# Patient Record
Sex: FEMALE | Race: WHITE | URBAN - METROPOLITAN AREA
[De-identification: names, ages, dates, MRNs, and addresses within clinical notes are randomized per-mention and may not be internally consistent; named-entity substitution may affect disease eponyms.]

---

## 2020-09-02 ENCOUNTER — INPATIENT (INPATIENT)
Facility: HOSPITAL | Age: 60
LOS: 12 days | Discharge: HOME | End: 2020-09-15
Attending: PSYCHIATRY & NEUROLOGY | Admitting: PSYCHIATRY & NEUROLOGY
Payer: MEDICARE

## 2020-09-02 VITALS
DIASTOLIC BLOOD PRESSURE: 88 MMHG | WEIGHT: 164.91 LBS | RESPIRATION RATE: 18 BRPM | SYSTOLIC BLOOD PRESSURE: 136 MMHG | TEMPERATURE: 98 F | OXYGEN SATURATION: 100 % | HEIGHT: 61 IN | HEART RATE: 98 BPM

## 2020-09-02 DIAGNOSIS — F25.9 SCHIZOAFFECTIVE DISORDER, UNSPECIFIED: ICD-10-CM

## 2020-09-02 LAB
ALBUMIN SERPL ELPH-MCNC: 4.1 G/DL — SIGNIFICANT CHANGE UP (ref 3.5–5.2)
ALP SERPL-CCNC: 107 U/L — SIGNIFICANT CHANGE UP (ref 30–115)
ALT FLD-CCNC: 16 U/L — SIGNIFICANT CHANGE UP (ref 0–41)
ANION GAP SERPL CALC-SCNC: 11 MMOL/L — SIGNIFICANT CHANGE UP (ref 7–14)
APAP SERPL-MCNC: <5 UG/ML — LOW (ref 10–30)
AST SERPL-CCNC: 16 U/L — SIGNIFICANT CHANGE UP (ref 0–41)
BASOPHILS # BLD AUTO: 0.04 K/UL — SIGNIFICANT CHANGE UP (ref 0–0.2)
BASOPHILS NFR BLD AUTO: 0.5 % — SIGNIFICANT CHANGE UP (ref 0–1)
BILIRUB SERPL-MCNC: 0.2 MG/DL — SIGNIFICANT CHANGE UP (ref 0.2–1.2)
BUN SERPL-MCNC: 16 MG/DL — SIGNIFICANT CHANGE UP (ref 10–20)
CALCIUM SERPL-MCNC: 9.8 MG/DL — SIGNIFICANT CHANGE UP (ref 8.5–10.1)
CHLORIDE SERPL-SCNC: 104 MMOL/L — SIGNIFICANT CHANGE UP (ref 98–110)
CO2 SERPL-SCNC: 25 MMOL/L — SIGNIFICANT CHANGE UP (ref 17–32)
CREAT SERPL-MCNC: 0.8 MG/DL — SIGNIFICANT CHANGE UP (ref 0.7–1.5)
EOSINOPHIL # BLD AUTO: 0.18 K/UL — SIGNIFICANT CHANGE UP (ref 0–0.7)
EOSINOPHIL NFR BLD AUTO: 2.2 % — SIGNIFICANT CHANGE UP (ref 0–8)
ETHANOL SERPL-MCNC: <10 MG/DL — SIGNIFICANT CHANGE UP
GLUCOSE SERPL-MCNC: 121 MG/DL — HIGH (ref 70–99)
HCT VFR BLD CALC: 33.9 % — LOW (ref 37–47)
HGB BLD-MCNC: 11.7 G/DL — LOW (ref 12–16)
IMM GRANULOCYTES NFR BLD AUTO: 0.6 % — HIGH (ref 0.1–0.3)
LYMPHOCYTES # BLD AUTO: 2.04 K/UL — SIGNIFICANT CHANGE UP (ref 1.2–3.4)
LYMPHOCYTES # BLD AUTO: 25.5 % — SIGNIFICANT CHANGE UP (ref 20.5–51.1)
MCHC RBC-ENTMCNC: 30.1 PG — SIGNIFICANT CHANGE UP (ref 27–31)
MCHC RBC-ENTMCNC: 34.5 G/DL — SIGNIFICANT CHANGE UP (ref 32–37)
MCV RBC AUTO: 87.1 FL — SIGNIFICANT CHANGE UP (ref 81–99)
MONOCYTES # BLD AUTO: 0.63 K/UL — HIGH (ref 0.1–0.6)
MONOCYTES NFR BLD AUTO: 7.9 % — SIGNIFICANT CHANGE UP (ref 1.7–9.3)
NEUTROPHILS # BLD AUTO: 5.07 K/UL — SIGNIFICANT CHANGE UP (ref 1.4–6.5)
NEUTROPHILS NFR BLD AUTO: 63.3 % — SIGNIFICANT CHANGE UP (ref 42.2–75.2)
NRBC # BLD: 0 /100 WBCS — SIGNIFICANT CHANGE UP (ref 0–0)
PLATELET # BLD AUTO: 247 K/UL — SIGNIFICANT CHANGE UP (ref 130–400)
POTASSIUM SERPL-MCNC: 4.3 MMOL/L — SIGNIFICANT CHANGE UP (ref 3.5–5)
POTASSIUM SERPL-SCNC: 4.3 MMOL/L — SIGNIFICANT CHANGE UP (ref 3.5–5)
PROT SERPL-MCNC: 6.8 G/DL — SIGNIFICANT CHANGE UP (ref 6–8)
RBC # BLD: 3.89 M/UL — LOW (ref 4.2–5.4)
RBC # FLD: 13.2 % — SIGNIFICANT CHANGE UP (ref 11.5–14.5)
SARS-COV-2 RNA SPEC QL NAA+PROBE: SIGNIFICANT CHANGE UP
SODIUM SERPL-SCNC: 140 MMOL/L — SIGNIFICANT CHANGE UP (ref 135–146)
WBC # BLD: 8.01 K/UL — SIGNIFICANT CHANGE UP (ref 4.8–10.8)
WBC # FLD AUTO: 8.01 K/UL — SIGNIFICANT CHANGE UP (ref 4.8–10.8)

## 2020-09-02 PROCEDURE — 99285 EMERGENCY DEPT VISIT HI MDM: CPT

## 2020-09-02 PROCEDURE — 90792 PSYCH DIAG EVAL W/MED SRVCS: CPT | Mod: 95

## 2020-09-02 RX ORDER — HALOPERIDOL DECANOATE 100 MG/ML
5 INJECTION INTRAMUSCULAR AT BEDTIME
Refills: 0 | Status: DISCONTINUED | OUTPATIENT
Start: 2020-09-02 | End: 2020-09-15

## 2020-09-02 RX ORDER — HALOPERIDOL DECANOATE 100 MG/ML
5 INJECTION INTRAMUSCULAR EVERY 6 HOURS
Refills: 0 | Status: DISCONTINUED | OUTPATIENT
Start: 2020-09-02 | End: 2020-09-15

## 2020-09-02 NOTE — ED PROVIDER NOTE - NS ED ROS FT
Review of Systems:  	•	CONSTITUTIONAL - no fever, no diaphoresis, no chills  	•	SKIN - no rash  	•	HEMATOLOGIC - no bleeding, no bruising  	•	EYES - no eye pain, no blurry vision  	•	ENT - no change in hearing, no sore throat, no ear pain or tinnitus  	•	RESPIRATORY - no shortness of breath, no cough  	•	CARDIAC - no chest pain, no palpitations  	•	GI - no abd pain, no nausea, no vomiting, no diarrhea, no constipation  	•	GENITO-URINARY - no discharge, no dysuria; no hematuria, no increased urinary frequency  	•	MUSCULOSKELETAL - no joint paint, no swelling, no redness  	•	NEUROLOGIC - no weakness, no headache, no paresthesias, no LOC  	•	PSYCH - no anxiety, non suicidal, non homicidal, auditory hallucination, no depression

## 2020-09-02 NOTE — ED BEHAVIORAL HEALTH ASSESSMENT NOTE - RISK ASSESSMENT
moderate risk, due to being unable to care for herself due to psychosis    elevated chronic risk due to significant hx of psychotic disorder, depressive episodes, hospitalizations Moderate Acute Suicide Risk

## 2020-09-02 NOTE — ED PROVIDER NOTE - OBJECTIVE STATEMENT
this is a 60 yo female who presents to ed for evaluation of hearing voices for past couple of weeks.

## 2020-09-02 NOTE — H&P ADULT - NSHPPHYSICALEXAM_GEN_ALL_CORE
ICU Vital Signs Last 24 Hrs  T(C): 36 (02 Sep 2020 23:00), Max: 36.5 (02 Sep 2020 15:04)  T(F): 96.8 (02 Sep 2020 23:00), Max: 97.7 (02 Sep 2020 15:04)  HR: 86 (02 Sep 2020 23:00) (86 - 98)  BP: 136/81 (02 Sep 2020 23:00) (130/76 - 136/88)  RR: 18 (02 Sep 2020 23:00) (17 - 18)  SpO2: 100% (02 Sep 2020 23:00) (100% - 100%)    Constitutional: NAD, well-nourished, non toxic appearing   HEENT: Airway patent, moist MM, no erythema/swelling/deformity of oral structures. EOMI, PERRLA.  CV: regular rate, regular rhythm, well-perfused extremities, 2+ b/l DP and radial pulses equal.  Lungs: BCTA, no increased WOB.  ABD: NTND, no guarding or rebound, no pulsatile mass, no hernias.   MSK: Neck supple, nontender, nl ROM, no stepoff. Chest nontender. Back nontender in TLS spine or to b/l bony structures or flanks. Ext nontender, nl rom, no deformity.   INTEG: Skin warm, dry, no rash.  NEURO: A&Ox3, normal strength, nl sensation throughout, normal speech.   PSYCH: Denies SI/HI/hallucinations

## 2020-09-02 NOTE — ED BEHAVIORAL HEALTH ASSESSMENT NOTE - HPI (INCLUDE ILLNESS QUALITY, SEVERITY, DURATION, TIMING, CONTEXT, MODIFYING FACTORS, ASSOCIATED SIGNS AND SYMPTOMS)
59 y o single, undomiciled, , disabled woman, no children/dependents, hx of schizoaffective d/o, multiple lifetime hospitalizations, no known suicidality/aggression/legal/substance/abuse hx; medical hx of leukemia in remission more than twenty years; bib self, c/o AH in the setting of medication noncompliance.  Patient  is unkempt, dissheveled, poorly related. she reports a long hx of schizoaffective d/o and multiple lifetime hospitalizations. she reports that one month ago, she had been living with her mom, but they were evicted and mother moved into a nursing home and pt was made homeless. she had a relapse of her schizoaffective d/o, and was hospitalized at [facility] for a week, discharged last friday. she was treated with haldol 5mg bid, and discharged wihtout any medications or follow up. she went to a drop in center where she began feeling paranoid that people were stealing from her, and "the voices started driving me crazy." she is praying all day, believes she is talking to God, that she works for god, praying to God and trying to "make good things happen." she feels hopeless and scared without medication or stable housing, and feels that she will not make it without more support. she is hearing voices, saying "you're good," and "to work for God." she denies command hallucinations. she has a hx of severe depressive episodes and feels she is headed that way without intervention.   called [facility], [phone], asked to be connected to inpatient psychiatry and was not connected.

## 2020-09-02 NOTE — ED BEHAVIORAL HEALTH ASSESSMENT NOTE - PSYCHIATRIC ISSUES AND PLAN (INCLUDE STANDING AND PRN MEDICATION)
haldol 5mg po qhs. haldol/ativan prn agitation. voluntary admission to [facility] once medically cleared

## 2020-09-02 NOTE — H&P ADULT - NSHPLABSRESULTS_GEN_ALL_CORE
11.7   8.01  )-----------( 247      ( 02 Sep 2020 16:34 )             33.9       09-02    140  |  104  |  16  ----------------------------<  121<H>  4.3   |  25  |  0.8    Ca    9.8      02 Sep 2020 16:34    TPro  6.8  /  Alb  4.1  /  TBili  0.2  /  DBili  x   /  AST  16  /  ALT  16  /  AlkPhos  107  09-02    Alcohol, Blood (09.02.20 @ 16:34)    Alcohol, Blood: <10 mg/dL    Acetaminophen Level, Serum (09.02.20 @ 16:34)    Acetaminophen Level, Serum: <5.0 ug/mL

## 2020-09-02 NOTE — H&P ADULT - ASSESSMENT
58 yo F h/o schizophrenia presents for psych evaluation with complaints of auditory hallucinations    admit to IPP  care as per psych   denies medical hx/no daily medications   regular diet   oob/ambulate

## 2020-09-02 NOTE — ED BEHAVIORAL HEALTH NOTE - BEHAVIORAL HEALTH NOTE
PRE-HOSPITAL COURSE  ===================  SOURCE:  Second-hand information via EMR documentation and primary RN Lauren.  DETAILS: Patient self-presented to the ED with no noted incidents.   ===================  ED COURSE:   SOURCE:  Second-hand information via EMR documentation and primary RNLauren.  ARRIVAL:  Patient self-presented to the ED with no noted incidents.  BELONGINGS:  Clothing.   BEHAVIOR: Complied with triage protocols –provided blood/urine, changed into a hospital gown, allowed staff to wand/collect belongings without incident, denies SI, denies HI, noted to be euthymic with mood congruent affect, speech is at a normal rate, appropriate volume, linear thought content, good hygiene, good eye contact, and AXO4. Per chart, patient reported hearing voices telling her “your good”; RN stated patient endorsed AH previously but not currently in the ED. RN reported that patient spent majority of her time sleeping, no aggression or behavioral issues reported.   TREATMENT: No prn medications, restraints, security interventions or manual holds required.   VISITORS: Is unaccompanied by family or social supports. PRE-HOSPITAL COURSE  ===================  SOURCE:  Second-hand information via EMR documentation and primary RN Lauren.  DETAILS: Patient self-presented to the ED with no noted incidents.   ===================  ED COURSE:   SOURCE:  Second-hand information via EMR documentation and primary RNLauren.  ARRIVAL:  Patient self-presented to the ED with no noted incidents.  BELONGINGS:  Clothing.   BEHAVIOR: Complied with triage protocols –provided blood/urine, changed into a hospital gown, allowed staff to wand/collect belongings without incident, denies SI, denies HI, noted to be euthymic with mood congruent affect, speech is at a normal rate, appropriate volume, linear thought content, good hygiene, good eye contact, and AXO4. Per chart, patient reported hearing voices telling her “your good”; RN stated patient endorsed AH previously but not currently in the ED. RN reported that patient spent majority of her time sleeping, no aggression or behavioral issues reported.   TREATMENT: No prn medications, restraints, security interventions or manual holds required.   VISITORS: Is unaccompanied by family or social supports.    1.        *In the past 14 days, has the patient been around anyone with a positive COVID-19 test?*  (  ) Yes   (  X) No   (  ) Unknown- Reason (e.g. collateral uncertain, refusing to answer, etc.):  ______  IF YES PROCEED TO QUESTION #2. IF NO or UNKNOWN, PLEASE SKIP TO QUESTION #7  2.        Was the patient within 6 feet of them for at least 15 minutes? (  ) Yes   (  ) No   (  ) Unknown- Reason: ______   3.        Did the patient provide care for them? (  ) Yes   (  ) No   (  ) Unknown- Reason: ______   4.        Did the patient have direct physical contact with them (touched, hugged, or kissed them)? (  ) Yes   (  ) No    (  ) Unknown- Reason: ______   5.        Did the patient share eating or drinking utensils with them? (  ) Yes   (  ) No    (  ) Unknown- Reason: ______   6.        Have they sneezed, coughed, or somehow got respiratory droplets on the patient? (  ) Yes   (  ) No    (  ) Unknown- Reason: ______   7.        *Has the patient been out of [place] within the past 14 days?*  (  ) Yes   ( X ) No   (  ) Unknown- Reason (e.g. collateral uncertain, refusing to answer, etc.): _______  IF YES PLEASE ANSWER THE FOLLOWING QUESTIONS:  8.        Which state/country has the patient been to? ______   9.        Was the patient there over 24 hours? (  ) Yes   (  ) No    (  ) Unknown- Reason: ______   10.     What date did the patient return to [facility]? ______

## 2020-09-02 NOTE — ED BEHAVIORAL HEALTH ASSESSMENT NOTE - ACTIVATING EVENTS/STRESSORS
Inadequate social supports/Triggering events leading to humiliation, shame, and/or despair (e.g. Loss of relationship, financial or health status) (real or anticipated)/Non-compliant or not receiving treatment/Pending incarceration or homelessness/Recent inpatient discharge

## 2020-09-02 NOTE — H&P ADULT - HISTORY OF PRESENT ILLNESS
pt is a 60 yo F h/o schizophrenia presents for psych evaluation. pt admits to auditory hallucinations, no suicidal or homicidal thoughts. Denies illicit drug or etoh use. Denies fever, chills, cp, sob, N/V/D, abdominal pain, dysuria.

## 2020-09-02 NOTE — ED BEHAVIORAL HEALTH ASSESSMENT NOTE - DETAILS
MARTHA discharged 8/28/20 none known sister - schizoaffective d/o self d/w Amy [facility]

## 2020-09-02 NOTE — ED BEHAVIORAL HEALTH ASSESSMENT NOTE - SUMMARY
59 y o single, undomiciled, , disabled woman, no children/dependents, hx of schizoaffective d/o, multiple lifetime hospitalizations, no known suicidality/aggression/legal/substance/abuse hx; medical hx of leukemia in remission more than twenty years; bib self, c/o AH in the setting of medication noncompliance.  pt presents with psychotic symptoms (dissheveled, negative symptoms, thought disorder, paranoia, auditory hallucinations). she is newly homeless, and has very little social support. she is noncompliant with her medications after a recent discharge and actively psychotic. she is an impending danger to self due to being unable to care for herself and would benefit from inpatient hospitalization once medically cleared.

## 2020-09-02 NOTE — ED BEHAVIORAL HEALTH ASSESSMENT NOTE - DESCRIPTION
used to live in group home in 2010, [facility]. more recently was living with mother, now homeless. on disability never /no children see bh note     COVID Exposure Screen- Patient    1.	*In the past 14 days, have you been around anyone with a positive COVID-19 test?*   (  ) Yes   (  x) No   (  ) Unknown- Reason (e.g. patient uncertain, sedated, refusing to answer, etc.):  ______  IF YES PROCEED TO QUESTION #2. IF NO or UNKNOWN, PLEASE SKIP TO QUESTION #7  2.	Were you within 6 feet of them for at least 15 minutes? (  ) Yes   (  ) No   (  ) Unknown- Reason: ______    3.	Have you provided care for them? (  ) Yes   (  ) No   (  ) Unknown- Reason: ______    4.	Have you had direct physical contact with them (touched, hugged, or kissed them)?  (  ) Yes   (  ) No    (  ) Unknown- Reason: ______    5.	Have you shared eating or drinking utensils with them? (  ) Yes   (  ) No    (  ) Unknown- Reason: ______    6.	Have they sneezed, coughed, or somehow got respiratory droplets on you? (  ) Yes   (  ) No    (  ) Unknown- Reason: ______      7.	*Have you been out of [place] within the past 14 days?*  (  ) Yes   (x  ) No   (  ) Unknown- Reason (e.g. patient uncertain, sedated, refusing to answer, etc.): _______  IF YES PLEASE ANSWER THE FOLLOWING QUESTIONS:  8.	Which state/country have you been to? ______   9.	Were you there over 24 hours? (  ) Yes   (  ) No    (  ) Unknown- Reason: ______    10.	What date did you return to [facility]? ______ hx leukemia in remission more than twenty years

## 2020-09-02 NOTE — ED ADULT TRIAGE NOTE - CHIEF COMPLAINT QUOTE
pt c/o of hearing voices, states voices are telling her "your good", denies SI/HI, requesting psych eval

## 2020-09-02 NOTE — H&P ADULT - NSHPREVIEWOFSYSTEMS_GEN_ALL_CORE
Constitutional: (-) fever (-) chills   Eyes: (-) visual changes  ENMT: (-) nasal or chest congestion (-) runny nose (-) sore throat (-) neck pain (-) neck stiffness  Cardiac: (-) chest pain (-) syncope  Respiratory: (-) cough (-) SOB  GI: (-) nausea (-) vomiting (-) diarrhea  : (-) incontinence  MS:(-) back pain   Neuro: (-) head injury (-) headache (-) dizziness (-) numbness/tingling to extremities B/L (-) LOC   Skin: (-) abrasion (-) rash (-) laceration  Except as documented in the HPI, all other systems are negative.

## 2020-09-02 NOTE — ED PROVIDER NOTE - ATTENDING CONTRIBUTION TO CARE
I was present for and supervised the key and critical aspects of the procedures performed during the care of the patient. ATTENDING NOTE: 60 y/o F presents requesting psychiatric evaluation. Pt is complaining that she is hearing voices telling her “Your good.” Pt denies any drug or alcohol use and states she has no SI or HI. No other medical complaints at this time. On exam: NCAT. PERRLA, EOMI. OP clear. Lungs CTAB. RRR, S1S2 noted. Radial pulses 2+ and equal, pedal pulses 2+ and equal. Abdomen soft, NT/ND, no rebound or guarding. FROM x4 extremities. No focal neuro deficits. Plan: Will obtain labs and consult psych likely admission to [facility]

## 2020-09-02 NOTE — ED PROVIDER NOTE - CCCP TRG CHIEF CMPLNT
Body Location Override (Optional - Billing Will Still Be Based On Selected Body Map Location If Applicable): left vertex scalp
Add 40492 Cpt? (Important Note: In 2017 The Use Of 69877 Is Being Tracked By Cms To Determine Future Global Period Reimbursement For Global Periods): no
Detail Level: Detailed
psychiatric evaluation

## 2020-09-02 NOTE — ED PROVIDER NOTE - PROGRESS NOTE DETAILS
psych called back and states case must wait for telepsych ATTENDING NOTE: 60 y/o F presents requesting psychiatric evaluation. Pt is complaining that she is hearing voices telling her “Your good.” Pt denies any drug or alcohol use and states she has no SI or HI. No other medical complaints at this time. On exam: NCAT. PERRLA, EOMI. OP clear. Lungs CTAB. RRR, S1S2 noted. Radial pulses 2+ and equal, pedal pulses 2+ and equal. Abdomen soft, NT/ND, no rebound or guarding. FROM x4 extremities. No focal neuro deficits. Plan: Will obtain labs and consult psych. patient spoke to psych and will be admitted to psych

## 2020-09-02 NOTE — ED ADULT TRIAGE NOTE - HEART RATE (BEATS/MIN)
Patient  is scheduled for a COLONOSCOPY, under MAC Sedation, with DR MAURICIO on 12/12/2018 for tubulovillous adenoma and family history of colon cancer.   Patient advised to contact insurance company to verify coverage as this is a Diagnostic procedure due to the diagnoses listed.   Patient also advise to see PCP within 30 days prior to procedure for clearance for Anesthesia. Patient verbalized understanding.Patient also advised they may receive call from Pre-Registration regarding a $250 Time Of Service Payment.    Please send Service to family Practice for H&P.     98

## 2020-09-03 LAB
A1C WITH ESTIMATED AVERAGE GLUCOSE RESULT: 8.8 % — HIGH (ref 4–5.6)
AMPHET UR-MCNC: NEGATIVE — SIGNIFICANT CHANGE UP
BARBITURATES UR SCN-MCNC: NEGATIVE — SIGNIFICANT CHANGE UP
BENZODIAZ UR-MCNC: NEGATIVE — SIGNIFICANT CHANGE UP
CHOLEST SERPL-MCNC: 230 MG/DL — HIGH (ref 100–200)
COCAINE METAB.OTHER UR-MCNC: NEGATIVE — SIGNIFICANT CHANGE UP
DRUG SCREEN 1, URINE RESULT: SIGNIFICANT CHANGE UP
ESTIMATED AVERAGE GLUCOSE: 206 MG/DL — HIGH (ref 68–114)
GLUCOSE BLDC GLUCOMTR-MCNC: 143 MG/DL — HIGH (ref 70–99)
GLUCOSE BLDC GLUCOMTR-MCNC: 167 MG/DL — HIGH (ref 70–99)
HCV AB S/CO SERPL IA: 0.04 COI — SIGNIFICANT CHANGE UP
HCV AB SERPL-IMP: SIGNIFICANT CHANGE UP
HDLC SERPL-MCNC: 44 MG/DL — LOW
LIPID PNL WITH DIRECT LDL SERPL: 148 MG/DL — HIGH (ref 4–129)
METHADONE UR-MCNC: NEGATIVE — SIGNIFICANT CHANGE UP
OPIATES UR-MCNC: NEGATIVE — SIGNIFICANT CHANGE UP
PCP UR-MCNC: NEGATIVE — SIGNIFICANT CHANGE UP
PROPOXYPHENE QUALITATIVE URINE RESULT: NEGATIVE — SIGNIFICANT CHANGE UP
THC UR QL: NEGATIVE — SIGNIFICANT CHANGE UP
TOTAL CHOLESTEROL/HDL RATIO MEASUREMENT: 5.2 RATIO — SIGNIFICANT CHANGE UP (ref 4–5.5)
TRIGL SERPL-MCNC: 285 MG/DL — HIGH (ref 10–149)

## 2020-09-03 PROCEDURE — 99232 SBSQ HOSP IP/OBS MODERATE 35: CPT | Mod: GC

## 2020-09-03 RX ADMIN — HALOPERIDOL DECANOATE 5 MILLIGRAM(S): 100 INJECTION INTRAMUSCULAR at 20:03

## 2020-09-03 RX ADMIN — HALOPERIDOL DECANOATE 5 MILLIGRAM(S): 100 INJECTION INTRAMUSCULAR at 10:10

## 2020-09-03 NOTE — PROGRESS NOTE BEHAVIORAL HEALTH - NSBHCHARTREVIEWINVESTIGATE_PSY_A_CORE FT
< from: 12 Lead ECG (09.02.20 @ 21:50) >      Ventricular Rate 75 BPM    Atrial Rate 75 BPM    P-R Interval 166 ms    QRS Duration 86 ms    Q-T Interval 374 ms    QTC Calculation(Bezet) 417 ms    P Axis 54 degrees    R Axis -11 degrees    T Axis 18 degrees    Diagnosis Line Normal sinus rhythm  Minimal voltage criteria for LVH, may be normal variant  Borderline ECG    Confirmed by RAMA CHAVEZ, BRIAN ([id number]) on 9/3/2020 11:05:58 AM    < end of copied text > < from: 12 Lead ECG (09.02.20 @ 21:50) >  Ventricular Rate 75 BPM  Atrial Rate 75 BPM  P-R Interval 166 ms  QRS Duration 86 ms  Q-T Interval 374 ms  QTC Calculation(Bezet) 417 ms  < end of copied text >

## 2020-09-03 NOTE — PROGRESS NOTE BEHAVIORAL HEALTH - NSBHFUPADDHPIFT_PSY_A_CORE
Patient presented to [facility] psych unit hearing voices. She was there for 2 days, the voices stopped and she was discharged to a homeless shelter. She decided to stay at a hotel instead. She was not told the pharmacy her medications were sent to, nor discharged with medication. Patient presented to [facility] psych unit hearing voices approximately 3 weeks ago. She was there for 2 weeks, the voices stopped after 2 days and she was discharged to a homeless shelter. She decided to stay at a hotel instead. She was not told the pharmacy her medications were sent to, nor discharged with medication, and the voices return. Patient presented to [facility] psych unit hearing voices approximately 3 weeks ago. She was there for 2 weeks, the voices stopped after 2 days and she was discharged to a homeless shelter. She decided to stay at a hotel instead. She was not told the pharmacy her medications were sent to, nor discharged with medication, and the voices returned.
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
## 2020-09-03 NOTE — PROGRESS NOTE BEHAVIORAL HEALTH - NSBHFUPSTRENGTHS_PSY_A_CORE
Knowledge of medications/In good physical health/Has supportive interpersonal relationships with family, friends or peers/Cooperative with treatment/Motivated and ready for change

## 2020-09-03 NOTE — PROGRESS NOTE BEHAVIORAL HEALTH - NSBHADDHXPSYCHFT_PSY_A_CORE
Patient reports being depressed after not being able to find a job, was prescribed Prozac for a few years and stopped when her depressive symptoms ceased. She is currently not depressed. She was diagnosed with psychosis in 2001/2002 and in 2004 the diagnosis was changed from psychosis to schizoaffective disorder. Patient reports being on Abilify for 11 years. Patient reports the voices go away with medication. Patient reports being depressed after not being able to find a job in the late 90s/early 2000s, and she was prescribed Prozac for a few years and stopped when her depressive symptoms ceased. She is currently not depressed. She was diagnosed with psychosis in 2001/2002 and in 2004 the diagnosis was changed from psychosis to schizoaffective disorder. Patient reports being on Abilify for 11 years. Patient reports the voices go away with medication. Patient reports being depressed after not being able to find a job in the late 90s/early 2000s, and she was prescribed Prozac for a few years and stopped when her depressive symptoms ceased. She is currently not depressed. She was diagnosed with psychosis in 2001/2002 and in 2004 the diagnosis was changed from psychosis to schizoaffective disorder. Patient reports being on Abilify for 11 years, but prefers haldol as it is cheaper. Patient reports the voices go away with medication.

## 2020-09-03 NOTE — PROGRESS NOTE BEHAVIORAL HEALTH - SUMMARY
59 y o single, un domiciled, , disabled woman, no children/dependents, hx of schizoaffective d/o, multiple lifetime hospitalizations, no known suicidality/aggression/legal/substance/abuse hx; medical hx of leukemia in remission more than twenty years; bib self, c/o AH in the setting of medication noncompliance.      On evaluation, patient is pleasant and highly cooperative. No psychosis evident, patient denies SI/HI. Patient's main concern is disposition planning currently.     Schizoaffective disorder      - Haldol 5 mg PO daily     For severe agitation not responding to behavioral intervention, may give haldol 5 mg po q6h prn, ativan 2 mg po q6h prn, hydroxyzine 50 mg po q6h prn, with escalation to IM if patient refusing PO and remains an imminent danger to self or others. If IM antipsychotic is administered, please perform follow-up ECG for QTc monitoring.
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
## 2020-09-03 NOTE — CHART NOTE - NSCHARTNOTEFT_GEN_A_CORE
Pt. is a 59 year old, single,  female with a diagnosis of schizoaffective disorder.  Pt has a history of multiple IPP admissions and was discharged from [facility] about 1 week ago.  She reports auditory hallucinations resumed about 3 days ago.  She also reported worsening depression symptoms.  There is no known history of suicide attempts.  Pt. denies any substance abuse history of current legal issues.  Pt. reports he mental health issues resurfaced about 1 month ago after she and her mother were evicted from their apartment.  She reports her mother then moved into a nursing home and pt. has been living at the [facility].  Pt.'s admission appears to be motivated by her need for housing which she requested.  Pt. was informed adult home referrals would be explored.  There are no known current medical issues.  Pt. has been in recovery from Leukemia for the past year.  She is not engaged in any type of outpatient treatment at this time.    Pt. is not  and does not have children.  She is undomiciled and resides at [facility].  Pt. is unemployed/Disabled.  Pt. will be referred to an adult home. If she is not accepted, pt. will be referred to the shelter system or back to [facility] along with outpatient mental health treatment.    Sexual History-  Pt. identifies as heterosexual    Family History-  Pt. is not  and does not have children.  There is no known family history of mental health issues    Suicide attempts and self-injurious behaviors-  No known history    Community Supports-  None known    Substance Use Assessment-  No known history    Traumatic Losses-  None reported    Leisure Activity Assessment-  TV, music    Life Goals-  Pt. unable to answer      Pt. is not stable for discharge at this time.

## 2020-09-03 NOTE — PROGRESS NOTE BEHAVIORAL HEALTH - NSBHFUPINTERVALHXFT_PSY_A_CORE
Patient was interviewed with team. She reports a 3 day history of hearing voices. They occur throughout the day, there are multiple, and they state that she is "working for God" and doing "great things" with regard to praying and her spirituality. She states she is strong in her Moravian zia. Patient reports she knows the voices are not real. Patient denies the voices ever telling her to hurt herself or other people. She states 45 minutes after given Haldol, the voices stopped. She denies past trauma, suicidal ideation, homicidal ideation, feelings of grandiosity or impulsiveness, periods of time where she felt she did not need sleep, and visual hallucinations. Patient was interviewed with team. She reports a 3-day history of hearing voices. The voices are heard throughout the day, there are multiple, and they commonly state that she is "working for God" and doing "great things" with regard to praying and her spirituality. She states she is strong in her Hindu zia, and has been Lutheran prior to the onset of her mental illness. Patient reports she knows the voices are not real. Patient denies the voices ever telling her to hurt herself or other people. She states 45 minutes after given Haldol, the voices stopped. She denies past trauma, suicidal ideation, homicidal ideation, feelings of grandiosity or impulsiveness, periods of time where she felt she did not need sleep, and visual hallucinations.

## 2020-09-03 NOTE — PROGRESS NOTE BEHAVIORAL HEALTH - MODIFICATIONS
seen/discussed with resident.  No s/h ideation or psychosis at this time. Pt is motivated to rsume meds and will also need housing

## 2020-09-03 NOTE — PROGRESS NOTE BEHAVIORAL HEALTH - NSBHADDHXFAMFT_PSY_A_CORE
Her sister has been diagnosed with schizoaffective disorder with Bipolar. Her brother has been diagnosed with PTSD from an incident at his employment ().

## 2020-09-03 NOTE — CONSULT NOTE ADULT - ASSESSMENT
60 yo F h/o schizophrenia and remote history of oral hairy cell leukemia s/p treatment and currently in remission, presents for voluntary psychiatric hold due to auditory hallucinations with no active intent for self harm. At this time patient has no chronic or active medical issues. Her ROS and physical exam are unremarkable. She did have an elevated glucose on BMP but unsure if this was post meal.    # elevated blood glucose  - would benefit a fasting serum glucose or a Hgb A1c  - will follow up on results to review lab data    # herniated disc  - no active pain or ROM limitation at this time  - non pharmacological interventions such as stretching or PT  - if necessary, ibuprofen 200mg TID    #Schizoaffective disorder  - management per primary team  - routine EKG for QTc prolongation; last one was 417(gender cutoff 450)

## 2020-09-04 LAB
GLUCOSE BLDC GLUCOMTR-MCNC: 124 MG/DL — HIGH (ref 70–99)
GLUCOSE BLDC GLUCOMTR-MCNC: 130 MG/DL — HIGH (ref 70–99)
SARS-COV-2 IGG SERPL QL IA: NEGATIVE — SIGNIFICANT CHANGE UP
SARS-COV-2 IGM SERPL IA-ACNC: 0.07 INDEX — SIGNIFICANT CHANGE UP

## 2020-09-04 PROCEDURE — 99232 SBSQ HOSP IP/OBS MODERATE 35: CPT | Mod: GC

## 2020-09-04 PROCEDURE — 99232 SBSQ HOSP IP/OBS MODERATE 35: CPT

## 2020-09-04 RX ADMIN — HALOPERIDOL DECANOATE 5 MILLIGRAM(S): 100 INJECTION INTRAMUSCULAR at 20:07

## 2020-09-04 NOTE — PROGRESS NOTE BEHAVIORAL HEALTH - MODIFICATIONS
seen/discussed with resident.  No s/h ideation or overt psychosis at this time. Remains motivated to feel better and to obtain housing.

## 2020-09-04 NOTE — PROGRESS NOTE BEHAVIORAL HEALTH - NSBHCHARTREVIEWINVESTIGATE_PSY_A_CORE FT
< from: 12 Lead ECG (09.02.20 @ 21:50) >  Ventricular Rate 75 BPM  Atrial Rate 75 BPM  P-R Interval 166 ms  QRS Duration 86 ms  Q-T Interval 374 ms  QTC Calculation(Bezet) 417 ms  < end of copied text >

## 2020-09-04 NOTE — PROGRESS NOTE BEHAVIORAL HEALTH - SUMMARY
59 y o single, un domiciled, , disabled woman, no children/dependents, hx of schizoaffective d/o, multiple lifetime hospitalizations, no known suicidality/aggression/legal/substance/abuse hx; medical hx of leukemia in remission more than twenty years; bib self, c/o AH in the setting of medication noncompliance.      On evaluation, patient is pleasant and highly cooperative. No psychosis evident, patient denies SI/HI. Patient's main concern is disposition planning currently.     Schizoaffective disorder      - Haldol 5 mg PO daily     For severe agitation not responding to behavioral intervention, may give haldol 5 mg po q6h prn, ativan 2 mg po q6h prn, hydroxyzine 50 mg po q6h prn, with escalation to IM if patient refusing PO and remains an imminent danger to self or others. If IM antipsychotic is administered, please perform follow-up ECG for QTc monitoring. 59 y o single, un domiciled, , disabled woman, no children/dependents, hx of schizoaffective d/o, multiple lifetime hospitalizations, no known suicidality/aggression/legal/substance/abuse hx; medical hx of leukemia in remission more than twenty years; bib self, c/o AH in the setting of medication noncompliance.    On evaluation, patient is pleasant and highly cooperative. No psychosis evident, patient denies SI/HI. Patient's main concern continues to center on housing.     Schizoaffective disorder      - Haldol 5 mg PO daily     For severe agitation not responding to behavioral intervention, may give haldol 5 mg po q6h prn, ativan 2 mg po q6h prn, hydroxyzine 50 mg po q6h prn, with escalation to IM if patient refusing PO and remains an imminent danger to self or others. If IM antipsychotic is administered, please perform follow-up ECG for QTc monitoring.

## 2020-09-04 NOTE — PROGRESS NOTE BEHAVIORAL HEALTH - NSBHFUPINTERVALHXFT_PSY_A_CORE
Patient was interviewed with team. She reports sleeping well, having a good appetite, no voices or other hallucinations, and no suicidality/ homicidally. Patient expresses concern that she needs housing. Patient was interviewed with team. She reports sleeping well, having a good appetite, no voices or other hallucinations, and no suicidality/homicidally. Patient expresses concern that she needs housing.

## 2020-09-05 LAB
GLUCOSE BLDC GLUCOMTR-MCNC: 116 MG/DL — HIGH (ref 70–99)
GLUCOSE BLDC GLUCOMTR-MCNC: 118 MG/DL — HIGH (ref 70–99)
GLUCOSE BLDC GLUCOMTR-MCNC: 151 MG/DL — HIGH (ref 70–99)

## 2020-09-05 RX ADMIN — Medication 2 MILLIGRAM(S): at 16:45

## 2020-09-05 RX ADMIN — HALOPERIDOL DECANOATE 5 MILLIGRAM(S): 100 INJECTION INTRAMUSCULAR at 20:03

## 2020-09-05 NOTE — PROGRESS NOTE BEHAVIORAL HEALTH - NSBHFUPINTERVALHXFT_PSY_A_CORE
Patient was interviewed , chart reviewed, discussed with staff . pt is compliant with meditations , no acute  event overnight . She reports sleeping well, having a good appetite, no voices or other hallucinations, and no suicidality/homicidally. Patient expresses concern that she needs housing.   She states medications are helping her

## 2020-09-05 NOTE — PROGRESS NOTE BEHAVIORAL HEALTH - SUMMARY
59 y o single, un domiciled, , disabled woman, no children/dependents, hx of schizoaffective d/o, multiple lifetime hospitalizations, no known suicidality/aggression/legal/substance/abuse hx; medical hx of leukemia in remission more than twenty years; bib self, c/o AH in the setting of medication noncompliance.    On evaluation, patient is pleasant and highly cooperative. No psychosis evident, patient denies SI/HI. Patient's main concern continues to center on housing.     Schizoaffective disorder      - Haldol 5 mg PO daily     For severe agitation not responding to behavioral intervention, may give haldol 5 mg po q6h prn, ativan 2 mg po q6h prn, hydroxyzine 50 mg po q6h prn, with escalation to IM if patient refusing PO and remains an imminent danger to self or others. If IM antipsychotic is administered, please perform follow-up ECG for QTc monitoring.

## 2020-09-05 NOTE — PROGRESS NOTE BEHAVIORAL HEALTH - NSBHCHARTREVIEWLAB_PSY_A_CORE FT
Lipid Profile (09.03.20 @ 07:54)    Total Cholesterol/HDL Ratio Measurement: 5.2 Ratio    Cholesterol, Serum: 230 mg/dL    Triglycerides, Serum: 285 mg/dL    HDL Cholesterol, Serum: 44: HDL Levels >/= 60 mg/dL are considered beneficial and a "negative" risk  factor.  Effective 08/15/2018: New reference range and interpretive comment. mg/dL    Direct LDL: 148: LDL Cholesterol (mg/dL) --- Interpretive Comment (for adults 18 and over)  Optimal LDL Level may vary based on clinical situation  Below 70                   Ideal for people at very high risk of heart      disease  Below 100                  Ideal for people at risk of heart disease  100 - 129                    [place]  130 - 159                    Borderline high  160 - 189                    High  190 and Above           Very high mg/dL
Lipid Profile (09.03.20 @ 07:54)    Total Cholesterol/HDL Ratio Measurement: 5.2 Ratio    Cholesterol, Serum: 230 mg/dL    Triglycerides, Serum: 285 mg/dL    HDL Cholesterol, Serum: 44: HDL Levels >/= 60 mg/dL are considered beneficial and a "negative" risk  factor.  Effective 08/15/2018: New reference range and interpretive comment. mg/dL    Direct LDL: 148: LDL Cholesterol (mg/dL) --- Interpretive Comment (for adults 18 and over)  Optimal LDL Level may vary based on clinical situation  Below 70                   Ideal for people at very high risk of heart  disease  Below 100                  Ideal for people at risk of heart disease  100 - 129                    [place]  130 - 159                    Borderline high  160 - 189                    High  190 and Above           Very high mg/dL
Lipid Profile (09.03.20 @ 07:54)    Total Cholesterol/HDL Ratio Measurement: 5.2 Ratio    Cholesterol, Serum: 230 mg/dL    Triglycerides, Serum: 285 mg/dL    HDL Cholesterol, Serum: 44: HDL Levels >/= 60 mg/dL are considered beneficial and a "negative" risk  factor.  Effective 08/15/2018: New reference range and interpretive comment. mg/dL    Direct LDL: 148: LDL Cholesterol (mg/dL) --- Interpretive Comment (for adults 18 and over)  Optimal LDL Level may vary based on clinical situation  Below 70                   Ideal for people at very high risk of heart      disease  Below 100                  Ideal for people at risk of heart disease  100 - 129                    [place]  130 - 159                    Borderline high  160 - 189                    High  190 and Above           Very high mg/dL

## 2020-09-06 LAB
GLUCOSE BLDC GLUCOMTR-MCNC: 134 MG/DL — HIGH (ref 70–99)
GLUCOSE BLDC GLUCOMTR-MCNC: 134 MG/DL — HIGH (ref 70–99)
GLUCOSE BLDC GLUCOMTR-MCNC: 142 MG/DL — HIGH (ref 70–99)
GLUCOSE BLDC GLUCOMTR-MCNC: 176 MG/DL — HIGH (ref 70–99)

## 2020-09-06 PROCEDURE — 99231 SBSQ HOSP IP/OBS SF/LOW 25: CPT

## 2020-09-06 RX ADMIN — HALOPERIDOL DECANOATE 5 MILLIGRAM(S): 100 INJECTION INTRAMUSCULAR at 20:13

## 2020-09-06 NOTE — PROGRESS NOTE BEHAVIORAL HEALTH - SUMMARY
Patient is a 58 yo single female, no children, hx of Schizoaffective dx, multiple lifetime hospitalizations, medical hx of leukemia in remission more than twenty years, admitted for voices in context of medication noncompliance.     Schizoaffective disorder  -Continue Haldol 5 mg PO daily     May give haldol 5 mg po q6h prn, ativan 2 mg po q6h prn, with escalation to IM if patient refusing PO and remains an imminent danger to self or others. If IM antipsychotic is administered, please perform follow-up ECG for QTc monitoring.

## 2020-09-06 NOTE — PROGRESS NOTE BEHAVIORAL HEALTH - NSBHFUPINTERVALHXFT_PSY_A_CORE
Patient reports sleeping, eating well, denies voices, paranoia or thoughts of self harm. She did not require prns overnight, has been adherent with Haldol and denies side effects.  She is hoping to obtain housing and has positive support from sister in .

## 2020-09-07 LAB
GLUCOSE BLDC GLUCOMTR-MCNC: 133 MG/DL — HIGH (ref 70–99)
GLUCOSE BLDC GLUCOMTR-MCNC: 143 MG/DL — HIGH (ref 70–99)
GLUCOSE BLDC GLUCOMTR-MCNC: 168 MG/DL — HIGH (ref 70–99)
GLUCOSE BLDC GLUCOMTR-MCNC: 169 MG/DL — HIGH (ref 70–99)

## 2020-09-07 PROCEDURE — 99231 SBSQ HOSP IP/OBS SF/LOW 25: CPT

## 2020-09-07 RX ADMIN — HALOPERIDOL DECANOATE 5 MILLIGRAM(S): 100 INJECTION INTRAMUSCULAR at 20:25

## 2020-09-07 RX ADMIN — Medication 2 MILLIGRAM(S): at 13:34

## 2020-09-07 NOTE — PROGRESS NOTE ADULT - SUBJECTIVE AND OBJECTIVE BOX
ESPINOZA CARRINGTON  59y  Female      Patient is a 59y old  Female who presents with a chief complaint of psych evaluation (03 Sep 2020 16:46)      INTERVAL HPI/OVERNIGHT EVENTS: no acute events. nothing medically active        T(C): 36.7 (09-07-20 @ 08:44), Max: 36.7 (09-07-20 @ 08:44)  HR: 97 (09-07-20 @ 08:44) (65 - 97)  BP: 121/84 (09-07-20 @ 08:44) (121/84 - 134/81)  RR: 18 (09-07-20 @ 08:44) (16 - 18)  SpO2: --  Wt(kg): --Vital Signs Last 24 Hrs  T(C): 36.7 (07 Sep 2020 08:44), Max: 36.7 (07 Sep 2020 08:44)  T(F): 98 (07 Sep 2020 08:44), Max: 98 (07 Sep 2020 08:44)  HR: 97 (07 Sep 2020 08:44) (65 - 97)  BP: 121/84 (07 Sep 2020 08:44) (121/84 - 134/81)  BP(mean): --  RR: 18 (07 Sep 2020 08:44) (16 - 18)  SpO2: --        PHYSICAL EXAM:  GENERAL: NAD, well-groomed, well-developed  NERVOUS SYSTEM:  Alert & Oriented X3,  PULMONARY: Clear to percussion bilaterally; No rales, rhonchi, wheezing, or rubs  CARDIOVASCULAR: Regular rate and rhythm; No murmurs, rubs, or gallops  GI: Soft, Nontender, Nondistended; Bowel sounds present  SKIN: No rashes or lesions

## 2020-09-07 NOTE — PROGRESS NOTE BEHAVIORAL HEALTH - SUMMARY
Summary (include case differential, formulation and patient response to therapy): Patient is a 60 yo single female, no children, hx of Schizoaffective dx, multiple lifetime hospitalizations, medical hx of leukemia in remission more than twenty years, admitted for voices in context of medication noncompliance.     Schizoaffective disorder  -Continue Haldol 5 mg PO daily   -Monitor EKG if prn haldol given

## 2020-09-07 NOTE — PROGRESS NOTE BEHAVIORAL HEALTH - NSBHFUPINTERVALHXFT_PSY_A_CORE
Pt reports some anxiety due to uncertainty with her housing situation. Did request ativan for anxiety yesterday. Sleeping well. Denies any AH.   Per RN, pt is cooperative and compliant on unit.

## 2020-09-07 NOTE — PROGRESS NOTE ADULT - ASSESSMENT
58 yo F h/o schizophrenia and remote history of oral hairy cell leukemia s/p treatment and currently in remission, presents for voluntary psychiatric hold due to auditory hallucinations with no active intent for self harm. At this time patient has no chronic or active medical issues. Her ROS and physical exam are unremarkable. She did have an elevated glucose on BMP but unsure if this was post meal.    # elevated blood glucose  - a1c is 5.4(within normal limits)  - would beneift with PCP follow up    # herniated disc  - no active pain or ROM limitation at this time  - non pharmacological interventions such as stretching or PT  - if necessary, ibuprofen 200mg TID    #Schizoaffective disorder  - management per primary team      Will sign off. Thank you for the consult.

## 2020-09-08 LAB
GLUCOSE BLDC GLUCOMTR-MCNC: 126 MG/DL — HIGH (ref 70–99)
GLUCOSE BLDC GLUCOMTR-MCNC: 134 MG/DL — HIGH (ref 70–99)
GLUCOSE BLDC GLUCOMTR-MCNC: 161 MG/DL — HIGH (ref 70–99)

## 2020-09-08 PROCEDURE — 99231 SBSQ HOSP IP/OBS SF/LOW 25: CPT | Mod: GC

## 2020-09-08 RX ADMIN — HALOPERIDOL DECANOATE 5 MILLIGRAM(S): 100 INJECTION INTRAMUSCULAR at 20:13

## 2020-09-08 NOTE — CHART NOTE - NSCHARTNOTEFT_GEN_A_CORE
The treatment team met with pt. to review treatment plan, medications and discharge plan.  No acute symptoms are present at this time.  Pt. appears primarily concerned with housing and verbalizes no other goals at this time.  However, she does acknowledge her need to remain compliant with her medication while in the community.  Pt. was encouraged to attend groups and activities on the unit and was educated to the benefits of doing so.  Pt. verbalized an understanding of this.  She denies any suicidal or homicidal ideations or any A/V hallucinations.    Pt. is requesting a referral to an adult home.  Writer spoke with Phylicia from [facility] on this date.  She reports she needs to speak with the Department of Health in regards to being able to accept referrals with a mental health diagnosis, particularly from an inpatient psychiatric unit.  She states she will call writer once she receives an answer.  Writer will follow up.  Edar also spoke with Clint from [facility].  At his request, a referral packet was faxed.  Edar will follow up.  A message was also left at [facility].    Mental Status Exam:    Mood-  Anxious    Sleep-  Normal    Appetite-  Normal    ADL's-  Fair/Good    Thought Process-  Linear    Observation-  Routine    Pt. is not yet ready for discharge on this date.

## 2020-09-08 NOTE — PROGRESS NOTE BEHAVIORAL HEALTH - NSBHFUPINTERVALHXFT_PSY_A_CORE
Patient was interviewed at bedside. She reports 2 panic attacks with shaking, hyperventilation, and sweating over the weekend, 1 per day. She reports anxiety about housing. Patient inquired about ativan. She reports being prescribe previously but denies taking it for fear of addiction. Patient reports a "good" appetite and sleep. Patient denies auditory and visual hallucinations as well as suicidal ideation. Patient was interviewed at bedside. She reports anxiety about housing. Patient inquired about ativan. She reports being prescribe previously but denies taking it for fear of addiction. Patient reports a "good" appetite and sleep. Patient denies auditory and visual hallucinations as well as suicidal ideation.

## 2020-09-08 NOTE — PROGRESS NOTE BEHAVIORAL HEALTH - SUMMARY
Summary (include case differential, formulation and patient response to therapy): Patient is a 58 yo single female, no children, hx of Schizoaffective dx, multiple lifetime hospitalizations, medical hx of leukemia in remission more than twenty years, admitted for voices in context of medication noncompliance.     Schizoaffective disorder  -Continue Haldol 5 mg PO daily   -Monitor EKG if prn haldol given

## 2020-09-08 NOTE — PROGRESS NOTE BEHAVIORAL HEALTH - NSBHCHARTREVIEWINVESTIGATE_PSY_A_CORE FT
< from: 12 Lead ECG (09.02.20 @ 21:50) >      Ventricular Rate 75 BPM    Atrial Rate 75 BPM    P-R Interval 166 ms    QRS Duration 86 ms    Q-T Interval 374 ms    QTC Calculation(Bezet) 417 ms    P Axis 54 degrees    R Axis -11 degrees    T Axis 18 degrees    Diagnosis Line Normal sinus rhythm  Minimal voltage criteria for LVH, may be normal variant  Borderline ECG    Confirmed by RAMA CHAVEZ, BRIAN ([id number]) on 9/3/2020 11:05:58 AM    < end of copied text >

## 2020-09-08 NOTE — PROGRESS NOTE BEHAVIORAL HEALTH - MODIFICATIONS
seen/discussed with trainee.  Mood is neutral; no s/h ideation or psychosis.  ADLs have improved. Apprehension regarding housing is noted; will monitor

## 2020-09-09 LAB
GLUCOSE BLDC GLUCOMTR-MCNC: 125 MG/DL — HIGH (ref 70–99)
GLUCOSE BLDC GLUCOMTR-MCNC: 128 MG/DL — HIGH (ref 70–99)
GLUCOSE BLDC GLUCOMTR-MCNC: 145 MG/DL — HIGH (ref 70–99)

## 2020-09-09 PROCEDURE — 99231 SBSQ HOSP IP/OBS SF/LOW 25: CPT

## 2020-09-09 RX ADMIN — HALOPERIDOL DECANOATE 5 MILLIGRAM(S): 100 INJECTION INTRAMUSCULAR at 20:06

## 2020-09-09 RX ADMIN — Medication 2 MILLIGRAM(S): at 16:39

## 2020-09-09 NOTE — PROGRESS NOTE BEHAVIORAL HEALTH - NSBHFUPINTERVALHXFT_PSY_A_CORE
Patient was interviewed at bedside. Patient reports a "good" appetite and sleep. Patient denies auditory and visual hallucinations as well as suicidal ideation.    As per staff, patient remains isolative to room.

## 2020-09-09 NOTE — PROGRESS NOTE BEHAVIORAL HEALTH - MODIFICATIONS
seen/discussed with trainee.  Pt is pleasant on approach. No s/h ideation or overt psychosis.  Will continue placement efforts

## 2020-09-10 LAB — GLUCOSE BLDC GLUCOMTR-MCNC: 119 MG/DL — HIGH (ref 70–99)

## 2020-09-10 PROCEDURE — 99232 SBSQ HOSP IP/OBS MODERATE 35: CPT

## 2020-09-10 RX ORDER — HYDROXYZINE HCL 10 MG
50 TABLET ORAL EVERY 6 HOURS
Refills: 0 | Status: DISCONTINUED | OUTPATIENT
Start: 2020-09-10 | End: 2020-09-15

## 2020-09-10 RX ADMIN — Medication 50 MILLIGRAM(S): at 15:05

## 2020-09-10 RX ADMIN — HALOPERIDOL DECANOATE 5 MILLIGRAM(S): 100 INJECTION INTRAMUSCULAR at 20:08

## 2020-09-10 NOTE — PROGRESS NOTE BEHAVIORAL HEALTH - MODIFICATIONS
pt seen and discussed with trainee. Ongoing placement efforts. No s/h ideation or psychosis. Will address anxiety sx with atarax; consider starting SSRI

## 2020-09-10 NOTE — PROGRESS NOTE BEHAVIORAL HEALTH - NSBHFUPINTERVALHXFT_PSY_A_CORE
Patient was interviewed at bedside.  Patient reports sleeping well. Patient reports a poor appetite but was unable to say why. Patient denies auditory and visual hallucinations as well as suicidal ideation.    As per staff, patient had an uneventful evening.

## 2020-09-11 PROCEDURE — 99232 SBSQ HOSP IP/OBS MODERATE 35: CPT | Mod: GC

## 2020-09-11 RX ADMIN — Medication 50 MILLIGRAM(S): at 15:10

## 2020-09-11 RX ADMIN — HALOPERIDOL DECANOATE 5 MILLIGRAM(S): 100 INJECTION INTRAMUSCULAR at 20:16

## 2020-09-11 NOTE — PROGRESS NOTE BEHAVIORAL HEALTH - NSBHFUPINTERVALHXFT_PSY_A_CORE
The patient was seen on teams. She denies sleep or appetite disturbance. She states she has anxiety about her living situation, which causes her to sleep more. She reports not attending the group on coping skills, but would be interested in attending such a group. She denies visual or auditory hallucinations, suicidal ideation, or homicidal ideation.

## 2020-09-11 NOTE — PROGRESS NOTE BEHAVIORAL HEALTH - SUMMARY
Summary (include case differential, formulation and patient response to therapy): Patient is a 58 yo single female, no children, hx of Schizoaffective dx, multiple lifetime hospitalizations, medical hx of leukemia in remission more than twenty years, admitted for voices in context of medication noncompliance.     On evaluation today, the patient continues to be free of visual or auditory hallucinations, suicidal ideation, or homicidal ideation. Patient continues to experience anxiety. Safe disposition planning.     Schizoaffective disorder  -Continue Haldol 5 mg PO daily   -Monitor EKG if prn haldol given

## 2020-09-11 NOTE — PROGRESS NOTE BEHAVIORAL HEALTH - MODIFICATIONS
seen/discussed with resident.  Pt is without suicidal/homicidal ideation.  Will continue tx plan; active placement efforts

## 2020-09-12 RX ADMIN — HALOPERIDOL DECANOATE 5 MILLIGRAM(S): 100 INJECTION INTRAMUSCULAR at 20:05

## 2020-09-12 RX ADMIN — Medication 50 MILLIGRAM(S): at 13:02

## 2020-09-13 RX ADMIN — Medication 50 MILLIGRAM(S): at 18:28

## 2020-09-13 RX ADMIN — HALOPERIDOL DECANOATE 5 MILLIGRAM(S): 100 INJECTION INTRAMUSCULAR at 20:01

## 2020-09-14 RX ADMIN — HALOPERIDOL DECANOATE 5 MILLIGRAM(S): 100 INJECTION INTRAMUSCULAR at 20:17

## 2020-09-14 NOTE — PROGRESS NOTE BEHAVIORAL HEALTH - PERCEPTIONS
No abnormalities
Auditory hallucinations
Auditory hallucinations
No abnormalities
Auditory hallucinations
No abnormalities

## 2020-09-14 NOTE — DISCHARGE NOTE BEHAVIORAL HEALTH - NSBHDCSWCOMMENTSFT_PSY_A_CORE
Discharge information faxed to the next level of care-[facility] OPD Discharge information faxed to the next level of care-[facility] [phone] on 9/15/20 at 1:30 pm

## 2020-09-14 NOTE — PROGRESS NOTE BEHAVIORAL HEALTH - SUMMARY
The patient has experienced a resolution of the auditory hallucinations (which prompted her to seek treatment) through the current medication regimen. She has not had any adverse side effects from 5mg of Haloperidol nightly. The patient has experienced a resolution of the auditory hallucinations (which prompted her to seek treatment) through the current medication regimen. She has not had any adverse side effects from 5mg of Haloperidol nightly. She is [facility]. She can be monitored for maintenance of improvement and discharged with outpatient follow up if current mental status continues. Patient is a 60 yo single female, no children, hx of Schizoaffective dx, multiple lifetime hospitalizations, medical hx of leukemia in remission more than twenty years, admitted for voices in context of medication noncompliance.     On evaluation today, the patient continues to be free of visual or auditory hallucinations, suicidal ideation, or homicidal ideation. Patient continues to experience anxiety. Disposition planning for tomorrow.    Schizoaffective disorder  -Continue Haldol 5 mg PO daily   -Monitor EKG if prn haldol given.

## 2020-09-14 NOTE — PROGRESS NOTE BEHAVIORAL HEALTH - NSBHATTESTSEENBY_PSY_A_CORE
attending Psychiatrist without NP/Trainee
Attending Psychiatrist supervising NP/Trainee, meeting pt...

## 2020-09-14 NOTE — PROGRESS NOTE BEHAVIORAL HEALTH - NSBHADMITDANGERSELF_PSY_A_CORE
unable to care for self

## 2020-09-14 NOTE — PROGRESS NOTE BEHAVIORAL HEALTH - AXIS III
hx of leukemia in remission
Leukemia in remission
hx of leukemia in remission
hx of leukemia in remission

## 2020-09-14 NOTE — PROGRESS NOTE BEHAVIORAL HEALTH - NSBHADMITIPOBSFT_PSY_A_CORE
as clinically indicated
Safety
as clinically indicated
Safety
as clinically indicated

## 2020-09-14 NOTE — PROGRESS NOTE BEHAVIORAL HEALTH - NSBHPTASSESSDT_PSY_A_CORE
03-Sep-2020 10:30
04-Sep-2020 09:15
06-Sep-2020 13:40
09-Sep-2020 09:30
10-Sep-2020 09:00
14-Sep-2020 09:40
05-Sep-2020 19:07
08-Sep-2020 09:45
11-Sep-2020 13:24
07-Sep-2020 13:52

## 2020-09-14 NOTE — DISCHARGE NOTE BEHAVIORAL HEALTH - NSBHDCREFEROTHERFT_PSY_A_CORE
[facility]-CARES ID [id number]  [address] [place], [place]  [facility]

## 2020-09-14 NOTE — DISCHARGE NOTE BEHAVIORAL HEALTH - HPI (INCLUDE ILLNESS QUALITY, SEVERITY, DURATION, TIMING, CONTEXT, MODIFYING FACTORS, ASSOCIATED SIGNS AND SYMPTOMS)
59 y o single, undomiciled, , disabled woman, no children/dependents, history of schizoaffective disorder, multiple lifetime hospitalizations, no known suicidality/aggression/legal/substance/abuse hx; medical hx of leukemia in remission more than twenty years; bib self, c/o AH in the setting of medication noncompliance.  Patient  is unkempt, disheveled, poorly related. She reports a long hx of schizoaffective disorder and multiple lifetime hospitalizations. she reports that one month ago, she had been living with her mom, but they were evicted and mother moved into a nursing home and patient was made homeless. She had a relapse of her schizoaffective disorder, and was hospitalized at [facility] for a week, discharged last Friday. She was treated with haldol 5 mg bid, and discharged without any medications or follow up. She went to a drop in center where she began feeling paranoid that people were stealing from her, and "the voices started driving me crazy." She is praying all day, believes she is talking to God, that she works for god, praying to God and trying to "make good things happen." She feels hopeless and scared without medication or stable housing, and feels that she will not make it without more support. She is hearing voices, saying "you're good," and "to work for God." she denies command hallucinations. She has a hx of severe depressive episodes and feels she is headed that way without intervention.   called [facility], [phone], asked to be connected to inpatient psychiatry and was not connected.

## 2020-09-14 NOTE — PROGRESS NOTE BEHAVIORAL HEALTH - NS ED BHA MSE SPEECH SPONTANEITY
Increased latency
Normal
Increased latency
Increased latency
Normal
Increased latency
Normal
Normal

## 2020-09-14 NOTE — DISCHARGE NOTE BEHAVIORAL HEALTH - MEDICATION SUMMARY - MEDICATIONS TO TAKE
I will START or STAY ON the medications listed below when I get home from the hospital:    haloperidol 5 mg oral tablet  -- 1 tab(s) by mouth once a day (at bedtime) until told otherwise by MD  -- Indication: For psychosis    hydrOXYzine hydrochloride 50 mg oral tablet  -- 1 tab(s) by mouth every 6 hours, As needed, anxiety until told otherwise by MD  -- Indication: For anxiety

## 2020-09-14 NOTE — DISCHARGE NOTE BEHAVIORAL HEALTH - CARE PROVIDER_API CALL
Tarik Garcia)  [facility]  [address] [place] [address]  Phone: [phone]  Fax: [phone]  Follow Up Time:

## 2020-09-14 NOTE — PROGRESS NOTE BEHAVIORAL HEALTH - NSBHFUPTYPE_PSY_A_CORE
Inpatient
Inpatient-On Service Note
Inpatient

## 2020-09-14 NOTE — CHART NOTE - NSCHARTNOTEFT_GEN_A_CORE
The treatment team met with pt. to review treatment plan, medications and discharge plan.  There are no acute symptoms at this time.  Pt. denies any suicidal or homicidal ideation or any A/V hallucinations.  She remains compliant with her medication.  However, pt. does not engage in any unit activities in spite of encouragement.  She remains mostly isolative to her room.    Pt. states she would like to be referred to her shelter at [facility] in order to take advantage of the services they offer.  She is no longer requesting an adult home referral.  Writer completed a shelter application on this date and has submitted the application.  Pt. will be referred to OPD N for continued mental health services.    Mental Status Exam:    Mood-  Neutral    Sleep-  Normal    Appetite-  Normal    ADL's-  Fair/Good    Thought Process-  Linear    Observation-  Routine    Pt. will be discharged once shelter application is approved.

## 2020-09-14 NOTE — PROGRESS NOTE BEHAVIORAL HEALTH - PRIMARY DX
Schizoaffective disorder, unspecified type

## 2020-09-14 NOTE — PROGRESS NOTE BEHAVIORAL HEALTH - NS ED BHA MED ROS HEMATOLOGIC LYMPHATIC
No complaints
Statement Selected
No complaints

## 2020-09-14 NOTE — PROGRESS NOTE BEHAVIORAL HEALTH - OTHER
concrete
simple, concrete
concrete
simple, concrete
simple, concrete
concrete
simple, concrete
concrete

## 2020-09-14 NOTE — PROGRESS NOTE BEHAVIORAL HEALTH - THOUGHT PROCESS
Other/Linear
Linear/Other
Linear/Other
Other/Linear
Linear/Other
Other/Linear
Linear/Other
Other/Linear

## 2020-09-14 NOTE — PROGRESS NOTE BEHAVIORAL HEALTH - NSBHCHARTREVIEWVS_PSY_A_CORE FT
Vital Signs Last 24 Hrs  T(C): 36.6 (09 Sep 2020 05:40), Max: 36.6 (09 Sep 2020 05:40)  T(F): 97.9 (09 Sep 2020 05:40), Max: 97.9 (09 Sep 2020 05:40)  HR: 97 (09 Sep 2020 05:40) (86 - 97)  BP: 135/82 (09 Sep 2020 05:40) (104/76 - 135/82)  BP(mean): --  RR: 16 (09 Sep 2020 05:40) (16 - 16)  SpO2: --
Vital Signs Last 24 Hrs  T(C): 35.7 (04 Sep 2020 08:16), Max: 36.6 (03 Sep 2020 15:49)  T(F): 96.3 (04 Sep 2020 08:16), Max: 97.8 (03 Sep 2020 15:49)  HR: 76 (04 Sep 2020 08:16) (76 - 77)  BP: 131/79 (04 Sep 2020 08:16) (131/79 - 137/80)  BP(mean): --  RR: 18 (04 Sep 2020 08:16) (16 - 18)  SpO2: --
Vital Signs Last 24 Hrs  T(C): 35.9 (10 Sep 2020 06:18), Max: 36.8 (09 Sep 2020 17:17)  T(F): 96.6 (10 Sep 2020 06:18), Max: 98.2 (09 Sep 2020 17:17)  HR: 74 (10 Sep 2020 06:18) (74 - 79)  BP: 105/72 (10 Sep 2020 06:18) (105/72 - 119/74)  BP(mean): --  RR: 16 (10 Sep 2020 06:18) (16 - 18)  SpO2: --
Vital Signs Last 24 Hrs  T(C): 36.6 (09 Sep 2020 05:40), Max: 36.6 (09 Sep 2020 05:40)  T(F): 97.9 (09 Sep 2020 05:40), Max: 97.9 (09 Sep 2020 05:40)  HR: 97 (09 Sep 2020 05:40) (86 - 97)  BP: 135/82 (09 Sep 2020 05:40) (104/76 - 135/82)  BP(mean): --  RR: 16 (09 Sep 2020 05:40) (16 - 16)  SpO2: --
Vital Signs Last 24 Hrs  T(C): 35.7 (03 Sep 2020 08:00), Max: 36.5 (02 Sep 2020 15:04)  T(F): 96.2 (03 Sep 2020 08:00), Max: 97.7 (02 Sep 2020 15:04)  HR: 80 (03 Sep 2020 08:00) (56 - 98)  BP: 118/81 (03 Sep 2020 08:00) (118/81 - 136/88)  BP(mean): --  RR: 16 (03 Sep 2020 08:00) (16 - 18)  SpO2: 100% (02 Sep 2020 23:00) (100% - 100%)
ICU Vital Signs Last 24 Hrs  T(C): 36 (14 Sep 2020 06:15), Max: 36.8 (13 Sep 2020 10:04)  T(F): 96.8 (14 Sep 2020 06:15), Max: 98.3 (13 Sep 2020 10:04)  HR: 64 (14 Sep 2020 06:15) (60 - 75)  BP: 145/77 (14 Sep 2020 06:15) (120/86 - 145/77)  BP(mean): --  ABP: --  ABP(mean): --  RR: 16 (14 Sep 2020 06:15) (16 - 16)  SpO2: --
Vital Signs Last 24 Hrs  T(C): 36.4 (05 Sep 2020 15:36), Max: 36.4 (05 Sep 2020 15:36)  T(F): 97.5 (05 Sep 2020 15:36), Max: 97.5 (05 Sep 2020 15:36)  HR: 64 (05 Sep 2020 15:36) (61 - 67)  BP: 118/71 (05 Sep 2020 15:36) (118/71 - 139/73)  BP(mean): --  RR: 18 (05 Sep 2020 15:36) (16 - 18)  SpO2: --
Vital Signs Last 24 Hrs  T(C): 36.3 (08 Sep 2020 08:06), Max: 36.7 (07 Sep 2020 15:24)  T(F): 97.3 (08 Sep 2020 08:06), Max: 98 (07 Sep 2020 15:24)  HR: 79 (08 Sep 2020 08:06) (64 - 79)  BP: 112/85 (08 Sep 2020 08:06) (110/55 - 126/75)  BP(mean): --  RR: 18 (08 Sep 2020 08:06) (16 - 18)  SpO2: --
ICU Vital Signs Last 24 Hrs  T(C): 36.7 (07 Sep 2020 15:24), Max: 36.7 (07 Sep 2020 08:44)  T(F): 98 (07 Sep 2020 15:24), Max: 98 (07 Sep 2020 08:44)  HR: 74 (07 Sep 2020 15:24) (65 - 97)  BP: 126/75 (07 Sep 2020 15:24) (121/84 - 134/81)  BP(mean): --  ABP: --  ABP(mean): --  RR: 16 (07 Sep 2020 15:24) (16 - 18)  SpO2: --

## 2020-09-15 VITALS
TEMPERATURE: 96 F | DIASTOLIC BLOOD PRESSURE: 69 MMHG | HEART RATE: 64 BPM | RESPIRATION RATE: 18 BRPM | SYSTOLIC BLOOD PRESSURE: 127 MMHG

## 2020-09-15 RX ORDER — HALOPERIDOL DECANOATE 100 MG/ML
1 INJECTION INTRAMUSCULAR
Qty: 14 | Refills: 1
Start: 2020-09-15

## 2020-09-15 RX ORDER — HYDROXYZINE HCL 10 MG
1 TABLET ORAL
Qty: 20 | Refills: 0
Start: 2020-09-15

## 2020-09-15 NOTE — CHART NOTE - NSCHARTNOTEFT_GEN_A_CORE
pt is not suicidal or psychotic. She wants to be d/c'd to shelter and await results of placement efforts outside the hospital.  Compliacne with meds and aftercare stressed and appears to be understood by patient

## 2020-09-17 DIAGNOSIS — F41.9 ANXIETY DISORDER, UNSPECIFIED: ICD-10-CM

## 2020-09-17 DIAGNOSIS — Z60.9 PROBLEM RELATED TO SOCIAL ENVIRONMENT, UNSPECIFIED: ICD-10-CM

## 2020-09-17 DIAGNOSIS — Z88.0 ALLERGY STATUS TO PENICILLIN: ICD-10-CM

## 2020-09-17 DIAGNOSIS — Z91.14 PATIENT'S OTHER NONCOMPLIANCE WITH MEDICATION REGIMEN: ICD-10-CM

## 2020-09-17 DIAGNOSIS — R73.9 HYPERGLYCEMIA, UNSPECIFIED: ICD-10-CM

## 2020-09-17 DIAGNOSIS — R44.0 AUDITORY HALLUCINATIONS: ICD-10-CM

## 2020-09-17 DIAGNOSIS — Z59.0 HOMELESSNESS: ICD-10-CM

## 2020-09-17 DIAGNOSIS — F25.9 SCHIZOAFFECTIVE DISORDER, UNSPECIFIED: ICD-10-CM

## 2020-09-17 DIAGNOSIS — C91.41 HAIRY CELL LEUKEMIA, IN REMISSION: ICD-10-CM

## 2020-09-17 SDOH — SOCIAL STABILITY - SOCIAL INSECURITY: PROBLEM RELATED TO SOCIAL ENVIRONMENT, UNSPECIFIED: Z60.9

## 2020-09-17 SDOH — ECONOMIC STABILITY - HOUSING INSECURITY: HOMELESSNESS: Z59.0

## 2020-10-24 ENCOUNTER — INPATIENT (INPATIENT)
Facility: HOSPITAL | Age: 60
LOS: 2 days | Discharge: LEFT BEFORE DISCHARGE | End: 2020-10-27
Attending: INTERNAL MEDICINE | Admitting: INTERNAL MEDICINE
Payer: MEDICARE

## 2020-10-24 VITALS
DIASTOLIC BLOOD PRESSURE: 79 MMHG | HEART RATE: 94 BPM | SYSTOLIC BLOOD PRESSURE: 135 MMHG | TEMPERATURE: 99 F | HEIGHT: 66 IN | OXYGEN SATURATION: 100 % | WEIGHT: 149.91 LBS | RESPIRATION RATE: 19 BRPM

## 2020-10-24 DIAGNOSIS — F41.9 ANXIETY DISORDER, UNSPECIFIED: ICD-10-CM

## 2020-10-24 DIAGNOSIS — I25.10 ATHEROSCLEROTIC HEART DISEASE OF NATIVE CORONARY ARTERY WITHOUT ANGINA PECTORIS: ICD-10-CM

## 2020-10-24 DIAGNOSIS — Z53.29 PROCEDURE AND TREATMENT NOT CARRIED OUT BECAUSE OF PATIENT'S DECISION FOR OTHER REASONS: ICD-10-CM

## 2020-10-24 DIAGNOSIS — I35.0 NONRHEUMATIC AORTIC (VALVE) STENOSIS: ICD-10-CM

## 2020-10-24 DIAGNOSIS — R07.9 CHEST PAIN, UNSPECIFIED: ICD-10-CM

## 2020-10-24 DIAGNOSIS — F20.9 SCHIZOPHRENIA, UNSPECIFIED: ICD-10-CM

## 2020-10-24 DIAGNOSIS — D64.9 ANEMIA, UNSPECIFIED: ICD-10-CM

## 2020-10-24 DIAGNOSIS — R00.1 BRADYCARDIA, UNSPECIFIED: ICD-10-CM

## 2020-10-24 DIAGNOSIS — R01.1 CARDIAC MURMUR, UNSPECIFIED: ICD-10-CM

## 2020-10-24 DIAGNOSIS — Z85.6 PERSONAL HISTORY OF LEUKEMIA: ICD-10-CM

## 2020-10-24 LAB
ALBUMIN SERPL ELPH-MCNC: 3.7 G/DL — SIGNIFICANT CHANGE UP (ref 3.5–5.2)
ALP SERPL-CCNC: 90 U/L — SIGNIFICANT CHANGE UP (ref 30–115)
ALT FLD-CCNC: 15 U/L — SIGNIFICANT CHANGE UP (ref 0–41)
ANION GAP SERPL CALC-SCNC: 8 MMOL/L — SIGNIFICANT CHANGE UP (ref 7–14)
AST SERPL-CCNC: 21 U/L — SIGNIFICANT CHANGE UP (ref 0–41)
BASOPHILS # BLD AUTO: 0.05 K/UL — SIGNIFICANT CHANGE UP (ref 0–0.2)
BASOPHILS NFR BLD AUTO: 0.9 % — SIGNIFICANT CHANGE UP (ref 0–1)
BILIRUB SERPL-MCNC: 0.2 MG/DL — SIGNIFICANT CHANGE UP (ref 0.2–1.2)
BUN SERPL-MCNC: 14 MG/DL — SIGNIFICANT CHANGE UP (ref 10–20)
CALCIUM SERPL-MCNC: 9.4 MG/DL — SIGNIFICANT CHANGE UP (ref 8.5–10.1)
CHLORIDE SERPL-SCNC: 103 MMOL/L — SIGNIFICANT CHANGE UP (ref 98–110)
CO2 SERPL-SCNC: 28 MMOL/L — SIGNIFICANT CHANGE UP (ref 17–32)
CREAT SERPL-MCNC: 0.9 MG/DL — SIGNIFICANT CHANGE UP (ref 0.7–1.5)
EOSINOPHIL # BLD AUTO: 0.18 K/UL — SIGNIFICANT CHANGE UP (ref 0–0.7)
EOSINOPHIL NFR BLD AUTO: 3.3 % — SIGNIFICANT CHANGE UP (ref 0–8)
GLUCOSE SERPL-MCNC: 125 MG/DL — HIGH (ref 70–99)
HCT VFR BLD CALC: 32.1 % — LOW (ref 37–47)
HGB BLD-MCNC: 10.7 G/DL — LOW (ref 12–16)
IMM GRANULOCYTES NFR BLD AUTO: 0.6 % — HIGH (ref 0.1–0.3)
LYMPHOCYTES # BLD AUTO: 1.82 K/UL — SIGNIFICANT CHANGE UP (ref 1.2–3.4)
LYMPHOCYTES # BLD AUTO: 33.5 % — SIGNIFICANT CHANGE UP (ref 20.5–51.1)
MAGNESIUM SERPL-MCNC: 1.8 MG/DL — SIGNIFICANT CHANGE UP (ref 1.8–2.4)
MCHC RBC-ENTMCNC: 30.7 PG — SIGNIFICANT CHANGE UP (ref 27–31)
MCHC RBC-ENTMCNC: 33.3 G/DL — SIGNIFICANT CHANGE UP (ref 32–37)
MCV RBC AUTO: 92 FL — SIGNIFICANT CHANGE UP (ref 81–99)
MONOCYTES # BLD AUTO: 0.57 K/UL — SIGNIFICANT CHANGE UP (ref 0.1–0.6)
MONOCYTES NFR BLD AUTO: 10.5 % — HIGH (ref 1.7–9.3)
NEUTROPHILS # BLD AUTO: 2.78 K/UL — SIGNIFICANT CHANGE UP (ref 1.4–6.5)
NEUTROPHILS NFR BLD AUTO: 51.2 % — SIGNIFICANT CHANGE UP (ref 42.2–75.2)
NRBC # BLD: 0 /100 WBCS — SIGNIFICANT CHANGE UP (ref 0–0)
NT-PROBNP SERPL-SCNC: 251 PG/ML — SIGNIFICANT CHANGE UP (ref 0–300)
PLATELET # BLD AUTO: 191 K/UL — SIGNIFICANT CHANGE UP (ref 130–400)
POTASSIUM SERPL-MCNC: 4.1 MMOL/L — SIGNIFICANT CHANGE UP (ref 3.5–5)
POTASSIUM SERPL-SCNC: 4.1 MMOL/L — SIGNIFICANT CHANGE UP (ref 3.5–5)
PROT SERPL-MCNC: 6.3 G/DL — SIGNIFICANT CHANGE UP (ref 6–8)
RAPID RVP RESULT: SIGNIFICANT CHANGE UP
RBC # BLD: 3.49 M/UL — LOW (ref 4.2–5.4)
RBC # FLD: 14 % — SIGNIFICANT CHANGE UP (ref 11.5–14.5)
SARS-COV-2 RNA SPEC QL NAA+PROBE: SIGNIFICANT CHANGE UP
SODIUM SERPL-SCNC: 139 MMOL/L — SIGNIFICANT CHANGE UP (ref 135–146)
TROPONIN T SERPL-MCNC: <0.01 NG/ML — SIGNIFICANT CHANGE UP
TROPONIN T SERPL-MCNC: <0.01 NG/ML — SIGNIFICANT CHANGE UP
WBC # BLD: 5.43 K/UL — SIGNIFICANT CHANGE UP (ref 4.8–10.8)
WBC # FLD AUTO: 5.43 K/UL — SIGNIFICANT CHANGE UP (ref 4.8–10.8)

## 2020-10-24 PROCEDURE — 99220: CPT

## 2020-10-24 PROCEDURE — 93010 ELECTROCARDIOGRAM REPORT: CPT

## 2020-10-24 PROCEDURE — 71046 X-RAY EXAM CHEST 2 VIEWS: CPT | Mod: 26

## 2020-10-24 RX ORDER — ASPIRIN/CALCIUM CARB/MAGNESIUM 324 MG
162 TABLET ORAL ONCE
Refills: 0 | Status: COMPLETED | OUTPATIENT
Start: 2020-10-24 | End: 2020-10-24

## 2020-10-24 RX ADMIN — Medication 162 MILLIGRAM(S): at 15:13

## 2020-10-24 NOTE — ED PROVIDER NOTE - ATTENDING CONTRIBUTION TO CARE
I personally evaluated the patient. I reviewed the Resident’s or Physician Assistant’s note (as assigned above), and agree with the findings and plan except as documented in my note.  60 yo woman with intermittent left sided chest pain radiating to left arm with SOB and diaphoresis.  Worse with exertion.  family history is significant for CAD.  Hasn't had cardiac testing in over 10 years.  Systolic murmur noted. ? AS.   But with normal EKG and troponin, she can go to obs for further cardiac workup.  R/O ACS.

## 2020-10-24 NOTE — PROGRESS NOTE BEHAVIORAL HEALTH - HYGIENE
Nephrology Progress Note    Subjective   Tracheostomy  (10/8)  on vent   Patient is restless today per his wife at the bedside.  He remains on CVVH, and is tolerating well.  He is tolerating tube feedings.  He remains on ECMO.  He is on dobutamine drip.    Assessment & Plan     1. TULIO. baseline cr not clear  tulio likely from cv shock and atn as a result  RRT started 9/24  -Continue current CVVH, 4K bath, net loss 0 to 50 mL/min as tolerated.  Monitor electrolytes and hemodynamics closely.  If remains hemodynamically stable over the next few days, might be candidate for intermittent hemodialysis.    2. cv shock, s/p MI, ecmo support  -cardiology following.  On inotropic support.    3. resp failure, vent dependent - Trach10/8.    4. Anemia: prbc per course, not on GITA.     5. 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     D/w ICU RN.  Updated wife at bedside.  All questions answered.  Updated patient's 2 daughters over FaceTime.  All questions answered.  Efrain Vega MD  [facility]      Objective     I/O's    Intake/Output Summary (Last 24 hours) at 10/24/2020 1327  Last data filed at 10/24/2020 1300  Gross per 24 hour   Intake 2326.37 ml   Output 2029 ml   Net 297.37 ml       Last Recorded Vitals  Blood pressure 125/53, pulse 89, temperature 98.6 °F (37 °C), temperature source Oral, resp. rate 20, height 6' 0.84\" (1.85 m), weight 79.6 kg (175 lb 7.8 oz), SpO2 100 %.  Body mass index is 23.26 kg/m².    Physical Exam  Vitals signs and nursing note reviewed.   Constitutional:       General: He is not in acute distress.     Appearance: He is well-developed. He is ill-appearing.   HENT:      Head: Normocephalic and atraumatic.      Nose: Nose normal.      Comments: NG tube in place  Eyes:      General: No scleral icterus.     Conjunctiva/sclera: Conjunctivae normal.      Comments: pallor   Neck:       Musculoskeletal: Neck supple.      Thyroid: No thyromegaly.   Cardiovascular:      Rate and Rhythm: Regular rhythm.      Heart sounds: No murmur. No friction rub.      Comments: ECMO support  Regular rate  Lower extremity edema improved bilaterally  Left temporary dialysis catheter in place  Pulmonary:      Comments: Decreased breath sounds at bases  Tracheostomy  Abdominal:      General: There is no distension.      Palpations: Abdomen is soft.      Comments: Flexi-Seal tube in place with liquid brown stool   Genitourinary:     Comments: No penile or scrotal edema  No Valencia catheter  Musculoskeletal:         General: No swelling, tenderness or signs of injury.      Right lower leg: Right lower leg edema: trace both LE    Lymphadenopathy:      Cervical: No cervical adenopathy.   Skin:     General: Skin is warm and dry.   Neurological:      Motor: No abnormal muscle tone.      Comments: Restless         Labs     Recent Labs     10/22/20  0320  10/23/20  0335 10/23/20  1649 10/24/20  0400   SODIUM 140   < > 137 138 137   POTASSIUM 4.3   < > 4.2 4.3 4.0   CO2 28   < > 29 28 29   ANIONGAP 10   < > 7* 8* 7*   GLUCOSE 122*   < > 130* 132* 159*   BUN 34*   < > 36* 37* 33*   CREATININE 1.18*   < > 1.13 1.07 1.05   GFRA 73   < > 76 82 84   GFRNA 63   < > 66 70 72   BCRAT 29*   < > 32* 35* 31*   CALCIUM 8.5   < > 8.1* 8.5 8.1*   BILIRUBIN 1.6*  --  1.8*  --  1.7*   AST 25  --  24  --  26   GPT 32  --  27  --  25   ALKPT 151*  --  152*  --  152*   GLOB 3.7  --  3.8  --  3.8   AGR 0.7*  --  0.7*  --  0.7*    < > = values in this interval not displayed.        Recent Labs     10/22/20  0320  10/23/20  0335 10/23/20  1327 10/23/20  1822 10/24/20  0400   WBC 19.0*  --  21.5*  --   --  19.7*   RBC 2.91*  --  2.80*  --   --  3.05*   HGB 8.4*   < > 8.1* 8.9* 9.1* 8.8*   HCT 27.8*   < > 26.5* 29.1* 30.0* 29.2*      < > 150 147  --  153   MCV 95.5  --  94.6  --   --  95.7   MCH 28.9  --  28.9  --   --  28.9   MCHC 30.2*  --   30.6*  --   --  30.1*   NRBCRE 0  --  0  --   --  0    < > = values in this interval not displayed.                Fair

## 2020-10-24 NOTE — ED PROVIDER NOTE - OBJECTIVE STATEMENT
58 yo female with no known pmh presents c/o intermittent L sided chest pain. pt states the pain started last night with radiation to her L arm and noted SOB while pain was present. the episode last about 2 minutes and she has not noted any aggravating or alleviating factors. pt states to have positive cardiac history in father and sister that presented in their 30s. denies any other symptoms including fevers, chill, headache, visual changes, numbness/tingling, recent illness/travel, cough, or abdominal pain.

## 2020-10-24 NOTE — ED PROVIDER NOTE - CLINICAL SUMMARY MEDICAL DECISION MAKING FREE TEXT BOX
58 yo woman with chest pain with exertion associated with SOB.  no risk for PE.  ACS workup ordered and considering all negative thus far, will place in obs for further workup and monitoring.  Also with social issues of homelessness at this time.

## 2020-10-24 NOTE — ED CDU PROVIDER INITIAL DAY NOTE - NS ED ROS FT
Review of Systems    Constitutional: (-) fever, (-) chills  Eyes/ENT: (-) blurry vision, (-) epistaxis, (-) sore throat  Cardiovascular: (+) chest pain, (-) syncope  Respiratory: (-) cough, (-) shortness of breath  Gastrointestinal: (-) pain, (-) nausea, (-) vomiting, (-) diarrhea  Musculoskeletal: (-) neck pain, (-) back pain, (-) body aches  Integumentary: (-) rash, (-) edema  Neurological: (-) headache, (-) altered mental status  Psychiatric: (-) hallucinations  Allergic/Immunologic: (-) pruritus

## 2020-10-24 NOTE — ED CDU PROVIDER INITIAL DAY NOTE - ATTENDING CONTRIBUTION TO CARE
59F PMH HTN nonsmoker, schizophrenia, homeless, p/w CP since last night. sharp L sided at rest radiates to LUE. assoc w sob. no fever, cough. no tearing back pain. no le edema, le pain, immobilization, hormones, hemoptysis. no trauma. no prior cardiac work up. no doctors. +fam hx dad  of mi at 40 yo. pt requesting sw eval for being homeless, wants to go to NH. pt seen in ED, had neg ekg/trop, cxr clear, placed in EDOU for acs rule out and provocative testing. no active CP at this time.     on exam, AFVSS, well priyank nad, ncat, eomi, perrla, mmm, lctab, rrr nl s1s2 no mrg, abd soft ntnd, aaox3, no focal deficits, no le edema or calf ttp,     a/p; CP r.o acs , will continue tele monitor, repeat ekg/trop, consider ccta

## 2020-10-24 NOTE — ED CDU PROVIDER INITIAL DAY NOTE - PROGRESS NOTE DETAILS
s/o note:  received s/o from KYA back evaluated patient at bedside. Denies any CP/palpitations and sob/ dizziness/lightheadedness currently. patient reports feeling much better. PE: NAD. VSS. s1 s2 no murmur, abd soft/ND/NT/ no swelling and tenderness to extremities. pulses are equal to all extremities. Neuro exam grossly unremarkable. speaking coherently and clear speech. pending CCTA in am.

## 2020-10-24 NOTE — ED CDU PROVIDER INITIAL DAY NOTE - OBJECTIVE STATEMENT
60 yo F c/o intermittent left sided chest pain radiating to left arm since last night. No SOB, n/v, abdominal pains, leg pains or leg swelling. No smoking. No drugs. +family hx of CAD (dad MI at 39).

## 2020-10-24 NOTE — ED CDU PROVIDER INITIAL DAY NOTE - PHYSICAL EXAMINATION
Gen: Alert, NAD, well appearing  Head: NC, AT, PERRL, EOMI, normal lids/conjunctiva  ENT: normal hearing  Neck: +supple, no tenderness/meningismus,  Pulm: Bilateral BS, normal resp effort, no wheeze/stridor/retractions  CV: RRR, + murmer  Abd: soft, NT/ND  Mskel: no edema/erythema/cyanosis  Skin: no rash, warm/dry  Neuro: AAOx3, no sensory/motor deficits

## 2020-10-25 PROCEDURE — 99222 1ST HOSP IP/OBS MODERATE 55: CPT

## 2020-10-25 PROCEDURE — 99223 1ST HOSP IP/OBS HIGH 75: CPT

## 2020-10-25 PROCEDURE — 99217: CPT

## 2020-10-25 PROCEDURE — 75574 CT ANGIO HRT W/3D IMAGE: CPT | Mod: 26

## 2020-10-25 RX ORDER — ENOXAPARIN SODIUM 100 MG/ML
40 INJECTION SUBCUTANEOUS AT BEDTIME
Refills: 0 | Status: DISCONTINUED | OUTPATIENT
Start: 2020-10-25 | End: 2020-10-27

## 2020-10-25 RX ORDER — ACETAMINOPHEN 500 MG
650 TABLET ORAL EVERY 6 HOURS
Refills: 0 | Status: DISCONTINUED | OUTPATIENT
Start: 2020-10-25 | End: 2020-10-27

## 2020-10-25 RX ORDER — METOPROLOL TARTRATE 50 MG
100 TABLET ORAL ONCE
Refills: 0 | Status: COMPLETED | OUTPATIENT
Start: 2020-10-25 | End: 2020-10-25

## 2020-10-25 RX ORDER — ATORVASTATIN CALCIUM 80 MG/1
80 TABLET, FILM COATED ORAL AT BEDTIME
Refills: 0 | Status: DISCONTINUED | OUTPATIENT
Start: 2020-10-25 | End: 2020-10-27

## 2020-10-25 RX ORDER — CHLORHEXIDINE GLUCONATE 213 G/1000ML
1 SOLUTION TOPICAL
Refills: 0 | Status: DISCONTINUED | OUTPATIENT
Start: 2020-10-25 | End: 2020-10-27

## 2020-10-25 RX ORDER — ASPIRIN/CALCIUM CARB/MAGNESIUM 324 MG
81 TABLET ORAL DAILY
Refills: 0 | Status: DISCONTINUED | OUTPATIENT
Start: 2020-10-25 | End: 2020-10-27

## 2020-10-25 RX ORDER — METOPROLOL TARTRATE 50 MG
12.5 TABLET ORAL DAILY
Refills: 0 | Status: DISCONTINUED | OUTPATIENT
Start: 2020-10-25 | End: 2020-10-26

## 2020-10-25 RX ORDER — HYDROXYZINE HCL 10 MG
50 TABLET ORAL EVERY 6 HOURS
Refills: 0 | Status: DISCONTINUED | OUTPATIENT
Start: 2020-10-25 | End: 2020-10-27

## 2020-10-25 RX ORDER — PANTOPRAZOLE SODIUM 20 MG/1
40 TABLET, DELAYED RELEASE ORAL
Refills: 0 | Status: DISCONTINUED | OUTPATIENT
Start: 2020-10-25 | End: 2020-10-27

## 2020-10-25 RX ORDER — HALOPERIDOL DECANOATE 100 MG/ML
5 INJECTION INTRAMUSCULAR AT BEDTIME
Refills: 0 | Status: DISCONTINUED | OUTPATIENT
Start: 2020-10-25 | End: 2020-10-27

## 2020-10-25 RX ADMIN — Medication 100 MILLIGRAM(S): at 06:03

## 2020-10-25 RX ADMIN — HALOPERIDOL DECANOATE 5 MILLIGRAM(S): 100 INJECTION INTRAMUSCULAR at 22:03

## 2020-10-25 RX ADMIN — ATORVASTATIN CALCIUM 80 MILLIGRAM(S): 80 TABLET, FILM COATED ORAL at 22:04

## 2020-10-25 NOTE — H&P ADULT - NSICDXFAMILYHX_GEN_ALL_CORE_FT
FAMILY HISTORY:  Father  Still living? Unknown  Family history of early CAD, Age at diagnosis: Age Unknown

## 2020-10-25 NOTE — H&P ADULT - NSHPLABSRESULTS_GEN_ALL_CORE
10.7   5.43  )-----------( 191      ( 24 Oct 2020 14:39 )             32.1       10-24    139  |  103  |  14  ----------------------------<  125<H>  4.1   |  28  |  0.9    Ca    9.4      24 Oct 2020 14:39  Mg     1.8     10-24    TPro  6.3  /  Alb  3.7  /  TBili  0.2  /  DBili  x   /  AST  21  /  ALT  15  /  AlkPhos  90  10-24        CARDIAC MARKERS ( 24 Oct 2020 18:27 )  x     / <0.01 ng/mL / x     / x     / x      CARDIAC MARKERS ( 24 Oct 2020 14:39 )  x     / <0.01 ng/mL / x     / x     / x          EXAM:  CT ANGIO HEART W CORONARIES IC            PROCEDURE DATE:  10/25/2020            INTERPRETATION:  HISTORY: Chest pain.    TECHNIQUE:Initially, unenhanced axial ECG-triggered CT was obtained through the chest. CT angiography of the heart was then performed utilizing ECG-gated imaging.    CONTRAST: 80ml of Optiray 320 IV    MEDICATION: Nitrostat tablet 400 mcg SL    POST PROCESSING:3D advanced post-processing was performed by the interpreting physician using an independent workstation utilizing a combination of MIP and MPR techniques to better evaluate anatomy and disease process.  3D rendering was performed .    COMPARISON: None    FINDINGS:    CORONARY CALCIUM SCORE:  Vessel      Calcium volume     Calcium Score    =======================================================  LAD:                 83                        92  =======================================================  Total:                83                         92    CORONARY ANGIOGRAPHY:    RIGHT CORONARY ARTERY: Normal    LEFT MAIN: Normal    LEFT ANTERIOR DESCENDING: Multifocal mixed calcified and noncalcified plaques proximal and mid LAD with focal moderate stenosis mid LAD (approximately 50%).    LEFT CIRCUMFLEX: Normal    Dominance of coronary artery system: Right    FUNCTIONAL ANALYSIS: Not performed as the study was performed using prospective gating to reduce radiation dose.    ADDITIONAL FINDINGS: None.    IMPRESSION:    1.  Multifocal mixed calcified and noncalcified plaques proximal and mid LAD with focal moderate stenosis mid LAD (approximately 50%).    2. Total coronary calcium score is 92. Total calcium volume is 83.  For a 59 year old female , this corresponds to 94 percentile    CAD-RADS 3.      CAD-RADS  Reporting and data system  ______________________________________________________________________    CAD-RADS 0  -    No plaque or stenosis                 Documented absence of CAD  CAD-RADS 1  -    1-24% Minimal stenosis              Minimal non-obstructive CAD  CAD-RADS 2  -    25 - 49% Mild stenosis                Mild non-obstructive CAD  CAD-RADS 3  -    50 - 69% stenosis                        Moderate Stenosis  CAD-RADS 4  -    A. 70 - 99% Stenosis  or             Severe Stenosis                                  B. Left main >50%  CAD-RADS 5  -    100% (total occlusion)                 Total coronary occlusion  CAD-RADS N  -    Non-diagnostic study                   Obstructive CAD cannot be excluded

## 2020-10-25 NOTE — H&P ADULT - NSHPPHYSICALEXAM_GEN_ALL_CORE
General:   Head and neck:  Heart:   Lung:  Abdomen:  Neuro:  Psych:   LE General: NAD  Head and neck: No JVD, NC, AT, EOMI   Heart: S1,S2 appreciated, Systolic murmur over the precordium   Lung: Bilateral equal breath sounds, No wheeze  Abdomen: Soft, nontender, nondistended, BS +ve  Neuro: A&O*3, nonfocal   Psych:  Normal affect, Calm   LE: No edema, no cyanosis

## 2020-10-25 NOTE — ED ADULT NURSE REASSESSMENT NOTE - NS ED NURSE REASSESS COMMENT FT1
report received from previous nurse. patient assessed, A&O x4, sleeping comfortably. continuous cardiac and pulse ox monitoring being maintained. safety precautions maintained. VSS. will continue to monitor report received from previous nurse. patient assessed, A&O x4, sleeping comfortably. continuous cardiac and pulse ox monitoring being maintained. safety precautions maintained. patient awaiting CCTA. VSS. will continue to monitor

## 2020-10-25 NOTE — H&P ADULT - ASSESSMENT
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    #Chest pain   -Patient chest pain is atypical for ACS, However the fact it is exertional and associated with SOB and arm heaviness, can be angina equivalent   -First two set of troponin on admission were negative   -EKG was negative for any acute ischemic changes, Patient reported WPW, EKG is negative for that two   -CCTA was done and showed CAD RAD of 3, LAD stenosis of 50%   -Cardiology consulted and saw the patient, Recs appreciated   -Start medical therapy with ASA, Metoprolol 12.5 daily ( patient heart rate was 60 on admission ), Atorvastatin 80 mg PO daily   -Echo to evaluate the valvular function   -Nuclear exercise stress test    #Schizophrenia   -Continue with Haloperidol     # consult   #Diet: DASH  #DVT ppx: Lovenox  #GI ppx: PPI  #Acute

## 2020-10-25 NOTE — ED CDU PROVIDER DISPOSITION NOTE - CLINICAL COURSE
pt p/w CP. ekg/trop neg x2. CCTA - cad rad 3, cardiology consulted, admit to tele for further cardiac work up

## 2020-10-25 NOTE — H&P ADULT - HISTORY OF PRESENT ILLNESS
This is a 59 year old F patient with Past medical history of Hairy cell leukemia ( In remission for 20 years ) and Past psychiatric hx of Schizophrenia presented to the ED for Chest pain. History goes back to one day prior to admission when the patient woke up due to retrosternal sharp chest pain radiating to the left arm. Pain lasted for few minutes and resolved on its own, Patient did not identify any exacerbating or relieving factors and she stated that she did not experience such pain with walking. She did however endorse mild shortness of breath on exertion. 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 History goes back to two day prior to admission when the patient woke up due to retrosternal sharp chest pain radiating to the left arm. Pain lasted for few minutes and resolved on its own, Patient did not identify any exacerbating or relieving factors. She stated that she had a similar pain while she was walking ( after 5 blocks of walking ) where she developed arm heaviness along with chest pain. After the pain was resolved she started experiencing mild SOB.     In the ED Vital Signs  T(C): 36.4 (25 Oct 2020 16:10), Max: 36.4 (25 Oct 2020 08:34)  T(F): 97.5 (25 Oct 2020 16:10), Max: 97.5 (25 Oct 2020 08:34)  HR: 59 (25 Oct 2020 16:10) (59 - 81)  BP: 119/67 (25 Oct 2020 16:10) (116/63 - 132/75)  RR: 18 (25 Oct 2020 16:10) (17 - 18)  SpO2: 100% (25 Oct 2020 16:10) (98% - 100%)

## 2020-10-25 NOTE — CONSULT NOTE ADULT - ASSESSMENT
IMPRESSION:  - Atypical chest pain (at rest, lasting couple of minutes, resolving spontaneously)  - Episodes of dyspnea on exertion with arm pain (Angina equivalent?)  - Strong family history of heart disease  - CCTA showing moderate stenosis in LAD    PLAN:  - Medical therapy with aspirin, atorvastatin (high dose), metoprolol  - Will discuss with attending concerning need for admission and more invasive work-up IMPRESSION:  - Atypical chest pain (at rest, lasting couple of minutes, resolving spontaneously)  - Episodes of dyspnea on exertion with arm pain (Angina equivalent?)  - Strong family history of heart disease  - CCTA showing moderate stenosis in LAD  - suspect AS    PLAN:  - Medical therapy with aspirin, atorvastatin (high dose), metoprolol  - Obtain 2D echo to assess valvular function  - Obtain nuclear stress test to evaluate for physiological significance of her CAD.

## 2020-10-25 NOTE — H&P ADULT - ATTENDING COMMENTS
I saw and evaluated the patient. I have reviewed and agree with the findings and plan of care as documented above in the resident’s note (unless indicated differently below). Any necessary changes were made in the body of the text.    CC: chest pain    HPI:  60 yo F pt w/ a relevant hx of WPW synd (dx'd in the past per patient) p/w 2 episodes of chest pain. Onset: 2 days prior to arrival; initial episode awakened her from sleep; second episode came on while she was ambulating. Precipitating factors: cannot think of any. Location: midsternal. Quality: sharp. Intensity: moderate. Duration: self-limited. Radiation: to the left arm. Associated w/ LUE heaviness/numbness and VALLEJO. Alleviating factors: none. Aggravating factors: none.     ROS:  Constitutional: no fevers; no chills  Eyes: no conjunctivitis; no itching  ENT: no dysphagia; no odynophagia  CVS: no PND; no orthopnea; +chest pain (as above; no chest pain at the time of this evaluation)  Resp: no SOB (+dyspnea during episodes of chest pain); no coughing  GI: no nausea; no vomiting; no diarrhea; no abd pain  : no dysuria; no hematuria  MSK: +b/l LE aching (pt attributes this to "a lot of walking")  Skin: no rashes; no ulcers  Neuro: no focal weakness; no headache  All other systems reviewed and are negative    PMHx, home medications, SurHx, FHx and Social history as above in the corresponding sections of the note - reviewed and edited where appropriate    Exam:  Vitals: BP = 119/60; P = 68; T = 97.1; RR = 18; SpO2 > 95 on room air  General: appears stated age; cooperative  Eyes: anicteric sclera; moist conjunctiva; PERRL; EOMI  HENT: NC/AT; clear oropharynx w/ moist mucous membranes; nL hard/soft palate  Neck: supple w/ FROM; trachea midline  Lungs: no tachypnea, accessory muscle use, wheezing, rhonci or rales  CVS: RRR; S1 and S2 w/ prominent 3+ systolic murmur (anne-decres) w/o rubs or gallops  Abd: BS+; soft; non-tender to palpation x 4; no masses or HSM  Ext: no peripheral edema; pulses palpable distally b/l  Skin: normal temp, turgor and texture; no rashes, ulcers or nodules  Neuro: CN II-XII intact; str grossly intact  Psych: appropriate affect; alert and oriented to person, place and situation    Labs reviewed - significant for H&H 10.7/32.1  Imaging reviewed: CXR w/ no acute cardiopulmonary process  CCTA: p&mLAD w/ focal moderate stenosis; coronary Ca score 92 [CAD-RADS 3]  EKG reviewed: NSR; qTC 408    Assessment:  (1) Chest pain r/o ACS  (2) Focal moderate stenosis of the mLAD on imaging:   --- Pending stress testing to ascertain physiologic significance  (3) Prominent murmur on exam - ?aortic stenosis: pending ECHO  (4) Normocytic anemia likely anemia of chronic disease - stable H&H  (5) Hx of hairy cell leukemia - no acute complaints  (6) ?WPW syndrome - no acute concerns (being monitored on Telemetry)  (7) Schizophrenia - stable mood (no SI/HI, no auditory/visual hallucinations)    Plan:  (1) Serial enzymes / EKG's; continuous rhythm monitoring  (2) Pending Nuclear Stress   (3) Pending TTE  (4) C/w ASA, statin, beta blocker  (5) Rest of meds as dosed  (6) Supportive care; PRN analgesics  (7) Dispo: anticipate d/c in 24-48 hrs pending aforementioned w/u    Full code

## 2020-10-26 LAB
ALBUMIN SERPL ELPH-MCNC: 3.2 G/DL — LOW (ref 3.5–5.2)
ALP SERPL-CCNC: 87 U/L — SIGNIFICANT CHANGE UP (ref 30–115)
ALT FLD-CCNC: 24 U/L — SIGNIFICANT CHANGE UP (ref 0–41)
ANION GAP SERPL CALC-SCNC: 7 MMOL/L — SIGNIFICANT CHANGE UP (ref 7–14)
AST SERPL-CCNC: 21 U/L — SIGNIFICANT CHANGE UP (ref 0–41)
BILIRUB SERPL-MCNC: 0.3 MG/DL — SIGNIFICANT CHANGE UP (ref 0.2–1.2)
BUN SERPL-MCNC: 10 MG/DL — SIGNIFICANT CHANGE UP (ref 10–20)
CALCIUM SERPL-MCNC: 8.5 MG/DL — SIGNIFICANT CHANGE UP (ref 8.5–10.1)
CHLORIDE SERPL-SCNC: 108 MMOL/L — SIGNIFICANT CHANGE UP (ref 98–110)
CK MB CFR SERPL CALC: <1 NG/ML — SIGNIFICANT CHANGE UP (ref 0.6–6.3)
CO2 SERPL-SCNC: 28 MMOL/L — SIGNIFICANT CHANGE UP (ref 17–32)
CREAT SERPL-MCNC: 0.9 MG/DL — SIGNIFICANT CHANGE UP (ref 0.7–1.5)
GLUCOSE SERPL-MCNC: 114 MG/DL — HIGH (ref 70–99)
HCT VFR BLD CALC: 31.9 % — LOW (ref 37–47)
HGB BLD-MCNC: 10.6 G/DL — LOW (ref 12–16)
MCHC RBC-ENTMCNC: 30.5 PG — SIGNIFICANT CHANGE UP (ref 27–31)
MCHC RBC-ENTMCNC: 33.2 G/DL — SIGNIFICANT CHANGE UP (ref 32–37)
MCV RBC AUTO: 91.7 FL — SIGNIFICANT CHANGE UP (ref 81–99)
NRBC # BLD: 0 /100 WBCS — SIGNIFICANT CHANGE UP (ref 0–0)
PLATELET # BLD AUTO: 191 K/UL — SIGNIFICANT CHANGE UP (ref 130–400)
POTASSIUM SERPL-MCNC: 4.2 MMOL/L — SIGNIFICANT CHANGE UP (ref 3.5–5)
POTASSIUM SERPL-SCNC: 4.2 MMOL/L — SIGNIFICANT CHANGE UP (ref 3.5–5)
PROT SERPL-MCNC: 5.3 G/DL — LOW (ref 6–8)
RBC # BLD: 3.48 M/UL — LOW (ref 4.2–5.4)
RBC # FLD: 14.1 % — SIGNIFICANT CHANGE UP (ref 11.5–14.5)
SODIUM SERPL-SCNC: 143 MMOL/L — SIGNIFICANT CHANGE UP (ref 135–146)
TROPONIN T SERPL-MCNC: <0.01 NG/ML — SIGNIFICANT CHANGE UP
WBC # BLD: 4.81 K/UL — SIGNIFICANT CHANGE UP (ref 4.8–10.8)
WBC # FLD AUTO: 4.81 K/UL — SIGNIFICANT CHANGE UP (ref 4.8–10.8)

## 2020-10-26 PROCEDURE — 93306 TTE W/DOPPLER COMPLETE: CPT | Mod: 26

## 2020-10-26 PROCEDURE — 93010 ELECTROCARDIOGRAM REPORT: CPT

## 2020-10-26 PROCEDURE — 99233 SBSQ HOSP IP/OBS HIGH 50: CPT

## 2020-10-26 PROCEDURE — 99232 SBSQ HOSP IP/OBS MODERATE 35: CPT

## 2020-10-26 PROCEDURE — 93016 CV STRESS TEST SUPVJ ONLY: CPT

## 2020-10-26 PROCEDURE — 93018 CV STRESS TEST I&R ONLY: CPT

## 2020-10-26 PROCEDURE — 78452 HT MUSCLE IMAGE SPECT MULT: CPT | Mod: 26

## 2020-10-26 RX ORDER — ADENOSINE 3 MG/ML
60 INJECTION INTRAVENOUS ONCE
Refills: 0 | Status: COMPLETED | OUTPATIENT
Start: 2020-10-26 | End: 2020-10-26

## 2020-10-26 RX ADMIN — PANTOPRAZOLE SODIUM 40 MILLIGRAM(S): 20 TABLET, DELAYED RELEASE ORAL at 05:16

## 2020-10-26 RX ADMIN — ENOXAPARIN SODIUM 40 MILLIGRAM(S): 100 INJECTION SUBCUTANEOUS at 05:17

## 2020-10-26 RX ADMIN — Medication 12.5 MILLIGRAM(S): at 05:16

## 2020-10-26 RX ADMIN — ADENOSINE 600 MILLIGRAM(S): 3 INJECTION INTRAVENOUS at 17:14

## 2020-10-26 RX ADMIN — ATORVASTATIN CALCIUM 80 MILLIGRAM(S): 80 TABLET, FILM COATED ORAL at 21:42

## 2020-10-26 RX ADMIN — Medication 81 MILLIGRAM(S): at 11:45

## 2020-10-26 NOTE — PHYSICAL THERAPY INITIAL EVALUATION ADULT - GENERAL OBSERVATIONS, REHAB EVAL
Attempted to see pt for b/s PT IE this AM. Pt awaiting nuclear stress test. Spoke with Dr. Vaughan (#[id number]) who states pt is cleared for PT. Pt with active amb orders in chart. Pt however declining all PT @ this time stating her legs hurt. 10/10 VAS B/L feet & "legs" When asked to qualify pt states it just hurts, when given verbal options pt describes pain as throbbing, & states it is all the time, even when in bed. Of note pt is resting comfortably in bed moving LEs, appears to be in NAD. Covering RN Sahara made aware of complaints. PT to f/u when pt is agreeable. 13:43-14:00 Attempted to see pt for b/s PT IE this AM. Pt awaiting nuclear stress test. Spoke with Dr. Vaughan (#[id number]) who states pt is cleared for PT. Pt with active amb orders in chart. Pt educated on purpose of PT, however pt declining all PT @ this time stating her legs hurt. 10/10 VAS B/L feet & "legs" When asked to qualify pt states it just hurts, when given verbal options pt describes pain as throbbing, & states it is all the time, even when in bed. Of note pt is resting comfortably in bed moving LEs, appears to be in NAD. Covering RN Sahara made aware of complaints. PT to f/u when pt is agreeable.

## 2020-10-26 NOTE — PROGRESS NOTE ADULT - ASSESSMENT
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       CP  H/O Hairy cell leukemia  H/O WPW syndrome  Schizophrenia.          PLAN:    ·	CCTA reviewed. Lesions in LAD. Cardiology recommended nuclear stress test  ·	ECHO reviewed. EF is 66% and there is severe AS  ·	Bradycardia. D/C Metoprolol  ·	Cont tele  ·	Cont ASA, Lipitor and her other meds  ·	Pt is homeless.  for D/C planning.

## 2020-10-26 NOTE — PROGRESS NOTE ADULT - SUBJECTIVE AND OBJECTIVE BOX
ESPINOZA CARRINGTON  59y Female    CHIEF COMPLAINT:    Patient is a 59y old  Female who presents with a chief complaint of Chest pain (26 Oct 2020 09:26)      INTERVAL HPI/OVERNIGHT EVENTS:    Patient seen and examined.    ROS: All other systems are negative.    Vital Signs:    T(F): 98 (10-26-20 @ 05:31), Max: 98 (10-26-20 @ 05:31)  HR: 49 (10-26-20 @ 05:31) (49 - 68)  BP: 115/59 (10-26-20 @ 05:31) (115/59 - 174/82)  RR: 18 (10-26-20 @ 05:31) (18 - 18)  SpO2: 100% (10-25-20 @ 16:10) (100% - 100%)  I&O's Summary    Daily Height in cm: 154.94 (26 Oct 2020 09:26)    Daily Weight in k (26 Oct 2020 05:31)  CAPILLARY BLOOD GLUCOSE          PHYSICAL EXAM:    GENERAL:  NAD  SKIN: No rashes or lesions  HENT: Atraumatic Normocephalic. PERRL. Moist membranes.  NECK: Supple, No JVD. No lymphadenopathy.  PULMONARY: CTA B/L. No wheezing. No rales  CVS: Normal S1, S2. Rate and Rhythm are regular. A holosystolic mm on aortic area.   ABDOMEN/GI: Soft, Nontender, Nondistended; BS present  EXTREMITIES: Peripheral pulses intact. No edema B/L LE.  NEUROLOGIC:  No motor or sensory deficit.  PSYCH: Alert & oriented x 3    Consultant(s) Notes Reviewed:  [x ] YES  [ ] NO  Care Discussed with Consultants/Other Providers [ x] YES  [ ] NO    EKG reviewed  Telemetry reviewed    LABS:                        10.6   4.81  )-----------( 191      ( 26 Oct 2020 08:16 )             31.9     10-    143  |  108  |  10  ----------------------------<  114<H>  4.2   |  28  |  0.9    Ca    8.5      26 Oct 2020 08:16  Mg     1.8     10-24    TPro  5.3<L>  /  Alb  3.2<L>  /  TBili  0.3  /  DBili  x   /  AST  21  /  ALT  24  /  AlkPhos  87  10-      Serum Pro-Brain Natriuretic Peptide: 251 pg/mL (10-24-20 @ 14:39)    Trop <0.01, CKMB <1.0, CK --, 10-26-20 @ 08:16  Trop <0.01, CKMB --, CK --, 10-24-20 @ 18:27  Trop <0.01, CKMB --, CK --, 10-24-20 @ 14:39        RADIOLOGY & ADDITIONAL TESTS:    < from: CT Angio Heart and Coronaries w/ IV Cont (10.25.20 @ 11:22) >    IMPRESSION:    1.  Multifocal mixed calcified and noncalcified plaques proximal and mid LAD with focal moderate stenosis mid LAD (approximately 50%).    2. Total coronary calcium score is 92. Total calcium volume is 83.  For a 59 year old female , this corresponds to 94 percentile      < end of copied text >  < from: TTE Echo Complete w/o Contrast w/ Doppler (10.26.20 @ 10:52) >    Summary:   1. LV Ejection Fraction by Villareal's Method with a biplane EF of 66 %.   2. Normal left ventricular size and wall thicknesses, with normal systolic and diastolic function.   3. The mean global longitudinal peak strain by speckle tracking is -16.2% which is borderline normal.   4. Normal left atrial size.   5. Normal right atrial size.   6. No evidence of mitral valve regurgitation.   7. LA volume Index is 21.4 ml/m² ml/m2.   8. Peak transaortic gradient equals 67.4 mmHg, mean transaortic gradient equals 42.1 mmHg, the calculated aortic valve area equals 0.77 cm² by the continuity equation consistent with severe aortic stenosis.   9. The aortic valve mean gradient is 42.1 mmHg consistent with severe aortic stenosis.    < end of copied text >    Imaging or report Personally Reviewed:  [ ] YES  [ ] NO    Medications:  Standing  aDENosine Injectable (ADENOSCAN) 60 milliGRAM(s) IV Bolus once  aspirin  chewable 81 milliGRAM(s) Oral daily  atorvastatin 80 milliGRAM(s) Oral at bedtime  chlorhexidine 4% Liquid 1 Application(s) Topical <User Schedule>  enoxaparin Injectable 40 milliGRAM(s) SubCutaneous at bedtime  haloperidol     Tablet 5 milliGRAM(s) Oral at bedtime  metoprolol succinate ER 12.5 milliGRAM(s) Oral daily  pantoprazole    Tablet 40 milliGRAM(s) Oral before breakfast    PRN Meds  acetaminophen   Tablet .. 650 milliGRAM(s) Oral every 6 hours PRN  hydrOXYzine  Oral Tab/Cap - Peds 50 milliGRAM(s) Oral every 6 hours PRN      Case discussed with resident    Care discussed with pt/family

## 2020-10-26 NOTE — PROGRESS NOTE ADULT - SUBJECTIVE AND OBJECTIVE BOX
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 History goes back to two day prior to admission when the patient woke up due to retrosternal sharp chest pain radiating to the left arm. Pain lasted for few minutes and resolved on its own, Patient did not identify any exacerbating or relieving factors. She stated that she had a similar pain while she was walking ( after 5 blocks of walking ) where she developed arm heaviness along with chest pain. After the pain was resolved she started experiencing mild SOB.     In the ED Vital Signs  T(C): 36.4 (25 Oct 2020 16:10), Max: 36.4 (25 Oct 2020 08:34)  T(F): 97.5 (25 Oct 2020 16:10), Max: 97.5 (25 Oct 2020 08:34)  HR: 59 (25 Oct 2020 16:10) (59 - 81)  BP: 119/67 (25 Oct 2020 16:10) (116/63 - 132/75)  RR: 18 (25 Oct 2020 16:10) (17 - 18)  SpO2: 100% (25 Oct 2020 16:10) (98% - 100%) (25 Oct 2020 16:29)      SUBJ:  Patient seen and examined. Comfortable today.      MEDICATIONS  (STANDING):  aspirin  chewable 81 milliGRAM(s) Oral daily  atorvastatin 80 milliGRAM(s) Oral at bedtime  chlorhexidine 4% Liquid 1 Application(s) Topical <User Schedule>  enoxaparin Injectable 40 milliGRAM(s) SubCutaneous at bedtime  haloperidol     Tablet 5 milliGRAM(s) Oral at bedtime  metoprolol succinate ER 12.5 milliGRAM(s) Oral daily  pantoprazole    Tablet 40 milliGRAM(s) Oral before breakfast    MEDICATIONS  (PRN):  acetaminophen   Tablet .. 650 milliGRAM(s) Oral every 6 hours PRN Mild Pain (1 - 3), Moderate Pain (4 - 6)  hydrOXYzine  Oral Tab/Cap - Peds 50 milliGRAM(s) Oral every 6 hours PRN anxiety            Vital Signs Last 24 Hrs  T(C): 36.7 (26 Oct 2020 05:31), Max: 36.7 (26 Oct 2020 05:31)  T(F): 98 (26 Oct 2020 05:31), Max: 98 (26 Oct 2020 05:31)  HR: 49 (26 Oct 2020 05:31) (49 - 68)  BP: 115/59 (26 Oct 2020 05:31) (115/59 - 174/82)  BP(mean): --  RR: 18 (26 Oct 2020 05:31) (18 - 18)  SpO2: 100% (25 Oct 2020 16:10) (100% - 100%)      PHYSICAL EXAM:    GEN:  NAD  HEENT: NC/AT  Neck: No JVD  CV: Reg, S1-S2, 2/6 systolic murmur  Lungs: CTAB  Abd: Soft, non-tender  Ext: No edema      I&O's Summary  	        ECG:  < from: 12 Lead ECG (10.26.20 @ 07:26) >  Sinus bradycardia with sinus arrhythmia  Moderate voltage criteria for LVH, may be normal variant  Borderline ECG    < end of copied text >    TTE:      LABS:                        10.6   4.81  )-----------( 191      ( 26 Oct 2020 08:16 )             31.9     10-26    143  |  108  |  10  ----------------------------<  114<H>  4.2   |  28  |  0.9    Ca    8.5      26 Oct 2020 08:16  Mg     1.8     10-24    TPro  5.3<L>  /  Alb  3.2<L>  /  TBili  0.3  /  DBili  x   /  AST  21  /  ALT  24  /  AlkPhos  87  10-26    CARDIAC MARKERS ( 26 Oct 2020 08:16 )  x     / <0.01 ng/mL / x     / x     / <1.0 ng/mL  CARDIAC MARKERS ( 24 Oct 2020 18:27 )  x     / <0.01 ng/mL / x     / x     / x      CARDIAC MARKERS ( 24 Oct 2020 14:39 )  x     / <0.01 ng/mL / x     / x     / x                BNP  RADIOLOGY & ADDITIONAL STUDIES:      IMPRESSION AND PLAN:

## 2020-10-26 NOTE — PROGRESS NOTE ADULT - ASSESSMENT
CCTA noted - moderate disease - mixed plaque  Atypical chest pain.  Systolic murmur.      Plan:  C/w ASA  C/w statin  Hold BB due to bradycardia  Monitor BP.  Obtain nuclear MPI to assess physiologic significance of the coronary lesion.  Obtain 2D echo to assess the aortic valve.

## 2020-10-26 NOTE — PROGRESS NOTE ADULT - SUBJECTIVE AND OBJECTIVE BOX
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 History goes back to two day prior to admission when the patient woke up due to retrosternal sharp chest pain radiating to the left arm. Pain lasted for few minutes and resolved on its own, Patient did not identify any exacerbating or relieving factors. She stated that she had a similar pain while she was walking ( after 5 blocks of walking ) where she developed arm heaviness along with chest pain. After the pain was resolved she started experiencing mild SOB.     In the ED Vital Signs  T(C): 36.4 (25 Oct 2020 16:10), Max: 36.4 (25 Oct 2020 08:34)  T(F): 97.5 (25 Oct 2020 16:10), Max: 97.5 (25 Oct 2020 08:34)  HR: 59 (25 Oct 2020 16:10) (59 - 81)  BP: 119/67 (25 Oct 2020 16:10) (116/63 - 132/75)  RR: 18 (25 Oct 2020 16:10) (17 - 18)  SpO2: 100% (25 Oct 2020 16:10) (98% - 100%) (25 Oct 2020 16:29)    Currently admitted to medicine with the primary diagnosis of Chest pain         Today is hospital day 1d.     INTERVAL HPI / OVERNIGHT EVENTS  Patient was examined and seen at bedside.   No OVNE.      PAST MEDICAL & SURGICAL HISTORY  Leukemia  in remission    Schizophrenia    No significant past surgical history      ALLERGIES  penicillin (Rash)    MEDICATIONS  STANDING MEDICATIONS  aspirin  chewable 81 milliGRAM(s) Oral daily  atorvastatin 80 milliGRAM(s) Oral at bedtime  chlorhexidine 4% Liquid 1 Application(s) Topical <User Schedule>  enoxaparin Injectable 40 milliGRAM(s) SubCutaneous at bedtime  haloperidol     Tablet 5 milliGRAM(s) Oral at bedtime  pantoprazole    Tablet 40 milliGRAM(s) Oral before breakfast    PRN MEDICATIONS  acetaminophen   Tablet .. 650 milliGRAM(s) Oral every 6 hours PRN  hydrOXYzine  Oral Tab/Cap - Peds 50 milliGRAM(s) Oral every 6 hours PRN    VITALS:  T(F): 97.5  HR: 59  BP: 139/79  RR: 18  SpO2: --    PHYSICAL EXAM  GEN: NAD, Resting comfortably in bed  PULM: Clear to auscultation bilaterally, No wheezes/rales/rhonchi  CVS: Regular rate and rhythm, S1-S2, no rubs/gallops, +2 pulses. Holosystolic murmur.  ABD: Soft, non-tender, non-distended, no guarding  EXT: No edema  NEURO: A&Ox3, no focal deficits    LABS                        10.6   4.81  )-----------( 191      ( 26 Oct 2020 08:16 )             31.9     10-26    143  |  108  |  10  ----------------------------<  114<H>  4.2   |  28  |  0.9    Ca    8.5      26 Oct 2020 08:16    TPro  5.3<L>  /  Alb  3.2<L>  /  TBili  0.3  /  DBili  x   /  AST  21  /  ALT  24  /  AlkPhos  87  10-26          Troponin T, Serum: <0.01 ng/mL (10-26-20 @ 08:16)      CARDIAC MARKERS ( 26 Oct 2020 08:16 )  x     / <0.01 ng/mL / x     / x     / <1.0 ng/mL      RADIOLOGY    < from: CT Angio Heart and Coronaries w/ IV Cont (10.25.20 @ 11:22) >  1.  Multifocal mixed calcified and noncalcified plaques proximal and mid LAD with focal moderate stenosis mid LAD (approximately 50%).    2. Total coronary calcium score is 92. Total calcium volume is 83.  For a 59 year old female , this corresponds to 94 percentile    CAD-RADS 3.    < end of copied text >    < from: Xray Chest 2 Views PA/Lat (10.24.20 @ 15:42) >  No radiographic evidence of acute cardiopulmonary disease.    < end of copied text >

## 2020-10-26 NOTE — PROGRESS NOTE ADULT - ASSESSMENT
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     # Typical chest pain   - Hemodynamically stable  - Substernal, worse with exertion, relieved by rest  - CCTA: LAD lesions  - Echo: Severe aortic stenosis, EF 66%  > F/u NM Stress test  > C/w ASA, lipitor  > C/w tele  > F/u w/ cardio regarding severe AS: patient is young, is she candidate for valve replacement?    #Bradycardia  > D/c metoprolol    #Hx of Hairy Cell Leukemia (chronic)    # Hx of WPW syndrome (chronic)  - Not observed on EKG    #Schizophrenia (controlled)  > C/w haloperidol      Dispo: Patient homeless,  for D/C planning  DVT: lovenox  GI ppx: Pantoprazole  Diet: NPO for NM Stress test  Activity: IAT

## 2020-10-27 VITALS — HEIGHT: 61 IN | WEIGHT: 168.65 LBS

## 2020-10-27 LAB
ALBUMIN SERPL ELPH-MCNC: 3.2 G/DL — LOW (ref 3.5–5.2)
ALP SERPL-CCNC: 89 U/L — SIGNIFICANT CHANGE UP (ref 30–115)
ALT FLD-CCNC: 22 U/L — SIGNIFICANT CHANGE UP (ref 0–41)
ANION GAP SERPL CALC-SCNC: 8 MMOL/L — SIGNIFICANT CHANGE UP (ref 7–14)
AST SERPL-CCNC: 20 U/L — SIGNIFICANT CHANGE UP (ref 0–41)
BASOPHILS # BLD AUTO: 0.03 K/UL — SIGNIFICANT CHANGE UP (ref 0–0.2)
BASOPHILS NFR BLD AUTO: 0.5 % — SIGNIFICANT CHANGE UP (ref 0–1)
BILIRUB SERPL-MCNC: 0.6 MG/DL — SIGNIFICANT CHANGE UP (ref 0.2–1.2)
BUN SERPL-MCNC: 10 MG/DL — SIGNIFICANT CHANGE UP (ref 10–20)
CALCIUM SERPL-MCNC: 8.8 MG/DL — SIGNIFICANT CHANGE UP (ref 8.5–10.1)
CHLORIDE SERPL-SCNC: 107 MMOL/L — SIGNIFICANT CHANGE UP (ref 98–110)
CO2 SERPL-SCNC: 27 MMOL/L — SIGNIFICANT CHANGE UP (ref 17–32)
CREAT SERPL-MCNC: 1.1 MG/DL — SIGNIFICANT CHANGE UP (ref 0.7–1.5)
EOSINOPHIL # BLD AUTO: 0.12 K/UL — SIGNIFICANT CHANGE UP (ref 0–0.7)
EOSINOPHIL NFR BLD AUTO: 2 % — SIGNIFICANT CHANGE UP (ref 0–8)
GLUCOSE SERPL-MCNC: 111 MG/DL — HIGH (ref 70–99)
HCT VFR BLD CALC: 33.2 % — LOW (ref 37–47)
HGB BLD-MCNC: 11.2 G/DL — LOW (ref 12–16)
IMM GRANULOCYTES NFR BLD AUTO: 0.3 % — SIGNIFICANT CHANGE UP (ref 0.1–0.3)
LYMPHOCYTES # BLD AUTO: 1.71 K/UL — SIGNIFICANT CHANGE UP (ref 1.2–3.4)
LYMPHOCYTES # BLD AUTO: 28.6 % — SIGNIFICANT CHANGE UP (ref 20.5–51.1)
MAGNESIUM SERPL-MCNC: 2 MG/DL — SIGNIFICANT CHANGE UP (ref 1.8–2.4)
MCHC RBC-ENTMCNC: 30.8 PG — SIGNIFICANT CHANGE UP (ref 27–31)
MCHC RBC-ENTMCNC: 33.7 G/DL — SIGNIFICANT CHANGE UP (ref 32–37)
MCV RBC AUTO: 91.2 FL — SIGNIFICANT CHANGE UP (ref 81–99)
MONOCYTES # BLD AUTO: 0.44 K/UL — SIGNIFICANT CHANGE UP (ref 0.1–0.6)
MONOCYTES NFR BLD AUTO: 7.4 % — SIGNIFICANT CHANGE UP (ref 1.7–9.3)
NEUTROPHILS # BLD AUTO: 3.65 K/UL — SIGNIFICANT CHANGE UP (ref 1.4–6.5)
NEUTROPHILS NFR BLD AUTO: 61.2 % — SIGNIFICANT CHANGE UP (ref 42.2–75.2)
NRBC # BLD: 0 /100 WBCS — SIGNIFICANT CHANGE UP (ref 0–0)
PLATELET # BLD AUTO: 207 K/UL — SIGNIFICANT CHANGE UP (ref 130–400)
POTASSIUM SERPL-MCNC: 4.4 MMOL/L — SIGNIFICANT CHANGE UP (ref 3.5–5)
POTASSIUM SERPL-SCNC: 4.4 MMOL/L — SIGNIFICANT CHANGE UP (ref 3.5–5)
PROT SERPL-MCNC: 5.6 G/DL — LOW (ref 6–8)
RBC # BLD: 3.64 M/UL — LOW (ref 4.2–5.4)
RBC # FLD: 13.7 % — SIGNIFICANT CHANGE UP (ref 11.5–14.5)
SODIUM SERPL-SCNC: 142 MMOL/L — SIGNIFICANT CHANGE UP (ref 135–146)
WBC # BLD: 5.97 K/UL — SIGNIFICANT CHANGE UP (ref 4.8–10.8)
WBC # FLD AUTO: 5.97 K/UL — SIGNIFICANT CHANGE UP (ref 4.8–10.8)

## 2020-10-27 PROCEDURE — 99233 SBSQ HOSP IP/OBS HIGH 50: CPT

## 2020-10-27 PROCEDURE — 99232 SBSQ HOSP IP/OBS MODERATE 35: CPT

## 2020-10-27 RX ADMIN — ENOXAPARIN SODIUM 40 MILLIGRAM(S): 100 INJECTION SUBCUTANEOUS at 04:22

## 2020-10-27 RX ADMIN — PANTOPRAZOLE SODIUM 40 MILLIGRAM(S): 20 TABLET, DELAYED RELEASE ORAL at 06:38

## 2020-10-27 NOTE — PHYSICAL THERAPY INITIAL EVALUATION ADULT - GENERAL OBSERVATIONS, REHAB EVAL
Chart reviewed, attempted to see pt for PT IE, pt received sitting  style in bed in NAD, pt adamantly refusing PT, stating "there is nothing wrong with me", pt also stated she no longer wishes to go to SNF and wants to go to a shelter, as pt is homeless,  Matt informed, pt also reported that her LEs and feet are feeling better, pt left as found in NAD, will d/c PT

## 2020-10-27 NOTE — PROGRESS NOTE ADULT - ASSESSMENT
Non-obstructive CAD  Negative MPI, CCTA - moderate stenosis    2d echo - preserved LV function, severe AS.    Patient does not want surgery evaluation, but is agreeable to f/u as outpatient.    C/w ASA, statin.  Not on BB due to bradycardia.  D/c planning.  F/u in the office.

## 2020-10-27 NOTE — PROGRESS NOTE ADULT - SUBJECTIVE AND OBJECTIVE BOX
ESPINOZA CARRINGTON  59y Female    CHIEF COMPLAINT:    Patient is a 59y old  Female who presents with a chief complaint of Chest pain (26 Oct 2020 19:38)      INTERVAL HPI/OVERNIGHT EVENTS:    Patient seen and examined. No cp today. No other complaint. Wants to leave AMA    ROS: All other systems are negative.    Vital Signs:    T(F): 97 (10-27-20 @ 04:40), Max: 97.5 (10-26-20 @ 14:00)  HR: 72 (10-27-20 @ 04:40) (59 - 77)  BP: 115/58 (10-27-20 @ 04:40) (111/62 - 139/79)  RR: 18 (10-27-20 @ 04:40) (18 - 18)  SpO2: 99% (10-27-20 @ 07:55) (96% - 99%)  I&O's Summary    26 Oct 2020 07:01  -  27 Oct 2020 07:00  --------------------------------------------------------  IN: 120 mL / OUT: 0 mL / NET: 120 mL      Daily     Daily   CAPILLARY BLOOD GLUCOSE          PHYSICAL EXAM:    GENERAL:  NAD  SKIN: No rashes or lesions  HENT: Atraumatic Normocephalic. PERRL. Moist membranes.  NECK: Supple, No JVD. No lymphadenopathy.  PULMONARY: CTA B/L. No wheezing. No rales  CVS: Normal S1, S2. Rate and Rhythm are regular. A holosystolic mm on aortic area  ABDOMEN/GI: Soft, Nontender, Nondistended; BS present  EXTREMITIES: Peripheral pulses intact. No edema B/L LE.  NEUROLOGIC:  No motor or sensory deficit.  PSYCH: Alert & oriented x 3    Consultant(s) Notes Reviewed:  [x ] YES  [ ] NO  Care Discussed with Consultants/Other Providers [ x] YES  [ ] NO    EKG reviewed  Telemetry reviewed    LABS:                        11.2   5.97  )-----------( 207      ( 27 Oct 2020 05:37 )             33.2     10-27    142  |  107  |  10  ----------------------------<  111<H>  4.4   |  27  |  1.1    Ca    8.8      27 Oct 2020 05:37  Mg     2.0     10-27    TPro  5.6<L>  /  Alb  3.2<L>  /  TBili  0.6  /  DBili  x   /  AST  20  /  ALT  22  /  AlkPhos  89  10-27      Serum Pro-Brain Natriuretic Peptide: 251 pg/mL (10-24-20 @ 14:39)    Trop <0.01, CKMB <1.0, CK --, 10-26-20 @ 08:16  Trop <0.01, CKMB --, CK --, 10-24-20 @ 18:27  Trop <0.01, CKMB --, CK --, 10-24-20 @ 14:39        RADIOLOGY & ADDITIONAL TESTS:      Imaging or report Personally Reviewed:  [ ] YES  [ ] NO    Medications:  Standing  aspirin  chewable 81 milliGRAM(s) Oral daily  atorvastatin 80 milliGRAM(s) Oral at bedtime  chlorhexidine 4% Liquid 1 Application(s) Topical <User Schedule>  enoxaparin Injectable 40 milliGRAM(s) SubCutaneous at bedtime  haloperidol     Tablet 5 milliGRAM(s) Oral at bedtime  pantoprazole    Tablet 40 milliGRAM(s) Oral before breakfast    PRN Meds  acetaminophen   Tablet .. 650 milliGRAM(s) Oral every 6 hours PRN  hydrOXYzine  Oral Tab/Cap - Peds 50 milliGRAM(s) Oral every 6 hours PRN      Case discussed with resident    Care discussed with pt/family

## 2020-10-27 NOTE — PROGRESS NOTE ADULT - ASSESSMENT
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       CP likely due to anxiety  Severe AS  H/O Hairy cell leukemia  H/O WPW syndrome  Schizophrenia.          PLAN:    ·	Nuclear stress is negative for ischemia  ·	Severe AS on ECHO. D/W the surgery. Will need surgery  ·	Spoke to the pt about sever AS. Refused surgery  ·	ECHO reviewed. EF is 66% and there is severe AS  ·	Cont ASA, Lipitor and her other meds  ·	Left AMA

## 2020-10-27 NOTE — PROGRESS NOTE ADULT - SUBJECTIVE AND OBJECTIVE BOX
Patient is a 59y old  Female who presents with a chief complaint of Chest pain (27 Oct 2020 10:35)          SUBJ:  Patient seen and examined. Reports no chest pain or dyspnea. Echo findings discussed. Patient would prefer to have follow-up as outpatient. Does not want to see CT surgery as inpatient.      MEDICATIONS  (STANDING):  aspirin  chewable 81 milliGRAM(s) Oral daily  atorvastatin 80 milliGRAM(s) Oral at bedtime  chlorhexidine 4% Liquid 1 Application(s) Topical <User Schedule>  enoxaparin Injectable 40 milliGRAM(s) SubCutaneous at bedtime  haloperidol     Tablet 5 milliGRAM(s) Oral at bedtime  pantoprazole    Tablet 40 milliGRAM(s) Oral before breakfast    MEDICATIONS  (PRN):  acetaminophen   Tablet .. 650 milliGRAM(s) Oral every 6 hours PRN Mild Pain (1 - 3), Moderate Pain (4 - 6)  hydrOXYzine  Oral Tab/Cap - Peds 50 milliGRAM(s) Oral every 6 hours PRN anxiety            Vital Signs Last 24 Hrs  T(C): 36.1 (27 Oct 2020 04:40), Max: 36.1 (27 Oct 2020 04:40)  T(F): 97 (27 Oct 2020 04:40), Max: 97 (27 Oct 2020 04:40)  HR: 72 (27 Oct 2020 04:40) (72 - 77)  BP: 115/58 (27 Oct 2020 04:40) (111/62 - 115/58)  BP(mean): --  RR: 18 (27 Oct 2020 04:40) (18 - 18)  SpO2: 99% (27 Oct 2020 07:55) (96% - 99%)      PHYSICAL EXAM:    GEN: AAO x 3, NAD  HEENT: NC/AT, PERRL  Neck: No JVD, no bruits  CV: Reg, S1-S2, 3/6 murmur  Lungs: CTAB  Abd: Soft, non-tender  Ext: No edema      I&O's Summary    26 Oct 2020 07:01  -  27 Oct 2020 07:00  --------------------------------------------------------  IN: 120 mL / OUT: 0 mL / NET: 120 mL    	    TELEMETRY:    ECG:    TTE:      LABS:                        11.2   5.97  )-----------( 207      ( 27 Oct 2020 05:37 )             33.2     10-27    142  |  107  |  10  ----------------------------<  111<H>  4.4   |  27  |  1.1    Ca    8.8      27 Oct 2020 05:37  Mg     2.0     10-27    TPro  5.6<L>  /  Alb  3.2<L>  /  TBili  0.6  /  DBili  x   /  AST  20  /  ALT  22  /  AlkPhos  89  10-27    CARDIAC MARKERS ( 26 Oct 2020 08:16 )  x     / <0.01 ng/mL / x     / x     / <1.0 ng/mL            BNP  RADIOLOGY & ADDITIONAL STUDIES:      IMPRESSION AND PLAN:

## 2020-10-27 NOTE — CHART NOTE - NSCHARTNOTEFT_GEN_A_CORE
Called by RN at 9:40am because patient left hospital without notice.     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 History goes back to two day prior to admission when the patient woke up due to retrosternal sharp chest pain radiating to the left arm. Pain lasted for few minutes and resolved on its own, Patient did not identify any exacerbating or relieving factors. She stated that she had a similar pain while she was walking ( after 5 blocks of walking ) where she developed arm heaviness along with chest pain. After the pain was resolved she started experiencing mild SOB.     She was worked up for typical chest pain with CCTA which showed LAD lesions w/ 50% stenosis, echo w/ EF 66%, and NM stress test which was normal. Also found to have severe aortic stenosis. Today, plan was to call cardiology to see if she would be candidate for AV repair. But as mentioned Called by RN at 9:40am because patient left hospital without notice.     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 History goes back to two day prior to admission when the patient woke up due to retrosternal sharp chest pain radiating to the left arm. Pain lasted for few minutes and resolved on its own, Patient did not identify any exacerbating or relieving factors. She stated that she had a similar pain while she was walking ( after 5 blocks of walking ) where she developed arm heaviness along with chest pain. After the pain was resolved she started experiencing mild SOB.     She was worked up for typical chest pain with CCTA which showed LAD lesions w/ 50% stenosis, echo w/ EF 66%, and NM stress test which was normal. Also found to have severe aortic stenosis. Today, plan was to call cardiology to see if she would be candidate for AV repair. But as mentioned, patient eloped.

## 2021-02-18 ENCOUNTER — EMERGENCY (EMERGENCY)
Facility: HOSPITAL | Age: 61
LOS: 0 days | Discharge: PSYCHIATRIC OR SUBSTANCE USE FACILITY | End: 2021-02-19
Attending: EMERGENCY MEDICINE | Admitting: EMERGENCY MEDICINE
Payer: MEDICARE

## 2021-02-18 VITALS
SYSTOLIC BLOOD PRESSURE: 110 MMHG | DIASTOLIC BLOOD PRESSURE: 65 MMHG | HEART RATE: 94 BPM | OXYGEN SATURATION: 99 % | RESPIRATION RATE: 18 BRPM | TEMPERATURE: 98 F

## 2021-02-18 VITALS
WEIGHT: 160.06 LBS | SYSTOLIC BLOOD PRESSURE: 140 MMHG | TEMPERATURE: 97 F | DIASTOLIC BLOOD PRESSURE: 78 MMHG | RESPIRATION RATE: 18 BRPM | HEART RATE: 88 BPM | OXYGEN SATURATION: 99 % | HEIGHT: 61 IN

## 2021-02-18 DIAGNOSIS — R45.851 SUICIDAL IDEATIONS: ICD-10-CM

## 2021-02-18 DIAGNOSIS — F20.9 SCHIZOPHRENIA, UNSPECIFIED: ICD-10-CM

## 2021-02-18 DIAGNOSIS — F32.9 MAJOR DEPRESSIVE DISORDER, SINGLE EPISODE, UNSPECIFIED: ICD-10-CM

## 2021-02-18 DIAGNOSIS — Z88.0 ALLERGY STATUS TO PENICILLIN: ICD-10-CM

## 2021-02-18 DIAGNOSIS — Z79.899 OTHER LONG TERM (CURRENT) DRUG THERAPY: ICD-10-CM

## 2021-02-18 DIAGNOSIS — Z20.822 CONTACT WITH AND (SUSPECTED) EXPOSURE TO COVID-19: ICD-10-CM

## 2021-02-18 DIAGNOSIS — F25.9 SCHIZOAFFECTIVE DISORDER, UNSPECIFIED: ICD-10-CM

## 2021-02-18 PROBLEM — C95.90 LEUKEMIA, UNSPECIFIED NOT HAVING ACHIEVED REMISSION: Chronic | Status: ACTIVE | Noted: 2020-09-02

## 2021-02-18 LAB
ANION GAP SERPL CALC-SCNC: 8 MMOL/L — SIGNIFICANT CHANGE UP (ref 7–14)
APAP SERPL-MCNC: <5 UG/ML — LOW (ref 10–30)
BASOPHILS # BLD AUTO: 0.05 K/UL — SIGNIFICANT CHANGE UP (ref 0–0.2)
BASOPHILS NFR BLD AUTO: 0.8 % — SIGNIFICANT CHANGE UP (ref 0–1)
BUN SERPL-MCNC: 14 MG/DL — SIGNIFICANT CHANGE UP (ref 10–20)
CALCIUM SERPL-MCNC: 9.1 MG/DL — SIGNIFICANT CHANGE UP (ref 8.5–10.1)
CHLORIDE SERPL-SCNC: 104 MMOL/L — SIGNIFICANT CHANGE UP (ref 98–110)
CO2 SERPL-SCNC: 27 MMOL/L — SIGNIFICANT CHANGE UP (ref 17–32)
CREAT SERPL-MCNC: 0.8 MG/DL — SIGNIFICANT CHANGE UP (ref 0.7–1.5)
EOSINOPHIL # BLD AUTO: 0.19 K/UL — SIGNIFICANT CHANGE UP (ref 0–0.7)
EOSINOPHIL NFR BLD AUTO: 3 % — SIGNIFICANT CHANGE UP (ref 0–8)
ETHANOL SERPL-MCNC: <10 MG/DL — SIGNIFICANT CHANGE UP
GLUCOSE SERPL-MCNC: 108 MG/DL — HIGH (ref 70–99)
HCG SERPL QL: NEGATIVE — SIGNIFICANT CHANGE UP
HCT VFR BLD CALC: 32.9 % — LOW (ref 37–47)
HGB BLD-MCNC: 11 G/DL — LOW (ref 12–16)
IMM GRANULOCYTES NFR BLD AUTO: 0.6 % — HIGH (ref 0.1–0.3)
LYMPHOCYTES # BLD AUTO: 1.92 K/UL — SIGNIFICANT CHANGE UP (ref 1.2–3.4)
LYMPHOCYTES # BLD AUTO: 30 % — SIGNIFICANT CHANGE UP (ref 20.5–51.1)
MCHC RBC-ENTMCNC: 30 PG — SIGNIFICANT CHANGE UP (ref 27–31)
MCHC RBC-ENTMCNC: 33.4 G/DL — SIGNIFICANT CHANGE UP (ref 32–37)
MCV RBC AUTO: 89.6 FL — SIGNIFICANT CHANGE UP (ref 81–99)
MONOCYTES # BLD AUTO: 0.44 K/UL — SIGNIFICANT CHANGE UP (ref 0.1–0.6)
MONOCYTES NFR BLD AUTO: 6.9 % — SIGNIFICANT CHANGE UP (ref 1.7–9.3)
NEUTROPHILS # BLD AUTO: 3.76 K/UL — SIGNIFICANT CHANGE UP (ref 1.4–6.5)
NEUTROPHILS NFR BLD AUTO: 58.7 % — SIGNIFICANT CHANGE UP (ref 42.2–75.2)
NRBC # BLD: 0 /100 WBCS — SIGNIFICANT CHANGE UP (ref 0–0)
PLATELET # BLD AUTO: 248 K/UL — SIGNIFICANT CHANGE UP (ref 130–400)
POTASSIUM SERPL-MCNC: 3.6 MMOL/L — SIGNIFICANT CHANGE UP (ref 3.5–5)
POTASSIUM SERPL-SCNC: 3.6 MMOL/L — SIGNIFICANT CHANGE UP (ref 3.5–5)
RAPID RVP RESULT: SIGNIFICANT CHANGE UP
RBC # BLD: 3.67 M/UL — LOW (ref 4.2–5.4)
RBC # FLD: 13.6 % — SIGNIFICANT CHANGE UP (ref 11.5–14.5)
SALICYLATES SERPL-MCNC: <0.3 MG/DL — LOW (ref 4–30)
SARS-COV-2 RNA SPEC QL NAA+PROBE: SIGNIFICANT CHANGE UP
SODIUM SERPL-SCNC: 139 MMOL/L — SIGNIFICANT CHANGE UP (ref 135–146)
WBC # BLD: 6.4 K/UL — SIGNIFICANT CHANGE UP (ref 4.8–10.8)
WBC # FLD AUTO: 6.4 K/UL — SIGNIFICANT CHANGE UP (ref 4.8–10.8)

## 2021-02-18 PROCEDURE — 99285 EMERGENCY DEPT VISIT HI MDM: CPT

## 2021-02-18 PROCEDURE — 90792 PSYCH DIAG EVAL W/MED SRVCS: CPT | Mod: 95

## 2021-02-18 RX ORDER — EPINEPHRINE 0.3 MG/.3ML
0.3 INJECTION INTRAMUSCULAR; SUBCUTANEOUS
Qty: 0.6 | Refills: 0
Start: 2021-02-18

## 2021-02-18 NOTE — ED PROVIDER NOTE - PHYSICAL EXAMINATION
CONSTITUTIONAL: in no acute distress.   SKIN: warm, dry  HEAD: Normocephalic.  EYES: PERRL.  ENT: Airway clear.  NECK: Supple.  LYMPH: No acute cervical adenopathy.  CARD: Regular rate and rhythm.   RESP: No wheezing, rales or rhonchi.  ABD: soft ntnd  EXT: No clubbing, cyanosis.   NEURO: Alert, oriented.  PSYCH: Cooperative, appropriate. Normal affect.

## 2021-02-18 NOTE — ED PROVIDER NOTE - OBJECTIVE STATEMENT
60F with pmh of schizoaffective disorder, leukemia in remission since 1990s, undomiciled presents for suicidal ideation for the past day. PT denies HI, AVH, plan or access to firearms. PT denies any new stressors other than lack of housing. Denies fever, chills, nausea, vomiting, diarrhea, chest pain, shortness of breath, dysuria. Has been taking prozac as directed, no missed doses as per PT. No prior suicidal attempt, smoking, alcohol or drug use.

## 2021-02-18 NOTE — ED BEHAVIORAL HEALTH ASSESSMENT NOTE - SUMMARY
Pt w/ reported hx of schizoaffective d/o w/ no prior SA/SIB, with 3 recent psych hospitalizations presents w/ self reported depressive mood and SI w/o plan but reported intent. Patient is noted to be concrete in thought process w/ affective lability, poor impulse control. Patient noted to be irritable and agitated while awaiting transfer process. Patient would benefit from inpatient psych hospitalization given her impulsivity, ongoing SI w/ intent but no specific plan, with no social support, homeless.

## 2021-02-18 NOTE — ED BEHAVIORAL HEALTH NOTE - BEHAVIORAL HEALTH NOTE
PRE-HOSPITAL COURSE  ===================  SOURCE:  Second-hand information via EMR documentation   DETAILS: Patient self-presented to the ED no additional pre-hospital course available.    ===================  ED COURSE:   SOURCE:  Second-hand information via EMR documentation   ARRIVAL:  Patient self-presented to the ED with no noted incidents.  BELONGINGS:  Clothing  BEHAVIOR: Complied with triage protocols –provided blood/urine, changed into a hospital gown, and allowed staff to wand/collect belongings without incident. Per chart, patient endorse SI but denies having a plan. Patient stated that she is depressed. Patient denies SI, denies AH, and denies VH. Patient noted to have a linear thought process and is A&Ox3. No aggression or behavioral issues reported.   TREATMENT: No prn medications, restraints, security interventions or manual holds required.   VISITORS: Patient is unaccompanied by family or social supports.   ========================  COLLATERAL ATTEMPT  ========================  NAME: Sruthi Floyd   NUMBER: [phone]  RELATIONSHIP: Sister   RELIABILITY:   COMMENTS: Attempted to reach to obtain collateral, left voicemail requesting a callback.       COVID Exposure Screen- collateral (i.e. third-party, chart review, belongings, etc; include EMS and ED staff)     1.        *Has the patient had a COVID-19 test in the last 21 days?  (  ) Yes   ( X ) No   (  ) Unknown- Reason: ______  IF YES PROCEED TO QUESTION #2. IF NO OR UNKNOWN THEN PLEASE SKIP TO QUESTION #3.  2.        Date of test: ________  3.        Do you know the result? (  ) Negative   (  ) Positive   (  ) No result available  4.        *In the past 10 days, has the patient been around anyone with a positive COVID-19 test?*  (  ) Yes   (  ) No   ( X ) Unknown- Reason (e.g. collateral uncertain, refusing to answer, etc.):  pt is undomiciled______  IF YES PROCEED TO QUESTION #5. IF NO or UNKNOWN, PLEASE SKIP TO QUESTION #10  5.        Was the patient within 6 feet of them for at least 15 minutes? (  ) Yes   (  ) No   (  ) Unknown- Reason: ______   6.        Did the patient provide care for them? (  ) Yes   (  ) No   (  ) Unknown- Reason: ______   7.        Did the patient have direct physical contact with them (touched, hugged, or kissed them)? (  ) Yes   (  ) No    (  ) Unknown- Reason: ______   8.        Did the patient share eating or drinking utensils with them? (  ) Yes   (  ) No    (  ) Unknown- Reason: ______   9.        Have they sneezed, coughed, or somehow got respiratory droplets on the patient? (  ) Yes   (  ) No    (  ) Unknown- Reason: ______   10.     *Has the patient been out of [place] within the past 10 days?*  (  X) Yes   ( ) No   (  ) Unknown- Reason (e.g. collateral uncertain, sedated, refusing to answer, etc.): _______  IF YES PLEASE ANSWER THE FOLLOWING QUESTIONS:  11.     Which state/country has the patient been to? _[place]_____  12.     Were they there over 24 hours? ( X ) Yes   (  ) No    (  ) Unknown- Reason: ______  13.     Date of return to [facility]: __2/16/21____

## 2021-02-18 NOTE — ED BEHAVIORAL HEALTH ASSESSMENT NOTE - DETAILS
self reported reportedly 2 past hospitalizations in [place] MD made aware clinician to clinician hand off given she doesn't recall name of the hospital

## 2021-02-18 NOTE — ED BEHAVIORAL HEALTH ASSESSMENT NOTE - DESCRIPTION
no beh issues    COVID Exposure Screen- Patient     1.        *Have you had a COVID-19 test in the last 21 days?  (  ) Yes   ( x ) No   (  ) Unknown- Reason: ______    4.        *In the past 10 days, have you been around anyone with a positive COVID-19 test?*  (  ) Yes   ( x ) No   (  ) Unknown- Reason (e.g. patient uncertain, sedated, refusing to answer, etc.):  ______    10.     *Have you been out of [place] within the past 10 days?*  ( x ) Yes   (  ) No   (  ) Unknown- Reason (e.g. patient uncertain, sedated, refusing to answer, etc.): _______  IF YES PLEASE ANSWER THE FOLLOWING QUESTIONS:  11.     Which state/country have you been to? [place]______  12.     Were you there over 24 hours? ( x ) Yes   (  ) No    (  ) Unknown- Reason: ______  13.     Date of return to [facility]: _2/16/2021_____ denied homeless, unemployed

## 2021-02-18 NOTE — ED PROVIDER NOTE - PROGRESS NOTE DETAILS
Pt signed out to Dr. Klerman pending psyc eval and dispo. EK - Spoke to telepsych - recommend voluntary admission for SI and schizoaffective disorder, but will need to go to [facility] for covid quarantine as she endorsed recent travel from [place] 2d ago. AG: received signout from Dr Ng. Pt seen at bedside resting comfortably. Adamantly refusing admission to [place], states she never agreed to it, wants to stay on [place]. Discussed options w/ pt who eventually agrees to admission. AG: received signout from Dr Ng. Pt seen at bedside resting comfortably. Adamantly refusing admission to [place], states she never agreed to it, wants to stay on [place]. EK - Pt unwilling to sign voluntary paperwork as she does not want to go out to [place].  Pt increasingly agitated, verbally aggressive to staff, cursing w/ intermittently seemingly unrelated responses to questions or interactions.  D/w telepsych - will convert to involuntary paperwork as pt remains NOT cleared for dc.  Serum preg added to labs - neg.

## 2021-02-18 NOTE — ED PROVIDER NOTE - CLINICAL SUMMARY MEDICAL DECISION MAKING FREE TEXT BOX
60yF schizoaffective disorder, leukemia in remission, currently undomiciled p/w suicidal ideation w/o attempt.  Pt calm, cooperative in the ED.  Labs and EKG reassuring, including neg covid swab.  Telepsych evaluated pt and recommend voluntary admission, but pt will need covid quarantine given recent travel from [place] as per pt.  Will adm to [facility] for psych.

## 2021-02-18 NOTE — ED ADULT TRIAGE NOTE - WEIGHT IN KG
----- Message from Francois Dougherty MD sent at 8/8/2019 10:33 AM EDT -----  Labs ok except diabetes is not controlled   72.6

## 2021-02-18 NOTE — ED PROVIDER NOTE - ATTENDING CONTRIBUTION TO CARE
60 y.o. female, PMH of schizoaffective disorder, leukemia in remission since 1990s, homeless comes in for suicidal ideations since yesterday. No plan. Pt denies HI, AH/VH. Denies fever/ chills, n/v/c/d, CP/SOB. Has been taking prozac as directed, no missed doses as per PT. No prior suicidal attempt, smoking, alcohol or drug use. On exam, pt in NAD, AAOx3, head NC/AT, CN II-XII intact, lungs CTA B/L, CV S1S2 regular, abdomen soft/NT/ND/(+)BS, ext (-) edema, motor 5/5x4, sensation intact. Will do labs, psyc consult and reevaluate.

## 2021-02-18 NOTE — ED BEHAVIORAL HEALTH ASSESSMENT NOTE - HPI (INCLUDE ILLNESS QUALITY, SEVERITY, DURATION, TIMING, CONTEXT, MODIFYING FACTORS, ASSOCIATED SIGNS AND SYMPTOMS)
59yo reportedly homeless, unemployed, single white F w/ hx of schizoaffective d/o, no reported past SA/SIB, hx of psych admissions (reportedly 3 in the past several months - last known in [facility] in 9/2020) who presented w/ reported SI.    Pt reported that since she and her mother were evicted from their home 7 months ago, she has remained on the streets. She reported that she had been dealing w/ depressive mood since then with issues sleeping too much sleep, poor energy, poor appetite, anhedonia, hopelessness, helplessness. Pt reported thoughts of killing herself, no plan but reported intent. Patient denied anxiety symptoms. Pt denied manic symptoms. Patient reported hx of AH but no current ones. Patient denied HI/intent or plan.      Pt became agitated, yelling screaming while awaiting for transfer process. Patient had been made aware of need for covid quarantine unit given her self disclosed travel from [place] and arrival to [place] 2 days ago.

## 2021-02-18 NOTE — ED PROVIDER NOTE - PROGRESS NOTE ADDITIONAL1
Karla Velazquez. is a 78 y.o. male smoker trying to quit, history of cervical spine surgery, hand osteoarthritis, who presents for evaluation of:     Chief Complaint   Patient presents with    Insomnia     4 week follow up, states that doxepin, at the lower, 3 mg dose, has been very effective in relieving insomnia, with minimal side effects    Hand Pain     bilateral, not ready for hand surgery, in the process to discuss recent CS MRI with neurosurgeon       Interim history: since the last office visit with me 1 month ago, the patient saw his dermatologist and went and established care with a new PCP which I recommended. Upon his first physical exam, he was diagnosed with benign asymptomatic cardiac arrhythmia, left-sided carotid bruit. Holter monitoring was ordered, carotid ultrasound, repeat MRI of the cervical and thoracic spine. The neurosurgeon  will be contacted with the MRI findings. EKG revealed isolated PACs. Annual low-dose CAT scan of the lung revealed enlarging right-sided nodule and patient was referred to pulmonology for further surveillance. The following laboratory reports since the past visit were reviewed (the ones pertinent to today's visit were discussed with the patient/ caregiver):    Orders Only on 02/11/2020   Component Date Value Ref Range Status    Phosphorus 02/11/2020 3.4  2.3 - 4.8 mg/dL Final    Magnesium 02/11/2020 2.3  1.7 - 2.4 mg/dL Final   Orders Only on 02/04/2020   Component Date Value Ref Range Status    Hep C Ab Interp 02/04/2020 Non-reactive   Final   Orders Only on 01/20/2020   Component Date Value Ref Range Status    TSH 01/20/2020 1.940  0.440 - 3.860 uIU/mL Final    PSA 01/20/2020 0.91  0.00 - 5.40 ng/mL Final    Comment: When the Total PSA is between 3.00 and 10.00 ng/mL, consider  requesting a Free PSA to aid in diagnosis.  Cholesterol, Total 01/20/2020 168  0 - 199 mg/dL Final    ATP III Cholesterol classification is Desirable.     Triglycerides 01/20/2020 56  0 - 150 mg/dL Final    ATP III Triglycerides Classification is Normal.    HDL 01/20/2020 76* 40 - 59 mg/dL Final    Comment: ATP III HDL Cholesterol Classification is high. Expected Values:    Males:    >55 = No Risk            35-55 = Moderate Risk            <35 = High Risk    Females:  >65 = No Risk            45-65 = Moderate Risk            <45 = High Risk    NCEP Guidelines: Third Report May 2001  >59 = negative risk factor for CHD  <40 = major risk factor for CHD      LDL Calculated 01/20/2020 81  0 - 129 mg/dL Final    ATP III LDL Classification is Optimal.    Sodium 01/20/2020 141  135 - 144 mEq/L Final    Potassium 01/20/2020 4.2  3.4 - 4.9 mEq/L Final    Chloride 01/20/2020 99  95 - 107 mEq/L Final    CO2 01/20/2020 26  20 - 31 mEq/L Final    Anion Gap 01/20/2020 16* 9 - 15 mEq/L Final    Glucose 01/20/2020 77  70 - 99 mg/dL Final    BUN 01/20/2020 14  8 - 23 mg/dL Final    CREATININE 01/20/2020 0.82  0.70 - 1.20 mg/dL Final    GFR Non- 01/20/2020 >60.0  >60 Final    Comment: >60 mL/min/1.73m2 EGFR, calc. for ages 25 and older using the  MDRD formula (not corrected for weight), is valid for stable  renal function.  GFR  01/20/2020 >60.0  >60 Final    Comment: >60 mL/min/1.73m2 EGFR, calc. for ages 25 and older using the  MDRD formula (not corrected for weight), is valid for stable  renal function.       Calcium 01/20/2020 9.0  8.5 - 9.9 mg/dL Final    Total Protein 01/20/2020 6.8  6.3 - 8.0 g/dL Final    Alb 01/20/2020 4.2  3.5 - 4.6 g/dL Final    Total Bilirubin 01/20/2020 0.6  0.2 - 0.7 mg/dL Final    Alkaline Phosphatase 01/20/2020 75  35 - 104 U/L Final    ALT 01/20/2020 21  0 - 41 U/L Final    AST 01/20/2020 24  0 - 40 U/L Final    Globulin 01/20/2020 2.6  2.3 - 3.5 g/dL Final    WBC 01/20/2020 8.0  4.8 - 10.8 K/uL Final    RBC 01/20/2020 4.55* 4.70 - 6.10 M/uL Final    Hemoglobin 01/20/2020 15.4  14.0 - 18.0 g/dL Final    Hematocrit 01/20/2020 44.5  42.0 - 52.0 % Final    MCV 01/20/2020 97.7  80.0 - 100.0 fL Final    MCH 01/20/2020 33.9* 27.0 - 31.3 pg Final    MCHC 01/20/2020 34.7  33.0 - 37.0 % Final    RDW 01/20/2020 12.8  11.5 - 14.5 % Final    Platelets 67/19/2476 331  130 - 400 K/uL Final    Neutrophils % 01/20/2020 62.2  % Final    Lymphocytes % 01/20/2020 24.7  % Final    Monocytes % 01/20/2020 8.4  % Final    Eosinophils % 01/20/2020 4.1  % Final    Basophils % 01/20/2020 0.6  % Final    Neutrophils Absolute 01/20/2020 5.0  1.4 - 6.5 K/uL Final    Lymphocytes Absolute 01/20/2020 2.0  1.0 - 4.8 K/uL Final    Monocytes Absolute 01/20/2020 0.7  0.2 - 0.8 K/uL Final    Eosinophils Absolute 01/20/2020 0.3  0.0 - 0.7 K/uL Final    Basophils Absolute 01/20/2020 0.1  0.0 - 0.2 K/uL Final       HPI:    Hand Pain    The injury mechanism was repetitive motion. The pain is present in the right hand and left hand. The quality of the pain is described as aching, cramping and burning. The pain does not radiate. The pain is moderate. The pain has been fluctuating since the incident. Pertinent negatives include no chest pain, muscle weakness or numbness. The symptoms are aggravated by movement and palpation. He has tried rest for the symptoms. The treatment provided no relief. More detail above in the chiefcomplaint(s), interim history and below in the review of systems.      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 The patient is nervous/anxious. Vitals:    02/14/20 1035 02/14/20 1104   BP: (!) 97/59 118/60   Site: Left Upper Arm    Position: Sitting    Cuff Size: Medium Adult    Pulse: 85    Resp: 12    Temp: 97.8 °F (36.6 °C)    TempSrc: Oral    SpO2: 94%    Weight: 145 lb (65.8 kg)        Physical Exam  Constitutional:       General: He is not in acute distress. Appearance: Normal appearance. Comments: Thinly built, age appropriate     HENT:      Head: Atraumatic. Nose: Nose normal.   Eyes:      Extraocular Movements: Extraocular movements intact. Neck:      Musculoskeletal: Neck supple. No muscular tenderness. Thyroid: No thyroid mass. Cardiovascular:      Rate and Rhythm: Regular rhythm. No extrasystoles are present. Pulses: Normal pulses. Carotid pulses are 2+ on the right side and 2+ on the left side with bruit. Radial pulses are 2+ on the right side and 2+ on the left side. Posterior tibial pulses are 2+ on the right side and 2+ on the left side. Heart sounds: Normal heart sounds. No murmur. Pulmonary:      Effort: Pulmonary effort is normal.      Breath sounds: Normal breath sounds. Abdominal:      General: There is no distension. Musculoskeletal:      Right lower leg: No edema. Left lower leg: No edema. Comments: Exam of the right hand reveals partial index amputation, osteoarthritic changes in all finger MCP joints  Exam of the left hand reveals thickened palmar fascia, tenderness and nodularity with palpation of the flexor tendon of the third digit, marked osteoarthritic deformities of all finger, MCP joints     Skin:     General: Skin is warm and dry. Neurological:      Mental Status: He is oriented to person, place, and time. Mental status is at baseline.       Coordination: Coordination normal.      Gait: Gait normal.   Psychiatric:         Attention and Perception: Attention Additional Progress Note...

## 2021-02-18 NOTE — ED BEHAVIORAL HEALTH ASSESSMENT NOTE - PSYCHIATRIC ISSUES AND PLAN (INCLUDE STANDING AND PRN MEDICATION)
resume haldol 5mg po bid, cogentin 0.5mg bid, prozac 20mg po qdaily; prn: haldol 5mg po q6hr prn agitation, ativan 2mg po q6hr prn agitation

## 2021-02-18 NOTE — ED BEHAVIORAL HEALTH ASSESSMENT NOTE - OTHER PAST PSYCHIATRIC HISTORY (INCLUDE DETAILS REGARDING ONSET, COURSE OF ILLNESS, INPATIENT/OUTPATIENT TREATMENT)
reported hx of schizoaffective  d/o, 3 prior psych hospitalizations since being evicted from home w/ mother, no reported hx of SA/SIB

## 2021-02-19 LAB
HAV IGM SER-ACNC: SIGNIFICANT CHANGE UP
HBV CORE IGM SER-ACNC: SIGNIFICANT CHANGE UP
HBV SURFACE AG SER-ACNC: SIGNIFICANT CHANGE UP
HCV AB S/CO SERPL IA: 0.24 S/CO — SIGNIFICANT CHANGE UP (ref 0–0.99)
HCV AB SERPL-IMP: SIGNIFICANT CHANGE UP
SARS-COV-2 IGG SERPL QL IA: NEGATIVE — SIGNIFICANT CHANGE UP
SARS-COV-2 IGM SERPL IA-ACNC: 0.06 INDEX — SIGNIFICANT CHANGE UP
T PALLIDUM AB TITR SER: NEGATIVE — SIGNIFICANT CHANGE UP

## 2021-04-05 NOTE — ED PROVIDER NOTE - NS ED ATTENDING STATEMENT MOD
Post Acute Virtual Skilled Nursing Home Readmission  Visit Note     Date of Service: 4/5/2021    Time of Service: 1035    This visit is being performed via video to discuss Skilled Nursing Home APC Intake and V-Visit Skilled Nursing Home Physician Admission    Clinician Location: [facility]    Jimi is in [place] and his identity has been established.   He was informed that consent to treat includes permission to submit charges to the applicable insurance on file. Jiim was advised regarding the potential risk inherent in video visits, as the assessment may be limited due to what can be seen on the screen which potentially results in an incomplete assessment; as well as either of us may discontinue the video visit if it is felt that the videoconferencing connections are not adequate for his/her situation.        Patient Location: SNF   Participants: patient, nurse    Subacute / Skilled Need: Rehabilitation    PCP: Dylan Marin DO   Patient Care Team:  Dylan Marin DO as PCP - General (Internal Medicine)  Jeff Jenkins MD as Post Acute Facility Provider: Physician (Geriatric Medicine)  Caitlin Payne CNP as Post Acute Facility Provider: APC (Nurse Practitioner - Family)  Attending SNF MD: Jeff Jenkins    Jimi Larson is a 74 year old male presenting to Post Acute Skilled Nursing for: Bilateral ankle fractures  History of Present Illness: Patient is a 74-year-old male history significant for Atrial flutter, hypertension, leukemia status post chemo presented to hospital after a fall.  He was found to have bilateral ankle fractures.  Status post ORIF 2/16/21.  Patient remained at AL while nonweightbearing since 3/12/2021.  On 4/1/2021 his weightbearing was upgraded to weightbearing as tolerated with Cam boots on bilaterally.  He transitioned back to skilled therapy on 4/2/2021.    4/5/21: Patient is resting comfortably, in no acute distress. He denies any  chills, fevers, N/V/abd pain, diarrhea, constipation, hematemesis, black or bloody stools, dyspnea, cough, CP, dizziness, or any other complaints. Denies any pain.  No new concerns reported by patient or staff      HISTORY  Past Medical History:   Diagnosis Date   • Colon polyps    • DVT of lower extremity, bilateral (CMS/HCC)    • Fluttering heart    • Hairy cell leukemia (CMS/HCC)    • History of atrial flutter    • Pulmonary emboli (CMS/HCC)     bilateral   • Seizures (CMS/HCC)       reports that he has never smoked. He has never used smokeless tobacco. He reports that he does not drink alcohol and does not use drugs.  Past Surgical History:   Procedure Laterality Date   • Ablation - atrial flutter     • Hip surgery Right     ORIF   • Splenectomy, total     • Tonsillectomy     • Ventral hernia repair       Family History   Problem Relation Age of Onset   • Cancer, Lung Father      History     Last reviewed in this visit by Caitlin Payne CNP on 4/5/2021 at  2:15 PM    Sections Reviewed    Tobacco, Alcohol, Drug Use, Sexual Activity, Family, Surgical, Medical          PROBLEM LIST:  Specialty Problems     None           DEPRESSION SCREENING:  PHQ 2/9 Test Results  0: Not at all  1: Several days  2: More than half the days  3: Nearly every day      DEPRESSION ASSESSMENT/PLAN:  Depression screening is negative no further plan needed.    ALLERGIES:  Allergies as of 04/05/2021 - Reviewed 04/05/2021   Allergen Reaction Noted   • Moxifloxacin RASH 01/12/2011   • Azithromycin RASH 08/07/2019   • Shellfish allergy   (food or med) DIARRHEA 06/01/2009       CURRENT MEDICATIONS:   Current Outpatient Medications   Medication Sig Dispense Refill   • topiramate (TOPAMAX) 25 MG tablet Take 25 mg by mouth daily.     • acetaminophen (TYLENOL) 325 MG tablet Take 650 mg by mouth every 6 hours as needed.     • docusate sodium-sennosides (SENOKOT S) 50-8.6 MG per tablet Take 1 tablet by mouth 2 times daily.     • polyethylene  glycol (MIRALAX) 17 g packet Take 17 g by mouth daily. Stir and dissolve powder in any 4 to 8 ounces of beverage, then drink.     • melatonin 3 MG Take 1 tablet by mouth nightly as needed.     • calcium carbonate-vitamin D 600-200 MG-UNIT tablet Take 1 tablet by mouth daily.     • VITAMIN D, CHOLECALCIFEROL, PO Take 2,000 Units by mouth daily.     • hydrochlorothiazide (HYDRODIURIL) 12.5 MG tablet TAKE 1 TABLET BY MOUTH DAILY 90 tablet 3   • ondansetron (ZOFRAN) 4 MG tablet Take 4 mg by mouth daily as needed.     • bisacodyl (DULCOLAX) 10 MG suppository Place 10 mg rectally daily as needed.     • rivaroxaban (XARELTO) 10 MG Tab Take 1 tablet by mouth daily.     • atorvastatin (LIPITOR) 10 MG tablet TAKE 1 TABLET BY MOUTH EVERY DAY 90 tablet 0   • alendronate (FOSAMAX) 70 MG tablet TAKE 1 TABLET BY MOUTH EVERY WEEK 4 tablet 2   • tamsulosin (FLOMAX) 0.4 MG Cap Take 1 capsule by mouth daily.       No current facility-administered medications for this visit.     Medications reviewed / reconciled: Yes    BASELINE FUNCTIONAL STATUS:  Independent    CURRENT FUNCTIONAL STATUS:  Non weight bearing (NWB) BLE)  2/24/2021: Nonambulatory, transfers min a with sliding board, bed mobility contact-guard assist,  dressing uppers standby assist, lowers max A, bathing uppers min A, lowers max A, toilet transfers not tested    2/26/2021: Nonambulatory: Sliding board standby assist, bed mobility standby assist/supervision, dressing bathing uppers standby assist, dressing lowers mod/max A, bathing lowers min A, toilet transfers not tested hygiene max A    3/1: Nonambulatory, transfers sliding board standby assist, bed mobility standby assist/supervision, dressing uppers set up, lowers min A, bathing uppers standby assist, lowers mod A, toilet transfers NT, hygiene max A, feeding independent    3/3/2021: Nonambulatory, transfers sliding board supervision/set up, bed mobility supervision, dressing uppers supervision/set up, lowers mod A,  bathing uppers standby assist, lowers max A, toilet transfers min A, hygiene mod A     3/8/2020 nonambulatory, transfers set up, bed mobility modified independent, dressing uppers modified independent, lowers min A, bathing uppers supervision, lowers min A, toilet transfers contact-guard assist, hygiene supervision, feeding independent    4/5/2021: Bed mobility contact-guard assist, transfers min A, ambulate 75 feet rolling walker min A, upper extremity dressing independent, lower extremity dressing min A, toileting min A    DIET:  Consistency: General   Type: regular  Appetite: Normal    REVIEW OF SYSTEMS:  Review of Systems   Constitutional: Negative for activity change, chills, fatigue and fever.   HENT: Negative for congestion, ear pain and sinus pressure.    Eyes: Negative.  Negative for discharge and visual disturbance.   Respiratory: Negative for cough, shortness of breath and wheezing.    Cardiovascular: Negative for chest pain and leg swelling.   Gastrointestinal: Negative for abdominal distention, abdominal pain, constipation, diarrhea, nausea and vomiting.   Endocrine: Negative for cold intolerance.   Genitourinary: Negative for difficulty urinating, hematuria and urgency.   Musculoskeletal: Negative for joint swelling and neck pain.   Skin: Negative for color change and rash.   Neurological: Negative for dizziness, numbness and headaches.   Psychiatric/Behavioral: Negative for confusion.       PHYSICAL ASSESSMENT:  Examined by nurse  Physical Exam  Constitutional:       Appearance: He is well-developed.   HENT:      Head: Normocephalic and atraumatic.   Eyes:      Conjunctiva/sclera: Conjunctivae normal.      Pupils: Pupils are equal, round, and reactive to light.   Cardiovascular:      Rate and Rhythm: Normal rate and regular rhythm.      Heart sounds: Normal heart sounds.   Pulmonary:      Effort: No respiratory distress.      Breath sounds: Normal breath sounds. No wheezing.   Abdominal:      General:  Bowel sounds are normal. There is no distension.      Palpations: Abdomen is soft.      Tenderness: There is no abdominal tenderness.   Musculoskeletal:         General: No tenderness. Normal range of motion.      Cervical back: Neck supple.      Comments: Bilateral lower extremity with Cam boots, intact distal CMS. trace edema left lower extremity   Skin:     General: Skin is warm and dry.   Neurological:      General: No focal deficit present.      Mental Status: He is alert and oriented to person, place, and time.   Psychiatric:         Mood and Affect: Mood normal.         Behavior: Behavior normal.         VITALS:  Visit Vitals  BP (!) 142/85   Pulse 74   Temp 98.8 °F (37.1 °C)   Resp 16   Wt 102.7 kg (226 lb 6.4 oz)   SpO2 96%   BMI 33.43 kg/m²         Pain Level 0 /10    LABS:    4/5/21: labs trending  3/8/2021: White blood count 7.7, hemoglobin 14.1, platelets 367, sodium 133, potassium 4.0, BUN 16, creatinine 0.8, glucose 75, AST 24, ALT 20, alk phosphatase 81, albumin 3.6, total bilirubin 0.8, magnesium 1.8, phosphorus 3.1    3/2/2021: Sodium 134, potassium 4.2, BUN 19, creatinine 0.8, glucose 85  3/1/2021: White blood count 9.5, hemoglobin 13.7, platelets 454, magnesium 2.6, phosphorus 3.0, Sodium 133, potassium 5.4, BUN 19, creatinine 0.8, glucose 76, albumin 3.5, AST 20, ALT 28, alk phosphatase 76 magnesium 2.6, phosphorus 3.0    2/25/2021: White blood count 9.7, hemoglobin 13.6, platelets 401, sodium 133, potassium 4.4, BUN 20, creatinine 0.7, glucose 100  22 2021: White blood count 11.5, hemoglobin 13.5, platelets 325, sodium 131, potassium 3.9, BUN 21, creatinine 0.7, AST 30, ALT 42, albumin 3.3, magnesium 1.8, phosphorus 3.3.    2/19/2021: White blood count 9.3, hemoglobin 13.6, platelets 194, sodium 133, potassium 3.4, BUN 17, creatinine 0.8, glucose 103, ALT 32, AST 45, alk phosphatase 64, total bilirubin 0.7, albumin 3.3, calcium 8.0, magnesium 2.0, phosphorus 2.5    2/15 2021: White blood count  11.8, hemoglobin 15.6, platelets 224 sodium 139, potassium 3.7, BUN 18, creatinine 1.14, alk phosphatase 57, ALT 27, AST 28, total bilirubin 0.5    Advance Care Planning     Advance Care Planning Visit     Patient and/or decision maker consent to Advance Care Planning visit.    Inclusion criteria: Inclusion criteria not met, but goals of care discussion needed. Use .GOC to document goals of care discussion    PCP:  LEXY Marin    Participants:  Patient, APN    Location:  [facility]    Purpose of the Meeting:  Advance Care Planning and Goals of Care discussion    Is the patient capable of making healthcare decisions: Yes, the patient is able to receive, process, and then give feedback to information regarding medical discussions.   Decision Maker: patient    Does the patient have an Advance Care Directive document in Epic? No, a consult for Social Work to assist the patient in completion of an Advance Care Directive document has been placed.     After code status discussion, the patient has decided on: Full Resuscitation       Goals of Care:  Patient has one or more life-limiting conditions: No    A discussion of the patient's Goals of Care has been completed with patient regarding the following:  (1) Current Serious Conditions: Hx leukemia  (2) Prognosis: FAIR     Function: Decline in function and Decline in mobility  (3) Patient-Centered Goals: Aggressive, usual medical care  (4) Plan for Future Deterioration: Would be ok with returning to hospital for care.  Would be ok with returning to ICU for care.    The patient verbalized understanding of the above discussion.    The following additional care is recommended: None           ASSESSMENT AND PLAN    Closed bimalleolar fracture with dislocation status post ORIF by Dr. Jeff Mora 2/16/21. NWB.  F/u ortho 4/1: WBAT w/ camboots on; f/u with ortho 4/16    BPH: Asymptomatic. Continue Flomax    Dyslipidemia: Continue statin    Hypertension: Cont present regimen,  trend    Insomnia:cont  malatonin    Hyponatremia: am labs pending    Hx seizures: cont topiramate    Hx DVT/PE: cont ppx dose xarelto     DVT prophylaxis: Continue Xarelto per Ortho recommendation    Bowel regimen: MiraLAX daily, senna S, Dulcolax suppository as needed        FOLLOW UP APPOINTMENTS:  No follow-ups on file.   Ortho DR. Mora 4/16/21    DISCHARGE PLANNING: Home HH. Patient has 5 outdoor steps and 13 indoor steps.  He lives with his spouse.    Discussed with: RN / Nursing, Patient, MD, SW/ and Reviewed old records    Prognosis: fair     Spent 40 minutes total time, greater than 50% spent in face-to-face, reviewing records and coordination of care.    Caitlin Payne, CNP    Home Health Face-To-Face    Patient Name: Jimi Larson  YOB: 1946    I certify that this patient is under my care and that I, or a nurse practitioner or physician's assistant working with me, had a face-to-face encounter that meets the physician face-to-face encounter requirement with this patient on 4/5/2021.    The encounter with the patient was in whole, or part, for the following medical condition, which is the primary reason for home health care (list medical conditions):  1. Deep vein thrombosis (DVT) of distal vein of both lower extremities, unspecified chronicity (CMS/HCC)    2. Essential hypertension    3. Osteoporosis without current pathological fracture, unspecified osteoporosis type    4. Seizures (CMS/HCC)    5. Pulmonary embolism without acute cor pulmonale, unspecified chronicity, unspecified pulmonary embolism type (CMS/[facility])        I certify that, based on my findings, the following services are medically necessary home health services:   Nursing  Occupational Therapy  Physical Therapy    My clinical findings support the need for the above service because of the need to monitor safety and medication at home and improve functional status.    Further, I certify that my clinical  findings support that this patient is homebound because:  · Unable to leave home without maximum assistance and/or effort due to bilateral malleolar fractures  · Difficult and taxing effort to leave home because non weight bearing ble    I certify/recertify that this patient is confined to his home (and meets homebound criteria) and needs intermittent skilled nursing care, physical therapy and/or speech therapy or continues to need occupational therapy.  The patient is under my care, and a plan of care has been initiated and will periodically be reviewed by a physician.  I (or an acute/post-acute physician or collaborating NPP) had a face-to-face encounter with this patient on the above date, during which the primary reason for home health services was addressed.  I have a clinical note (supporting documentation) documenting my encounter with the patient in the patient's medical record to support certification and eligibility for home care, and will make it available to [facility] upon request.        Signature Date: April 5, 2021     Caitlin Payne, CNP   I have personally performed a face to face diagnostic evaluation on this patient. I have reviewed the ACP note and agree with the history, exam and plan of care, except as noted.

## 2021-10-27 ENCOUNTER — INPATIENT (INPATIENT)
Facility: HOSPITAL | Age: 61
LOS: 6 days | Discharge: HOME | End: 2021-11-03
Attending: PSYCHIATRY & NEUROLOGY | Admitting: PSYCHIATRY & NEUROLOGY
Payer: MEDICARE

## 2021-10-27 VITALS
TEMPERATURE: 98 F | HEIGHT: 61 IN | RESPIRATION RATE: 18 BRPM | DIASTOLIC BLOOD PRESSURE: 88 MMHG | SYSTOLIC BLOOD PRESSURE: 115 MMHG | OXYGEN SATURATION: 99 % | HEART RATE: 101 BPM

## 2021-10-27 LAB
ALBUMIN SERPL ELPH-MCNC: 4.2 G/DL — SIGNIFICANT CHANGE UP (ref 3.5–5.2)
ALP SERPL-CCNC: 116 U/L — HIGH (ref 30–115)
ALT FLD-CCNC: 11 U/L — SIGNIFICANT CHANGE UP (ref 0–41)
AMPHET UR-MCNC: NEGATIVE — SIGNIFICANT CHANGE UP
ANION GAP SERPL CALC-SCNC: 12 MMOL/L — SIGNIFICANT CHANGE UP (ref 7–14)
APAP SERPL-MCNC: <5 UG/ML — LOW (ref 10–30)
APPEARANCE UR: CLEAR — SIGNIFICANT CHANGE UP
AST SERPL-CCNC: 12 U/L — SIGNIFICANT CHANGE UP (ref 0–41)
BACTERIA # UR AUTO: ABNORMAL
BARBITURATES UR SCN-MCNC: NEGATIVE — SIGNIFICANT CHANGE UP
BASOPHILS # BLD AUTO: 0.06 K/UL — SIGNIFICANT CHANGE UP (ref 0–0.2)
BASOPHILS NFR BLD AUTO: 0.7 % — SIGNIFICANT CHANGE UP (ref 0–1)
BENZODIAZ UR-MCNC: NEGATIVE — SIGNIFICANT CHANGE UP
BILIRUB SERPL-MCNC: 0.4 MG/DL — SIGNIFICANT CHANGE UP (ref 0.2–1.2)
BILIRUB UR-MCNC: ABNORMAL
BUN SERPL-MCNC: 12 MG/DL — SIGNIFICANT CHANGE UP (ref 10–20)
CALCIUM SERPL-MCNC: 9.4 MG/DL — SIGNIFICANT CHANGE UP (ref 8.5–10.1)
CHLORIDE SERPL-SCNC: 102 MMOL/L — SIGNIFICANT CHANGE UP (ref 98–110)
CO2 SERPL-SCNC: 22 MMOL/L — SIGNIFICANT CHANGE UP (ref 17–32)
COCAINE METAB.OTHER UR-MCNC: NEGATIVE — SIGNIFICANT CHANGE UP
COLOR SPEC: YELLOW — SIGNIFICANT CHANGE UP
CREAT SERPL-MCNC: 0.8 MG/DL — SIGNIFICANT CHANGE UP (ref 0.7–1.5)
DIFF PNL FLD: NEGATIVE — SIGNIFICANT CHANGE UP
DRUG SCREEN 1, URINE RESULT: SIGNIFICANT CHANGE UP
EOSINOPHIL # BLD AUTO: 0.07 K/UL — SIGNIFICANT CHANGE UP (ref 0–0.7)
EOSINOPHIL NFR BLD AUTO: 0.8 % — SIGNIFICANT CHANGE UP (ref 0–8)
EPI CELLS # UR: ABNORMAL /HPF
ETHANOL SERPL-MCNC: <10 MG/DL — SIGNIFICANT CHANGE UP
GLUCOSE SERPL-MCNC: 114 MG/DL — HIGH (ref 70–99)
GLUCOSE UR QL: NEGATIVE MG/DL — SIGNIFICANT CHANGE UP
HCT VFR BLD CALC: 37.3 % — SIGNIFICANT CHANGE UP (ref 37–47)
HGB BLD-MCNC: 12.5 G/DL — SIGNIFICANT CHANGE UP (ref 12–16)
IMM GRANULOCYTES NFR BLD AUTO: 0.5 % — HIGH (ref 0.1–0.3)
KETONES UR-MCNC: ABNORMAL
LEUKOCYTE ESTERASE UR-ACNC: ABNORMAL
LYMPHOCYTES # BLD AUTO: 1.88 K/UL — SIGNIFICANT CHANGE UP (ref 1.2–3.4)
LYMPHOCYTES # BLD AUTO: 22.5 % — SIGNIFICANT CHANGE UP (ref 20.5–51.1)
MAGNESIUM SERPL-MCNC: 2.1 MG/DL — SIGNIFICANT CHANGE UP (ref 1.8–2.4)
MCHC RBC-ENTMCNC: 29.6 PG — SIGNIFICANT CHANGE UP (ref 27–31)
MCHC RBC-ENTMCNC: 33.5 G/DL — SIGNIFICANT CHANGE UP (ref 32–37)
MCV RBC AUTO: 88.2 FL — SIGNIFICANT CHANGE UP (ref 81–99)
METHADONE UR-MCNC: NEGATIVE — SIGNIFICANT CHANGE UP
MONOCYTES # BLD AUTO: 0.45 K/UL — SIGNIFICANT CHANGE UP (ref 0.1–0.6)
MONOCYTES NFR BLD AUTO: 5.4 % — SIGNIFICANT CHANGE UP (ref 1.7–9.3)
NEUTROPHILS # BLD AUTO: 5.86 K/UL — SIGNIFICANT CHANGE UP (ref 1.4–6.5)
NEUTROPHILS NFR BLD AUTO: 70.1 % — SIGNIFICANT CHANGE UP (ref 42.2–75.2)
NITRITE UR-MCNC: NEGATIVE — SIGNIFICANT CHANGE UP
NRBC # BLD: 0 /100 WBCS — SIGNIFICANT CHANGE UP (ref 0–0)
OPIATES UR-MCNC: NEGATIVE — SIGNIFICANT CHANGE UP
PCP UR-MCNC: NEGATIVE — SIGNIFICANT CHANGE UP
PH UR: 5.5 — SIGNIFICANT CHANGE UP (ref 5–8)
PLATELET # BLD AUTO: 253 K/UL — SIGNIFICANT CHANGE UP (ref 130–400)
POTASSIUM SERPL-MCNC: 4.2 MMOL/L — SIGNIFICANT CHANGE UP (ref 3.5–5)
POTASSIUM SERPL-SCNC: 4.2 MMOL/L — SIGNIFICANT CHANGE UP (ref 3.5–5)
PROPOXYPHENE QUALITATIVE URINE RESULT: NEGATIVE — SIGNIFICANT CHANGE UP
PROT SERPL-MCNC: 7.5 G/DL — SIGNIFICANT CHANGE UP (ref 6–8)
PROT UR-MCNC: NEGATIVE MG/DL — SIGNIFICANT CHANGE UP
RBC # BLD: 4.23 M/UL — SIGNIFICANT CHANGE UP (ref 4.2–5.4)
RBC # FLD: 13.7 % — SIGNIFICANT CHANGE UP (ref 11.5–14.5)
RBC CASTS # UR COMP ASSIST: SIGNIFICANT CHANGE UP /HPF
SALICYLATES SERPL-MCNC: <0.3 MG/DL — LOW (ref 4–30)
SARS-COV-2 RNA SPEC QL NAA+PROBE: SIGNIFICANT CHANGE UP
SODIUM SERPL-SCNC: 136 MMOL/L — SIGNIFICANT CHANGE UP (ref 135–146)
SP GR SPEC: >=1.03 (ref 1.01–1.03)
THC UR QL: NEGATIVE — SIGNIFICANT CHANGE UP
UROBILINOGEN FLD QL: 0.2 MG/DL — SIGNIFICANT CHANGE UP
WBC # BLD: 8.36 K/UL — SIGNIFICANT CHANGE UP (ref 4.8–10.8)
WBC # FLD AUTO: 8.36 K/UL — SIGNIFICANT CHANGE UP (ref 4.8–10.8)
WBC UR QL: ABNORMAL /HPF

## 2021-10-27 PROCEDURE — 99285 EMERGENCY DEPT VISIT HI MDM: CPT

## 2021-10-27 PROCEDURE — 93010 ELECTROCARDIOGRAM REPORT: CPT

## 2021-10-27 PROCEDURE — 90792 PSYCH DIAG EVAL W/MED SRVCS: CPT | Mod: 95

## 2021-10-27 PROCEDURE — 73120 X-RAY EXAM OF HAND: CPT | Mod: 26,LT

## 2021-10-27 PROCEDURE — 73110 X-RAY EXAM OF WRIST: CPT | Mod: 26,LT

## 2021-10-27 RX ORDER — DIPHENHYDRAMINE HCL 50 MG
50 CAPSULE ORAL ONCE
Refills: 0 | Status: DISCONTINUED | OUTPATIENT
Start: 2021-10-27 | End: 2021-10-28

## 2021-10-27 RX ORDER — ARIPIPRAZOLE 15 MG/1
5 TABLET ORAL DAILY
Refills: 0 | Status: DISCONTINUED | OUTPATIENT
Start: 2021-10-28 | End: 2021-10-28

## 2021-10-27 RX ORDER — DIPHENHYDRAMINE HCL 50 MG
50 CAPSULE ORAL EVERY 6 HOURS
Refills: 0 | Status: DISCONTINUED | OUTPATIENT
Start: 2021-10-27 | End: 2021-11-03

## 2021-10-27 RX ORDER — HALOPERIDOL DECANOATE 100 MG/ML
5 INJECTION INTRAMUSCULAR EVERY 6 HOURS
Refills: 0 | Status: DISCONTINUED | OUTPATIENT
Start: 2021-10-27 | End: 2021-11-03

## 2021-10-27 RX ORDER — HALOPERIDOL DECANOATE 100 MG/ML
5 INJECTION INTRAMUSCULAR ONCE
Refills: 0 | Status: DISCONTINUED | OUTPATIENT
Start: 2021-10-27 | End: 2021-10-28

## 2021-10-27 NOTE — ED PROVIDER NOTE - ATTENDING CONTRIBUTION TO CARE
I was present for and supervised the key and critical aspects of the procedures performed during the care of the patient. Patient presents for evaluation of increasing depression and suicidal ideation but no specific plan over the past several days she denies any co-ingestion, she denies any illicit drug use she denies any alcohol use. she denies any headache, chest pain, sob, abdominal pain back pain   we obtained labs and will consult telepsychiatry for further evaluation

## 2021-10-27 NOTE — ED PROVIDER NOTE - NSICDXFAMILYHX_GEN_ALL_CORE_FT
High blood pressure reminder letter sent to PCP.     FAMILY HISTORY:  Father  Still living? Unknown  Family history of early CAD, Age at diagnosis: Age Unknown
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
## 2021-10-27 NOTE — ED PROVIDER NOTE - CROS ED SKIN ALL NEG
After Visit Summary   5/23/2017    Savannah Camacho    MRN: [id number]           Patient Information     Date Of Birth          1940        Visit Information        Provider Department      5/23/2017 7:00 AM UC 10 ATC;  ONCOLOGY INFUSION [facility]        Today's Diagnoses     Malignant neoplasm of exocervix (H)    -  1      Care Instructions    Contact Numbers  [facility] Main Line/After Hours: [phone]  [facility] Triage line: [phone]      Please call the triage or after hours line if you experience a temperature greater than or equal to 100.5, shaking chills, have uncontrolled nausea, vomiting and/or diarrhea, dizziness, shortness of breath, chest pain, bleeding, unexplained bruising, or if you have any other new/concerning symptoms, questions or concerns.      If you are having any concerning symptoms or wish to speak to a provider before your next infusion visit, please call to notify us so we can adequately serve you.     If you need a refill on a narcotic prescription or other medication, please call before your infusion appointment.                 May 2017   [place] Monday Tuesday Wednesday Thursday Friday Saturday        1     US ABD/PEL DUPLEX COMPLETE    7:55 AM   (50 min.)   UUUS2   [facility] [place]     UMP EXTERNAL RADIATION TREATMT    2:00 PM   (15 min.)   UMP RAD ONC VARIAN   Radiation Oncology Clinic 2     UMP EXTERNAL RADIATION TREATMT    2:30 PM   (15 min.)   UMP RAD ONC VARIAN   Radiation Oncology Clinic 3     UMP EXTERNAL RADIATION TREATMT    2:30 PM   (15 min.)   UMP RAD ONC VARIAN   Radiation Oncology Clinic 4     UMP EXTERNAL RADIATION TREATMT    2:30 PM   (15 min.)   UMP RAD ONC VARIAN   Radiation Oncology Clinic     UMP ON TREATMENT VISIT    2:45 PM   (15 min.)   Michelle Carrion MD   [facility]iation Oncology Clinic 5     UMP EXTERNAL RADIATION TREATMT    2:30 PM   (15 min.)   P RAD ONC VARIAN   Radiation Oncology Clinic     LAB    3:00 PM   (15  min.)   UC LAB   [facility]     UMP RETURN ACTIVE TREATMENT    3:05 PM   (40 min.)   Kerry Hurtado APRN CNP   [facility] 6       7     8     UMP EXTERNAL RADIATION TREATMT    2:30 PM   (15 min.)   UMP RAD ONC [facility]   Radiation Oncology [facility] 9     UMP MASONIC LAB DRAW    6:30 AM   (15 min.)   UC MASON[facility] LAB DRAW   [facility] Lab Draw     UMP ONC INFUSION 360    7:00 AM   (360 min.)   UC ONCOLOGY INFUSION   [facility]     UMP EXTERNAL RADIATION TREATMT    2:30 PM   (15 min.)   UMP RAD ONC [facility]   Radiation Oncology [facility] 10     UMP EXTERNAL RADIATION TREATMT    2:30 PM   (15 min.)   UMP RAD ONC [facility]   Radiation Oncology Clinic 11     UMP EXTERNAL RADIATION TREATMT    2:30 PM   (15 min.)   UMP RAD ONC [facility]   Radiation Oncology [facility]     UMP ON TREATMENT VISIT    2:45 PM   (15 min.)   Michelle Carrion MD   [facility]iation Oncology Clinic 12     UMP EXTERNAL RADIATION TREATMT    2:30 PM   (15 min.)   UMP RAD ONC [facility]   Radiation Oncology Clinic 13       14     15     UMP EXTERNAL RADIATION TREATMT    2:30 PM   (15 min.)   UMP RAD ONC [facility]   Radiation Oncology Clinic 16     [facility] LAB DRAW    6:30 AM   (15 min.)   [facility]   [facility]     UMP ONC INFUSION 360    7:00 AM   (360 min.)   UC ONCOLOGY INFUSION   [facility]     UMP EXTERNAL RADIATION TREATMT    2:30 PM   (15 min.)   UMP RAD ONC [facility]   Radiation Oncology Clinic 17     UMP EXTERNAL RADIATION TREATMT    2:30 PM   (15 min.)   UMP RAD ONC [facility]   Radiation Oncology Clinic     UMP ON TREATMENT VISIT    2:45 PM   (15 min.)   Michelle Carrion MD   Radiation Oncology Clinic 18     UMP EXTERNAL RADIATION TREATMT    2:30 PM   (15 min.)   UMP RAD ONC [facility]   Radiation Oncology Clinic 19     UMP EXTERNAL RADIATION TREATMT    2:30 PM   (15 min.)   UMP RAD ONC [facility]   Radiation Oncology Clinic 20       21     22     UMP EXTERNAL RADIATION TREATMT    2:30 PM    (15 min.)   UMP RAD ONC [facility]   Radiation Oncology Clinic 23     UMP MASON[facility] LAB DRAW    6:30 AM   (15 min.)    MASONIC LAB DRAW   [facility] Lab Draw     UMP ONC INFUSION 360    7:00 AM   (360 min.)    ONCOLOGY INFUSION   [facility]     UMP EXTERNAL RADIATION TREATMT    2:30 PM   (15 min.)   UMP RAD ONC [facility]   Radiation Oncology Clinic 24     UMP EXTERNAL RADIATION TREATMT    2:30 PM   (15 min.)   UMP RAD ONC [facility]   Radiation Oncology Clinic 25     UMP EXTERNAL RADIATION TREATMT    2:30 PM   (15 min.)   UMP RAD ONC [facility]   Radiation Oncology [facility]     UMP ON TREATMENT VISIT    2:45 PM   (15 min.)   Michelle Carrion MD   Radiation Oncology Clinic 26     UMP EXTERNAL RADIATION TREATMT    2:30 PM   (15 min.)   UMP RAD ONC [facility]   Radiation Oncology Clinic 27       28     29     30     UMP MASON[facility] LAB DRAW    6:30 AM   (15 min.)    MASONIC LAB DRAW   [facility] Lab Draw     UMP ONC INFUSION 360    7:00 AM   (360 min.)    ONCOLOGY INFUSION   [facility]     UMP EXTERNAL RADIATION TREATMT    2:30 PM   (15 min.)   UMP RAD ONC [facility]   Radiation Oncology [facility]     MR PELVIS WWO    3:00 PM   (60 min.)   UUMR2   [facility] [place], MRI 31     UMP EXTERNAL RADIATION TREATMT    2:30 PM   (15 min.)   UMP RAD ONC [facility]   Radiation Oncology Clinic                           June 2017 Sunday Monday Tuesday Wednesday Thursday Friday Saturday                       1     PAC EVAL   11:15 AM   (60 min.)   6,  [facility]     PAC RN ASSESSMENT   12:15 PM   (30 min.)   Rn, [facility]     PAC ANESTHESIA CONSULT   12:45 PM   (10 min.)   Anesthesiologist, [facility]     UMP EXTERNAL RADIATION TREATMT    2:30 PM   (15 min.)   UMP RAD ONC [facility]   Radiation Oncology Clinic     UMP ON TREATMENT VISIT    2:45 PM   (15 min.)   Jazzy Dave MD   Radiation  Oncology Clinic 2     [facility] EXTERNAL RADIATION TREATMT    2:30 PM   (15 min.)   [facility] RAD ONC [facility]   Radiation Oncology Clinic 3       4     5     [facility] MASONIC LAB DRAW    6:30 AM   (15 min.)    [facility]   [facility] Lab Draw     [facility] ONC INFUSION 360    7:00 AM   (360 min.)    ONCOLOGY INFUSION   [facility] EXTERNAL RADIATION TREATMT    2:30 PM   (15 min.)   [facility] RAD ONC [facility]   Radiation Oncology Clinic 6     7     8     9     10       11     12     13     [facility] TCT/SIM SUITE    6:00 AM   (90 min.)   Nicole Ward MD   Radiation Oncology Clinic     INSERT INTERSTITIAL NEEDLE, ULTRASOUND GUIDED    8:00 AM   Nicole Ward MD   UU OR     [facility] TCT/SIM SUITE   11:00 AM   (60 min.)   Nicole Ward MD   Radiation Oncology [facility] TCT/SIM SUITE    3:00 PM   (60 min.)   Nicole Ward MD   Radiation Oncology [facility] 14     [facility] TCT/SIM SUITE    8:00 AM   (90 min.)   Michelle Carrion MD   Radiation Oncology [facility] TCT/SIM SUITE    2:30 PM   (60 min.)   Michelle Carrion MD   Radiation Oncology [facility] 15     [facility] TCT/SIM SUITE    8:00 AM   (60 min.)   Michelle Carrion MD   Radiation Oncology [facility] TCT/SIM SUITE    2:30 PM   (30 min.)   Michelle Carrion MD   Radiation Oncology [facility] TCT/SIM SUITE    3:55 PM   (30 min.)   Michelle Carrion MD   Radiation Oncology [facility]     REMOVE RADIATION NEEDLES CERVIX    4:00 PM   Michelle Carrion MD   UU OR 16     17       18     19     20     21     22     23     24       25     26     27     28     29     30                       Lab Results:  Recent Results (from the past 12 hour(s))   CBC with platelets differential    Collection Time: 05/23/17  6:42 AM   Result Value Ref Range    WBC  4.0 - 11.0 10e9/L     Canceled, Test credited   Unsatisfactory specimen - clotted  NOTIFIED REGIS LUND RN [facility] 05/23/2017 0714 BY       RBC Count  3.8 - 5.2 10e12/L     Canceled, Test credited    Unsatisfactory specimen - clotted  NOTIFIED REGIS LUND RN [facility] 05/23/2017 0714 BY       Hemoglobin  11.7 - 15.7 g/dL     Canceled, Test credited   Unsatisfactory specimen - clotted  NOTIFIED REGIS LUND RN [facility] 05/23/2017 0714 BY       Hematocrit  35.0 - 47.0 %     Canceled, Test credited   Unsatisfactory specimen - clotted  NOTIFIED REGIS LUND RN [facility] 05/23/2017 0714 BY       MCV  78 - 100 fl     Canceled, Test credited   Unsatisfactory specimen - clotted  NOTIFIED REGIS LUND RN [facility] 05/23/2017 0714 BY       MCH  26.5 - 33.0 pg     Canceled, Test credited   Unsatisfactory specimen - clotted  NOTIFIED REGIS LUND RN [facility] 05/23/2017 0714 BY       MCHC  31.5 - 36.5 g/dL     Canceled, Test credited   Unsatisfactory specimen - clotted  NOTIFIED REGIS LUND RN [facility] 05/23/2017 0714 BY       RDW  10.0 - 15.0 %     Canceled, Test credited   Unsatisfactory specimen - clotted  NOTIFIED REGIS LUND RN [facility] 05/23/2017 0714 BY       Platelet Count  150 - 450 10e9/L     Canceled, Test credited   Unsatisfactory specimen - clotted  NOTIFIED REGIS LUND RN [facility] 05/23/2017 0714 BY GH      Diff Method       Canceled, Test credited   Unsatisfactory specimen - clotted  NOTIFIED REGIS LUND RN [facility] 05/23/2017 0714 BY GH      % Neutrophils Not Measured %    % Lymphocytes Not Measured %    % Monocytes Not Measured %    % Eosinophils Not Measured %    % Basophils Not Measured %    % Immature Granulocytes Not Measured %    Nucleated RBCs Not Measured 0 /100    Absolute Neutrophil Not Measured 1.6 - 8.3 10e9/L    Absolute Lymphocytes Not Measured 0.8 - 5.3 10e9/L    Absolute Monocytes Not Measured 0.0 - 1.3 10e9/L    Absolute Eosinophils Not Measured 0.0 - 0.7 10e9/L    Absolute Basophils Not Measured 0.0 - 0.2 10e9/L    Abs Immature Granulocytes Not Measured 0 - 0.4 10e9/L    Absolute Nucleated RBC Not Measured    Comprehensive metabolic panel    Collection Time: 05/23/17  6:42 AM   Result  Value Ref Range    Sodium 136 133 - 144 mmol/L    Potassium 3.8 3.4 - 5.3 mmol/L    Chloride 101 94 - 109 mmol/L    Carbon Dioxide 25 20 - 32 mmol/L    Anion Gap 10 3 - 14 mmol/L    Glucose 114 (H) 70 - 99 mg/dL    Urea Nitrogen 14 7 - 30 mg/dL    Creatinine 0.73 0.52 - 1.04 mg/dL    GFR Estimate 78 >60 mL/min/1.7m2    GFR Estimate If Black >90   GFR Calc   >60 mL/min/1.7m2    Calcium 8.3 (L) 8.5 - 10.1 mg/dL    Bilirubin Total 0.4 0.2 - 1.3 mg/dL    Albumin 3.1 (L) 3.4 - 5.0 g/dL    Protein Total 7.3 6.8 - 8.8 g/dL    Alkaline Phosphatase 88 40 - 150 U/L    ALT 30 0 - 50 U/L    AST 26 0 - 45 U/L   Magnesium    Collection Time: 05/23/17  6:42 AM   Result Value Ref Range    Magnesium 1.8 1.6 - 2.3 mg/dL   CBC with platelets differential    Collection Time: 05/23/17  7:24 AM   Result Value Ref Range    WBC 3.7 (L) 4.0 - 11.0 10e9/L    RBC Count 3.15 (L) 3.8 - 5.2 10e12/L    Hemoglobin 9.4 (L) 11.7 - 15.7 g/dL    Hematocrit 28.4 (L) 35.0 - 47.0 %    MCV 90 78 - 100 fl    MCH 29.8 26.5 - 33.0 pg    MCHC 33.1 31.5 - 36.5 g/dL    RDW 14.2 10.0 - 15.0 %    Platelet Count 110 (L) 150 - 450 10e9/L    Diff Method Automated Method     % Neutrophils 63.2 %    % Lymphocytes 8.8 %    % Monocytes 11.5 %    % Eosinophils 15.2 %    % Basophils 0.5 %    % Immature Granulocytes 0.8 %    Nucleated RBCs 0 0 /100    Absolute Neutrophil 2.4 1.6 - 8.3 10e9/L    Absolute Lymphocytes 0.3 (L) 0.8 - 5.3 10e9/L    Absolute Monocytes 0.4 0.0 - 1.3 10e9/L    Absolute Eosinophils 0.6 0.0 - 0.7 10e9/L    Absolute Basophils 0.0 0.0 - 0.2 10e9/L    Abs Immature Granulocytes 0.0 0 - 0.4 10e9/L    Absolute Nucleated RBC 0.0              Follow-ups after your visit        Your next 10 appointments already scheduled     May 23, 2017  2:30 PM CDT   EXTERNAL RADIATION TREATMENT with [facility] RAD ONC VARIAN   Radiation Oncology Clinic ([facility])    [facility]  1st Floor  [address]  [address]   [phone]            May 24, 2017  2:30 PM CDT   EXTERNAL RADIATION TREATMENT with [facility] RAD ONC VARIAN   Radiation Oncology Clinic ([facility])    [facility]  1st Floor  [address] [address]   [phone]            May 25, 2017  2:30 PM CDT   EXTERNAL RADIATION TREATMENT with [facility] RAD ONC VARIAN   Radiation Oncology Clinic ([facility])    [facility]  1st Floor  [address] [address]   [phone]            May 25, 2017  2:45 PM CDT   ON TREATMENT VISIT with Michelle Carrion MD   Radiation Oncology Clinic ([facility])    [facility]  1st Floor  [address] [address]   [phone]            May 26, 2017  2:30 PM CDT   EXTERNAL RADIATION TREATMENT with [facility] RAD ONC VARIAN   Radiation Oncology Clinic ([facility])    [facility]  1st Floor  [address] [address]   [phone]            May 30, 2017  6:30 AM CDT   [facility] Lab Draw with  [facility] LAB DRAW   [facility] ([facility])    [address]  2nd Floor  [facility] [address]   [phone]            May 30, 2017  7:00 AM CDT   Infusion 360 with UC ONCOLOGY INFUSION, UC 12 ATC   [facility] ([facility])    [address]  2nd Floor  [facility] [address]   [phone]            May 30, 2017  2:30 PM CDT   EXTERNAL RADIATION TREATMENT with [facility] RAD ONC VARIAN   Radiation Oncology Clinic ([facility])    [facility]  1st Floor  [address] [address]   [phone]            May 30, 2017  3:00 PM CDT   MR PELVIS W/O & W CONTRAST with UUMR2   [facility] [place], MRI ([facility])    [address]  [address]   [phone]           Take your medicines as usual, unless your doctor tells you not to. Bring a list of your current medicines to your exam (including vitamins, minerals and over-the-counter drugs).  You will be given intravenous contrast for this exam. To prepare:   The day before your exam, drink extra fluids at least six 8-ounce glasses (unless your doctor tells you to restrict your fluids).   Have a blood test (creatinine test) within 30 days of your exam. Go to your clinic or Diagnostic Imaging Department for this test.  The MRI machine uses a strong magnet. Please wear clothes without metal (snaps, zippers). A sweatsuit works well, or we may give you a hospital gown.  Please remove any body piercings and hair extensions before you arrive. You will also remove watches, jewelry, hairpins, wallets, dentures, partial dental plates and hearing aids. You may wear contact lenses, and you may be able to wear your rings. We have a safe place to keep your personal items, but it is safer to leave them at home.   **IMPORTANT** THE INSTRUCTIONS BELOW ARE ONLY FOR THOSE PATIENTS WHO HAVE BEEN TOLD THEY WILL RECEIVE SEDATION OR GENERAL ANESTHESIA DURING THEIR MRI PROCEDURE:  IF YOU WILL RECEIVE SEDATION (take medicine to help you relax during your exam):   You must get the medicine from your doctor before you arrive. Bring the medicine to the exam. Do not take it at home.   Arrive one hour early. Bring someone who can take you home after the test. Your medicine will make you sleepy. After the exam, you may not drive, take a bus or take a taxi by yourself.   No eating 8 hours before your exam. You may have clear liquids up until 4 hours before your exam. (Clear liquids include water, clear tea, black coffee and fruit juice without pulp.)  IF YOU WILL RECEIVE ANESTHESIA (be asleep for your exam):   Arrive 1 1/2 hours early. Bring someone who can take you home after the test. You may not drive, take a bus or take  "a taxi by yourself.   No eating 8 hours before your exam. You may have clear liquids up until 4 hours before your exam. (Clear liquids include water, clear tea, black coffee and fruit juice without pulp.)  Please call the Imaging Department at your exam site with any questions.              Who to contact     If you have questions or need follow up information about today's clinic visit or your schedule please contact [facility] directly at [phone].  Normal or non-critical lab and imaging results will be communicated to you by MyChart, letter or phone within 4 business days after the clinic has received the results. If you do not hear from us within 7 days, please contact the clinic through [employer] or phone. If you have a critical or abnormal lab result, we will notify you by phone as soon as possible.  Submit refill requests through [employer] or call your pharmacy and they will forward the refill request to us. Please allow 3 business days for your refill to be completed.          Additional Information About Your Visit        [employer] Information     [employer] lets you send messages to your doctor, view your test results, renew your prescriptions, schedule appointments and more. To sign up, go to [email or web address] . Click on \"Log in\" on the left side of the screen, which will take you to the Welcome page. Then click on \"Sign up Now\" on the right side of the page.     You will be asked to enter the access code listed below, as well as some personal information. Please follow the directions to create your username and password.     Your access code is: [email or web address]  Expires: 2017 11:20 AM     Your access code will  in 90 days. If you need help or a new code, please call your [facility] clinic or [phone].        Care EveryWhere ID     This is your Care EveryWhere ID. This could be used by other organizations to access your [facility] medical records  [id number]        Your " Vitals Were     Pulse Temperature Respirations Pulse Oximetry BMI (Body Mass Index)       80 96.4  F (35.8  C) (Tympanic) 16 99% 24.65 kg/m2        Blood Pressure from Last 3 Encounters:   05/23/17 115/70   05/16/17 103/64   05/09/17 98/54    Weight from Last 3 Encounters:   05/23/17 65.2 kg (143 lb 11.2 oz)   05/17/17 68.3 kg (150 lb 8 oz)   05/16/17 65.5 kg (144 lb 4.8 oz)              We Performed the Following     CBC with platelets differential     CBC with platelets differential     Comprehensive metabolic panel     Electrolyte Replacement     Magnesium     Nursing Communication 1     Treatment Conditions        Primary Care Provider Office Phone # Fax #    Adán Lanier [phone]       [address] [place] [facility] [address]        Thank you!     Thank you for choosing [facility]  for your care. Our goal is always to provide you with excellent care. Hearing back from our patients is one way we can continue to improve our services. Please take a few minutes to complete the written survey that you may receive in the mail after your visit with us. Thank you!             Your Updated Medication List - Protect others around you: Learn how to safely use, store and throw away your medicines at www.disposemymeds.org.          This list is accurate as of: 5/23/17  8:25 AM.  Always use your most recent med list.                   Brand Name Dispense Instructions for use    albuterol 108 (90 BASE) MCG/ACT Inhaler    PROAIR HFA/PROVENTIL HFA/VENTOLIN HFA     Inhale 2 puffs into the lungs every 6 hours as needed Reported on 4/20/2017       ASPIRIN PO      Take 81 mg by mouth daily       COREG PO      Take 6.25 mg by mouth 2 times daily       hydrochlorothiazide 25 MG tablet    HYDRODIURIL     daily On hold for now 5/17/17, Reported on 5/16/2017       lisinopril 5 MG tablet    PRINIVIL/ZESTRIL     2 times daily Reported on 5/5/2017       LORazepam 1 MG tablet     ATIVAN    30 tablet    Take 1 tablet (1 mg) by mouth every 6 hours as needed (nausea/vomiting, anxiety or sleep )       MULTIVITAMIN ADULT PO      Take by mouth daily       prochlorperazine 10 MG tablet    COMPAZINE    30 tablet    Take 1 tablet (10 mg) by mouth every 6 hours as needed (nausea/vomiting)       SIMVASTATIN PO      Take 40 mg by mouth once On hold 5/17/17 per Gyn onc  Reported on 5/16/2017       SINGULAIR PO      Take 10 mg by mouth daily Reported on 5/16/2017          negative...

## 2021-10-27 NOTE — ED BEHAVIORAL HEALTH NOTE - BEHAVIORAL HEALTH NOTE
COVID Exposure Screen- Patient  1.	*Have you had a COVID-19 test in the last 90 days?  ( x ) Yes   (  ) No   (  ) Unknown- Reason: _____  IF YES PROCEED TO QUESTION #2. IF NO OR UNKNOWN, PLEASE SKIP TO QUESTION #3.  2.	Date of test(s) and result(s): __unknown exact date;  results neg.______  3.	*Have you tested positive for COVID-19 antibodies? (  ) Yes   (  ) No   (  ) Unknown- Reason: _____  IF YES PROCEED TO QUESTION #4. IF NO or UNKNOWN, PLEASE SKIP TO QUESTION #5.  4.	Date of positive antibody test: ________  5.	*Have you received 2 doses of the COVID-19 vaccine? (  ) Yes   (  ) No   (  ) Unknown- Reason: _____   IF YES PROCEED TO QUESTION #6. IF NO or UNKNOWN, PLEASE SKIP TO QUESTION #7.  6.	Date of second dose:date of first dose 10/8.  next dose due 11/8 ________  7.	*In the past 10 days, have you been around anyone with a positive COVID-19 test?* (  ) Yes   (x  ) No   (  ) Unknown- Reason: ____  IF YES PROCEED TO QUESTION #8. IF NO or UNKNOWN, PLEASE SKIP TO QUESTION #13.  8.	Were you within 6 feet of them for at least 15 minutes? (  ) Yes   (  ) No   (  ) Unknown- Reason: _____  9.	Have you provided care for them? (  ) Yes   (  ) No   (  ) Unknown- Reason: ______  10.	Have you had direct physical contact with them (touched, hugged, or kissed them)? (  ) Yes   (  ) No    (  ) Unknown- Reason: _____  11.	Have you shared eating or drinking utensils with them? (  ) Yes   (  ) No    (  ) Unknown- Reason: ____  12.	Have they sneezed, coughed, or somehow gotten respiratory droplets on you? (  ) Yes   (  ) No    (  ) Unknown- Reason: ______  13.	*Have you been out of [place] within the past 10 days?* (  ) Yes   ( x ) No   (  ) Unknown- Reason: _____  IF YES PLEASE ANSWER THE FOLLOWING QUESTIONS:  14.	Which state/country have you been to? ______  15.	Were you there over 24 hours? (  ) Yes   (  ) No    (  ) Unknown- Reason: ______  16.	Date of return to [facility]: ______     COVID Exposure Screen- collateral (i.e. third-party, chart review, belongings, etc; include EMS and ED staff)  1.	*Has the patient had a COVID-19 test in the last 90 days?  (  ) Yes   (  ) No   (  ) Unknown- Reason: _____  IF YES PROCEED TO QUESTION #2. IF NO OR UNKNOWN, PLEASE SKIP TO QUESTION #3.  2.	Date of test(s) and result(s): ________  3.	*Has the patient tested positive for COVID-19 antibodies? (  ) Yes   (  ) No   (  ) Unknown- Reason: _____  IF YES PROCEED TO QUESTION #4. IF NO or UNKNOWN, PLEASE SKIP TO QUESTION #5.  4.	Date of positive antibody test: ________  5.	*Has the patient received 2 doses of the COVID-19 vaccine? (  ) Yes   (  ) No   (  ) Unknown- Reason: _____  IF YES PROCEED TO QUESTION #6. IF NO or UNKNOWN, PLEASE SKIP TO QUESTION #7.  6.	 Date of second dose: ________  7.	*In the past 10 days, has the patient been around anyone with a positive COVID-19 test?* (  ) Yes   (  ) No   (  ) Unknown- Reason: __  IF YES PROCEED TO QUESTION #8. IF NO or UNKNOWN, PLEASE SKIP TO QUESTION #13.  8.	Was the patient within 6 feet of them for at least 15 minutes? (  ) Yes   (  ) No   (  ) Unknown- Reason: _____  9.	Did the patient provide care for them? (  ) Yes   (  ) No   (  ) Unknown- Reason: ______  10.	Did the patient have direct physical contact with them (touched, hugged, or kissed them)? (  ) Yes   (  ) No    (  ) Unknown- Reason: __  11.	Did the patient share eating or drinking utensils with them? (  ) Yes   (  ) No    (  ) Unknown- Reason: ____  12.	Did they sneeze, cough, or somehow get respiratory droplets on the patient? (  ) Yes   (  ) No    (  ) Unknown- Reason: ______  13.	*Has the patient been out of [place] within the past 10 days?* (  ) Yes   (  ) No   (  ) Unknown- Reason: _____  IF YES PLEASE ANSWER THE FOLLOWING QUESTIONS:  14.	Which state/country did they go to? ______  15.	Were they there over 24 hours? (  ) Yes   (  ) No    (  ) Unknown- Reason: ______  16.	Date of return to [facility]: ______

## 2021-10-27 NOTE — ED BEHAVIORAL HEALTH ASSESSMENT NOTE - CASE SUMMARY
59 yo F with PPHx of schizoaffective disorder, previous admissions, PMHx of aortic stenosis and herniated disc, allergy to PCN, denies substance use, presented with SI with plan.  Pt seeking and appropriate for voluntary admission at this time.    COVID Exposure Screen- Patient  1.	*Have you had a COVID-19 test in the last 90 days?  ( x ) Yes, reportedly negative 1 week ago   (  ) No   (  ) Unknown- Reason: _____  2.	*Have you tested positive for COVID-19 antibodies? (  ) Yes   ( x ) No   (  ) Unknown- Reason: _____  3.	*Have you received 2 doses of the COVID-19 vaccine? (  ) Yes   ( x ) No, 2nd dose due on 11/8/21   (  ) Unknown- Reason: _____  4.	*In the past 10 days, have you been around anyone with a positive COVID-19 test?* (  ) Yes   ( x ) No   (  ) Unknown- Reason: ____  5.	*Have you been out of [place] within the past 10 days?* (  ) Yes   ( x ) No   (  ) Unknown- Reason: _____

## 2021-10-27 NOTE — ED ADULT NURSE NOTE - NSICDXPASTMEDICALHX_GEN_ALL_CORE_FT
PRINCIPAL DISCHARGE DIAGNOSIS  Diagnosis: ACS (acute coronary syndrome)  Assessment and Plan of Treatment: NSTEMI  Cardio consulted  medically optimized  P
PAST MEDICAL HISTORY:  Depression     Leukemia in remission    Schizophrenia

## 2021-10-27 NOTE — ED BEHAVIORAL HEALTH ASSESSMENT NOTE - DESCRIPTION
homeless, unemployed denied calm and cooperative  Vital Signs Last 24 Hrs  T(C): 36.7 (27 Oct 2021 12:33), Max: 36.7 (27 Oct 2021 12:33)  T(F): 98 (27 Oct 2021 12:33), Max: 98 (27 Oct 2021 12:33)  HR: 101 (27 Oct 2021 12:33) (101 - 101)  BP: 115/88 (27 Oct 2021 12:33) (115/88 - 115/88)  BP(mean): --  RR: 18 (27 Oct 2021 12:33) (18 - 18)  SpO2: 99% (27 Oct 2021 12:33) (99% - 99%)

## 2021-10-27 NOTE — ED PROVIDER NOTE - CLINICAL SUMMARY MEDICAL DECISION MAKING FREE TEXT BOX
patient presents for evaluation of increasing depression and suicidal ideation without specific plan she denies any headache, visual changes chest pain sob abdominal pain or back pain   on exam patient is calm and co-operative with rrr abd-soft no guarding no rebound ext from   a/p- we have obtained labs and have consulted telepsychiatry for further evaluation patient presents for evaluation of increasing depression and suicidal ideation without specific plan she denies any headache, visual changes chest pain sob abdominal pain or back pain   on exam patient is calm and co-operative with rrr abd-soft no guarding no rebound ext from   a/p- we have obtained labs and have consulted telepsychiatry for further evaluation  Psychiatry determinend she has ongoing SI with plan and intent, and will admit to their service.

## 2021-10-27 NOTE — ED PROVIDER NOTE - OBJECTIVE STATEMENT
Patient c/o depression, suicidal thoughts, no plan, Was Dc'd from psych 1 week ago, Meds not working, denies drug abuse, fever, chest pain

## 2021-10-27 NOTE — ED BEHAVIORAL HEALTH ASSESSMENT NOTE - DETAILS
[facility] self referred after hours suicidal ideation with plan to run into traffic handoff given

## 2021-10-27 NOTE — ED BEHAVIORAL HEALTH ASSESSMENT NOTE - SOURCE OF INFORMATION
Your labs showed normal kidney and liver tests, blood counts, thyroid, and diabetes screening test.  Your total cholesterol was normal but your LDL, or bad cholesterol, was high at 128. It should be less than 100. Your LDL cholesterol has been increasing since 2016. To lower LDL cholesterol, work on diet, exercise, and weight loss. For exercise, aim for 30 minutes of activity 5 days a week. For diet, increase fruits, vegetables, and low-fat dairy products (such as low-fat yogurt, 1% or skim milk), and decrease fried foods, fast foods, and red meats such as beef, shelton, sausage, hot dogs, and pork. Patient

## 2021-10-27 NOTE — ED BEHAVIORAL HEALTH ASSESSMENT NOTE - OTHER PAST PSYCHIATRIC HISTORY (INCLUDE DETAILS REGARDING ONSET, COURSE OF ILLNESS, INPATIENT/OUTPATIENT TREATMENT)
reported hx of schizoaffective  d/o, 4 prior psych hospitalizations since being evicted from home w/ mother, no reported hx of SA/SIB  no current outpatient treatment

## 2021-10-28 DIAGNOSIS — F25.9 SCHIZOAFFECTIVE DISORDER, UNSPECIFIED: ICD-10-CM

## 2021-10-28 LAB
COVID-19 SPIKE DOMAIN AB INTERP: POSITIVE
COVID-19 SPIKE DOMAIN ANTIBODY RESULT: 43.1 U/ML — HIGH
SARS-COV-2 IGG+IGM SERPL QL IA: 43.1 U/ML — HIGH
SARS-COV-2 IGG+IGM SERPL QL IA: POSITIVE

## 2021-10-28 PROCEDURE — 99232 SBSQ HOSP IP/OBS MODERATE 35: CPT

## 2021-10-28 RX ORDER — HYDROXYZINE HCL 10 MG
25 TABLET ORAL EVERY 6 HOURS
Refills: 0 | Status: DISCONTINUED | OUTPATIENT
Start: 2021-10-28 | End: 2021-11-03

## 2021-10-28 RX ORDER — ACETAMINOPHEN 500 MG
650 TABLET ORAL EVERY 6 HOURS
Refills: 0 | Status: DISCONTINUED | OUTPATIENT
Start: 2021-10-28 | End: 2021-11-03

## 2021-10-28 RX ORDER — ARIPIPRAZOLE 15 MG/1
10 TABLET ORAL DAILY
Refills: 0 | Status: DISCONTINUED | OUTPATIENT
Start: 2021-10-28 | End: 2021-11-03

## 2021-10-28 RX ADMIN — ARIPIPRAZOLE 10 MILLIGRAM(S): 15 TABLET ORAL at 11:50

## 2021-10-28 RX ADMIN — Medication 650 MILLIGRAM(S): at 15:59

## 2021-10-28 RX ADMIN — Medication 650 MILLIGRAM(S): at 16:03

## 2021-10-28 NOTE — H&P ADULT - NSHPLABSRESULTS_GEN_ALL_CORE
12.5   8.36  )-----------( 253      ( 27 Oct 2021 12:48 )             37.3       10-27    136  |  102  |  12  ----------------------------<  114<H>  4.2   |  22  |  0.8    Ca    9.4      27 Oct 2021 12:48  Mg     2.1     10-27    TPro  7.5  /  Alb  4.2  /  TBili  0.4  /  DBili  x   /  AST  12  /  ALT  11  /  AlkPhos  116<H>  10-27              Urinalysis Basic - ( 27 Oct 2021 18:25 )    Color: Yellow / Appearance: Clear / SG: >=1.030 / pH: x  Gluc: x / Ketone: Trace  / Bili: Small / Urobili: 0.2 mg/dL   Blood: x / Protein: Negative mg/dL / Nitrite: Negative   Leuk Esterase: Trace / RBC: 1-2 /HPF / WBC 6-10 /HPF   Sq Epi: x / Non Sq Epi: Moderate /HPF / Bacteria: Moderate            Lactate Trend            CAPILLARY BLOOD GLUCOSE

## 2021-10-28 NOTE — PROGRESS NOTE BEHAVIORAL HEALTH - NSBHFUPINTERVALHXFT_PSY_A_CORE
Patient seen and evaluated on IPP. Chart reviewed. As per nursing report no acute events. On approach patient calm and cooperative. No agitation aggression noted. Patient states she has been increasingly depressed and having thoughts  of "running into traffic". Patient currently endorses thought of wanting to harm herself but denies intent or plan and feels safe on the unit. Endorses passive thought of harming others, denies intent or plan. Admits to hearing voces at times but states she has not heard then in some time. Patient stated she is currently on Abilify 5 mg. Discharged a week ago from [facility]. States the Abilify helps with the depression and the voices but believes the dose is too low. States she was on 20mg before and it worked for her. Patient denies any ETOH or illicit drug use. Denies any s/s of COVID. Paient COVID negative. Patient seen and evaluated on IPP. Chart reviewed. As per nursing report no acute events. On approach patient calm and cooperative. No agitation aggression noted. Patient states she has been increasingly depressed and having thoughts  of "running into traffic". Patient currently endorses thoughts of wanting to harm herself but denies intent or plan and feels safe on the unit. Endorses passive thoughts of harming others, denies intent or plan. Admits to hearing voces at times but states she has not heard then in some time. Patient stated she is currently on Abilify 5 mg. Discharged a week ago from [facility]. States the Abilify helps with the depression and the voices but believes the dose is too low. States she was on 20mg before and it worked for her. Does not have a psychiatrist in te community. Has not followed up on outpatient. States she has been sleeping too much, appetite is good. Denies any s/s of aleena. Patient denies any ETOH or illicit drug use. States she is homeless since her mom went into a nursing home and does not want to go back to her shelter. States she plans to eventually move to [place]. Denies any s/s of COVID. Patient COVID negative. Did not provide information for collateral.

## 2021-10-28 NOTE — H&P ADULT - NSHPPHYSICALEXAM_GEN_ALL_CORE
GENERAL:  59y/o Female NAD, resting comfortably.  HEAD:  Atraumatic, Normocephalic  EYES: EOMI, PERRLA, conjunctiva and sclera clear  NECK: Supple, No JVD, no cervical lymphadenopathy, non-tender  CHEST/LUNG: Clear to auscultation bilaterally; No wheeze, rhonchi, or rales  HEART: Regular rate and rhythm; S1&S2  ABDOMEN: Soft, Nontender, Nondistended x 4 quadrants; Bowel sounds present  EXTREMITIES:   Peripheral Pulses Present, No clubbing, no cyanosis, or no edema, no calf tenderness  PSYCH: AAOx3, cooperative, appropriate  NEUROLOGY: WNL  SKIN: WNL

## 2021-10-28 NOTE — H&P ADULT - HISTORY OF PRESENT ILLNESS
C/O: suicidal thoughts.    · Chief Complaint: The patient is a 60y Female complaining of suicidal thoughts.  · HPI Objective Statement: 59y/o F c/o depression, suicidal thoughts, no plan, Was Dc'd from psych 1 week ago, Meds not working, denies drug abuse, fever, chest pain
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
## 2021-10-28 NOTE — H&P ADULT - ASSESSMENT
61y/o F c/o depression, suicidal thoughts, no plan, Was Dc'd from psych 1 week ago, Meds not working, denies drug abuse, fever    DX suicidal thoughts:  Abilify    Psychosis: Haldol    agitation: Prn AtivanShahramryl

## 2021-10-29 LAB
A1C WITH ESTIMATED AVERAGE GLUCOSE RESULT: 6.4 % — HIGH (ref 4–5.6)
CHOLEST SERPL-MCNC: 187 MG/DL — SIGNIFICANT CHANGE UP
CULTURE RESULTS: SIGNIFICANT CHANGE UP
ESTIMATED AVERAGE GLUCOSE: 137 MG/DL — HIGH (ref 68–114)
HDLC SERPL-MCNC: 49 MG/DL — LOW
LIPID PNL WITH DIRECT LDL SERPL: 110 MG/DL — HIGH
NON HDL CHOLESTEROL: 138 MG/DL — HIGH
SPECIMEN SOURCE: SIGNIFICANT CHANGE UP
TRIGL SERPL-MCNC: 137 MG/DL — SIGNIFICANT CHANGE UP
TSH SERPL-MCNC: 4.52 UIU/ML — HIGH (ref 0.27–4.2)

## 2021-10-29 PROCEDURE — 99231 SBSQ HOSP IP/OBS SF/LOW 25: CPT

## 2021-10-29 RX ADMIN — ARIPIPRAZOLE 10 MILLIGRAM(S): 15 TABLET ORAL at 08:22

## 2021-10-29 NOTE — PROGRESS NOTE BEHAVIORAL HEALTH - NSBHFUPINTERVALHXFT_PSY_A_CORE
Patient seen and evaluated. Chart reviewed. As per nursing report no acute events. On approach patient calm and cooperative. No agitation aggression noted. States she slept well. Appetite is good. Denies suicidal/homicidal ideations. Denies A/V hallucinations. Denies any s/s of aleena. Abilify increased yesterday. Denies any side effects/adverse reactions. No side effects/adverse reactions noted. Denies any s/s of COVID. Patient COVID negative.

## 2021-10-29 NOTE — BH SAFETY PLAN - INTERNAL COPING STRATEGY 2
Another way I would cope with my depression would be that would read a good book or read a magazine.

## 2021-10-30 DIAGNOSIS — F25.9 SCHIZOAFFECTIVE DISORDER, UNSPECIFIED: ICD-10-CM

## 2021-10-30 LAB — T4 FREE SERPL-MCNC: 0.9 NG/DL — SIGNIFICANT CHANGE UP (ref 0.9–1.8)

## 2021-10-30 PROCEDURE — 99231 SBSQ HOSP IP/OBS SF/LOW 25: CPT

## 2021-10-30 RX ADMIN — ARIPIPRAZOLE 10 MILLIGRAM(S): 15 TABLET ORAL at 08:26

## 2021-10-30 NOTE — PROGRESS NOTE ADULT - SUBJECTIVE AND OBJECTIVE BOX
CARRINGTON ESPINOZA  60y, Female  Allergy: penicillin (Rash)    Hospital Day: 2d    Patient seen and examined. No acute events overnight    PMH/PSH:  PAST MEDICAL & SURGICAL HISTORY:  Schizophrenia    Leukemia  in remission    Depression    No significant past surgical history        VITALS:  T(F): 96.7 (10-30-21 @ 08:24), Max: 98.1 (10-29-21 @ 15:30)  HR: 72 (10-30-21 @ 08:24)  BP: 122/86 (10-30-21 @ 08:24) (113/75 - 164/89)  RR: 18 (10-30-21 @ 08:24)  SpO2: --    TESTS & MEASUREMENTS:  Weight (Kg):   BMI (kg/m2): 35.8 (10-28)    Culture - Urine (collected 10-27-21 @ 18:25)  Source: Clean Catch Clean Catch (Midstream)  Final Report (10-29-21 @ 07:26):    <10,000 CFU/mL Normal Urogenital Amna      RECENT DIAGNOSTIC ORDERS:  COVID-19 PCR: AM Sched. Collection: 01-Nov-2021 04:30  Specimen Source: Nasopharyngeal (10-29-21 @ 15:00)    MEDICATIONS:  MEDICATIONS  (STANDING):  ARIPiprazole 10 milliGRAM(s) Oral daily    MEDICATIONS  (PRN):  acetaminophen     Tablet .. 650 milliGRAM(s) Oral every 6 hours PRN Temp greater or equal to 38C (100.4F), Mild Pain (1 - 3)  aluminum hydroxide/magnesium hydroxide/simethicone Suspension 30 milliLiter(s) Oral every 4 hours PRN Dyspepsia  diphenhydrAMINE 50 milliGRAM(s) Oral every 6 hours PRN Extrapyramidal prophylaxis  haloperidol     Tablet 5 milliGRAM(s) Oral every 6 hours PRN psychosis  hydrOXYzine hydrochloride 25 milliGRAM(s) Oral every 6 hours PRN Anxiety/insomnia  LORazepam     Tablet 2 milliGRAM(s) Oral every 6 hours PRN Agitation    HOME MEDICATIONS:      REVIEW OF SYSTEMS:  All other review of systems is negative unless indicated above.     PHYSICAL EXAM:  PHYSICAL EXAM:  GENERAL: NAD, well-developed  HEAD:  Atraumatic, Normocephalic  NECK: Supple, No JVD  CHEST/LUNG: Clear to auscultation bilaterally; No wheeze  HEART: Regular rate and rhythm; No murmurs, rubs, or gallops  ABDOMEN: Soft, Nontender, Nondistended; Bowel sounds present  EXTREMITIES:  2+ Peripheral Pulses, No clubbing, cyanosis, or edema  PSYCH: AAOx3  SKIN: No rashes or lesions

## 2021-10-30 NOTE — PROGRESS NOTE BEHAVIORAL HEALTH - NSBHFUPINTERVALHXFT_PSY_A_CORE
Natalie was assessed in her room;     She endorsed euthymic mood, noting that while she had suicidal thoughts on admission (which she attributed to housing stressors) she had felt better having made a plan to relocate to [place] where housing is more affordable. She denied side effects from Abilify, perceptual disturbance, suicidal thoughts, sleep disturbance, or appetite disturbance. Labs including elevated A1C to 6.4, elevated lipid panel were reviewed and we discussed the importance of establishing regular medical appointments with a primary care physician.

## 2021-10-30 NOTE — PROGRESS NOTE ADULT - ASSESSMENT
59yo F w no significant pmhx currently admitted to IPP for suicidal ideation/depression. Pt was found to have hyperlipidemia and elevated a1c.    #DM2  Hba1c : 6.4%  Discussed with patient diagnosis of diabetes. She has a sister who is diabetic.  - After discussing with patient, will do trial of weight loss, diet/lifestyle modification  - Recommend changing diet to DASH/TLC + Consistent Carbohydrate  - Advised her to follow up with PCP within 1-2 months of discharge    #HLD    - Pt agreeable to starting atorvastatin 20mg once a day at bedtime, can begin while admitted here  - Weight loss as above  - Outpatient PCP follow-up    #Suicidal ideation  #Schizoaffective disorder  - Continue care per psychiatry    [phone] 61yo F w no significant pmhx currently admitted to IPP for suicidal ideation/depression. Pt was found to have hyperlipidemia and elevated a1c.    #DM2  Hba1c : 6.4%  Discussed with patient diagnosis of diabetes. She has a sister who is diabetic.  - After discussing with patient, will do trial of weight loss, diet/lifestyle modification  - Recommend changing diet to DASH/TLC + Consistent Carbohydrate  - Advised her to follow up with PCP within 1-2 months of discharge    #HLD    - Pt agreeable to starting atorvastatin 20mg once a day at bedtime, can begin while admitted here  - Weight loss as above  - Outpatient PCP follow-up    #Elevated TSH  - Recommend to check free t4, and free t3. Will follow labs, no need to start synthroid if WNL    #Suicidal ideation  #Schizoaffective disorder  - Continue care per psychiatry    [phone] 61yo F w no significant pmhx currently admitted to IPP for suicidal ideation/depression. Pt was found to have hyperlipidemia and elevated a1c.    #DM2  Hba1c : 6.4%  Discussed with patient diagnosis of diabetes. She has a sister who is diabetic.  - After discussing with patient, will do trial of weight loss, diet/lifestyle modification  - Recommend changing diet to DASH/TLC + Consistent Carbohydrate  - Advised her to follow up with PCP within 1-2 months of discharge    #HLD    - Pt agreeable to starting atorvastatin 20mg once a day at bedtime, can begin while admitted here  - Weight loss as above  - Outpatient PCP follow-up    #Elevated TSH  - f/u free t4, and free t3 (will try to add on labs 10/29). Will follow labs, no need to start synthroid if WNL    #Suicidal ideation  #Schizoaffective disorder  - Continue care per psychiatry    [phone]

## 2021-10-31 LAB — T3FREE SERPL-MCNC: 2.42 PG/ML — SIGNIFICANT CHANGE UP (ref 1.8–4.6)

## 2021-10-31 PROCEDURE — 99231 SBSQ HOSP IP/OBS SF/LOW 25: CPT | Mod: GC

## 2021-10-31 RX ADMIN — ARIPIPRAZOLE 10 MILLIGRAM(S): 15 TABLET ORAL at 08:04

## 2021-10-31 NOTE — PROGRESS NOTE BEHAVIORAL HEALTH - NSBHFUPINTERVALHXFT_PSY_A_CORE
Pt seen and examined. Chart reviewed. Per nursing report, no acute events reported overnight.    Pt reports feeling "better". States that her mood has been "good". Denies any SI/HI/AVH. States that her main issue was homelessness and is looking forward to working with social work to figure out how she can be helped. Denies any side effects to current medications or any other complaints.
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
## 2021-10-31 NOTE — PROGRESS NOTE BEHAVIORAL HEALTH - NSBHCHARTREVIEWINVESTIGATE_PSY_A_CORE FT
< from: 12 Lead ECG (10.27.21 @ 14:44) >  QTC Calculation(Bazett) 406 ms
< from: 12 Lead ECG (10.27.21 @ 14:44) >  QTC Calculation(Bazett) 406 ms

## 2021-10-31 NOTE — PROGRESS NOTE BEHAVIORAL HEALTH - CASE SUMMARY
Saw pt with the residents. As per nurses, pt appears to be improving, coming out of her room more. She eats her meals and takes her medications. She makes plans for the future. Pt was seen in her room, reports feeling better with improved mood, denies SI/HI or psychotic sx. She is looking forward to see SW to address homelessness issue.

## 2021-11-01 LAB — SARS-COV-2 RNA SPEC QL NAA+PROBE: SIGNIFICANT CHANGE UP

## 2021-11-01 PROCEDURE — 99231 SBSQ HOSP IP/OBS SF/LOW 25: CPT

## 2021-11-01 RX ORDER — ATORVASTATIN CALCIUM 80 MG/1
20 TABLET, FILM COATED ORAL AT BEDTIME
Refills: 0 | Status: DISCONTINUED | OUTPATIENT
Start: 2021-11-01 | End: 2021-11-03

## 2021-11-01 RX ADMIN — ATORVASTATIN CALCIUM 20 MILLIGRAM(S): 80 TABLET, FILM COATED ORAL at 20:22

## 2021-11-01 RX ADMIN — ARIPIPRAZOLE 10 MILLIGRAM(S): 15 TABLET ORAL at 08:17

## 2021-11-01 NOTE — DISCHARGE NOTE BEHAVIORAL HEALTH - MEDICATION SUMMARY - MEDICATIONS TO STOP TAKING
I will STOP taking the medications listed below when I get home from the hospital:    haloperidol 5 mg oral tablet  -- 1 tab(s) by mouth once a day (at bedtime) until told otherwise by MD    hydrOXYzine hydrochloride 50 mg oral tablet  -- 1 tab(s) by mouth every 6 hours, As needed, anxiety until told otherwise by MD

## 2021-11-01 NOTE — DISCHARGE NOTE BEHAVIORAL HEALTH - NSBHDCADMRISKMITFT_PSY_A_CORE
Has been medication compliant, not endorsing any side effects. Patient is future oriented and optimistic about starting Outpatient. Patient verbalizes reasons for living. Patient to use positive coping skills learned during the course of the inpatient hospitalization. Patient verbalized willingness to seek care in moment of crisis. Patient is agreeable that should she have any thoughts of hurting herself or others, she will call 911, return to the ED or call the National Suicide Prevention Lifeline, immediately

## 2021-11-01 NOTE — DISCHARGE NOTE BEHAVIORAL HEALTH - NSBHDCMEDICALFT_PSY_A_CORE
#DM2  Hba1c : 6.4%  Discussed with patient diagnosis of diabetes. She has a sister who is diabetic.  - After discussing with patient, will do trial of weight loss, diet/lifestyle modification  - Diet changed  to DASH/TLC + Consistent Carbohydrate  - Advised her to follow up with PCP within 1-2 months of discharge    #HLD    - Pt agreeable to starting atorvastatin 20mg once a day at bedtime, started  - Weight loss as above  - Outpatient PCP follow-up    #Elevated TSH  - f/u free t4, and free t3 (will try to add on labs 10/29). Will follow labs, no need to start synthroid if WNL

## 2021-11-01 NOTE — DISCHARGE NOTE BEHAVIORAL HEALTH - NSBHDCRESPONSEFT_PSY_A_CORE
Patient reports feeling better, presenting with better insight and judgement. Patient denied any suicidal or homicidal ideations. Patient denied any auditory or visual hallucinations. Patient is future oriented, optimistic and motivated to participate in Outpatient treatment. Patient shows no imminent danger to self, others at this time. Patient understands and verbalized agreement with treatment plan and following up with outpatient. Psychoeducation provided regarding diagnosis, medications, treatment and follow up. Risks, benefits, alternatives discussed, all questions and concerns addressed and answered.

## 2021-11-01 NOTE — DISCHARGE NOTE BEHAVIORAL HEALTH - MEDICATION SUMMARY - MEDICATIONS TO TAKE
I will START or STAY ON the medications listed below when I get home from the hospital:    atorvastatin 20 mg oral tablet  -- 1 tab(s) by mouth once a day (at bedtime) x 14 days. Continue until told otherwise by your provider.   -- Indication: For HLD    ARIPiprazole 10 mg oral tablet  -- 1 tab(s) by mouth once a day x 14 days. Continue until told otherwise by your provider.    -- Indication: For Schizoaffective disorder

## 2021-11-01 NOTE — DISCHARGE NOTE BEHAVIORAL HEALTH - COMPLETE THE FOLLOWING IF THE PATIENT REFUSES THE INFLUENZA VACCINE:
Patient: Choco Diallo  : 1968  MRN: [id number]  Age: 50 year old      HPI: Mr. Diallo is seen today for follow-up of palpitations. This is a long-standing issue dating back more than 10 years and has been established to be related to intermittent single PVCs. These have been of varying frequency and symptomatology over the years--somewhat worse over the last few weeks which is why he requested this earlier than planned appointment. He does not seem to have ever had any sustained arrhythmia of any kind and there is no history of syncope. Some of his concern revolves around his father dying at the age of 46 of a myocardial infarction. The patient does not have a clinical history of ischemic heart disease, hypertension or diabetes. He does have a lipid panel suggesting metabolic syndrome and nonalcoholic steatohepatitis on a recent liver ultrasound.  He does exercise occasionally and feels well doing that.  He is working on weight loss and has lost about 12 pounds since his last visit with me.  He had an exercise stress echocardiogram elsewhere about 4 years ago that was reported to him as being normal.  He had an exercise stress echocardiogram at [facility] after my initial visit with him in 2018--this was also normal in all respects with no rhythm disturbances.  He has not yet performed a CT coronary calcium score that we discussed at that visit.  He does not smoke.   He has steatohepatitis which explains his mildly abnormal LFTs.    Review of Systems   Cardiovascular: Positive for palpitations.       Current Outpatient Medications   Medication Sig Dispense Refill   • allopurinol (ZYLOPRIM) 100 MG tablet Take 100 mg by mouth 2 times daily.     • pantoprazole (PROTONIX) 40 MG tablet Take 40 mg by mouth daily.     • atenolol (TENORMIN) 25 MG tablet Take 1 tablet by mouth daily. 90 tablet 3     No current facility-administered medications for this visit.        ALLERGIES: no known allergies.    Visit  Vitals  /85 (BP Location: [facility], Patient Position: Sitting)   Pulse 65   Wt 85 kg (187 lb 6.3 oz)   SpO2 95%   BMI 28.49 kg/m²     Physical Exam   Constitutional: He is oriented to person, place, and time. He appears well-developed and well-nourished.   HENT:   Head: Normocephalic and atraumatic.   Eyes: Conjunctivae are normal. Pupils are equal, round, and reactive to light.   Neck: Normal range of motion. Neck supple. No JVD present. No tracheal deviation present.   Cardiovascular: Normal rate, regular rhythm and intact distal pulses. Exam reveals no gallop and no friction rub.   No murmur heard.  Pulmonary/Chest: Effort normal and breath sounds normal. No stridor. No respiratory distress. He has no wheezes. He has no rales. He exhibits no tenderness.   Abdominal: Soft. He exhibits no distension. There is no tenderness.   Musculoskeletal: Normal range of motion. He exhibits no edema.   Neurological: He is alert and oriented to person, place, and time.   Skin: Skin is warm and dry.   Psychiatric: He has a normal mood and affect. His behavior is normal. Judgment and thought content normal.   Vitals reviewed.      Data: EKG today shows normal sinus rhythm at 61 bpm and is a normal tracing.    Impression/Plan:  Palpitations  We will have a trial of atenolol 25 mg daily as needed for palpitations.  - ELECTROCARDIOGRAM 12-LEAD    Mixed hyperlipidemia  He takes a fish oil supplement and is making some efforts at notifying his diet.  His exercise is going well.  He tells me he has a follow-up lipid panel in the next few months.  We can review that at his follow-up with me in about 6 months.  I will contact him with the results of his CT coronary calcium score as that will influence our thinking about his lipids and any possible pharmacologic treatment.          Isaac Padgett MD   Risks/benefits discussed with patient/surrogate

## 2021-11-01 NOTE — PROGRESS NOTE BEHAVIORAL HEALTH - NSBHFUPINTERVALHXFT_PSY_A_CORE
Patient seen and evaluated. Chart reviewed. As per nursing report no acute events. On approach patient calm and cooperative. No agitation aggression noted. States she slept well. Appetite is good. Denies suicidal/homicidal ideations. Denies A/V hallucinations. Denies any s/s of aleena. Discussed discharge. Patient states she will go back to the shelter for now then she wants to go to [place]. States she used to live there. Denies any s/s of COVID. Patient COVID negative. Anticipated discharge Wednesday 11/3/21 provided patient continues to progress.

## 2021-11-01 NOTE — DISCHARGE NOTE BEHAVIORAL HEALTH - NSBHDCSUICPROTECTFT_PSY_A_CORE
Patient denies suicidal ideations. Responsibility to family and others, Identifies reasons for living, Has future plans

## 2021-11-01 NOTE — DISCHARGE NOTE BEHAVIORAL HEALTH - NSBHDCVIOLFCTRMIT_PSY_A_CORE
The patient is a 73y Female complaining of Has been medication compliant, not endorsing any side effects. Patient is future oriented and optimistic about starting Outpatient. Patient verbalizes reasons for living. Patient to use positive coping skills learned during the course of the inpatient hospitalization. Patient verbalized willingness to seek care in moment of crisis. Patient is agreeable that should she have any thoughts of hurting herself or others, she will call 911, return to the ED or call the National Suicide Prevention Lifeline, immediately

## 2021-11-01 NOTE — DISCHARGE NOTE BEHAVIORAL HEALTH - NSBHDCSUICFCTROTHERFT_PSY_A_CORE
Patient and provider identified future stressors as being psychosocial stressors, non compliance with medication/outpatient follow up.
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
## 2021-11-02 PROCEDURE — 99231 SBSQ HOSP IP/OBS SF/LOW 25: CPT

## 2021-11-02 RX ORDER — ARIPIPRAZOLE 15 MG/1
1 TABLET ORAL
Qty: 14 | Refills: 0
Start: 2021-11-02 | End: 2021-11-15

## 2021-11-02 RX ORDER — ATORVASTATIN CALCIUM 80 MG/1
1 TABLET, FILM COATED ORAL
Qty: 14 | Refills: 0
Start: 2021-11-02 | End: 2021-11-15

## 2021-11-02 RX ADMIN — ARIPIPRAZOLE 10 MILLIGRAM(S): 15 TABLET ORAL at 08:11

## 2021-11-02 RX ADMIN — ATORVASTATIN CALCIUM 20 MILLIGRAM(S): 80 TABLET, FILM COATED ORAL at 20:08

## 2021-11-02 NOTE — PROGRESS NOTE BEHAVIORAL HEALTH - NSBHFUPINTERVALCCFT_PSY_A_CORE
"I'm feeling better, I'm going to go to [place]"
"I feel better today"
"I feel good"
"Im depressed"
"I'm feeling better"
"I feel better"

## 2021-11-02 NOTE — PROGRESS NOTE BEHAVIORAL HEALTH - PRIMARY DX
Schizoaffective disorder
Schizoaffective disorder
Schizoaffective disorder, unspecified type
Schizoaffective disorder
Schizoaffective disorder, unspecified type
Schizoaffective disorder

## 2021-11-02 NOTE — PROGRESS NOTE BEHAVIORAL HEALTH - NSBHATTESTSEENBY_PSY_A_CORE
Attending Psychiatrist supervising NP/Trainee, meeting pt...
NP without Attending Psychiatrist
Attending Psychiatrist supervising NP/Trainee, meeting pt...

## 2021-11-02 NOTE — PROGRESS NOTE BEHAVIORAL HEALTH - NSBHPTASSESSDT_PSY_A_CORE
29-Oct-2021 09:52
31-Oct-2021 12:02
30-Oct-2021 12:25
01-Nov-2021 10:15
02-Nov-2021 10:53
28-Oct-2021 11:09

## 2021-11-02 NOTE — PROGRESS NOTE BEHAVIORAL HEALTH - NSBHCHARTREVIEWLAB_PSY_A_CORE FT
Complete Blood Count + Automated Diff (10.27.21 @ 12:48)   WBC Count: 8.36 K/uL   RBC Count: 4.23 M/uL   Hemoglobin: 12.5 g/dL   Hematocrit: 37.3    Mean Cell Volume: 88.2 fL   Mean Cell Hemoglobin: 29.6 pg   Mean Cell Hemoglobin Conc: 33.5 g/dL   Red Cell Distrib Width: 13.7    Platelet Count - Automated: 253 K/uL   Auto Neutrophil #: 5.86 K/uL   Auto Lymphocyte #: 1.88 K/uL   Auto Monocyte #: 0.45 K/uL   Auto Eosinophil #: 0.07 K/uL   Auto Basophil #: 0.06 K/uL   Auto Neutrophil %: 70.1:   Auto Lymphocyte %: 22.5    Auto Monocyte %: 5.4    Auto Eosinophil %: 0.8    Auto Basophil %: 0.7    Auto Immature Granulocyte %: 0.5: (Includes meta, myelo and promyelocytes)    Nucleated RBC: 0 /100 WBCs   Comprehensive Metabolic Panel (10.27.21 @ 12:48)   Sodium, Serum: 136 mmol/L   Potassium, Serum: 4.2 mmol/L   Chloride, Serum: 102 mmol/L   Carbon Dioxide, Serum: 22 mmol/L   Anion Gap, Serum: 12 mmol/L   Blood Urea Nitrogen, Serum: 12 mg/dL   Creatinine, Serum: 0.8 mg/dL   Glucose, Serum: 114 mg/dL   Calcium, Total Serum: 9.4 mg/dL   Protein Total, Serum: 7.5 g/dL   Albumin, Serum: 4.2 g/dL   Bilirubin Total, Serum: 0.4 mg/dL   Alkaline Phosphatase, Serum: 116 U/L   Aspartate Aminotransferase (AST/SGOT): 12 U/L   Alanine Aminotransferase (ALT/SGPT): 11 U/L   eGFR if Non : 81:

## 2021-11-02 NOTE — PROGRESS NOTE BEHAVIORAL HEALTH - NSBHADMITIPOBSFT_PSY_A_CORE
As per unit protocol

## 2021-11-02 NOTE — PROGRESS NOTE BEHAVIORAL HEALTH - SUMMARY
Pt w/ reported hx of schizoaffective d/o w/ no prior SA/SIB, with 4 recent psych hospitalizations presents w/ self reported depressive mood and SI with plan to run into traffic. Patient is noted to be concrete in thought process.   patient reports she did not connect to after care after d/c from [facility] last week.  She reside in a shelter and had locker broken into and now does not feel safe returning there.  She also endorses non-specific homicidal ideation, no plan or intent.  Patient with no significant social supports and unable to meaningfully engage in safety planning.   Patient would benefit from inpatient psych hospitalization given her ongoing SI w/ plan and intent.    Patient seen and evaluated. Chart reviewed. As per nursing report no acute events. On approach patient calm and cooperative. No agitation aggression noted. States she slept well. Appetite is good. Denies suicidal/homicidal ideations. Denies A/V hallucinations. Denies any s/s of aleena. Denies any s/s of COVID. Patient COVID negative. Anticipated discharge tomorrow 11/3/21. Compliant with medication. Does not warrant continued Inpatient hospitalization. Patient does not present an imminent risk to self or others at this time.    #Admit    #Schizoaffective disorder  -Abilify 10mg Daily    -Haldol 5mg Q6 PRN for agitation  -Lorazepam 2mg Q6 PRN for aggression  -Benadryl 50mg Q6 PRN for EPS  -Hydroxyzine 25mg Q6 PRN for anxiety/insomnia      -Tylenol PRN  -Maalox PRN  -Atorvastatin
Pt w/ reported hx of schizoaffective d/o w/ no prior SA/SIB, with 4 recent psych hospitalizations presents w/ self reported depressive mood and SI with plan to run into traffic. Patient is noted to be concrete in thought process.   patient reports she did not connect to after care after d/c from [facility] last week.  She reside in a shelter and had locker broken into and now does not feel safe returning there.  She also endorses non-specific homicidal ideation, no plan or intent.  Patient with no significant social supports and unable to meaningfully engage in safety planning.   Patient would benefit from inpatient psych hospitalization given her ongoing SI w/ plan and intent.    #Admit    #Schizoaffective disorder  -Abilify 10mg Daily    -Haldol 5mg Q6 PRN for agitation  -Lorazepam 2mg Q6 PRN for aggression  -Benadryl 50mg Q6 PRN for EPS  -Hydroxyzine 25mg Q6 PRN for anxiety/insomnia      -Tylenol PRN  -Maalox PRN
Pt w/ reported hx of schizoaffective d/o w/ no prior SA/SIB, with 4 recent psych hospitalizations presents w/ self reported depressive mood and SI with plan to run into traffic. Patient is noted to be concrete in thought process.   patient reports she did not connect to after care after d/c from [facility] last week.  She reside in a shelter and had locker broken into and now does not feel safe returning there.  She also endorses non-specific homicidal ideation, no plan or intent.  Patient with no significant social supports and unable to meaningfully engage in safety planning.   Patient would benefit from inpatient psych hospitalization given her ongoing SI w/ plan and intent.    On assessment today, pt demonstrates improved mood, denying any SI/HI/AVH.     #Continue IPP admission     #Schizoaffective disorder  -Abilify 10mg Daily    -Haldol 5mg Q6 PRN for agitation  -Lorazepam 2mg Q6 PRN for aggression  -Benadryl 50mg Q6 PRN for EPS  -Hydroxyzine 25mg Q6 PRN for anxiety/insomnia      -Tylenol PRN  -Maalox PRN
Pt w/ reported hx of schizoaffective d/o w/ no prior SA/SIB, with 4 recent psych hospitalizations presents w/ self reported depressive mood and SI with plan to run into traffic. Patient is noted to be concrete in thought process.   patient reports she did not connect to after care after d/c from [facility] last week.  She reside in a shelter and had locker broken into and now does not feel safe returning there.  She also endorses non-specific homicidal ideation, no plan or intent.  Patient with no significant social supports and unable to meaningfully engage in safety planning.   Patient would benefit from inpatient psych hospitalization given her ongoing SI w/ plan and intent.    Patient seen and evaluated. Chart reviewed. As per nursing report no acute events. On approach patient calm and cooperative. No agitation aggression noted. States she slept well. Appetite is good. Denies suicidal/homicidal ideations. Denies A/V hallucinations. Denies any s/s of aleena. Abilify increased yesterday. Denies any side effects/adverse reactions. No side effects/adverse reactions noted. Denies any s/s of COVID. Patient COVID negative.    #Admit    #Schizoaffective disorder  -Abilify 10mg Daily    -Haldol 5mg Q6 PRN for agitation  -Lorazepam 2mg Q6 PRN for aggression  -Benadryl 50mg Q6 PRN for EPS  -Hydroxyzine 25mg Q6 PRN for anxiety/insomnia      -Tylenol PRN  -Maalox PRN
Pt w/ reported hx of schizoaffective d/o w/ no prior SA/SIB, with 4 recent psych hospitalizations presents w/ self reported depressive mood and SI with plan to run into traffic. Patient is noted to be concrete in thought process.   patient reports she did not connect to after care after d/c from [facility] last week.  She reside in a shelter and had locker broken into and now does not feel safe returning there.  She also endorses non-specific homicidal ideation, no plan or intent.  Patient with no significant social supports and unable to meaningfully engage in safety planning.   Patient would benefit from inpatient psych hospitalization given her ongoing SI w/ plan and intent.    Patient seen and evaluated. Chart reviewed. As per nursing report no acute events. On approach patient calm and cooperative. No agitation aggression noted. States she slept well. Appetite is good. Denies suicidal/homicidal ideations. Denies A/V hallucinations. Denies any s/s of aleena. Discussed discharge. Patient states she will go back to the shelter for now then she wants to go to [place]. States she used to live there. Denies any s/s of COVID. Patient COVID negative. Anticipated discharge Wednesday 11/3/21 provided patient continues to progress.    #Admit    #Schizoaffective disorder  -Abilify 10mg Daily    -Haldol 5mg Q6 PRN for agitation  -Lorazepam 2mg Q6 PRN for aggression  -Benadryl 50mg Q6 PRN for EPS  -Hydroxyzine 25mg Q6 PRN for anxiety/insomnia      -Tylenol PRN  -Maalox PRN  -Atorvastatin
Pt w/ reported hx of schizoaffective d/o w/ no prior SA/SIB, with 4 recent psych hospitalizations presents w/ self reported depressive mood and SI with plan to run into traffic. Patient is noted to be concrete in thought process.   patient reports she did not connect to after care after d/c from [facility] last week.  She reside in a shelter and had locker broken into and now does not feel safe returning there.  She also endorses non-specific homicidal ideation, no plan or intent.  Patient with no significant social supports and unable to meaningfully engage in safety planning.   Patient would benefit from inpatient psych hospitalization given her ongoing SI w/ plan and intent.    Patient seen and evaluated. Chart reviewed. As per nursing report no acute events. On approach patient calm and cooperative. No agitation aggression noted. States she slept well. Appetite is good. Denies suicidal/homicidal ideations. Denies A/V hallucinations. Denies any s/s of aleena. Abilify increased yesterday. Denies any side effects/adverse reactions. No side effects/adverse reactions noted. Denies any s/s of COVID. Patient COVID negative.    #Continue IPP admission     #Schizoaffective disorder  -Abilify 10mg Daily    -Haldol 5mg Q6 PRN for agitation  -Lorazepam 2mg Q6 PRN for aggression  -Benadryl 50mg Q6 PRN for EPS  -Hydroxyzine 25mg Q6 PRN for anxiety/insomnia      -Tylenol PRN  -Maalox PRN

## 2021-11-02 NOTE — PROGRESS NOTE BEHAVIORAL HEALTH - NSBHFUPINTERVALHXFT_PSY_A_CORE
Patient seen and evaluated. Chart reviewed. As per nursing report no acute events. On approach patient calm and cooperative. No agitation aggression noted. States she slept well. Appetite is good. Denies suicidal/homicidal ideations. Denies A/V hallucinations. Denies any s/s of aleena. Denies any s/s of COVID. Patient COVID negative. Anticipated discharge tomorrow 11/3/21. Compliant with medication. Does not warrant continued Inpatient hospitalization. Patient does not present an imminent risk to self or others at this time.    Writer sent meds to [facility] for delivery to [facility]. Per [facility] unable to fill Abilify. Patient recently picked up 30 day supply from [facility] [phone]. Writer placed call to [facility] and confirmed patient picked up meds 10/13 30 day supply, Abilify, Losartan, Januvia. Writer questioned patient who stated she forgot that she does have Abilify 10mg at the boarding [place]. States she will go there and pick them up after discharge. States she was hospitalized at [facility] just prior to this admission and they took her off  Losartan and Januvia.

## 2021-11-02 NOTE — PROGRESS NOTE BEHAVIORAL HEALTH - RISK ASSESSMENT
see above risk factors    Protective factors: seeking treatment

## 2021-11-02 NOTE — PROGRESS NOTE BEHAVIORAL HEALTH - NSBHCHARTREVIEWVS_PSY_A_CORE FT
Vital Signs Last 24 Hrs  T(C): 35.6 (29 Oct 2021 06:23), Max: 36.2 (28 Oct 2021 15:15)  T(F): 96 (29 Oct 2021 06:23), Max: 97.2 (28 Oct 2021 15:15)  HR: 67 (29 Oct 2021 06:23) (67 - 81)  BP: 128/71 (29 Oct 2021 06:23) (125/91 - 128/71)  BP(mean): --  RR: 18 (29 Oct 2021 06:23) (16 - 18)  SpO2: --
ICU Vital Signs Last 24 Hrs  T(C): 35.6 (31 Oct 2021 09:50), Max: 36.1 (30 Oct 2021 16:00)  T(F): 96.1 (31 Oct 2021 09:50), Max: 97 (30 Oct 2021 16:00)  HR: 79 (31 Oct 2021 09:50) (68 - 83)  BP: 145/83 (31 Oct 2021 09:50) (128/88 - 167/97)  BP(mean): --  ABP: --  ABP(mean): --  RR: 18 (31 Oct 2021 09:50) (16 - 18)  SpO2: --
Vital Signs Last 24 Hrs  T(C): 35.9 (30 Oct 2021 08:24), Max: 36.7 (29 Oct 2021 15:30)  T(F): 96.7 (30 Oct 2021 08:24), Max: 98.1 (29 Oct 2021 15:30)  HR: 72 (30 Oct 2021 08:24) (72 - 83)  BP: 122/86 (30 Oct 2021 08:24) (113/75 - 164/89)  BP(mean): --  RR: 18 (30 Oct 2021 08:24) (18 - 18)  SpO2: --
Vital Signs Last 24 Hrs  T(C): 36.2 (01 Nov 2021 06:05), Max: 36.7 (31 Oct 2021 15:32)  T(F): 97.2 (01 Nov 2021 06:05), Max: 98.1 (31 Oct 2021 15:32)  HR: 79 (01 Nov 2021 06:05) (79 - 94)  BP: 117/70 (01 Nov 2021 06:05) (117/70 - 124/83)  BP(mean): --  RR: 18 (01 Nov 2021 06:05) (16 - 18)  SpO2: --
Vital Signs Last 24 Hrs  T(C): 35.7 (02 Nov 2021 08:42), Max: 36.2 (02 Nov 2021 06:15)  T(F): 96.2 (02 Nov 2021 08:42), Max: 97.2 (02 Nov 2021 06:15)  HR: 100 (02 Nov 2021 08:42) (68 - 100)  BP: 110/80 (02 Nov 2021 08:42) (110/80 - 129/76)  BP(mean): --  RR: 18 (02 Nov 2021 08:42) (18 - 18)  SpO2: --
Vital Signs Last 24 Hrs  T(C): 36.8 (28 Oct 2021 08:10), Max: 36.8 (28 Oct 2021 08:10)  T(F): 98.3 (28 Oct 2021 08:10), Max: 98.3 (28 Oct 2021 08:10)  HR: 70 (28 Oct 2021 08:10) (70 - 101)  BP: 112/71 (28 Oct 2021 08:10) (82/51 - 115/88)  BP(mean): --  RR: 18 (28 Oct 2021 08:10) (16 - 18)  SpO2: 95% (27 Oct 2021 22:23) (95% - 99%)

## 2021-11-03 VITALS
TEMPERATURE: 96 F | RESPIRATION RATE: 18 BRPM | HEART RATE: 77 BPM | SYSTOLIC BLOOD PRESSURE: 130 MMHG | DIASTOLIC BLOOD PRESSURE: 98 MMHG

## 2021-11-03 PROCEDURE — 99238 HOSP IP/OBS DSCHRG MGMT 30/<: CPT

## 2021-11-03 RX ADMIN — ARIPIPRAZOLE 10 MILLIGRAM(S): 15 TABLET ORAL at 08:17

## 2021-11-03 NOTE — CHART NOTE - NSCHARTNOTEFT_GEN_A_CORE
D/C today. Transportation set up. Writer sent meds to [facility] pharmacy for delivery to [facility]. Per [facility] unable to fill Abilify. Patient recently picked up 30 day supply from [facility] [phone]. Writer placed call to [facility] and confirmed patient picked up meds 10/13 30 day supply. Patient stated that she does have Abilify 10mg at the boarding house. States she will go there and pick them up after discharge. Patient appeared to be in good spirits today. Endorses a better mood. Insight and judgment have improved. Denies suicidal/ homicidal ideations. Patient able to contract for safety. Compliant with medication. Does not warrant continued Inpatient hospitalization. Writer eviewed D/C papers with patient. Patient verbalized understanding. Patient does not appear to be of imminent danger to self or others at this time.    · Residence	DSS/Shelter...  · Housing Details	[facility] ID [id number]  [facility]   [address] [facility] [address]  [phone]  Gladys Su      Aftercare Appointments:  · Agency Name	[facility]  · Appointment Date/Time	08-Nov-2021 01:15  · Address	[address] [facility] [address]  · Phone #	[phone]  · Purpose	IN-PERSON INTAKE WITH LUIS MANUEL    · Agency Name	[facility]  · Appointment Date/Time	16-Nov-2021 10:00  · Address	[address] [facility] [address]  · Phone #	[phone]  · Purpose	IN-PERSON INTAKE WITH PSYCHIATRIST
Social Work Admit Note:    Patient is 50 years of age female who was admitted for evaluation after presenting to ED with complaints of suicidal thoughts with plan and vague homicidal ideations. Patient states she has been increasingly depressed and has been thinking abut walking into traffic or hurting someone. Patient states her life has become stressful since her eviction and mothers placement into a nursing home. Patient states she has been in and out of the hospital since getting evicted. Patient reports she is homeless and is living at the [facility] via [facility]. Patient denies AVH. Patient denies Substance abuse/misuse. Patient reports she has a few medical comorbidities that are stable and under a doctors care.  Patient is disabled receiving disability benefits. Patient indicates she was recently at [facility] and has not seen an outpatient provider since her discharge over a week ago.  Patient admitted voluntarily for treatment safety and observation.       Sexual History – patient identifies as heterosexual. 	  Family relationships and history – patient is single reports strained relations.     Leisure Activity Assessment – [facility]  Community Supports – no known attendance in any self- help groups or other organizations.   Employment – patient is disabled.   Substance Use Assessment – use of alcohol or other substances denied.     History of suicidality or self- injurious behaviors – denies.      Significant Loses – housing.    Life Goals – patient would like to find housing.       will continue to meet with patient 1:1 and with treatment team daily.      Discharge plan is for continued mental health treatment in outpatient setting.      Please refer to Social Work Psychosocial for additional information.
Social Work Discharge Note:    Patient is for discharge today.   She is alert and oriented x3.  Mood is improved.  Anxiety has decreased.  Insight and judgment have improved.  Suicidal / homicidal ideation denied.      Patient will be discharged to [facility] however Pt states she will go to [facility] instead. Pt [facility] [place]. Plan is for referral to [facility]. She is aware and agreeable to same. Information about [facility] provided as well. Discharge huddle was held prior to discharge to discuss discharge plan and referral for continued mental health treatment in outpatient setting.  No safety concerns were verbalized at that time.      Patient is aware and agreeable to discharge today.
Writer met with patient; Pt assessed writer provided support and education. Treatment plan and discharge plan discussed. Patient is compliant with treatment and unit protocol. Patient is taking medications as prescribed and is compliant with unit rules. Patient contracts for safety on the unit. Patient is oriented on all spheres, denies current suicidal and or homicidal ideations. Patient denies audio visual hallucinations and is in good behavioral control.  Activities of daily living are within normal limits. Patient reports she is feeling better with the increase in medications. Patient reports improved sleep and appetite. Patient states she is looking forward to moving to [place] with in the next few weeks. Patient reports feeling more hopeful about her situation. Patient is future oriented and goal directed. Patient inquires about discharge date, states she is feeling better and would like to go back to the shelter so she can plan her move. Patient states she did not follow up with outpatient since her last inpatient admission and will need a referral to a community based mental health program.  Writer contacted [facility] for CARES ID#. Pt is [facility] to [facility] under [place] CARES ID [id number] last date of shelter 10-27-21 patient can return when stable. Discharge planning ensues.    Mental Status Exam:    Mood – Low  Sleep - Good  Appetite - Good  ADLs - WNL  Thought Process – Linear    Observation – [id number]    No barriers to discharge identified at this time.

## 2021-11-08 ENCOUNTER — APPOINTMENT (OUTPATIENT)
Dept: PSYCHIATRY | Facility: CLINIC | Age: 61
End: 2021-11-08

## 2021-11-08 DIAGNOSIS — F25.9 SCHIZOAFFECTIVE DISORDER, UNSPECIFIED: ICD-10-CM

## 2021-11-08 DIAGNOSIS — Z88.0 ALLERGY STATUS TO PENICILLIN: ICD-10-CM

## 2021-11-08 DIAGNOSIS — E78.5 HYPERLIPIDEMIA, UNSPECIFIED: ICD-10-CM

## 2021-11-08 DIAGNOSIS — I35.0 NONRHEUMATIC AORTIC (VALVE) STENOSIS: ICD-10-CM

## 2021-11-08 DIAGNOSIS — R45.851 SUICIDAL IDEATIONS: ICD-10-CM

## 2021-11-08 DIAGNOSIS — Z82.49 FAMILY HISTORY OF ISCHEMIC HEART DISEASE AND OTHER DISEASES OF THE CIRCULATORY SYSTEM: ICD-10-CM

## 2021-11-08 DIAGNOSIS — E11.9 TYPE 2 DIABETES MELLITUS WITHOUT COMPLICATIONS: ICD-10-CM

## 2021-11-08 PROBLEM — Z00.00 ENCOUNTER FOR PREVENTIVE HEALTH EXAMINATION: Status: ACTIVE | Noted: 2021-11-08

## 2021-11-16 ENCOUNTER — APPOINTMENT (OUTPATIENT)
Dept: PSYCHIATRY | Facility: CLINIC | Age: 61
End: 2021-11-16

## 2021-12-04 ENCOUNTER — INPATIENT (INPATIENT)
Facility: HOSPITAL | Age: 61
LOS: 11 days | Discharge: HOME | DRG: 885 | End: 2021-12-16
Attending: PSYCHIATRY & NEUROLOGY | Admitting: PSYCHIATRY & NEUROLOGY
Payer: MEDICARE

## 2021-12-04 VITALS
RESPIRATION RATE: 16 BRPM | HEART RATE: 97 BPM | DIASTOLIC BLOOD PRESSURE: 78 MMHG | WEIGHT: 179.9 LBS | SYSTOLIC BLOOD PRESSURE: 138 MMHG | HEIGHT: 61 IN | TEMPERATURE: 98 F | OXYGEN SATURATION: 98 %

## 2021-12-04 DIAGNOSIS — F25.0 SCHIZOAFFECTIVE DISORDER, BIPOLAR TYPE: ICD-10-CM

## 2021-12-04 DIAGNOSIS — F43.21 ADJUSTMENT DISORDER WITH DEPRESSED MOOD: ICD-10-CM

## 2021-12-04 DIAGNOSIS — Z23 ENCOUNTER FOR IMMUNIZATION: ICD-10-CM

## 2021-12-04 LAB
ALBUMIN SERPL ELPH-MCNC: 3.2 G/DL — LOW (ref 3.3–5)
ALP SERPL-CCNC: 99 U/L — SIGNIFICANT CHANGE UP (ref 40–120)
ALT FLD-CCNC: 19 U/L — SIGNIFICANT CHANGE UP (ref 12–78)
ANION GAP SERPL CALC-SCNC: 4 MMOL/L — LOW (ref 5–17)
APAP SERPL-MCNC: 3 UG/ML — LOW (ref 10–30)
AST SERPL-CCNC: 16 U/L — SIGNIFICANT CHANGE UP (ref 15–37)
BASOPHILS # BLD AUTO: 0.03 K/UL — SIGNIFICANT CHANGE UP (ref 0–0.2)
BASOPHILS NFR BLD AUTO: 0.5 % — SIGNIFICANT CHANGE UP (ref 0–2)
BILIRUB SERPL-MCNC: 0.3 MG/DL — SIGNIFICANT CHANGE UP (ref 0.2–1.2)
BUN SERPL-MCNC: 12 MG/DL — SIGNIFICANT CHANGE UP (ref 7–23)
CALCIUM SERPL-MCNC: 9 MG/DL — SIGNIFICANT CHANGE UP (ref 8.5–10.1)
CHLORIDE SERPL-SCNC: 107 MMOL/L — SIGNIFICANT CHANGE UP (ref 96–108)
CO2 SERPL-SCNC: 32 MMOL/L — HIGH (ref 22–31)
CREAT SERPL-MCNC: 0.78 MG/DL — SIGNIFICANT CHANGE UP (ref 0.5–1.3)
EOSINOPHIL # BLD AUTO: 0.05 K/UL — SIGNIFICANT CHANGE UP (ref 0–0.5)
EOSINOPHIL NFR BLD AUTO: 0.8 % — SIGNIFICANT CHANGE UP (ref 0–6)
ETHANOL SERPL-MCNC: <10 MG/DL — SIGNIFICANT CHANGE UP (ref 0–10)
GLUCOSE SERPL-MCNC: 113 MG/DL — HIGH (ref 70–99)
HCT VFR BLD CALC: 34 % — LOW (ref 34.5–45)
HGB BLD-MCNC: 11.5 G/DL — SIGNIFICANT CHANGE UP (ref 11.5–15.5)
IMM GRANULOCYTES NFR BLD AUTO: 0.5 % — SIGNIFICANT CHANGE UP (ref 0–1.5)
LYMPHOCYTES # BLD AUTO: 1.72 K/UL — SIGNIFICANT CHANGE UP (ref 1–3.3)
LYMPHOCYTES # BLD AUTO: 27.6 % — SIGNIFICANT CHANGE UP (ref 13–44)
MCHC RBC-ENTMCNC: 29.7 PG — SIGNIFICANT CHANGE UP (ref 27–34)
MCHC RBC-ENTMCNC: 33.8 GM/DL — SIGNIFICANT CHANGE UP (ref 32–36)
MCV RBC AUTO: 87.9 FL — SIGNIFICANT CHANGE UP (ref 80–100)
MONOCYTES # BLD AUTO: 0.4 K/UL — SIGNIFICANT CHANGE UP (ref 0–0.9)
MONOCYTES NFR BLD AUTO: 6.4 % — SIGNIFICANT CHANGE UP (ref 2–14)
NEUTROPHILS # BLD AUTO: 4 K/UL — SIGNIFICANT CHANGE UP (ref 1.8–7.4)
NEUTROPHILS NFR BLD AUTO: 64.2 % — SIGNIFICANT CHANGE UP (ref 43–77)
PCP SPEC-MCNC: SIGNIFICANT CHANGE UP
PLATELET # BLD AUTO: 203 K/UL — SIGNIFICANT CHANGE UP (ref 150–400)
POTASSIUM SERPL-MCNC: 3.9 MMOL/L — SIGNIFICANT CHANGE UP (ref 3.5–5.3)
POTASSIUM SERPL-SCNC: 3.9 MMOL/L — SIGNIFICANT CHANGE UP (ref 3.5–5.3)
PROT SERPL-MCNC: 7.3 GM/DL — SIGNIFICANT CHANGE UP (ref 6–8.3)
RBC # BLD: 3.87 M/UL — SIGNIFICANT CHANGE UP (ref 3.8–5.2)
RBC # FLD: 13.5 % — SIGNIFICANT CHANGE UP (ref 10.3–14.5)
SALICYLATES SERPL-MCNC: <1.7 MG/DL — LOW (ref 2.8–20)
SARS-COV-2 RNA SPEC QL NAA+PROBE: SIGNIFICANT CHANGE UP
SODIUM SERPL-SCNC: 143 MMOL/L — SIGNIFICANT CHANGE UP (ref 135–145)
TROPONIN I, HIGH SENSITIVITY RESULT: 35.69 NG/L — SIGNIFICANT CHANGE UP
WBC # BLD: 6.23 K/UL — SIGNIFICANT CHANGE UP (ref 3.8–10.5)
WBC # FLD AUTO: 6.23 K/UL — SIGNIFICANT CHANGE UP (ref 3.8–10.5)

## 2021-12-04 PROCEDURE — 99285 EMERGENCY DEPT VISIT HI MDM: CPT

## 2021-12-04 RX ORDER — ACETAMINOPHEN 500 MG
650 TABLET ORAL EVERY 6 HOURS
Refills: 0 | Status: DISCONTINUED | OUTPATIENT
Start: 2021-12-04 | End: 2021-12-15

## 2021-12-04 RX ORDER — ARIPIPRAZOLE 15 MG/1
10 TABLET ORAL DAILY
Refills: 0 | Status: DISCONTINUED | OUTPATIENT
Start: 2021-12-05 | End: 2021-12-06

## 2021-12-04 RX ORDER — ARIPIPRAZOLE 15 MG/1
10 TABLET ORAL ONCE
Refills: 0 | Status: COMPLETED | OUTPATIENT
Start: 2021-12-04 | End: 2021-12-04

## 2021-12-04 RX ADMIN — Medication 650 MILLIGRAM(S): at 19:56

## 2021-12-04 RX ADMIN — ARIPIPRAZOLE 10 MILLIGRAM(S): 15 TABLET ORAL at 17:11

## 2021-12-04 RX ADMIN — Medication 650 MILLIGRAM(S): at 15:27

## 2021-12-04 NOTE — ED BEHAVIORAL HEALTH ASSESSMENT NOTE - SUMMARY
62 y/o single, undomiciled,  female who with PPHx of Depression, Schizoaffective disorder,  presents to the ED c/o command auditory hallucinations that started this morning and "on & off" depression x1 year since she has been homeless, worsening this morning with the AH. Pt states command AHs are telling her to kill herself and "other random people in the street". States her plan would be to jump into traffic. States she took the last of her daily Abilify 10mg yesterday and the voices started this morning. States she has ran out of her medication before but never had the symptoms come back so fast and not with command AH. States she has had a psychiatric admission in the past but not recently and wants to be admitted again. States she just returned from [facility] after midnight after promise of housing fell through, ate breakfast and the command hallucinations started. Denies difficulty sleeping. Took Prozac in the past up to 2 years ago when depression went into remission. On disability due to mental health history. Has BA in Communications from [employer] but last worked 1992. Denies h/o drugs, alcohol or cigarette use. Allergic to Penicillin but does not known reaction. States mother told her she could die from it. Medical h/o HTN (not on meds), heart murmur, herniated disc to neck and leukemia in remission more than 10 years now.  Family h/o mental illness in sister with Bipolar d/o, denies family h/o suicide. Patient reports not being in contact with her family.    MSE: 62 y/o single, undomiciled,  female who looks stated age. Dressed in hospital gown lying on bed in ED hallway with CO 1:1 at bedside. Cooperative with fair eye contact. Alert and oriented x4. Mood "a little depressed". Affect neutral, constricted and incongruent to mood. Memory intact, remote and recent. Speech of average rate and volume. Thoughts organized and goal directed. Admits to S/H ideations due to command AH. Denies VH. Reports paranoia is one of her symptoms and she believes that is why the voices are telling her to hurt others before they hurt her. Insight and judgment fair.    Axis I - Schizoaffective disorder, bipolar type (F25.0)   II - Deferred  III - h/o HTN (not on meds), heart murmur, herniated disc to neck and leukemia in remission  IV - Homelessness  V - 30    Case discussed with Dr. Guillermo. Patient is requesting inpatient admission as she does not feel safe due to the command hallucinations. Patient is aware that there are no beds available anywhere at this time but still wants admission. She will be on HOLD in the ED to be admitted on a voluntary status when a bed is located.  Restart Abilify 10mg po daily with 1st dose today  C/L will reassess in AM. Hand -off given to Dr. Barton with telepsych.

## 2021-12-04 NOTE — ED PROVIDER NOTE - NS ED ROS FT
Constitutional: No fever or chills  Eyes: No visual changes  HEENT: No throat pain  CV: +chest pain  Resp: No SOB no cough  GI: No abd pain or vomiting. +Nausea  : No dysuria  MSK: No musculoskeletal pain  Skin: No rash  Neuro: +Headache  Psych: +Auditory hallucinations. +SI

## 2021-12-04 NOTE — ED BEHAVIORAL HEALTH NOTE - BEHAVIORAL HEALTH NOTE
Psych Elena called all surrounding  facilities for bed availability; no beds at this time. Psych Elena to continue to follow up for opening.  1. [facility] – No beds as per Regine  2. [facility] – No adult beds as per [facility]  3. [facility]- No beds  4. [facility]- No beds as per Alice  5. [facility] – No beds- They have a caseload in their ED can not take transfers at this time.   6. [facility] – No beds   7. [facility]- No beds as per Radha  8. [facility] closed on the weekend, can not get through to this unit  9. [facility]- No beds as per Isa  10. [facility]- No beds

## 2021-12-04 NOTE — ED BEHAVIORAL HEALTH ASSESSMENT NOTE - OTHER PAST PSYCHIATRIC HISTORY (INCLUDE DETAILS REGARDING ONSET, COURSE OF ILLNESS, INPATIENT/OUTPATIENT TREATMENT)
H/o Schizoaffective d/o on Abilify. Prior psychiatric admissions but denies recent psychiatric admission stating she had been stable until she became homeless x1 year ago.

## 2021-12-04 NOTE — ED BEHAVIORAL HEALTH ASSESSMENT NOTE - PAST PSYCHOTROPIC MEDICATION
Haldol and Zyprexa gave her "terrible Akathisia"  Prozac up to x2 years ago when depression has been in remission

## 2021-12-04 NOTE — ED PROVIDER NOTE - OBJECTIVE STATEMENT
60 y/o female with PMHx of Depression, Schizoaffective disorder, HTN, heart murmur, leukemia presents to the ED c/o auditory hallucinations. Pt states she is having auditory hallucinations which are telling her to kill herself. States her plan would be to jump into traffic. Also notes having some intermittent chest pain, last had chest pain about an hour. Also notes a headache and some nausea over the past couple of days. States she ran out of her Abilify yesterday. States she has had a psychiatric admission in the past and feels like she needs to be admitted again. Non-smoker. Non-drinker. Allergic to Penicillin.

## 2021-12-04 NOTE — ED BEHAVIORAL HEALTH ASSESSMENT NOTE - DETAILS
Patient self referred Patient denies SI in the past month though she has had SI in the past and never acted on them Allergy to PCN from a child 4 years old- not sure of reaction but states mother told her she could die if she gets it again. Patient reports command to kill herself and others "random people on the street" Bed not yet located Sister with h/o Bipolar d/o, was stable on treatment but patient is not in contact with her anymore. Patient c/o current HI due command AH c/o pain to left hand from injury sustained from fall 2 months ago - pain level 5/10 Haldol and Zyprexa gave her "terrible Akathisia"

## 2021-12-04 NOTE — ED BEHAVIORAL HEALTH ASSESSMENT NOTE - HPI (INCLUDE ILLNESS QUALITY, SEVERITY, DURATION, TIMING, CONTEXT, MODIFYING FACTORS, ASSOCIATED SIGNS AND SYMPTOMS)
60 y/o single, undomiciled,  female who with PPHx of Depression, Schizoaffective disorder,  presents to the ED c/o command auditory hallucinations that started this morning and "on & off" depression x1 year since she has been homeless, worsening this morning with the AH. Pt states command AHs are telling her to kill herself and "other random people in the street". States her plan would be to jump into traffic. States she took the last of her daily Abilify 10mg yesterday and the voices started this morning. States she has ran out of her medication before but never had the symptoms come back so fast and not with command AH. States she has had a psychiatric admission in the past but not recently and wants to be admitted again. States she just returned from [facility] after midnight after promise of housing fell through, ate breakfast and the command hallucinations started. Denies difficulty sleeping. Took Prozac in the past up to 2 years ago when depression went into remission. Mood "a little depressed". Affect neutral, constricted and incongruent to mood. Memory intact, remote and recent. Speech of average rate and volume. Thoughts organized and goal directed. Reports paranoia is one of her symptoms and she believes that is why the voices are telling her to hurt others before they hurt her. On disability due to mental health history. Has BA in Communications from [employer] but last worked 1992. Denies h/o drugs, alcohol or cigarette use. Allergic to Penicillin but does not known reaction. States mother told her she could die from it. Medical h/o HTN (not on meds), heart murmur, herniated disc to neck and leukemia in remission more than 10 years now.  Family h/o mental illness in sister with Bipolar d/o, denies family h/o suicide. Patient reports not being in contact with her family.

## 2021-12-04 NOTE — ED PROVIDER NOTE - PHYSICAL EXAMINATION
Constitutional: NAD AAOx3  Eyes: PERRLA EOMI  Head: Normocephalic atraumatic  Mouth: MMM  Cardiac: regular rate   Resp: Lungs CTAB  GI: Abd s/nt/nd  Neuro: CN2-12 intact  Skin: No rashes Constitutional: NAD AAOx3  Eyes: PERRLA EOMI  Head: Normocephalic atraumatic  Mouth: MMM  Cardiac: regular rate normal peripheral pulses no LE swelling  Resp: Lungs CTAB  GI: Abd s/nt/nd  Neuro: CN2-12 intact  Skin: No rashes  psych: SI

## 2021-12-04 NOTE — ED ADULT NURSE NOTE - NS ED NURSE RECORD ANOTHER VITAL SIGN
Called and informed patient the antibiotic for his upcoming prostate biopsy was sent to his pharmacy. Patient may pick the antibiotic up but do not start Levaquin until the day before his biopsy, take one the day of his biopsy and one the day after his biopsy. Patient understood instructions.//da  
Yes

## 2021-12-04 NOTE — ED BEHAVIORAL HEALTH ASSESSMENT NOTE - PSYCHIATRIC ISSUES AND PLAN (INCLUDE STANDING AND PRN MEDICATION)
Abilify 10mg po daily, Prolixin 5 mg po/IM prn for agitation, Ativan 2mgs po/IM prn Q6h for agitation

## 2021-12-04 NOTE — ED ADULT NURSE NOTE - OBJECTIVE STATEMENT
pt is 60 yo female presents to ED for auditory hallucinations, hearing voices to hurt herself, started this morning, pt states the last auditory hallucinations was several months ago, admitted in [facility].  1:1 initiated upon arrival to ED.  pt calm and cooperative, aaox4, denies any visual hallucination.  +SI/HI, "I have thoughts about hurting myself and others, punching people."

## 2021-12-04 NOTE — ED BEHAVIORAL HEALTH ASSESSMENT NOTE - DESCRIPTION
Medical assessment 61 year-old, single, homeless  female on disability due to mental health history. Has BA in Communications from [employer] but last worked 1992. Denies h/o drugs, alcohol or cigarette use H/o heart murmur, HTN, herniated disc in neck and Leukemia more than 10 years ago.

## 2021-12-04 NOTE — ED ADULT NURSE NOTE - NSIMPLEMENTINTERV_GEN_ALL_ED
Implemented All Fall Risk Interventions:  [place] to call system. Call bell, personal items and telephone within reach. Instruct patient to call for assistance. Room bathroom lighting operational. Non-slip footwear when patient is off stretcher. Physically safe environment: no spills, clutter or unnecessary equipment. Stretcher in lowest position, wheels locked, appropriate side rails in place. Provide visual cue, wrist band, yellow gown, etc. Monitor gait and stability. Monitor for mental status changes and reorient to person, place, and time. Review medications for side effects contributing to fall risk. Reinforce activity limits and safety measures with patient and family.

## 2021-12-04 NOTE — ED PROVIDER NOTE - NS_ ATTENDINGSCRIBEDETAILS _ED_A_ED_FT
I, Arya White MD,  performed the initial face to face bedside interview with this patient regarding history of present illness, review of symptoms and relevant past medical, social and family history.  I completed an independent physical examination.  I was the initial provider who evaluated this patient.  The history, relevant review of systems, past medical and surgical history, medical decision making, and physical examination was documented by the scribe in my presence and I attest to the accuracy of the documentation.

## 2021-12-04 NOTE — ED ADULT NURSE REASSESSMENT NOTE - NS ED NURSE REASSESS COMMENT FT1
Assumed care of patient. Pt resting comfortably in bed. Respirations even and unlabored. Skin warm and dry. Pt denies further needs at this time. Pt on enhanced supervision. Pt cooperative at this time. Will continue to monitor.

## 2021-12-04 NOTE — ED BEHAVIORAL HEALTH ASSESSMENT NOTE - RISK ASSESSMENT
Moderate Acute Risk - C/o command AH to "kill" self and others, homelessness, off meds only 1 day.  Modifiable - agreeable to restarting meds and initiated emergency care independently Moderate Acute Suicide Risk

## 2021-12-04 NOTE — ED BEHAVIORAL HEALTH ASSESSMENT NOTE - NS ED BHA PLAN ADMIT TO PSYCHIATRY REFERRANT CONTACTED YN
Assessment:     1. Tinea corporis     2. Hyperpigmentation         No Follow-up on file.    Plan:     Hyperpigmentation  Noted to be [place] 5 and questionable hyperpigmentation secondary to tinea.  Topical as ordered triamcinolone.    Tinea corporis  This is persistent since November of last year.  Most notable on the left anterior thigh.  Ketoconazole as ordered.  Follow-up in 1 month if no improvement noted.         Subjective:       25 y.o. female presents for evaluation of skin rash.  She notes beginning in November of last year circular macular rash in the left anterior thigh.  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  Is also present in bilateral hips and on the left anterior/shoulder region.  Denies itching.  Mild improvement.  In the areas that have improved she has noticed some hyperpigmentation.  Has been using triamcinolone only.  Currently working as a  and plans to go to graduate school in 2019.    The following portions of the patient's history were reviewed and updated as appropriate: allergies, current medications, past family history, past medical history, past social history, past surgical history and problem list.    Review of Systems  Pertinent items are noted in HPI.  A 12 point comprehensive review of systems was negative except as noted.     History   Smoking Status     Never Smoker   Smokeless Tobacco     Never Used         Objective:      /64  Pulse 66  Wt 110 lb (49.9 kg)  BMI 17.62 kg/m2  General appearance: alert, appears stated age and cooperative  Head: Normocephalic, without obvious abnormality, atraumatic  Lungs: clear to auscultation bilaterally  Heart: regular rate and rhythm, S1, S2 normal, no murmur, click, rub or gallop  Skin: hyperpigmentation - shoulder(s) right, arm(s) bilateral, back, lower leg(s) left noted particularly on the left anterior thigh is a circular/oval peeling plaque/mildly raised border.  Neurologic: Grossly  normal       This note has been dictated using voice recognition software. Any grammatical or context distortions are unintentional and inherent to the software    I agree with the documentation as performed by Robina Dennis.  I obtained a full history and performed a physical examination myself.  The treatment plan was developed by myself and the student.  The treatment plan was conveyed to the patient by myself.  All of the patient's questions were answered during the course of this visit.     Jalen Smith MD       N/A

## 2021-12-04 NOTE — ED ADULT TRIAGE NOTE - BP NONINVASIVE DIASTOLIC (MM HG)
"Anesthesia Transfer of Care Note    Patient: Sanchez Zarate    Procedure(s) Performed: Procedure(s) (LRB):  CYSTOSCOPY, WITH URETERAL STENT INSERTION (Left)  PYELOGRAM, RETROGRADE (Left)    Patient location: [facility]    Anesthesia Type: general    Transport from OR: Transported from OR on 6-10 L/min O2 by face mask with adequate spontaneous ventilation    Post pain: adequate analgesia    Post assessment: no apparent anesthetic complications and tolerated procedure well    Post vital signs: stable    Level of consciousness: responds to stimulation and sedated    Nausea/Vomiting: no nausea/vomiting    Complications: none    Transfer of care protocol was followed      Last vitals:   Visit Vitals  BP (!) 149/82 (BP Location: Right arm, Patient Position: Lying)   Pulse 66   Temp 37.1 °C (98.7 °F) (Oral)   Resp 16   Ht 5' 10" (1.778 m)   Wt 95.3 kg (210 lb)   SpO2 98%   BMI 30.13 kg/m²     " 78

## 2021-12-04 NOTE — ED ADULT TRIAGE NOTE - CHIEF COMPLAINT QUOTE
Patient comes in with auditory hallucinations. Patient states she is hearing voices that tell her to kill herself. Patient also endorses + HI. Hx Schizoaffective disorder.

## 2021-12-04 NOTE — ED PROVIDER NOTE - CLINICAL SUMMARY MEDICAL DECISION MAKING FREE TEXT BOX
60 y/o female with PMHx of schizoaffective diarrhea presents to the ED with auditory hallucinations' and SI. Will obtain psych clearance labs, psych consult and reassess. 62 y/o female with PMHx of schizoaffective diarrhea presents to the ED with auditory hallucinations' and SI. Will obtain psych clearance labs, psych consult and reassess. pt with chest discomfort for 2 days constant non longer has cp now. will send trop and ekg.

## 2021-12-05 RX ORDER — CHLORPROMAZINE HCL 10 MG
50 TABLET ORAL ONCE
Refills: 0 | Status: DISCONTINUED | OUTPATIENT
Start: 2021-12-05 | End: 2021-12-15

## 2021-12-05 RX ADMIN — ARIPIPRAZOLE 10 MILLIGRAM(S): 15 TABLET ORAL at 09:57

## 2021-12-05 RX ADMIN — Medication 1 MILLIGRAM(S): at 17:00

## 2021-12-05 NOTE — ED ADULT NURSE REASSESSMENT NOTE - NS ED NURSE REASSESS COMMENT FT1
vs WNL pt not on a 1:1, pt endorses +SI and +HI, states she is hearing voices telling her to "kill herself and others", when asked how would she plan on killing herself she states "I don't know." she states she would kill someone by "punching them in the face." MD notifed of +SI and HI, pt will be placed on a 1:1

## 2021-12-05 NOTE — ED ADULT NURSE REASSESSMENT NOTE - NS ED NURSE REASSESS COMMENT FT1
pt refused thorazine medication, "I had bad reaction to thorazine and I have every right to refuse the medication."  md hawkins made aware.  pt agreeing to take the ativan, md hawkins made aware.  will con't to monitor

## 2021-12-05 NOTE — ED ADULT NURSE REASSESSMENT NOTE - NS ED NURSE REASSESS COMMENT FT1
Patient resting comfortably in bed. Respirations even and unlabored. Skin warm and dry. No distress noted. Enhanced supervision in place. Will continue to monitor.

## 2021-12-05 NOTE — ED BEHAVIORAL HEALTH NOTE - BEHAVIORAL HEALTH NOTE
As per previous Assessment: · HPI: 60 y/o single, un-domiciled,  female who with PPHx of Depression, Schizoaffective disorder,  presents to the ED c/o command auditory hallucinations that started this morning and "on & off" depression x1 year since she has been homeless, worsening this morning with the AH. Pt states command AHs are telling her to kill herself and "other random people in the street". States her plan would be to jump into traffic. States she took the last of her daily Abilify 10mg yesterday and the voices started this morning. States she has ran out of her medication before but never had the symptoms come back so fast and not with command AH. States she has had a psychiatric admission in the past but not recently and wants to be admitted again. States she just returned from [facility] after midnight after promise of housing fell through, ate breakfast and the command hallucinations started. Denies difficulty sleeping. Took Prozac in the past up to 2 years ago when depression went into remission. Mood "a little depressed". Affect neutral, constricted and incongruent to mood. Memory intact, remote and recent. Speech of average rate and volume. Thoughts organized and goal directed. Reports paranoia is one of her symptoms and she believes that is why the voices are telling her to hurt others before they hurt her. On disability due to mental health history. Has BA in Communications from [employer] but last worked 1992. Denies h/o drugs, alcohol or cigarette use. Allergic to Penicillin but does not known reaction. States mother told her she could die from it. Medical h/o HTN (not on meds), heart murmur, herniated disc to neck and leukemia in remission more than 10 years now.  Family h/o mental illness in sister with Bipolar d/o, denies family h/o suicide. Patient reports not being in contact with her family.    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 She keeps on repeating the comment and was seen relatively speaking loudly, she took a dose of Abilify 10 mg for her mood. She in not aware where she is now , but seems to have a cheerful mood by herself.    MSE: Patient looks older than stated age, dressed in hospital gown, poor eye contact, poorly related, mood seems OK with constricted affect. Thoughts are disorganized, repetitive, and unaware of her current issues. Speech seems "Word SALAD". Fair attention/concentration. Memory-Fair    Diagnosis: SAD-Bipolar type    Labs:                       11.5   6.23  )-----------( 203      ( 04 Dec 2021 12:16 )             34.0   12-04    143  |  107  |  12  ----------------------------<  113<H>  3.9   |  32<H>  |  0.78    Ca    9.0      04 Dec 2021 12:16    TPro  7.3  /  Alb  3.2<L>  /  TBili  0.3  /  DBili  x   /  AST  16  /  ALT  19  /  AlkPhos  99  12-04    Assessment: Patient looks older than stated age, dressed in hospital gown with poor eye contact, poorly related, endorses that she hears voices, voices telling het to kill self and others, when asked why she wants to kill she endorses " They dislike me, 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 She keeps on repeating the comment and was seen relatively speaking loudly, she took a dose of Abilify 10 mg for her mood. She in not aware where she is now , but seems to have a cheerful mood by herself.    Plan: Patient needs in-patient admission for stability/safety         To continue with Abilify 10 mg daily As per previous Assessment: · HPI: 62 y/o single, un-domiciled,  female who with PPHx of Depression, Schizoaffective disorder,  presents to the ED c/o command auditory hallucinations that started this morning and "on & off" depression x1 year since she has been homeless, worsening this morning with the AH. Pt states command AHs are telling her to kill herself and "other random people in the street". States her plan would be to jump into traffic. States she took the last of her daily Abilify 10mg yesterday and the voices started this morning. States she has ran out of her medication before but never had the symptoms come back so fast and not with command AH. States she has had a psychiatric admission in the past but not recently and wants to be admitted again. States she just returned from [facility] after midnight after promise of housing fell through, ate breakfast and the command hallucinations started. Denies difficulty sleeping. Took Prozac in the past up to 2 years ago when depression went into remission. Mood "a little depressed". Affect neutral, constricted and incongruent to mood. Memory intact, remote and recent. Speech of average rate and volume. Thoughts organized and goal directed. Reports paranoia is one of her symptoms and she believes that is why the voices are telling her to hurt others before they hurt her. On disability due to mental health history. Has BA in Communications from [employer] but last worked 1992. Denies h/o drugs, alcohol or cigarette use. Allergic to Penicillin but does not known reaction. States mother told her she could die from it. Medical h/o HTN (not on meds), heart murmur, herniated disc to neck and leukemia in remission more than 10 years now.  Family h/o mental illness in sister with Bipolar d/o, denies family h/o suicide. Patient reports not being in contact with her family.    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 She keeps on repeating the comment and was seen relatively speaking loudly, she took a dose of Abilify 10 mg for her mood. She in not aware where she is now , but seems to have a cheerful mood by herself.    MSE: Patient looks older than stated age, dressed in hospital gown, poor eye contact, poorly related, mood seems OK with constricted affect. Thoughts are disorganized, repetitive, and unaware of her current issues. Speech seems "Word SALAD". Fair attention/concentration. Memory-Fair    Diagnosis: SAD-Bipolar type    Labs:                       11.5   6.23  )-----------( 203      ( 04 Dec 2021 12:16 )             34.0   12-04    143  |  107  |  12  ----------------------------<  113<H>  3.9   |  32<H>  |  0.78    Ca    9.0      04 Dec 2021 12:16    TPro  7.3  /  Alb  3.2<L>  /  TBili  0.3  /  DBili  x   /  AST  16  /  ALT  19  /  AlkPhos  99  12-04    Assessment: Patient looks older than stated age, dressed in hospital gown with poor eye contact, poorly related, endorses that she hears voices, voices telling het to kill self and others, when asked why she wants to kill she endorses " They dislike me, 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 She keeps on repeating the comment and was seen relatively speaking loudly, she took a dose of Abilify 10 mg for her mood. She in not aware where she is now , but seems to have a cheerful mood by herself.    Plan: Patient needs in-patient admission for stability/safety         To continue with Abilify 10 mg daily         To add Thorazine 50 mg Q-6 PRN PO agitation

## 2021-12-05 NOTE — ED ADULT NURSE REASSESSMENT NOTE - NS ED NURSE REASSESS COMMENT FT1
brief episodes of agitation noted, md hawkins made aware, received an order for thorazine.  will con't to monitor

## 2021-12-06 DIAGNOSIS — F25.9 SCHIZOAFFECTIVE DISORDER, UNSPECIFIED: ICD-10-CM

## 2021-12-06 PROCEDURE — 80061 LIPID PANEL: CPT

## 2021-12-06 PROCEDURE — 86769 SARS-COV-2 COVID-19 ANTIBODY: CPT

## 2021-12-06 PROCEDURE — 83036 HEMOGLOBIN GLYCOSYLATED A1C: CPT

## 2021-12-06 PROCEDURE — U0003: CPT

## 2021-12-06 PROCEDURE — 84443 ASSAY THYROID STIM HORMONE: CPT

## 2021-12-06 PROCEDURE — U0005: CPT

## 2021-12-06 PROCEDURE — 36415 COLL VENOUS BLD VENIPUNCTURE: CPT

## 2021-12-06 PROCEDURE — 93005 ELECTROCARDIOGRAM TRACING: CPT

## 2021-12-06 RX ORDER — ARIPIPRAZOLE 15 MG/1
15 TABLET ORAL DAILY
Refills: 0 | Status: DISCONTINUED | OUTPATIENT
Start: 2021-12-06 | End: 2021-12-16

## 2021-12-06 RX ADMIN — ARIPIPRAZOLE 10 MILLIGRAM(S): 15 TABLET ORAL at 13:00

## 2021-12-06 NOTE — ED ADULT NURSE REASSESSMENT NOTE - NS ED NURSE REASSESS COMMENT FT1
Assumed care of pt at this time from Bud rn, pt's safety maintained, 1:1 at bedside, pt observed calm, will continue to monitor.

## 2021-12-06 NOTE — ED ADULT NURSE REASSESSMENT NOTE - NS ED NURSE REASSESS COMMENT FT1
pt awake, VS WNL, BP meds given, turned and positioned the pt in the bed, pt not incontinent at this time, Protonix drip is still going 1:1 at bedside pt is asleep at this time

## 2021-12-06 NOTE — ED ADULT NURSE REASSESSMENT NOTE - NS ED NURSE REASSESS COMMENT FT1
Pt calm/cooperative at this time, report given to inpt psyche, security called for transport and belongings gathered.

## 2021-12-06 NOTE — ED ADULT NURSE REASSESSMENT NOTE - NS ED NURSE REASSESS COMMENT FT1
Pt observed calm/cooperative, states she is doing fine although hearing voices, pt is eating/drinking, Abilify administered at this time.

## 2021-12-06 NOTE — PATIENT PROFILE BEHAVIORAL HEALTH - FALL HARM RISK - RISK INTERVENTIONS

## 2021-12-06 NOTE — ED BEHAVIORAL HEALTH NOTE - BEHAVIORAL HEALTH NOTE
Pt was assessed over the weekend and plan was to admit her to inpatient unit once bed is available.   Today pt continue  to endorse CAH telling her to kill  herself and others. PT can not work on Safety Plan.     Plan is to admit to 5N  , Status  9.39 . Pt was assessed over the weekend and plan was to admit her to inpatient unit once bed is available.   Today pt continue  to endorse CAH telling her to kill  herself and others. PT can not work on Safety Plan.   PT states she is homeless and she take Abilify 10mg a few weeks ago d/yamila form [facility].   Then she went to [place] and returned last week. She has Ab and she si COVID negative.   Med problems> herniated cervical disc, Hair cell Leukemia,  denies taking any meds.     Plan is to admit to 5N  , Status  9.39 .  Will restart Abilify but defer decision about possible  changing antipsychotic  to primary inpatient team.

## 2021-12-07 LAB
CHOLEST SERPL-MCNC: 208 MG/DL — HIGH
HDLC SERPL-MCNC: 53 MG/DL — SIGNIFICANT CHANGE UP
LIPID PNL WITH DIRECT LDL SERPL: 119 MG/DL — HIGH
NON HDL CHOLESTEROL: 156 MG/DL — HIGH
TRIGL SERPL-MCNC: 182 MG/DL — HIGH

## 2021-12-07 PROCEDURE — 99223 1ST HOSP IP/OBS HIGH 75: CPT

## 2021-12-07 PROCEDURE — 93010 ELECTROCARDIOGRAM REPORT: CPT

## 2021-12-07 RX ADMIN — ARIPIPRAZOLE 15 MILLIGRAM(S): 15 TABLET ORAL at 09:41

## 2021-12-07 NOTE — PROGRESS NOTE BEHAVIORAL HEALTH - NSBHADDHXMEDFT_PSY_A_CORE
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

## 2021-12-07 NOTE — PROGRESS NOTE BEHAVIORAL HEALTH - RISK ASSESSMENT
moderate risk: aud hallucination of voices   mitigating : pt with denial of any intent or plan   protective help seeking behavior   chronic prior psych hosp

## 2021-12-07 NOTE — PROGRESS NOTE BEHAVIORAL HEALTH - NSBHFUPIPCHARTREVFT_PSY_A_CORE
61 yr old female with schizoaffective disorder who is self referred with cc of auditory hallucination of voices commanding her to kill herself. Pt is homeless and just arrived from [facility] sister with bipolar Stable

## 2021-12-07 NOTE — BH SAFETY PLAN - STEP 6 SAFE ENVIRONMENT
To get back on my medication and find a place to live. To get back on my medication and find a place to live. I am going to [place] [facility] [place].

## 2021-12-07 NOTE — PROGRESS NOTE BEHAVIORAL HEALTH - DETAILS
Allergy to PCN from a child 4 years old- not sure of reaction but states mother told her she could die if she gets it again. c/o pain to left hand from injury sustained from fall 2 months ago - pain level 5/10 Patient reports command to kill herself and others "random people on the street"

## 2021-12-08 PROCEDURE — 99232 SBSQ HOSP IP/OBS MODERATE 35: CPT

## 2021-12-08 RX ADMIN — ARIPIPRAZOLE 15 MILLIGRAM(S): 15 TABLET ORAL at 09:43

## 2021-12-08 NOTE — PROGRESS NOTE BEHAVIORAL HEALTH - RISK ASSESSMENT
lowrisk: aud hallucination of voices , able to sleep.   mitigating : pt with denial of any intent or plan   protective help seeking behavior   chronic prior psych hosp

## 2021-12-09 PROCEDURE — 99232 SBSQ HOSP IP/OBS MODERATE 35: CPT

## 2021-12-09 RX ADMIN — ARIPIPRAZOLE 15 MILLIGRAM(S): 15 TABLET ORAL at 09:12

## 2021-12-09 NOTE — PROGRESS NOTE BEHAVIORAL HEALTH - DETAILS
Patient reports command to kill herself and others "random people on the street" Allergy to PCN from a child 4 years old- not sure of reaction but states mother told her she could die if she gets it again. c/o pain to left hand from injury sustained from fall 2 months ago - pain level 5/10

## 2021-12-10 PROCEDURE — 99232 SBSQ HOSP IP/OBS MODERATE 35: CPT

## 2021-12-10 RX ADMIN — ARIPIPRAZOLE 15 MILLIGRAM(S): 15 TABLET ORAL at 09:20

## 2021-12-10 NOTE — PROGRESS NOTE BEHAVIORAL HEALTH - RISK ASSESSMENT
lowrisk: aud hallucination of voices , able to sleep.   mitigating : pt with denial of any intent or plan   protective help seeking behavior   chronic prior psych hosp no chagelowrisk: aud hallucination of voices , able to sleep.   mitigating : pt with denial of any intent or plan   protective help seeking behavior   chronic prior psych hosp no change low risk: aud hallucination of voices , able to sleep.   mitigating : pt with denial of any intent or plan   protective help seeking behavior   chronic prior psych hosp

## 2021-12-11 PROCEDURE — 99231 SBSQ HOSP IP/OBS SF/LOW 25: CPT

## 2021-12-11 RX ADMIN — ARIPIPRAZOLE 15 MILLIGRAM(S): 15 TABLET ORAL at 09:12

## 2021-12-11 NOTE — PROGRESS NOTE BEHAVIORAL HEALTH - RISK ASSESSMENT
no change low risk: aud hallucination of voices , able to sleep.   mitigating : pt with denial of any intent or plan   protective help seeking behavior   chronic prior psych hosp

## 2021-12-11 NOTE — PROGRESS NOTE BEHAVIORAL HEALTH - DETAILS
c/o pain to left hand from injury sustained from fall 2 months ago - pain level 5/10 Allergy to PCN from a child 4 years old- not sure of reaction but states mother told her she could die if she gets it again.

## 2021-12-12 PROCEDURE — 99231 SBSQ HOSP IP/OBS SF/LOW 25: CPT

## 2021-12-12 RX ADMIN — ARIPIPRAZOLE 15 MILLIGRAM(S): 15 TABLET ORAL at 09:17

## 2021-12-12 NOTE — PROGRESS NOTE BEHAVIORAL HEALTH - DETAILS
Allergy to PCN from a child 4 years old- not sure of reaction but states mother told her she could die if she gets it again. c/o pain to left hand from injury sustained from fall 2 months ago - pain level 5/10

## 2021-12-13 PROCEDURE — 99232 SBSQ HOSP IP/OBS MODERATE 35: CPT

## 2021-12-13 RX ADMIN — ARIPIPRAZOLE 15 MILLIGRAM(S): 15 TABLET ORAL at 09:33

## 2021-12-13 NOTE — PROGRESS NOTE BEHAVIORAL HEALTH - RISK ASSESSMENT
no change low risk: and hallucination of voices , able to sleep.   mitigating : pt with denial of any intent or plan   protective help seeking behavior   chronic prior psych hosp

## 2021-12-14 PROCEDURE — 99232 SBSQ HOSP IP/OBS MODERATE 35: CPT

## 2021-12-14 RX ORDER — TUBERCULIN PURIFIED PROTEIN DERIVATIVE 5 [IU]/.1ML
5 INJECTION, SOLUTION INTRADERMAL ONCE
Refills: 0 | Status: COMPLETED | OUTPATIENT
Start: 2021-12-14 | End: 2021-12-14

## 2021-12-14 RX ADMIN — TUBERCULIN PURIFIED PROTEIN DERIVATIVE 5 UNIT(S): 5 INJECTION, SOLUTION INTRADERMAL at 13:22

## 2021-12-14 RX ADMIN — ARIPIPRAZOLE 15 MILLIGRAM(S): 15 TABLET ORAL at 09:32

## 2021-12-14 NOTE — DISCHARGE NOTE BEHAVIORAL HEALTH - CONDITIONS AT DISCHARGE
pt returning to shelter Patient alert, oriented x3, pleasant and cooperative. Therapist and nurse reviewed safety plan with patient who denies any thoughts to self harm or others. SW and nurse reviewed discharge plan with patient who is in agreement with plan. Patient received a copy of her discharge instructions. All belongings returned to patient.

## 2021-12-14 NOTE — DISCHARGE NOTE BEHAVIORAL HEALTH - HPI (INCLUDE ILLNESS QUALITY, SEVERITY, DURATION, TIMING, CONTEXT, MODIFYING FACTORS, ASSOCIATED SIGNS AND SYMPTOMS)
61yo reportedly homeless, domiciled in a shelter, disabled receiving entitlements of SSD, single white F w/ hx of schizoaffective d/o, no reported past SA/SIB, denies any Substance use, abue, denies current or pending legal issues, 60 y/o single, undomiciled,  female who with PPHx of Depression, Schizoaffective disorder,  presents to the ED c/o command auditory hallucinations that started this morning and "on & off" depression x1 year since she has been homeless, worsening this morning with the AH. Pt states command AHs are telling her to kill herself and "other random people in the street". States her plan would be to jump into traffic. States she took the last of her daily Abilify 10mg yesterday and the voices started this morning. States she has ran out of her medication before but never had the symptoms come back so fast and not with command AH. States she has had a psychiatric admission in the past but not recently and wants to be admitted again. States she just returned from [facility] after midnight after promise of housing fell through, ate breakfast and the command hallucinations started. Denies difficulty sleeping. Took Prozac in the past up to 2 years ago when depression went into remission. Mood "a little depressed". Affect neutral, constricted and incongruent to mood. Memory intact, remote and recent. Speech of average rate and volume. Thoughts organized and goal directed. Reports paranoia is one of her symptoms and she believes that is why the voices are telling her to hurt others before they hurt her. On disability due to mental health history. Has BA in Communications from [employer] but last worked 1992. Denies h/o drugs, alcohol or cigarette use. Allergic to Penicillin but does not known reaction. States mother told her she could die from it. Medical h/o HTN (not on meds), heart murmur, herniated disc to neck and leukemia in remission more than 10 years now.

## 2021-12-14 NOTE — DISCHARGE NOTE BEHAVIORAL HEALTH - NSBHDCMEDICALFT_PSY_A_CORE
#DM2  Hba1c : 6.4%  Discussed with patient diagnosis of diabetes. She has a sister who is diabetic.  hx of aortic stenosis and herniated discs    - Advised her to follow up with PCP within 1-2 months of discharge    #HLD    - Pt agreeable to starting atorvastatin 20mg once a day at bedtime, started  - Weight loss as above  - Outpatient PCP follow-up    #Elevated TSH  - f/u free t4, and free t3 (will try to add on labs 10/29). Will follow labs, no need to start synthroid if WNL

## 2021-12-14 NOTE — DISCHARGE NOTE BEHAVIORAL HEALTH - FAMILY HISTORY OF PSYCHIATRIC ILLNESS
Pt reported that she is homeless, residing in a shelter in [place], since her mother went into a nursing home this year. Family h/o mental illness in sister with Bipolar d/o, denies family h/o suicide. Patient reports not being in contact with her family. Family h/o mental illness in sister with Bipolar d/o, denies family h/o suicide. Patient reports not being in contact with her family. Pt denies traumatic events. Pt's Mother is dx with dementia and currently resides in a nursing home.

## 2021-12-14 NOTE — DISCHARGE NOTE BEHAVIORAL HEALTH - NSFUCAREDSC_ALL_CORE_SIUH
Yes, the patient is being discharged from [facility]... No, the patient is not being discharged from [facility]

## 2021-12-14 NOTE — DISCHARGE NOTE BEHAVIORAL HEALTH - NSBHDCREFEROTHERFT_PSY_A_CORE
[facility]- for psychiatric crises (available AFTER HOURS)  24/7 hotline: [phone]  Address:  [address], [place] [address]

## 2021-12-14 NOTE — DISCHARGE NOTE BEHAVIORAL HEALTH - NSBHDCMEDSFT_PSY_A_CORE
Abilify, Patient tolerated the medication well, denied side effects. Abilify, Patient tolerated the medication well, denied side effects.  MEDICATIONS  (STANDING):  ARIPiprazole 15 milliGRAM(s) Oral daily  coronavirus (EUA) Vaccine (MODERNA) 0.5 milliLiter(s) IntraMuscular once

## 2021-12-14 NOTE — DISCHARGE NOTE BEHAVIORAL HEALTH - NSBHDCHOUSINGFT_PSY_A_CORE
[facility] ID [id number]  [facility]   [address] [place] [address]    Gladys Su  [address] [address]

## 2021-12-14 NOTE — DISCHARGE NOTE BEHAVIORAL HEALTH - MEDICATION SUMMARY - MEDICATIONS TO CHANGE
I will SWITCH the dose or number of times a day I take the medications listed below when I get home from the hospital:  None I will SWITCH the dose or number of times a day I take the medications listed below when I get home from the hospital:    ARIPiprazole 10 mg oral tablet  -- 1 tab(s) by mouth once a day x 14 days. Continue until told otherwise by your provider.

## 2021-12-14 NOTE — DISCHARGE NOTE BEHAVIORAL HEALTH - PAST PSYCHIATRIC HISTORY
hx of psych admissions (reportedly 4 in the past several months - last known in [facility] in 2/2021) who presented w/ reported SI.    Discharged  from [facility] in November 2021  Does not have a psychiatrist in te community.Has not followed up on outpatient. Pt reports being hospitalized numerous times due to similar symptoms.

## 2021-12-14 NOTE — DISCHARGE NOTE BEHAVIORAL HEALTH - NSBHDCCRISISPROB1FT_PSY_A_CORE
Suicidal Thoughts Any thoughts to harm/kill yourself or others or the return or worsening of any of the acute symptoms you had prior to admission to the hospital.

## 2021-12-14 NOTE — DISCHARGE NOTE BEHAVIORAL HEALTH - NSBHDCCRISISPLAN2FT_PSY_A_CORE
[facility] 5N: [phone]  ROMULO- Katie Caldwell LMSW  Psychiatrist- Dr. Guillermo

## 2021-12-14 NOTE — DISCHARGE NOTE BEHAVIORAL HEALTH - MEDICATION SUMMARY - MEDICATIONS TO STOP TAKING
I will STOP taking the medications listed below when I get home from the hospital:    haloperidol 5 mg oral tablet  -- 1 tab(s) by mouth once a day (at bedtime) until told otherwise by MD    hydrOXYzine hydrochloride 50 mg oral tablet  -- 1 tab(s) by mouth every 6 hours, As needed, anxiety until told otherwise by MD   I will STOP taking the medications listed below when I get home from the hospital:    atorvastatin 20 mg oral tablet  -- 1 tab(s) by mouth once a day (at bedtime) x 14 days. Continue until told otherwise by your provider.

## 2021-12-14 NOTE — DISCHARGE NOTE BEHAVIORAL HEALTH - NSBHDCSWCOMMENTSFT_PSY_A_CORE
discharge faxed to [facility] x [phone] 11-3-21 11am Pt. was educated on the importance of taking medications as prescribed and to not stop or miss any doses unless directed by prescribing provider. Pt. was counseled on the importance of attending outpatient appointments for ongoing development of coping skills in particular. Pt. was made aware of crisis resources to use after hours as needed including returning to the hospital.    Pt. was educated regarding safety precautions for COVID-19 including hand hygiene, wearing a mask, and maintaining social distancing and advised to seek medical attention if experiencing any symptoms. Pt. provided written handout of COVID-19 symptoms and management in discharge packet.

## 2021-12-14 NOTE — DISCHARGE NOTE BEHAVIORAL HEALTH - MEDICATION SUMMARY - MEDICATIONS TO TAKE
I will START or STAY ON the medications listed below when I get home from the hospital:    atorvastatin 20 mg oral tablet  -- 1 tab(s) by mouth once a day (at bedtime) x 14 days. Continue until told otherwise by your provider.   -- Indication: For HLD    ARIPiprazole 10 mg oral tablet  -- 1 tab(s) by mouth once a day x 14 days. Continue until told otherwise by your provider.    -- Indication: For Schizoaffective disorder   I will START or STAY ON the medications listed below when I get home from the hospital:    ARIPiprazole 15 mg oral tablet  -- 1 tab(s) by mouth once a day  -- Indication: For Schizoaffective disorder

## 2021-12-14 NOTE — DISCHARGE NOTE BEHAVIORAL HEALTH - NSBHDCCRISISPLAN1FT_PSY_A_CORE
Report to the nearest emergency room Tell a trusted friend/family member, tell housing staff, tell clinic staff, call a crisis line ( [facility] ph. [phone], [facility] [phone], [facility] (peer support) ph. [phone]), return to the nearest emergency room. You may call 9-1-1 for assistance.

## 2021-12-14 NOTE — DISCHARGE NOTE BEHAVIORAL HEALTH - NS MD DC FALL RISK RISK
For information on Fall & Injury Prevention, visit: [email or web address] OR  [email or web address] OR  https://www.cdc.gov/steadi/patient.html

## 2021-12-15 PROCEDURE — 99232 SBSQ HOSP IP/OBS MODERATE 35: CPT

## 2021-12-15 RX ORDER — CX-024414 0.2 MG/ML
0.5 INJECTION, SUSPENSION INTRAMUSCULAR ONCE
Refills: 0 | Status: COMPLETED | OUTPATIENT
Start: 2021-12-15 | End: 2021-12-16

## 2021-12-15 RX ADMIN — ARIPIPRAZOLE 15 MILLIGRAM(S): 15 TABLET ORAL at 09:18

## 2021-12-16 VITALS — TEMPERATURE: 98 F | OXYGEN SATURATION: 100 % | RESPIRATION RATE: 18 BRPM

## 2021-12-16 LAB — SARS-COV-2 RNA SPEC QL NAA+PROBE: SIGNIFICANT CHANGE UP

## 2021-12-16 PROCEDURE — 99239 HOSP IP/OBS DSCHRG MGMT >30: CPT

## 2021-12-16 RX ORDER — ARIPIPRAZOLE 15 MG/1
1 TABLET ORAL
Qty: 0 | Refills: 0 | DISCHARGE
Start: 2021-12-16

## 2021-12-16 RX ADMIN — ARIPIPRAZOLE 15 MILLIGRAM(S): 15 TABLET ORAL at 09:20

## 2021-12-16 RX ADMIN — TUBERCULIN PURIFIED PROTEIN DERIVATIVE 5 UNIT(S): 5 INJECTION, SOLUTION INTRADERMAL at 13:39

## 2021-12-16 RX ADMIN — CX-024414 0.5 MILLILITER(S): 0.2 INJECTION, SUSPENSION INTRAMUSCULAR at 12:11

## 2021-12-16 NOTE — PROGRESS NOTE BEHAVIORAL HEALTH - NSBHFUPINTERVALHXFT_PSY_A_CORE
12/15/2021 Pt is scheduled for discharge tomorrow and going to nursing home . Pt with need for covid test PCR order in for today. as the pt is to go tomorrow am. Pt willing to go for placement.  Pt denies any suicidal ideation intent or pln . Pt denies any side effects from medication   12/14/2021 Pt with frequent request for an earlier discharge but the aftercare care is in place starting thursday of lima week.. Pt feeling more hopeful that she would continue with improvement.   Pt denies any suicidal ideation intent or plan .  Pt continue with her medication   12/13/2021 Pt claiming that she is feeling less anxious and is not with any side effects from medication .  Pt is aware of the aftercare plans that will be in effect on thursday.   12/10/2021 Pt with denial of any suicidal ideation intent or plan Pt with denial of any side effect from medication   Pt denies any hallucination  12/9/2021 Pt feeling better that a place for her to live is located.  Pt denies any suicidal ideation intent or plan   12/8/2021 Pt with decreased anxiety , denies any suicidal ideation intent or plan . Pt is able to sleep .  Encourage to attend groups      12/7/2021 pt with aud. hallucination of voices.  Claims suicidal ideation but denies intent  Pt with preoccupied with own thoughts   Pt with goal directed speech .    12/11/2021: Patient was seen today AM in her room, chart reviewed and discussed in team. She endorses that she does not hear any voices now and has been doing well with increased dosage of Abilify 15 mg daily. She has fair to good sleep/appetite she also endorses that she has a place to stay now. She was earlier seen by writer in ED and things are much better now. Not S/H and to continue current meds for stability/safety    12/12/2021: Patient was seen today AM, chart reviewed and discussed in team. No complaints, good sleep/appetite, an  d endorses that her A/h are gone since the Abilify was increased to Abilify 15 mg. Her housing situation also improved as she endorses that she would be able to go somewhere.
12/10/2021 Pt with denial of any suicidal ideation intent or plan Pt with denial of any side effect from medication   Pt denies any hallucination  12/9/2021 Pt feeling better that a place for her to live is located.  Pt denies any suicidal ideation intent or plan   12/8/2021 Pt with decreased anxiety , denies any suicidal ideation intent or plan . Pt is able to sleep .  Encourage to attend groups    12/7/2021 pt with aud. hallucination of voices.  Claims suicidal ideation but denies intent  Pt with preoccupied with own thoughts   Pt with goal directd speech .    12/11/2021: Patient was seen today AM in her room, chart reviewed and discussed in team. She endorses that she does not hear any voices now and has been doing well with increased dosage of Abilify 15 mg daily. She has fair to good sleep/appetite she also endorses that she has a place to stay now. She was earlier seen by writer in ED and things are much better now. Not S/H and to continue current meds for stability/safety
12/16/2021 Pt with euthymic mood and affect is full range and is mood congruent.   Pt denies any suicidal ideation intent or plan   12/15/2021 Pt is scheduled for discharge tomorrow and going to nursing home . Pt with need for covid test PCR order in for today. as the pt is to go tomorrow am. Pt willing to go for placement.  Pt denies any suicidal ideation intent or pln . Pt denies any side effects from medication   12/14/2021 Pt with frequent request for an earlier discharge but the aftercare care is in place starting thursday of lima week.. Pt feeling more hopeful that she would continue with improvement.   Pt denies any suicidal ideation intent or plan .  Pt continue with her medication   12/13/2021 Pt claiming that she is feeling less anxious and is not with any side effects from medication .  Pt is aware of the aftercare plans that will be in effect on thursday.   12/10/2021 Pt with denial of any suicidal ideation intent or plan Pt with denial of any side effect from medication   Pt denies any hallucination  12/9/2021 Pt feeling better that a place for her to live is located.  Pt denies any suicidal ideation intent or plan   12/8/2021 Pt with decreased anxiety , denies any suicidal ideation intent or plan . Pt is able to sleep .  Encourage to attend groups      12/7/2021 pt with aud. hallucination of voices.  Claims suicidal ideation but denies intent  Pt with preoccupied with own thoughts   Pt with goal directed speech .    12/11/2021: Patient was seen today AM in her room, chart reviewed and discussed in team. She endorses that she does not hear any voices now and has been doing well with increased dosage of Abilify 15 mg daily. She has fair to good sleep/appetite she also endorses that she has a place to stay now. She was earlier seen by writer in ED and things are much better now. Not S/H and to continue current meds for stability/safety    12/12/2021: Patient was seen today AM, chart reviewed and discussed in team. No complaints, good sleep/appetite, an  d endorses that her A/h are gone since the Abilify was increased to Abilify 15 mg. Her housing situation also improved as she endorses that she would be able to go somewhere.
12/14/2021 Pt with frequent request for an earlier discharge but the aftercare care is in place starting thursday of lima week.. Pt feeling more hopeful that she would continue with improvement.   Pt denies any suicidal ideation intent or plan .  Pt continue with her medication   12/13/2021 Pt claiming that she is feeling less anxious and is not with any side effects from medication .  Pt is aware of the aftercare plans that will be in effect on thursday.   12/10/2021 Pt with denial of any suicidal ideation intent or plan Pt with denial of any side effect from medication   Pt denies any hallucination  12/9/2021 Pt feeling better that a place for her to live is located.  Pt denies any suicidal ideation intent or plan   12/8/2021 Pt with decreased anxiety , denies any suicidal ideation intent or plan . Pt is able to sleep .  Encourage to attend groups      12/7/2021 pt with aud. hallucination of voices.  Claims suicidal ideation but denies intent  Pt with preoccupied with own thoughts   Pt with goal directed speech .    12/11/2021: Patient was seen today AM in her room, chart reviewed and discussed in team. She endorses that she does not hear any voices now and has been doing well with increased dosage of Abilify 15 mg daily. She has fair to good sleep/appetite she also endorses that she has a place to stay now. She was earlier seen by writer in ED and things are much better now. Not S/H and to continue current meds for stability/safety    12/12/2021: Patient was seen today AM, chart reviewed and discussed in team. No complaints, good sleep/appetite, an  d endorses that her A/h are gone since the Abilify was increased to Abilify 15 mg. Her housing situation also improved as she endorses that she would be able to go somewhere.
12/8/2021 Pt with decreased anxiety , denies any suicidal ideation intent or plan . Pt is able to sleep .  Encourage to attend groups    12/7/2021 pt with aud. hallucination of voices.  Claims suicidal ideation but denies intent  Pt with preoccupied with own thoughts   Pt with goal directd speech .
12/13/2021 Pt claiming that she is feeling less anxious and is not with any side effects from medication .  Pt is aware of the aftercare plans that will be in effect on thursday.   12/10/2021 Pt with denial of any suicidal ideation intent or plan Pt with denial of any side effect from medication   Pt denies any hallucination  12/9/2021 Pt feeling better that a place for her to live is located.  Pt denies any suicidal ideation intent or plan   12/8/2021 Pt with decreased anxiety , denies any suicidal ideation intent or plan . Pt is able to sleep .  Encourage to attend groups      12/7/2021 pt with aud. hallucination of voices.  Claims suicidal ideation but denies intent  Pt with preoccupied with own thoughts   Pt with goal directed speech .    12/11/2021: Patient was seen today AM in her room, chart reviewed and discussed in team. She endorses that she does not hear any voices now and has been doing well with increased dosage of Abilify 15 mg daily. She has fair to good sleep/appetite she also endorses that she has a place to stay now. She was earlier seen by writer in ED and things are much better now. Not S/H and to continue current meds for stability/safety    12/12/2021: Patient was seen today AM, chart reviewed and discussed in team. No complaints, good sleep/appetite, an  d endorses that her A/h are gone since the Abilify was increased to Abilify 15 mg. Her housing situation also improved as she endorses that she would be able to go somewhere.
12/9/2021 Pt feeling better that a place for her to live is located.  Pt denies any suicidal ideation intent or plan   12/8/2021 Pt with decreased anxiety , denies any suicidal ideation intent or plan . Pt is able to sleep .  Encourage to attend groups    12/7/2021 pt with aud. hallucination of voices.  Claims suicidal ideation but denies intent  Pt with preoccupied with own thoughts   Pt with goal directd speech .
12/10/2021 Pt with denial of any suicidal ideation intent or plan Pt with denial of any side effect from medication   Pt denies any hallucination  12/9/2021 Pt feeling better that a place for her to live is located.  Pt denies any suicidal ideation intent or plan   12/8/2021 Pt with decreased anxiety , denies any suicidal ideation intent or plan . Pt is able to sleep .  Encourage to attend groups      12/7/2021 pt with aud. hallucination of voices.  Claims suicidal ideation but denies intent  Pt with preoccupied with own thoughts   Pt with goal directed speech .    12/11/2021: Patient was seen today AM in her room, chart reviewed and discussed in team. She endorses that she does not hear any voices now and has been doing well with increased dosage of Abilify 15 mg daily. She has fair to good sleep/appetite she also endorses that she has a place to stay now. She was earlier seen by writer in ED and things are much better now. Not S/H and to continue current meds for stability/safety    12/12/2021: Patient was seen today AM, chart reviewed and discussed in team. No complaints, good sleep/appetite, an  d endorses that her A/h are gone since the Abilify was increased to Abilify 15 mg. Her housing situation also improved as she endorses that she would be able to go somewhere.
pt with aud. hallucination of voices.  Claims suicidal ideation but denies intent  Pt with preoccupied with own thoughts   Pt with goal directd speech .
12/10/2021 Pt with denial of any suicidal ideation intent or plan Pt with denial of any side effect from medication   Pt denies any hallucination  12/9/2021 Pt feeling better that a place for her to live is located.  Pt denies any suicidal ideation intent or plan   12/8/2021 Pt with decreased anxiety , denies any suicidal ideation intent or plan . Pt is able to sleep .  Encourage to attend groups    12/7/2021 pt with aud. hallucination of voices.  Claims suicidal ideation but denies intent  Pt with preoccupied with own thoughts   Pt with goal directd speech .

## 2021-12-16 NOTE — PROGRESS NOTE BEHAVIORAL HEALTH - NSBHFUPINTERVALCCFT_PSY_A_CORE
"I'm feeling better with the medication
"I'm feeling better with the medication
"I'm much better now that I can live some where
"I'm feeling less anxious "
"I'm ready to be discharged
"I feel better today"
"I'm feeling much more hopeful now that I have a place to go."
"I'm feeling better with the medication
"I feel better today"
"I'm most stressed about no place to stay

## 2021-12-16 NOTE — PROGRESS NOTE BEHAVIORAL HEALTH - PROBLEM SELECTOR PLAN 1
Abilify 15mg daily  encourage to attend groups for insight

## 2021-12-16 NOTE — PROGRESS NOTE BEHAVIORAL HEALTH - NSBHCHARTREVIEWVS_PSY_A_CORE FT
Vital Signs Last 24 Hrs  T(C): 36.7 (11 Dec 2021 08:00), Max: 36.7 (11 Dec 2021 08:00)  T(F): 98 (11 Dec 2021 08:00), Max: 98 (11 Dec 2021 08:00)  HR: --  BP: --  BP(mean): --  RR: 18 (11 Dec 2021 08:00) (18 - 18)  SpO2: 99% (11 Dec 2021 08:00) (99% - 99%)
Vital Signs Last 24 Hrs  T(C): 36.5 (14 Dec 2021 07:57), Max: 36.5 (14 Dec 2021 07:57)  T(F): 97.7 (14 Dec 2021 07:57), Max: 97.7 (14 Dec 2021 07:57)  HR: --  BP: --  BP(mean): --  RR: 16 (14 Dec 2021 07:57) (16 - 16)  SpO2: 100% (14 Dec 2021 07:57) (100% - 100%)
Vital Signs Last 24 Hrs  T(C): 36.6 (16 Dec 2021 07:43), Max: 36.6 (16 Dec 2021 07:43)  T(F): 97.9 (16 Dec 2021 07:43), Max: 97.9 (16 Dec 2021 07:43)  HR: --  BP: --  BP(mean): --  RR: 18 (16 Dec 2021 07:43) (18 - 18)  SpO2: 100% (16 Dec 2021 07:43) (100% - 100%)
Vital Signs Last 24 Hrs  T(C): 36.1 (15 Dec 2021 08:04), Max: 36.1 (15 Dec 2021 08:04)  T(F): 97 (15 Dec 2021 08:04), Max: 97 (15 Dec 2021 08:04)  HR: --  BP: --  BP(mean): --  RR: 18 (15 Dec 2021 08:04) (18 - 18)  SpO2: 100% (15 Dec 2021 08:04) (100% - 100%)
Vital Signs Last 24 Hrs  T(C): 36.7 (12 Dec 2021 07:32), Max: 36.7 (12 Dec 2021 07:32)  T(F): 98.1 (12 Dec 2021 07:32), Max: 98.1 (12 Dec 2021 07:32)  HR: --  BP: --  BP(mean): --  RR: 18 (12 Dec 2021 07:32) (18 - 18)  SpO2: 100% (12 Dec 2021 07:32) (100% - 100%)
VVital Signs Last 24 Hrs  T(C): 36.7 (10 Dec 2021 08:22), Max: 36.7 (10 Dec 2021 08:22)  T(F): 98 (10 Dec 2021 08:22), Max: 98 (10 Dec 2021 08:22)  HR: --  BP: --  BP(mean): --  RR: 14 (10 Dec 2021 08:22) (14 - 14)  SpO2: 96% (10 Dec 2021 08:22) (96% - 96%)
Vital Signs Last 24 Hrs  T(C): 36.1 (13 Dec 2021 07:40), Max: 36.1 (13 Dec 2021 07:40)  T(F): 97 (13 Dec 2021 07:40), Max: 97 (13 Dec 2021 07:40)  HR: --  BP: --  BP(mean): --  RR: 16 (13 Dec 2021 07:40) (16 - 16)  SpO2: 100% (13 Dec 2021 07:40) (100% - 100%)
Vital Signs Last 24 Hrs  T(C): 36.7 (07 Dec 2021 07:14), Max: 36.8 (06 Dec 2021 19:18)  T(F): 98.1 (07 Dec 2021 07:14), Max: 98.3 (06 Dec 2021 19:18)  HR: 72 (06 Dec 2021 18:31) (72 - 72)  BP: 122/81 (06 Dec 2021 18:31) (122/81 - 122/81)  BP(mean): 91 (06 Dec 2021 18:31) (91 - 91)  RR: 16 (07 Dec 2021 07:14) (16 - 18)  SpO2: 100% (07 Dec 2021 07:14) (99% - 100%)
Vital Signs Last 24 Hrs  T(C): 37 (09 Dec 2021 08:04), Max: 37 (09 Dec 2021 08:04)  T(F): 98.6 (09 Dec 2021 08:04), Max: 98.6 (09 Dec 2021 08:04)  HR: --  BP: --  BP(mean): --  RR: 16 (09 Dec 2021 08:04) (16 - 16)  SpO2: 98% (09 Dec 2021 08:04) (98% - 98%)
Vital Signs Last 24 Hrs  T(C): 36.7 (07 Dec 2021 07:14), Max: 36.8 (06 Dec 2021 19:18)  T(F): 98.1 (07 Dec 2021 07:14), Max: 98.3 (06 Dec 2021 19:18)  HR: 72 (06 Dec 2021 18:31) (72 - 72)  BP: 122/81 (06 Dec 2021 18:31) (122/81 - 122/81)  BP(mean): 91 (06 Dec 2021 18:31) (91 - 91)  RR: 16 (07 Dec 2021 07:14) (16 - 18)  SpO2: 100% (07 Dec 2021 07:14) (99% - 100%)

## 2021-12-16 NOTE — PROGRESS NOTE BEHAVIORAL HEALTH - ESTIMATED DISCHARGE DATE
17-Dec-2021

## 2021-12-16 NOTE — PROGRESS NOTE BEHAVIORAL HEALTH - NSBHPTASSESSDT_PSY_A_CORE
11-Dec-2021 12:57
15-Dec-2021 10:54
07-Dec-2021 15:00
12-Dec-2021 12:38
13-Dec-2021 17:21
16-Dec-2021 11:41
08-Dec-2021 20:38
14-Dec-2021 11:29
09-Dec-2021 10:00
10-Dec-2021 12:01

## 2021-12-16 NOTE — PROGRESS NOTE BEHAVIORAL HEALTH - NSBHROSSYSTEMS_PSY_ALL_CORE
Musculoskeletal.../Allergic/Immunologic...

## 2021-12-16 NOTE — PROGRESS NOTE BEHAVIORAL HEALTH - SUMMARY
60 y/o single, undomiciled,  female who with PPHx of Depression, Schizoaffective disorder,  presents to the ED c/o command auditory hallucinations that started this morning and "on & off" depression x1 year since she has been homeless, worsening this morning with the AH. Pt states command AHs are telling her to kill herself and "other random people in the street". States her plan would be to jump into traffic. States she took the last of her daily Abilify 10mg yesterday and the voices started this morning. States she has ran out of her medication before but never had the symptoms come back so fast and not with command AH. States she has had a psychiatric admission in the past but not recently and wants to be admitted again. States she just returned from [facility] after midnight after promise of housing fell through, ate breakfast and the command hallucinations started. Denies difficulty sleeping. Took Prozac in the past up to 2 years ago when depression went into remission. On disability due to mental health history. Has BA in Communications from [employer] but last worked 1992. Denies h/o drugs, alcohol or cigarette use. Allergic to Penicillin but does not known reaction. States mother told her she could die from it. Medical h/o HTN (not on meds), heart murmur, herniated disc to neck and leukemia in remission more than 10 years now.  Family h/o mental illness in sister with Bipolar d/o, denies family h/o suicide. Patient reports not being in contact with her family.    MSE: 60 y/o single, undomiciled,  female who looks stated age. Dressed in hospital gown lying on bed in ED hallway with CO 1:1 at bedside. Cooperative with fair eye contact. Alert and oriented x4. Mood "a little depressed". Affect neutral, constricted and incongruent to mood. Memory intact, remote and recent. Speech of average rate and volume. Thoughts organized and goal directed. Admits to S/H ideations due to command AH. Denies VH. Reports paranoia is one of her symptoms and she believes that is why the voices are telling her to hurt others before they hurt her. Insight and judgment fair.    Axis I - Schizoaffective disorder, bipolar type (F25.0)   II - Deferred  III - h/o HTN (not on meds), heart murmur, herniated disc to neck and leukemia in remission  IV - Homelessness  V - 30    Case discussed with Dr. Guillermo. Patient is requesting inpatient admission as she does not feel safe due to the command hallucinations. Patient is aware that there are no beds available anywhere at this time but still wants admission. She will be on HOLD in the ED to be admitted on a voluntary status when a bed is located.  Restart Abilify 10mg po daily with 1st dose today  C/L will reassess in AM. Hand -off given to Dr. Barton with telepsych.
62 y/o single, undomiciled,  female who with PPHx of Depression, Schizoaffective disorder,  presents to the ED c/o command auditory hallucinations that started this morning and "on & off" depression x1 year since she has been homeless, worsening this morning with the AH. Pt states command AHs are telling her to kill herself and "other random people in the street". States her plan would be to jump into traffic. States she took the last of her daily Abilify 10mg yesterday and the voices started this morning. States she has ran out of her medication before but never had the symptoms come back so fast and not with command AH. States she has had a psychiatric admission in the past but not recently and wants to be admitted again. States she just returned from [facility] after midnight after promise of housing fell through, ate breakfast and the command hallucinations started. Denies difficulty sleeping. Took Prozac in the past up to 2 years ago when depression went into remission. On disability due to mental health history. Has BA in Communications from [employer] but last worked 1992. Denies h/o drugs, alcohol or cigarette use. Allergic to Penicillin but does not known reaction. States mother told her she could die from it. Medical h/o HTN (not on meds), heart murmur, herniated disc to neck and leukemia in remission more than 10 years now.  Family h/o mental illness in sister with Bipolar d/o, denies family h/o suicide. Patient reports not being in contact with her family.    MSE: 62 y/o single, undomiciled,  female who looks stated age. Dressed in hospital gown lying on bed in ED hallway with CO 1:1 at bedside. Cooperative with fair eye contact. Alert and oriented x4. Mood "a little depressed". Affect neutral, constricted and incongruent to mood. Memory intact, remote and recent. Speech of average rate and volume. Thoughts organized and goal directed. Admits to S/H ideations due to command AH. Denies VH. Reports paranoia is one of her symptoms and she believes that is why the voices are telling her to hurt others before they hurt her. Insight and judgment fair.    Axis I - Schizoaffective disorder, bipolar type (F25.0)   II - Deferred  III - h/o HTN (not on meds), heart murmur, herniated disc to neck and leukemia in remission  IV - Homelessness  V - 30    Case discussed with Dr. Guillermo. Patient is requesting inpatient admission as she does not feel safe due to the command hallucinations. Patient is aware that there are no beds available anywhere at this time but still wants admission. She will be on HOLD in the ED to be admitted on a voluntary status when a bed is located.  Restart Abilify 10mg po daily with 1st dose today  C/L will reassess in AM. Hand -off given to Dr. Barton with telepsych.
60 y/o single, undomiciled,  female who with PPHx of Depression, Schizoaffective disorder,  presents to the ED c/o command auditory hallucinations that started this morning and "on & off" depression x1 year since she has been homeless, worsening this morning with the AH. Pt states command AHs are telling her to kill herself and "other random people in the street". States her plan would be to jump into traffic. States she took the last of her daily Abilify 10mg yesterday and the voices started this morning. States she has ran out of her medication before but never had the symptoms come back so fast and not with command AH. States she has had a psychiatric admission in the past but not recently and wants to be admitted again. States she just returned from [facility] after midnight after promise of housing fell through, ate breakfast and the command hallucinations started. Denies difficulty sleeping. Took Prozac in the past up to 2 years ago when depression went into remission. On disability due to mental health history. Has BA in Communications from [employer] but last worked 1992. Denies h/o drugs, alcohol or cigarette use. Allergic to Penicillin but does not known reaction. States mother told her she could die from it. Medical h/o HTN (not on meds), heart murmur, herniated disc to neck and leukemia in remission more than 10 years now.  Family h/o mental illness in sister with Bipolar d/o, denies family h/o suicide. Patient reports not being in contact with her family.    MSE: 60 y/o single, undomiciled,  female who looks stated age. Dressed in hospital gown lying on bed in ED hallway with CO 1:1 at bedside. Cooperative with fair eye contact. Alert and oriented x4. Mood "a little depressed". Affect neutral, constricted and incongruent to mood. Memory intact, remote and recent. Speech of average rate and volume. Thoughts organized and goal directed. Admits to S/H ideations due to command AH. Denies VH. Reports paranoia is one of her symptoms and she believes that is why the voices are telling her to hurt others before they hurt her. Insight and judgment fair.    Axis I - Schizoaffective disorder, bipolar type (F25.0)   II - Deferred  III - h/o HTN (not on meds), heart murmur, herniated disc to neck and leukemia in remission  IV - Homelessness  V - 30    Case discussed with Dr. Guillermo. Patient is requesting inpatient admission as she does not feel safe due to the command hallucinations. Patient is aware that there are no beds available anywhere at this time but still wants admission. She will be on HOLD in the ED to be admitted on a voluntary status when a bed is located.  Restart Abilify 10mg po daily with 1st dose today  C/L will reassess in AM. Hand -off given to Dr. Barton with telepsych.
62 y/o single, undomiciled,  female who with PPHx of Depression, Schizoaffective disorder,  presents to the ED c/o command auditory hallucinations that started this morning and "on & off" depression x1 year since she has been homeless, worsening this morning with the AH. Pt states command AHs are telling her to kill herself and "other random people in the street". States her plan would be to jump into traffic. States she took the last of her daily Abilify 10mg yesterday and the voices started this morning. States she has ran out of her medication before but never had the symptoms come back so fast and not with command AH. States she has had a psychiatric admission in the past but not recently and wants to be admitted again. States she just returned from [facility] after midnight after promise of housing fell through, ate breakfast and the command hallucinations started. Denies difficulty sleeping. Took Prozac in the past up to 2 years ago when depression went into remission. On disability due to mental health history. Has BA in Communications from [employer] but last worked 1992. Denies h/o drugs, alcohol or cigarette use. Allergic to Penicillin but does not known reaction. States mother told her she could die from it. Medical h/o HTN (not on meds), heart murmur, herniated disc to neck and leukemia in remission more than 10 years now.  Family h/o mental illness in sister with Bipolar d/o, denies family h/o suicide. Patient reports not being in contact with her family.    MSE: 62 y/o single, undomiciled,  female who looks stated age. Dressed in hospital gown lying on bed in ED hallway with CO 1:1 at bedside. Cooperative with fair eye contact. Alert and oriented x4. Mood "a little depressed". Affect neutral, constricted and incongruent to mood. Memory intact, remote and recent. Speech of average rate and volume. Thoughts organized and goal directed. Admits to S/H ideations due to command AH. Denies VH. Reports paranoia is one of her symptoms and she believes that is why the voices are telling her to hurt others before they hurt her. Insight and judgment fair.    Axis I - Schizoaffective disorder, bipolar type (F25.0)   II - Deferred  III - h/o HTN (not on meds), heart murmur, herniated disc to neck and leukemia in remission  IV - Homelessness  V - 30    Case discussed with Dr. Guillermo. Patient is requesting inpatient admission as she does not feel safe due to the command hallucinations. Patient is aware that there are no beds available anywhere at this time but still wants admission. She will be on HOLD in the ED to be admitted on a voluntary status when a bed is located.  Restart Abilify 10mg po daily with 1st dose today  C/L will reassess in AM. Hand -off given to Dr. Barton with telepsych.
62 y/o single, undomiciled,  female who with PPHx of Depression, Schizoaffective disorder,  presents to the ED c/o command auditory hallucinations that started this morning and "on & off" depression x1 year since she has been homeless, worsening this morning with the AH. Pt states command AHs are telling her to kill herself and "other random people in the street". States her plan would be to jump into traffic. States she took the last of her daily Abilify 10mg yesterday and the voices started this morning. States she has ran out of her medication before but never had the symptoms come back so fast and not with command AH. States she has had a psychiatric admission in the past but not recently and wants to be admitted again. States she just returned from [facility] after midnight after promise of housing fell through, ate breakfast and the command hallucinations started. Denies difficulty sleeping. Took Prozac in the past up to 2 years ago when depression went into remission. On disability due to mental health history. Has BA in Communications from [employer] but last worked 1992. Denies h/o drugs, alcohol or cigarette use. Allergic to Penicillin but does not known reaction. States mother told her she could die from it. Medical h/o HTN (not on meds), heart murmur, herniated disc to neck and leukemia in remission more than 10 years now.  Family h/o mental illness in sister with Bipolar d/o, denies family h/o suicide. Patient reports not being in contact with her family.    MSE: 62 y/o single, undomiciled,  female who looks stated age. Dressed in hospital gown lying on bed in ED hallway with CO 1:1 at bedside. Cooperative with fair eye contact. Alert and oriented x4. Mood "a little depressed". Affect neutral, constricted and incongruent to mood. Memory intact, remote and recent. Speech of average rate and volume. Thoughts organized and goal directed. Admits to S/H ideations due to command AH. Denies VH. Reports paranoia is one of her symptoms and she believes that is why the voices are telling her to hurt others before they hurt her. Insight and judgment fair.    Axis I - Schizoaffective disorder, bipolar type (F25.0)   II - Deferred  III - h/o HTN (not on meds), heart murmur, herniated disc to neck and leukemia in remission  IV - Homelessness  V - 30    Case discussed with Dr. Guillermo. Patient is requesting inpatient admission as she does not feel safe due to the command hallucinations. Patient is aware that there are no beds available anywhere at this time but still wants admission. She will be on HOLD in the ED to be admitted on a voluntary status when a bed is located.  Restart Abilify 10mg po daily with 1st dose today  C/L will reassess in AM. Hand -off given to Dr. Barton with telepsych.
62 y/o single, undomiciled,  female who with PPHx of Depression, Schizoaffective disorder,  presents to the ED c/o command auditory hallucinations that started this morning and "on & off" depression x1 year since she has been homeless, worsening this morning with the AH. Pt states command AHs are telling her to kill herself and "other random people in the street". States her plan would be to jump into traffic. States she took the last of her daily Abilify 10mg yesterday and the voices started this morning. States she has ran out of her medication before but never had the symptoms come back so fast and not with command AH. States she has had a psychiatric admission in the past but not recently and wants to be admitted again. States she just returned from [facility] after midnight after promise of housing fell through, ate breakfast and the command hallucinations started. Denies difficulty sleeping. Took Prozac in the past up to 2 years ago when depression went into remission. On disability due to mental health history. Has BA in Communications from [employer] but last worked 1992. Denies h/o drugs, alcohol or cigarette use. Allergic to Penicillin but does not known reaction. States mother told her she could die from it. Medical h/o HTN (not on meds), heart murmur, herniated disc to neck and leukemia in remission more than 10 years now.  Family h/o mental illness in sister with Bipolar d/o, denies family h/o suicide. Patient reports not being in contact with her family.    MSE: 62 y/o single, undomiciled,  female who looks stated age. Dressed in hospital gown lying on bed in ED hallway with CO 1:1 at bedside. Cooperative with fair eye contact. Alert and oriented x4. Mood "a little depressed". Affect neutral, constricted and incongruent to mood. Memory intact, remote and recent. Speech of average rate and volume. Thoughts organized and goal directed. Admits to S/H ideations due to command AH. Denies VH. Reports paranoia is one of her symptoms and she believes that is why the voices are telling her to hurt others before they hurt her. Insight and judgment fair.    Axis I - Schizoaffective disorder, bipolar type (F25.0)   II - Deferred  III - h/o HTN (not on meds), heart murmur, herniated disc to neck and leukemia in remission  IV - Homelessness  V - 30    Case discussed with Dr. Guillermo. Patient is requesting inpatient admission as she does not feel safe due to the command hallucinations. Patient is aware that there are no beds available anywhere at this time but still wants admission. She will be on HOLD in the ED to be admitted on a voluntary status when a bed is located.  Restart Abilify 10mg po daily with 1st dose today  C/L will reassess in AM. Hand -off given to Dr. Barton with telepsych.
60 y/o single, undomiciled,  female who with PPHx of Depression, Schizoaffective disorder,  presents to the ED c/o command auditory hallucinations that started this morning and "on & off" depression x1 year since she has been homeless, worsening this morning with the AH. Pt states command AHs are telling her to kill herself and "other random people in the street". States her plan would be to jump into traffic. States she took the last of her daily Abilify 10mg yesterday and the voices started this morning. States she has ran out of her medication before but never had the symptoms come back so fast and not with command AH. States she has had a psychiatric admission in the past but not recently and wants to be admitted again. States she just returned from [facility] after midnight after promise of housing fell through, ate breakfast and the command hallucinations started. Denies difficulty sleeping. Took Prozac in the past up to 2 years ago when depression went into remission. On disability due to mental health history. Has BA in Communications from [employer] but last worked 1992. Denies h/o drugs, alcohol or cigarette use. Allergic to Penicillin but does not known reaction. States mother told her she could die from it. Medical h/o HTN (not on meds), heart murmur, herniated disc to neck and leukemia in remission more than 10 years now.  Family h/o mental illness in sister with Bipolar d/o, denies family h/o suicide. Patient reports not being in contact with her family.    MSE: 60 y/o single, undomiciled,  female who looks stated age. Dressed in hospital gown lying on bed in ED hallway with CO 1:1 at bedside. Cooperative with fair eye contact. Alert and oriented x4. Mood "a little depressed". Affect neutral, constricted and incongruent to mood. Memory intact, remote and recent. Speech of average rate and volume. Thoughts organized and goal directed. Admits to S/H ideations due to command AH. Denies VH. Reports paranoia is one of her symptoms and she believes that is why the voices are telling her to hurt others before they hurt her. Insight and judgment fair.    Axis I - Schizoaffective disorder, bipolar type (F25.0)   II - Deferred  III - h/o HTN (not on meds), heart murmur, herniated disc to neck and leukemia in remission  IV - Homelessness  V - 30    Case discussed with Dr. Guillermo. Patient is requesting inpatient admission as she does not feel safe due to the command hallucinations. Patient is aware that there are no beds available anywhere at this time but still wants admission. She will be on HOLD in the ED to be admitted on a voluntary status when a bed is located.  Restart Abilify 10mg po daily with 1st dose today  C/L will reassess in AM. Hand -off given to Dr. Barton with telepsych.
62 y/o single, undomiciled,  female who with PPHx of Depression, Schizoaffective disorder,  presents to the ED c/o command auditory hallucinations that started this morning and "on & off" depression x1 year since she has been homeless, worsening this morning with the AH. Pt states command AHs are telling her to kill herself and "other random people in the street". States her plan would be to jump into traffic. States she took the last of her daily Abilify 10mg yesterday and the voices started this morning. States she has ran out of her medication before but never had the symptoms come back so fast and not with command AH. States she has had a psychiatric admission in the past but not recently and wants to be admitted again. States she just returned from [facility] after midnight after promise of housing fell through, ate breakfast and the command hallucinations started. Denies difficulty sleeping. Took Prozac in the past up to 2 years ago when depression went into remission. On disability due to mental health history. Has BA in Communications from [employer] but last worked 1992. Denies h/o drugs, alcohol or cigarette use. Allergic to Penicillin but does not known reaction. States mother told her she could die from it. Medical h/o HTN (not on meds), heart murmur, herniated disc to neck and leukemia in remission more than 10 years now.  Family h/o mental illness in sister with Bipolar d/o, denies family h/o suicide. Patient reports not being in contact with her family.    MSE: 62 y/o single, undomiciled,  female who looks stated age. Dressed in hospital gown lying on bed in ED hallway with CO 1:1 at bedside. Cooperative with fair eye contact. Alert and oriented x4. Mood "a little depressed". Affect neutral, constricted and incongruent to mood. Memory intact, remote and recent. Speech of average rate and volume. Thoughts organized and goal directed. Admits to S/H ideations due to command AH. Denies VH. Reports paranoia is one of her symptoms and she believes that is why the voices are telling her to hurt others before they hurt her. Insight and judgment fair.    Axis I - Schizoaffective disorder, bipolar type (F25.0)   II - Deferred  III - h/o HTN (not on meds), heart murmur, herniated disc to neck and leukemia in remission  IV - Homelessness  V - 30    Case discussed with Dr. Guillermo. Patient is requesting inpatient admission as she does not feel safe due to the command hallucinations. Patient is aware that there are no beds available anywhere at this time but still wants admission. She will be on HOLD in the ED to be admitted on a voluntary status when a bed is located.  Restart Abilify 10mg po daily with 1st dose today  C/L will reassess in AM. Hand -off given to Dr. Barton with telepsych.
60 y/o single, undomiciled,  female who with PPHx of Depression, Schizoaffective disorder,  presents to the ED c/o command auditory hallucinations that started this morning and "on & off" depression x1 year since she has been homeless, worsening this morning with the AH. Pt states command AHs are telling her to kill herself and "other random people in the street". States her plan would be to jump into traffic. States she took the last of her daily Abilify 10mg yesterday and the voices started this morning. States she has ran out of her medication before but never had the symptoms come back so fast and not with command AH. States she has had a psychiatric admission in the past but not recently and wants to be admitted again. States she just returned from [facility] after midnight after promise of housing fell through, ate breakfast and the command hallucinations started. Denies difficulty sleeping. Took Prozac in the past up to 2 years ago when depression went into remission. On disability due to mental health history. Has BA in Communications from [employer] but last worked 1992. Denies h/o drugs, alcohol or cigarette use. Allergic to Penicillin but does not known reaction. States mother told her she could die from it. Medical h/o HTN (not on meds), heart murmur, herniated disc to neck and leukemia in remission more than 10 years now.  Family h/o mental illness in sister with Bipolar d/o, denies family h/o suicide. Patient reports not being in contact with her family.    MSE: 60 y/o single, undomiciled,  female who looks stated age. Dressed in hospital gown lying on bed in ED hallway with CO 1:1 at bedside. Cooperative with fair eye contact. Alert and oriented x4. Mood "a little depressed". Affect neutral, constricted and incongruent to mood. Memory intact, remote and recent. Speech of average rate and volume. Thoughts organized and goal directed. Admits to S/H ideations due to command AH. Denies VH. Reports paranoia is one of her symptoms and she believes that is why the voices are telling her to hurt others before they hurt her. Insight and judgment fair.    Axis I - Schizoaffective disorder, bipolar type (F25.0)   II - Deferred  III - h/o HTN (not on meds), heart murmur, herniated disc to neck and leukemia in remission  IV - Homelessness  V - 30    Case discussed with Dr. Guillermo. Patient is requesting inpatient admission as she does not feel safe due to the command hallucinations. Patient is aware that there are no beds available anywhere at this time but still wants admission. She will be on HOLD in the ED to be admitted on a voluntary status when a bed is located.  Restart Abilify 10mg po daily with 1st dose today  C/L will reassess in AM. Hand -off given to Dr. Barton with telepsych.
62 y/o single, undomiciled,  female who with PPHx of Depression, Schizoaffective disorder,  presents to the ED c/o command auditory hallucinations that started this morning and "on & off" depression x1 year since she has been homeless, worsening this morning with the AH. Pt states command AHs are telling her to kill herself and "other random people in the street". States her plan would be to jump into traffic. States she took the last of her daily Abilify 10mg yesterday and the voices started this morning. States she has ran out of her medication before but never had the symptoms come back so fast and not with command AH. States she has had a psychiatric admission in the past but not recently and wants to be admitted again. States she just returned from [facility] after midnight after promise of housing fell through, ate breakfast and the command hallucinations started. Denies difficulty sleeping. Took Prozac in the past up to 2 years ago when depression went into remission. On disability due to mental health history. Has BA in Communications from [employer] but last worked 1992. Denies h/o drugs, alcohol or cigarette use. Allergic to Penicillin but does not known reaction. States mother told her she could die from it. Medical h/o HTN (not on meds), heart murmur, herniated disc to neck and leukemia in remission more than 10 years now.  Family h/o mental illness in sister with Bipolar d/o, denies family h/o suicide. Patient reports not being in contact with her family.    MSE: 62 y/o single, undomiciled,  female who looks stated age. Dressed in hospital gown lying on bed in ED hallway with CO 1:1 at bedside. Cooperative with fair eye contact. Alert and oriented x4. Mood "a little depressed". Affect neutral, constricted and incongruent to mood. Memory intact, remote and recent. Speech of average rate and volume. Thoughts organized and goal directed. Admits to S/H ideations due to command AH. Denies VH. Reports paranoia is one of her symptoms and she believes that is why the voices are telling her to hurt others before they hurt her. Insight and judgment fair.    Axis I - Schizoaffective disorder, bipolar type (F25.0)   II - Deferred  III - h/o HTN (not on meds), heart murmur, herniated disc to neck and leukemia in remission  IV - Homelessness  V - 30    Case discussed with Dr. Guillermo. Patient is requesting inpatient admission as she does not feel safe due to the command hallucinations. Patient is aware that there are no beds available anywhere at this time but still wants admission. She will be on HOLD in the ED to be admitted on a voluntary status when a bed is located.  Restart Abilify 10mg po daily with 1st dose today  C/L will reassess in AM. Hand -off given to Dr. Barton with telepsych.

## 2021-12-16 NOTE — PROGRESS NOTE BEHAVIORAL HEALTH - ADDITIONAL DETAILS / COMMENTS
Patient called the ambulance and came to ED for help due to symptoms started this morning.

## 2021-12-16 NOTE — PROGRESS NOTE BEHAVIORAL HEALTH - AXIS III
H/o HTN, heart murmur, herniated disc to neck, & leukemia in remission

## 2021-12-16 NOTE — PROGRESS NOTE BEHAVIORAL HEALTH - NSBHCHARTREVIEWINVESTIGATE_PSY_A_CORE FT
< from: 12 Lead ECG (12.07.21 @ 08:00) >    Ventricular Rate 68 BPM    Atrial Rate 68 BPM    P-R Interval 164 ms    QRS Duration 94 ms    Q-T Interval 374 ms    QTC Calculation(Bazett) 397 ms    P Axis 51 degrees    R Axis -15 degrees    T Axis 7 degrees    Diagnosis Line Normal sinus rhythm  Minimal voltage criteria for LVH, may be normal variant  Borderline ECG  When compared with ECG of 04-DEC-2021 11:35,  Minimal criteria for Anterior infarct are no longer Present  Confirmed by BEN WAGNER MD ([id number]) on 12/7/2021 4:37:29 PM

## 2021-12-16 NOTE — PROGRESS NOTE BEHAVIORAL HEALTH - NS ED BHA REVIEW OF ED CHART AVAILABLE LABS REVIEWED
None available
None available
Yes
None available
Yes

## 2021-12-16 NOTE — PROGRESS NOTE BEHAVIORAL HEALTH - RISK ASSESSMENT
no change low risk: pt with euthymic mood and affect is full.  Pt denies any suicidal ideation intent or plan  mitigating : pt with denial of any intent or plan   protective help seeking behavior   chronic prior psych hosp

## 2021-12-16 NOTE — PROGRESS NOTE BEHAVIORAL HEALTH - SECONDARY DX1
Adjustment disorder with depressed mood
.
Adjustment disorder with depressed mood

## 2021-12-21 DIAGNOSIS — F25.0 SCHIZOAFFECTIVE DISORDER, BIPOLAR TYPE: ICD-10-CM

## 2021-12-21 DIAGNOSIS — F43.21 ADJUSTMENT DISORDER WITH DEPRESSED MOOD: ICD-10-CM

## 2021-12-21 DIAGNOSIS — E78.5 HYPERLIPIDEMIA, UNSPECIFIED: ICD-10-CM

## 2021-12-21 DIAGNOSIS — I10 ESSENTIAL (PRIMARY) HYPERTENSION: ICD-10-CM

## 2021-12-21 DIAGNOSIS — Z88.0 ALLERGY STATUS TO PENICILLIN: ICD-10-CM

## 2021-12-21 DIAGNOSIS — Z59.00 HOMELESSNESS UNSPECIFIED: ICD-10-CM

## 2021-12-21 DIAGNOSIS — C95.91 LEUKEMIA, UNSPECIFIED, IN REMISSION: ICD-10-CM

## 2021-12-21 SDOH — ECONOMIC STABILITY - HOUSING INSECURITY: HOMELESSNESS UNSPECIFIED: Z59.00

## 2022-02-10 NOTE — ED PROVIDER NOTE - MDM ORDERS SUBMITTED SELECTION
Pre-Chart Review                                                                              Recent labs:  []PSA. []SOBT. []COLOGUARD. []PREGNANCY. (PREVENTIVE HEALTH)  No results found for: PSA, COLOGUARD  No results found for: HCGQUAL  []LIPID PANEL  Lab Results   Component Value Date    CHOLESTEROL 148 01/08/2021    CHOLESTEROL 156 07/05/2016    CALCLDL 90 01/08/2021    CALCLDL 107 07/05/2016    HDL 45 01/08/2021    HDL 38 (L) 07/05/2016    TRIGLYCERIDE 64 01/08/2021    TRIGLYCERIDE 53 07/05/2016    GLUCOSE 69 (L) 10/20/2021    GLUCOSE 76 10/19/2021     []BMP.   []Magnesium.  Lab Results   Component Value Date    GFRESTIMATE 52 (L) 10/20/2021    GFRESTIMATE 56 (L) 10/19/2021    GFRNA 55 06/13/2019    GFRNA 55 06/30/2018    GFRA 63 06/13/2019    GFRA 63 06/30/2018    CREATININE 1.27 (H) 10/20/2021    CREATININE 1.19 (H) 10/19/2021    BUN 21 (H) 10/20/2021     Lab Results   Component Value Date    SODIUM 140 10/20/2021    POTASSIUM 4.4 10/20/2021    MG 2.0 10/20/2021    CALCIUM 9.2 10/20/2021    ALBUMIN 3.1 (L) 10/15/2021     Lab Results   Component Value Date    MG 2.0 10/20/2021     []LFT  Lab Results   Component Value Date    TOTPROTEIN 6.3 (L) 10/15/2021    GLOB 3.2 10/15/2021    ALBUMIN 3.1 (L) 10/15/2021     Lab Results   Component Value Date    BILIRUBIN 0.9 10/15/2021    BILIRUBIN 2.7 (H) 09/10/2021    GPT 14 10/15/2021    GPT 24 09/10/2021    AST 19 10/15/2021    AST 31 09/10/2021     Lab Results   Component Value Date    ALKPT 46 10/15/2021    ALKPT 60 09/10/2021     []URINALYSIS.   Lab Results   Component Value Date    UPROT Negative 10/17/2021    URBC Negative 10/17/2021    UWBC Negative 10/17/2021    UNITR Negative 10/17/2021    UGLU Negative 10/17/2021    UKET Negative 10/17/2021     []MICROALBUMIN.  Lab Results   Component Value Date    MALBCR 9.0 01/08/2021     CARDIAC  No components found for: NPPROB  ENDOCRINAL  []TSH.   []Free T4. Free T3. (THYROID)  Lab Results   Component Value Date    TSH  4.769 01/08/2021     []HG1AC.   []MICROALBUMIN.   []B12.  Lab Results   Component Value Date    GLUCOSE 69 (L) 10/20/2021    GLUCOSE 76 10/19/2021    MALBCR 9.0 01/08/2021    VB12 238 08/02/2021    VB12 175 (L) 06/17/2021     []VITAMIN D  Lab Results   Component Value Date    VITD25 25.0 (L) 08/02/2021    VITD25 19.0 (L) 06/17/2021     SEX HORMONES  No results found for: TEST, TESTFK, LH, FSH, DHEAS  PITUITARY HORMONES  No results found for: PROLACTIN, ACTH  ADRENAL HORMONES  No results found for: TEST, LH, FSH, PROLACTIN  GASTROINTESTINAL/HEPATIC  Lab Results   Component Value Date     01/08/2021    GLIGGP 3 01/20/2021    GLIGAP 7 01/20/2021     []PDP (CHRONIC KIDNEY DISEASE)  Lab Results   Component Value Date    VITD25 25.0 (L) 08/02/2021    VITD25 19.0 (L) 06/17/2021    PHOS 2.9 10/19/2021    HGB 12.5 (L) 10/20/2021    MCV 90.5 10/20/2021     []CBC.  []B12.   []FOLATE.  []IRON. FERRITIN.  HAPTOGLOBIN.  Lab Results   Component Value Date    WBC 5.3 10/20/2021    WBC 4.3 10/19/2021    HCT 38.1 (L) 10/20/2021    HCT 36.0 (L) 10/19/2021    HGB 12.5 (L) 10/20/2021    HGB 11.7 (L) 10/19/2021    MCV 90.5 10/20/2021     (L) 10/20/2021     (L) 10/19/2021    INR 1.1 10/15/2021    PTT 29 10/15/2021     Lab Results   Component Value Date    VB12 238 08/02/2021    GRACE 19.1 08/02/2021     Lab Results   Component Value Date    IRON 44 (L) 06/17/2016    FERR 16 (L) 06/17/2016     ONCOLOGIC  No results found for: PSA, ATHYRTAC, , AFET, CEA  RHEUMATOLOGIC  []URIC ACID (GOUT)  No results found for: URIC  AUTOIMMUNE  No results found for: ANABL, CCPA, RA, HLAB27, AGTCE                                                                                                                   PRx: MWV (d8/22).  - PSA (a). Col (a).  Sum (3C/Smo-30/Social EtOH/Coffee 1 cup daily/Retired-)  - Nicotine.   [CT head 9/21, Chronic age-related ischemic change; Lacular infarcts invlving R basal ganglia & R  periventricular white matter; Cystic encephlomalacia involving inferior L cerebellum]  - Vascular dementia. Living with his son. No driving. >>> Anger outburst f/u, 9/21. >>> trazodone 100 f/u, consider abilify if not improving f/u, 10/21.  - Insomnia. = Trazodone 100 PRN. >>> trazodone 100 f/u, consider abilify if not improving f/u, 10/21.  - h/ TIA/Lacunar infarct.  [Echo 10/21, Severe LV hypokinesis; LVEF 25%; Sev LAE; Mod MILAN; Sev MVR; AV sclerosis, Mild AVR; Mod TVR; Mild PVR]  - (CAR) A-fib. = Xarelto.  - V-tachy. (ICD 21) (Cardioversion 21) = Amiodarone 200.  - CHF, systolic. (ICD 21) = Digoxin 0.125.  - CM, non-ischemic. [Cath 16, Small diagnonal branch w/ severe mid stenosis; OM1 w/ ostial 50% stenosis]  - AA Ectasia, infrarenal, 2.8 cm.   - HTN. = Lisinopril 2.5. Lasix 20 q2d. >>> BMP. LFT (). UA. Micro.  - DL. >>> LP.  - Vit D. = MV. >>> Vit D. >>> 2000 IU f/u, 8/21.  - HyperPTH, 2nd/CKD.   - H-her.  - GERD. Ga-itis. Duo-itis. h/ Dysphagia [EGD 14]  - Duodenal diverticulum.  - Di-losis.  - L I-her.  - Her (Hazel).  - C Diarrhea. h/ Colitis. CT findings of possible gastroparesis & proctitis (nl Stool study except SOBT 1/21) (neg Celiac 1/21) = Imodium 2 qid PRN. Metamucil PRN. [Col-13] [CT abd 2/21, abnl]. = (GI 2/21, pt & POA would like to continue conservative Mg for now). >>> Stool study per GI, 8/21.  - Colovesical fistula (Hazel 10)  - CKD3. >>> PDP. CBC.  - HypoMg. >>> Mg.  - KStone. (Hazel-11)  - BPH (TURP) >>> consider Oxybutynin due to urgency f/u.  - Abnl PSA ([facility] at [facility] 4/7, Stable; PRN)  - MHematuria. (Cysto 21) [CT uro 8/21] = (Uro 9/21, Benign glomerular leak vs Prostatic origin; RTC if GHematuria; ow, PRN].  - Ane/TPenia. >>> CBC f/u, 10/21.  - Iron. >>> Iron/Ferritin.  - B12. = MV. >>> B12. >>> Lab f/u, 10/21.  - Folate. = MV. >>> Folate.   - OPe.    Est.  10/21. ER. V-tachy in 200's. Hypotension. AMS. Cardioversion. Return to A-fib. EF 22%. Pursuing all life-saving measures. [facility]  Cardiology.  - Hold Lisinopril due to borderline BP. Restart Lasix q2d. PCP/Cardio f/u.   Not Applicable

## 2022-02-23 NOTE — ED BEHAVIORAL HEALTH ASSESSMENT NOTE - AFFECT RANGE
Gino Reeves presents today for a screening Mantoux Tuberculin Skin Test.    See Immunization activity for NDC & LOT information.    Patient instructed to return in 48 to 72 hours for reading.    Signed: Lisette Argueta CNP    
Constricted

## 2022-03-19 ENCOUNTER — INPATIENT (INPATIENT)
Facility: HOSPITAL | Age: 62
LOS: 10 days | Discharge: HOME | End: 2022-03-30
Attending: PSYCHIATRY & NEUROLOGY | Admitting: PSYCHIATRY & NEUROLOGY
Payer: MEDICARE

## 2022-03-19 VITALS
OXYGEN SATURATION: 97 % | TEMPERATURE: 98 F | SYSTOLIC BLOOD PRESSURE: 139 MMHG | HEIGHT: 61 IN | RESPIRATION RATE: 18 BRPM | DIASTOLIC BLOOD PRESSURE: 84 MMHG | HEART RATE: 95 BPM

## 2022-03-19 DIAGNOSIS — Z59.00 HOMELESSNESS UNSPECIFIED: ICD-10-CM

## 2022-03-19 DIAGNOSIS — R45.850 HOMICIDAL IDEATIONS: ICD-10-CM

## 2022-03-19 DIAGNOSIS — I10 ESSENTIAL (PRIMARY) HYPERTENSION: ICD-10-CM

## 2022-03-19 DIAGNOSIS — C91.41 HAIRY CELL LEUKEMIA, IN REMISSION: ICD-10-CM

## 2022-03-19 DIAGNOSIS — F32.A DEPRESSION, UNSPECIFIED: ICD-10-CM

## 2022-03-19 DIAGNOSIS — F25.1 SCHIZOAFFECTIVE DISORDER, DEPRESSIVE TYPE: ICD-10-CM

## 2022-03-19 DIAGNOSIS — R45.851 SUICIDAL IDEATIONS: ICD-10-CM

## 2022-03-19 DIAGNOSIS — Z91.14 PATIENT'S OTHER NONCOMPLIANCE WITH MEDICATION REGIMEN: ICD-10-CM

## 2022-03-19 LAB
ALBUMIN SERPL ELPH-MCNC: 4.2 G/DL — SIGNIFICANT CHANGE UP (ref 3.5–5.2)
ALP SERPL-CCNC: 136 U/L — HIGH (ref 30–115)
ALT FLD-CCNC: 45 U/L — HIGH (ref 0–41)
AMPHET UR-MCNC: NEGATIVE — SIGNIFICANT CHANGE UP
ANION GAP SERPL CALC-SCNC: 12 MMOL/L — SIGNIFICANT CHANGE UP (ref 7–14)
APAP SERPL-MCNC: <5 UG/ML — LOW (ref 10–30)
APPEARANCE UR: CLEAR — SIGNIFICANT CHANGE UP
AST SERPL-CCNC: 26 U/L — SIGNIFICANT CHANGE UP (ref 0–41)
BARBITURATES UR SCN-MCNC: NEGATIVE — SIGNIFICANT CHANGE UP
BASOPHILS # BLD AUTO: 0.05 K/UL — SIGNIFICANT CHANGE UP (ref 0–0.2)
BASOPHILS NFR BLD AUTO: 0.7 % — SIGNIFICANT CHANGE UP (ref 0–1)
BENZODIAZ UR-MCNC: NEGATIVE — SIGNIFICANT CHANGE UP
BILIRUB SERPL-MCNC: <0.2 MG/DL — SIGNIFICANT CHANGE UP (ref 0.2–1.2)
BILIRUB UR-MCNC: NEGATIVE — SIGNIFICANT CHANGE UP
BUN SERPL-MCNC: 13 MG/DL — SIGNIFICANT CHANGE UP (ref 10–20)
CALCIUM SERPL-MCNC: 10 MG/DL — SIGNIFICANT CHANGE UP (ref 8.5–10.1)
CHLORIDE SERPL-SCNC: 103 MMOL/L — SIGNIFICANT CHANGE UP (ref 98–110)
CO2 SERPL-SCNC: 25 MMOL/L — SIGNIFICANT CHANGE UP (ref 17–32)
COCAINE METAB.OTHER UR-MCNC: NEGATIVE — SIGNIFICANT CHANGE UP
COLOR SPEC: YELLOW — SIGNIFICANT CHANGE UP
CREAT SERPL-MCNC: 0.7 MG/DL — SIGNIFICANT CHANGE UP (ref 0.7–1.5)
DIFF PNL FLD: NEGATIVE — SIGNIFICANT CHANGE UP
DRUG SCREEN 1, URINE RESULT: SIGNIFICANT CHANGE UP
EGFR: 100 ML/MIN/1.73M2 — SIGNIFICANT CHANGE UP
EOSINOPHIL # BLD AUTO: 0.18 K/UL — SIGNIFICANT CHANGE UP (ref 0–0.7)
EOSINOPHIL NFR BLD AUTO: 2.4 % — SIGNIFICANT CHANGE UP (ref 0–8)
EPI CELLS # UR: ABNORMAL /HPF
ETHANOL SERPL-MCNC: <10 MG/DL — SIGNIFICANT CHANGE UP
FENTANYL UR QL: NEGATIVE — SIGNIFICANT CHANGE UP
GLUCOSE SERPL-MCNC: 133 MG/DL — HIGH (ref 70–99)
GLUCOSE UR QL: NEGATIVE MG/DL — SIGNIFICANT CHANGE UP
HCT VFR BLD CALC: 36.1 % — LOW (ref 37–47)
HGB BLD-MCNC: 12 G/DL — SIGNIFICANT CHANGE UP (ref 12–16)
IMM GRANULOCYTES NFR BLD AUTO: 0.5 % — HIGH (ref 0.1–0.3)
KETONES UR-MCNC: NEGATIVE — SIGNIFICANT CHANGE UP
LEUKOCYTE ESTERASE UR-ACNC: ABNORMAL
LYMPHOCYTES # BLD AUTO: 2.15 K/UL — SIGNIFICANT CHANGE UP (ref 1.2–3.4)
LYMPHOCYTES # BLD AUTO: 28.7 % — SIGNIFICANT CHANGE UP (ref 20.5–51.1)
MCHC RBC-ENTMCNC: 29.5 PG — SIGNIFICANT CHANGE UP (ref 27–31)
MCHC RBC-ENTMCNC: 33.2 G/DL — SIGNIFICANT CHANGE UP (ref 32–37)
MCV RBC AUTO: 88.7 FL — SIGNIFICANT CHANGE UP (ref 81–99)
METHADONE UR-MCNC: NEGATIVE — SIGNIFICANT CHANGE UP
MONOCYTES # BLD AUTO: 0.54 K/UL — SIGNIFICANT CHANGE UP (ref 0.1–0.6)
MONOCYTES NFR BLD AUTO: 7.2 % — SIGNIFICANT CHANGE UP (ref 1.7–9.3)
NEUTROPHILS # BLD AUTO: 4.53 K/UL — SIGNIFICANT CHANGE UP (ref 1.4–6.5)
NEUTROPHILS NFR BLD AUTO: 60.5 % — SIGNIFICANT CHANGE UP (ref 42.2–75.2)
NITRITE UR-MCNC: NEGATIVE — SIGNIFICANT CHANGE UP
NRBC # BLD: 0 /100 WBCS — SIGNIFICANT CHANGE UP (ref 0–0)
OPIATES UR-MCNC: NEGATIVE — SIGNIFICANT CHANGE UP
OXYCODONE UR-MCNC: NEGATIVE — SIGNIFICANT CHANGE UP
PCP UR-MCNC: NEGATIVE — SIGNIFICANT CHANGE UP
PH UR: 6.5 — SIGNIFICANT CHANGE UP (ref 5–8)
PLATELET # BLD AUTO: 261 K/UL — SIGNIFICANT CHANGE UP (ref 130–400)
POTASSIUM SERPL-MCNC: 4.2 MMOL/L — SIGNIFICANT CHANGE UP (ref 3.5–5)
POTASSIUM SERPL-SCNC: 4.2 MMOL/L — SIGNIFICANT CHANGE UP (ref 3.5–5)
PROPOXYPHENE QUALITATIVE URINE RESULT: NEGATIVE — SIGNIFICANT CHANGE UP
PROT SERPL-MCNC: 7 G/DL — SIGNIFICANT CHANGE UP (ref 6–8)
PROT UR-MCNC: NEGATIVE MG/DL — SIGNIFICANT CHANGE UP
RBC # BLD: 4.07 M/UL — LOW (ref 4.2–5.4)
RBC # FLD: 13.6 % — SIGNIFICANT CHANGE UP (ref 11.5–14.5)
SALICYLATES SERPL-MCNC: <0.3 MG/DL — LOW (ref 4–30)
SARS-COV-2 RNA SPEC QL NAA+PROBE: SIGNIFICANT CHANGE UP
SODIUM SERPL-SCNC: 140 MMOL/L — SIGNIFICANT CHANGE UP (ref 135–146)
SP GR SPEC: 1.02 — SIGNIFICANT CHANGE UP (ref 1.01–1.03)
THC UR QL: NEGATIVE — SIGNIFICANT CHANGE UP
UROBILINOGEN FLD QL: 0.2 MG/DL — SIGNIFICANT CHANGE UP
WBC # BLD: 7.49 K/UL — SIGNIFICANT CHANGE UP (ref 4.8–10.8)
WBC # FLD AUTO: 7.49 K/UL — SIGNIFICANT CHANGE UP (ref 4.8–10.8)
WBC UR QL: SIGNIFICANT CHANGE UP /HPF

## 2022-03-19 PROCEDURE — 99285 EMERGENCY DEPT VISIT HI MDM: CPT

## 2022-03-19 PROCEDURE — 93010 ELECTROCARDIOGRAM REPORT: CPT

## 2022-03-19 RX ORDER — HALOPERIDOL DECANOATE 100 MG/ML
5 INJECTION INTRAMUSCULAR ONCE
Refills: 0 | Status: DISCONTINUED | OUTPATIENT
Start: 2022-03-19 | End: 2022-03-21

## 2022-03-19 RX ORDER — ARIPIPRAZOLE 15 MG/1
5 TABLET ORAL DAILY
Refills: 0 | Status: DISCONTINUED | OUTPATIENT
Start: 2022-03-20 | End: 2022-03-22

## 2022-03-19 RX ORDER — DIPHENHYDRAMINE HCL 50 MG
50 CAPSULE ORAL ONCE
Refills: 0 | Status: DISCONTINUED | OUTPATIENT
Start: 2022-03-19 | End: 2022-03-21

## 2022-03-19 SDOH — ECONOMIC STABILITY - HOUSING INSECURITY: HOMELESSNESS UNSPECIFIED: Z59.00

## 2022-03-19 NOTE — ED BEHAVIORAL HEALTH ASSESSMENT NOTE - SUMMARY
61 year-old  female, single, disabled (since ~1992), history of Schizoaffective Disorder, depressive type, chronically managed with Abilify ( history of medication non-compliance ), prior multiple in-patient hospitalizations (last:  12/2021) for command auditory hallucinations / suicidal ideation (plan to jump into traffic or in front of train) / homicidal ideation, no prior self-injurious behaviors or suicide attempt, no substance abuse, no known trauma, no legal history, no aggression / violence history, is walked into ED endorsing suicidal ideation and homicidal ideation.     Medical History: HTN (not on meds), HLD, heart murmur, herniated disc to neck and leukemia in remission more than 10 years now. 61 year-old  female, single, disabled (since ~1992), history of Schizoaffective Disorder, depressive type, chronically managed with Abilify ( history of medication non-compliance ), prior multiple in-patient hospitalizations (last:  12/2021) for command auditory hallucinations / suicidal ideation (plan to jump into traffic or in front of train) / homicidal ideation, no prior self-injurious behaviors or suicide attempt, no substance abuse, no known trauma, no legal history, no aggression / violence history, is walked into ED endorsing suicidal ideation and homicidal ideation.     Medical History: HTN (not on meds), HLD, heart murmur, herniated disc to neck and leukemia in remission more than 10 years now.    Patient has Schizoaffective Disorder, depressive type. Patient has chronic history of similar presentations to ED, endorsing suicidal ideation to jump in front of train / car and homicidal ideation, at times in the setting of command auditory hallucinations. Hence. R/O Malingering in light of recently returning from [place] and homelessness (chronic). Patient however is off medications and is not engaged in any out-patient treatment. Patient has no future orientation, not engaged in safety planning, and no social supports. Hence. Patient has no currently identifiable protective factors / barriers to suicide. Patient seeking voluntary admission to restart medications of which (Abilify 15 mg daily) has helped stabilize her symptoms. Patient to benefit from inpatient psychiatric admission once medically cleared.

## 2022-03-19 NOTE — ED PROVIDER NOTE - PHYSICAL EXAMINATION
Physical Exam    Vital Signs: I have reviewed the initial vital signs.  Constitutional: NAD but unkempt appearing  Eyes: Conjunctiva pink, Sclera clear, PERRLA, EOMI, no ptosis, no entrapment, no racoon eyes.  Cardiovascular: S1 and S2, regular rate, regular rhythm, well-perfused extremities, radial pulses equal and 2+, calves nonttp, equal in size  Respiratory: unlabored respiratory effort, speaking in full sentences, handling oral secretions, clear to auscultation bilaterally no wheezing, rales and rhonchi  Gastrointestinal: soft, non-tender abdomen  Musculoskeletal: supple neck, no lower extremity edema, no gross bony deformities or swelling.  Integumentary: warm, dry, no rashes, lacerations,  Neurologic: awake, alert, cranial nerves II-XII grossly intact, extremities’ motor and sensory functions grossly intact, no nystagmus, no tremors,no fasciculations facial droop, no ataxia

## 2022-03-19 NOTE — ED BEHAVIORAL HEALTH ASSESSMENT NOTE - PSYCHIATRIC ISSUES AND PLAN (INCLUDE STANDING AND PRN MEDICATION)
Abilify 15 mg daily Abilify 15 mg daily. Haldol 5 mg PO/IM, Ativan 2 mg PO/IM, and Benadryl 50 mg PO/IM PRN Q6H for psychosis / anxiety / mood dysregulation / behavioral disturbances.

## 2022-03-19 NOTE — ED BEHAVIORAL HEALTH ASSESSMENT NOTE - RISK ASSESSMENT
MODERATE - HIGH RISK     ACUTE RISK FACTORS: depressed mood, hopelessness, suicidal ideation, homicidal ideation, not in psychiatric treatment, no current medication management     CHRONIC RISK FACTORS: Schizoaffective disorder, no social supports, poor engagement with out-patient treatment, medication / treatment non-compliance, history of suicidal ideation / homicidal ideation, history of in-patient hospitalizations, homelessness    PROTECTIVE FACTORS: Unknown.     MITIGATION STRATEGIES: Constant Observation, in-patient hospitalization High Acute Suicide Risk

## 2022-03-19 NOTE — ED ADULT NURSE NOTE - NSIMPLEMENTINTERV_GEN_ALL_ED
Implemented All Fall Risk Interventions:  [place] to call system. Call bell, personal items and telephone within reach. Instruct patient to call for assistance. Room bathroom lighting operational. Non-slip footwear when patient is off stretcher. Physically safe environment: no spills, clutter or unnecessary equipment. Stretcher in lowest position, wheels locked, appropriate side rails in place. Provide visual cue, wrist band, yellow gown, etc. Monitor gait and stability. Monitor for mental status changes and reorient to person, place, and time. Review medications for side effects contributing to fall risk. Reinforce activity limits and safety measures with patient and family.

## 2022-03-19 NOTE — ED BEHAVIORAL HEALTH ASSESSMENT NOTE - HPI (INCLUDE ILLNESS QUALITY, SEVERITY, DURATION, TIMING, CONTEXT, MODIFYING FACTORS, ASSOCIATED SIGNS AND SYMPTOMS)
Medical History: HTN (not on meds), HLD, heart murmur, herniated disc to neck and leukemia in remission more than 10 years now. 61 year-old  female, single, disabled (since ~1992), history of Schizoaffective Disorder, depressive type, chronically managed with Abilify ( history of medication non-compliance ), prior multiple in-patient hospitalizations (last:  12/2021) for command auditory hallucinations / suicidal ideation (plan to jump into traffic or in front of train) / homicidal ideation, no prior self-injurious behaviors or suicide attempt, no substance abuse, no known trauma, no legal history, no aggression / violence history, is walked into ED endorsing suicidal ideation and homicidal ideation.     Medical History: HTN (not on meds), HLD, heart murmur, herniated disc to neck and leukemia in remission more than 10 years now. 61 year-old  female, single, disabled (since ~1992), homeless (since ~ 2020 as per patient), history of Schizoaffective Disorder, depressive type, chronically managed with Abilify ( history of medication non-compliance ), prior multiple in-patient hospitalizations (last:  12/2021) for command auditory hallucinations / suicidal ideation (plan to jump into traffic or in front of train) / homicidal ideation, no prior self-injurious behaviors or suicide attempt, no substance abuse, no known trauma, no legal history, no aggression / violence history, is walked into ED endorsing suicidal ideation and homicidal ideation.     Patient is alert and oriented to person, time, place and situation. Patient is calm and cooperative; linear, organized, with no evidence of thought disorder. Patient is flat. Reports arriving from [place] ~ 4-5 days ago, of which she oscillates between these two states since being homeless (2020). Reports since arriving in [place], she has been depressed, hopeless, helpless, with suicidal ideation to jump in front of train / traffic in the setting of running out of medications (Abilify 15 mg daily). Patient endorsing nonspecific homicidal ideation. Denies command auditory hallucinations. Denies other psychotic symptoms including paranoia, ideas of reference, thought insertion/broadcasting, or visual/olfactory/tactile/gustatory hallucinations. Patient not appearing internally preoccupied. Reports last auditory hallucination being > 3 months ago. Of note, last admission was secondary to command auditory hallucinations to kill self / others. Denies manic symptoms, including elevated mood, mood lability, irritability. Patient is not grandiose, pressured and without flight of ideas. Denies prior / current substance abuse. Reports wanting to get back on medications: seeking voluntary admission. Reports out-patient psychiatrist is in [place]: does not having professional contact information. Reports not having personal collateral information: is estranged from family and has no social supports.    Denies depressive symptoms, including persistent depressed mood or anhedonia, hopelessness, insomnia, or suicidal ideation. Denies manic / psychotic symptoms. No delusions elicited. Denies substance abuse. Denies complaints / needs at this time. Tolerating medications with no side effects. Reports positive therapeutic relationships and strong social supports. Reports future orientation, with motivation to continue outpatient treatment. Engaged in safety planning.       Medical History: HTN (not on meds), HLD, heart murmur, herniated disc to neck and leukemia in remission more than 10 years now. 61 year-old  female, single, disabled (since ~1992), homeless (since ~ 2020 as per patient), history of Schizoaffective Disorder, depressive type, chronically managed with Abilify ( history of medication non-compliance ), prior multiple in-patient hospitalizations (last:  12/2021) for command auditory hallucinations / suicidal ideation (plan to jump into traffic or in front of train) / homicidal ideation, no prior self-injurious behaviors or suicide attempt, no substance abuse, no known trauma, no legal history, no aggression / violence history, is walked into ED endorsing suicidal ideation and homicidal ideation.     Patient is alert and oriented to person, time, place and situation. Patient is calm and cooperative; linear, organized, with no evidence of thought disorder. Patient is flat. Reports arriving from [place] ~ 4-5 days ago, of which she oscillates between these two states since being homeless (2020). Reports since arriving in [place], she has been depressed, hopeless, helpless, with suicidal ideation to jump in front of train / traffic in the setting of running out of medications (Abilify 15 mg daily). Patient endorsing nonspecific homicidal ideation. Denies command auditory hallucinations. Denies other psychotic symptoms including paranoia, ideas of reference, thought insertion/broadcasting, or visual/olfactory/tactile/gustatory hallucinations. Patient not appearing internally preoccupied. Reports last auditory hallucination being > 3 months ago. Of note, last admission was secondary to command auditory hallucinations to kill self / others. Denies manic symptoms, including elevated mood, mood lability, irritability. Patient is not grandiose, pressured and without flight of ideas. Denies prior / current substance abuse. Reports wanting to get back on medications: seeking voluntary admission. Reports out-patient psychiatrist is in [place]: does not having professional contact information. Unable to reach: Sruthi -  [phone] - sister for collateral.      Medical History: HTN (not on meds), HLD, heart murmur, herniated disc to neck and leukemia in remission more than 10 years now.

## 2022-03-19 NOTE — ED PROVIDER NOTE - OBJECTIVE STATEMENT
60 y/o female  PMH SCHIZOPHRENIA/DEPRESSION  H/O LEUKEMIA  IN REMISSION. patient reporting suicidal ideations. states "I want to jump in front of a train or on coming traffic". 1:1 initiated  suicidal thoughts  DENIES CP, NO SOB, NO NVD, NO FEVER, NO CHILLS 60 y/o female  PMH SCHIZOPHRENIA/DEPRESSION  H/O LEUKEMIA  IN REMISSION. patient reporting suicidal ideations. states "I want to jump in front of a train or on coming traffic". 1:1 initiated  suicidal thoughts + HI, + SI  DENIES CP, NO SOB, NO NVD, NO FEVER, NO CHILLS

## 2022-03-19 NOTE — ED PROVIDER NOTE - NS ED ROS FT
Constitutional: (-) fever (-) chills (-) (-) lightheadedness   Eyes/ENT: (-) blurry vision, (-) epistaxis (-) rhinorrhea (-) nasal congestion  Cardiovascular: (-) chest pain, (-) syncope (-) palpitations   Respiratory: (-) cough, (-) shortness of breath (-) pleurisy   Gastrointestinal: (-) vomiting, (-) diarrhea (-) abdominal pain (-) nausea (-) anorexia  Musculoskeletal: (-) neck pain, (-) back pain, (-) joint pain (-) joint swelling (-) painful ROM  Integumentary: (-) rash, (-) edema (-) lacerations (-) pruritis   Neurological: (-) headache, (-) altered mental status (-) LOC (-) dizziness (-) paresthesias (-) gait abnormalities   Psychiatric: (-) hallucinations (+) SI (-) HI

## 2022-03-19 NOTE — ED PROVIDER NOTE - NS ED ATTENDING STATEMENT MOD
This was a shared visit with the BECKY. I reviewed and verified the documentation and independently performed the documented:

## 2022-03-19 NOTE — ED BEHAVIORAL HEALTH NOTE - BEHAVIORAL HEALTH NOTE
===================  PRE-HOSPITAL COURSE  ===================  SOURCE:  Information obtained from secondhand ED documentation.  DETAILS:  Per chart, patient presented to the ED due to experiencing SI with a plan to jump in front of a train.      ============  ED COURSE  ============  ARRIVAL:  No arrival issues noted, pt noted to be compliant with triage and is currently with a 1:1 staff member.   BEHAVIOR: Pt noted to be calm and cooperative, expressing SI with thoughts of wanting to jump in front of a train, not noted to be violent/aggressive, has documented hx/o multiple IPP admissions most recently at [facility] in Dec. 2021 for CAH and SI. Per chart, pt is currently undomiciled with few social supports.   TREATMENT: Per chart, pt did not require PRN medication to be managed in the ED.   VISITORS:  None noted.    [facility] attempted to contact pt's sister Sruthi [phone] however they were unavailable - number was out of service.     COVID Exposure Screen- collateral (i.e. third-party, chart review, belongings, etc; include EMS and ED staff)  1.        *Has the patient had a COVID-19 test in the last 90 days?  (  ) Yes   (  ) No   ( x ) Unknown- Reason: Unable to assess.  IF YES PROCEED TO QUESTION #2. IF NO OR UNKNOWN, PLEASE SKIP TO QUESTION #3.  2.        Date of test(s) and result(s): ________  3.        *Has the patient tested positive for COVID-19 antibodies? (  ) Yes   (  ) No   ( x ) Unknown- Reason: Unable to assess.  IF YES PRO  CEED TO QUESTION #4. IF NO or UNKNOWN, PLEASE SKIP TO QUESTION #5.  4.        Date of positive antibody test: ________  5.        *Has the patient received 2 doses of the COVID-19 vaccine? (  ) Yes   (  ) No   ( x ) Unknown- Reason: Unable to assess.  IF YES PROCEED TO QUESTION #6. IF NO or UNKNOWN, PLEASE SKIP TO QUESTION #7.  6.         Date of second dose: ________  7.        *In the past 10 days, has the patient been around anyone with a positive COVID-19 test?* (  ) Yes   (  ) No   ( x ) Unknown- Reason: Unable to assess.  IF YES PROCEED TO QUESTION #8. IF NO or UNKNOWN, PLEASE SKIP TO QUESTION #13.  8.        Was the patient within 6 feet of them for at least 15 minutes? (  ) Yes   (  ) No   (  ) Unknown- Reason: ____  9.        Did the patient provide care for them? (  ) Yes   (  ) No   (  ) Unknown- Reason: ______  10.     Did the patient have direct physical contact with them (touched, hugged, or kissed them)? (  ) Yes   (  ) No	(  ) Unknown- Reason: __  11.     Did the patient share eating or drinking utensils with them? (  ) Yes   (  ) No	(  ) Unknown- Reason: ____  12.     Did they sneeze, cough, or somehow get respiratory droplets on the patient? (  ) Yes   (  ) No	(  ) Unknown- Reason: ______  13.     *Has the patient been out of [place] within the past 10 days?* (  ) Yes   (  ) No   ( x ) Unknown- Reason: Unable to assess.  IF YES PLEASE ANSWER THE FOLLOWING QUESTIONS:  14.     Which state/country did they go to? ______  15.     Were they there over 24 hours? (  ) Yes   (  ) No	(  ) Unknown- Reason: ______

## 2022-03-19 NOTE — ED ADULT TRIAGE NOTE - CHIEF COMPLAINT QUOTE
patient reporting suicidal ideations. states "I want to jump in front of a train or on coming traffic". 1:1 initiated

## 2022-03-19 NOTE — ED BEHAVIORAL HEALTH ASSESSMENT NOTE - DETAILS
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 suicidal ideation with plan of jumping in front of train / car Haldol and Zyprexa gave her "terrible Akathisia" self-referred pending bed allocation As per HPI / Formulation / Summary

## 2022-03-19 NOTE — ED BEHAVIORAL HEALTH ASSESSMENT NOTE - DESCRIPTION
BA in communications; last worked 1992; disabled secondary to mental illness; has family but estranged HLD, heart murmur, HTN, herniated disc in neck and Leukemia more than 10 years ago. As per HPI     Vital Signs Last 24 Hrs  T(C): 36.6 (19 Mar 2022 18:07), Max: 36.6 (19 Mar 2022 18:07)  T(F): 97.8 (19 Mar 2022 18:07), Max: 97.8 (19 Mar 2022 18:07)  HR: 95 (19 Mar 2022 18:07) (95 - 95)  BP: 139/84 (19 Mar 2022 18:07) (139/84 - 139/84)  BP(mean): --  RR: 18 (19 Mar 2022 18:07) (18 - 18)  SpO2: 97% (19 Mar 2022 18:07) (97% - 97%)    COVID Exposure Screen- Patient  1.	*Have you had a COVID-19 test in the last 90 days?  ( x ) Yes, reportedly negative 1 week ago   (  ) No   (  ) Unknown- Reason: _____  2.	*Have you tested positive for COVID-19 antibodies? (  ) Yes   ( x ) No   (  ) Unknown- Reason: _____  3.	*Have you received 2 doses of the COVID-19 vaccine? ( X) Yes   (  ) No   (  ) Unknown- Reason: _____  4.	*In the past 10 days, have you been around anyone with a positive COVID-19 test?* (  ) Yes   ( x ) No   (  ) Unknown- Reason: ____

## 2022-03-19 NOTE — ED BEHAVIORAL HEALTH ASSESSMENT NOTE - NSBHDSMAXES_PSY_ALL_CORE
Bilobed Transposition Flap Text: The defect edges were debeveled with a #15 scalpel blade.  Given the location of the defect and the proximity to free margins a bilobed transposition flap was deemed most appropriate.  Using a sterile surgical marker, an appropriate bilobe flap drawn around the defect.    The area thus outlined was incised deep to adipose tissue with a #15 scalpel blade.  The skin margins were undermined to an appropriate distance in all directions utilizing iris scissors. See above

## 2022-03-19 NOTE — ED BEHAVIORAL HEALTH ASSESSMENT NOTE - ADDITIONAL DETAILS ALL
suicidal ideation with plan of jumping in front of train / car (chronic plan); no history of self-injurious behaviors or suicide attempt

## 2022-03-19 NOTE — ED PROVIDER NOTE - ATTENDING CONTRIBUTION TO CARE
61-year-old female past medical history schizoaffective disorder, hairy cell leukemia in remission, HTN presents for evaluation of suicidal thoughts.  Patient states that she is thinking of jumping in front of traffic or in  front of a train.  Patient also expressing homicidal ideations.  Patient states not toward one specific person but if she sees them she will know.  Patient does  hear voices telling her to hurt herself.  Reports noncompliance with medication, Abilify since running out few days ago.  Patient currently undomiciled, states was living in [place] now living on train.  Patient with prior psychiatric hospitalizations.  no alcohol or rug use. No CP, no shortness of breath, no nausea vomiting, no fever or chills, no abdominal pain. On exam pt in NAD AAO x 3, no signs of trauma, appears disheveled, cooperative, + mur, Lungs cta b/l, abd soft nt    will medically clear for psych

## 2022-03-20 LAB
CHOLEST SERPL-MCNC: 207 MG/DL — HIGH
HDLC SERPL-MCNC: 69 MG/DL — SIGNIFICANT CHANGE UP
LIPID PNL WITH DIRECT LDL SERPL: 127 MG/DL — HIGH
NON HDL CHOLESTEROL: 138 MG/DL — HIGH
TRIGL SERPL-MCNC: 87 MG/DL — SIGNIFICANT CHANGE UP

## 2022-03-20 RX ORDER — ACETAMINOPHEN 500 MG
650 TABLET ORAL EVERY 6 HOURS
Refills: 0 | Status: DISCONTINUED | OUTPATIENT
Start: 2022-03-20 | End: 2022-03-30

## 2022-03-20 RX ADMIN — ARIPIPRAZOLE 5 MILLIGRAM(S): 15 TABLET ORAL at 08:32

## 2022-03-20 NOTE — H&P ADULT - NSHPLABSRESULTS_GEN_ALL_CORE
12.0   7.49  )-----------( 261      ( 19 Mar 2022 18:15 )             36.1   03-19    140  |  103  |  13  ----------------------------<  133<H>  4.2   |  25  |  0.7    Ca    10.0      19 Mar 2022 18:15    TPro  7.0  /  Alb  4.2  /  TBili  <0.2  /  DBili  x   /  AST  26  /  ALT  45<H>  /  AlkPhos  136<H>  03-19

## 2022-03-20 NOTE — PATIENT PROFILE BEHAVIORAL HEALTH - FALL HARM RISK - UNIVERSAL INTERVENTIONS
Bed in lowest position, wheels locked, appropriate side rails in place/Call bell, personal items and telephone in reach/Instruct patient to call for assistance before getting out of bed or chair/Non-slip footwear when patient is out of bed/[place] to call system/Physically safe environment - no spills, clutter or unnecessary equipment/Purposeful Proactive Rounding/Room/bathroom lighting operational, light cord in reach

## 2022-03-20 NOTE — PROGRESS NOTE BEHAVIORAL HEALTH - NSBHFUPINTERVALHXFT_PSY_A_CORE
chart reviewed , discussed with staff , patient evaluated by her bed side with the staff.  no acute event overnight  .  She  was sleepy  ,and  minimally  engaged .  reports   she had  and still have thoughts of jumping in front of the train  because she  is homeless, but no intent or plan  reports feeling depress. slept well last night . no overt psychosis.

## 2022-03-20 NOTE — H&P ADULT - NSHPPHYSICALEXAM_GEN_ALL_CORE
Vital Signs Last 24 Hrs  T(C): 35.9 (19 Mar 2022 22:47), Max: 36.6 (19 Mar 2022 18:07)  T(F): 96.7 (19 Mar 2022 22:47), Max: 97.8 (19 Mar 2022 18:07)  HR: 98 (19 Mar 2022 22:47) (95 - 98)  BP: 119/58 (19 Mar 2022 22:47) (119/58 - 139/84)  BP(mean): --  RR: 18 (19 Mar 2022 22:47) (18 - 18)  SpO2: 98% (19 Mar 2022 22:47) (97% - 98%)

## 2022-03-20 NOTE — PROGRESS NOTE BEHAVIORAL HEALTH - NSBHCHARTREVIEWVS_PSY_A_CORE FT
Vital Signs Last 24 Hrs  T(C): 36.2 (20 Mar 2022 15:50), Max: 36.4 (20 Mar 2022 09:09)  T(F): 97.2 (20 Mar 2022 15:50), Max: 97.5 (20 Mar 2022 09:09)  HR: 86 (20 Mar 2022 15:50) (85 - 107)  BP: 130/80 (20 Mar 2022 15:50) (119/58 - 149/88)  BP(mean): --  RR: 18 (20 Mar 2022 15:50) (16 - 20)  SpO2: 98% (19 Mar 2022 22:47) (98% - 98%)

## 2022-03-20 NOTE — H&P ADULT - HISTORY OF PRESENT ILLNESS
62 y/o female  PMH SCHIZOPHRENIA/DEPRESSION  H/O LEUKEMIA  IN REMISSION    patient reporting suicidal ideations. states "I want to jump in front of a train or on coming traffic". 1:1 initiated  suicidal thoughts  DENIES CP, NO SOB, NO NVD, NO FEVER, NO CHILLS

## 2022-03-21 LAB
A1C WITH ESTIMATED AVERAGE GLUCOSE RESULT: 6.1 % — HIGH (ref 4–5.6)
ESTIMATED AVERAGE GLUCOSE: 128 MG/DL — HIGH (ref 68–114)
TSH SERPL-MCNC: 3.27 UIU/ML — SIGNIFICANT CHANGE UP (ref 0.27–4.2)

## 2022-03-21 PROCEDURE — 99232 SBSQ HOSP IP/OBS MODERATE 35: CPT

## 2022-03-21 RX ORDER — HYDROXYZINE HCL 10 MG
25 TABLET ORAL EVERY 6 HOURS
Refills: 0 | Status: DISCONTINUED | OUTPATIENT
Start: 2022-03-21 | End: 2022-03-30

## 2022-03-21 RX ORDER — HALOPERIDOL DECANOATE 100 MG/ML
5 INJECTION INTRAMUSCULAR EVERY 6 HOURS
Refills: 0 | Status: DISCONTINUED | OUTPATIENT
Start: 2022-03-21 | End: 2022-03-30

## 2022-03-21 RX ADMIN — ARIPIPRAZOLE 5 MILLIGRAM(S): 15 TABLET ORAL at 08:52

## 2022-03-21 NOTE — PROGRESS NOTE BEHAVIORAL HEALTH - NSBHFUPINTERVALHXFT_PSY_A_CORE
Patient seen and evaluated on IPP. As per nursing report no acute events. On approach patient calm and cooperative. No agitation aggression noted. Patient states she has been increasingly depressed and having suicidal thoughts  of "jumping in front of a train". Patient denies suicidal/homicidal thoughts at this time. States she has not had them since coming onto the unit. States she was in [place] and came back a few days ago. states she has been sleeping in the train station. States she ran out of meds. Did not have any contact information for Psychiatrist in [place]. States she has not heard voices just feeling depressed. States voices have not come back yet. Requested to re-start Abilify states it helps with the voices and the depression.  States appetite is good. Denies any s/s of aleena. Patient denies any ETOH or illicit drug use. Unable to reach: Sruthi Nicholas  [phone] - sister for collateral.

## 2022-03-21 NOTE — PROGRESS NOTE BEHAVIORAL HEALTH - NSBHADDHXMEDFT_PSY_A_CORE
Medical History: HTN (not on meds), HLD, heart murmur, herniated disc to neck and leukemia in remission more than 10 years now. not on any medical meds

## 2022-03-21 NOTE — PROGRESS NOTE BEHAVIORAL HEALTH - NSBHFUPADDHPIFT_PSY_A_CORE
As per chart review, HPI obtained in the emergency room.  Interval CC and HPI in designated sections below.    61 year-old  female, single, disabled (since ~1992), homeless (since ~ 2020 as per patient), history of Schizoaffective Disorder, depressive type, chronically managed with Abilify ( history of medication non-compliance ), prior multiple in-patient hospitalizations (last:  12/2021) for command auditory hallucinations / suicidal ideation (plan to jump into traffic or in front of train) / homicidal ideation, no prior self-injurious behaviors or suicide attempt, no substance abuse, no known trauma, no legal history, no aggression / violence history, is walked into ED endorsing suicidal ideation and homicidal ideation.     Patient is alert and oriented to person, time, place and situation. Patient is calm and cooperative; linear, organized, with no evidence of thought disorder. Patient is flat. Reports arriving from [place] ~ 4-5 days ago, of which she oscillates between these two states since being homeless (2020). Reports since arriving in [place], she has been depressed, hopeless, helpless, with suicidal ideation to jump in front of train / traffic in the setting of running out of medications (Abilify 15 mg daily). Patient endorsing nonspecific homicidal ideation. Denies command auditory hallucinations. Denies other psychotic symptoms including paranoia, ideas of reference, thought insertion/broadcasting, or visual/olfactory/tactile/gustatory hallucinations. Patient not appearing internally preoccupied. Reports last auditory hallucination being > 3 months ago. Of note, last admission was secondary to command auditory hallucinations to kill self / others. Denies manic symptoms, including elevated mood, mood lability, irritability. Patient is not grandiose, pressured and without flight of ideas. Denies prior / current substance abuse. Reports wanting to get back on medications: seeking voluntary admission. Reports out-patient psychiatrist is in [place]: does not having professional contact information. Unable to reach: Sruthi Nicholas  [phone] - sister for collateral.

## 2022-03-21 NOTE — PROGRESS NOTE BEHAVIORAL HEALTH - NSBHCHARTREVIEWVS_PSY_A_CORE FT
Vital Signs Last 24 Hrs  T(C): 36.3 (21 Mar 2022 10:06), Max: 36.3 (21 Mar 2022 10:06)  T(F): 97.4 (21 Mar 2022 10:06), Max: 97.4 (21 Mar 2022 10:06)  HR: 91 (21 Mar 2022 10:06) (63 - 91)  BP: 141/70 (21 Mar 2022 10:06) (130/80 - 154/72)  BP(mean): --  RR: 18 (21 Mar 2022 10:06) (16 - 18)  SpO2: --

## 2022-03-21 NOTE — PROGRESS NOTE BEHAVIORAL HEALTH - SUMMARY
#Admit    #Schizoaffective disorder  -Abilify 5mg Daily    -Haldol 5mg Q6 PRN for agitation  -Lorazepam 2mg Q6 PRN for aggression  -Hydroxyzine 25mg Q6 PRN for anxiety/insomnia      -Tylenol PRN

## 2022-03-22 PROCEDURE — 99231 SBSQ HOSP IP/OBS SF/LOW 25: CPT

## 2022-03-22 RX ORDER — ARIPIPRAZOLE 15 MG/1
10 TABLET ORAL DAILY
Refills: 0 | Status: DISCONTINUED | OUTPATIENT
Start: 2022-03-23 | End: 2022-03-30

## 2022-03-22 RX ORDER — ARIPIPRAZOLE 15 MG/1
5 TABLET ORAL ONCE
Refills: 0 | Status: COMPLETED | OUTPATIENT
Start: 2022-03-22 | End: 2022-03-22

## 2022-03-22 RX ADMIN — ARIPIPRAZOLE 5 MILLIGRAM(S): 15 TABLET ORAL at 12:06

## 2022-03-22 NOTE — PROGRESS NOTE BEHAVIORAL HEALTH - NSBHFUPINTERVALHXFT_PSY_A_CORE
Patient seen and evaluated P. As per nursing report no acute events. On approach patient calm and cooperative. No agitation aggression noted. Patient states she believes people are after her. States she is still depressed. Presents as paranoid. Denies A/V hallucinations. Will increase Abilify. Was on a higher dose in the past. Denies suicidal/homicidal ideations at this time. Writer encouraged patient to get out of the room and attend groups. Patient stated "im tired, need to get good sleep, been sleeping on the street for sometime" . Appears to have some secondary gain to this admission as she has on past admissions.

## 2022-03-22 NOTE — PROGRESS NOTE BEHAVIORAL HEALTH - NSBHCHARTREVIEWVS_PSY_A_CORE FT
Vital Signs Last 24 Hrs  T(C): 35.8 (22 Mar 2022 15:58), Max: 35.8 (22 Mar 2022 15:58)  T(F): 96.5 (22 Mar 2022 15:58), Max: 96.5 (22 Mar 2022 15:58)  HR: 81 (22 Mar 2022 15:58) (81 - 81)  BP: 106/70 (22 Mar 2022 15:58) (106/70 - 106/70)  BP(mean): --  RR: 18 (22 Mar 2022 15:58) (18 - 18)  SpO2: --

## 2022-03-22 NOTE — PROGRESS NOTE BEHAVIORAL HEALTH - SUMMARY
Patient has Schizoaffective Disorder, depressive type. Patient has chronic history of similar presentations to ED, endorsing suicidal ideation to jump in front of train / car and homicidal ideation, at times in the setting of command auditory hallucinations. Hence. R/O Malingering in light of recently returning from [place] and homelessness (chronic). Patient however is off medications and is not engaged in any out-patient treatment. Patient has no future orientation, not engaged in safety planning, and no social supports. Hence. Patient has no currently identifiable protective factors / barriers to suicide. Patient seeking voluntary admission to restart medications of which (Abilify 15 mg daily) has helped stabilize her symptoms. Patient to benefit from inpatient psychiatric admission once medically cleared    Patient seen and evaluated P. As per nursing report no acute events. On approach patient calm and cooperative. No agitation aggression noted. Patient states she believes people are after her. Presents as paranoid. Denies A/V hallucinations. Will increase Abilify. Was on a higher dose in the past. Denies suicidal/homicidal ideations at this time. Writer encouraged patient to get out of the room and attend groups. Patient stated "im tired, need to get good sleep, been sleeping on the street for sometime" . Appears to have some secondary gain to this admission as she has on past admissions.    #Admit    #Schizoaffective disorder  -Abilify 10mg Daily starting tomorrow    -Haldol 5mg Q6 PRN for agitation  -Lorazepam 2mg Q6 PRN for aggression  -Hydroxyzine 25mg Q6 PRN for anxiety/insomnia      -Tylenol PRN

## 2022-03-23 PROCEDURE — 99231 SBSQ HOSP IP/OBS SF/LOW 25: CPT

## 2022-03-23 RX ADMIN — ARIPIPRAZOLE 10 MILLIGRAM(S): 15 TABLET ORAL at 08:01

## 2022-03-23 NOTE — PROGRESS NOTE BEHAVIORAL HEALTH - NSBHFUPINTERVALHXFT_PSY_A_CORE
Patient seen and evaluated. As per nursing report no acute events. On approach patient calm and cooperative. No agitation aggression noted. Patient states continues to believe people are after her. Presents as odd, paranoid. Received increase dose of Ability this morning. Denies any side effects/adverse reactions. No side effects/adverse reactions noted. Patient was on a higher dose previously. Denies A/V hallucinations. Denies suicidal/homicidal ideations at this time. Writer encouraged patient to get out of the room and attend groups. Patient requesting to go to [place]. Appears to have some secondary gain to this admission as she has on past admissions.

## 2022-03-23 NOTE — PROGRESS NOTE BEHAVIORAL HEALTH - NSBHCHARTREVIEWVS_PSY_A_CORE FT
Vital Signs Last 24 Hrs  T(C): 35.9 (23 Mar 2022 06:00), Max: 35.9 (23 Mar 2022 06:00)  T(F): 96.7 (23 Mar 2022 06:00), Max: 96.7 (23 Mar 2022 06:00)  HR: 71 (23 Mar 2022 06:00) (71 - 81)  BP: 154/83 (23 Mar 2022 06:00) (106/70 - 154/83)  BP(mean): --  RR: 20 (23 Mar 2022 06:00) (18 - 20)  SpO2: --

## 2022-03-23 NOTE — PROGRESS NOTE BEHAVIORAL HEALTH - SUMMARY
Patient has Schizoaffective Disorder, depressive type. Patient has chronic history of similar presentations to ED, endorsing suicidal ideation to jump in front of train / car and homicidal ideation, at times in the setting of command auditory hallucinations. Hence. R/O Malingering in light of recently returning from [place] and homelessness (chronic). Patient however is off medications and is not engaged in any out-patient treatment. Patient has no future orientation, not engaged in safety planning, and no social supports. Hence. Patient has no currently identifiable protective factors / barriers to suicide. Patient seeking voluntary admission to restart medications of which (Abilify 15 mg daily) has helped stabilize her symptoms. Patient to benefit from inpatient psychiatric admission once medically cleared    Patient seen and evaluated. As per nursing report no acute events. On approach patient calm and cooperative. No agitation aggression noted. Patient states continues to believe people are after her. Presents as odd, paranoid. Received increase dose of Ability this morning. Denies any side effects/adverse reactions. No side effects/adverse reactions noted. Patient was on a higher dose previously. Denies A/V hallucinations. Denies suicidal/homicidal ideations at this time. Writer encouraged patient to get out of the room and attend groups. Patient requesting to go to [place]. Appears to have some secondary gain to this admission as she has on past admissions.    #Admit    #Schizoaffective disorder  -Abilify 10mg Daily     -Haldol 5mg Q6 PRN for agitation  -Lorazepam 2mg Q6 PRN for aggression  -Hydroxyzine 25mg Q6 PRN for anxiety/insomnia      -Tylenol PRN

## 2022-03-24 PROCEDURE — 99231 SBSQ HOSP IP/OBS SF/LOW 25: CPT

## 2022-03-24 RX ADMIN — ARIPIPRAZOLE 10 MILLIGRAM(S): 15 TABLET ORAL at 08:11

## 2022-03-24 NOTE — PROGRESS NOTE BEHAVIORAL HEALTH - SUMMARY
Patient has Schizoaffective Disorder, depressive type. Patient has chronic history of similar presentations to ED, endorsing suicidal ideation to jump in front of train / car and homicidal ideation, at times in the setting of command auditory hallucinations. Hence. R/O Malingering in light of recently returning from [place] and homelessness (chronic). Patient however is off medications and is not engaged in any out-patient treatment. Patient has no future orientation, not engaged in safety planning, and no social supports. Hence. Patient has no currently identifiable protective factors / barriers to suicide. Patient seeking voluntary admission to restart medications of which (Abilify 15 mg daily) has helped stabilize her symptoms. Patient to benefit from inpatient psychiatric admission once medically cleared    Patient seen and evaluated. As per nursing report no acute events. On approach patient calm and cooperative. No agitation aggression noted. Patient states she feels better today.  Did not endorse any paranoia today. Denies A/V hallucinations. Denies suicidal/homicidal ideations at this time. Writer encouraged patient to get out of the room and attend groups. Patient visible on the unit yesterday afternoon watching TV in the TV room. Patient focused on placement when discharged. Appears to have some secondary gain to this admission as she has on past admissions.    #Admit    #Schizoaffective disorder  -Abilify 10mg Daily     -Haldol 5mg Q6 PRN for agitation  -Lorazepam 2mg Q6 PRN for aggression  -Hydroxyzine 25mg Q6 PRN for anxiety/insomnia      -Tylenol PRN

## 2022-03-24 NOTE — PROGRESS NOTE BEHAVIORAL HEALTH - NSBHFUPINTERVALHXFT_PSY_A_CORE
Patient seen and evaluated. As per nursing report no acute events. On approach patient calm and cooperative. No agitation aggression noted. Patient states she feels better today.  Did not endorse any paranoia today. Denies A/V hallucinations. Denies suicidal/homicidal ideations at this time. Writer encouraged patient to get out of the room and attend groups. Patient visible on the unit yesterday afternoon watching TV in the TV room. Patient focused on placement when discharged. Appears to have some secondary gain to this admission as she has on past admissions.

## 2022-03-25 PROCEDURE — 99231 SBSQ HOSP IP/OBS SF/LOW 25: CPT

## 2022-03-25 RX ADMIN — ARIPIPRAZOLE 10 MILLIGRAM(S): 15 TABLET ORAL at 08:15

## 2022-03-25 NOTE — PROGRESS NOTE BEHAVIORAL HEALTH - NSBHCHARTREVIEWVS_PSY_A_CORE FT
Vital Signs Last 24 Hrs  T(C): 36.2 (25 Mar 2022 08:39), Max: 36.4 (24 Mar 2022 15:50)  T(F): 97.2 (25 Mar 2022 08:39), Max: 97.6 (24 Mar 2022 15:50)  HR: 86 (25 Mar 2022 08:39) (66 - 88)  BP: 93/72 (25 Mar 2022 08:39) (93/72 - 133/71)  BP(mean): --  RR: 19 (25 Mar 2022 08:39) (18 - 19)  SpO2: --

## 2022-03-25 NOTE — PROGRESS NOTE BEHAVIORAL HEALTH - NSBHFUPINTERVALHXFT_PSY_A_CORE
Patient seen and evaluated in treatment team. As per nursing report no acute events. On approach patient calm and cooperative. No agitation aggression noted. Patient states she feels better.  Did not endorse any paranoia today. Denies A/V hallucinations. Denies suicidal/homicidal ideations at this time. Writer encouraged patient to get out of the room and attend groups. Patient visible on the unit yesterday afternoon watching TV in the TV room. Patient focused on placement when discharged. Appears to have some secondary gain to this admission as she has on past admissions.

## 2022-03-25 NOTE — PROGRESS NOTE BEHAVIORAL HEALTH - SUMMARY
Patient has Schizoaffective Disorder, depressive type. Patient has chronic history of similar presentations to ED, endorsing suicidal ideation to jump in front of train / car and homicidal ideation, at times in the setting of command auditory hallucinations. Hence. R/O Malingering in light of recently returning from [place] and homelessness (chronic). Patient however is off medications and is not engaged in any out-patient treatment. Patient has no future orientation, not engaged in safety planning, and no social supports. Hence. Patient has no currently identifiable protective factors / barriers to suicide. Patient seeking voluntary admission to restart medications of which (Abilify 15 mg daily) has helped stabilize her symptoms. Patient to benefit from inpatient psychiatric admission once medically cleared    Patient seen and evaluated in treatment team. As per nursing report no acute events. On approach patient calm and cooperative. No agitation aggression noted. Patient states she feels better.  Did not endorse any paranoia today. Denies A/V hallucinations. Denies suicidal/homicidal ideations at this time. Writer encouraged patient to get out of the room and attend groups. Patient visible on the unit yesterday afternoon watching TV in the TV room. Patient focused on placement when discharged. Appears to have some secondary gain to this admission as she has on past admissions.    #Admit    #Schizoaffective disorder  -Abilify 10mg Daily     -Haldol 5mg Q6 PRN for agitation  -Lorazepam 2mg Q6 PRN for aggression  -Hydroxyzine 25mg Q6 PRN for anxiety/insomnia      -Tylenol PRN

## 2022-03-26 RX ADMIN — Medication 650 MILLIGRAM(S): at 15:13

## 2022-03-26 RX ADMIN — ARIPIPRAZOLE 10 MILLIGRAM(S): 15 TABLET ORAL at 08:10

## 2022-03-27 RX ADMIN — ARIPIPRAZOLE 10 MILLIGRAM(S): 15 TABLET ORAL at 08:01

## 2022-03-27 RX ADMIN — Medication 650 MILLIGRAM(S): at 08:01

## 2022-03-28 PROCEDURE — 99231 SBSQ HOSP IP/OBS SF/LOW 25: CPT

## 2022-03-28 RX ADMIN — ARIPIPRAZOLE 10 MILLIGRAM(S): 15 TABLET ORAL at 08:20

## 2022-03-28 NOTE — DISCHARGE NOTE BEHAVIORAL HEALTH - HPI (INCLUDE ILLNESS QUALITY, SEVERITY, DURATION, TIMING, CONTEXT, MODIFYING FACTORS, ASSOCIATED SIGNS AND SYMPTOMS)
61 year-old  female, single, disabled (since ~1992), homeless (since ~ 2020 as per patient), history of Schizoaffective Disorder, depressive type, chronically managed with Abilify ( history of medication non-compliance ), prior multiple in-patient hospitalizations (last:  12/2021) for command auditory hallucinations / suicidal ideation (plan to jump into traffic or in front of train) / homicidal ideation, no prior self-injurious behaviors or suicide attempt, no substance abuse, no known trauma, no legal history, no aggression / violence history, is walked into ED endorsing suicidal ideation and homicidal ideation.   Patient is alert and oriented to person, time, place and situation. Patient is calm and cooperative; linear, organized, with no evidence of thought disorder. Patient is flat. Reports arriving from [place] ~ 4-5 days ago, of which she oscillates between these two states since being homeless (2020). Reports since arriving in [place], she has been depressed, hopeless, helpless, with suicidal ideation to jump in front of train / traffic in the setting of running out of medications (Abilify 15 mg daily). Patient endorsing nonspecific homicidal ideation. Denies command auditory hallucinations. Denies other psychotic symptoms including paranoia, ideas of reference, thought insertion/broadcasting, or visual/olfactory/tactile/gustatory hallucinations. Patient not appearing internally preoccupied. Reports last auditory hallucination being > 3 months ago. Of note, last admission was secondary to command auditory hallucinations to kill self / others. Denies manic symptoms, including elevated mood, mood lability, irritability. Patient is not grandiose, pressured and without flight of ideas. Denies prior / current substance abuse. Reports wanting to get back on medications: seeking voluntary admission. Reports out-patient psychiatrist is in [place]: does not having professional contact information. Unable to reach: Sruthi -  [phone] - sister for collateral    Patient seen and evaluated on IPP. As per nursing report no acute events. On approach patient calm and cooperative. No agitation aggression noted. Patient states she has been increasingly depressed and having suicidal thoughts  of "jumping in front of a train". Patient denies suicidal/homicidal thoughts at this time. States she has not had them since coming onto the unit. States she was in [place] and came back a few days ago. states she has been sleeping in the train station. States she ran out of meds. Did not have any contact information for Psychiatrist in [place]. States she has not heard voices just feeling depressed. States voices have not come back yet. Requested to re-start Abilify states it helps with the voices and the depression.  States appetite is good. Denies any s/s of aleena. Patient denies any ETOH or illicit drug use. Unable to reach: Sruthi   [phone] - sister for collateral 61 year-old  female, single, disabled (since ~1992), homeless (since ~ 2020 as per patient), history of Schizoaffective Disorder, depressive type, chronically managed with Abilify ( history of medication non-compliance ), prior multiple in-patient hospitalizations (last:  12/2021) for command auditory hallucinations / suicidal ideation (plan to jump into traffic or in front of train) / homicidal ideation, no prior self-injurious behaviors or suicide attempt, no substance abuse, no known trauma, no legal history, no aggression / violence history, is walked into ED endorsing suicidal ideation and homicidal ideation.   Patient is alert and oriented to person, time, place and situation. Patient is calm and cooperative; linear, organized, with no evidence of thought disorder. Patient is flat. Reports arriving from [place] ~ 4-5 days ago, of which she oscillates between these two states since being homeless (2020). Reports since arriving in [place], she has been depressed, hopeless, helpless, with suicidal ideation to jump in front of train / traffic in the setting of running out of medications (Abilify 15 mg daily). Patient endorsing nonspecific homicidal ideation. Denies command auditory hallucinations. Denies other psychotic symptoms including paranoia, ideas of reference, thought insertion/broadcasting, or visual/olfactory/tactile/gustatory hallucinations. Patient not appearing internally preoccupied. Reports last auditory hallucination being > 3 months ago. Of note, last admission was secondary to command auditory hallucinations to kill self / others. Denies manic symptoms, including elevated mood, mood lability, irritability. Patient is not grandiose, pressured and without flight of ideas. Denies prior / current substance abuse. Reports wanting to get back on medications: seeking voluntary admission. Reports out-patient psychiatrist is in [place]: does not having professional contact information. Unable to reach: Sruthi -  [phone] - sister for collateral    Patient seen and evaluated on IPP. As per nursing report no acute events. On approach patient calm and cooperative. No agitation aggression noted. Patient states she has been increasingly depressed and having suicidal thoughts  of "jumping in front of a train". Patient denies suicidal/homicidal thoughts at this time. States she has not had them since coming onto the unit. States she was in [place] and came back a few days ago. states she has been sleeping in the train station. States she ran out of meds. Did not have any contact information for Psychiatrist in [place]. States she has not heard voices just feeling depressed. States voices have not come back yet. Requested to re-start Abilify states it helps with the voices and the depression.  States appetite is good. Denies any s/s of aleena. Patient denies any ETOH or illicit drug use. Unable to reach: Sruthi   [phone] - sister for collateral    Patient seen and evaluated 3/29/22. As per nursing report no acute events. On approach patient calm and cooperative. No agitation aggression noted. Patient states she feels "fine".  Did not endorse any paranoia. Denies A/V hallucinations. Denies suicidal/homicidal ideations at this time. Writer encouraged patient to get out of the room and attend groups. Patient visible on the unit engaging with peers, attending group and watching TV in the TV room. Discussed discharge. Patient states she will go back to shelter Anticipated discharge Tomorrow 3/30/22. Compliant with medication. Does not warrant continued Inpatient hospitalization. Patient does not present an imminent risk to self or others at this time.

## 2022-03-28 NOTE — DISCHARGE NOTE BEHAVIORAL HEALTH - NS MD DC FALL RISK RISK
For information on Fall & Injury Prevention, visit: [email or web address] OR  [email or web address] OR  https://www.cdc.gov/steadi/patient.html

## 2022-03-28 NOTE — DISCHARGE NOTE BEHAVIORAL HEALTH - NSBHDCMEDSFT_PSY_A_CORE
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   Abilify: Patient tolerated the medication well, denied side effects.

## 2022-03-28 NOTE — DISCHARGE NOTE BEHAVIORAL HEALTH - NSBHDCADMRISKREASONS_PSY_A_CORE
High utilizer of Inpateint/ED services/Poor engagement in outpt treatment/Non-adherence with medications

## 2022-03-28 NOTE — DISCHARGE NOTE BEHAVIORAL HEALTH - MEDICATION SUMMARY - MEDICATIONS TO CHANGE
I will SWITCH the dose or number of times a day I take the medications listed below when I get home from the hospital:    ARIPiprazole 15 mg oral tablet  -- 1 tab(s) by mouth once a day

## 2022-03-28 NOTE — DISCHARGE NOTE BEHAVIORAL HEALTH - NSBHDCTESTSPEND_PSY_A_CORE
[facility] Medicine History & Physical      Patient Name: Yancy Guy    : 1963    PCP: Turner Joshua, APRN - CNP    Date of Service:  Patient seen and examined on 2020     Chief Complaint:  Shortness of breath    History Of Present Illness:    Yancy Guy is a 49 y.o. male who presented to ED with complaint of shortness of breath. He reports progressive shortness of breath with exertion for the last 5 to 6 days. He also reports he has been sleeping in his recliner due to orthopnea. He reports a PMH of asthma as well as hypertension. He states he has not been taking his blood pressure medications for \"awhile\" due to loss of insurance and does not regularly need inhalers. He denies fever, dizziness, chest pain, cough, edema, abdominal pain, nausea, vomiting, diarrhea, constipation, pain with urination. Work-up initiated in ED. CXR notable for cardiomegaly and small, left pleural effusion and/or atelectasis. EKG notable for sinus tachycardia without evidence of acute ischemia or infarction. ProBNP 3415. Troponin negative. No significant electrolyte abnormalities or evidence of kidney failure. Past Medical History:    Patient  has a past medical history of Asthma. Past Surgical History:    Patient denies any significant past surgical history. Medications Prior to Admission:      Prior to Admission medications    Medication Sig Start Date End Date Taking? Authorizing Provider   naproxen (NAPROSYN) 500 MG tablet Take 1 tablet by mouth 2 times daily for 20 doses 10/25/17 11/4/17  Char Velasco MD   naproxen (NAPROSYN) 500 MG tablet Take 1 tablet by mouth 2 times daily for 20 doses 17  ANTONIA Nieves       Allergies:  Patient has no known allergies. Social History:      TOBACCO:   reports that he has been smoking cigars. He has never used smokeless tobacco.  ETOH:   reports current alcohol use of about 4.2 standard drinks of alcohol per week.   DRUGS: reports no history of drug use. Family History:      Reviewed in detail positive as follows:    History reviewed. No pertinent family history. REVIEW OF SYSTEMS:   Pertinent positives as noted in the HPI. All other systems reviewed and negative. PHYSICAL EXAM PERFORMED:    BP (!) 157/109   Pulse 100   Temp 97.7 °F (36.5 °C)   Resp 21   SpO2 98%     General appearance:  Awake, alert, no apparent distress  HEENT:  Normocephalic, atraumatic without obvious deformity. PERRL. EOM intact. Conjunctivae/corneas clear. Neck: Supple, with full range of motion. No JVD. Trachea midline. Respiratory:  Clear to auscultation bilaterally without rales, wheezes, or rhonchi. Normal respiratory effort. Cardiovascular:  Regular rate and rhythm without murmurs, rubs or gallops. Abdomen: Soft, NT, ND, without rebound or guarding. Normal bowel sounds. Extremities:  No clubbing, cyanosis, or edema bilaterally. Full range of motion without deformity. +2 palpable pulses, equal bilaterally. Capillary refill brisk,< 3 seconds   Skin: No rashes or lesions. Warm/dry. Neurologic:  Neurovascularly intact without any focal sensory/motor deficits. Cranial nerves: II-XII intact, grossly non-focal. Alert and oriented x 3. Normal speech. Psychiatric:  Thought content appropriate, normal insight. Labs:   CBC   No results for input(s): WBC, HGB, HCT, PLT in the last 72 hours. RENAL  Recent Labs     02/24/20  1828      K 4.3      CO2 19*   BUN 19   CREATININE 1.0       LFTS  Recent Labs     02/24/20  1828   AST 33   ALT 53*   BILIDIR 0.3   BILITOT 1.2*   ALKPHOS 69       COAG  No results for input(s): INR in the last 72 hours.     CARDIAC ENZYMES  Recent Labs     02/24/20  1828   TROPONINI <0.01       LIPIDS  Cholesterol, Total   Date/Time Value Ref Range Status   03/29/2018 10:26  <=199 mg/dL Final     Triglycerides   Date/Time Value Ref Range Status   03/29/2018 10:26  30 - 149 mg/dL Final     HDL none

## 2022-03-28 NOTE — PROGRESS NOTE BEHAVIORAL HEALTH - NSBHFUPINTERVALHXFT_PSY_A_CORE
Patient seen and evaluated in treatment team. As per nursing report no acute events. On approach patient calm and cooperative. No agitation aggression noted. Patient states she feels better.  Did not endorse any paranoia. Denies A/V hallucinations. Denies suicidal/homicidal ideations at this time. Writer encouraged patient to get out of the room and attend groups. Patient visible on the unit engaging with peers, attending group and watching TV in the TV room. Discussed discharge. Patient states she will go back to shelter Anticipated discharge Wednesday 3/30/22. Patient seen and evaluated. As per nursing report no acute events. On approach patient calm and cooperative. No agitation aggression noted. Patient states she feels better.  Did not endorse any paranoia. Denies A/V hallucinations. Denies suicidal/homicidal ideations at this time. Writer encouraged patient to get out of the room and attend groups. Patient visible on the unit engaging with peers, attending group and watching TV in the TV room. Discussed discharge. Patient states she will go back to shelter Anticipated discharge Wednesday 3/30/22.

## 2022-03-28 NOTE — DISCHARGE NOTE BEHAVIORAL HEALTH - MEDICATION SUMMARY - MEDICATIONS TO TAKE
I will START or STAY ON the medications listed below when I get home from the hospital:    ARIPiprazole 10 mg oral tablet  -- 1 tab(s) by mouth once a day x 30 days. Continue until told otherwise by your provider.   -- Indication: For Schizoaffective disorder

## 2022-03-28 NOTE — PROGRESS NOTE BEHAVIORAL HEALTH - SUMMARY
Patient has Schizoaffective Disorder, depressive type. Patient has chronic history of similar presentations to ED, endorsing suicidal ideation to jump in front of train / car and homicidal ideation, at times in the setting of command auditory hallucinations. Hence. R/O Malingering in light of recently returning from [place] and homelessness (chronic). Patient however is off medications and is not engaged in any out-patient treatment. Patient has no future orientation, not engaged in safety planning, and no social supports. Hence. Patient has no currently identifiable protective factors / barriers to suicide. Patient seeking voluntary admission to restart medications of which (Abilify 15 mg daily) has helped stabilize her symptoms. Patient to benefit from inpatient psychiatric admission once medically cleared    Patient seen and evaluated in treatment team. As per nursing report no acute events. On approach patient calm and cooperative. No agitation aggression noted. Patient states she feels better.  Did not endorse any paranoia. Denies A/V hallucinations. Denies suicidal/homicidal ideations at this time. Writer encouraged patient to get out of the room and attend groups. Patient visible on the unit engaging with peers, attending group and watching TV in the TV room. Discussed discharge. Patient states she will go back to shelter Anticipated discharge Wednesday 3/30/22.    #Admit    #Schizoaffective disorder  -Abilify 10mg Daily     -Haldol 5mg Q6 PRN for agitation  -Lorazepam 2mg Q6 PRN for aggression  -Hydroxyzine 25mg Q6 PRN for anxiety/insomnia      -Tylenol PRN Patient has Schizoaffective Disorder, depressive type. Patient has chronic history of similar presentations to ED, endorsing suicidal ideation to jump in front of train / car and homicidal ideation, at times in the setting of command auditory hallucinations. Hence. R/O Malingering in light of recently returning from [place] and homelessness (chronic). Patient however is off medications and is not engaged in any out-patient treatment. Patient has no future orientation, not engaged in safety planning, and no social supports. Hence. Patient has no currently identifiable protective factors / barriers to suicide. Patient seeking voluntary admission to restart medications of which (Abilify 15 mg daily) has helped stabilize her symptoms. Patient to benefit from inpatient psychiatric admission once medically cleared    Patient seen and evaluated. As per nursing report no acute events. On approach patient calm and cooperative. No agitation aggression noted. Patient states she feels better.  Did not endorse any paranoia. Denies A/V hallucinations. Denies suicidal/homicidal ideations at this time. Writer encouraged patient to get out of the room and attend groups. Patient visible on the unit engaging with peers, attending group and watching TV in the TV room. Discussed discharge. Patient states she will go back to shelter Anticipated discharge Wednesday 3/30/22.    #Admit    #Schizoaffective disorder  -Abilify 10mg Daily     -Haldol 5mg Q6 PRN for agitation  -Lorazepam 2mg Q6 PRN for aggression  -Hydroxyzine 25mg Q6 PRN for anxiety/insomnia      -Tylenol PRN

## 2022-03-28 NOTE — PROGRESS NOTE BEHAVIORAL HEALTH - NSBHCHARTREVIEWVS_PSY_A_CORE FT
Vital Signs Last 24 Hrs  T(C): 35.4 (28 Mar 2022 08:38), Max: 35.9 (27 Mar 2022 16:17)  T(F): 95.8 (28 Mar 2022 08:38), Max: 96.7 (27 Mar 2022 16:17)  HR: 88 (28 Mar 2022 08:38) (68 - 88)  BP: 126/77 (28 Mar 2022 08:38) (114/78 - 126/77)  BP(mean): --  RR: 16 (28 Mar 2022 08:38) (16 - 18)  SpO2: --

## 2022-03-28 NOTE — DISCHARGE NOTE BEHAVIORAL HEALTH - NSBHDCHOUSINGFT_PSY_A_CORE
[facility] [place] [facility] ID# [id number]  [facility]   [address] [facility] [id number]     Ms. Gomez

## 2022-03-29 PROCEDURE — 99231 SBSQ HOSP IP/OBS SF/LOW 25: CPT

## 2022-03-29 RX ORDER — ARIPIPRAZOLE 15 MG/1
1 TABLET ORAL
Qty: 30 | Refills: 0
Start: 2022-03-29 | End: 2022-04-27

## 2022-03-29 RX ADMIN — ARIPIPRAZOLE 10 MILLIGRAM(S): 15 TABLET ORAL at 08:42

## 2022-03-29 NOTE — PROGRESS NOTE BEHAVIORAL HEALTH - PRIMARY DX
Schizoaffective disorder, depressive type

## 2022-03-29 NOTE — PROGRESS NOTE BEHAVIORAL HEALTH - GAIT / STATION
Normal gait / station
Other
Normal gait / station

## 2022-03-29 NOTE — PROGRESS NOTE BEHAVIORAL HEALTH - NSBHADMITIPREASON_PSY_A_CORE
Danger to self; mental illness expected to respond to inpatient care
unablwe to take care for self/other reason
Danger to self; mental illness expected to respond to inpatient care

## 2022-03-29 NOTE — PROGRESS NOTE BEHAVIORAL HEALTH - RISK ASSESSMENT
MODERATE - HIGH RISK     ACUTE RISK FACTORS: depressed mood, hopelessness, suicidal ideation, homicidal ideation, not in psychiatric treatment, no current medication management     CHRONIC RISK FACTORS: Schizoaffective disorder, no social supports, poor engagement with out-patient treatment, medication / treatment non-compliance, history of suicidal ideation / homicidal ideation, history of in-patient hospitalizations, homelessness    PROTECTIVE FACTORS: seeking help    MITIGATION STRATEGIES: Constant Observation, in-patient hospitalization  High Acute Suicide Risk

## 2022-03-29 NOTE — PROGRESS NOTE BEHAVIORAL HEALTH - NSBHCHARTREVIEWLAB_PSY_A_CORE FT
Complete Blood Count + Automated Diff (03.19.22 @ 18:15)   WBC Count: 7.49 K/uL   RBC Count: 4.07 M/uL   Hemoglobin: 12.0 g/dL   Hematocrit: 36.1    Mean Cell Volume: 88.7 fL   Mean Cell Hemoglobin: 29.5 pg   Mean Cell Hemoglobin Conc: 33.2 g/dL   Red Cell Distrib Width: 13.6    Platelet Count - Automated: 261 K/uL   Auto Neutrophil #: 4.53 K/uL   Auto Lymphocyte #: 2.15 K/uL   Auto Monocyte #: 0.54 K/uL   Auto Eosinophil #: 0.18 K/uL   Auto Basophil #: 0.05 K/uL   Auto Neutrophil %: 60.5:   Auto Lymphocyte %: 28.7    Auto Monocyte %: 7.2 %   Auto Eosinophil %: 2.4   Auto Basophil %: 0.7   Auto Immature Granulocyte %: 0.5: (Includes meta, myelo and promyelocytes)   Nucleated RBC: 0 /100 WBCs

## 2022-03-29 NOTE — PROGRESS NOTE BEHAVIORAL HEALTH - AXIS III
H/o HTN, heart murmur, herniated disc to neck, & leukemia in remission

## 2022-03-29 NOTE — PROGRESS NOTE BEHAVIORAL HEALTH - NSBHFUPINTERVALHXFT_PSY_A_CORE
Patient seen and evaluated. As per nursing report no acute events. On approach patient calm and cooperative. No agitation aggression noted. Patient states she feels "fine".  Did not endorse any paranoia. Denies A/V hallucinations. Denies suicidal/homicidal ideations at this time. Writer encouraged patient to get out of the room and attend groups. Patient visible on the unit engaging with peers, attending group and watching TV in the TV room. Discussed discharge. Patient states she will go back to shelter Anticipated discharge Tomorrow 3/30/22. Compliant with medication. Does not warrant continued Inpatient hospitalization. Patient does not present an imminent risk to self or others at this time. Patient seen and evaluated. As per nursing report no acute events. On approach patient calm and cooperative. No agitation aggression noted. Patient states she feels "fine".  Did not endorse any paranoia. Denies A/V hallucinations. Denies suicidal/homicidal ideations at this time. Writer encouraged patient to get out of the room and attend groups. Patient visible on the unit engaging with peers, attending group and watching TV in the TV room. Discussed discharge. Patient states she will go back to shelter Anticipated discharge Tomorrow 3/30/22. Compliant with medication. Does not warrant continued Inpatient hospitalization. Patient does not present an imminent risk to self or others at this time.    Spoke to patients aunt Anisa Giancalro [phone] in person during visit.

## 2022-03-29 NOTE — PROGRESS NOTE BEHAVIORAL HEALTH - NSBHATTESTSEENBY_PSY_A_CORE
NP without Attending Psychiatrist
NP without Attending Psychiatrist
attending Psychiatrist without NP/Trainee
NP without Attending Psychiatrist

## 2022-03-29 NOTE — PROGRESS NOTE BEHAVIORAL HEALTH - NSBHPTASSESSDT_PSY_A_CORE
24-Mar-2022 11:51
22-Mar-2022 10:34
23-Mar-2022 11:52
20-Mar-2022 21:01
25-Mar-2022 11:02
29-Mar-2022 11:30
28-Mar-2022 09:55
21-Mar-2022 14:28

## 2022-03-29 NOTE — PROGRESS NOTE BEHAVIORAL HEALTH - NSBHCHARTREVIEWINVESTIGATE_PSY_A_CORE FT
< from: 12 Lead ECG (03.19.22 @ 19:03) >    Ventricular Rate 65 BPM    Atrial Rate 65 BPM    P-R Interval 160 ms    QRS Duration 92 ms    Q-T Interval 372 ms    QTC Calculation(Bazett) 386 ms    P Axis 76 degrees    R Axis -24 degrees    T Axis 57 degrees    Diagnosis Line Normal sinus rhythm  Poor R wave progression

## 2022-03-29 NOTE — PROGRESS NOTE BEHAVIORAL HEALTH - NSBHFUPINTERVALCCFT_PSY_A_CORE
"I think someone is after me"
"I feel a little better"
"I feel better"
" I am feeling depress"
"I feel a little better"
"I feel better"
"I feel fine"
"I been depressed, ran out of meds"

## 2022-03-29 NOTE — PROGRESS NOTE BEHAVIORAL HEALTH - NSBHADMITIPOBSFT_PSY_A_CORE
As per unit protocol
for safety
As per unit protocol

## 2022-03-29 NOTE — PROGRESS NOTE BEHAVIORAL HEALTH - MUSCLE TONE / STRENGTH
Normal muscle tone/strength
Other
Normal muscle tone/strength
Normal muscle tone/strength

## 2022-03-29 NOTE — PROGRESS NOTE BEHAVIORAL HEALTH - SUMMARY
Patient has Schizoaffective Disorder, depressive type. Patient has chronic history of similar presentations to ED, endorsing suicidal ideation to jump in front of train / car and homicidal ideation, at times in the setting of command auditory hallucinations. Hence. R/O Malingering in light of recently returning from [place] and homelessness (chronic). Patient however is off medications and is not engaged in any out-patient treatment. Patient has no future orientation, not engaged in safety planning, and no social supports. Hence. Patient has no currently identifiable protective factors / barriers to suicide. Patient seeking voluntary admission to restart medications of which (Abilify 15 mg daily) has helped stabilize her symptoms. Patient to benefit from inpatient psychiatric admission once medically cleared    Patient seen and evaluated. As per nursing report no acute events. On approach patient calm and cooperative. No agitation aggression noted. Patient states she feels "fine".  Did not endorse any paranoia. Denies A/V hallucinations. Denies suicidal/homicidal ideations at this time. Writer encouraged patient to get out of the room and attend groups. Patient visible on the unit engaging with peers, attending group and watching TV in the TV room. Discussed discharge. Patient states she will go back to shelter Anticipated discharge Tomorrow 3/30/22. Compliant with medication. Does not warrant continued Inpatient hospitalization. Patient does not present an imminent risk to self or others at this time.    #Admit    #Schizoaffective disorder  -Abilify 10mg Daily     -Haldol 5mg Q6 PRN for agitation  -Lorazepam 2mg Q6 PRN for aggression  -Hydroxyzine 25mg Q6 PRN for anxiety/insomnia      -Tylenol PRN

## 2022-03-29 NOTE — PROGRESS NOTE BEHAVIORAL HEALTH - NSBHADMITDANGERSELF_PSY_A_CORE
suicidal behavior/suicidal ideation with plan and means

## 2022-03-29 NOTE — PROGRESS NOTE BEHAVIORAL HEALTH - NSBHCHARTREVIEWVS_PSY_A_CORE FT
Vital Signs Last 24 Hrs  T(C): 35 (29 Mar 2022 08:32), Max: 36.1 (28 Mar 2022 15:41)  T(F): 95 (29 Mar 2022 08:32), Max: 97 (28 Mar 2022 15:41)  HR: 85 (29 Mar 2022 08:32) (69 - 85)  BP: 124/78 (29 Mar 2022 08:32) (119/77 - 124/78)  BP(mean): --  RR: 18 (29 Mar 2022 08:32) (16 - 18)  SpO2: --

## 2022-03-29 NOTE — PROGRESS NOTE BEHAVIORAL HEALTH - AFFECT CONGRUENCE
Not congruent

## 2022-03-30 VITALS
SYSTOLIC BLOOD PRESSURE: 138 MMHG | TEMPERATURE: 96 F | DIASTOLIC BLOOD PRESSURE: 81 MMHG | HEART RATE: 90 BPM | RESPIRATION RATE: 16 BRPM

## 2022-03-30 PROCEDURE — 99238 HOSP IP/OBS DSCHRG MGMT 30/<: CPT

## 2022-03-30 RX ADMIN — ARIPIPRAZOLE 10 MILLIGRAM(S): 15 TABLET ORAL at 08:49

## 2022-03-30 NOTE — CHART NOTE - NSCHARTNOTEFT_GEN_A_CORE
D/C today. [employer] cards provided at patients request.. Patient states she has a plane ticket to go back to [place]. Writer sent meds to [facility] pharmacy as requested by patient. Patient appeared to be in good spirits today. Endorses a better mood. Insight and judgment have improved. Denies suicidal/ homicidal ideations. Patient able to contract for safety. Compliant with medication. Does not warrant continued Inpatient hospitalization. Writer reviewed D/C papers with patient. Patient verbalized understanding. Patient does not appear to be of imminent danger to self or others at this time.    · Residence	DSS/Shelter...  · Housing Details	[facility] ID# [id number]  [address] [id number] [phone]   Ms. Gomez     Aftercare Appointments:  · Agency Name	[facility]  · Appointment Date/Time	30-Mar-2022 01:00  · Address	[address] [address]  · Phone #	[phone]  · Purpose	Follow up services provided via shelter    · Agency Name	[facility]  · Appointment Date/Time	07-Apr-2022 11:00  · Address	[address] [place] [address] [address]  · Phone #	[phone]  · Purpose	Intake with Dr TRIMBLE
Patient remains on unit for continued treatment safety and observation. Patient is visible on unit and compliant with treatment as well as unit protocol. Writer meets with patient daily on rounds and or team meeting. Writer provides support and education to the patient daily. Patient to remain on unit until cleared by attending for discharge. Patient denies suicidal ideation, homicidal ideation and presents with no evidence of audio visual hallucinations. Patient not cleared for discharge at this time.    This worker met with patient to discuss discharge plan.  Patient states she is homeless and does not like the shelter system. Patient states she wants to go to [place] for long term inpatient care. Secondary gain suspected. Patient will be referred for outpatient ental health services when cleared by attending for discharge. Patient is aware and again states she wants to go to [place].     Mental Status Exam:    Mood – Low  Sleep - Good  Appetite - Good  ADLs - WNL  Thought Process – Paranoid  Observation – [id number]
Social Work Admit Note:    Patient is 61 years of age female who was admitted for evaluation after presenting to ED with complaints of command auditory hallucinations to harm self and others. Patient reports suicidal thoughts with plan to jump in front of a train or a car and vague homicidal ideations. Patient states she has been increasingly depressed and has been thinking abut walking into traffic or hurting someone. Patient states her life has become stressful since her eviction and mothers placement into a nursing home a few years ago. Patient states she has been in and out of the hospital since getting evicted. Patient reports oscillating between being homeless n [facility]. Patient reports coming in from [place] 4 days ago. Patient has a schizoaffective disorder diagnosis and is serously persistently mentally ill. Patient reports being discharged from a hospital in [place] about a week ago. Patient denies Substance abuse/misuse. Patient reports she has a few medical comorbidities that are stable.  Patient is disabled receiving disability benefits. Patient admitted to unit voluntarily for treatment safety and observation.       Sexual History – patient identifies as heterosexual. 	  Family relationships and history – patient is single reports strained relations.     Leisure Activity Assessment – [facility]  Community Supports – no known attendance in any self- help groups or other organizations.   Employment – patient is disabled.   Substance Use Assessment – use of alcohol or other substances denied.     History of suicidality or self- injurious behaviors – denies.      Significant Loses – housing.    Life Goals – patient would like to find housing.       will continue to meet with patient 1:1 and with treatment team daily.      Discharge plan is for continued mental health treatment in outpatient setting.      Please refer to Social Work Psychosocial for additional information.
Social Work Discharge Note:    Patient is for discharge today.   She is alert and oriented x3.  Mood is improved.  Anxiety has decreased.  Insight and judgment have improved.  Suicidal / homicidal ideation denied.      Patient will be discharged to [facility] shelter of record under [facility] ID # [id number]. Patient receives all treatment and follow up on site at shelter. Patient expressed a desire to return to [place] within the next few days. Writer outreached to prior provider in [place] and procured an intake appointment should she remain with the desire to go to [place]. Patient is aware and agreeable to same.      Discharge huddle was held prior to discharge to discuss discharge plan and referral for continued mental health treatment in outpatient setting.  No safety concerns were verbalized at that time.      Patient confirms phone number .     Patient is aware and agreeable to discharge today.
Social Work Note:    Treatment team met with patient to discuss treatment plan, medications and discharge plan.  During the day patient is observed to be isolative to her room.  Patient was educated to the benefits of attending and actively participating in groups that are offered on the unit.  Patient reports she feels paranoid and anxious. Patient is taking medications as directed. Patient encouraged to participate in groups to learn coping skills and distress tolerance techniques. Patient states she wants to be referred to [facility] because she has no where to live and it makes her anxious and paranoid. Writer discussed shelter and treatment options in the community for when psychiatrically stable. Patient presents with secondary gain to admission.     Mental Status Exam:    Mood – Low  Sleep - Good   Appetite - Good  ADLs - WNL  Thought Process – Anxious and paranoid    Observation – [id number]    No barriers to discharge identified at this time. Plan is for referral to [facility] and community mental health program.      At this time patient is not psychiatrically stable for discharge.
pt seen and discussed with nurse practitioner
treatment plan and history reviewed with nurse practitioner
Social Work Note:    Treatment team met with patient to discuss treatment plan, medications and discharge plan.  During the day patient is observed to be isolative to room.  Patient was educated to the benefits of attending and actively participating in groups that are offered on the unit. Patient reports she is feeling a bit better and is a bit more hopeful. Patient denies SI HI and AVH. Patient continues to present with secondary gain to admission again requesting to be sent to [place].    This worker met with patient to discuss discharge plan.  [facility] confirms [facility] ID [id number] assigned to [facility]. Patient will be referred to outpatient mental health clinic when stable for discharge.  Patient is aware and agreeable to same.      Mental Status Exam:    Mood – Low  Sleep - Good  Appetite - Good  ADLs - WNL  Thought Process – Paranoid    Observation – [id number]    No barriers to discharge identified at this time. Plan is for referral to community mental health clinic.      At this time patient is not psychiatrically stable for discharge.
treatment plan reviewed with nurse practitioner

## 2022-03-30 NOTE — CHART NOTE - NSCHARTNOTESELECT_GEN_ALL_CORE
Event Note
attending note/Event Note
Event Note
attending note/Event Note
attending note/Event Note

## 2022-03-30 NOTE — ED PROVIDER NOTE - PRO INTERPRETER NEED 2
Interval summary:    After chart review and contacted by RN regarding hypotension/bradycardia/hypothermia there was initially level of concern for panhypopituitarism.  TSH/free T4 within normal limits and cortisol level not terribly significant for adrenal insufficiency and POC glucose checks and CMP does not reveal hypoglycemia however we will continue patient on systemic steroids precautiously.  Patient started on Natalie hugger for her hypothermia.  It appears the urine culture order had never been completed and read as in process from ERP order and I instructed RN to reobtain blood culture/urine culture and start IV antibiotics as last CBC with differential does reveal significant neutrophilia, lymphocytopenia and immature granulocytosis without leukocytosis/leukopenia.  Previous urinalysis does reveal few bacteria with pyuria and epithelial cells concerning for possible contaminated urinalysis.  D-dimer also ordered and pending however low suspicion for pulmonary embolism as patient does not have O2 requirement, bradycardic as opposed to tachycardic and last twelve-lead EKG does not reveal heart strain or S1Q3T3 waveform abnormality and patient not tachycardic however will check for thoroughness at this time.     Electronically signed:  Kranthi Oakley DO        English

## 2022-06-02 ENCOUNTER — EMERGENCY (EMERGENCY)
Facility: HOSPITAL | Age: 62
LOS: 0 days | Discharge: HOME | End: 2022-06-02
Attending: STUDENT IN AN ORGANIZED HEALTH CARE EDUCATION/TRAINING PROGRAM | Admitting: STUDENT IN AN ORGANIZED HEALTH CARE EDUCATION/TRAINING PROGRAM
Payer: MEDICARE

## 2022-06-02 VITALS
HEIGHT: 61 IN | RESPIRATION RATE: 20 BRPM | DIASTOLIC BLOOD PRESSURE: 93 MMHG | HEART RATE: 99 BPM | WEIGHT: 184.97 LBS | SYSTOLIC BLOOD PRESSURE: 134 MMHG | TEMPERATURE: 97 F | OXYGEN SATURATION: 97 %

## 2022-06-02 DIAGNOSIS — Z59.00 HOMELESSNESS UNSPECIFIED: ICD-10-CM

## 2022-06-02 DIAGNOSIS — J34.89 OTHER SPECIFIED DISORDERS OF NOSE AND NASAL SINUSES: ICD-10-CM

## 2022-06-02 DIAGNOSIS — Z88.0 ALLERGY STATUS TO PENICILLIN: ICD-10-CM

## 2022-06-02 DIAGNOSIS — F20.9 SCHIZOPHRENIA, UNSPECIFIED: ICD-10-CM

## 2022-06-02 DIAGNOSIS — F32.A DEPRESSION, UNSPECIFIED: ICD-10-CM

## 2022-06-02 PROCEDURE — 99284 EMERGENCY DEPT VISIT MOD MDM: CPT

## 2022-06-02 PROCEDURE — 70160 X-RAY EXAM OF NASAL BONES: CPT | Mod: 26

## 2022-06-02 SDOH — ECONOMIC STABILITY - HOUSING INSECURITY: HOMELESSNESS UNSPECIFIED: Z59.00

## 2022-06-02 NOTE — ED PROVIDER NOTE - NS ED ROS FT
Constitutional: No fever   Eyes:  No visual changes  Ears:  No hearing changes +nose pain  Neck: No neck pain  Cardiac:  No chest pain  Respiratory:  No SOB   GI:  No abdominal pain, nausea, or vomiting  :  No dysuria  MS:  No back pain  Neuro:  No headache or weakness.  No LOC  Skin:  No skin rash

## 2022-06-02 NOTE — ED PROVIDER NOTE - OBJECTIVE STATEMENT
61-year-old history of schizophrenia undomiciled who presents with nose pain.  Earlier she was sleeping at a bus and she tilted downwards landing nose on the floor.  No anticoagulation use no loss of consciousness.  She got up and walked around right afterwards and walked to the ED.  Denies numbness weakness headache nausea vomiting.  She isjust wants to know if her nose is fractured

## 2022-06-02 NOTE — ED PROVIDER NOTE - PHYSICAL EXAMINATION
CONSTITUTIONAL: well-developed, well-nourished, in no acute distress  SKIN: warm, dry  HEAD: Normocephalic  EYES: no conjunctival erythema  ENT: no nasal discharge, airway clear nose red nontender not bruised no septal hematoma  NECK: full ROM, non-tender  CARD: regular rate and rhythm  RESP: normal respiratory effort, no wheezes, rales or rhonchi  ABD: soft, non-distended, non-tender  back: no midline tenderness  EXT: moving all extremities spontaneously  NEURO: alert and oriented, strength and sensation 5/5 b/l UE and LE, CN2-12 intact, finger to nose normal b/l, able to ambulate without difficulty  PSYCH: cooperative, appropriate

## 2022-06-02 NOTE — ED PROVIDER NOTE - CARE PROVIDER_API CALL
Arya Glover)  Otolaryngology  [address] [place] [address], [address] [place] [address]  Phone: [phone]  Fax: [phone]  Follow Up Time: 4-6 Days

## 2022-06-02 NOTE — ED PROVIDER NOTE - CLINICAL SUMMARY MEDICAL DECISION MAKING FREE TEXT BOX
61-year-old female presents the ER for pain, minimal swelling after hitting her nose on the floor at the bus.  She states that she fell asleep, denies LOC, anticoagulation and bleeding stopped easily with pressure.  Denies headache, vomiting, focal weakness or numbness, neck pain.  Noted, triage note reviewed.  Agree with exam as above.  X-ray negative for acute fracture.  Pain was given pain management education, close follow-up, return precautions, and she verbalized understanding.

## 2022-06-02 NOTE — ED PROVIDER NOTE - PATIENT PORTAL LINK FT
You can access the FollowMyHealth Patient Portal offered by [facility] by registering at the following website: [email or web address]. By joining [employer]’s FollowMyHealth portal, you will also be able to view your health information using other applications (apps) compatible with our system.

## 2022-06-21 NOTE — ED ADULT TRIAGE NOTE - BP NONINVASIVE SYSTOLIC (MM HG)
"GYN Annual Exam     Chief Complaint   Patient presents with   • Gynecologic Exam     Annual exam - last ae 6/15/20, no pap; MG today; colonoscopy         Kristyn Vega is a 72 y.o. female who presents for annual well woman exam. She is postmenopausal. She denies vaginal spotting or discharge. She completes SBE monthly. BP is elevated. She was seen by PCP yesterday. Reports increased stress at home. Reports intermittent vulvar itching for several years, she treats with hydrocortisone cream and \"psoriasis\" medication with good relief of symptoms. Hx of psoriasis. She reports itching has not been a problem for several months    OB History             Para        Term   0            AB        Living           SAB        IAB        Ectopic        Molar        Multiple        Live Births                    Mammogram: today  Dexa scan: several years ago   Colonoscopy: due for colonoscopy  Last Pap : several years ago   History of abnormal Pap smear: no  Family history of uterine, colon or ovarian cancer: no  Family history of breast cancer: no  History of abnormal mammogram: no    Menopause:  Bleeding since? no  Vasomotor symptoms: no  HRT: no  Incontinence?  No  Patient reports that she is not currently experiencing any symptoms of urinary incontinence.  Dyspareunia: no      Past Medical History:   Diagnosis Date   • Postmenopause        Past Surgical History:   Procedure Laterality Date   • CHOLECYSTECTOMY     • GASTRIC BYPASS     • HIP SURGERY Right 09/10/2019    Hip replacement         Current Outpatient Medications:   •  cetirizine (zyrTEC) 10 MG tablet, Take 10 mg by mouth Daily., Disp: , Rfl:     Allergies   Allergen Reactions   • Morphine Rash       Social History     Tobacco Use   • Smoking status: Never Smoker   • Smokeless tobacco: Never Used   Substance Use Topics   • Alcohol use: Yes   • Drug use: Never       Family History   Problem Relation Age of Onset   • Pancreatic cancer Father    • " "Dementia Mother    • Breast cancer Neg Hx    • Ovarian cancer Neg Hx    • Uterine cancer Neg Hx    • Colon cancer Neg Hx        Review of Systems   Constitutional: Negative for chills, fatigue and fever.   Gastrointestinal: Negative for abdominal distention, abdominal pain, nausea and vomiting.   Endocrine: Negative for cold intolerance and heat intolerance.   Genitourinary: Negative for dyspareunia, dysuria, menstrual problem, pelvic pain, vaginal bleeding, vaginal discharge and vaginal pain.        + vulvar itching   Musculoskeletal: Negative for gait problem.   Skin: Negative for rash.   Neurological: Negative for dizziness and headaches.   Hematological: Does not bruise/bleed easily.   Psychiatric/Behavioral: Negative for behavioral problems.       /78   Ht 152.4 cm (60\")   Wt 95.7 kg (211 lb)   BMI 41.21 kg/m²     Physical Exam  Constitutional:       General: She is not in acute distress.     Appearance: Normal appearance. She is not ill-appearing, toxic-appearing or diaphoretic.   Genitourinary:      Vulva, bladder and urethral meatus normal.      No lesions in the vagina.      Right Labia: No rash, tenderness, lesions, skin changes or Bartholin's cyst.     Left Labia: No tenderness, lesions, skin changes, Bartholin's cyst or rash.     No labial fusion noted.      Vulva exam comments: Hypopigmentation as marked in image. No lesions or fissures.            No inguinal adenopathy present in the right or left side.     No vaginal discharge, erythema, tenderness, bleeding or ulceration.      No vaginal prolapse present.     No vaginal atrophy present.       Right Adnexa: not tender, not full, not palpable, no mass present and not absent.     Left Adnexa: not tender, not full, not palpable, no mass present and not absent.     No cervical motion tenderness, discharge, friability, lesion, polyp, nabothian cyst or eversion.      Uterus is not enlarged, fixed, tender, irregular or prolapsed.      No uterine " mass detected.     No urethral tenderness or mass present.      Pelvic exam was performed with patient in the lithotomy position.   Breasts: Breasts are symmetrical.      Right: Present. No swelling, bleeding, inverted nipple, mass, nipple discharge, skin change, tenderness, breast implant, axillary adenopathy or supraclavicular adenopathy.      Left: Present. No swelling, bleeding, inverted nipple, mass, nipple discharge, skin change, tenderness, breast implant, axillary adenopathy or supraclavicular adenopathy.       HENT:      Head: Normocephalic and atraumatic.   Eyes:      Pupils: Pupils are equal, round, and reactive to light.   Cardiovascular:      Rate and Rhythm: Normal rate.   Pulmonary:      Effort: Pulmonary effort is normal.   Abdominal:      General: There is no distension.      Palpations: Abdomen is soft. There is no mass.      Tenderness: There is no abdominal tenderness. There is no guarding.      Hernia: No hernia is present. There is no hernia in the left inguinal area or right inguinal area.   Musculoskeletal:         General: Normal range of motion.      Cervical back: Normal range of motion and neck supple. No tenderness.   Lymphadenopathy:      Cervical: No cervical adenopathy.      Upper Body:      Right upper body: No supraclavicular, axillary or pectoral adenopathy.      Left upper body: No supraclavicular, axillary or pectoral adenopathy.      Lower Body: No right inguinal adenopathy. No left inguinal adenopathy.   Neurological:      General: No focal deficit present.      Mental Status: She is alert and oriented to person, place, and time.      Cranial Nerves: No cranial nerve deficit.   Skin:     General: Skin is warm and dry.   Psychiatric:         Mood and Affect: Mood normal.         Behavior: Behavior normal.         Thought Content: Thought content normal.         Judgment: Judgment normal.   Vitals and nursing note reviewed.       Assessment    Diagnoses and all orders for this  visit:    1. Women's annual routine gynecological examination (Primary)    2. Postmenopause  -     DEXA Bone Density Axial; Future    3. Vulvar itching       Plan     1) Breast Health - Clinical breast exam & mammogram yearly, Self breast awareness. Schedule screening mammogram.   2) Pap - not indicated secondary to age >65, no hx dysplasia   3) STD-no  4) Smoking status- nonsmoker  5) Colon health - screening colonoscopy recommended if not up to date  6) Obese by BMI 41.21  7) Bone health - Weight bearing exercise, dietary calcium recommendations and vitamin D  reviewed.   8) Encouraged 150 minutes of exercise per week if not medically contraindicated  9) Hypopigmentation noted above clitoral pena. Possible psoriatic changes vs lichen sclerosis. Encouraged to call if symptoms are not improved with current treatments. Will consider punch biopsy in the future with any worsening symptoms    Follow up prn and one year    Wanda Pendleton, APRN  6/21/2022  10:11 EDT     139

## 2022-06-24 NOTE — BH PATIENT CHARACTERISTICS SURVEY - NSPCSTGENDER_PSY_ALL_CORE
No
Opioid Pregnancy And Lactation Text: These medications can lead to premature delivery and should be avoided during pregnancy. These medications are also present in breast milk in small amounts.
wheel chair used minimal ambulation due to hip pain

## 2022-07-15 NOTE — PROGRESS NOTE BEHAVIORAL HEALTH - MOOD
07/15/22 0900   Daily Treatment   Symptoms Review Activity Group 0900 - 1000   Affect Flat   Mood Other (comment)  (Optomistic)   Thought Process Organized   Psychosis None   Symptom Notation Pt shared she is feeling more balanced today.   Psychosocial Stressors Interpersonal conflict   Sleep Report Fair   Hours Slept 6.5   Meals Dinner;Breakfast   Goal Complete / Activity Pt shared she watched some [employer] videos for a little bit and ate dinner together. Pt shared she went to the dentist yesterday.   Treatment Plan Continue treatment plan   Patient Response Attentive;Good eye contact   Comment Pt completed sx rating sheet   RN Monitoring of Pain, Safety, and Relapse   Safety Concerns None   Physical Concerns 0   Pain Concerns 0   Use of Street Drugs or Alcohol No   Taking Medications as Prescribed Yes   Group Total: 5  MURIEL Singh     Normal

## 2022-08-03 NOTE — PROGRESS NOTE BEHAVIORAL HEALTH - NSBHFUPADDINFO_PSY_A_CORE
Quality 111:Pneumonia Vaccination Status For Older Adults: Pneumococcal vaccine administered on or after patient’s 60th birthday and before the end of the measurement period
Quality 226: Preventive Care And Screening: Tobacco Use: Screening And Cessation Intervention: Patient screened for tobacco use and is an ex/non-smoker
Detail Level: Detailed
yes, from patient
Quality 130: Documentation Of Current Medications In The Medical Record: Current Medications Documented
Quality 110: Preventive Care And Screening: Influenza Immunization: Influenza Immunization Administered during Influenza season

## 2022-11-17 ENCOUNTER — INPATIENT (INPATIENT)
Facility: HOSPITAL | Age: 62
LOS: 13 days | Discharge: HOME | End: 2022-12-01
Attending: PSYCHIATRY & NEUROLOGY | Admitting: PSYCHIATRY & NEUROLOGY

## 2022-11-17 VITALS
RESPIRATION RATE: 18 BRPM | SYSTOLIC BLOOD PRESSURE: 136 MMHG | DIASTOLIC BLOOD PRESSURE: 90 MMHG | OXYGEN SATURATION: 100 % | TEMPERATURE: 97 F | HEART RATE: 100 BPM | WEIGHT: 169.98 LBS

## 2022-11-17 LAB
ANION GAP SERPL CALC-SCNC: 14 MMOL/L — SIGNIFICANT CHANGE UP (ref 7–14)
APAP SERPL-MCNC: <5 UG/ML — LOW (ref 10–30)
BASOPHILS # BLD AUTO: 0.04 K/UL — SIGNIFICANT CHANGE UP (ref 0–0.2)
BASOPHILS NFR BLD AUTO: 0.5 % — SIGNIFICANT CHANGE UP (ref 0–1)
BUN SERPL-MCNC: 13 MG/DL — SIGNIFICANT CHANGE UP (ref 10–20)
CALCIUM SERPL-MCNC: 8.9 MG/DL — SIGNIFICANT CHANGE UP (ref 8.4–10.5)
CHLORIDE SERPL-SCNC: 103 MMOL/L — SIGNIFICANT CHANGE UP (ref 98–110)
CO2 SERPL-SCNC: 24 MMOL/L — SIGNIFICANT CHANGE UP (ref 17–32)
CREAT SERPL-MCNC: 0.8 MG/DL — SIGNIFICANT CHANGE UP (ref 0.7–1.5)
EGFR: 85 ML/MIN/1.73M2 — SIGNIFICANT CHANGE UP
EOSINOPHIL # BLD AUTO: 0.13 K/UL — SIGNIFICANT CHANGE UP (ref 0–0.7)
EOSINOPHIL NFR BLD AUTO: 1.6 % — SIGNIFICANT CHANGE UP (ref 0–8)
ETHANOL SERPL-MCNC: <10 MG/DL — SIGNIFICANT CHANGE UP
GLUCOSE SERPL-MCNC: 231 MG/DL — HIGH (ref 70–99)
HCT VFR BLD CALC: 35.4 % — LOW (ref 37–47)
HGB BLD-MCNC: 11.9 G/DL — LOW (ref 12–16)
IMM GRANULOCYTES NFR BLD AUTO: 0.5 % — HIGH (ref 0.1–0.3)
LYMPHOCYTES # BLD AUTO: 1.93 K/UL — SIGNIFICANT CHANGE UP (ref 1.2–3.4)
LYMPHOCYTES # BLD AUTO: 23.6 % — SIGNIFICANT CHANGE UP (ref 20.5–51.1)
MCHC RBC-ENTMCNC: 29.6 PG — SIGNIFICANT CHANGE UP (ref 27–31)
MCHC RBC-ENTMCNC: 33.6 G/DL — SIGNIFICANT CHANGE UP (ref 32–37)
MCV RBC AUTO: 88.1 FL — SIGNIFICANT CHANGE UP (ref 81–99)
MONOCYTES # BLD AUTO: 0.54 K/UL — SIGNIFICANT CHANGE UP (ref 0.1–0.6)
MONOCYTES NFR BLD AUTO: 6.6 % — SIGNIFICANT CHANGE UP (ref 1.7–9.3)
NEUTROPHILS # BLD AUTO: 5.49 K/UL — SIGNIFICANT CHANGE UP (ref 1.4–6.5)
NEUTROPHILS NFR BLD AUTO: 67.2 % — SIGNIFICANT CHANGE UP (ref 42.2–75.2)
NRBC # BLD: 0 /100 WBCS — SIGNIFICANT CHANGE UP (ref 0–0)
PLATELET # BLD AUTO: 235 K/UL — SIGNIFICANT CHANGE UP (ref 130–400)
POTASSIUM SERPL-MCNC: 4 MMOL/L — SIGNIFICANT CHANGE UP (ref 3.5–5)
POTASSIUM SERPL-SCNC: 4 MMOL/L — SIGNIFICANT CHANGE UP (ref 3.5–5)
RBC # BLD: 4.02 M/UL — LOW (ref 4.2–5.4)
RBC # FLD: 13.2 % — SIGNIFICANT CHANGE UP (ref 11.5–14.5)
SALICYLATES SERPL-MCNC: <0.3 MG/DL — LOW (ref 4–30)
SARS-COV-2 RNA SPEC QL NAA+PROBE: SIGNIFICANT CHANGE UP
SODIUM SERPL-SCNC: 141 MMOL/L — SIGNIFICANT CHANGE UP (ref 135–146)
WBC # BLD: 8.17 K/UL — SIGNIFICANT CHANGE UP (ref 4.8–10.8)
WBC # FLD AUTO: 8.17 K/UL — SIGNIFICANT CHANGE UP (ref 4.8–10.8)

## 2022-11-17 PROCEDURE — 93010 ELECTROCARDIOGRAM REPORT: CPT

## 2022-11-17 PROCEDURE — 99285 EMERGENCY DEPT VISIT HI MDM: CPT

## 2022-11-17 PROCEDURE — 90792 PSYCH DIAG EVAL W/MED SRVCS: CPT | Mod: 95

## 2022-11-17 NOTE — ED BEHAVIORAL HEALTH ASSESSMENT NOTE - SUMMARY
This is a 63yo single woman, on disability, homeless since 2020, noncaregiver; pmhx HTN (not on meds); pphx schizoaffective disorder/depressive, multiple inpatient hospitalizations (most recently at [facility] 3/2022), no suicide attempts, no self harming in past; no substance abuse; no known trauma; no legal history; no aggression/violence hx; self presenting to ED for SI/HI. This is a 61yo single woman, on disability, homeless since 2020, noncaregiver; pmhx HTN (not on meds); pphx schizoaffective disorder/depressive, multiple inpatient hospitalizations (most recently at [facility] 3/2022), no suicide attempts, no self harming in past; no substance abuse; no known trauma; no legal history; no aggression/violence hx; self presenting to ED for SI/HI. Appears depressed, with flat affect, endorsing SI/HI, no acute psychotic symptoms. She is at moderate acute risk for suicide due to expressed SI and depression symptoms, also at chronically elevated risk due to hx of psychiatric hospitalizations, chronic/serious mental illness, poor social supports, and homelessness. Meets criteria for inpatient psych admission for acute stabilization.

## 2022-11-17 NOTE — ED BEHAVIORAL HEALTH ASSESSMENT NOTE - DIFFERENTIAL
Schizoaffective disorder, Depression   R/O Malingering Schizoaffective disorder/depressive   R/O Malingering

## 2022-11-17 NOTE — ED BEHAVIORAL HEALTH ASSESSMENT NOTE - DESCRIPTION
BA in communications; last worked 1992; disabled secondary to mental illness; has family but estranged see [facility] note.    iSTOP: This report was requested by: Lourdes Brown | Reference #: [id number]  NO RESULTS HLD, heart murmur, HTN, herniated disc in neck and Leukemia more than 10 years ago. HLD, heart murmur, HTN, herniated disc in neck and Leukemia more than 10 years ago HLD, heart murmur, HTN, herniated disc in neck, leukemia 10+ years ago

## 2022-11-17 NOTE — ED BEHAVIORAL HEALTH NOTE - BEHAVIORAL HEALTH NOTE
==================      PRE-HOSPITAL COURSE      ===================      SOURCE:  Secondhand EMR documentation.       DETAILS:  Patient presents self to ED: chief complaint of SI/HI.      ===========      ED COURSE:      ============      SOURCE:  RN and secondhand EMR documentation.       ARRIVAL:  Patient was cooperative with hospital protocol and allowed for gowning/wanding without incident. Patient presents with good hygiene/grooming. Patient able to be placed in private room on 1:1 In private room for consult.       BELONGINGS:  None notable.      BEHAVIOR: Blood provided for routine labs without noted incident; urine not provided as of yet. No SI/HI/AH/VH elicited. Patient is alert, oriented, and makes eye contact; speech of normal volume/rate accompanied by logical and linear thought process. Patient has been in hospital bed while in ED; has had a meal and resting.   TREATMENT: Patient did not require medication intervention while in ED.    VISITORS:  Patient presently unaccompanied by social supports while in ED.

## 2022-11-17 NOTE — ED PROVIDER NOTE - PHYSICAL EXAMINATION
GENERAL: Well-nourished, Well-developed. NAD.  HEAD: No visible or palpable bumps or hematomas. No ecchymosis behind ears B/L.  Eyes: PERRLA, EOMI. No asymmetry. No nystagmus. No conjunctival injection. Non-icteric sclera.  ENMT: MMM.   Neck: Supple. FROM  CVS: Normal S1,S2. No murmurs appreciated on auscultation   RESP: No use of accessory muscles. Chest rise symmetrical with good expansion. Lungs clear to auscultation B/L. No wheezing, rales, or rhonchi auscultated.  Skin: Warm, Dry. No rashes or lesions. Good cap refill < 2 sec B/L.  EXT: Radial and pedal pulses present B/L. No calf tenderness or swelling B/L. No palpable cords. No pedal edema B/L.  Neuro: AA&O x 3. Sensation grossly intact. Strength 5/5 B/L. Gait within normal limits.   Psych:  Appropriate mood and affect. Cooperative. Calm

## 2022-11-17 NOTE — ED BEHAVIORAL HEALTH ASSESSMENT NOTE - RISK ASSESSMENT
MODERATE - HIGH RISK     ACUTE RISK FACTORS: depressed mood, hopelessness, suicidal ideation, homicidal ideation, not in psychiatric treatment, no current medication management     CHRONIC RISK FACTORS: Schizoaffective disorder, no social supports, poor engagement with out-patient treatment, medication / treatment non-compliance, history of suicidal ideation / homicidal ideation, history of in-patient hospitalizations, homelessness    PROTECTIVE FACTORS: Unknown.     MITIGATION STRATEGIES: Constant Observation, in-patient hospitalization MODERATE - HIGH RISK   ACUTE RISK FACTORS: depressed mood, hopelessness, suicidal ideation, homicidal ideation, not in psychiatric treatment, no current medication management     CHRONIC RISK FACTORS: Schizoaffective disorder, no social supports, poor engagement with out-patient treatment, medication / treatment non-compliance, history of suicidal ideation / homicidal ideation, history of in-patient hospitalizations, homelessness    PROTECTIVE FACTORS: none known     MITIGATION STRATEGIES: Constant Observation, in-patient hospitalization MODERATE ACUTE RISK.  ACUTE RISK FACTORS: depressed mood, hopelessness, suicidal ideation, homicidal ideation.  CHRONIC RISK FACTORS: Schizoaffective disorder, no social supports, history of suicidal ideation/homicidal ideation, history of inpatient hospitalizations, homelessness.  PROTECTIVE FACTORS: none known.

## 2022-11-17 NOTE — ED BEHAVIORAL HEALTH ASSESSMENT NOTE - PAST PSYCHOTROPIC MEDICATION
Haldol and Zyprexa gave her "terrible Akathisia"  Prozac up to x2 years ago when depression has been in remission Haldol and Zyprexa gave her "terrible Akathisia"  Prozac up to x2 years ago when depression has been in remission  Hydroxyzine Haldol and Zyprexa; Prozac; Hydroxyzine Haldol; Zyprexa; Prozac; Hydroxyzine

## 2022-11-17 NOTE — ED BEHAVIORAL HEALTH ASSESSMENT NOTE - ADDITIONAL DETAILS ALL
suicidal ideation with plan of jumping in front of train / car (chronic plan); no history of self-injurious behaviors or suicide attempt suicidal ideation with plan of jumping in front of train

## 2022-11-17 NOTE — ED PROVIDER NOTE - CLINICAL SUMMARY MEDICAL DECISION MAKING FREE TEXT BOX
61yF depression/schizoaffective on abilify p/w SI and HI.  Pt calm, cooperative w/o agitation, violence or signs of trauma on exam.  EKG w/o ischemia or arrhythmia.  Labs reassuring.  Telepsych consulted, recommend voluntary admission.

## 2022-11-17 NOTE — ED BEHAVIORAL HEALTH ASSESSMENT NOTE - OTHER PAST PSYCHIATRIC HISTORY (INCLUDE DETAILS REGARDING ONSET, COURSE OF ILLNESS, INPATIENT/OUTPATIENT TREATMENT)
As per HPI multiple psychiatric admissions in past (about five in lifetime); most recent psych admission at [facility] in 3/2022; no suicide attempts; no self harming; currently in treatment with psychiatrist at her homeless shelter; compliant with abilify 5 mg daily.

## 2022-11-17 NOTE — ED ADULT NURSE NOTE - OBJECTIVE STATEMENT
mental health evaluation with h/o psychiatric.  Pt initially reported she wants to hurt herself and others.

## 2022-11-17 NOTE — ED PROVIDER NOTE - NS ED ROS FT
Constitutional: No fever, chills.  Eyes:  No visual changes  ENMT:  No neck pain  Cardiac:  No chest pain  Respiratory:  No cough, SOB  GI:  No nausea, vomiting, diarrhea, abdominal pain.  :  No dysuria, hematuria  MS:  No back pain.  Neuro:  No headache or lightheadedness  Skin:  No skin rash  psych: (+)SI/HI. no hallucinations  Except as documented in the HPI,  all other systems are negative.

## 2022-11-17 NOTE — ED PROVIDER NOTE - ATTENDING APP SHARED VISIT CONTRIBUTION OF CARE
61yF depression on abilify (sees psych in the [place], was visiting friends here in [place] today) p/w SI and HI.  Reports vague HI w/o plan but has thought of throwing herself in front of a train.  No recent suicidal gestures/attempts.

## 2022-11-17 NOTE — ED BEHAVIORAL HEALTH ASSESSMENT NOTE - UNDOMICILED
homelessness homeless shelter in [place] homeless [facility] in [place] ([facility])

## 2022-11-17 NOTE — ED BEHAVIORAL HEALTH ASSESSMENT NOTE - MEDICAL ISSUES AND PLAN (INCLUDE STANDING AND PRN MEDICATION)
HTN not on meds HLD, heart murmur, HTN, herniated disc in neck, leukemia 10+ years ago: not on meds for any of these conditions

## 2022-11-17 NOTE — ED BEHAVIORAL HEALTH ASSESSMENT NOTE - NSACTIVEVENT_PSY_ALL_CORE
Pending incarceration or homelessness Triggering events leading to humiliation, shame, and/or despair (e.g., Loss of relationship, financial or health status) (real or anticipated)/Pending incarceration or homelessness Triggering events leading to humiliation, shame, and/or despair (e.g., Loss of relationship, financial or health status) (real or anticipated) Triggering events leading to humiliation, shame, and/or despair (e.g., Loss of relationship, financial or health status) (real or anticipated)/Current or pending social isolation/Inadequate social supports

## 2022-11-17 NOTE — ED BEHAVIORAL HEALTH ASSESSMENT NOTE - AXIS III
H/o HTN, heart murmur, herniated disc to neck, & leukemia in remission H/o HTN, heart murmur, herniated disc to neck, leukemia in remission HLD, heart murmur, HTN, herniated disc in neck, leukemia 10+ years ago

## 2022-11-17 NOTE — ED BEHAVIORAL HEALTH ASSESSMENT NOTE - VIOLENCE RISK FACTORS:
Violent ideation/threat/speech Violent ideation/threat/speech/Community stressors that increase the risk of destabilization

## 2022-11-17 NOTE — ED BEHAVIORAL HEALTH ASSESSMENT NOTE - HPI (INCLUDE ILLNESS QUALITY, SEVERITY, DURATION, TIMING, CONTEXT, MODIFYING FACTORS, ASSOCIATED SIGNS AND SYMPTOMS)
61 year-old  female, single, disabled (since ~1992), homeless (since ~ 2020 as per patient), history of Schizoaffective Disorder, depressive type, chronically managed with Abilify ( history of medication non-compliance ), prior multiple in-patient hospitalizations (last:  12/2021) for command auditory hallucinations / suicidal ideation (plan to jump into traffic or in front of train) / homicidal ideation, no prior self-injurious behaviors or suicide attempt, no substance abuse, no known trauma, no legal history, no aggression / violence history, is walked into ED endorsing suicidal ideation and homicidal ideation.     Patient is alert and oriented to person, time, place and situation. Patient is calm and cooperative; linear, organized, with no evidence of thought disorder. Patient is flat. Reports arriving from [place] ~ 4-5 days ago, of which she oscillates between these two states since being homeless (2020). Reports since arriving in [place], she has been depressed, hopeless, helpless, with suicidal ideation to jump in front of train / traffic in the setting of running out of medications (Abilify 15 mg daily). Patient endorsing nonspecific homicidal ideation. Denies command auditory hallucinations. Denies other psychotic symptoms including paranoia, ideas of reference, thought insertion/broadcasting, or visual/olfactory/tactile/gustatory hallucinations. Patient not appearing internally preoccupied. Reports last auditory hallucination being > 3 months ago. Of note, last admission was secondary to command auditory hallucinations to kill self / others. Denies manic symptoms, including elevated mood, mood lability, irritability. Patient is not grandiose, pressured and without flight of ideas. Denies prior / current substance abuse. Reports wanting to get back on medications: seeking voluntary admission. Reports out-patient psychiatrist is in [place]: does not having professional contact information. Unable to reach: Sruthi -  [phone] - sister for collateral.      Medical History: HTN (not on meds), HLD, heart murmur, herniated disc to neck and leukemia in remission more than 10 years now. 61 year-old  female, single, disabled (since ~1992), homeless (since ~ 2020 as per patient), history of Schizoaffective Disorder, depressive type, chronically managed with Abilify ( history of medication non-compliance ), prior multiple in-patient hospitalizations (last:  12/2021) for command auditory hallucinations / suicidal ideation (plan to jump into traffic or in front of train) / homicidal ideation, no prior self-injurious behaviors or suicide attempt, no substance abuse, no known trauma, no legal history, no aggression / violence history, is walked into ED endorsing suicidal ideation and homicidal ideation.     Since this morning. No AH. Just very depressed. Abilify 5 mg for schizoaffective disorder - about 5 months. Psychiatrist Dr. López at the shelter.    [facility].     [facility] - shelter for mentally ill - living there since February 2021. Homeless for 2.5 years. She and mom were homeless together. Mom now in a nursing home, mild dementia.     Psych hospitalizations about five times in life; last hospitalization about 8 months ago.    Unable to reach: Sruthi - [phone] - sister for collateral.    Medical History: HTN (not on meds), HLD, heart murmur, herniated disc to neck and leukemia in remission more than 10 years now.    COVID screening:  COVID vaccine x 2 This is a 61yo single woman, on disability, homeless since 2020, noncaregiver; pmhx HTN (not on meds); pphx schizoaffective disorder/depressive, multiple inpatient hospitalizations (most recently at [facility] 3/2022), no suicide attempts, no self harming in past; no substance abuse; no known trauma; no legal history; no aggression/violence hx; self presenting to ED for SI/HI.    Reports feeling very discouraged especially this morning. No AH. Just very depressed. Abilify 5 mg for schizoaffective disorder - about 5 months. Psychiatrist Dr. López at the shelter.    [facility].     [facility] - shelter for mentally ill - living there since February 2021. Homeless for 2.5 years. She and mom were homeless together. Mom now in a nursing home, mild dementia.     Psych hospitalizations about five times in life; last hospitalization about 8 months ago.    Unable to reach: Sruthi   [phone] - sister for collateral.    Medical History: HTN (not on meds), HLD, heart murmur, herniated disc to neck and leukemia in remission more than 10 years now.    COVID screening:  COVID vaccine x 2 This is a 63yo single woman, on disability, homeless since 2020, noncaregiver; pmhx HTN (not on meds); pphx schizoaffective disorder/depressive, multiple inpatient hospitalizations (most recently at [facility] 3/2022), no suicide attempts, no self harming in past; no substance abuse; no known trauma; no legal history; no aggression/violence hx; self presenting to ED for SI/HI.    Reports feeling very discouraged and hopeless especially this morning. Started feeling paranoid about people at her homeless shelter, wondering if they were tampering with the printers as she was trying to get a housing application together for example. Endorses HI, though not toward anyone specifically, just generally feeling paranoid and angry toward "people who are getting in the way." She notes feeling very depressed in general. Endorsing thoughts of suicide, specifically thoughts of jumping in front of a train. Reflects on her family, that she does not speak with many of them except her mom who has mild dementia and lives in a nursing home. Denies AH/VH. Has been taking Abilify 5 mg for schizoaffective disorder for the past 5 months or so. Sees psychiatrist Dr. López at the shelter about once every two months.      [facility] - shelter for mentally ill - living there since February 2021. Homeless for 2.5 years. She and mom were homeless together before mom found a place in a nursing home.    Psych hospitalizations about five times in life; last hospitalization about 8 months ago.    COVID screening:  COVID vaccine x 2  no known exposures  no positive covid test in past three months This is a 61yo single woman, on disability, homeless since 2020, noncaregiver; pmhx HTN (not on meds); pphx schizoaffective disorder/depressive, multiple inpatient hospitalizations (most recently at [facility] 3/2022), no suicide attempts, no self harming in past; no substance abuse; no known trauma; no legal history; no aggression/violence hx; self presenting to ED for SI/HI.    Reports feeling very discouraged and hopeless especially this morning. Started feeling paranoid about people at her homeless shelter, wondering if they were tampering with the printers as she was trying to get a housing application together for example. Endorses HI, though not toward anyone specifically, just generally feeling paranoid and angry toward "people who are getting in the way." She notes feeling very depressed in general. Endorsing thoughts of suicide, specifically thoughts of jumping in front of a train. Reflects on her family, that she does not speak with many of them except her mom who has mild dementia and lives in a nursing home. Denies AH/VH. Has been taking Abilify 5 mg for schizoaffective disorder for the past 5 months or so. Sees psychiatrist Dr. López at the shelter about once every two months.      [facility] - shelter for mentally ill - living there since February 2021. Homeless for 2.5 years. She and mom were homeless together before mom found a place in a nursing home.  Psych hospitalizations about five times in life; last hospitalization about 8 months ago.    States she has no collateral available. Her sister Sruthi who was previously listed as collateral is now estranged, pt does not know her whereabouts.     COVID screening:  COVID vaccine x 2  no known exposures  no positive covid test in past three months

## 2022-11-17 NOTE — ED BEHAVIORAL HEALTH ASSESSMENT NOTE - DETAILS
Patient c/o current HI due command AH Haldol and Zyprexa gave her "terrible Akathisia" suicidal ideation with plan of jumping in front of train / car Allergy to PCN from a child 4 years old- not sure of reaction but states mother told her she could die if she gets it again. Sister with Bipolar disorder one sister with bipolar disorder; another sister with intellectual disability pending in AM self Haldol and Zyprexa = "terrible Akathisia" no specific plan/intent penicillin allergy suicidal ideation with plan of jumping in front of train Haldol, Zyprexa = "terrible akathisia" vague HI thoughts - no specific plan/intent

## 2022-11-17 NOTE — ED BEHAVIORAL HEALTH ASSESSMENT NOTE - PATIENT'S CHIEF COMPLAINT
"I'm hearing voices telling me to kill myself and other people" "I've been very paranoid" "I've been paranoid"

## 2022-11-18 DIAGNOSIS — F25.9 SCHIZOAFFECTIVE DISORDER, UNSPECIFIED: ICD-10-CM

## 2022-11-18 LAB
COVID-19 SPIKE DOMAIN AB INTERP: POSITIVE
COVID-19 SPIKE DOMAIN ANTIBODY RESULT: >250 U/ML — HIGH
SARS-COV-2 IGG+IGM SERPL QL IA: >250 U/ML — HIGH
SARS-COV-2 IGG+IGM SERPL QL IA: POSITIVE

## 2022-11-18 PROCEDURE — 99232 SBSQ HOSP IP/OBS MODERATE 35: CPT

## 2022-11-18 RX ORDER — HALOPERIDOL DECANOATE 100 MG/ML
5 INJECTION INTRAMUSCULAR EVERY 6 HOURS
Refills: 0 | Status: DISCONTINUED | OUTPATIENT
Start: 2022-11-18 | End: 2022-12-01

## 2022-11-18 RX ORDER — ARIPIPRAZOLE 15 MG/1
5 TABLET ORAL DAILY
Refills: 0 | Status: DISCONTINUED | OUTPATIENT
Start: 2022-11-18 | End: 2022-11-18

## 2022-11-18 RX ORDER — ACETAMINOPHEN 500 MG
650 TABLET ORAL ONCE
Refills: 0 | Status: COMPLETED | OUTPATIENT
Start: 2022-11-18 | End: 2022-11-18

## 2022-11-18 RX ORDER — TRAZODONE HCL 50 MG
50 TABLET ORAL AT BEDTIME
Refills: 0 | Status: DISCONTINUED | OUTPATIENT
Start: 2022-11-18 | End: 2022-11-18

## 2022-11-18 RX ORDER — ARIPIPRAZOLE 15 MG/1
5 TABLET ORAL DAILY
Refills: 0 | Status: DISCONTINUED | OUTPATIENT
Start: 2022-11-18 | End: 2022-11-21

## 2022-11-18 RX ORDER — DIPHENHYDRAMINE HCL 50 MG
50 CAPSULE ORAL EVERY 6 HOURS
Refills: 0 | Status: DISCONTINUED | OUTPATIENT
Start: 2022-11-18 | End: 2022-12-01

## 2022-11-18 RX ORDER — ACETAMINOPHEN 500 MG
650 TABLET ORAL EVERY 6 HOURS
Refills: 0 | Status: DISCONTINUED | OUTPATIENT
Start: 2022-11-18 | End: 2022-12-01

## 2022-11-18 RX ORDER — HYDROXYZINE HCL 10 MG
50 TABLET ORAL EVERY 6 HOURS
Refills: 0 | Status: DISCONTINUED | OUTPATIENT
Start: 2022-11-18 | End: 2022-12-01

## 2022-11-18 RX ADMIN — Medication 650 MILLIGRAM(S): at 01:14

## 2022-11-18 RX ADMIN — ARIPIPRAZOLE 5 MILLIGRAM(S): 15 TABLET ORAL at 08:02

## 2022-11-18 NOTE — CHART NOTE - NSCHARTNOTEFT_GEN_A_CORE
Social Work Admit Note:    Ms. Turner is a 62 year old  female with a history of Schizoaffective Disorder and Depression who self presented to the ED for suicidal ideation, homicidal ideation and paranoia. Patient reported feeling discouraged and helpless. She resides at [facility] homeless shelter in the [place] and reports recent paranoia about people at the shelter (feeling they may be tampering with the printer, as an example). Patient endorsed HI, though not towards anyone in general and anger towards "people who are getting in the way". She endorsed thoughts of suicide, specifically thoughts of jumping in front of a train. Patient reports she does not speak with anyone in her family except for her mother who has mild dementia and lives in a nursing home. Patient denies AH/ VH and substance use. She was admitted to P for safety and stabilization.     In the community, Ms. Turner is single, disabled and receives SSDI benefits. She resides at [facility] in the [place]. She was homeless with her mother for two and a half years until her mother found a place in a nursing home. She has a BA in [email or web address] and was last employed in 1992. Medical history is significant for HLD, heart murmur, HTN, herniated disc in neck and leukemia over 10 years ago. She has multiple prior [facility] admissions including March 2022 at [facility]. There is no known legal history, and no history of aggression or violence. She follows up with Psychiatrist Dr. López at the shelter. Shelter contact according to March 2022 discharge note is Mr. Gomez [phone].     Sexual History – patient identifies as heterosexual. 	  Family relationships and history – patient is single and reports strained relations with family. She no longer speaks to her sister Sruthi who was previously listed as collateral.    Leisure Activity Assessment – SHAKIR.  Community Supports – patient sees a Psychiatrist at her shelter.   Employment – patient is disabled.   Substance Use Assessment – use of alcohol or other substances denied.     History of suicidality or self- injurious behaviors – denies.      Significant Loses – Patient is homeless and residing at a shelter. She reports strained relations with family.   Life Goals – Improved mental health and stable housing.        will continue to meet with patient 1:1 and with treatment team daily. Discharge plan is for continued mental health treatment in outpatient setting. Patient will return to her shelter when ready. Please refer to Social Work Psychosocial for additional information.

## 2022-11-18 NOTE — PATIENT PROFILE BEHAVIORAL HEALTH - FUNCTIONAL ASSESSMENT - DAILY ACTIVITY SCORE.
"Interval HPI:   Overnight events: Patient on the IMTA unit in room 91413/19981 A. Blood glucose variable. BG at, above and below goal on current insulin regimen (SSI, prandial, and basal insulin ). Patient's BG is extremely difficult to manage. Possible sepsis can be contributing to glucose lability.  Steroid use- None.    Renal function- Normal   Vasopressors-  None         Diet diabetic [facility];  Calorie   Eatin%  Nausea: No  Hypoglycemia and intervention: Yes; BG 54 this morning. Administered breakfast and held scheduled Novolog. Decreased basal insulin.   Fever: No  TPN and/or TF: No      BP (!) 142/69 (Patient Position: Lying)   Pulse 70   Temp 97.9 °F (36.6 °C) (Oral)   Resp 16   Ht 6' 2" (1.88 m)   Wt 76.4 kg (168 lb 6.9 oz)   SpO2 (!) 91%   BMI 21.63 kg/m²     Labs Reviewed and Include    Recent Labs   Lab 22  0345      CALCIUM 7.9*   ALBUMIN 1.9*   PROT 5.7*      K 4.0   CO2 24      BUN 20   CREATININE 0.9   ALKPHOS 99   ALT 10   AST 19   BILITOT 0.5     Lab Results   Component Value Date    WBC 10.23 2022    HGB 10.9 (L) 2022    HCT 33.8 (L) 2022    MCV 88 2022     2022     No results for input(s): TSH, FREET4 in the last 168 hours.  Lab Results   Component Value Date    HGBA1C 11.5 (H) 2022       Nutritional status:   Body mass index is 21.63 kg/m².  Lab Results   Component Value Date    ALBUMIN 1.9 (L) 2022    ALBUMIN 1.8 (L) 2022    ALBUMIN 1.9 (L) 2022     No results found for: PREALBUMIN    Estimated Creatinine Clearance: 73.1 mL/min (based on SCr of 0.9 mg/dL).    Accu-Checks  Recent Labs     22  0737 22  1215 22  1712 22  0937 22  1227 22  1616 22  0446 22  0845   POCTGLUCOSE 221* 222* 131* 137* 429* 387* 377* 278* 92 54*       Current Medications and/or Treatments Impacting Glycemic Control  Immunotherapy:  "   Immunosuppressants       None          Steroids:   Hormones (From admission, onward)                Start     Stop Route Frequency Ordered    02/23/22 1824  melatonin tablet 6 mg         -- Oral Nightly PRN 02/23/22 1724          Pressors:    Autonomic Drugs (From admission, onward)                Start     Stop Route Frequency Ordered    03/05/22 1415  midodrine tablet 5 mg         -- Oral 3 times daily 03/05/22 1409          Hyperglycemia/Diabetes Medications:   Antihyperglycemics (From admission, onward)                Start     Stop Route Frequency Ordered    03/06/22 2100  insulin detemir U-100 pen 8 Units         -- SubQ Nightly 03/06/22 0854    03/06/22 1130  insulin aspart U-100 pen 8 Units         -- SubQ 3 times daily with meals 03/06/22 0854    03/05/22 1145  insulin aspart U-100 pen 0-5 Units         -- SubQ Before meals, nightly and at 0100 PRN 03/05/22 1034           24

## 2022-11-18 NOTE — PROGRESS NOTE BEHAVIORAL HEALTH - NSBHFUPADDHPIFT_PSY_A_CORE
61yo single woman, on disability, homeless since 2020, noncaregiver; pmhx HTN (not on meds); pphx schizoaffective disorder/depressive, multiple inpatient hospitalizations (most recently at [facility] 3/2022), no suicide attempts, no self harming in past; no substance abuse; no known trauma; no legal history; no aggression/violence hx; self presenting to ED for SI/HI.    Reports feeling very discouraged and hopeless especially this morning. Started feeling paranoid about people at her homeless shelter, wondering if they were tampering with the printers as she was trying to get a housing application together for example. Endorses HI, though not toward anyone specifically, just generally feeling paranoid and angry toward "people who are getting in the way." She notes feeling very depressed in general. Endorsing thoughts of suicide, specifically thoughts of jumping in front of a train. Reflects on her family, that she does not speak with many of them except her mom who has mild dementia and lives in a nursing home. Denies AH/VH. Has been taking Abilify 5 mg for schizoaffective disorder for the past 5 months or so. Sees psychiatrist Dr. López at the shelter about once every two months.      [facility] - shelter for mentally ill - living there since February 2021. Homeless for 2.5 years. She and mom were homeless together before mom found a place in a nursing home.  Psych hospitalizations about five times in life; last hospitalization about 8 months ago.    States she has no collateral available. Her sister Sruthi who was previously listed as collateral is now estranged, pt does not know her whereabouts.

## 2022-11-18 NOTE — PATIENT PROFILE BEHAVIORAL HEALTH - MEDICATION PUMPS, PROFILE
CHAPINCITO GUIDRY 79 f [facility] S 4/21/2022   DR ILIANA KEMP     Initial evaluation/Pulmonary Critical Care consultation requested on 4/21/2022  by Dr INGRAM  from Dr Sandoval   Patient examined chart reviewed    HOSPITAL ADMISSION   PATIENT CAME  FROM (if information available)      CHAPINCITO estrada [facility] S 4/21/2022   DR ILIANA KEMP     REVIEW OF SYMPTOMS      Able to give (reliable) ROS  NO     PHYSICAL EXAM    HEENT Unremarkable  atraumatic   RESP Fair air entry EXP prolonged    Harsh breath sound Resp distres mild   CARDIAC S1 S2 No S3     NO JVD    ABDOMEN SOFT BS PRESENT NOT DISTENDED No hepatosplenomegaly   PEDAL EDEMA present No calf tenderness  NO rash       PATIENT PRESENTATION.  4/21/2022  80 f vented pt bib ems from [facility] for fever, hypotension. pt given tylenol at [facility], given IVF by ems. no further hx available.  Pulm crit care consulted 4/21/2022                                            DOA/CC/PROBLEMS poa .  80 f   from [facility]  doa 4/21/2022  cc fever hypotension    PRESENTING PROBLEMS 4/21/2022   Sepsis   Lacticemia La 2.8   Anemia Hb 6   RYAN Cr 1.3     PMH-PSH .  Pneumonia  HTN, DM, CVA, dementia, chronic respiratory failure s/p trach, PEG, cardiac arrest      VITALS/PO/IO/VENT/DRIPS.     4/21/2022 85 100/50 afeb   4/21/2022 18/400/5/100       GENERAL ISSUES .                                       ALLERGY.    codeine                        WEIGHT.      4/21/2022 49                              BMI.               4/21/2022 18            PROBLEM DATA/ASSESSMENT RECOMMENDATIONS (A/R).    HEMODYNAMICS.   Monitor bp Target MAP 65 (+)    RESP.   Monitor po Target po 90-95%      PMH/PSH PROBLEMS.  Management continued/modified as indicated    VENT MANAGEMENT.   HOB elevation  Target Pplat 30 (-)  Target PO 90-95%  Target pH 730 (+)  Daily spontaneous breathing trials   Daily sedation vacation         INFECTION SOURCE.   INFECTION A/R.   Sepsis   bsab   Check sputum cult     INFECTION AUGUST.  W 4/21/2022 w 14   UA 4/21/2022 w 11-25   MICROBIO.  Pending   ABIO.  Bsab       LACTICEMIA.  La 4/21/2022 la 2.8     ANEMIA.   Hb 4/21/2022 hb 6     HYPONATREMIA.  Na 4/21/2022 na 134    RYAN.  Cr 4/21/2022 Cr 1.3       TIME SPENT   Over 36 minutes aggregate critical care time spent on encounter; activities included   direct patient care, counseling and/or coordinating care reviewing notes, lab data/ imaging , discussion with multidisciplinary team/ patient  /family and explaining in detail risks, benefits, alternatives  of the recommendations     CHAPINCITO GUIDRY 79 f [facility] 4/21/2022   DR ILIANA KEMP      peg dysfunction  anemia  dysphagia    plan  daily cbc   transfuse prn   consider hematology eval  check iron studies   ppi once a day  check stool occult blood  further recommendations pending above  peg to be changed    Advanced care planning was discussed with patient and family.  Advanced care planning forms were reviewed and discussed.  Risks, benefits and alternatives of gastroenterologic procedures were discussed in detail and all questions were answered.    30 minutes spent.   The patient is a 80 year old female with a history of HTN, DM, CVA, dementia, chronic respiratory failure s/p trach, PEG, cardiac arrest, anemia who presented with respiratory distress, fevers, hypotension.    Plan:  - Echo 9/21 with normal LV systolic function, mod pulm HTN  - CT chest with bilateral infiltrates and pleural effusions  - Weaned off of norepinephrine  - If BP trends down, start midodrine  - Received IV fluids  - Hemoglobin initially low at 6 - transfused  - On zosyn  - Mechanical ventilation  - Overall prognosis remains poor Patient presents with   1. Left gluteal fold unstageable pressure injury 3 x 2 yellow slough 100% scant/moderate serosanguinous drainage no odor, periwound erythema  recommendation:   -Santyl daily  2. Sacral area scr tissue likley resolved stage 3/4 pressure injury  -as per prevention protocol  3. Left hallux unstageable pressure injury 1.5 x 1.5 dry, periwound dry mild erythema  recommendation:   -Continue Betadine daily  4. Right hallux unstageable pressure injury 1.5 x 1.5 dry, periwound dry mild erythema  recommendation:   -Continue Betadine daily  5. Right lateral foot unstageable pressure injury 1 x 1 dry, periwound dry mild erythema  recommendation:  -Continue Betadine daily  6. Right lateral/plantar foot unstageable pressure injury 1 x 1 dry, periwound dry mild erythema  recommendation:  -Continue Betadine daily  Patient is on a low air loss mattress  for the critically ill patient experiencing multiorgan failure, impaired tissue oxygenation, and perfusion can contribute to skin failure thus the development of a pressure related injury may be unavoidable    This pressure injury is community acquired/YES  At risk for altered tissue perfusion /YES  Impaired perfusion of peripheral tissue /YES  Continue  Nutrition (as tolerated)  Continue  Offloading   Continue Pericare  Apply cair boots at all times while in bed.   Provide skin checks and foot placement q8h.  Care as per medicine will follow w/ you  [facility]   Findings and recommendations discussed with BRANDEN Ruth  Thank you for this consult  Ana Luisa Cantu NP, [facility] [phone] 79 y/o F with PMH of HTN, DM type 2, CVA, recent Cardiac Arrest, Chronic Respiratory Failure, Vent Dependant s/p trach & PEG, Anemia with GI blood loss, and Dementia who was sent from [facility] facility for acute respiratory distress, fevers, hypotension. Patient unable to contribute to hx due to mental status.  In the ED: wbc 14.15, HGB 6.0, INR 1.40, sodium 134, cr 1.36, lactate 2.8. UA sig for mod LE, large blood, wbc, bacteria. Given Zosyn and ns.     RECOMMENDATIONS  Frequent admission and usually due to pulm localized infections but this time potential urinary localization with gross pyuria noted. Will start Zosyn with recs to follow.    Thank you for consulting us and involving us in the management of this most interesting and challenging case.  We will follow along in the care of this patient. Please call us at [phone] or text me directly on my cell# at [phone] with any concerns.       Physical Exam:   Vital Signs Last 24 Hrs  T(C): 36.9 (26 Apr 2022 12:30), Max: 38.4 (25 Apr 2022 20:27)  T(F): 98.4 (26 Apr 2022 12:30), Max: 101.1 (25 Apr 2022 20:27)  HR: 94 (26 Apr 2022 14:05) (80 - 109)  BP: 128/68 (26 Apr 2022 13:00) (105/67 - 136/75)  BP(mean): 87 (26 Apr 2022 13:00) (76 - 93)  RR: 20 (26 Apr 2022 13:00) (17 - 30)  SpO2: 95% (26 Apr 2022 14:05) (95% - 100%)    General: NAD, obtunded, emaciated  CVS: S1S2 no M/R/G  Resp: vent in place, no gross crackles or rales  : vaughn w/ purulent urine  Extremeties: no edema, + pulses           CAPILLARY BLOOD GLUCOSE      POCT Blood Glucose.: 199 mg/dL (26 Apr 2022 11:28)  POCT Blood Glucose.: 203 mg/dL (26 Apr 2022 05:28)  POCT Blood Glucose.: 257 mg/dL (25 Apr 2022 23:24)  POCT Blood Glucose.: 250 mg/dL (25 Apr 2022 17:11)      Cholesterol, Serum: 113 mg/dL (05.19.21 @ 08:36)     HDL Cholesterol, Serum: 22 mg/dL (05.19.21 @ 08:36)     LDL Cholesterol Calculated: 66 mg/dL (05.19.21 @ 08:36)     DIET: NPO w/ tube feeds glucerna 1.5 Horacio 30ml/hr; on hold due to PEG malfunction    Reccs: maintain low dose correctional ISS. change F/S to q6 hours while NPO, start 10 Units Lantus subq every morning, first dose now       81 y/o F with PMH of HTN, DM type 2, CVA, recent Cardiac Arrest, Chronic Respiratory Failure, Vent Dependant s/p trach & PEG, Anemia with GI blood loss, and Dementia admitted for    1. Multifocal PNA  2. UTI  3. Septic shock requiring pressors  4. Severe protein malnutrition  5. RYAN  6. Acute blood loss anemia    Neuro:  - Dementia at baseline  - Avoid Neuro Deliriogenic / sedative medications    CV:  - Actively titrating Levo to maintain MAP > 65  - Avoid fluid overload    Pulm:  - continue Decadron for enhanced anti-inflammatory effect  - Low TV lung protective strategies 4-6mL/kg,  - will utilize PEEP for alveolar recruitment is pt desats  - Actively titrating ventilator settings to maintain SpO2 > 92%,  - Vent Bundle in SITU  - End Tidal CO2 monitoring                  GI:  - Protonix 40mg  Daily  - Enteral feeds as tolerated to avoid Stress ulceration and optimize nutritional state when indicated    Renal:  - Even to net negative fluid balance as tolerated by hemodynamics and renal fx.    - Cr 1.36 Continue to monitor Bun/Cr and UOP  - Replacing electrolytes as needed with Goal K> 4, PO> 3, Mg> 2               - Strict I&O's, Woody to BSD  - Avoid Nephro toxic medication    Heme:  - Heparin for dvt prophylaxis  - Tranfuse to maintain HGB > 7    ID:  - WBC 14.15,  Hypothermic amada hugger on  - Continue Zosyn, 1 time dose vanco given  - Microbiology and Radiology reviewed   - trend CBC with diff, CMP  and fever curve  - Lactic 2.8 now 2.1  - 30ml/kg bolus given in ED    Endo:  - ISS for aggressive glycemic control to limit FS glucose to < 180mg/dl.  - Keep Euthyroid    Critical Care Time:  70 minutes were spent assessing the patient's presenting problems of acute illness that pose a high probability of life threatening  deterioration or end organ damage / dysfunction.  Medical decision making includes initiation / continuation of plan or care review data/ labwork/ radiographic study, direct patient care bedside ,  discussions with  consultants regarding care,  evaluation and interpretation of vital signs, any necessary ventilator management,   NIV or BIPAP changes  or initiation,    discussions with multidisciplinary team,  am or pm rounds, discussions of goals of care with patient and family all non-inclusive of procedures.    Plan discussed with Dr Edge Microcytic anemia most likely related to acute/chronic disease with probable UTI, decubuti, long term intubation etic    Recommendations  1.  follow CBC and transfuse as indicated  2.  continue medical management  3.  prognosis is guarded  4.  check fe studies  5.  expect with current clinical situation that patient will be transfusion dependent and is not a candidate for JENNIFER therapy  6.  has received 1 unit PRBC in the ER  7.  further heme recommendations pending above 81 y/o F with PMH of HTN, DM type 2, CVA, recent Cardiac Arrest, Chronic Respiratory Failure, Vent Dependant s/p trach & PEG, Anemia with GI blood loss, and Dementia who was sent from [facility] facility for acute respiratory distress, fevers, hypotension.    sepsis  hypotension  OP  OA  chr resp failure  cva  hx of card arrest  DM    emp ABX  cx - biomarkers  ct imaging reviewed  labs reviewed  assist with needs    with HCP   pt is full code  old records reviewed  GOC documented   none

## 2022-11-18 NOTE — PROGRESS NOTE BEHAVIORAL HEALTH - NSBHFUPINTERVALHXFT_PSY_A_CORE
Patient seen and evaluated on IPP. As per nursing report no acute events. On approach patient calm and cooperative. No agitation aggression noted. Patient states she has been depressed and having suicidal thoughts. No intent or plan. States she feels safe on the unit. Today is also patients birthday.  States she has also been paranoid. Denies any homicidal thoughts at this time. Denies any ETOH or illicit drug use. States she sees a Dr. López but unable to provide contact information. States she has been compliant with her Abilify but unable to tell what pharmacy she gets her meds from.  States she wants to stay on the Abilify because it works for her. States she has not heard any voices while on Abilify. Denies any s/s of aleena. States she is estranged from her sister Sruthi so writer unable to obtain collateral.

## 2022-11-18 NOTE — PROGRESS NOTE BEHAVIORAL HEALTH - SUMMARY
This is a 63yo single woman, on disability, homeless since 2020, noncaregiver; pmhx HTN (not on meds); pphx schizoaffective disorder/depressive, multiple inpatient hospitalizations (most recently at [facility] 3/2022), no suicide attempts, no self harming in past; no substance abuse; no known trauma; no legal history; no aggression/violence hx; self presenting to ED for SI/HI. Appears depressed, with flat affect, endorsing SI/HI, no acute psychotic symptoms. She is at moderate acute risk for suicide due to expressed SI and depression symptoms, also at chronically elevated risk due to hx of psychiatric hospitalizations, chronic/serious mental illness, poor social supports, and homelessness. Meets criteria for inpatient psych admission for acute stabilization. This is a 61yo single woman, on disability, homeless since 2020, noncaregiver; pmhx HTN (not on meds); pphx schizoaffective disorder/depressive, multiple inpatient hospitalizations (most recently at [facility] 3/2022), no suicide attempts, no self harming in past; no substance abuse; no known trauma; no legal history; no aggression/violence hx; self presenting to ED for SI/HI. Appears depressed, with flat affect, endorsing SI/HI, no acute psychotic symptoms. She is at moderate acute risk for suicide due to expressed SI and depression symptoms, also at chronically elevated risk due to hx of psychiatric hospitalizations, chronic/serious mental illness, poor social supports, and homelessness. Meets criteria for inpatient psych admission for acute stabilization.    #Schizoaffective disorder  -Abilify 5mg Daily    -Hydroxyzine 50mg Q6 PRN for anxiety/insomnia  -Haldol 5mg Q6 PRN for agitation/psychosis  -Benadryl 50mg Q6 PRN got EPS  -Lorazepam 2mg Q6 PRN for aggression    -Tylenol PRN for pain    #Agitation  -for agitation not amenable to verbal redirection, may give haldol 5 mg q6h prn, ativan 2 mg q6h prn, benadryl 50 mg q6h prn with escalation to IM if pt is a danger to self or/and others with repeat EKG to ensure QTc <500 ms.

## 2022-11-18 NOTE — CONSULT NOTE ADULT - SUBJECTIVE AND OBJECTIVE BOX
ESPINOZA CARRINGTON  62y, Female  Allergy: penicillin (Rash)      CHIEF COMPLAINT:     HPI:    HPI:  This is a 61yo single woman, on disability, homeless since 2020, noncaregiver; pmhx HTN (not on meds); pphx schizoaffective disorder/depressive, multiple inpatient hospitalizations (most recently at [facility] 3/2022), no suicide attempts, no self harming in past; no substance abuse; no known trauma; no legal history; no aggression/violence hx; self presenting to ED for SI/HI.    Reports feeling very discouraged and hopeless especially this morning. Started feeling paranoid about people at her homeless shelter, wondering if they were tampering with the printers as she was trying to get a housing application together for example. Endorses HI, though not toward anyone specifically, just generally feeling paranoid and angry toward "people who are getting in the way." She notes feeling very depressed in general. Endorsing thoughts of suicide, specifically thoughts of jumping in front of a train. Reflects on her family, that she does not speak with many of them except her mom who has mild dementia and lives in a nursing home. Denies AH/VH. Has been taking Abilify 5 mg for schizoaffective disorder for the past 5 months or so. Sees psychiatrist Dr. López at the shelter about once every two months.      [facility] - shelter for mentally ill - living there since February 2021. Homeless for 2.5 years. She and mom were homeless together before mom found a place in a nursing home.  Psych hospitalizations about five times in life; last hospitalization about 8 months ago.    States she has no collateral available. Her sister Sruthi who was previously listed as collateral is now estranged, pt does not know her whereabouts.     COVID screening:  COVID vaccine x 2  no known exposures  no positive covid test in past three months (17 Nov 2022 23:24)    FAMILY HISTORY:  Family history of early CAD (Father)  Says that multiple family members had heart disease (a sibling is awaiting heart transplant)      PAST MEDICAL & SURGICAL HISTORY:  Schizophrenia      Leukemia  in remission      Depression      No significant past surgical history          SOCIAL HISTORY  Social History:  denies all (28 Oct 2021 00:35)        ROS  General: Denies fevers, chills, nightsweats, weight loss  HEENT: Denies headache, rhinorrhea, sore throat, eye pain  CV: Denies CP, palpitations  PULM: Denies SOB, cough  GI: Denies abdominal pain, diarrhea  : Denies dysuria, hematuria  MSK: Denies arthralgias  SKIN: Denies rash   NEURO: Denies paresthesias, weakness  PSYCH: Denies depression    VITALS:  T(F): 97.4, Max: 97.4 (11-18-22 @ 08:40)  HR: 81  BP: 138/81  RR: 18Vital Signs Last 24 Hrs  T(C): 36.3 (18 Nov 2022 08:40), Max: 36.3 (18 Nov 2022 08:40)  T(F): 97.4 (18 Nov 2022 08:40), Max: 97.4 (18 Nov 2022 08:40)  HR: 81 (18 Nov 2022 08:40) (79 - 100)  BP: 138/81 (18 Nov 2022 08:40) (113/76 - 138/81)  BP(mean): --  RR: 18 (18 Nov 2022 08:40) (18 - 18)  SpO2: 97% (18 Nov 2022 01:17) (97% - 100%)    Parameters below as of 17 Nov 2022 19:25  Patient On (Oxygen Delivery Method): room air        PHYSICAL EXAM:  Gen: NAD, resting in bed  HEENT: Normocephalic, atraumatic  Neck: supple, no lymphadenopathy  CV: Regular rate & regular rhythm  Lungs: decreased BS at bases, no fremitus  Abdomen: Soft, BS present  Ext: Warm, well perfused  Neuro: non focal, awake  Skin: no rash, no erythema  Psych: no SI, HI, Hallucination     TESTS & MEASUREMENTS:                        11.9   8.17  )-----------( 235      ( 17 Nov 2022 20:44 )             35.4     11-17    141  |  103  |  13  ----------------------------<  231<H>  4.0   |  24  |  0.8    Ca    8.9      17 Nov 2022 20:44                  QRS axis to [] ° and NSR at a rate of [] BPM. There was no atrial enlargement. There was no ventricular hypertrophy. There were no ST-T changes and all intervals were normal.      INFECTIOUS DISEASES TESTING      RADIOLOGY & ADDITIONAL TESTS:  I have personally reviewed the last Chest xray  CXR      CT      CARDIOLOGY TESTING  12 Lead ECG:   Ventricular Rate 88 BPM    Atrial Rate 88 BPM    P-R Interval 150 ms    QRS Duration 92 ms    Q-T Interval 356 ms    QTC Calculation(Bazett) 430 ms    P Axis 56 degrees    R Axis -24 degrees    T Axis 24 degrees    Diagnosis Line Normal sinus rhythm  Minimal voltage criteria for LVH, may be normal variant  Possible Anterior infarct , age undetermined  Abnormal ECG    Confirmed by Zia Caraballo ([id number]) on 11/18/2022 9:31:30 AM (11-17-22 @ 19:36)      MEDICATIONS  ARIPiprazole 5      ANTIBIOTICS:      All available historical data has been reviewed    ASSESSMENT  62y F admitted with SUICIDAL IDEATION        PROBLEMS   ESPINOZA CARRINGTON  62y, Female  Allergy: penicillin (Rash)      CHIEF COMPLAINT:     HPI:    HPI:  This is a 61yo single woman, on disability, homeless since 2020, noncaregiver; pmhx HTN (not on meds); pphx schizoaffective disorder/depressive, multiple inpatient hospitalizations (most recently at [facility] 3/2022), no suicide attempts, no self harming in past; no substance abuse; no known trauma; no legal history; no aggression/violence hx; self presenting to ED for SI/HI.    Reports feeling very discouraged and hopeless especially this morning. Started feeling paranoid about people at her homeless shelter, wondering if they were tampering with the printers as she was trying to get a housing application together for example. Endorses HI, though not toward anyone specifically, just generally feeling paranoid and angry toward "people who are getting in the way." She notes feeling very depressed in general. Endorsing thoughts of suicide, specifically thoughts of jumping in front of a train. Reflects on her family, that she does not speak with many of them except her mom who has mild dementia and lives in a nursing home. Denies AH/VH. Has been taking Abilify 5 mg for schizoaffective disorder for the past 5 months or so. Sees psychiatrist Dr. López at the shelter about once every two months.      [facility] - shelter for mentally ill - living there since February 2021. Homeless for 2.5 years. She and mom were homeless together before mom found a place in a nursing home.  Psych hospitalizations about five times in life; last hospitalization about 8 months ago.    States she has no collateral available. Her sister Sruthi who was previously listed as collateral is now estranged, pt does not know her whereabouts.     COVID screening:  COVID vaccine x 2  no known exposures  no positive covid test in past three months (17 Nov 2022 23:24)    FAMILY HISTORY:  Family history of early CAD (Father)  Says that multiple family members had heart disease (a sibling is awaiting heart transplant)      PAST MEDICAL & SURGICAL HISTORY:  Schizophrenia  aortic stenosis  WPW      Leukemia  in remission      Depression      No significant past surgical history          SOCIAL HISTORY  Social History:  denies all (28 Oct 2021 00:35)        ROS  General: Denies fevers, chills, nightsweats, weight loss  HEENT: Denies headache, rhinorrhea, sore throat, eye pain  CV: Denies CP, palpitations  PULM: Denies SOB, cough  GI: Denies abdominal pain, diarrhea  : Denies dysuria, hematuria  MSK: Denies arthralgias  SKIN: Denies rash   NEURO: Denies paresthesias, weakness  PSYCH: Denies depression    VITALS:  T(F): 97.4, Max: 97.4 (11-18-22 @ 08:40)  HR: 81  BP: 138/81  RR: 18Vital Signs Last 24 Hrs  T(C): 36.3 (18 Nov 2022 08:40), Max: 36.3 (18 Nov 2022 08:40)  T(F): 97.4 (18 Nov 2022 08:40), Max: 97.4 (18 Nov 2022 08:40)  HR: 81 (18 Nov 2022 08:40) (79 - 100)  BP: 138/81 (18 Nov 2022 08:40) (113/76 - 138/81)  BP(mean): --  RR: 18 (18 Nov 2022 08:40) (18 - 18)  SpO2: 97% (18 Nov 2022 01:17) (97% - 100%)    Parameters below as of 17 Nov 2022 19:25  Patient On (Oxygen Delivery Method): room air        PHYSICAL EXAM:  Gen: NAD, resting in bed  HEENT: Normocephalic, atraumatic  Neck: supple, no lymphadenopathy  CV: Regular rate & regular rhythm +TIKI  Lungs: decreased BS at bases, no fremitus  Abdomen: Soft, BS present  Ext: Warm, well perfused  Neuro: non focal, awake  Skin: no rash, no erythema  Psych: no SI, HI, Hallucination     TESTS & MEASUREMENTS:                        11.9   8.17  )-----------( 235      ( 17 Nov 2022 20:44 )             35.4     11-17    141  |  103  |  13  ----------------------------<  231<H>  4.0   |  24  |  0.8    Ca    8.9      17 Nov 2022 20:44                  QRS axis to [] ° and NSR at a rate of [] BPM. There was no atrial enlargement. There was no ventricular hypertrophy. There were no ST-T changes and all intervals were normal.      INFECTIOUS DISEASES TESTING      RADIOLOGY & ADDITIONAL TESTS:  I have personally reviewed the last Chest xray  CXR      CT      CARDIOLOGY TESTING  12 Lead ECG:   Ventricular Rate 88 BPM    Atrial Rate 88 BPM    P-R Interval 150 ms    QRS Duration 92 ms    Q-T Interval 356 ms    QTC Calculation(Bazett) 430 ms    P Axis 56 degrees    R Axis -24 degrees    T Axis 24 degrees    Diagnosis Line Normal sinus rhythm  Minimal voltage criteria for LVH, may be normal variant  Possible Anterior infarct , age undetermined  Abnormal ECG    Confirmed by Zia Caraballo ([id number]) on 11/18/2022 9:31:30 AM (11-17-22 @ 19:36)      MEDICATIONS  ARIPiprazole 5      ANTIBIOTICS:      All available historical data has been reviewed    ASSESSMENT  62y F admitted with SUICIDAL IDEATION        PROBLEMS

## 2022-11-18 NOTE — CONSULT NOTE ADULT - ASSESSMENT
#schizoaffective, SI - tx per pysch   # Aortic stenosis/WPW - asymptomatic - outpt cardio follow up   #morbid obesity     recall prn

## 2022-11-19 LAB
A1C WITH ESTIMATED AVERAGE GLUCOSE RESULT: 6.7 % — HIGH (ref 4–5.6)
ALBUMIN SERPL ELPH-MCNC: 4.1 G/DL — SIGNIFICANT CHANGE UP (ref 3.5–5.2)
ALP SERPL-CCNC: 134 U/L — HIGH (ref 30–115)
ALT FLD-CCNC: 31 U/L — SIGNIFICANT CHANGE UP (ref 0–41)
ANION GAP SERPL CALC-SCNC: 9 MMOL/L — SIGNIFICANT CHANGE UP (ref 7–14)
AST SERPL-CCNC: 21 U/L — SIGNIFICANT CHANGE UP (ref 0–41)
BASOPHILS # BLD AUTO: 0.04 K/UL — SIGNIFICANT CHANGE UP (ref 0–0.2)
BASOPHILS NFR BLD AUTO: 0.6 % — SIGNIFICANT CHANGE UP (ref 0–1)
BILIRUB SERPL-MCNC: 0.2 MG/DL — SIGNIFICANT CHANGE UP (ref 0.2–1.2)
BUN SERPL-MCNC: 13 MG/DL — SIGNIFICANT CHANGE UP (ref 10–20)
CALCIUM SERPL-MCNC: 9.3 MG/DL — SIGNIFICANT CHANGE UP (ref 8.4–10.5)
CHLORIDE SERPL-SCNC: 102 MMOL/L — SIGNIFICANT CHANGE UP (ref 98–110)
CHOLEST SERPL-MCNC: 199 MG/DL — SIGNIFICANT CHANGE UP
CO2 SERPL-SCNC: 27 MMOL/L — SIGNIFICANT CHANGE UP (ref 17–32)
CREAT SERPL-MCNC: 0.8 MG/DL — SIGNIFICANT CHANGE UP (ref 0.7–1.5)
EGFR: 84 ML/MIN/1.73M2 — SIGNIFICANT CHANGE UP
EOSINOPHIL # BLD AUTO: 0.15 K/UL — SIGNIFICANT CHANGE UP (ref 0–0.7)
EOSINOPHIL NFR BLD AUTO: 2.2 % — SIGNIFICANT CHANGE UP (ref 0–8)
ESTIMATED AVERAGE GLUCOSE: 146 MG/DL — HIGH (ref 68–114)
GLUCOSE SERPL-MCNC: 231 MG/DL — HIGH (ref 70–99)
HCT VFR BLD CALC: 39.7 % — SIGNIFICANT CHANGE UP (ref 37–47)
HDLC SERPL-MCNC: 56 MG/DL — SIGNIFICANT CHANGE UP
HGB BLD-MCNC: 13.5 G/DL — SIGNIFICANT CHANGE UP (ref 12–16)
IMM GRANULOCYTES NFR BLD AUTO: 1 % — HIGH (ref 0.1–0.3)
LIPID PNL WITH DIRECT LDL SERPL: 100 MG/DL — HIGH
LYMPHOCYTES # BLD AUTO: 1.43 K/UL — SIGNIFICANT CHANGE UP (ref 1.2–3.4)
LYMPHOCYTES # BLD AUTO: 20.8 % — SIGNIFICANT CHANGE UP (ref 20.5–51.1)
MCHC RBC-ENTMCNC: 29.5 PG — SIGNIFICANT CHANGE UP (ref 27–31)
MCHC RBC-ENTMCNC: 34 G/DL — SIGNIFICANT CHANGE UP (ref 32–37)
MCV RBC AUTO: 86.9 FL — SIGNIFICANT CHANGE UP (ref 81–99)
MONOCYTES # BLD AUTO: 0.38 K/UL — SIGNIFICANT CHANGE UP (ref 0.1–0.6)
MONOCYTES NFR BLD AUTO: 5.5 % — SIGNIFICANT CHANGE UP (ref 1.7–9.3)
NEUTROPHILS # BLD AUTO: 4.8 K/UL — SIGNIFICANT CHANGE UP (ref 1.4–6.5)
NEUTROPHILS NFR BLD AUTO: 69.9 % — SIGNIFICANT CHANGE UP (ref 42.2–75.2)
NON HDL CHOLESTEROL: 143 MG/DL — HIGH
NRBC # BLD: 0 /100 WBCS — SIGNIFICANT CHANGE UP (ref 0–0)
PLATELET # BLD AUTO: 232 K/UL — SIGNIFICANT CHANGE UP (ref 130–400)
POTASSIUM SERPL-MCNC: 5.1 MMOL/L — HIGH (ref 3.5–5)
POTASSIUM SERPL-SCNC: 5.1 MMOL/L — HIGH (ref 3.5–5)
PROT SERPL-MCNC: 7.1 G/DL — SIGNIFICANT CHANGE UP (ref 6–8)
RBC # BLD: 4.57 M/UL — SIGNIFICANT CHANGE UP (ref 4.2–5.4)
RBC # FLD: 13.2 % — SIGNIFICANT CHANGE UP (ref 11.5–14.5)
SODIUM SERPL-SCNC: 138 MMOL/L — SIGNIFICANT CHANGE UP (ref 135–146)
TRIGL SERPL-MCNC: 214 MG/DL — HIGH
TSH SERPL-MCNC: 3.3 UIU/ML — SIGNIFICANT CHANGE UP (ref 0.27–4.2)
WBC # BLD: 6.87 K/UL — SIGNIFICANT CHANGE UP (ref 4.8–10.8)
WBC # FLD AUTO: 6.87 K/UL — SIGNIFICANT CHANGE UP (ref 4.8–10.8)

## 2022-11-19 PROCEDURE — 99231 SBSQ HOSP IP/OBS SF/LOW 25: CPT

## 2022-11-19 RX ADMIN — ARIPIPRAZOLE 5 MILLIGRAM(S): 15 TABLET ORAL at 08:14

## 2022-11-19 NOTE — BH SAFETY PLAN - ENVIRONMENT SAFETY 3:
Also another way I would make my environment safe would be to continue taking my medications as prescribed from the doctors.

## 2022-11-19 NOTE — PROGRESS NOTE BEHAVIORAL HEALTH - SUMMARY
This is a 63yo single woman, on disability, homeless since 2020, noncaregiver; pmhx HTN (not on meds); pphx schizoaffective disorder/depressive, multiple inpatient hospitalizations (most recently at [facility] 3/2022), no suicide attempts, no self harming in past; no substance abuse; no known trauma; no legal history; no aggression/violence hx; self presenting to ED for SI/HI. Appears depressed, with flat affect, endorsing SI/HI, no acute psychotic symptoms. She is at moderate acute risk for suicide due to expressed SI and depression symptoms, also at chronically elevated risk due to hx of psychiatric hospitalizations, chronic/serious mental illness, poor social supports, and homelessness. Meets criteria for inpatient psych admission for acute stabilization.    #Schizoaffective disorder  -Abilify 5mg Daily    -Hydroxyzine 50mg Q6 PRN for anxiety/insomnia  -Haldol 5mg Q6 PRN for agitation/psychosis  -Benadryl 50mg Q6 PRN got EPS  -Lorazepam 2mg Q6 PRN for aggression    -Tylenol PRN for pain    #Agitation  -for agitation not amenable to verbal redirection, may give haldol 5 mg q6h prn, ativan 2 mg q6h prn, benadryl 50 mg q6h prn with escalation to IM if pt is a danger to self or/and others with repeat EKG to ensure QTc <500 ms.

## 2022-11-19 NOTE — PROGRESS NOTE BEHAVIORAL HEALTH - NSBHFUPINTERVALHXFT_PSY_A_CORE
Patient seen and evaluated on IPP. As per nursing report no acute events. On approach patient calm and cooperative. No agitation or aggression noted. She reports feeling the same, "depressed and suicidal", upset with her housing situation. Her affect is a little blunted but does not appear to be dysphoric. Speech is monotone. She was asking about the increase in her dose of Abilify but did not state any reason for this, besides her being on a higher dose in the past. She denies AH, denies suicidal intent or plan. She "feels like killing people in the shelter", but does not have a plan or intent to act on it. No overt delusions were elicited.

## 2022-11-19 NOTE — BH SAFETY PLAN - ENVIRONMENT SAFETY 1:
One way I would make my environment safe would be to call for help once I get into one of theses bad moods.

## 2022-11-19 NOTE — BH SAFETY PLAN - ENVIRONMENT SAFETY 2:
Another way I would I would make my environment safe would be to stay away from certain people places and things.

## 2022-11-20 RX ADMIN — ARIPIPRAZOLE 5 MILLIGRAM(S): 15 TABLET ORAL at 08:11

## 2022-11-20 NOTE — PROGRESS NOTE BEHAVIORAL HEALTH - NSBHFUPINTERVALHXFT_PSY_A_CORE
Patient seen and evaluated on IPP. As per nursing report no acute events. On approach patient calm and cooperative. No agitation or aggression noted. She reports feeling the same, "depressed ", upset with her housing situation. Her affect is a little blunted but does not appear to be dysphoric. Speech is monotone. She was asking about the increase in her dose of Abilify but did not state any reason for this, besides her being on a higher dose in the past. She denies  s/h ideations  . denies AH, denies suicidal intent or plan. She "feels like killing people in the shelter", but does not have a plan or intent to act on it. No overt delusions were elicited.

## 2022-11-20 NOTE — PROGRESS NOTE BEHAVIORAL HEALTH - SUMMARY
This is a 63yo single woman, on disability, homeless since 2020, noncaregiver; pmhx HTN (not on meds); pphx schizoaffective disorder/depressive, multiple inpatient hospitalizations (most recently at [facility] 3/2022), no suicide attempts, no self harming in past; no substance abuse; no known trauma; no legal history; no aggression/violence hx; self presenting to ED for SI/HI. Appears depressed, with flat affect, endorsing SI/HI, no acute psychotic symptoms. She is at moderate acute risk for suicide due to expressed SI and depression symptoms, also at chronically elevated risk due to hx of psychiatric hospitalizations, chronic/serious mental illness, poor social supports, and homelessness. Meets criteria for inpatient psych admission for acute stabilization.    #Schizoaffective disorder  -Abilify 5mg Daily    -Hydroxyzine 50mg Q6 PRN for anxiety/insomnia  -Haldol 5mg Q6 PRN for agitation/psychosis  -Benadryl 50mg Q6 PRN got EPS  -Lorazepam 2mg Q6 PRN for aggression    -Tylenol PRN for pain    #Agitation  -for agitation not amenable to verbal redirection, may give haldol 5 mg q6h prn, ativan 2 mg q6h prn, benadryl 50 mg q6h prn with escalation to IM if pt is a danger to self or/and others with repeat EKG to ensure QTc <500 ms.

## 2022-11-21 LAB
APPEARANCE UR: CLEAR — SIGNIFICANT CHANGE UP
BACTERIA # UR AUTO: ABNORMAL
BILIRUB UR-MCNC: NEGATIVE — SIGNIFICANT CHANGE UP
COLOR SPEC: YELLOW — SIGNIFICANT CHANGE UP
DIFF PNL FLD: NEGATIVE — SIGNIFICANT CHANGE UP
EPI CELLS # UR: ABNORMAL /HPF
GLUCOSE UR QL: NEGATIVE MG/DL — SIGNIFICANT CHANGE UP
KETONES UR-MCNC: NEGATIVE — SIGNIFICANT CHANGE UP
LEUKOCYTE ESTERASE UR-ACNC: ABNORMAL
NITRITE UR-MCNC: NEGATIVE — SIGNIFICANT CHANGE UP
PH UR: 6 — SIGNIFICANT CHANGE UP (ref 5–8)
PROT UR-MCNC: NEGATIVE MG/DL — SIGNIFICANT CHANGE UP
RBC CASTS # UR COMP ASSIST: NEGATIVE — SIGNIFICANT CHANGE UP
SP GR SPEC: >=1.03 (ref 1.01–1.03)
UROBILINOGEN FLD QL: 0.2 MG/DL — SIGNIFICANT CHANGE UP
WBC UR QL: ABNORMAL /HPF

## 2022-11-21 PROCEDURE — 99231 SBSQ HOSP IP/OBS SF/LOW 25: CPT

## 2022-11-21 RX ORDER — ARIPIPRAZOLE 15 MG/1
10 TABLET ORAL DAILY
Refills: 0 | Status: DISCONTINUED | OUTPATIENT
Start: 2022-11-22 | End: 2022-12-01

## 2022-11-21 RX ADMIN — ARIPIPRAZOLE 5 MILLIGRAM(S): 15 TABLET ORAL at 08:20

## 2022-11-21 NOTE — PROGRESS NOTE BEHAVIORAL HEALTH - NSBHFUPINTERVALHXFT_PSY_A_CORE
Patient seen and evaluated as per nursing report no acute events. Verbalizes feeling the same. Focused on housing. Very upset about her housing situation. Asked  about supportive housing placement. Writer discussed adding a medication for her depression. Patient states the only medication that works for everything is Abilify. States she does not need anything else. Patient was previously on a higher dose. Admits to passive suicidal ideations, no intent or plan. States feel safe on the unit. Denies any A/V hallucinations. Patient encouraged to get out of her room, engage with peers and attend groups.

## 2022-11-21 NOTE — PROGRESS NOTE BEHAVIORAL HEALTH - SUMMARY
This is a 63yo single woman, on disability, homeless since 2020, noncaregiver; pmhx HTN (not on meds); pphx schizoaffective disorder/depressive, multiple inpatient hospitalizations (most recently at [facility] 3/2022), no suicide attempts, no self harming in past; no substance abuse; no known trauma; no legal history; no aggression/violence hx; self presenting to ED for SI/HI. Appears depressed, with flat affect, endorsing SI/HI, no acute psychotic symptoms. She is at moderate acute risk for suicide due to expressed SI and depression symptoms, also at chronically elevated risk due to hx of psychiatric hospitalizations, chronic/serious mental illness, poor social supports, and homelessness. Meets criteria for inpatient psych admission for acute stabilization.    Patient seen and evaluated as per nursing report no acute events. Verbalizes feeling the same. Focused on housing. Very upset about her housing situation. Asked  about supportive housing placement. Writer discussed adding a medication for her depression. Patient states the only medication that works for everything is Abilify. States she does not need anything else. Patient was previously on a higher dose. Admits to passive suicidal ideations, no intent or plan. States feel safe on the unit. Denies any A/V hallucinations. Patient encouraged to get out of her room, engage with peers and attend groups.    #Schizoaffective disorder  -Abilify 10mg Daily    -Hydroxyzine 50mg Q6 PRN for anxiety/insomnia  -Haldol 5mg Q6 PRN for agitation/psychosis  -Benadryl 50mg Q6 PRN got EPS  -Lorazepam 2mg Q6 PRN for aggression    -Tylenol PRN for pain    #Agitation  -for agitation not amenable to verbal redirection, may give haldol 5 mg q6h prn, ativan 2 mg q6h prn, benadryl 50 mg q6h prn with escalation to IM if pt is a danger to self or/and others with repeat EKG to ensure QTc <500 ms.

## 2022-11-21 NOTE — CHART NOTE - NSCHARTNOTEFT_GEN_A_CORE
INTERVAL DATA:   · Interval Chief Complaint: "I  feel the same"  · Interval History: Patient seen and evaluated as per nursing report no acute events. States she feels the same as she did upon admission. Patient states and appears very upset about her housing situation and asked  about supportive housing placement. NP discussed adding a medication for her depression. Patient states the only medication that works for everything is Abilify. States she does not need anything else. Patient was previously on a higher dose. Admits to passive suicidal ideations, no intent or plan. States she feel safe on the unit. Denies any A/V hallucinations. Patient encouraged to get out of her room, engage with peers and attend groups.      MENTAL STATUS EXAM:   · Level of Consciousness	Alert  · Body Habitus	Overweight  · Hygiene  	Fair  · Grooming	Fair  · Behavior	Cooperative  · Eye Contact	Fair  · Relatedness	Fair  · Impulse Control	Normal  · Muscle Tone / Strength	Normal muscle tone/strength  · Abnormal Movements	No abnormal movements  · Gait / Station	Normal gait / station  · Speech Volume	Normal  · Speech Rate	Slowed  · Speech Spontaneity	Normal  · Speech Articulation	Normal  · Reported mood	Depressed  · Affect Quality	Euthymic  · Affect Range	Blunted  · Affect Congruence	Not congruent  · Thought Process	Linear  · Thought Associations	Normal  · Thought Content	Hopelessness  Suicidality  · Perceptions	No abnormalities  · Oriented to Place	Yes  · Oriented to Situation	Yes  · Oriented to Person	Yes  · Estimated Intelligence	Average  · Recent Memory	Normal  · Remote Memory	Normal  · Fund of Knowledge	Normal  · Language	No abnormalities noted  · Judgment (regarding everyday events)	Fair  · Insight (regarding psychiatric illness)	Fair    SUICIDALITY:   · Suicidality (Interval)	yes  · Passive Ideation	yes  · Active Ideation	none known  · Plan	none known  · Intent	none known    HOMICIDALITY/AGGRESSION:   · Homicidality/Aggression	yes  · Ideation	none known  · Plan	none known  · Intent	none known  · Aggression to others	none known  · Aggression to property	none known    DIAGNOSIS DSM-V:    Psychiatric Diagnosis (Corresponds to DSM-IV Axis I, II):  Primary Dx Schizoaffective disorder F25.9.    Plan:    #Schizoaffective disorder  -Abilify 10mg Daily    -Hydroxyzine 50mg Q6 PRN for anxiety/insomnia  -Haldol 5mg Q6 PRN for agitation/psychosis  -Benadryl 50mg Q6 PRN got EPS  -Lorazepam 2mg Q6 PRN for aggression    -Tylenol PRN for pain    #Agitation  -for agitation not amenable to verbal redirection, may give haldol 5 mg q6h prn, ativan 2 mg q6h prn, benadryl 50 mg q6h prn with escalation to IM if pt is a danger to self or/and others with repeat EKG to ensure QTc <500 ms.

## 2022-11-21 NOTE — CHART NOTE - NSCHARTNOTEFT_GEN_A_CORE
Social Work Note:       Natalie remains on the unit for continued treatment, safety and observation.  Treatment team met with patient on morning rounds.  She was calm, cooperative, and pleasant.  Patient endorsed feeling “the same” as when she was admitted to the hospital.  She continues to report feeling depressed with passive suicidal ideation.  She said she feels safe on the unit.  Patient remains perseverative on her housing situation and endorsed frustration with the lengthy process to apply for supportive housing.  Patient has been medication compliant and in good behavioral control on the unit.     Once stable for discharge, patient will return [facility] in the [place].  She will resume treatment with her Psychiatrist Dr. López at the shelter.    Mental Status Exam:      Mood – Depressed   Sleep  - Fair  Appetite – Good  ADLs – Fair  Thought Process – Linear/Perseverative   Observation – q10 minutes.

## 2022-11-22 PROCEDURE — 99231 SBSQ HOSP IP/OBS SF/LOW 25: CPT

## 2022-11-22 RX ADMIN — ARIPIPRAZOLE 10 MILLIGRAM(S): 15 TABLET ORAL at 08:57

## 2022-11-22 NOTE — CHART NOTE - NSCHARTNOTEFT_GEN_A_CORE
INTERVAL DATA:   · Interval Chief Complaint: "I  feel the same, but a little better"  · Interval History: Patient seen and evaluated as per nursing report no acute events. States she is starting to feel a little better. Admits to passive suicidal ideations, no intent or plan, however states these thoughts are getting better and are happening less often since admission. Patient states she has been eating and sleeping well. States she feel safe on the unit. Denies any A/V hallucinations. Patient encouraged to get out of her room, engage with peers and attend groups.      MENTAL STATUS EXAM:   · Level of Consciousness	Alert  · Body Habitus	Overweight  · Hygiene  	Fair  · Grooming	Fair  · Behavior	Cooperative  · Eye Contact	Fair  · Relatedness	Fair  · Impulse Control	Normal  · Muscle Tone / Strength	Normal muscle tone/strength  · Abnormal Movements	No abnormal movements  · Gait / Station	Normal gait / station  · Speech Volume	Normal  · Speech Rate	Slowed  · Speech Spontaneity	Normal  · Speech Articulation	Normal  · Reported mood	Depressed  · Affect Quality	Euthymic  · Affect Range	Blunted  · Affect Congruence	Not congruent  · Thought Process	Linear  · Thought Associations	Normal  · Thought Content	Hopelessness  Suicidality  · Perceptions	No abnormalities  · Oriented to Place	Yes  · Oriented to Situation	Yes  · Oriented to Person	Yes  · Estimated Intelligence	Average  · Recent Memory	Normal  · Remote Memory	Normal  · Fund of Knowledge	Normal  · Language	No abnormalities noted  · Judgment (regarding everyday events)	Fair  · Insight (regarding psychiatric illness)	Fair    SUICIDALITY:   · Suicidality (Interval)	yes  · Passive Ideation	yes  · Active Ideation	none known  · Plan	none known  · Intent	none known    HOMICIDALITY/AGGRESSION:   · Homicidality/Aggression	yes  · Ideation	none known  · Plan	none known  · Intent	none known  · Aggression to others	none known  · Aggression to property	none known    DIAGNOSIS DSM-V:    Psychiatric Diagnosis (Corresponds to DSM-IV Axis I, II):  Primary Dx Schizoaffective disorder F25.9.    Plan:    #Schizoaffective disorder  -Abilify 10mg Daily    -Hydroxyzine 50mg Q6 PRN for anxiety/insomnia  -Haldol 5mg Q6 PRN for agitation/psychosis  -Benadryl 50mg Q6 PRN got EPS  -Lorazepam 2mg Q6 PRN for aggression    -Tylenol PRN for pain    #Agitation  -for agitation not amenable to verbal redirection, may give haldol 5 mg q6h prn, ativan 2 mg q6h prn, benadryl 50 mg q6h prn with escalation to IM if pt is a danger to self or/and others with repeat EKG to ensure QTc <500 ms.

## 2022-11-22 NOTE — PROGRESS NOTE BEHAVIORAL HEALTH - SUMMARY
This is a 63yo single woman, on disability, homeless since 2020, noncaregiver; pmhx HTN (not on meds); pphx schizoaffective disorder/depressive, multiple inpatient hospitalizations (most recently at [facility] 3/2022), no suicide attempts, no self harming in past; no substance abuse; no known trauma; no legal history; no aggression/violence hx; self presenting to ED for SI/HI. Appears depressed, with flat affect, endorsing SI/HI, no acute psychotic symptoms. She is at moderate acute risk for suicide due to expressed SI and depression symptoms, also at chronically elevated risk due to hx of psychiatric hospitalizations, chronic/serious mental illness, poor social supports, and homelessness. Meets criteria for inpatient psych admission for acute stabilization.    Patient seen and evaluated as per nursing report no acute events. Verbalizes feeling the same. Continues to be focused on housing. Yesterday writer discussed adding a medication for her depression. Patient stated the only medication that works for her is Abilify. States she does not need anything else. Patient was previously on a higher dose. Abilify increased. Denies any side effects/adverse reactions. No side effects/adverse reactions noted. Admits to passive suicidal ideations, no intent or plan. States feel safe on the unit. Denies any A/V hallucinations. Patient encouraged to get out of her room, engage with peers and attend groups.     #Schizoaffective disorder  -Abilify 10mg Daily    -Hydroxyzine 50mg Q6 PRN for anxiety/insomnia  -Haldol 5mg Q6 PRN for agitation/psychosis  -Benadryl 50mg Q6 PRN got EPS  -Lorazepam 2mg Q6 PRN for aggression    -Tylenol PRN for pain    #Agitation  -for agitation not amenable to verbal redirection, may give haldol 5 mg q6h prn, ativan 2 mg q6h prn, benadryl 50 mg q6h prn with escalation to IM if pt is a danger to self or/and others with repeat EKG to ensure QTc <500 ms.

## 2022-11-22 NOTE — PROGRESS NOTE BEHAVIORAL HEALTH - NSBHFUPINTERVALHXFT_PSY_A_CORE
Patient seen and evaluated as per nursing report no acute events. Verbalizes feeling the same. Continues to be focused on housing. Yesterday writer discussed adding a medication for her depression. Patient stated the only medication that works for her is Abilify. States she does not need anything else. Patient was previously on a higher dose. Abilify increased. Denies any side effects/adverse reactions. No side effects/adverse reactions noted. Admits to passive suicidal ideations, no intent or plan. States feel safe on the unit. Denies any A/V hallucinations. Patient encouraged to get out of her room, engage with peers and attend groups.

## 2022-11-22 NOTE — CHART NOTE - NSCHARTNOTEFT_GEN_A_CORE
ROMULO called [facility] [phone] and informed them that patient was admitted to the hospital.  They confirmed patient is a resident there and can return at discharge

## 2022-11-23 PROCEDURE — 99231 SBSQ HOSP IP/OBS SF/LOW 25: CPT

## 2022-11-23 RX ADMIN — ARIPIPRAZOLE 10 MILLIGRAM(S): 15 TABLET ORAL at 08:17

## 2022-11-23 NOTE — PROGRESS NOTE BEHAVIORAL HEALTH - SUMMARY
This is a 61yo single woman, on disability, homeless since 2020, noncaregiver; pmhx HTN (not on meds); pphx schizoaffective disorder/depressive, multiple inpatient hospitalizations (most recently at [facility] 3/2022), no suicide attempts, no self harming in past; no substance abuse; no known trauma; no legal history; no aggression/violence hx; self presenting to ED for SI/HI. Appears depressed, with flat affect, endorsing SI/HI, no acute psychotic symptoms. She is at moderate acute risk for suicide due to expressed SI and depression symptoms, also at chronically elevated risk due to hx of psychiatric hospitalizations, chronic/serious mental illness, poor social supports, and homelessness. Meets criteria for inpatient psych admission for acute stabilization.    Patient seen and evaluated as per nursing report no acute events. Verbalizes feeling better. Still admits to periods of depression. States she is sleeping well and appetite is good. Continues to be focused on housing. Denies suicidal/homicidal ideations. Denies any A/V hallucinations. Patient visible on the unit attending groups.    #Schizoaffective disorder  -Abilify 10mg Daily    -Hydroxyzine 50mg Q6 PRN for anxiety/insomnia  -Haldol 5mg Q6 PRN for agitation/psychosis  -Benadryl 50mg Q6 PRN got EPS  -Lorazepam 2mg Q6 PRN for aggression    -Tylenol PRN for pain    #Agitation  -for agitation not amenable to verbal redirection, may give haldol 5 mg q6h prn, ativan 2 mg q6h prn, benadryl 50 mg q6h prn with escalation to IM if pt is a danger to self or/and others with repeat EKG to ensure QTc <500 ms. This is a 63yo single woman, on disability, homeless since 2020, noncaregiver; pmhx HTN (not on meds); pphx schizoaffective disorder/depressive, multiple inpatient hospitalizations (most recently at [facility] 3/2022), no suicide attempts, no self harming in past; no substance abuse; no known trauma; no legal history; no aggression/violence hx; self presenting to ED for SI/HI. Appears depressed, with flat affect, endorsing SI/HI, no acute psychotic symptoms. She is at moderate acute risk for suicide due to expressed SI and depression symptoms, also at chronically elevated risk due to hx of psychiatric hospitalizations, chronic/serious mental illness, poor social supports, and homelessness. Meets criteria for inpatient psych admission for acute stabilization.    Patient seen and evaluated as per nursing report no acute events. Verbalizes feeling better. Still admits to periods of depression. Again states Abilify is the only medication that works for her. States she does not need anything else. Abilify recently increased. will continue to monitor. States she is sleeping well and appetite is good. Continues to be focused on housing. Denies suicidal/homicidal ideations. Denies any A/V hallucinations. Patient visible on the unit attending groups.    #Schizoaffective disorder  -Abilify 10mg Daily    -Hydroxyzine 50mg Q6 PRN for anxiety/insomnia  -Haldol 5mg Q6 PRN for agitation/psychosis  -Benadryl 50mg Q6 PRN got EPS  -Lorazepam 2mg Q6 PRN for aggression    -Tylenol PRN for pain    #Agitation  -for agitation not amenable to verbal redirection, may give haldol 5 mg q6h prn, ativan 2 mg q6h prn, benadryl 50 mg q6h prn with escalation to IM if pt is a danger to self or/and others with repeat EKG to ensure QTc <500 ms.

## 2022-11-23 NOTE — PROGRESS NOTE BEHAVIORAL HEALTH - NSBHFUPINTERVALHXFT_PSY_A_CORE
Patient seen and evaluated as per nursing report no acute events. Verbalizes feeling better. Still admits to periods of depression. States she is sleeping well and appetite is good. Continues to be focused on housing. Denies suicidal/homicidal ideations. Denies any A/V hallucinations. Patient visible on the unit attending groups. Patient seen and evaluated as per nursing report no acute events. Verbalizes feeling better. Still admits to periods of depression. Again states Abilify is the only medication that works for her. States she does not need anything else. Abilify recently increased. will continue to monitor. States she is sleeping well and appetite is good. Continues to be focused on housing. Denies suicidal/homicidal ideations. Denies any A/V hallucinations. Patient visible on the unit attending groups.

## 2022-11-23 NOTE — CHART NOTE - NSCHARTNOTEFT_GEN_A_CORE
Social Work Note:       Natalie remains on the unit for continued treatment, safety and observation.  Treatment team met with patient on morning rounds.  She was calm, cooperative, and pleasant.  Patient endorsed feeling a little better than on admission.  She continues to report low mood but denies suicidal ideation and says she feels safe in the hospital.  Patient has been medication compliant and in good behavioral control.  She has been less isolative to her room and has attended a few groups.      Once stable for discharge, patient will return [facility] in the [place].  She will resume treatment with her Psychiatrist Dr. López at the shelter.    Mental Status Exam:      Mood – Depressed   Sleep  - Fair  Appetite – Good  ADLs – Fair  Thought Process – Linear/Perseverative   Observation – q10 minutes.

## 2022-11-24 RX ADMIN — ARIPIPRAZOLE 10 MILLIGRAM(S): 15 TABLET ORAL at 08:02

## 2022-11-25 RX ADMIN — ARIPIPRAZOLE 10 MILLIGRAM(S): 15 TABLET ORAL at 08:01

## 2022-11-25 NOTE — CHART NOTE - NSCHARTNOTEFT_GEN_A_CORE
Social Work Note:       Natalie remains on the unit for continued treatment, safety and observation.  Treatment team met with patient on morning rounds.  She was calm, cooperative, and pleasant.  Patient continues to present as depressed.  She reports her mood is still low mood but denies suicidal ideation and says she feels safe in the hospital.  Patient has been medication compliant and in good behavioral control.  She has been less isolative to her room and has attended a few groups.  Patient denies SI/HI and A/V/H.      Once stable for discharge, patient will return to [facility] in [facility].  She will resume treatment with her Psychiatrist Dr. López at the shelter.    Mental Status Exam:      Mood – Depressed   Sleep  - Fair  Appetite – Good  ADLs – Fair  Thought Process – Linear  Observation – q10 minutes.

## 2022-11-26 RX ADMIN — ARIPIPRAZOLE 10 MILLIGRAM(S): 15 TABLET ORAL at 08:00

## 2022-11-27 RX ADMIN — ARIPIPRAZOLE 10 MILLIGRAM(S): 15 TABLET ORAL at 08:14

## 2022-11-28 PROCEDURE — 99231 SBSQ HOSP IP/OBS SF/LOW 25: CPT

## 2022-11-28 RX ADMIN — ARIPIPRAZOLE 10 MILLIGRAM(S): 15 TABLET ORAL at 08:19

## 2022-11-28 NOTE — PROGRESS NOTE BEHAVIORAL HEALTH - NSBHFUPINTERVALHXFT_PSY_A_CORE
Patient seen and evaluated as per nursing report no acute events. Verbalizes feeling better. States she feels good today. States the increase in Abilify has helped her mood. States she believes this is a good dose. States she is sleeping well and appetite is good. Continues to be focused on housing. Denies suicidal/homicidal ideations. Denies any A/V hallucinations. Patient visible on the unit attending groups.

## 2022-11-28 NOTE — PROGRESS NOTE BEHAVIORAL HEALTH - NSBHADMITIPOBSFT_PSY_A_CORE
As per unit protocol

## 2022-11-28 NOTE — CHART NOTE - NSCHARTNOTEFT_GEN_A_CORE
INTERVAL DATA:   · Interval Chief Complaint: "I feel good"  · Interval History: Patient seen and evaluated as per nursing report no acute events. On approach, patient is calm and cooperative with no aggression or agitation noted. Patient remains isolative to her room. Patient states she is feeling better, and feels as though she would be ready for discharge soon. Patient states the increased abilify has been helping with her mood. Patient states she has been eating and sleeping well. States she feel safe on the unit. Denies any A/V hallucinations. Patient encouraged to get out of her room, engage with peers and attend groups.      MENTAL STATUS EXAM:   · Level of Consciousness	Alert  · Body Habitus	Overweight  · Hygiene  	Fair  · Grooming	Fair  · Behavior	Cooperative  · Eye Contact	Fair  · Relatedness	Fair  · Impulse Control	Normal  · Muscle Tone / Strength	Normal muscle tone/strength  · Abnormal Movements	No abnormal movements  · Gait / Station	Normal gait / station  · Speech Volume	Normal  · Speech Rate	Slowed  · Speech Spontaneity	Normal  · Speech Articulation	Normal  · Reported mood	"Im good"  · Affect Quality	Euthymic  · Affect Range	Blunted  · Affect Congruence	 congruent  · Thought Process	Linear  · Thought Associations	Normal  · Thought Content	Unremarkable  · Perceptions	No abnormalities  · Oriented to Place	Yes  · Oriented to Situation	Yes  · Oriented to Person	Yes  · Estimated Intelligence	Average  · Recent Memory	Normal  · Remote Memory	Normal  · Fund of Knowledge	Normal  · Language	No abnormalities noted  · Judgment (regarding everyday events)	Fair  · Insight (regarding psychiatric illness)	Fair    SUICIDALITY:   · Suicidality (Interval)	yes  · Passive Ideation	none known  · Active Ideation	none known  · Plan	none known  · Intent	none known    HOMICIDALITY/AGGRESSION:   · Homicidality/Aggression	yes  · Ideation	none known  · Plan	none known  · Intent	none known  · Aggression to others	none known  · Aggression to property	none known    DIAGNOSIS DSM-V:    Psychiatric Diagnosis (Corresponds to DSM-IV Axis I, II):  Primary Dx Schizoaffective disorder F25.9.    Plan:    #Schizoaffective disorder  -Abilify 10mg Daily    -Hydroxyzine 50mg Q6 PRN for anxiety/insomnia  -Haldol 5mg Q6 PRN for agitation/psychosis  -Benadryl 50mg Q6 PRN got EPS  -Lorazepam 2mg Q6 PRN for aggression    -Tylenol PRN for pain    #Agitation  -for agitation not amenable to verbal redirection, may give haldol 5 mg q6h prn, ativan 2 mg q6h prn, benadryl 50 mg q6h prn with escalation to IM if pt is a danger to self or/and others with repeat EKG to ensure QTc <500 ms. INTERVAL DATA:   · Interval Chief Complaint: "I feel good"  · Interval History: Patient seen and evaluated as per nursing report no acute events. On approach, patient is calm and cooperative with no aggression or agitation noted. Patient remains isolative to her room. Patient states she is feeling better, and feels as though she would be ready for discharge soon. Patient states the increased abilify has been helping with her mood. Patient states she has been eating and sleeping well. States she feel safe on the unit. Denies SI/HI. Denies any A/V hallucinations. Patient encouraged to get out of her room, engage with peers and attend groups.      MENTAL STATUS EXAM:   · Level of Consciousness	Alert  · Body Habitus	Overweight  · Hygiene  	Fair  · Grooming	Fair  · Behavior	Cooperative  · Eye Contact	Fair  · Relatedness	Fair  · Impulse Control	Normal  · Muscle Tone / Strength	Normal muscle tone/strength  · Abnormal Movements	No abnormal movements  · Gait / Station	Normal gait / station  · Speech Volume	Normal  · Speech Rate	Slowed  · Speech Spontaneity	Normal  · Speech Articulation	Normal  · Reported mood	"Im good"  · Affect Quality	Euthymic  · Affect Range	Blunted  · Affect Congruence	 congruent  · Thought Process	Linear  · Thought Associations	Normal  · Thought Content	Unremarkable  · Perceptions	No abnormalities  · Oriented to Place	Yes  · Oriented to Situation	Yes  · Oriented to Person	Yes  · Estimated Intelligence	Average  · Recent Memory	Normal  · Remote Memory	Normal  · Fund of Knowledge	Normal  · Language	No abnormalities noted  · Judgment (regarding everyday events)	Fair  · Insight (regarding psychiatric illness)	Fair    SUICIDALITY:   · Suicidality (Interval)	yes  · Passive Ideation	none known  · Active Ideation	none known  · Plan	none known  · Intent	none known    HOMICIDALITY/AGGRESSION:   · Homicidality/Aggression	yes  · Ideation	none known  · Plan	none known  · Intent	none known  · Aggression to others	none known  · Aggression to property	none known    DIAGNOSIS DSM-V:    Psychiatric Diagnosis (Corresponds to DSM-IV Axis I, II):  Primary Dx Schizoaffective disorder F25.9.    Plan:    #Schizoaffective disorder  -Abilify 10mg Daily    -Hydroxyzine 50mg Q6 PRN for anxiety/insomnia  -Haldol 5mg Q6 PRN for agitation/psychosis  -Benadryl 50mg Q6 PRN got EPS  -Lorazepam 2mg Q6 PRN for aggression    -Tylenol PRN for pain    #Agitation  -for agitation not amenable to verbal redirection, may give haldol 5 mg q6h prn, ativan 2 mg q6h prn, benadryl 50 mg q6h prn with escalation to IM if pt is a danger to self or/and others with repeat EKG to ensure QTc <500 ms.

## 2022-11-28 NOTE — PROGRESS NOTE BEHAVIORAL HEALTH - THOUGHT CONTENT
Unremarkable
Unremarkable
Hopelessness/Suicidality

## 2022-11-28 NOTE — PROGRESS NOTE BEHAVIORAL HEALTH - AXIS III
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

## 2022-11-28 NOTE — PROGRESS NOTE BEHAVIORAL HEALTH - RISK ASSESSMENT
MODERATE ACUTE RISK.  ACUTE RISK FACTORS: depressed mood, hopelessness, suicidal ideation, homicidal ideation.  CHRONIC RISK FACTORS: Schizoaffective disorder, no social supports, history of suicidal ideation/homicidal ideation, history of inpatient hospitalizations, homelessness.  PROTECTIVE FACTORS: none known.  Unable to determine level of potential violence risk  Moderate Acute Suicide Risk

## 2022-11-28 NOTE — PROGRESS NOTE BEHAVIORAL HEALTH - SUMMARY
This is a 63yo single woman, on disability, homeless since 2020, noncaregiver; pmhx HTN (not on meds); pphx schizoaffective disorder/depressive, multiple inpatient hospitalizations (most recently at [facility] 3/2022), no suicide attempts, no self harming in past; no substance abuse; no known trauma; no legal history; no aggression/violence hx; self presenting to ED for SI/HI. Appears depressed, with flat affect, endorsing SI/HI, no acute psychotic symptoms. She is at moderate acute risk for suicide due to expressed SI and depression symptoms, also at chronically elevated risk due to hx of psychiatric hospitalizations, chronic/serious mental illness, poor social supports, and homelessness. Meets criteria for inpatient psych admission for acute stabilization.    Patient seen and evaluated as per nursing report no acute events. Verbalizes feeling better. States she feels good today. States the increase in Abilify has helped her mood. States she believes this is a good dose. States she is sleeping well and appetite is good. Continues to be focused on housing. Denies suicidal/homicidal ideations. Denies any A/V hallucinations. Patient visible on the unit attending groups.    #Schizoaffective disorder  -Abilify 10mg Daily    -Hydroxyzine 50mg Q6 PRN for anxiety/insomnia  -Haldol 5mg Q6 PRN for agitation/psychosis  -Benadryl 50mg Q6 PRN got EPS  -Lorazepam 2mg Q6 PRN for aggression    -Tylenol PRN for pain    #Agitation  -for agitation not amenable to verbal redirection, may give haldol 5 mg q6h prn, ativan 2 mg q6h prn, benadryl 50 mg q6h prn with escalation to IM if pt is a danger to self or/and others with repeat EKG to ensure QTc <500 ms.

## 2022-11-28 NOTE — PROGRESS NOTE BEHAVIORAL HEALTH - NSBHCHARTREVIEWLAB_PSY_A_CORE FT
Complete Blood Count + Automated Diff (11.17.22 @ 20:44)   WBC Count: 8.17 K/uL   RBC Count: 4.02 M/uL   Hemoglobin: 11.9 g/dL   Hematocrit: 35.4    Mean Cell Volume: 88.1 fL   Mean Cell Hemoglobin: 29.6 pg   Mean Cell Hemoglobin Conc: 33.6 g/dL   Red Cell Distrib Width: 13.2    Platelet Count - Automated: 235 K/uL   Auto Neutrophil #: 5.49 K/uL   Auto Lymphocyte #: 1.93 K/uL   Auto Monocyte #: 0.54 K/uL   Auto Eosinophil #: 0.13 K/uL   Auto Basophil #: 0.04 K/uL   Auto Neutrophil %: 67.2: Differential percentages must be correlated with absolute numbers for   clinical significance. %   Auto Lymphocyte %: 23.6   Auto Monocyte %: 6.6    Auto Eosinophil %: 1.6   Auto Basophil %: 0.5   Auto Immature Granulocyte %: 0.5: (Includes meta, myelo and promyelocytes). Mild elevations in immature   granulocytes may be seen with many inflammatory processes and pregnancy;   clinical correlation suggested.   Nucleated RBC: 0 /100 WBCs

## 2022-11-28 NOTE — DISCHARGE NOTE BEHAVIORAL HEALTH - NS MD DC FALL RISK RISK
For information on Fall & Injury Prevention, visit: [email or web address] OR  [email or web address] OR  https://www.cdc.gov/steadi/patient.html

## 2022-11-28 NOTE — PROGRESS NOTE BEHAVIORAL HEALTH - NSBHATTESTBILLING_PSY_A_CORE
[id number] Inpatient care - low complexity - 15 minutes
[id number]-Udqclhsmwr Inpatient care - low complexity - 15 minutes
[id number]-Frwlvzhpee Inpatient care - moderate complexity - 25 minutes
[id number]-Dpkjbpuick Inpatient care - low complexity - 15 minutes
[id number]-Fswafbdsep Inpatient care - low complexity - 15 minutes
[id number]-Lotsbyffsb Inpatient care - low complexity - 15 minutes
[id number]-Uolftqrcpi Inpatient care - low complexity - 15 minutes

## 2022-11-28 NOTE — PROGRESS NOTE BEHAVIORAL HEALTH - NSBHCHARTREVIEWVS_PSY_A_CORE FT
Vital Signs Last 24 Hrs  T(C): 36.6 (21 Nov 2022 08:50), Max: 36.6 (20 Nov 2022 15:49)  T(F): 97.8 (21 Nov 2022 08:50), Max: 97.8 (20 Nov 2022 15:49)  HR: 83 (21 Nov 2022 08:50) (83 - 91)  BP: 115/92 (21 Nov 2022 08:50) (115/92 - 132/79)  BP(mean): --  RR: 16 (21 Nov 2022 08:50) (16 - 16)  SpO2: --
Vital Signs Last 24 Hrs  T(C): 37 (28 Nov 2022 07:40), Max: 37 (28 Nov 2022 07:40)  T(F): 98.6 (28 Nov 2022 07:40), Max: 98.6 (28 Nov 2022 07:40)  HR: 86 (28 Nov 2022 07:40) (67 - 86)  BP: 146/81 (27 Nov 2022 16:00) (146/81 - 146/81)  BP(mean): --  RR: 16 (28 Nov 2022 07:40) (16 - 18)  SpO2: --
Vital Signs Last 24 Hrs  T(C): 36.2 (22 Nov 2022 08:53), Max: 36.5 (21 Nov 2022 15:35)  T(F): 97.1 (22 Nov 2022 08:53), Max: 97.7 (21 Nov 2022 15:35)  HR: 94 (22 Nov 2022 08:53) (89 - 94)  BP: 122/86 (22 Nov 2022 08:53) (119/69 - 122/86)  BP(mean): --  RR: 18 (22 Nov 2022 08:53) (16 - 18)  SpO2: --
Vital Signs Last 24 Hrs  T(C): 36.6 (19 Nov 2022 15:59), Max: 36.6 (19 Nov 2022 15:59)  T(F): 97.9 (19 Nov 2022 15:59), Max: 97.9 (19 Nov 2022 15:59)  HR: 83 (20 Nov 2022 08:12) (83 - 86)  BP: 143/76 (20 Nov 2022 08:12) (143/76 - 149/78)  BP(mean): --  RR: 16 (20 Nov 2022 08:12) (16 - 18)  SpO2: --
Vital Signs Last 24 Hrs  T(C): 36.7 (19 Nov 2022 10:53), Max: 36.7 (18 Nov 2022 16:31)  T(F): 98.1 (19 Nov 2022 10:53), Max: 98.1 (19 Nov 2022 10:53)  HR: 89 (19 Nov 2022 10:53) (77 - 89)  BP: 134/79 (19 Nov 2022 10:53) (129/71 - 134/79)  BP(mean): --  RR: 16 (19 Nov 2022 10:53) (16 - 20)  SpO2: --
Vital Signs Last 24 Hrs  T(C): 36.3 (18 Nov 2022 08:40), Max: 36.3 (18 Nov 2022 08:40)  T(F): 97.4 (18 Nov 2022 08:40), Max: 97.4 (18 Nov 2022 08:40)  HR: 81 (18 Nov 2022 08:40) (79 - 100)  BP: 138/81 (18 Nov 2022 08:40) (113/76 - 138/81)  BP(mean): --  RR: 18 (18 Nov 2022 08:40) (18 - 18)  SpO2: 97% (18 Nov 2022 01:17) (97% - 100%)    Parameters below as of 17 Nov 2022 19:25  Patient On (Oxygen Delivery Method): room air
Vital Signs Last 24 Hrs  T(C): 35.7 (23 Nov 2022 08:37), Max: 36.4 (22 Nov 2022 16:01)  T(F): 96.3 (23 Nov 2022 08:37), Max: 97.5 (22 Nov 2022 16:01)  HR: 86 (23 Nov 2022 08:37) (83 - 86)  BP: 154/84 (23 Nov 2022 08:37) (146/81 - 154/84)  BP(mean): --  RR: 18 (23 Nov 2022 08:37) (18 - 18)  SpO2: --

## 2022-11-28 NOTE — DISCHARGE NOTE BEHAVIORAL HEALTH - NSBHDCSWCOMMENTSFT_PSY_A_CORE
Discharge summary to be faxed to [phone] Discharge summary faxed to [facility] [phone] on 12/1 at 10am

## 2022-11-28 NOTE — DISCHARGE NOTE BEHAVIORAL HEALTH - NSBHDCCONDITIONFT_PSY_A_CORE
Compliant with medication. Does not warrant continued Inpatient hospitalization. Patient does not present a risk to self or others at this time.

## 2022-11-28 NOTE — PROGRESS NOTE BEHAVIORAL HEALTH - NSBHFUPINTERVALCCFT_PSY_A_CORE
"I  feel the same"
"I feel better"
"I am the same, depressed, suicidal"
"I feel good"
"I feel the same"
"I  feel depress"
"Hollie been depressed"

## 2022-11-28 NOTE — PROGRESS NOTE BEHAVIORAL HEALTH - NSBHATTESTTYPEVISIT_PSY_A_CORE
Attending Only
BECKY without on-site Attending supervision

## 2022-11-28 NOTE — DISCHARGE NOTE BEHAVIORAL HEALTH - NSBHDCRESPONSEFT_PSY_A_CORE
Patient reports feeling better, presenting with better insight and judgment. Patient denied any suicidal or homicidal ideations. Patient denied any auditory or visual hallucinations. Patient is future oriented, optimistic. Patient shows no risk to self, others at this time. Patient understands and verbalized agreement with treatment plan and following up with outpatient. Psychoeducation provided regarding diagnosis, medications, treatment and follow up. Risks, benefits, alternatives discussed, all questions and concerns addressed and answered.

## 2022-11-28 NOTE — DISCHARGE NOTE BEHAVIORAL HEALTH - HPI (INCLUDE ILLNESS QUALITY, SEVERITY, DURATION, TIMING, CONTEXT, MODIFYING FACTORS, ASSOCIATED SIGNS AND SYMPTOMS)
· Additional HPI	63yo single woman, on disability, homeless since 2020, noncaregiver; pmhx HTN (not on meds); pphx schizoaffective disorder/depressive, multiple inpatient hospitalizations (most recently at [facility] 3/2022), no suicide attempts, no self harming in past; no substance abuse; no known trauma; no legal history; no aggression/violence hx; self presenting to ED for SI/HI.    Reports feeling very discouraged and hopeless especially this morning. Started feeling paranoid about people at her homeless shelter, wondering if they were tampering with the printers as she was trying to get a housing application together for example. Endorses HI, though not toward anyone specifically, just generally feeling paranoid and angry toward "people who are getting in the way." She notes feeling very depressed in general. Endorsing thoughts of suicide, specifically thoughts of jumping in front of a train. Reflects on her family, that she does not speak with many of them except her mom who has mild dementia and lives in a nursing home. Denies AH/VH. Has been taking Abilify 5 mg for schizoaffective disorder for the past 5 months or so. Sees psychiatrist Dr. López at the shelter about once every two months.      [facility] - shelter for mentally ill - living there since February 2021. Homeless for 2.5 years. She and mom were homeless together before mom found a place in a nursing home.  Psych hospitalizations about five times in life; last hospitalization about 8 months ago.    States she has no collateral available. Her sister Sruthi who was previously listed as collateral is now estranged, pt does not know her whereabouts.    Patient seen and evaluated on IPP. As per nursing report no acute events. On approach patient calm and cooperative. No agitation aggression noted. Patient states she has been depressed and having suicidal thoughts. No intent or plan. States she feels safe on the unit. Today is also patients birthday.  States she has also been paranoid. Denies any homicidal thoughts at this time. Denies any ETOH or illicit drug use. States she sees a Dr. López but unable to provide contact information. States she has been compliant with her Abilify but unable to tell what pharmacy she gets her meds from.  States she wants to stay on the Abilify because it works for her. States she has not heard any voices while on Abilify. Denies any s/s of aleena. States she is estranged from her sister Sruthi so writer unable to obtain collateral. · Additional HPI	61yo single woman, on disability, homeless since 2020, noncaregiver; pmhx HTN (not on meds); pphx schizoaffective disorder/depressive, multiple inpatient hospitalizations (most recently at [facility] 3/2022), no suicide attempts, no self harming in past; no substance abuse; no known trauma; no legal history; no aggression/violence hx; self presenting to ED for SI/HI.    Reports feeling very discouraged and hopeless especially this morning. Started feeling paranoid about people at her homeless shelter, wondering if they were tampering with the printers as she was trying to get a housing application together for example. Endorses HI, though not toward anyone specifically, just generally feeling paranoid and angry toward "people who are getting in the way." She notes feeling very depressed in general. Endorsing thoughts of suicide, specifically thoughts of jumping in front of a train. Reflects on her family, that she does not speak with many of them except her mom who has mild dementia and lives in a nursing home. Denies AH/VH. Has been taking Abilify 5 mg for schizoaffective disorder for the past 5 months or so. Sees psychiatrist Dr. López at the shelter about once every two months.      [facility] - shelter for mentally ill - living there since February 2021. Homeless for 2.5 years. She and mom were homeless together before mom found a place in a nursing home.  Psych hospitalizations about five times in life; last hospitalization about 8 months ago.    States she has no collateral available. Her sister Sruthi who was previously listed as collateral is now estranged, pt does not know her whereabouts.    Patient seen and evaluated on IPP. As per nursing report no acute events. On approach patient calm and cooperative. No agitation aggression noted. Patient states she has been depressed and having suicidal thoughts. No intent or plan. States she feels safe on the unit. Today is also patients birthday.  States she has also been paranoid. Denies any homicidal thoughts at this time. Denies any ETOH or illicit drug use. States she sees a Dr. López but unable to provide contact information. States she has been compliant with her Abilify but unable to tell what pharmacy she gets her meds from.  States she wants to stay on the Abilify because it works for her. States she has not heard any voices while on Abilify. Denies any s/s of aleena. States she is estranged from her sister Sruthi so writer unable to obtain collateral.    Patient seen and evaluated 11/30/22. As per nursing report no acute events. Verbalizes feeling good today. Verbalizes being ready for discharge. States she is sleeping well and appetite is good. Patient appears future oriented and goal focused. insight and judgement have improved. Denies suicidal/homicidal ideations. Denies any A/V hallucinations. Discussed discharge. Patient states she will return to her shelter. Patient visible on the unit attending groups. Anticipated discharge later this week. Anticipated discharge tomorrow 12/1/22. Compliant with medication. Does not warrant continued Inpatient hospitalization. Patient does not present a risk to self or others at this time. Scripts sent to [facility] for delivery to [facility].

## 2022-11-28 NOTE — PROGRESS NOTE BEHAVIORAL HEALTH - NSBHCHARTREVIEWINVESTIGATE_PSY_A_CORE FT
< from: 12 Lead ECG (11.17.22 @ 19:36) >    Ventricular Rate 88 BPM    Atrial Rate 88 BPM    P-R Interval 150 ms    QRS Duration 92 ms    Q-T Interval 356 ms    QTC Calculation(Bazett) 430 ms    P Axis 56 degrees    R Axis -24 degrees    T Axis 24 degrees    Diagnosis Line Normal sinus rhythm  Minimal voltage criteria for LVH, may be normal variant  Possible Anterior infarct , age undetermined  Abnormal ECG

## 2022-11-28 NOTE — CHART NOTE - NSCHARTNOTEFT_GEN_A_CORE
Social Work Note:       Natalie remains on the unit for continued treatment, safety and observation.  Treatment team met with patient on morning rounds.  She was calm, cooperative, and pleasant.  Patient said her mood is starting to improve.  She endorsed good sleep and appetite.  Patient has been taking her medications as prescribed and she said she feels they are helpful.  She denies SI/HI and A/V/H.  Patient has been visible on the unit, interacting with peers, and attending groups.      Once stable for discharge, patient will return to [facility] in [facility].  She will resume treatment with her Psychiatrist Dr. López at the shelter.    Mental Status Exam:      Mood – Depressed, but less so   Sleep  - Good  Appetite – Good  ADLs – Fair  Thought Process – Linear  Observation – q10 minutes.

## 2022-11-28 NOTE — DISCHARGE NOTE BEHAVIORAL HEALTH - NSBHDCADMRISKMITFT_PSY_A_CORE
Has been medication compliant, not endorsing any side effects. Patient is future oriented and optimistic. Patient verbalizes reasons for living. Patient to use positive coping skills learned during the course of the inpatient hospitalization. Patient verbalized willingness to seek care in moment of crisis. Patient is agreeable that should she have any thoughts of hurting herself or others, she will call 911, return to the ED or call the National Suicide Prevention Lifeline, immediately.

## 2022-11-29 PROCEDURE — 99231 SBSQ HOSP IP/OBS SF/LOW 25: CPT

## 2022-11-29 RX ORDER — ARIPIPRAZOLE 15 MG/1
1 TABLET ORAL
Qty: 30 | Refills: 0
Start: 2022-11-29 | End: 2022-12-28

## 2022-11-29 RX ADMIN — ARIPIPRAZOLE 10 MILLIGRAM(S): 15 TABLET ORAL at 08:01

## 2022-11-29 NOTE — CHART NOTE - NSCHARTNOTEFT_GEN_A_CORE
INTERVAL DATA:   · Interval Chief Complaint: "I feel good"  · Interval History: Patient seen and evaluated. as per nursing report no acute events. On approach, patient is calm and cooperative with no aggression or agitation noted. Patient remains isolative to her room. Patient states she is feeling better, and feels as though she would be ready for discharge on Thursday 12/2/22. Patient states she has been eating and sleeping well. States she feel safe on the unit. Denies SI/HI at this time. Denies any A/V hallucinations. Patient encouraged to get out of her room, engage with peers and attend groups.      MENTAL STATUS EXAM:   · Level of Consciousness	Alert  · Body Habitus	Overweight  · Hygiene  	Fair  · Grooming	Fair  · Behavior	Cooperative  · Eye Contact	Fair  · Relatedness	Fair  · Impulse Control	Normal  · Muscle Tone / Strength	Normal muscle tone/strength  · Abnormal Movements	No abnormal movements  · Gait / Station	Normal gait / station  · Speech Volume	Normal  · Speech Rate	Slowed  · Speech Spontaneity	Normal  · Speech Articulation	Normal  · Reported mood	"Im good"  · Affect Quality	Euthymic  · Affect Range	Blunted  · Affect Congruence	 congruent  · Thought Process	Linear  · Thought Associations	Normal  · Thought Content	Unremarkable  · Perceptions	No abnormalities  · Oriented to Place	Yes  · Oriented to Situation	Yes  · Oriented to Person	Yes  · Estimated Intelligence	Average  · Recent Memory	Normal  · Remote Memory	Normal  · Fund of Knowledge	Normal  · Language	No abnormalities noted  · Judgment (regarding everyday events)	Fair  · Insight (regarding psychiatric illness)	Fair    SUICIDALITY:   · Suicidality (Interval)	yes  · Passive Ideation	none known  · Active Ideation	none known  · Plan	none known  · Intent	none known    HOMICIDALITY/AGGRESSION:   · Homicidality/Aggression	yes  · Ideation	none known  · Plan	none known  · Intent	none known  · Aggression to others	none known  · Aggression to property	none known    DIAGNOSIS DSM-V:    Psychiatric Diagnosis (Corresponds to DSM-IV Axis I, II):  Primary Dx Schizoaffective disorder F25.9.    Plan:    #Schizoaffective disorder  -Abilify 10mg Daily    -Hydroxyzine 50mg Q6 PRN for anxiety/insomnia  -Haldol 5mg Q6 PRN for agitation/psychosis  -Benadryl 50mg Q6 PRN got EPS  -Lorazepam 2mg Q6 PRN for aggression    -Tylenol PRN for pain    #Agitation  -for agitation not amenable to verbal redirection, may give haldol 5 mg q6h prn, ativan 2 mg q6h prn, benadryl 50 mg q6h prn with escalation to IM if pt is a danger to self or/and others with repeat EKG to ensure QTc <500 ms.

## 2022-11-29 NOTE — BH INPATIENT PSYCHIATRY PROGRESS NOTE - NSBHCHARTREVIEWVS_PSY_A_CORE FT
Vital Signs Last 24 Hrs  T(C): 35.6 (29 Nov 2022 09:36), Max: 35.6 (28 Nov 2022 15:46)  T(F): 96.1 (29 Nov 2022 09:36), Max: 96.1 (28 Nov 2022 15:46)  HR: 76 (29 Nov 2022 09:36) (76 - 77)  BP: 139/88 (29 Nov 2022 09:36) (135/95 - 139/88)  BP(mean): --  RR: 18 (29 Nov 2022 09:36) (16 - 18)  SpO2: --     Vital Signs Last 24 Hrs  T(C): 35.6 (11-29-22 @ 09:36), Max: 35.6 (11-28-22 @ 15:46)  T(F): 96.1 (11-29-22 @ 09:36), Max: 96.1 (11-28-22 @ 15:46)  HR: 76 (11-29-22 @ 09:36) (76 - 77)  BP: 139/88 (11-29-22 @ 09:36) (135/95 - 139/88)  BP(mean): --  RR: 18 (11-29-22 @ 09:36) (16 - 18)  SpO2: --

## 2022-11-30 PROCEDURE — 99231 SBSQ HOSP IP/OBS SF/LOW 25: CPT

## 2022-11-30 RX ADMIN — ARIPIPRAZOLE 10 MILLIGRAM(S): 15 TABLET ORAL at 08:17

## 2022-11-30 NOTE — BH INPATIENT PSYCHIATRY PROGRESS NOTE - PRN MEDS
MEDICATIONS  (PRN):  acetaminophen     Tablet .. 650 milliGRAM(s) Oral every 6 hours PRN Moderate Pain (4 - 6)  diphenhydrAMINE 50 milliGRAM(s) Oral every 6 hours PRN Eps  haloperidol     Tablet 5 milliGRAM(s) Oral every 6 hours PRN Agitation  hydrOXYzine hydrochloride 50 milliGRAM(s) Oral every 6 hours PRN Anxiety/insomnia  LORazepam     Tablet 2 milliGRAM(s) Oral every 6 hours PRN Aggression  
MEDICATIONS  (PRN):  acetaminophen     Tablet .. 650 milliGRAM(s) Oral every 6 hours PRN Moderate Pain (4 - 6)  diphenhydrAMINE 50 milliGRAM(s) Oral every 6 hours PRN Eps  haloperidol     Tablet 5 milliGRAM(s) Oral every 6 hours PRN Agitation  hydrOXYzine hydrochloride 50 milliGRAM(s) Oral every 6 hours PRN Anxiety/insomnia  LORazepam     Tablet 2 milliGRAM(s) Oral every 6 hours PRN Aggression

## 2022-11-30 NOTE — BH INPATIENT PSYCHIATRY PROGRESS NOTE - CURRENT MEDICATION
MEDICATIONS  (STANDING):  ARIPiprazole 10 milliGRAM(s) Oral daily    MEDICATIONS  (PRN):  acetaminophen     Tablet .. 650 milliGRAM(s) Oral every 6 hours PRN Moderate Pain (4 - 6)  diphenhydrAMINE 50 milliGRAM(s) Oral every 6 hours PRN Eps  haloperidol     Tablet 5 milliGRAM(s) Oral every 6 hours PRN Agitation  hydrOXYzine hydrochloride 50 milliGRAM(s) Oral every 6 hours PRN Anxiety/insomnia  LORazepam     Tablet 2 milliGRAM(s) Oral every 6 hours PRN Aggression  
MEDICATIONS  (STANDING):  ARIPiprazole 10 milliGRAM(s) Oral daily    MEDICATIONS  (PRN):  acetaminophen     Tablet .. 650 milliGRAM(s) Oral every 6 hours PRN Moderate Pain (4 - 6)  diphenhydrAMINE 50 milliGRAM(s) Oral every 6 hours PRN Eps  haloperidol     Tablet 5 milliGRAM(s) Oral every 6 hours PRN Agitation  hydrOXYzine hydrochloride 50 milliGRAM(s) Oral every 6 hours PRN Anxiety/insomnia  LORazepam     Tablet 2 milliGRAM(s) Oral every 6 hours PRN Aggression

## 2022-11-30 NOTE — BH INPATIENT PSYCHIATRY PROGRESS NOTE - NSBHMETABOLIC_PSY_ALL_CORE_FT
BMI: BMI (kg/m2): 33.1 (11-18-22 @ 01:32)  HbA1c: A1C with Estimated Average Glucose Result: 6.7 % (11-19-22 @ 09:12)    Glucose:   BP: 125/74 (11-30-22 @ 07:42) (125/74 - 153/89)  Lipid Panel: Date/Time: 11-19-22 @ 09:12  Cholesterol, Serum: 199  Direct LDL: --  HDL Cholesterol, Serum: 56  Total Cholesterol/HDL Ration Measurement: --  Triglycerides, Serum: 214  
BMI: BMI (kg/m2): 33.1 (11-18-22 @ 01:32)  HbA1c: A1C with Estimated Average Glucose Result: 6.7 % (11-19-22 @ 09:12)    Glucose:   BP: 139/88 (11-29-22 @ 09:36) (129/90 - 152/92)  Lipid Panel: Date/Time: 11-19-22 @ 09:12  Cholesterol, Serum: 199  Direct LDL: --  HDL Cholesterol, Serum: 56  Total Cholesterol/HDL Ration Measurement: --  Triglycerides, Serum: 214

## 2022-11-30 NOTE — BH INPATIENT PSYCHIATRY PROGRESS NOTE - NSBHFUPINTERVALHXFT_PSY_A_CORE
Patient seen and evaluated as per nursing report no acute events. Verbalizes feeling good today. Verbalizes being ready for discharge. States she is sleeping well and appetite is good. Patient appears future oriented and goal focused. insight and judgement have improved. Denies suicidal/homicidal ideations. Denies any A/V hallucinations. Discussed discharge. Patient states she will return to her shelter. Patient visible on the unit attending groups. Anticipated discharge later this week. Anticipated discharge tomorrow 12/1/22. Compliant with medication. Does not warrant continued Inpatient hospitalization. Patient does not present a risk to self or others at this time. Scripts sent to [employer] for delivery to [facility].
Patient seen and evaluated as per nursing report no acute events. Verbalizes feeling good today. States the increase in Abilify has helped her mood. States she is sleeping well and appetite is good. Patient appears future oriented and goal focuses. Denies suicidal/homicidal ideations. Denies any A/V hallucinations. Discussed discharge. Patient states she will return to her shelter. Patient visible on the unit attending groups. Anticipated discharge later this week.

## 2022-11-30 NOTE — BH INPATIENT PSYCHIATRY PROGRESS NOTE - NSDCCRITERIA_PSY_ALL_CORE
Patient to be discharged back to the [facility]
Patient to be discharged back to the [facility]

## 2022-11-30 NOTE — BH INPATIENT PSYCHIATRY PROGRESS NOTE - NSBHCHARTREVIEWVS_PSY_A_CORE FT
Vital Signs Last 24 Hrs  T(C): 35.2 (30 Nov 2022 07:42), Max: 36.3 (29 Nov 2022 16:10)  T(F): 95.3 (30 Nov 2022 07:42), Max: 97.4 (29 Nov 2022 16:10)  HR: 66 (30 Nov 2022 07:42) (58 - 66)  BP: 125/74 (30 Nov 2022 07:42) (125/74 - 153/89)  BP(mean): --  RR: 18 (30 Nov 2022 07:42) (16 - 18)  SpO2: --

## 2022-11-30 NOTE — BH INPATIENT PSYCHIATRY PROGRESS NOTE - NSBHASSESSSUMMFT_PSY_ALL_CORE
This is a 61yo single woman, on disability, homeless since 2020, noncaregiver; pmhx HTN (not on meds); pphx schizoaffective disorder/depressive, multiple inpatient hospitalizations (most recently at [facility] 3/2022), no suicide attempts, no self harming in past; no substance abuse; no known trauma; no legal history; no aggression/violence hx; self presenting to ED for SI/HI. Appears depressed, with flat affect, endorsing SI/HI, no acute psychotic symptoms. She is at moderate acute risk for suicide due to expressed SI and depression symptoms, also at chronically elevated risk due to hx of psychiatric hospitalizations, chronic/serious mental illness, poor social supports, and homelessness. Meets criteria for inpatient psych admission for acute stabilization.    Patient seen and evaluated as per nursing report no acute events. Verbalizes feeling good today. Verbalizes being ready for discharge. States she is sleeping well and appetite is good. Patient appears future oriented and goal focused. insight and judgement have improved. Denies suicidal/homicidal ideations. Denies any A/V hallucinations. Discussed discharge. Patient states she will return to her shelter. Patient visible on the unit attending groups. Anticipated discharge later this week. Anticipated discharge tomorrow 12/1/22. Compliant with medication. Does not warrant continued Inpatient hospitalization. Patient does not present a risk to self or others at this time. [facility] sent to [facility] for [facility] to [facility].    #Schizoaffective disorder  -Abilify 10mg Daily    -Hydroxyzine 50mg Q6 PRN for anxiety/insomnia  -Haldol 5mg Q6 PRN for agitation/psychosis  -Benadryl 50mg Q6 PRN got EPS  -Lorazepam 2mg Q6 PRN for aggression    -Tylenol PRN for pain    #Agitation  -for agitation not amenable to verbal redirection, may give haldol 5 mg q6h prn, ativan 2 mg q6h prn, benadryl 50 mg q6h prn with escalation to IM if pt is a danger to self or/and others with repeat EKG to ensure QTc <500 ms.   
This is a 63yo single woman, on disability, homeless since 2020, noncaregiver; pmhx HTN (not on meds); pphx schizoaffective disorder/depressive, multiple inpatient hospitalizations (most recently at [facility] 3/2022), no suicide attempts, no self harming in past; no substance abuse; no known trauma; no legal history; no aggression/violence hx; self presenting to ED for SI/HI. Appears depressed, with flat affect, endorsing SI/HI, no acute psychotic symptoms. She is at moderate acute risk for suicide due to expressed SI and depression symptoms, also at chronically elevated risk due to hx of psychiatric hospitalizations, chronic/serious mental illness, poor social supports, and homelessness. Meets criteria for inpatient psych admission for acute stabilization.    Patient seen and evaluated as per nursing report no acute events. Verbalizes feeling good today. States the increase in Abilify has helped her mood. States she is sleeping well and appetite is good. Patient appears future oriented and goal focuses. Denies suicidal/homicidal ideations. Denies any A/V hallucinations. Discussed discharge. Patient states she will return to her shelter. Patient visible on the unit attending groups. Anticipated discharge later this week.    #Schizoaffective disorder  -Abilify 10mg Daily    -Hydroxyzine 50mg Q6 PRN for anxiety/insomnia  -Haldol 5mg Q6 PRN for agitation/psychosis  -Benadryl 50mg Q6 PRN got EPS  -Lorazepam 2mg Q6 PRN for aggression    -Tylenol PRN for pain    #Agitation  -for agitation not amenable to verbal redirection, may give haldol 5 mg q6h prn, ativan 2 mg q6h prn, benadryl 50 mg q6h prn with escalation to IM if pt is a danger to self or/and others with repeat EKG to ensure QTc <500 ms.

## 2022-12-01 VITALS
SYSTOLIC BLOOD PRESSURE: 150 MMHG | TEMPERATURE: 96 F | HEART RATE: 81 BPM | RESPIRATION RATE: 18 BRPM | DIASTOLIC BLOOD PRESSURE: 77 MMHG

## 2022-12-01 PROCEDURE — 99238 HOSP IP/OBS DSCHRG MGMT 30/<: CPT

## 2022-12-01 RX ADMIN — ARIPIPRAZOLE 10 MILLIGRAM(S): 15 TABLET ORAL at 08:19

## 2022-12-01 NOTE — BH CHART NOTE - NSEVENTNOTEFT_PSY_ALL_CORE
D/C today. [employer] cards provided at patients request. Meds delivered from [facility] to be given to patient upon discharge. Patient appeared to be in good spirits today. Endorses a better mood. Insight and judgment have improved. Denies suicidal/ homicidal ideations. Patient able to contract for safety. Compliant with medication. Does not warrant continued Inpatient hospitalization. Writer reviewed D/C papers with patient. Patient verbalized understanding. Patient does not appear to be of risk to self or others at this time.      · Residence	DSS/Shelter...  · Housing Details	[facility]  [phone]     Aftercare Appointments:  · Agency Name	[facility]  · Appointment Date/Time	05-Dec-2022 09:30  · Address	[address] [place] [address]  · Phone #	[phone]  · Purpose	Psychiatry Appointment with Dr. López   D/C today. [employer] cards provided at patients request. Meds delivered from [facility] to be given to patient upon discharge. Patient appeared to be in good spirits today. Endorses a better mood. Insight and judgment have improved. Denies suicidal/ homicidal ideations. Patient able to contract for safety. Compliant with medication. Does not warrant continued Inpatient hospitalization. Writer reviewed D/C papers with patient. Patient verbalized understanding. Patient does not appear to be of risk to self or others at this time.    Updated columbia scale completed on paper in chart      · Residence	DSS/Shelter...  · Housing Details	[facility]  [phone]     Aftercare Appointments:  · Agency Name	[facility]  · Appointment Date/Time	05-Dec-2022 09:30  · Address	[address] [place] [address]  · Phone #	[phone]  · Purpose	Psychiatry Appointment with Dr. López   D/C today. [employer] cards provided at patients request. Meds delivered from [facility] to be given to patient upon discharge. Patient appeared to be in good spirits today. Endorses a better mood. Insight and judgment have improved. Denies suicidal/ homicidal ideations. Patient able to contract for safety. Compliant with medication. Does not warrant continued Inpatient hospitalization. Writer reviewed D/C papers with patient. Patient verbalized understanding. Patient does not appear to be of risk to self or others at this time.    · Residence	DSS/Shelter...  · Housing Details	[facility]  [phone]     Aftercare Appointments:  · Agency Name	[facility]  · Appointment Date/Time	05-Dec-2022 09:30  · Address	[address] [place] [address]  · Phone #	[phone]  · Purpose	Psychiatry Appointment with Dr. López

## 2022-12-01 NOTE — BH CHART NOTE - RISK ASSESSMENT
Denies suicidal/ homicidal ideations. Patient able to contract for safety. Compliant with medication. Does not warrant continued Inpatient hospitalization. Patient does not appear to be of risk to self or others at this time.

## 2022-12-06 ENCOUNTER — EMERGENCY (EMERGENCY)
Facility: HOSPITAL | Age: 62
LOS: 0 days | Discharge: LEFT BEFORE DISCHARGE | End: 2022-12-06
Admitting: EMERGENCY MEDICINE

## 2022-12-06 VITALS
DIASTOLIC BLOOD PRESSURE: 92 MMHG | SYSTOLIC BLOOD PRESSURE: 133 MMHG | WEIGHT: 192.9 LBS | RESPIRATION RATE: 18 BRPM | HEART RATE: 107 BPM | TEMPERATURE: 98 F | OXYGEN SATURATION: 98 % | HEIGHT: 65 IN

## 2022-12-06 DIAGNOSIS — R07.9 CHEST PAIN, UNSPECIFIED: ICD-10-CM

## 2022-12-06 DIAGNOSIS — Z53.21 PROCEDURE AND TREATMENT NOT CARRIED OUT DUE TO PATIENT LEAVING PRIOR TO BEING SEEN BY HEALTH CARE PROVIDER: ICD-10-CM

## 2022-12-06 PROCEDURE — L9991: CPT

## 2022-12-06 PROCEDURE — 93010 ELECTROCARDIOGRAM REPORT: CPT

## 2022-12-06 NOTE — DISCHARGE NOTE BEHAVIORAL HEALTH - NSBHDCSUICPREVNU_PSY_A_CORE
Writer received voicemail from patient returning call from nursing.  Please follow up with patient for resolution.   [phone]

## 2022-12-07 DIAGNOSIS — I35.0 NONRHEUMATIC AORTIC (VALVE) STENOSIS: ICD-10-CM

## 2022-12-07 DIAGNOSIS — Z88.0 ALLERGY STATUS TO PENICILLIN: ICD-10-CM

## 2022-12-07 DIAGNOSIS — R45.1 RESTLESSNESS AND AGITATION: ICD-10-CM

## 2022-12-07 DIAGNOSIS — E66.01 MORBID (SEVERE) OBESITY DUE TO EXCESS CALORIES: ICD-10-CM

## 2022-12-07 DIAGNOSIS — R45.851 SUICIDAL IDEATIONS: ICD-10-CM

## 2022-12-07 DIAGNOSIS — Z85.6 PERSONAL HISTORY OF LEUKEMIA: ICD-10-CM

## 2022-12-07 DIAGNOSIS — M50.20 OTHER CERVICAL DISC DISPLACEMENT, UNSPECIFIED CERVICAL REGION: ICD-10-CM

## 2022-12-07 DIAGNOSIS — F25.9 SCHIZOAFFECTIVE DISORDER, UNSPECIFIED: ICD-10-CM

## 2022-12-07 DIAGNOSIS — I10 ESSENTIAL (PRIMARY) HYPERTENSION: ICD-10-CM

## 2022-12-07 DIAGNOSIS — E78.5 HYPERLIPIDEMIA, UNSPECIFIED: ICD-10-CM

## 2022-12-07 DIAGNOSIS — I45.6 PRE-EXCITATION SYNDROME: ICD-10-CM

## 2022-12-07 DIAGNOSIS — Z59.00 HOMELESSNESS UNSPECIFIED: ICD-10-CM

## 2022-12-07 SDOH — ECONOMIC STABILITY - HOUSING INSECURITY: HOMELESSNESS UNSPECIFIED: Z59.00

## 2022-12-13 NOTE — PROGRESS NOTE BEHAVIORAL HEALTH - NSBHADMITIPOBSFT_PSY_A_CORE
pt denies any suicidal intent or plan
on the discharge service for the patient. I have reviewed and made amendments to the documentation where necessary.
pt denies any suicidal intent or plan

## 2023-01-04 NOTE — BH SOCIAL WORK CONFIRMATION FOLLOW UP NOTE - NSCOMMENTS_PSY_ALL_CORE
Patient did not attend intake appt on 12/5, rescheduled appt for 12/8. Patient is not at shelter- unable to mobile.

## 2023-01-25 NOTE — PROGRESS NOTE BEHAVIORAL HEALTH - NSBHFUPSUICACTIVE_PSY_A_CORE
January 25, 2023        Holly Matos  [address] [id number]    Bimal aBrrera,      Your health and wellness have never been more important. We have been trying to reach you to talk to you about scheduling a health and wellness visit to help you better manage your health and stay safe during these uncertain times.  This visit can be done in the comfort of your home with a Nurse Practitioner from [facility].      Key benefits for YOU?   1. Convenience: Our Nurse Practitioner can do screenings in your home that you would have to go out to the clinic for. They will talk about important care for you and help you set health goals for the new year.    2. Health exam:  The Nurse Practitioner will check your blood pressure, screen for diabetes, and check your lungs by doing a quick breathing test.   3. Better manage your health: The friendly, quality provider will review your medications, medical history, and talk about your health concerns and questions.  4. Take your time: The appointment is 45 minutes, allowing plenty of time to get to know your provider, review your health information, and talk about your health goals.  5. Get the care you need: The provider will share your health information, concerns, and priorities with your doctor, so that your doctor and care team can help you get the care you need, when you need it.    Next steps  To take advantage of this new program, please call us back to schedule an appointment at [phone], Monday - Friday, 8 a.m. ? 4 p.m. CST.  We can also answer any questions you may have about this appointment.      Be well!  Your health care partners at [facility]       
none known

## 2023-02-17 NOTE — ED BEHAVIORAL HEALTH ASSESSMENT NOTE - ACCOMPANIED BY
Radha called and indicated that her mother cannot get her diabetes test strips filled due to having [employer]. As it was imparted yesterday that it is not [employer] that is the problem, it is that the PCP did not put in the diagnostic code when ordering the prescription and call the PCP office to have them put in the CPT code to get the test strips for diabetes filled. And to remove [employer] from the [employer] [facility] as it is a car owner's insurance and has nothing to do w/medical.   Self

## 2023-05-23 ENCOUNTER — INPATIENT (INPATIENT)
Facility: HOSPITAL | Age: 63
LOS: 9 days | Discharge: HOME | DRG: 885 | End: 2023-06-02
Attending: PSYCHIATRY & NEUROLOGY | Admitting: PSYCHIATRY & NEUROLOGY
Payer: MEDICARE

## 2023-05-23 VITALS
WEIGHT: 179.9 LBS | TEMPERATURE: 97 F | HEIGHT: 61 IN | RESPIRATION RATE: 18 BRPM | OXYGEN SATURATION: 100 % | SYSTOLIC BLOOD PRESSURE: 161 MMHG | HEART RATE: 98 BPM | DIASTOLIC BLOOD PRESSURE: 87 MMHG

## 2023-05-23 DIAGNOSIS — Z95.2 PRESENCE OF PROSTHETIC HEART VALVE: Chronic | ICD-10-CM

## 2023-05-23 DIAGNOSIS — F25.1 SCHIZOAFFECTIVE DISORDER, DEPRESSIVE TYPE: ICD-10-CM

## 2023-05-23 LAB
ANION GAP SERPL CALC-SCNC: 10 MMOL/L — SIGNIFICANT CHANGE UP (ref 7–14)
APAP SERPL-MCNC: <5 UG/ML — LOW (ref 10–30)
BASOPHILS # BLD AUTO: 0.04 K/UL — SIGNIFICANT CHANGE UP (ref 0–0.2)
BASOPHILS NFR BLD AUTO: 0.6 % — SIGNIFICANT CHANGE UP (ref 0–1)
BUN SERPL-MCNC: 16 MG/DL — SIGNIFICANT CHANGE UP (ref 10–20)
CALCIUM SERPL-MCNC: 9.3 MG/DL — SIGNIFICANT CHANGE UP (ref 8.4–10.5)
CHLORIDE SERPL-SCNC: 107 MMOL/L — SIGNIFICANT CHANGE UP (ref 98–110)
CO2 SERPL-SCNC: 24 MMOL/L — SIGNIFICANT CHANGE UP (ref 17–32)
CREAT SERPL-MCNC: 0.8 MG/DL — SIGNIFICANT CHANGE UP (ref 0.7–1.5)
EGFR: 84 ML/MIN/1.73M2 — SIGNIFICANT CHANGE UP
EOSINOPHIL # BLD AUTO: 0.1 K/UL — SIGNIFICANT CHANGE UP (ref 0–0.7)
EOSINOPHIL NFR BLD AUTO: 1.5 % — SIGNIFICANT CHANGE UP (ref 0–8)
ETHANOL SERPL-MCNC: <10 MG/DL — SIGNIFICANT CHANGE UP
GLUCOSE SERPL-MCNC: 92 MG/DL — SIGNIFICANT CHANGE UP (ref 70–99)
HCT VFR BLD CALC: 33.9 % — LOW (ref 37–47)
HGB BLD-MCNC: 11.5 G/DL — LOW (ref 12–16)
IMM GRANULOCYTES NFR BLD AUTO: 0.3 % — SIGNIFICANT CHANGE UP (ref 0.1–0.3)
LYMPHOCYTES # BLD AUTO: 2.03 K/UL — SIGNIFICANT CHANGE UP (ref 1.2–3.4)
LYMPHOCYTES # BLD AUTO: 30.9 % — SIGNIFICANT CHANGE UP (ref 20.5–51.1)
MCHC RBC-ENTMCNC: 29.5 PG — SIGNIFICANT CHANGE UP (ref 27–31)
MCHC RBC-ENTMCNC: 33.9 G/DL — SIGNIFICANT CHANGE UP (ref 32–37)
MCV RBC AUTO: 86.9 FL — SIGNIFICANT CHANGE UP (ref 81–99)
MONOCYTES # BLD AUTO: 0.39 K/UL — SIGNIFICANT CHANGE UP (ref 0.1–0.6)
MONOCYTES NFR BLD AUTO: 5.9 % — SIGNIFICANT CHANGE UP (ref 1.7–9.3)
NEUTROPHILS # BLD AUTO: 3.99 K/UL — SIGNIFICANT CHANGE UP (ref 1.4–6.5)
NEUTROPHILS NFR BLD AUTO: 60.8 % — SIGNIFICANT CHANGE UP (ref 42.2–75.2)
NRBC # BLD: 0 /100 WBCS — SIGNIFICANT CHANGE UP (ref 0–0)
PLATELET # BLD AUTO: 218 K/UL — SIGNIFICANT CHANGE UP (ref 130–400)
PMV BLD: 9 FL — SIGNIFICANT CHANGE UP (ref 7.4–10.4)
POTASSIUM SERPL-MCNC: 4.7 MMOL/L — SIGNIFICANT CHANGE UP (ref 3.5–5)
POTASSIUM SERPL-SCNC: 4.7 MMOL/L — SIGNIFICANT CHANGE UP (ref 3.5–5)
RBC # BLD: 3.9 M/UL — LOW (ref 4.2–5.4)
RBC # FLD: 13.3 % — SIGNIFICANT CHANGE UP (ref 11.5–14.5)
SALICYLATES SERPL-MCNC: <0.3 MG/DL — LOW (ref 4–30)
SARS-COV-2 RNA SPEC QL NAA+PROBE: SIGNIFICANT CHANGE UP
SODIUM SERPL-SCNC: 141 MMOL/L — SIGNIFICANT CHANGE UP (ref 135–146)
WBC # BLD: 6.57 K/UL — SIGNIFICANT CHANGE UP (ref 4.8–10.8)
WBC # FLD AUTO: 6.57 K/UL — SIGNIFICANT CHANGE UP (ref 4.8–10.8)

## 2023-05-23 PROCEDURE — 99285 EMERGENCY DEPT VISIT HI MDM: CPT

## 2023-05-23 RX ORDER — ARIPIPRAZOLE 15 MG/1
5 TABLET ORAL DAILY
Refills: 0 | Status: DISCONTINUED | OUTPATIENT
Start: 2023-05-23 | End: 2023-05-24

## 2023-05-23 RX ADMIN — ARIPIPRAZOLE 5 MILLIGRAM(S): 15 TABLET ORAL at 18:47

## 2023-05-23 NOTE — ED BEHAVIORAL HEALTH ASSESSMENT NOTE - DETAILS
Discussed with ED attending Would not impact clinical decisionmaking at this time Patient is referent See HPI

## 2023-05-23 NOTE — ED BEHAVIORAL HEALTH ASSESSMENT NOTE - HPI (INCLUDE ILLNESS QUALITY, SEVERITY, DURATION, TIMING, CONTEXT, MODIFYING FACTORS, ASSOCIATED SIGNS AND SYMPTOMS)
62F, living at [facility], on disability since 2020, PMH of HTN, aortic valve replacement, psych hx of schizoaffective disorder depressive type, no suicide attempts or NSSIB, no violence, no substance use, 6 prior hospitalizations (last at [facility] in november 2022 for SI), now BIBS reporting suicide ideation, HI, intermittent CAHKS.     Patient states that in general she has been doing okay in the last few weeks, no changes in sleep, no suicide ideation, no changes in weight or appetite, generally no AH, adherent to her abilify. She states that around 2.5 hours ago while on the [place] [facility] she started having thoughts of jumping in front of a truck to kill herself. She also felt generally agitated, wanting to lash out at random people, and reports hearing two voices of people from her past saying things like "jump in front of the train." states that she does not hear them at the moment of the interview but had in the preceding hours. She denies any specific triggers, states that the last time she felt this way was around 6mo ago. She states that she reached out to her psychiatrist but her next appointment is in a month. She denies etoh and drugs, Denies VH, denies paranoia, denies ideas of reference. Reports adherence to her medications. Denies having collateral. Would like to be admitted.     Per JEROME no recent hospitalizations

## 2023-05-23 NOTE — ED BEHAVIORAL HEALTH ASSESSMENT NOTE - SUMMARY
62F, living at [facility], on disability since 2020, PMH of HTN, aortic valve replacement, psych hx of schizoaffective disorder depressive type, no suicide attempts or NSSIB, no violence, no substance use, 6 prior hospitalizations (last at [facility] in november 2022 for SI), now BIBS reporting suicide ideation, HI, intermittent CAHKS.     Unclear what is causing patients symptoms and they have only been present for the last 3 hours so there is a chance she clears, but at the moment she is reporting suicide ideation w plan as well as thoughts of hurting others, which appears to be different from her baseline as per our records and psykes she has not been hospitalized since 11/2022 and does not frequent the EDs. As such, she is currently appropriate for voluntary admission.

## 2023-05-23 NOTE — ED BEHAVIORAL HEALTH NOTE - BEHAVIORAL HEALTH NOTE
===================     PRE-HOSPITAL COURSE     ==================     Name: Natalie Turner   SOURCE:  ED Documentation.    DETAILS:  Pt Connie.     ============     ED COURSE     ============     SOURCE: ED Documentation   ARRIVAL: Per RN patient Connie to ED.    BELONGINGS:    Per RN, Patient currently in a gown with a 1:1 staff member?   BEHAVIOR:    The patient came to the ED reportedly for worsening symptoms related to reported depression. The patient presented hygiene is not documented. Documentation described the patient as having good orientation. The patient was given no medication at the time of this review. Per RN, the patient is not displaying aggression towards staff or others. There are no visible marks, bruises, or lacerations on the body and the patient is calm and compliant. The patient has endorsed SI/HI and auditory hallucinations.   TREATMENT:    No medication administered in ED at time of this review.    VISITORS:    None bedside     -----------------------------------------------      COVID Exposure Screen- collateral (i.e. third-party, chart review, belongings, etc; include EMS and ED staff)      ---------------------------------------------------      1. Has the patient had a COVID-19 test in the last 90 days? Unknown.      2. Has the patient tested positive for COVID-19 antibodies? Unknown.      3.Has the patient received 2 doses of the COVID-19 vaccine?  Unknown.      4. In the past 10 days, has the patient been around anyone with a positive COVID-19 test?* Unknown.      5.Has the patient been out of [place] within the past 10 days? Unknown.

## 2023-05-23 NOTE — ED ADULT NURSE NOTE - FINAL NURSING ELECTRONIC SIGNATURE
Problem: Mobility Impaired (Adult and Pediatric)  Goal: *Acute Goals and Plan of Care (Insert Text)  Description: FUNCTIONAL STATUS PRIOR TO ADMISSION: Very poor historian. Reports that he was living at a hotel? And then home alone and staying on the 1st floor. Essentially unable to get up from the couch. Per chart, at last recent admission, a L BKA was recommended and he refused; was to be NWB but also refused/was unable to maintain that status    HOME SUPPORT PRIOR TO ADMISSION: Details unknown - very poor historian but sounds like he had very limited assistance     Physical Therapy Goals  Initiated 12/11/2020  1. Patient will move from supine to sit and sit to supine  and scoot up and down in bed with independence within 7 day(s). 2.  Patient will transfer from bed to chair and chair to bed with supervision/set-up using the least restrictive device within 7 day(s). 3.  Patient will perform lateral/scoot transfer to wheelchair vs NWB stand pivot to wheelchair with min A within 7 days. Outcome: Progressing Towards Goal   PHYSICAL THERAPY TREATMENT  Patient: Janelle Bob (31 y.o. male)  Date: 12/14/2020  Diagnosis: SAH (subarachnoid hemorrhage) ([facility]) [I60.9]  SAH (subarachnoid hemorrhage) ([facility] [[email or web address]ca [facility] [I60.9]   <principal problem not specified>       Precautions:    Chart, physical therapy assessment, plan of care and goals were reviewed. ASSESSMENT  Patient continues with skilled PT services and is not progressing towards goals. XL cast shoe and recliner are obtained to protect bandage and allow for elevated of LE. Patient makes several attempts at standing with bed height elevated almost to standing height. He is unable to achieve safe standing balance with Max Ax2 and demonstrates heavy R lean in a modified stand. Patient does not seem to comprehend the increased fall risk related to his attempts at transferring.  This is only increased as patient relies on momentum and ballistic movements due to L hemiplegia. Per patient and RN he was able to sit in the chair and transfer to [facility] this AM. Patient reports pain is most under control when he receives methadone and pain medication around 9 am.     Current Level of Function Impacting Discharge (mobility/balance): Max Ax2    Other factors to consider for discharge: unable to achieve and maintain standing balance, increacreased risk for falls, decreased safety insight          PLAN :  Patient continues to benefit from skilled intervention to address the above impairments. Continue treatment per established plan of care. to address goals. Recommendation for discharge: (in order for the patient to meet his/her long term goals)  Therapy up to 5 days/week in SNF setting    This discharge recommendation:  Has been made in collaboration with the attending provider and/or case management    IF patient discharges home will need the following DME: to be determined (TBD)       SUBJECTIVE:   Patient stated I just can't do it right now. When im hurting this bad I cant do anything.     OBJECTIVE DATA SUMMARY:   Critical Behavior:  Neurologic State: Alert  Orientation Level: Oriented X4  Cognition: Follows commands  Safety/Judgement: Awareness of environment, Insight into deficits  Functional Mobility Training:  Bed Mobility:        Sit to Supine: Stand-by assistance  Scooting: Stand-by assistance        Transfers:                                   Balance:  Sitting: Intact  Standing: (unable)  Ambulation/Gait Training:                                                        Stairs: Therapeutic Exercises:     Pain Rating:      Activity Tolerance:   Fair and Poor    After treatment patient left in no apparent distress:   Supine in bed, Call bell within reach, and Side rails x 3    COMMUNICATION/COLLABORATION:   The patients plan of care was discussed with: Occupational therapist and Registered nurse.      Bethel De La Cruz PT   Time Calculation: 24 mins 24-May-2023 12:31

## 2023-05-23 NOTE — ED ADULT NURSE NOTE - NSFALLUNIVINTERV_ED_ALL_ED
Bed/Stretcher in lowest position, wheels locked, appropriate side rails in place/Call bell, personal items and telephone in reach/Instruct patient to call for assistance before getting out of bed/chair/stretcher/Non-slip footwear applied when patient is off stretcher/[place] to call system/Physically safe environment - no spills, clutter or unnecessary equipment/Purposeful proactive rounding/Room/bathroom lighting operational, light cord in reach

## 2023-05-23 NOTE — ED PROVIDER NOTE - PROGRESS NOTE DETAILS
psych consulted SR: spoke with dr. pereira from telepsych, patient will be a voluntary admission however currently there are no beds, recommending she received 5mg of ability and will be re-assessed overnight. SR: s/o provided to dr. cordova pending bed in IPP

## 2023-05-23 NOTE — ED ADULT TRIAGE NOTE - CHIEF COMPLAINT QUOTE
pt complaining of suicidal ideations and homicidal ideations, states she wants to hurt people and jump infront of a truck

## 2023-05-23 NOTE — ED ADULT NURSE NOTE - OBJECTIVE STATEMENT
pt complaining of SI/HI "for 2 hours"  pt stated she would jump in front of a truck  pt denies previous suicide attempts   pt stated she has a psychiatrist, takes all medications as prescribed

## 2023-05-23 NOTE — ED PROVIDER NOTE - CLINICAL SUMMARY MEDICAL DECISION MAKING FREE TEXT BOX
Patient signed to me follow-up official psychiatric recommendations.  Patient here with suicide ideation with plan to hurt others in addition.  Seen by psych medically cleared admitted voluntarily for suicidal ideation.

## 2023-05-23 NOTE — ED BEHAVIORAL HEALTH ASSESSMENT NOTE - PSYCHIATRIC ISSUES AND PLAN (INCLUDE STANDING AND PRN MEDICATION)
please give abilify 5mg PO x 1 now, continue abilify 10mg PO qDay. For agitation, haldol 5mg with ativan 2mg and benadryl 50mg PO or IM if severe

## 2023-05-23 NOTE — ED PROVIDER NOTE - OBJECTIVE STATEMENT
62 year old female, past medical history depression, schizophrenia, who presents with suicidal and homicidal ideation. patient currently homeless, reports auditory hallucinations telling her "to hurt myself and hurt other people." no plan. denies visual hallucinations or drug use.

## 2023-05-23 NOTE — ED PROVIDER NOTE - PHYSICAL EXAMINATION
CONSTITUTIONAL: non-toxic appearing female, NAD   SKIN: skin exam is warm and dry  EYES: conjunctiva and sclera clear.  ENT: MMM  CARD: S1, S2 normal, no murmur  RESP: No increased WOB   EXT: Normal ROM  NEURO: awake, alert, following commands, oriented, grossly unremarkable. No Focal deficits. GCS 15.   PSYCH: Cooperative. Verbalization of thoughts of harming self and others.

## 2023-05-23 NOTE — ED BEHAVIORAL HEALTH NOTE - BEHAVIORAL HEALTH NOTE
===================       PRE-HOSPITAL COURSE       ==================       Name: Natalie Turner   SOURCE:  ED Documentation.    DETAILS:  Pt timothy Self.     ============     ED COURSE     ============     SOURCE: ED Documentation   ARRIVAL: Per RN patient timothy Self to ED.    BELONGINGS:    Per RN, Patient currently in a gown with a 1:1 staff member?   BEHAVIOR:    The patient came to the ED reportedly for worsening symptoms related to reported depression. The patient presented hygiene is not documented. Documentation described the patient as having good orientation. The patient was given no medication at the time of this review. Per RN, the patient is not displaying aggression towards staff or others. There are no visible marks, bruises, or lacerations on the body and the patient is calm and compliant. The patient has endorsed SI/HI and auditory hallucinations.      TREATMENT:    No medication administered in ED at time of this review.    VISITORS:    None bedside     -----------------------------------------------      COVID Exposure Screen- collateral (i.e. third-party, chart review, belongings, etc; include EMS and ED staff)      ---------------------------------------------------      1. Has the patient had a COVID-19 test in the last 90 days? Unknown.      2. Has the patient tested positive for COVID-19 antibodies? Unknown.      3.Has the patient received 2 doses of the COVID-19 vaccine?  Unknown.      4. In the past 10 days, has the patient been around anyone with a positive COVID-19 test?* Unknown.      5.Has the patient been out of [place] within the past 10 days? Unknown.

## 2023-05-23 NOTE — ED BEHAVIORAL HEALTH ASSESSMENT NOTE - DESCRIPTION
aortic valve replacement 3 months ago, on aspirin. HTN Per chart has estranged sister, lives at [email or web address] for the [facility] See BTCM note

## 2023-05-23 NOTE — ED PROVIDER NOTE - ATTENDING APP SHARED VISIT CONTRIBUTION OF CARE
62-year-old female history of depression leukemia schizophrenia presenting here complaining of SI and HI but began 1 hour ago endorses that she is homeless and so that is been a factor for her but she is endorsing auditory hallucinations that are telling her to kill herself no visual hallucinations no alcohol or drug abuse no other complaints   CONSTITUTIONAL: WA / WN / NAD  HEAD: NCAT  EYES: PERRL; EOMI;   ENT: Normal pharynx; mucous membranes pink/moist, no erythema.  NECK: Supple;  MSK/EXT: No gross deformities; full range of motion.  SKIN: Warm and dry;   NEURO: AAOx3  PSYCH: calm cooperative

## 2023-05-23 NOTE — ED BEHAVIORAL HEALTH ASSESSMENT NOTE - RISK ASSESSMENT
risk factors include suicide ideation, HI, psychosis, mood/psychotic disorder, homelessness. Protective factors include lack of prior attempts, lack of prior violence, treatment seeking behavior, sobriety, adherence to medications

## 2023-05-24 DIAGNOSIS — F25.9 SCHIZOAFFECTIVE DISORDER, UNSPECIFIED: ICD-10-CM

## 2023-05-24 PROCEDURE — 83036 HEMOGLOBIN GLYCOSYLATED A1C: CPT

## 2023-05-24 PROCEDURE — 99232 SBSQ HOSP IP/OBS MODERATE 35: CPT

## 2023-05-24 PROCEDURE — 80061 LIPID PANEL: CPT

## 2023-05-24 PROCEDURE — 80053 COMPREHEN METABOLIC PANEL: CPT

## 2023-05-24 PROCEDURE — 84443 ASSAY THYROID STIM HORMONE: CPT

## 2023-05-24 PROCEDURE — 85025 COMPLETE CBC W/AUTO DIFF WBC: CPT

## 2023-05-24 PROCEDURE — 36415 COLL VENOUS BLD VENIPUNCTURE: CPT

## 2023-05-24 RX ORDER — HYDROXYZINE HCL 10 MG
50 TABLET ORAL EVERY 6 HOURS
Refills: 0 | Status: DISCONTINUED | OUTPATIENT
Start: 2023-05-24 | End: 2023-06-02

## 2023-05-24 RX ORDER — ARIPIPRAZOLE 15 MG/1
10 TABLET ORAL DAILY
Refills: 0 | Status: DISCONTINUED | OUTPATIENT
Start: 2023-05-25 | End: 2023-05-27

## 2023-05-24 RX ORDER — DIPHENHYDRAMINE HCL 50 MG
50 CAPSULE ORAL EVERY 6 HOURS
Refills: 0 | Status: DISCONTINUED | OUTPATIENT
Start: 2023-05-24 | End: 2023-06-02

## 2023-05-24 RX ORDER — HALOPERIDOL DECANOATE 100 MG/ML
5 INJECTION INTRAMUSCULAR EVERY 6 HOURS
Refills: 0 | Status: DISCONTINUED | OUTPATIENT
Start: 2023-05-24 | End: 2023-06-02

## 2023-05-24 RX ORDER — ASPIRIN/CALCIUM CARB/MAGNESIUM 324 MG
81 TABLET ORAL DAILY
Refills: 0 | Status: DISCONTINUED | OUTPATIENT
Start: 2023-05-25 | End: 2023-06-02

## 2023-05-24 RX ORDER — ACETAMINOPHEN 500 MG
650 TABLET ORAL EVERY 6 HOURS
Refills: 0 | Status: DISCONTINUED | OUTPATIENT
Start: 2023-05-24 | End: 2023-06-02

## 2023-05-24 RX ADMIN — ARIPIPRAZOLE 5 MILLIGRAM(S): 15 TABLET ORAL at 12:34

## 2023-05-24 NOTE — ED ADULT NURSE REASSESSMENT NOTE - NS ED NURSE REASSESS COMMENT FT1
Patient is A&Ox4, respirations easy and unlabored, patient is calm, patient aware that plan is for Psych to re-evaluate in the morning.
Patient is asleep, easy to arouse, respirations easy and unlabored, 1:1 maintained.
Patient is asleep, easy to arouse, respirations easy and unlabored, 1:1 maintained.
Received patient from RN, opportunity provided to answer all questions. Patient is A&Ox4, respirations easy and unlabored, 1:1 maintained.
Verbal report given to Crow PIERRE, opportunity provided to answer all questions.

## 2023-05-24 NOTE — ED BEHAVIORAL HEALTH PROGRESS NOTE - SUMMARY
Per previous provider: "62F, living at [facility] shelter, on disability since 2020, PMH of HTN, aortic valve replacement, psych hx of schizoaffective disorder depressive type, no suicide attempts or NSSIB, no violence, no substance use, 6 prior hospitalizations (last at [facility] in november 2022 for SI), now BIBS reporting suicide ideation, HI, intermittent CAHKS.     Unclear what is causing patients symptoms and they have only been present for the last 3 hours so there is a chance she clears, but at the moment she is reporting suicide ideation w plan as well as thoughts of hurting others, which appears to be different from her baseline as per our records and psykes she has not been hospitalized since 11/2022 and does not frequent the EDs. As such, she is currently appropriate for voluntary admission."

## 2023-05-24 NOTE — BH PATIENT PROFILE - NSBHSNSOFSAFETY_PSY_A_CORE
thinking about my future/drinking tea or coffee/talking to someone/other thinking about my future/calling significant other/family member/counting to 10/drinking tea or coffee/exercising/fresh air breaks/going to their room/listening to music/reading/showering/taking a nap/talking to someone/other

## 2023-05-24 NOTE — BH INPATIENT PSYCHIATRY ASSESSMENT NOTE - NSBHCHARTREVIEWVS_PSY_A_CORE FT
Vital Signs Last 24 Hrs  T(C): 35.6 (05-24-23 @ 13:03), Max: 36.6 (05-24-23 @ 12:38)  T(F): 96 (05-24-23 @ 13:03), Max: 97.9 (05-24-23 @ 12:38)  HR: 85 (05-24-23 @ 13:03) (84 - 99)  BP: 134/82 (05-24-23 @ 13:03) (117/66 - 139/85)  BP(mean): --  RR: 16 (05-24-23 @ 13:03) (16 - 20)  SpO2: 94% (05-24-23 @ 12:38) (94% - 99%)

## 2023-05-24 NOTE — BH INPATIENT PSYCHIATRY ASSESSMENT NOTE - NSBHMETABOLIC_PSY_ALL_CORE_FT
BMI: BMI (kg/m2): 35.3 (05-24-23 @ 13:03)  HbA1c: A1C with Estimated Average Glucose Result: 6.7 % (11-19-22 @ 09:12)    Glucose:   BP: 134/82 (05-24-23 @ 13:03) (117/66 - 161/87)  Lipid Panel: Date/Time: 11-19-22 @ 09:12  Cholesterol, Serum: 199  Direct LDL: --  HDL Cholesterol, Serum: 56  Total Cholesterol/HDL Ration Measurement: --  Triglycerides, Serum: 214

## 2023-05-24 NOTE — BH PATIENT PROFILE - NSDYSPHAGSECTTHREE_PSY_ALL_CORE
Increase CPAP compliance     Yearly follow up     Order CPAP supplies     Yearly follow up       
N/A

## 2023-05-24 NOTE — BH PATIENT PROFILE - NSINDCRISISTRIGGER_PSY_ALL_CORE
Hearing voices Being ignored/Hearing voices/Rejection/Someone in personal space/Temperature/Heat/Crowded spaces/Loud noises/Suspiciousness

## 2023-05-24 NOTE — BH INPATIENT PSYCHIATRY ASSESSMENT NOTE - NS_RISKASSESSMENTINTER_PSY_ALL_CORE
How Severe Is Your Skin Lesion?: mild
Has Your Skin Lesion Been Treated?: not been treated
Is This A New Presentation, Or A Follow-Up?: Skin Lesion
High Acute Suicide Risk

## 2023-05-24 NOTE — BH PATIENT PROFILE - FALL HARM RISK - UNIVERSAL INTERVENTIONS
Bed in lowest position, wheels locked, appropriate side rails in place/Call bell, personal items and telephone in reach/Instruct patient to call for assistance before getting out of bed or chair/Non-slip footwear when patient is out of bed/[place] to call system/Physically safe environment - no spills, clutter or unnecessary equipment/Purposeful Proactive Rounding/Room/bathroom lighting operational, light cord in reach

## 2023-05-24 NOTE — ED ADULT NURSE REASSESSMENT NOTE - NSFALLUNIVINTERV_ED_ALL_ED
Bed/Stretcher in lowest position, wheels locked, appropriate side rails in place/Call bell, personal items and telephone in reach/Instruct patient to call for assistance before getting out of bed/chair/stretcher/Non-slip footwear applied when patient is off stretcher/[place] to call system/Physically safe environment - no spills, clutter or unnecessary equipment/Purposeful proactive rounding/Room/bathroom lighting operational, light cord in reach

## 2023-05-24 NOTE — BH INPATIENT PSYCHIATRY ASSESSMENT NOTE - CURRENT MEDICATION
MEDICATIONS  (STANDING):    MEDICATIONS  (PRN):  acetaminophen     Tablet .. 650 milliGRAM(s) Oral every 6 hours PRN Moderate Pain (4 - 6)  diphenhydrAMINE 50 milliGRAM(s) Oral every 6 hours PRN Eps  haloperidol     Tablet 5 milliGRAM(s) Oral every 6 hours PRN Agitation  hydrOXYzine hydrochloride 50 milliGRAM(s) Oral every 6 hours PRN Anxiety/insomnia  LORazepam     Tablet 2 milliGRAM(s) Oral every 6 hours PRN Aggression

## 2023-05-24 NOTE — BH INPATIENT PSYCHIATRY ASSESSMENT NOTE - NSTREATMENTCERT_PSY_ALL_CORE
. Propranolol Counseling:  I discussed with the patient the risks of propranolol including but not limited to low heart rate, low blood pressure, low blood sugar, restlessness and increased cold sensitivity. They should call the office if they experience any of these side effects.

## 2023-05-24 NOTE — ED BEHAVIORAL HEALTH NOTE - BEHAVIORAL HEALTH NOTE
=========     REASSESSMENT - pt holding for IPP bed.     =========     SOURCE:  RN [place] and secondhand ED documentation.     BEHAVIOR:  pt remains calm and cooperative, sleeping comfortably in bed. Pt received routine medications.     TREATMENT:  Per chart and RN, patient did not require PRN medications.     VISITORS:  Per RN,  there are no visitors at bedside.

## 2023-05-24 NOTE — ED BEHAVIORAL HEALTH PROGRESS NOTE - MEDICAL ISSUES AND PLAN (INCLUDE STANDING AND PRN MEDICATION)
medically cleared per ED provider; continue aspirin 81 mg daily; recommend medicine consult for further management of chronic medical conditions

## 2023-05-24 NOTE — BH PATIENT PROFILE - HOME MEDICATIONS
ARIPiprazole 10 mg oral tablet , 1 tab(s) orally once a day x 30 days. Continue until told otherwise by your provider.    ARIPiprazole 15 mg oral tablet , 1 tab(s) orally once a day Continue until told otherwise by your provider.  aspirin 81 mg oral tablet, chewable , 1 tab(s) orally once a day Continue until told otherwise by your provider

## 2023-05-24 NOTE — BH INPATIENT PSYCHIATRY ASSESSMENT NOTE - NSBHCHARTREVIEWINVESTIGATE_PSY_A_CORE FT
< from: 12 Lead ECG (05.23.23 @ 15:45) >      Ventricular Rate 85 BPM    Atrial Rate 85 BPM    P-R Interval 156 ms    QRS Duration 88 ms    Q-T Interval 356 ms    QTC Calculation(Bazett) 423 ms    P Axis 64 degrees    R Axis 17 degrees    T Axis 41 degrees    Diagnosis Line Normal sinus rhythm  Cannot rule out Anterior infarct , age undetermined  Abnormal ECG      < end of copied text >
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
## 2023-05-24 NOTE — ED BEHAVIORAL HEALTH PROGRESS NOTE - NSBHADMITCOORDCAREOTHER_PSY_A_CORE FT
I spent a total of 35 minutes reviewing past medical records, evaluating the patient, discussing treatment options with the patient, communicating with other healthcare professionals, coordinating care, and documenting in the EMR.

## 2023-05-24 NOTE — ED BEHAVIORAL HEALTH PROGRESS NOTE - CASE SUMMARY/FORMULATION (CLEARLY DOCUMENT RATIONALE FOR DISPOSITION CHANGE)
On reassessment, pt continues to report SI/HI/CAH and remains interested in voluntary admission at this time.

## 2023-05-24 NOTE — BH INPATIENT PSYCHIATRY ASSESSMENT NOTE - HPI (INCLUDE ILLNESS QUALITY, SEVERITY, DURATION, TIMING, CONTEXT, MODIFYING FACTORS, ASSOCIATED SIGNS AND SYMPTOMS)
62F, living at [facility], on disability since 2020, PMH of HTN, aortic valve replacement, psych hx of schizoaffective disorder depressive type, no suicide attempts or NSSIB, no violence, no substance use, 6 prior hospitalizations (last at [facility] in november 2022 for SI), now BIBS reporting suicide ideation, HI, intermittent CAHKS.     Patient states that in general she has been doing okay in the last few weeks, no changes in sleep, no suicide ideation, no changes in weight or appetite, generally no AH, adherent to her abilify. She states that around 2.5 hours ago while on the [employer] she started having thoughts of jumping in front of a truck to kill herself. She also felt generally agitated, wanting to lash out at random people, and reports hearing two voices of people from her past saying things like "jump in front of the train." states that she does not hear them at the moment of the interview but had in the preceding hours. She denies any specific triggers, states that the last time she felt this way was around 6mo ago. She states that she reached out to her psychiatrist but her next appointment is in a month. She denies etoh and drugs, Denies VH, denies paranoia, denies ideas of reference. Reports adherence to her medications. Denies having collateral. Would like to be admitted.     Per JEROME no recent hospitalizations

## 2023-05-24 NOTE — BH PATIENT PROFILE - NSINDCRISISSIGNS_PSY_ALL_CORE
none none/Becoming argumentative/Glaring/Rolling of eyes/Clenching fists/Scratching self/Swallowing objects/Punching walls/Self-injury/Cursing/Yelling

## 2023-05-24 NOTE — BH INPATIENT PSYCHIATRY ASSESSMENT NOTE - NSBHCHARTREVIEWLAB_PSY_A_CORE FT
Complete Blood Count + Automated Diff (05.23.23 @ 16:19)   WBC Count: 6.57 K/uL  RBC Count: 3.90 M/uL  Hemoglobin: 11.5 g/dL  Hematocrit: 33.9 %  Mean Cell Volume: 86.9 fL  Mean Cell Hemoglobin: 29.5 pg  Mean Cell Hemoglobin Conc: 33.9 g/dL  Red Cell Distrib Width: 13.3 %  Platelet Count - Automated: 218 K/uL  MPV: 9.0 fL  Auto Neutrophil #: 3.99 K/uL  Auto Lymphocyte #: 2.03 K/uL  Auto Monocyte #: 0.39 K/uL  Auto Eosinophil #: 0.10 K/uL  Auto Basophil #: 0.04 K/uL  Auto Neutrophil %: 60.8: Differential percentages must be correlated with absolute numbers for   clinical significance. %  Auto Lymphocyte %: 30.9 %  Auto Monocyte %: 5.9 %  Auto Eosinophil %: 1.5 %  Auto Basophil %: 0.6 %  Auto Immature Granulocyte %: 0.3: (Includes meta, myelo and promyelocytes). Mild elevations in immature   granulocytes may be seen with many inflammatory processes and pregnancy;   clinical correlation suggested. %  Nucleated RBC: 0 /100 WBCs

## 2023-05-24 NOTE — ED BEHAVIORAL HEALTH PROGRESS NOTE - DETAILS:
Pt reports still having the same symptoms as yesterday, including paranoia, CAH to kill herself, and HI towards "random" people.  Pt reports feeling more in control since receiving abilify BID yesterday, contracts for safety in the hospital.  Pt reports that she takes aspirin 81 mg daily and denies substance use.    Covid Screen - Patient  denies positive test ever  denies recent exposures

## 2023-05-24 NOTE — BH SOCIAL WORK INITIAL PSYCHOSOCIAL EVALUATION - OTHER PAST PSYCHIATRIC HISTORY (INCLUDE DETAILS REGARDING ONSET, COURSE OF ILLNESS, INPATIENT/OUTPATIENT TREATMENT)
Patient has a past psychiatric history of schizoaffective disorder depressive type.  She has 6 prior inpatient psych hospitalizations (last at [facility] in november 2022)

## 2023-05-24 NOTE — ED BEHAVIORAL HEALTH PROGRESS NOTE - IS SUICIDALITY/HOMICIDALITY RISK SCREENING/ASSESSMENT INCOMPLETE OR NEED TO BE REDONE?
Patient says he continues to have migraine pain, and thinks he is dehydrated - please c/b to advise treatment.
Spoke with pt he continues to have a migraine. States he is fine when he is lying down but when he gets up headache returns. Feeling tightness in side of neck and blood pressure readings go from extremely low to elevated. Appt scheduled for appt today.
No (assessment complete)

## 2023-05-25 LAB
A1C WITH ESTIMATED AVERAGE GLUCOSE RESULT: 6 % — HIGH (ref 4–5.6)
ALBUMIN SERPL ELPH-MCNC: 4.1 G/DL — SIGNIFICANT CHANGE UP (ref 3.5–5.2)
ALP SERPL-CCNC: 184 U/L — HIGH (ref 30–115)
ALT FLD-CCNC: 27 U/L — SIGNIFICANT CHANGE UP (ref 0–41)
ANION GAP SERPL CALC-SCNC: 13 MMOL/L — SIGNIFICANT CHANGE UP (ref 7–14)
AST SERPL-CCNC: 25 U/L — SIGNIFICANT CHANGE UP (ref 0–41)
BASOPHILS # BLD AUTO: 0.05 K/UL — SIGNIFICANT CHANGE UP (ref 0–0.2)
BASOPHILS NFR BLD AUTO: 0.8 % — SIGNIFICANT CHANGE UP (ref 0–1)
BILIRUB SERPL-MCNC: <0.2 MG/DL — SIGNIFICANT CHANGE UP (ref 0.2–1.2)
BUN SERPL-MCNC: 14 MG/DL — SIGNIFICANT CHANGE UP (ref 10–20)
CALCIUM SERPL-MCNC: 9.2 MG/DL — SIGNIFICANT CHANGE UP (ref 8.4–10.5)
CHLORIDE SERPL-SCNC: 101 MMOL/L — SIGNIFICANT CHANGE UP (ref 98–110)
CHOLEST SERPL-MCNC: 225 MG/DL — HIGH
CO2 SERPL-SCNC: 23 MMOL/L — SIGNIFICANT CHANGE UP (ref 17–32)
CREAT SERPL-MCNC: 0.9 MG/DL — SIGNIFICANT CHANGE UP (ref 0.7–1.5)
EGFR: 73 ML/MIN/1.73M2 — SIGNIFICANT CHANGE UP
EOSINOPHIL # BLD AUTO: 0.14 K/UL — SIGNIFICANT CHANGE UP (ref 0–0.7)
EOSINOPHIL NFR BLD AUTO: 2.2 % — SIGNIFICANT CHANGE UP (ref 0–8)
ESTIMATED AVERAGE GLUCOSE: 126 MG/DL — HIGH (ref 68–114)
GLUCOSE SERPL-MCNC: 174 MG/DL — HIGH (ref 70–99)
HCT VFR BLD CALC: 38.5 % — SIGNIFICANT CHANGE UP (ref 37–47)
HDLC SERPL-MCNC: 56 MG/DL — SIGNIFICANT CHANGE UP
HGB BLD-MCNC: 12.7 G/DL — SIGNIFICANT CHANGE UP (ref 12–16)
IMM GRANULOCYTES NFR BLD AUTO: 0.8 % — HIGH (ref 0.1–0.3)
LIPID PNL WITH DIRECT LDL SERPL: 89 MG/DL — SIGNIFICANT CHANGE UP
LYMPHOCYTES # BLD AUTO: 1.55 K/UL — SIGNIFICANT CHANGE UP (ref 1.2–3.4)
LYMPHOCYTES # BLD AUTO: 24.8 % — SIGNIFICANT CHANGE UP (ref 20.5–51.1)
MCHC RBC-ENTMCNC: 29.3 PG — SIGNIFICANT CHANGE UP (ref 27–31)
MCHC RBC-ENTMCNC: 33 G/DL — SIGNIFICANT CHANGE UP (ref 32–37)
MCV RBC AUTO: 88.9 FL — SIGNIFICANT CHANGE UP (ref 81–99)
MONOCYTES # BLD AUTO: 0.37 K/UL — SIGNIFICANT CHANGE UP (ref 0.1–0.6)
MONOCYTES NFR BLD AUTO: 5.9 % — SIGNIFICANT CHANGE UP (ref 1.7–9.3)
NEUTROPHILS # BLD AUTO: 4.09 K/UL — SIGNIFICANT CHANGE UP (ref 1.4–6.5)
NEUTROPHILS NFR BLD AUTO: 65.5 % — SIGNIFICANT CHANGE UP (ref 42.2–75.2)
NON HDL CHOLESTEROL: 169 MG/DL — HIGH
NRBC # BLD: 0 /100 WBCS — SIGNIFICANT CHANGE UP (ref 0–0)
PLATELET # BLD AUTO: 227 K/UL — SIGNIFICANT CHANGE UP (ref 130–400)
PMV BLD: 9.5 FL — SIGNIFICANT CHANGE UP (ref 7.4–10.4)
POTASSIUM SERPL-MCNC: 4.8 MMOL/L — SIGNIFICANT CHANGE UP (ref 3.5–5)
POTASSIUM SERPL-SCNC: 4.8 MMOL/L — SIGNIFICANT CHANGE UP (ref 3.5–5)
PROT SERPL-MCNC: 7.4 G/DL — SIGNIFICANT CHANGE UP (ref 6–8)
RBC # BLD: 4.33 M/UL — SIGNIFICANT CHANGE UP (ref 4.2–5.4)
RBC # FLD: 13.4 % — SIGNIFICANT CHANGE UP (ref 11.5–14.5)
SODIUM SERPL-SCNC: 137 MMOL/L — SIGNIFICANT CHANGE UP (ref 135–146)
TRIGL SERPL-MCNC: 398 MG/DL — HIGH
TSH SERPL-MCNC: 3.71 UIU/ML — SIGNIFICANT CHANGE UP (ref 0.27–4.2)
WBC # BLD: 6.25 K/UL — SIGNIFICANT CHANGE UP (ref 4.8–10.8)
WBC # FLD AUTO: 6.25 K/UL — SIGNIFICANT CHANGE UP (ref 4.8–10.8)

## 2023-05-25 PROCEDURE — 99221 1ST HOSP IP/OBS SF/LOW 40: CPT

## 2023-05-25 PROCEDURE — 99231 SBSQ HOSP IP/OBS SF/LOW 25: CPT

## 2023-05-25 RX ADMIN — ARIPIPRAZOLE 10 MILLIGRAM(S): 15 TABLET ORAL at 08:14

## 2023-05-25 RX ADMIN — Medication 81 MILLIGRAM(S): at 08:14

## 2023-05-25 NOTE — BH SAFETY PLAN - ENVIRONMENT SAFETY 2:
Also another way I would make my environment safe would be to stay away fro certain people, place and things.

## 2023-05-25 NOTE — BH SAFETY PLAN - ENVIRONMENT SAFETY 1:
One way I would make my environment safe would be o call for help once I get into one of these bad moods.

## 2023-05-25 NOTE — CONSULT NOTE ADULT - ASSESSMENT
# Schizophrenia/depression/suicidal ideation - treatement and mngmt per psych  - any change in meds - check qtc     #hx of AvR    # no significant medical history - check TSH, b12, HIV    recall prn

## 2023-05-25 NOTE — BH INPATIENT PSYCHIATRY PROGRESS NOTE - CURRENT MEDICATION
MEDICATIONS  (STANDING):  ARIPiprazole 10 milliGRAM(s) Oral daily  aspirin  chewable 81 milliGRAM(s) Oral daily    MEDICATIONS  (PRN):  acetaminophen     Tablet .. 650 milliGRAM(s) Oral every 6 hours PRN Moderate Pain (4 - 6)  diphenhydrAMINE 50 milliGRAM(s) Oral every 6 hours PRN Eps  haloperidol     Tablet 5 milliGRAM(s) Oral every 6 hours PRN Agitation  hydrOXYzine hydrochloride 50 milliGRAM(s) Oral every 6 hours PRN Anxiety/insomnia  LORazepam     Tablet 2 milliGRAM(s) Oral every 6 hours PRN Aggression

## 2023-05-25 NOTE — CONSULT NOTE ADULT - SUBJECTIVE AND OBJECTIVE BOX
History and Physical Examination - General Surgery   [facility]  Shabana Medina 52 y o  male MRN: [id number]  Unit/Bed#:  Encounter: [id number] PCP: ELIESER Fonseca    History of Present Illness   Chief Complaint:  Left groin pain  S/p left inguinal hernia repair in 2012  Night sweats  Feeling lethargy tired    HPI:  Shabana Medina is a 52 y o  male who presents to office with left the groin pain  For last few days well patient was standing without even doing any strenuous exercise or lifting  Patient has a hernia surgery in 2012 at that time patient has some superficial wound infection  Though it has healed well without any problem except now having some pain  Since surgery patient is having off and on some symptoms on the left groin and has been disregarding    Has recent ultrasound left groin which is unremarkable for hernia but had some inguinal lymphadenopathy  Denies any night sweats patient has not gained weight and not lost weight  Works as a  work involves some lifting    Historical Information   The following portions of the patient's history were reviewed and updated as appropriate  Past Medical History:   Diagnosis Date    Arthritis      Past Surgical History:   Procedure Laterality Date    HERNIA REPAIR      Inguinal hernia repair, left     SPLENECTOMY, PARTIAL       Social History   Social History     Substance and Sexual Activity   Alcohol Use No     Social History     Substance and Sexual Activity   Drug Use No     Social History     Tobacco Use   Smoking Status Former Smoker    Packs/day: 0 50    Years: 20 00    Pack years: 10 00   Smokeless Tobacco Former User   Tobacco Comment    chew     Family History:   Family History   Problem Relation Age of Onset    Coronary artery disease Mother     Emphysema Mother         Emphysema/COPD    Skin cancer Mother     Emphysema Father         Emphysema/COPD    Heart attack Father         MI involving other coronary artery     Diabetes Paternal Uncle     Emphysema Family     Diabetes Family     Skin cancer Family     Nephrolithiasis Family        Meds/Allergies   Allergies   Allergen Reactions    Ezetimibe Rash    Levofloxacin Rash    Penicillins Other (See Comments)     Unsure of reaction    Statins Myalgia       Current Outpatient Medications:     traMADol (ULTRAM) 50 mg tablet, Take 1 tablet (50 mg total) by mouth every 12 (twelve) hours as needed for moderate pain, Disp: 60 tablet, Rfl: 1     REVIEW OF SYSTEMS  Constitutional:  Denies fever or chills has some night sweats and tiredness  Eyes:  Denies change in visual acuity   HENT:  Denies nasal congestion or sore throat   Respiratory:  Denies cough or shortness of breath   Cardiovascular:  Denies chest pain or edema   GI:  Left groin pain no abdominal pain, nausea, vomiting, bloody stools or diarrhea   :  Denies dysuria, frequency, difficulty in micturition and nocturia has history of ureteric stone  Musculoskeletal:  Denies back pain or joint pain   Neurologic:  Denies headache, focal weakness or sensory changes   Endocrine:  Denies polyuria or polydipsia   Lymphatic:  Denies swollen glands   Psychiatric:  Denies depression or anxiety     Objective   Current Vitals:   /60   Pulse 55   Temp (!) 96 8 °F (36 °C)   Ht 5' 9" (1 753 m)   Wt 62 kg (136 lb 9 6 oz)   BMI 20 17 kg/m²   Body mass index is 20 17 kg/m²  PHYSICAL EXAMS  General:  Patient is not in acute distress, laying in the bed comfortably, awake, alert responding to commands,   HEENT:  Both pupils normal-size atraumatic, normocephalic, nonicteric  Neck:  JVP not raised  Trachea central  Respiratory:  normal Breath sounds clear to auscultation,  Cardiovascular:  S1-S2 normal without any murmur   GI:  Abdomen soft nontender   Liver and spleen normal size, no free fluid, left groin hernial sites unremarkable without any cough impulse  Scar from previous surgery is well healed   Musculoskeletal:  History of chronic bilateral lower back pain  Integument:  No skin rashes or ulceration  Lymphatic:  No cervical lymphadenopathy bilateral axillary and the bilateral groin lymphadenopathy  Neurologic:  Patient is awake alert, responding to command, well-oriented to time and place and person moving all extremities ambulating well    Visit Diagnosis:   Diagnoses and all orders for this visit:    Left groin pain    Left inguinal pain  Comments:  s/p left inguinal hernia repair  Orders:  -     Ambulatory referral to General Surgery    Inguinal lymphadenopathy    Axillary lymphadenopathy       Plan of care was discussed with patient in detail    Pertinent labs reviewed  Pertinent images and available reads personally reviewed  Procedure: Us Groin/inguinal Area    Result Date: 9/3/2019  Narrative: LEFT INGUINAL ULTRASOUND INDICATION:   R10 32: Left lower quadrant pain  COMPARISON:  None TECHNIQUE:   Real-time ultrasound of the left inguinal region was performed with a transducer with both volumetric sweeps and still imaging techniques  FINDINGS:  Several normal-appearing lymph nodes are visualized  No fluid collections or hernia demonstrated  Impression: No acute findings  No hernia or fluid collection visualized  Workstation performed: [id number]     Pertinent notes reviewed    Assessment/Plan   Assessment:  Left groin pain  Axillary inguinal lymphadenopathy   Some night sweats and the pathology weaknes  Plan:  Ultrasound report was discussed with patient in detail and with Radiology  In view of chronic intermittent recurrent pain and some axillary lymphadenopathy bilateral with some constitutional symptom  We will do CBC ESR, CMP, CT scan of abdominal pelvis before discharge    Counseling / Coordination of Care  Expained patient in detailed about coordination of care   A description of the counseling / coordination of care:  I performed an interim history, pertinent images and HOSPITALIST CONSULT for IPP History and Physical     CARRINGTON, ESPINOZA  62y, Female  Allergy: penicillin (Rash)      CHIEF COMPLAINT:     HPI:    HPI:  62F, living at [facility], on disability since 2020, PMH of HTN, aortic valve replacement, psych hx of schizoaffective disorder depressive type, no suicide attempts or NSSIB, no violence, no substance use, 6 prior hospitalizations (last at [facility] in november 2022 for SI), now BIBS reporting suicide ideation, HI, intermittent CAHKS.     Patient states that in general she has been doing okay in the last few weeks, no changes in sleep, no suicide ideation, no changes in weight or appetite, generally no AH, adherent to her abilify. She states that around 2.5 hours ago while on the [employer] she started having thoughts of jumping in front of a truck to kill herself. She also felt generally agitated, wanting to lash out at random people, and reports hearing two voices of people from her past saying things like "jump in front of the train." states that she does not hear them at the moment of the interview but had in the preceding hours. She denies any specific triggers, states that the last time she felt this way was around 6mo ago. She states that she reached out to her psychiatrist but her next appointment is in a month. She denies etoh and drugs, Denies VH, denies paranoia, denies ideas of reference. Reports adherence to her medications. Denies having collateral. Would like to be admitted.     Per JEROME no recent hospitalizations (24 May 2023 15:30)    FAMILY HISTORY:  Family history of early CAD (Father)  Says that multiple family members had heart disease (a sibling is awaiting heart transplant)      PAST MEDICAL & SURGICAL HISTORY:  Schizophrenia      Leukemia  in remission      Depression      H/O aortic valve replacement          SOCIAL HISTORY  Social History:  denies all (28 Oct 2021 00:35)      Home Medications:      ROS  General: Denies fevers, chills, nightsweats, weight loss  HEENT: Denies headache, rhinorrhea, sore throat, eye pain  CV: Denies CP, palpitations  PULM: Denies SOB, cough  GI: Denies abdominal pain, diarrhea  : Denies dysuria, hematuria  MSK: Denies arthralgias  SKIN: Denies rash   NEURO: Denies paresthesias, weakness  PSYCH: Denies depression    VITALS:  T(F): 95.9, Max: 97.9 (05-24-23 @ 12:38)  HR: 78  BP: 125/72  RR: 16Vital Signs Last 24 Hrs  T(C): 35.5 (24 May 2023 16:38), Max: 36.6 (24 May 2023 12:38)  T(F): 95.9 (24 May 2023 16:38), Max: 97.9 (24 May 2023 12:38)  HR: 78 (25 May 2023 08:35) (78 - 85)  BP: 125/72 (25 May 2023 08:35) (125/72 - 139/85)  BP(mean): --  RR: 16 (25 May 2023 08:35) (16 - 20)  SpO2: 94% (24 May 2023 12:38) (94% - 94%)        PHYSICAL EXAM:  Gen: NAD, resting in bed  HEENT: Normocephalic, atraumatic  Neck: supple, no lymphadenopathy  CV: Regular rate & regular rhythm  Lungs: CTABL no wheeze  Abdomen: Soft, NTND+ BS present  Ext: Warm, well perfused no CCE  Neuro: non focal, awake, CN II-XII intact   Skin: no rash, no erythema  Psych: no SI, HI, Hallucination     TESTS & MEASUREMENTS:                        12.7   6.25  )-----------( 227      ( 25 May 2023 08:59 )             38.5     05-23    141  |  107  |  16  ----------------------------<  92  4.7   |  24  |  0.8    Ca    9.3      23 May 2023 16:19              COVID-19 PCR: NotDetec (23 May 2023 16:34)      QRS axis to [] ° and NSR at a rate of [] BPM. There was no atrial enlargement. There was no ventricular hypertrophy. There were no ST-T changes and all intervals were normal.      INFECTIOUS DISEASES TESTING      RADIOLOGY & ADDITIONAL TESTS:  I have personally reviewed the last Chest xray  CXR      CT      CARDIOLOGY TESTING  12 Lead ECG:   Ventricular Rate 85 BPM    Atrial Rate 85 BPM    P-R Interval 156 ms    QRS Duration 88 ms    Q-T Interval 356 ms    QTC Calculation(Bazett) 423 ms    P Axis 64 degrees    R Axis 17 degrees    T Axis 41 degrees    Diagnosis Line Normal sinus rhythm  Cannot rule out Anterior infarct , age undetermined  Abnormal ECG    Confirmed by Zia Caraballo ([id number]) on 5/24/2023 7:42:18 AM (05-23-23 @ 15:45)      MEDICATIONS  (STANDING):  ARIPiprazole 10 milliGRAM(s) Oral daily  aspirin  chewable 81 milliGRAM(s) Oral daily    MEDICATIONS  (PRN):  acetaminophen     Tablet .. 650 milliGRAM(s) Oral every 6 hours PRN Moderate Pain (4 - 6)  diphenhydrAMINE 50 milliGRAM(s) Oral every 6 hours PRN Eps  haloperidol     Tablet 5 milliGRAM(s) Oral every 6 hours PRN Agitation  hydrOXYzine hydrochloride 50 milliGRAM(s) Oral every 6 hours PRN Anxiety/insomnia  LORazepam     Tablet 2 milliGRAM(s) Oral every 6 hours PRN Aggression      ANTIBIOTICS:      All available historical data has been reviewed    ASSESSMENT  62y F admitted with Schizoaffective disorder        PROBLEMS   labs, performed a physical examination to arrive at the plan delineated above with associated thought processes  MD Leigh Reddy    Office   Tel  [phone]  Fax   [phone]

## 2023-05-25 NOTE — BH SAFETY PLAN - INTERNAL COPING STRATEGY 2
Pt discharged to home with all belongings. Wife at side. Pt provided with urine strainer.    Another way I would cope with my depression would be that would read a good book or read a magazine.

## 2023-05-25 NOTE — BH INPATIENT PSYCHIATRY PROGRESS NOTE - NSBHFUPINTERVALHXFT_PSY_A_CORE
Patient seen and evaluated as per nursing report no acute events. Verbalizes feeling better. Still admits to periods of depression. Wii continue to titrate Abilify accordingly. Patient verbalizes as with previous admissions Abilify is the only medication that works for her. Will continue to monitor. States she is sleeping well and appetite is good. Denies suicidal/homicidal ideations. Denies any A/V hallucinations. Patient visible on the unit attending groups.

## 2023-05-25 NOTE — UM REPORT PROGRESS NOTE - NSUMRMPROVIDER_GEN_A_CORE FT
PARIENT: CHARISSA DORAN  : 1960  INSURANCE: [employer]  ID# [id number]  Spoke to Sera- [facility] # [phone]. Patient is approved for 8 days form - with review due with CODY Farnsworth [phone].    PARIJOSUE: CHARISSA DORAN  : 1960  INSURANCE: [employer]  ID# [id number]  Spoke to [facility] # [phone]. Patient is approved for 8 days form - with review due with [facility] [phone].      - Review complete with [facility] [phone].  Continued stay approved through .  LCD and review due on  with Daja PARIJOSUE: CHARISSA DORAN  : 1960  INSURANCE: [employer]  ID# [id number]  Spoke to Sera- [facility] # [phone]. Patient is approved for 8 days form - with review due with [facility] [phone].      - Review complete with [facility] [phone].  Continued stay approved through .  LCD and review due on  with [facility]     - Patient was discharged today .  D/C clinical provided to Cristela [phone]

## 2023-05-25 NOTE — BH INPATIENT PSYCHIATRY PROGRESS NOTE - NSBHASSESSSUMMFT_PSY_ALL_CORE
62F, living at [facility], on disability since 2020, PMH of HTN, aortic valve replacement, psych hx of schizoaffective disorder depressive type, no suicide attempts or NSSIB, no violence, no substance use, 6 prior hospitalizations (last at [facility] in november 2022 for SI), now BIBS reporting suicide ideation, HI, intermittent CAHKS.     Patient seen and evaluated as per nursing report no acute events. Verbalizes feeling better. Still admits to periods of depression. Wii continue to titrate Abilify accordingly. Patient verbalizes as with previous admissions Abilify is the only medication that works for her. Will continue to monitor. States she is sleeping well and appetite is good. Denies suicidal/homicidal ideations. Denies any A/V hallucinations. Patient visible on the unit attending groups.    #Schizoaffective disorder  -Abilify 10mg Daily    -Hydroxyzine 50mg Q6 PRN for anxiety/insomnia  -Haldol 5mg Q6 PRN for agitation/psychosis  -Benadryl 50mg Q6 PRN got EPS  -Lorazepam 2mg Q6 PRN for aggression    -Tylenol PRN for pain  -Aspirin     #Agitation  -for agitation not amenable to verbal redirection, may give haldol 5 mg q6h prn, ativan 2 mg q6h prn, benadryl 50 mg q6h prn with escalation to IM if pt is a danger to self or/and others with repeat EKG to ensure QTc <500 ms.

## 2023-05-25 NOTE — BH INPATIENT PSYCHIATRY PROGRESS NOTE - NSBHMETABOLIC_PSY_ALL_CORE_FT
BMI: BMI (kg/m2): 35.3 (05-24-23 @ 13:03)  HbA1c: A1C with Estimated Average Glucose Result: 6.7 % (11-19-22 @ 09:12)    Glucose:   BP: 125/72 (05-25-23 @ 08:35) (117/66 - 161/87)  Lipid Panel: Date/Time: 05-25-23 @ 08:59  Cholesterol, Serum: 225  Direct LDL: --  HDL Cholesterol, Serum: 56  Total Cholesterol/HDL Ration Measurement: --  Triglycerides, Serum: 398

## 2023-05-25 NOTE — BH INPATIENT PSYCHIATRY PROGRESS NOTE - PRN MEDS
MEDICATIONS  (PRN):  acetaminophen     Tablet .. 650 milliGRAM(s) Oral every 6 hours PRN Moderate Pain (4 - 6)  diphenhydrAMINE 50 milliGRAM(s) Oral every 6 hours PRN Eps  haloperidol     Tablet 5 milliGRAM(s) Oral every 6 hours PRN Agitation  hydrOXYzine hydrochloride 50 milliGRAM(s) Oral every 6 hours PRN Anxiety/insomnia  LORazepam     Tablet 2 milliGRAM(s) Oral every 6 hours PRN Aggression

## 2023-05-25 NOTE — BH INPATIENT PSYCHIATRY PROGRESS NOTE - NSBHCHARTREVIEWVS_PSY_A_CORE FT
Vital Signs Last 24 Hrs  T(C): 35.5 (05-24-23 @ 16:38), Max: 36.6 (05-24-23 @ 12:38)  T(F): 95.9 (05-24-23 @ 16:38), Max: 97.9 (05-24-23 @ 12:38)  HR: 78 (05-25-23 @ 08:35) (78 - 85)  BP: 125/72 (05-25-23 @ 08:35) (125/72 - 139/85)  BP(mean): --  RR: 16 (05-25-23 @ 08:35) (16 - 20)  SpO2: 94% (05-24-23 @ 12:38) (94% - 94%)

## 2023-05-26 PROCEDURE — 99231 SBSQ HOSP IP/OBS SF/LOW 25: CPT

## 2023-05-26 RX ADMIN — ARIPIPRAZOLE 10 MILLIGRAM(S): 15 TABLET ORAL at 08:21

## 2023-05-26 RX ADMIN — Medication 81 MILLIGRAM(S): at 08:21

## 2023-05-26 NOTE — BH INPATIENT PSYCHIATRY PROGRESS NOTE - NSBHCHARTREVIEWVS_PSY_A_CORE FT
Vital Signs Last 24 Hrs  T(C): 36.8 (05-25-23 @ 16:17), Max: 36.8 (05-25-23 @ 16:17)  T(F): 98.2 (05-25-23 @ 16:17), Max: 98.2 (05-25-23 @ 16:17)  HR: 80 (05-25-23 @ 16:17) (80 - 80)  BP: 130/73 (05-25-23 @ 16:17) (130/73 - 130/73)  BP(mean): --  RR: 18 (05-25-23 @ 16:17) (18 - 18)  SpO2: --

## 2023-05-26 NOTE — BH INPATIENT PSYCHIATRY PROGRESS NOTE - NSBHFUPINTERVALHXFT_PSY_A_CORE
Patient seen and evaluated as per nursing report no acute events. Verbalizes an improved mood overall but was upset this morning about social security issues. Appeared internally preoccupied at times. Will continue to titrate Abilify accordingly. States she is sleeping well, and appetite is good. Denies suicidal/homicidal ideations. Denies any A/V hallucinations. Patient visible on the unit engaging with peers and attending groups.

## 2023-05-26 NOTE — BH INPATIENT PSYCHIATRY PROGRESS NOTE - NSBHASSESSSUMMFT_PSY_ALL_CORE
62F, living at [facility], on disability since 2020, PMH of HTN, aortic valve replacement, psych hx of schizoaffective disorder depressive type, no suicide attempts or NSSIB, no violence, no substance use, 6 prior hospitalizations (last at [facility] in november 2022 for SI), now BIBS reporting suicide ideation, HI, intermittent CAHKS.     Patient seen and evaluated as per nursing report no acute events. Verbalizes an improved mood overall but was upset this morning about social security issues. Appeared internally preoccupied at times. Will continue to titrate Abilify accordingly. States she is sleeping well, and appetite is good. Denies suicidal/homicidal ideations. Denies any A/V hallucinations. Patient visible on the unit engaging with peers and attending groups.    #Schizoaffective disorder  -Abilify 10mg Daily    -Hydroxyzine 50mg Q6 PRN for anxiety/insomnia  -Haldol 5mg Q6 PRN for agitation/psychosis  -Benadryl 50mg Q6 PRN got EPS  -Lorazepam 2mg Q6 PRN for aggression    -Tylenol PRN for pain  -Aspirin     #Agitation  -for agitation not amenable to verbal redirection, may give haldol 5 mg q6h prn, ativan 2 mg q6h prn, benadryl 50 mg q6h prn with escalation to IM if pt is a danger to self or/and others with repeat EKG to ensure QTc <500 ms.

## 2023-05-26 NOTE — BH TREATMENT PLAN - NSTXCOPEINTERSW_PSY_ALL_CORE
SW will meet with patient daily for education and support  SW will encourage patient to attend unit groups to learn new coping skills

## 2023-05-26 NOTE — BH INPATIENT PSYCHIATRY PROGRESS NOTE - NSBHMETABOLIC_PSY_ALL_CORE_FT
BMI: BMI (kg/m2): 35.3 (05-24-23 @ 13:03)  HbA1c: A1C with Estimated Average Glucose Result: 6.0 % (05-25-23 @ 08:59)    Glucose:   BP: 130/73 (05-25-23 @ 16:17) (117/66 - 161/87)  Lipid Panel: Date/Time: 05-25-23 @ 08:59  Cholesterol, Serum: 225  Direct LDL: --  HDL Cholesterol, Serum: 56  Total Cholesterol/HDL Ration Measurement: --  Triglycerides, Serum: 398

## 2023-05-27 RX ORDER — ARIPIPRAZOLE 15 MG/1
15 TABLET ORAL DAILY
Refills: 0 | Status: DISCONTINUED | OUTPATIENT
Start: 2023-05-28 | End: 2023-06-02

## 2023-05-27 RX ADMIN — ARIPIPRAZOLE 10 MILLIGRAM(S): 15 TABLET ORAL at 08:12

## 2023-05-27 RX ADMIN — Medication 81 MILLIGRAM(S): at 08:12

## 2023-05-27 NOTE — BH INPATIENT PSYCHIATRY PROGRESS NOTE - NSBHCHARTREVIEWVS_PSY_A_CORE FT
Vital Signs Last 24 Hrs  T(C): 36.6 (05-26-23 @ 16:19), Max: 36.6 (05-26-23 @ 16:19)  T(F): 97.9 (05-26-23 @ 16:19), Max: 97.9 (05-26-23 @ 16:19)  HR: 94 (05-27-23 @ 09:05) (81 - 94)  BP: 167/92 (05-27-23 @ 09:05) (150/86 - 167/92)  BP(mean): --  RR: 17 (05-27-23 @ 09:05) (16 - 17)  SpO2: --

## 2023-05-27 NOTE — BH INPATIENT PSYCHIATRY PROGRESS NOTE - CURRENT MEDICATION
MEDICATIONS  (STANDING):  aspirin  chewable 81 milliGRAM(s) Oral daily    MEDICATIONS  (PRN):  acetaminophen     Tablet .. 650 milliGRAM(s) Oral every 6 hours PRN Moderate Pain (4 - 6)  diphenhydrAMINE 50 milliGRAM(s) Oral every 6 hours PRN Eps  haloperidol     Tablet 5 milliGRAM(s) Oral every 6 hours PRN Agitation  hydrOXYzine hydrochloride 50 milliGRAM(s) Oral every 6 hours PRN Anxiety/insomnia  LORazepam     Tablet 2 milliGRAM(s) Oral every 6 hours PRN Aggression

## 2023-05-27 NOTE — BH INPATIENT PSYCHIATRY PROGRESS NOTE - NSBHASSESSSUMMFT_PSY_ALL_CORE
62F, living at [facility], on disability since 2020, PMH of HTN, aortic valve replacement, psych hx of schizoaffective disorder depressive type, no suicide attempts or NSSIB, no violence, no substance use, 6 prior hospitalizations (last at [facility] in november 2022 for SI), now BIBS reporting suicide ideation, HI, intermittent CAHKS.     Patient seen and evaluated as per nursing report no acute events. Verbalizes an improved mood overall but was upset this morning about social security issues. Appeared internally preoccupied at times. Will continue to titrate Abilify accordingly. States she is sleeping well, and appetite is good. Denies suicidal/homicidal ideations. Denies any A/V hallucinations. Patient visible on the unit engaging with peers and attending groups.    #Schizoaffective disorder  - increase Abilify to 15mg Daily    -Hydroxyzine 50mg Q6 PRN for anxiety/insomnia  -Haldol 5mg Q6 PRN for agitation/psychosis  -Benadryl 50mg Q6 PRN got EPS  -Lorazepam 2mg Q6 PRN for aggression    -Tylenol PRN for pain  -Aspirin     #Agitation  -for agitation not amenable to verbal redirection, may give haldol 5 mg q6h prn, ativan 2 mg q6h prn, benadryl 50 mg q6h prn with escalation to IM if pt is a danger to self or/and others with repeat EKG to ensure QTc <500 ms.

## 2023-05-27 NOTE — BH INPATIENT PSYCHIATRY PROGRESS NOTE - NSBHMETABOLIC_PSY_ALL_CORE_FT
BMI: BMI (kg/m2): 35.3 (05-24-23 @ 13:03)  HbA1c: A1C with Estimated Average Glucose Result: 6.0 % (05-25-23 @ 08:59)    Glucose:   BP: 167/92 (05-27-23 @ 09:05) (125/72 - 167/92)  Lipid Panel: Date/Time: 05-25-23 @ 08:59  Cholesterol, Serum: 225  Direct LDL: --  HDL Cholesterol, Serum: 56  Total Cholesterol/HDL Ration Measurement: --  Triglycerides, Serum: 398

## 2023-05-27 NOTE — BH INPATIENT PSYCHIATRY PROGRESS NOTE - NSBHFUPINTERVALHXFT_PSY_A_CORE
Chart reviewed. Patient seen and evaluated at bedside. No acute overnight events reported.     On encounter patient was sitting calmly in bed. She reports today that she is okay overall but she reports that she believes she may need to increase her dose of abilify. She reports that yesterday she spoke to Social security and noted that her reaction was significant, indicating she got quite angry and reports it was out character for her. She reports that she has previously been on higher doses of abilify and understands that it has helped her mood lability in the past. She denied auditory or visual hallucinations, denied fears that others (such as  offices) were out to harm her or get her. Patient denied si/hi

## 2023-05-28 RX ORDER — GUAIFENESIN/DEXTROMETHORPHAN 600MG-30MG
10 TABLET, EXTENDED RELEASE 12 HR ORAL EVERY 6 HOURS
Refills: 0 | Status: DISCONTINUED | OUTPATIENT
Start: 2023-05-28 | End: 2023-06-02

## 2023-05-28 RX ADMIN — Medication 10 MILLILITER(S): at 11:02

## 2023-05-28 RX ADMIN — Medication 81 MILLIGRAM(S): at 08:57

## 2023-05-28 RX ADMIN — ARIPIPRAZOLE 15 MILLIGRAM(S): 15 TABLET ORAL at 08:57

## 2023-05-28 NOTE — BH INPATIENT PSYCHIATRY PROGRESS NOTE - NSBHCHARTREVIEWVS_PSY_A_CORE FT
Vital Signs Last 24 Hrs  T(C): 36.3 (05-28-23 @ 08:48), Max: 36.8 (05-27-23 @ 16:42)  T(F): 97.4 (05-28-23 @ 08:48), Max: 98.2 (05-27-23 @ 16:42)  HR: 96 (05-28-23 @ 08:48) (81 - 96)  BP: 96/- (05-28-23 @ 08:48) (96/- - 119/73)  BP(mean): --  RR: 16 (05-28-23 @ 08:48) (16 - 16)  SpO2: --

## 2023-05-28 NOTE — BH INPATIENT PSYCHIATRY PROGRESS NOTE - NSBHMETABOLIC_PSY_ALL_CORE_FT
BMI: BMI (kg/m2): 35.3 (05-24-23 @ 13:03)  HbA1c: A1C with Estimated Average Glucose Result: 6.0 % (05-25-23 @ 08:59)    Glucose:   BP: 96/- (05-28-23 @ 08:48) (96/- - 167/92)  Lipid Panel: Date/Time: 05-25-23 @ 08:59  Cholesterol, Serum: 225  Direct LDL: --  HDL Cholesterol, Serum: 56  Total Cholesterol/HDL Ration Measurement: --  Triglycerides, Serum: 398

## 2023-05-28 NOTE — BH INPATIENT PSYCHIATRY PROGRESS NOTE - NSBHFUPINTERVALHXFT_PSY_A_CORE
Chart reviewed. Patient seen and evaluated at bedside. No acute overnight events reported.     On encounter patient was sitting calmly in bed. She reports today she is feeling a lot better. she is compliant with medications.  she denies  hearing voices ,  as per staff , patient  is observed  talking to her self.  She  reports that medications are helping her.   reports  fragmented sleep at  times.  she denies s/o ideations intent or plan

## 2023-05-28 NOTE — BH INPATIENT PSYCHIATRY PROGRESS NOTE - NSBHASSESSSUMMFT_PSY_ALL_CORE
62F, living at [facility], on disability since 2020, PMH of HTN, aortic valve replacement, psych hx of schizoaffective disorder depressive type, no suicide attempts or NSSIB, no violence, no substance use, 6 prior hospitalizations (last at [facility] in november 2022 for SI), now BIBS reporting suicide ideation, HI, intermittent CAHKS.     Patient seen and evaluated as per nursing report no acute events. Verbalizes an improved mood overall but was upset this morning about social security issues. Appeared internally preoccupied at times. Will continue to titrate Abilify accordingly. States she is sleeping well, and appetite is good. Denies suicidal/homicidal ideations. Denies any A/V hallucinations. Patient visible on the unit engaging with peers and attending groups.    #Schizoaffective disorder  - increase Abilify to 15mg Daily    -Hydroxyzine 50mg Q6 PRN for anxiety/insomnia  -Haldol 5mg Q6 PRN for agitation/psychosis  -Benadryl 50mg Q6 PRN got EPS  -Lorazepam 2mg Q6 PRN for aggression    -Tylenol PRN for pain  -Aspirin     #Agitation  -for agitation not amenable to verbal redirection, may give haldol 5 mg q6h prn, ativan 2 mg q6h prn, benadryl 50 mg q6h prn with escalation to IM if pt is a danger to self or/and others with repeat EKG to ensure QTc <500 ms.

## 2023-05-28 NOTE — BH INPATIENT PSYCHIATRY PROGRESS NOTE - CURRENT MEDICATION
MEDICATIONS  (STANDING):  ARIPiprazole 15 milliGRAM(s) Oral daily  aspirin  chewable 81 milliGRAM(s) Oral daily    MEDICATIONS  (PRN):  acetaminophen     Tablet .. 650 milliGRAM(s) Oral every 6 hours PRN Moderate Pain (4 - 6)  diphenhydrAMINE 50 milliGRAM(s) Oral every 6 hours PRN Eps  guaifenesin/dextromethorphan Oral Liquid 10 milliLiter(s) Oral every 6 hours PRN Cough  haloperidol     Tablet 5 milliGRAM(s) Oral every 6 hours PRN Agitation  hydrOXYzine hydrochloride 50 milliGRAM(s) Oral every 6 hours PRN Anxiety/insomnia  LORazepam     Tablet 2 milliGRAM(s) Oral every 6 hours PRN Aggression

## 2023-05-28 NOTE — BH INPATIENT PSYCHIATRY PROGRESS NOTE - PRN MEDS
MEDICATIONS  (PRN):  acetaminophen     Tablet .. 650 milliGRAM(s) Oral every 6 hours PRN Moderate Pain (4 - 6)  diphenhydrAMINE 50 milliGRAM(s) Oral every 6 hours PRN Eps  guaifenesin/dextromethorphan Oral Liquid 10 milliLiter(s) Oral every 6 hours PRN Cough  haloperidol     Tablet 5 milliGRAM(s) Oral every 6 hours PRN Agitation  hydrOXYzine hydrochloride 50 milliGRAM(s) Oral every 6 hours PRN Anxiety/insomnia  LORazepam     Tablet 2 milliGRAM(s) Oral every 6 hours PRN Aggression

## 2023-05-29 PROCEDURE — 99231 SBSQ HOSP IP/OBS SF/LOW 25: CPT

## 2023-05-29 RX ADMIN — ARIPIPRAZOLE 15 MILLIGRAM(S): 15 TABLET ORAL at 08:38

## 2023-05-29 RX ADMIN — Medication 10 MILLILITER(S): at 13:29

## 2023-05-29 RX ADMIN — Medication 81 MILLIGRAM(S): at 08:38

## 2023-05-29 NOTE — BH INPATIENT PSYCHIATRY PROGRESS NOTE - NSBHFUPINTERVALHXFT_PSY_A_CORE
Patient seen and interviewed   She reports that she is feeling less depressed than before and she denies hearing any voices at this time. She also denies having current suicidal thoughts , intent or plan.   According to the nursing staff , patient is in good behavioral control, compliant with medications , had no issues overnight

## 2023-05-29 NOTE — BH INPATIENT PSYCHIATRY PROGRESS NOTE - NSBHASSESSSUMMFT_PSY_ALL_CORE
Ms Turner is a 62 year old woman with a history of Schizoaffective disorder depressive t who was admitted to the inpatient psychiatric floor for command auditory hallucinations telling her to kill herself .   Patient appears to be less depressed and is no longer reporting any auditory hallucinations.   At time patient continues to be considered a danger to herself and continues to need inpatient psychiatric hospitalization for medication management and symptom stabilization .     Plan  1. Psychosis and mood   - Continue Abilify 15mg P.O daily   2. Continue other medical medications as prescribed

## 2023-05-29 NOTE — BH INPATIENT PSYCHIATRY PROGRESS NOTE - NSBHCHARTREVIEWVS_PSY_A_CORE FT
Vital Signs Last 24 Hrs  T(C): 36.5 (05-29-23 @ 09:02), Max: 36.5 (05-29-23 @ 09:02)  T(F): 97.7 (05-29-23 @ 09:02), Max: 97.7 (05-29-23 @ 09:02)  HR: 101 (05-29-23 @ 09:02) (88 - 101)  BP: 148/91 (05-29-23 @ 09:02) (144/86 - 148/91)  BP(mean): --  RR: 16 (05-29-23 @ 09:02) (16 - 16)  SpO2: --

## 2023-05-29 NOTE — BH INPATIENT PSYCHIATRY PROGRESS NOTE - NSBHMETABOLIC_PSY_ALL_CORE_FT
BMI: BMI (kg/m2): 35.3 (05-24-23 @ 13:03)  HbA1c: A1C with Estimated Average Glucose Result: 6.0 % (05-25-23 @ 08:59)    Glucose:   BP: 148/91 (05-29-23 @ 09:02) (96/- - 167/92)  Lipid Panel: Date/Time: 05-25-23 @ 08:59  Cholesterol, Serum: 225  Direct LDL: --  HDL Cholesterol, Serum: 56  Total Cholesterol/HDL Ration Measurement: --  Triglycerides, Serum: 398

## 2023-05-30 PROCEDURE — 99231 SBSQ HOSP IP/OBS SF/LOW 25: CPT

## 2023-05-30 RX ADMIN — ARIPIPRAZOLE 15 MILLIGRAM(S): 15 TABLET ORAL at 08:14

## 2023-05-30 RX ADMIN — Medication 81 MILLIGRAM(S): at 08:15

## 2023-05-30 NOTE — BH DISCHARGE NOTE NURSING/SOCIAL WORK/PSYCH REHAB - NSDCVIVACCINE_GEN_ALL_CORE_FT
COVID-19, mRNA, LNP-S, PF, 100 mcg/ 0.5 mL dose (Moderna); 16-Dec-2021 12:11; Alana Solares (RN); [employer] [id number] (Exp. Date: 30-Dec-2021); IntraMuscular; Deltoid Right.; 0.5 milliLiter(s);

## 2023-05-30 NOTE — BH INPATIENT PSYCHIATRY PROGRESS NOTE - NSBHASSESSSUMMFT_PSY_ALL_CORE
62F, living at [facility], on disability since 2020, PMH of HTN, aortic valve replacement, psych hx of schizoaffective disorder depressive type, no suicide attempts or NSSIB, no violence, no substance use, 6 prior hospitalizations (last at [facility] in november 2022 for SI), now BIBS reporting suicide ideation, HI, intermittent CAHKS.     Patient seen and evaluated as per nursing report no acute events. Verbalizes an improved mood. Verbalizes feeling well on this starks of Abilify. States she is sleeping well, and appetite is good. Denies suicidal/homicidal ideations. Denies any A/V hallucinations. Patient visible on the unit engaging with peers and attending groups. Discussed discharge plans and coping skills to use when discharged. Anticipated discharge later in week.     #Schizoaffective disorder  -Abilify 10mg Daily    -Hydroxyzine 50mg Q6 PRN for anxiety/insomnia  -Haldol 5mg Q6 PRN for agitation/psychosis  -Benadryl 50mg Q6 PRN got EPS  -Lorazepam 2mg Q6 PRN for aggression    -Tylenol PRN for pain  -Aspirin     #Agitation  -for agitation not amenable to verbal redirection, may give haldol 5 mg q6h prn, ativan 2 mg q6h prn, benadryl 50 mg q6h prn with escalation to IM if pt is a danger to self or/and others with repeat EKG to ensure QTc <500 ms.   62F, living at [facility], on disability since 2020, PMH of HTN, aortic valve replacement, psych hx of schizoaffective disorder depressive type, no suicide attempts or NSSIB, no violence, no substance use, 6 prior hospitalizations (last at [facility] in november 2022 for SI), now BIBS reporting suicide ideation, HI, intermittent CAHKS.     Patient seen and evaluated as per nursing report no acute events. Verbalizes an improved mood. Verbalizes feeling well on this dose of Abilify. States she is sleeping well, and appetite is good. Denies suicidal/homicidal ideations. Denies any A/V hallucinations. Patient visible on the unit engaging with peers and attending groups. Discussed discharge plans and coping skills to use when discharged. Anticipated discharge later in week.     #Schizoaffective disorder  -Abilify 10mg Daily    -Hydroxyzine 50mg Q6 PRN for anxiety/insomnia  -Haldol 5mg Q6 PRN for agitation/psychosis  -Benadryl 50mg Q6 PRN got EPS  -Lorazepam 2mg Q6 PRN for aggression    -Tylenol PRN for pain  -Aspirin     #Agitation  -for agitation not amenable to verbal redirection, may give haldol 5 mg q6h prn, ativan 2 mg q6h prn, benadryl 50 mg q6h prn with escalation to IM if pt is a danger to self or/and others with repeat EKG to ensure QTc <500 ms.

## 2023-05-30 NOTE — BH INPATIENT PSYCHIATRY PROGRESS NOTE - NSBHMETABOLIC_PSY_ALL_CORE_FT
BMI: BMI (kg/m2): 35.3 (05-24-23 @ 13:03)  HbA1c: A1C with Estimated Average Glucose Result: 6.0 % (05-25-23 @ 08:59)    Glucose:   BP: 146/91 (05-30-23 @ 08:25) (96/- - 154/85)  Lipid Panel: Date/Time: 05-25-23 @ 08:59  Cholesterol, Serum: 225  Direct LDL: --  HDL Cholesterol, Serum: 56  Total Cholesterol/HDL Ration Measurement: --  Triglycerides, Serum: 398

## 2023-05-30 NOTE — BH INPATIENT PSYCHIATRY PROGRESS NOTE - NSBHCHARTREVIEWVS_PSY_A_CORE FT
Vital Signs Last 24 Hrs  T(C): 35.9 (05-29-23 @ 15:48), Max: 35.9 (05-29-23 @ 15:48)  T(F): 96.7 (05-29-23 @ 15:48), Max: 96.7 (05-29-23 @ 15:48)  HR: 75 (05-30-23 @ 08:25) (69 - 75)  BP: 146/91 (05-30-23 @ 08:25) (146/91 - 154/85)  BP(mean): --  RR: 18 (05-30-23 @ 08:25) (18 - 18)  SpO2: --

## 2023-05-30 NOTE — BH INPATIENT PSYCHIATRY PROGRESS NOTE - NSBHFUPINTERVALHXFT_PSY_A_CORE
Patient seen and evaluated as per nursing report no acute events. Verbalizes an improved mood. Verbalizes feeling well on this starks of Abilify. States she is sleeping well, and appetite is good. Denies suicidal/homicidal ideations. Denies any A/V hallucinations. Patient visible on the unit engaging with peers and attending groups. Discussed discharge plans and coping skills to use when discharged. Anticipated discharge later in week.  Patient seen and evaluated as per nursing report no acute events. Verbalizes an improved mood. Verbalizes feeling well on this dose of Abilify. States she is sleeping well, and appetite is good. Denies suicidal/homicidal ideations. Denies any A/V hallucinations. Patient visible on the unit engaging with peers and attending groups. Discussed discharge plans and coping skills to use when discharged. Anticipated discharge later in week.

## 2023-05-30 NOTE — BH DISCHARGE NOTE NURSING/SOCIAL WORK/PSYCH REHAB - NSCDUDCCRISIS_PSY_A_CORE
[facility]  1 [phone] [facility] ([phone])  Text "WELL" to 40069  Website: [email or web address]National Suicide Prevention Lifeline 4 [phone] [phone] 780-4762/988 Suicide and Crisis Lifeline

## 2023-05-31 PROCEDURE — 99231 SBSQ HOSP IP/OBS SF/LOW 25: CPT

## 2023-05-31 RX ADMIN — Medication 81 MILLIGRAM(S): at 08:08

## 2023-05-31 RX ADMIN — ARIPIPRAZOLE 15 MILLIGRAM(S): 15 TABLET ORAL at 08:08

## 2023-05-31 NOTE — BH INPATIENT PSYCHIATRY PROGRESS NOTE - NSBHCHARTREVIEWVS_PSY_A_CORE FT
Vital Signs Last 24 Hrs  T(C): --  T(F): --  HR: 72 (05-31-23 @ 09:47) (72 - 93)  BP: 135/82 (05-31-23 @ 09:47) (134/88 - 135/82)  BP(mean): --  RR: 16 (05-31-23 @ 09:47) (16 - 16)  SpO2: --     Vital Signs Last 24 Hrs  T(C): 36.3 (06-01-23 @ 08:15), Max: 36.8 (05-31-23 @ 15:46)  T(F): 97.3 (06-01-23 @ 08:15), Max: 98.2 (05-31-23 @ 15:46)  HR: 80 (06-01-23 @ 08:15) (80 - 82)  BP: 159/91 (06-01-23 @ 08:15) (135/93 - 159/91)  BP(mean): --  RR: 16 (06-01-23 @ 08:15) (16 - 16)  SpO2: --

## 2023-05-31 NOTE — BH INPATIENT PSYCHIATRY PROGRESS NOTE - PRN MEDS
MEDICATIONS  (PRN):  acetaminophen     Tablet .. 650 milliGRAM(s) Oral every 6 hours PRN Moderate Pain (4 - 6)  diphenhydrAMINE 50 milliGRAM(s) Oral every 6 hours PRN Eps  guaifenesin/dextromethorphan Oral Liquid 10 milliLiter(s) Oral every 6 hours PRN Cough  haloperidol     Tablet 5 milliGRAM(s) Oral every 6 hours PRN Agitation  hydrOXYzine hydrochloride 50 milliGRAM(s) Oral every 6 hours PRN Anxiety/insomnia  LORazepam     Tablet 2 milliGRAM(s) Oral every 6 hours PRN Aggression   MEDICATIONS  (PRN):  acetaminophen     Tablet .. 650 milliGRAM(s) Oral every 6 hours PRN Moderate Pain (4 - 6)  diphenhydrAMINE 50 milliGRAM(s) Oral every 6 hours PRN Eps  guaifenesin/dextromethorphan Oral Liquid 10 milliLiter(s) Oral every 6 hours PRN Cough  haloperidol     Tablet 5 milliGRAM(s) Oral every 6 hours PRN Agitation  hydrOXYzine hydrochloride 50 milliGRAM(s) Oral every 6 hours PRN Anxiety/insomnia

## 2023-05-31 NOTE — BH INPATIENT PSYCHIATRY PROGRESS NOTE - CURRENT MEDICATION
MEDICATIONS  (STANDING):  ARIPiprazole 15 milliGRAM(s) Oral daily  aspirin  chewable 81 milliGRAM(s) Oral daily    MEDICATIONS  (PRN):  acetaminophen     Tablet .. 650 milliGRAM(s) Oral every 6 hours PRN Moderate Pain (4 - 6)  diphenhydrAMINE 50 milliGRAM(s) Oral every 6 hours PRN Eps  guaifenesin/dextromethorphan Oral Liquid 10 milliLiter(s) Oral every 6 hours PRN Cough  haloperidol     Tablet 5 milliGRAM(s) Oral every 6 hours PRN Agitation  hydrOXYzine hydrochloride 50 milliGRAM(s) Oral every 6 hours PRN Anxiety/insomnia  LORazepam     Tablet 2 milliGRAM(s) Oral every 6 hours PRN Aggression   MEDICATIONS  (STANDING):  ARIPiprazole 15 milliGRAM(s) Oral daily  aspirin  chewable 81 milliGRAM(s) Oral daily    MEDICATIONS  (PRN):  acetaminophen     Tablet .. 650 milliGRAM(s) Oral every 6 hours PRN Moderate Pain (4 - 6)  diphenhydrAMINE 50 milliGRAM(s) Oral every 6 hours PRN Eps  guaifenesin/dextromethorphan Oral Liquid 10 milliLiter(s) Oral every 6 hours PRN Cough  haloperidol     Tablet 5 milliGRAM(s) Oral every 6 hours PRN Agitation  hydrOXYzine hydrochloride 50 milliGRAM(s) Oral every 6 hours PRN Anxiety/insomnia

## 2023-05-31 NOTE — BH INPATIENT PSYCHIATRY PROGRESS NOTE - NSBHFUPINTERVALHXFT_PSY_A_CORE
Patient seen and evaluated as per nursing report no acute events. Verbalizes feeling good. States she is sleeping well, and appetite is good. Focused on discharge. Denies suicidal/homicidal ideations. Denies any A/V hallucinations. Patient visible on the unit engaging with peers and attending groups. Anticipated discharge later in week.

## 2023-05-31 NOTE — BH INPATIENT PSYCHIATRY PROGRESS NOTE - NSBHMETABOLIC_PSY_ALL_CORE_FT
BMI: BMI (kg/m2): 35.3 (05-24-23 @ 13:03)  HbA1c: A1C with Estimated Average Glucose Result: 6.0 % (05-25-23 @ 08:59)    Glucose:   BP: 135/82 (05-31-23 @ 09:47) (134/88 - 154/85)  Lipid Panel: Date/Time: 05-25-23 @ 08:59  Cholesterol, Serum: 225  Direct LDL: --  HDL Cholesterol, Serum: 56  Total Cholesterol/HDL Ration Measurement: --  Triglycerides, Serum: 398   BMI: BMI (kg/m2): 35.3 (05-24-23 @ 13:03)  HbA1c: A1C with Estimated Average Glucose Result: 6.0 % (05-25-23 @ 08:59)    Glucose:   BP: 159/91 (06-01-23 @ 08:15) (134/88 - 159/91)  Lipid Panel: Date/Time: 05-25-23 @ 08:59  Cholesterol, Serum: 225  Direct LDL: --  HDL Cholesterol, Serum: 56  Total Cholesterol/HDL Ration Measurement: --  Triglycerides, Serum: 398

## 2023-05-31 NOTE — BH INPATIENT PSYCHIATRY PROGRESS NOTE - NSBHASSESSSUMMFT_PSY_ALL_CORE
62F, living at [facility], on disability since 2020, PMH of HTN, aortic valve replacement, psych hx of schizoaffective disorder depressive type, no suicide attempts or NSSIB, no violence, no substance use, 6 prior hospitalizations (last at [facility] in november 2022 for SI), now BIBS reporting suicide ideation, HI, intermittent CAHKS.     Patient seen and evaluated as per nursing report no acute events. Verbalizes feeling good. States she is sleeping well, and appetite is good. Focused on discharge. Denies suicidal/homicidal ideations. Denies any A/V hallucinations. Patient visible on the unit engaging with peers and attending groups. Anticipated discharge later in week.     #Schizoaffective disorder  -Abilify 10mg Daily    -Hydroxyzine 50mg Q6 PRN for anxiety/insomnia  -Haldol 5mg Q6 PRN for agitation/psychosis  -Benadryl 50mg Q6 PRN got EPS  -Lorazepam 2mg Q6 PRN for aggression    -Tylenol PRN for pain  -Aspirin     #Agitation  -for agitation not amenable to verbal redirection, may give haldol 5 mg q6h prn, ativan 2 mg q6h prn, benadryl 50 mg q6h prn with escalation to IM if pt is a danger to self or/and others with repeat EKG to ensure QTc <500 ms.   62F, living at [facility], on disability since 2020, PMH of HTN, aortic valve replacement, psych hx of schizoaffective disorder depressive type, no suicide attempts or NSSIB, no violence, no substance use, 6 prior hospitalizations (last at [facility] in november 2022 for SI), now BIBS reporting suicide ideation, HI, intermittent CAHKS.     Patient seen and evaluated as per nursing report no acute events. Verbalizes feeling good. States she is sleeping well, and appetite is good. Focused on discharge. Denies suicidal/homicidal ideations. Denies any A/V hallucinations. Patient visible on the unit engaging with peers and attending groups. Anticipated discharge later in week.     #Schizoaffective disorder  -Abilify 15mg Daily    -Hydroxyzine 50mg Q6 PRN for anxiety/insomnia  -Haldol 5mg Q6 PRN for agitation/psychosis  -Benadryl 50mg Q6 PRN got EPS  -Lorazepam 2mg Q6 PRN for aggression    -Tylenol PRN for pain  -Aspirin     #Agitation  -for agitation not amenable to verbal redirection, may give haldol 5 mg q6h prn, ativan 2 mg q6h prn, benadryl 50 mg q6h prn with escalation to IM if pt is a danger to self or/and others with repeat EKG to ensure QTc <500 ms.

## 2023-06-01 PROCEDURE — 99231 SBSQ HOSP IP/OBS SF/LOW 25: CPT

## 2023-06-01 RX ORDER — ARIPIPRAZOLE 15 MG/1
1 TABLET ORAL
Qty: 14 | Refills: 0
Start: 2023-06-01 | End: 2023-06-14

## 2023-06-01 RX ORDER — ASPIRIN/CALCIUM CARB/MAGNESIUM 324 MG
1 TABLET ORAL
Qty: 14 | Refills: 0
Start: 2023-06-01 | End: 2023-06-14

## 2023-06-01 RX ADMIN — ARIPIPRAZOLE 15 MILLIGRAM(S): 15 TABLET ORAL at 08:08

## 2023-06-01 RX ADMIN — Medication 81 MILLIGRAM(S): at 08:09

## 2023-06-01 NOTE — BH INPATIENT PSYCHIATRY DISCHARGE NOTE - HPI (INCLUDE ILLNESS QUALITY, SEVERITY, DURATION, TIMING, CONTEXT, MODIFYING FACTORS, ASSOCIATED SIGNS AND SYMPTOMS)
62F, living at [facility], on disability since 2020, PMH of HTN, aortic valve replacement, psych hx of schizoaffective disorder depressive type, no suicide attempts or NSSIB, no violence, no substance use, 6 prior hospitalizations (last at [facility] in november 2022 for SI), now BIBS reporting suicide ideation, HI, intermittent CAHKS.     Patient states that in general she has been doing okay in the last few weeks, no changes in sleep, no suicide ideation, no changes in weight or appetite, generally no AH, adherent to her abilify. She states that around 2.5 hours ago while on the [employer] she started having thoughts of jumping in front of a truck to kill herself. She also felt generally agitated, wanting to lash out at random people, and reports hearing two voices of people from her past saying things like "jump in front of the train." states that she does not hear them at the moment of the interview but had in the preceding hours. She denies any specific triggers, states that the last time she felt this way was around 6mo ago. She states that she reached out to her psychiatrist but her next appointment is in a month. She denies etoh and drugs, Denies VH, denies paranoia, denies ideas of reference. Reports adherence to her medications. Denies having collateral. Would like to be admitted.      62F, living at [facility], on disability since 2020, PMH of HTN, aortic valve replacement, psych hx of schizoaffective disorder depressive type, no suicide attempts or NSSIB, no violence, no substance use, 6 prior hospitalizations (last at [facility] in november 2022 for SI), now BIBS reporting suicide ideation, HI, intermittent CAHKS.     Patient states that in general she has been doing okay in the last few weeks, no changes in sleep, no suicide ideation, no changes in weight or appetite, generally no AH, adherent to her abilify. She states that around 2.5 hours ago while on the [employer] she started having thoughts of jumping in front of a truck to kill herself. She also felt generally agitated, wanting to lash out at random people, and reports hearing two voices of people from her past saying things like "jump in front of the train." states that she does not hear them at the moment of the interview but had in the preceding hours. She denies any specific triggers, states that the last time she felt this way was around 6mo ago. She states that she reached out to her psychiatrist but her next appointment is in a month. She denies etoh and drugs, Denies VH, denies paranoia, denies ideas of reference. Reports adherence to her medications. Denies having collateral. Would like to be admitted.    Patient seen and evaluated 6/1/23 as per nursing report no acute events. Verbalizes feeling good today. Verbalizes being ready for discharge. States she is sleeping well and appetite is good. Patient is future oriented, and goal directed. Denies suicidal/homicidal ideations. Denies any A/V hallucinations. Discussed discharge. Patient states she will return to her shelter. Anticipated discharge tomorrow 6/2/23. Compliant with medication. Does not warrant continued Inpatient hospitalization. Patient does not present a risk to self or others at this time.

## 2023-06-01 NOTE — BH INPATIENT PSYCHIATRY DISCHARGE NOTE - NSDCMRMEDTOKEN_GEN_ALL_CORE_FT
ARIPiprazole 10 mg oral tablet: 1 tab(s) orally once a day x 30 days. Continue until told otherwise by your provider.    ARIPiprazole 15 mg oral tablet: 1 tab(s) orally once a day x 14 days Continue until told otherwise by your provider.  aspirin 81 mg oral tablet, chewable: 1 tab(s) orally once a day x 14 days Continue until told otherwise by your provider   ARIPiprazole 15 mg oral tablet: 1 tab(s) orally once a day Continue until told otherwise by your provider.  aspirin 81 mg oral tablet, chewable: 1 tab(s) orally once a day Continue until told otherwise by your provider

## 2023-06-01 NOTE — BH INPATIENT PSYCHIATRY PROGRESS NOTE - NSBHMETABOLIC_PSY_ALL_CORE_FT
BMI: BMI (kg/m2): 35.3 (05-24-23 @ 13:03)  HbA1c: A1C with Estimated Average Glucose Result: 6.0 % (05-25-23 @ 08:59)    Glucose:   BP: 159/91 (06-01-23 @ 08:15) (134/88 - 159/91)  Lipid Panel: Date/Time: 05-25-23 @ 08:59  Cholesterol, Serum: 225  Direct LDL: --  HDL Cholesterol, Serum: 56  Total Cholesterol/HDL Ration Measurement: --  Triglycerides, Serum: 398   BMI: BMI (kg/m2): 35.3 (05-24-23 @ 13:03)  HbA1c: A1C with Estimated Average Glucose Result: 6.0 % (05-25-23 @ 08:59)    Glucose:   BP: 133/84 (06-01-23 @ 16:05) (133/84 - 159/91)  Lipid Panel: Date/Time: 05-25-23 @ 08:59  Cholesterol, Serum: 225  Direct LDL: --  HDL Cholesterol, Serum: 56  Total Cholesterol/HDL Ration Measurement: --  Triglycerides, Serum: 398

## 2023-06-01 NOTE — BH INPATIENT PSYCHIATRY DISCHARGE NOTE - NSDCCPCAREPLAN_GEN_ALL_CORE_FT
PRINCIPAL DISCHARGE DIAGNOSIS  Diagnosis: Schizoaffective disorder  Assessment and Plan of Treatment: Patient to continue with prescribed medication and follow up with outpatient

## 2023-06-01 NOTE — BH INPATIENT PSYCHIATRY PROGRESS NOTE - CURRENT MEDICATION
MEDICATIONS  (STANDING):  ARIPiprazole 15 milliGRAM(s) Oral daily  aspirin  chewable 81 milliGRAM(s) Oral daily    MEDICATIONS  (PRN):  acetaminophen     Tablet .. 650 milliGRAM(s) Oral every 6 hours PRN Moderate Pain (4 - 6)  diphenhydrAMINE 50 milliGRAM(s) Oral every 6 hours PRN Eps  guaifenesin/dextromethorphan Oral Liquid 10 milliLiter(s) Oral every 6 hours PRN Cough  haloperidol     Tablet 5 milliGRAM(s) Oral every 6 hours PRN Agitation  hydrOXYzine hydrochloride 50 milliGRAM(s) Oral every 6 hours PRN Anxiety/insomnia

## 2023-06-01 NOTE — BH INPATIENT PSYCHIATRY PROGRESS NOTE - NSBHFUPINTERVALHXFT_PSY_A_CORE
Patient seen and evaluated as per nursing report no acute events. Verbalizes feeling good today. Verbalizes being ready for discharge. States she is sleeping well and appetite is good. Patient is future oriented, and goal directed. Denies suicidal/homicidal ideations. Denies any A/V hallucinations. Discussed discharge. Patient states she will return to her shelter. Anticipated discharge tomorrow 6/2/23. Compliant with medication. Does not warrant continued Inpatient hospitalization. Patient does not present a risk to self or others at this time. Patient seen and evaluated as per nursing report no acute events. Verbalizes feeling good today. Verbalizes being ready for discharge. States she is sleeping well and appetite is good. Patient is future oriented, and goal directed. Denies suicidal/homicidal ideations. Denies any A/V hallucinations. Discussed discharge. Patient states she will return to her shelter. Anticipated discharge tomorrow 6/2/23. Compliant with medication. Does not warrant continued Inpatient hospitalization. Patient does not present a risk to self or others at this time.    Case reviewed with Chair of Psychiatry Dr. Evangelista Cat

## 2023-06-01 NOTE — BH INPATIENT PSYCHIATRY PROGRESS NOTE - PRN MEDS
MEDICATIONS  (PRN):  acetaminophen     Tablet .. 650 milliGRAM(s) Oral every 6 hours PRN Moderate Pain (4 - 6)  diphenhydrAMINE 50 milliGRAM(s) Oral every 6 hours PRN Eps  guaifenesin/dextromethorphan Oral Liquid 10 milliLiter(s) Oral every 6 hours PRN Cough  haloperidol     Tablet 5 milliGRAM(s) Oral every 6 hours PRN Agitation  hydrOXYzine hydrochloride 50 milliGRAM(s) Oral every 6 hours PRN Anxiety/insomnia
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
## 2023-06-01 NOTE — BH INPATIENT PSYCHIATRY DISCHARGE NOTE - NSBHMETABOLIC_PSY_ALL_CORE_FT
BMI: BMI (kg/m2): 35.3 (05-24-23 @ 13:03)  HbA1c: A1C with Estimated Average Glucose Result: 6.0 % (05-25-23 @ 08:59)    Glucose:   BP: 159/91 (06-01-23 @ 08:15) (134/88 - 159/91)  Lipid Panel: Date/Time: 05-25-23 @ 08:59  Cholesterol, Serum: 225  Direct LDL: --  HDL Cholesterol, Serum: 56  Total Cholesterol/HDL Ration Measurement: --  Triglycerides, Serum: 398

## 2023-06-01 NOTE — BH INPATIENT PSYCHIATRY PROGRESS NOTE - NSBHATTESTAPPBILLTIME_PSY_A_CORE
I attest my time as BECKY is greater than 50% of the total combined time spent on qualifying patient care activities. I have reviewed and verified the documentation.

## 2023-06-01 NOTE — BH INPATIENT PSYCHIATRY PROGRESS NOTE - NSBHCHARTREVIEWVS_PSY_A_CORE FT
Vital Signs Last 24 Hrs  T(C): 36.3 (06-01-23 @ 08:15), Max: 36.8 (05-31-23 @ 15:46)  T(F): 97.3 (06-01-23 @ 08:15), Max: 98.2 (05-31-23 @ 15:46)  HR: 80 (06-01-23 @ 08:15) (80 - 82)  BP: 159/91 (06-01-23 @ 08:15) (135/93 - 159/91)  BP(mean): --  RR: 16 (06-01-23 @ 08:15) (16 - 16)  SpO2: --     Vital Signs Last 24 Hrs  T(C): 36.8 (06-01-23 @ 16:05), Max: 36.8 (06-01-23 @ 16:05)  T(F): 98.3 (06-01-23 @ 16:05), Max: 98.3 (06-01-23 @ 16:05)  HR: 88 (06-01-23 @ 16:05) (80 - 88)  BP: 133/84 (06-01-23 @ 16:05) (133/84 - 159/91)  BP(mean): --  RR: 16 (06-01-23 @ 16:05) (16 - 16)  SpO2: --

## 2023-06-01 NOTE — BH INPATIENT PSYCHIATRY PROGRESS NOTE - NSBHASSESSSUMMFT_PSY_ALL_CORE
62F, living at [facility], on disability since 2020, PMH of HTN, aortic valve replacement, psych hx of schizoaffective disorder depressive type, no suicide attempts or NSSIB, no violence, no substance use, 6 prior hospitalizations (last at [facility] in november 2022 for SI), now BIBS reporting suicide ideation, HI, intermittent CAHKS.     Patient seen and evaluated as per nursing report no acute events. Verbalizes feeling good today. Verbalizes being ready for discharge. States she is sleeping well and appetite is good. Patient is future oriented, and goal directed. Denies suicidal/homicidal ideations. Denies any A/V hallucinations. Discussed discharge. Patient states she will return to her shelter. Anticipated discharge tomorrow 6/2/23. Compliant with medication. Does not warrant continued Inpatient hospitalization. Patient does not present a risk to self or others at this time.    #Schizoaffective disorder  -Abilify 15mg Daily    -Hydroxyzine 50mg Q6 PRN for anxiety/insomnia  -Haldol 5mg Q6 PRN for agitation/psychosis  -Benadryl 50mg Q6 PRN got EPS  -Lorazepam 2mg Q6 PRN for aggression    -Tylenol PRN for pain  -Aspirin     #Agitation  -for agitation not amenable to verbal redirection, may give haldol 5 mg q6h prn, ativan 2 mg q6h prn, benadryl 50 mg q6h prn with escalation to IM if pt is a danger to self or/and others with repeat EKG to ensure QTc <500 ms.   62F, living at [facility], on disability since 2020, PMH of HTN, aortic valve replacement, psych hx of schizoaffective disorder depressive type, no suicide attempts or NSSIB, no violence, no substance use, 6 prior hospitalizations (last at [facility] in november 2022 for SI), now BIBS reporting suicide ideation, HI, intermittent CAHKS.     Patient seen and evaluated as per nursing report no acute events. Verbalizes feeling good today. Verbalizes being ready for discharge. States she is sleeping well and appetite is good. Patient is future oriented, and goal directed. Denies suicidal/homicidal ideations. Denies any A/V hallucinations. Discussed discharge. Patient states she will return to her shelter. Anticipated discharge tomorrow 6/2/23. Compliant with medication. Does not warrant continued Inpatient hospitalization. Patient does not present a risk to self or others at this time.    Case reviewed with Chair of Psychiatry Dr. Evangelista Cat    #Schizoaffective disorder  -Abilify 15mg Daily    -Hydroxyzine 50mg Q6 PRN for anxiety/insomnia  -Haldol 5mg Q6 PRN for agitation/psychosis  -Benadryl 50mg Q6 PRN got EPS  -Lorazepam 2mg Q6 PRN for aggression    -Tylenol PRN for pain  -Aspirin     #Agitation  -for agitation not amenable to verbal redirection, may give haldol 5 mg q6h prn, ativan 2 mg q6h prn, benadryl 50 mg q6h prn with escalation to IM if pt is a danger to self or/and others with repeat EKG to ensure QTc <500 ms.

## 2023-06-02 VITALS
HEART RATE: 99 BPM | TEMPERATURE: 98 F | RESPIRATION RATE: 17 BRPM | DIASTOLIC BLOOD PRESSURE: 95 MMHG | SYSTOLIC BLOOD PRESSURE: 135 MMHG

## 2023-06-02 PROCEDURE — 99238 HOSP IP/OBS DSCHRG MGMT 30/<: CPT

## 2023-06-02 RX ADMIN — Medication 81 MILLIGRAM(S): at 08:25

## 2023-06-02 RX ADMIN — ARIPIPRAZOLE 15 MILLIGRAM(S): 15 TABLET ORAL at 08:24

## 2023-06-02 NOTE — BH INPATIENT PSYCHIATRY PROGRESS NOTE - NSBHASSESSSUMMFT_PSY_ALL_CORE
62F, living at [facility], on disability since 2020, PMH of HTN, aortic valve replacement, psych hx of schizoaffective disorder depressive type, no suicide attempts or NSSIB, no violence, no substance use, 6 prior hospitalizations (last at [facility] in november 2022 for SI), now BIBS reporting suicide ideation, HI, intermittent CAHKS.     D/C today. [employer] cards provided at patients request. Meds sent to [facility] pharmacy as requested by patient. Patient appeared to be in good spirits today. Endorses a better mood. Insight and judgment have improved. Denies suicidal/ homicidal ideations. Patient able to contract for safety. Compliant with medication. Does not warrant continued Inpatient hospitalization. Writer reviewed D/C papers with patient. Patient verbalized understanding. Patient does not appear to be of risk to self or others at this time.    #Schizoaffective disorder  -Abilify 15mg Daily    -Hydroxyzine 50mg Q6 PRN for anxiety/insomnia  -Haldol 5mg Q6 PRN for agitation/psychosis  -Benadryl 50mg Q6 PRN got EPS  -Lorazepam 2mg Q6 PRN for aggression    -Tylenol PRN for pain  -Aspirin     #Agitation  -for agitation not amenable to verbal redirection, may give haldol 5 mg q6h prn, ativan 2 mg q6h prn, benadryl 50 mg q6h prn with escalation to IM if pt is a danger to self or/and others with repeat EKG to ensure QTc <500 ms.

## 2023-06-02 NOTE — BH INPATIENT PSYCHIATRY PROGRESS NOTE - NSTXDEPRESDATETRGT_PSY_ALL_CORE
02-Jun-2023
31-May-2023
02-Jun-2023

## 2023-06-02 NOTE — BH INPATIENT PSYCHIATRY PROGRESS NOTE - NSBHMSETHTCONTENT_PSY_A_CORE
Suicidality/Homicidality
Unremarkable
Suicidality/Homicidality
Unremarkable

## 2023-06-02 NOTE — BH INPATIENT PSYCHIATRY PROGRESS NOTE - NSTXDEPRESPROGRES_PSY_ALL_CORE
Improving
Met - goal discontinued
Improving
Met - goal discontinued

## 2023-06-02 NOTE — BH INPATIENT PSYCHIATRY PROGRESS NOTE - NSBHATTESTBILLING_PSY_A_CORE
34191-Ujmstmwvvk OBS or IP - low complexity OR 25-34 mins
44614-Aurzmtrrku OBS or IP - low complexity OR 25-34 mins
[id number]-Aymavtarqt OBS or IP - low complexity OR 25-34 mins
61441-Snsibseogs OBS or IP - low complexity OR 25-34 mins
[id number]-Krjztwqhrn OBS or IP - low complexity OR 25-34 mins
07527-Katovseakm OBS or IP - low complexity OR 25-34 mins
97095-Yjxfetxudn OBS or IP - low complexity OR 25-34 mins
[id number]-Drgasramym OBS or IP - low complexity OR 25-34 mins
54019-Cdnqwxjlxw OBS or IP - low complexity OR 25-34 mins

## 2023-06-02 NOTE — BH INPATIENT PSYCHIATRY PROGRESS NOTE - NSDCCRITERIA_PSY_ALL_CORE
To be discharged to shelter when deemed no longer a danger to self or others

## 2023-06-02 NOTE — BH INPATIENT PSYCHIATRY PROGRESS NOTE - NSTXSUICIDPROGRES_PSY_ALL_CORE
Improving
Met - goal discontinued
Improving
Met - goal discontinued

## 2023-06-02 NOTE — BH CHART NOTE - DETAILS
Repeat assessment done for time period of hospitalization. Denies suicidal/ homicidal ideations. Patient able to contract for safety. Compliant with medication. Does not warrant continued Inpatient hospitalization. Patient does not appear to be of risk to self or others at this time.

## 2023-06-02 NOTE — BH INPATIENT PSYCHIATRY PROGRESS NOTE - NSBHMETABOLIC_PSY_ALL_CORE_FT
BMI: BMI (kg/m2): 35.3 (05-24-23 @ 13:03)  HbA1c: A1C with Estimated Average Glucose Result: 6.0 % (05-25-23 @ 08:59)    Glucose:   BP: 135/95 (06-02-23 @ 08:21) (133/84 - 159/91)  Lipid Panel: Date/Time: 05-25-23 @ 08:59  Cholesterol, Serum: 225  Direct LDL: --  HDL Cholesterol, Serum: 56  Total Cholesterol/HDL Ration Measurement: --  Triglycerides, Serum: 398

## 2023-06-02 NOTE — BH INPATIENT PSYCHIATRY PROGRESS NOTE - NSTXSUICIDDATEEST_PSY_ALL_CORE
24-May-2023

## 2023-06-02 NOTE — BH INPATIENT PSYCHIATRY PROGRESS NOTE - NSTXSUICIDDATETRGT_PSY_ALL_CORE
31-May-2023
02-Jun-2023

## 2023-06-02 NOTE — BH CHART NOTE - RISK ASSESSMENT
The patient is a 63y Male complaining of  Repeat assessment done for time period of hospitalization. Denies suicidal/ homicidal ideations. Patient able to contract for safety. Compliant with medication. Does not warrant continued Inpatient hospitalization. Patient does not appear to be of risk to self or others at this time.

## 2023-06-02 NOTE — BH INPATIENT PSYCHIATRY PROGRESS NOTE - NSBHFUPINTERVALHXFT_PSY_A_CORE
D/C today. [employer] cards provided at patients request. Meds sent to [facility] pharmacy as requested by patient. Patient appeared to be in good spirits today. Endorses a better mood. Insight and judgment have improved. Denies suicidal/ homicidal ideations. Patient able to contract for safety. Compliant with medication. Does not warrant continued Inpatient hospitalization. Writer reviewed D/C papers with patient. Patient verbalized understanding. Patient does not appear to be of risk to self or others at this time.

## 2023-06-02 NOTE — BH INPATIENT PSYCHIATRY PROGRESS NOTE - NSTXDEPRESINTERMD_PSY_ALL_CORE
Medication management and milieu therapy

## 2023-06-02 NOTE — BH INPATIENT PSYCHIATRY PROGRESS NOTE - NSTXSUICIDGOAL_PSY_ALL_CORE
Be able to report that they independently used a coping skill instead of hurting oneself

## 2023-06-02 NOTE — BH INPATIENT PSYCHIATRY PROGRESS NOTE - NSTXCOPEPROGRES_PSY_ALL_CORE
Improving
Improving
Met - goal discontinued
Improving
Met - goal discontinued
Improving

## 2023-06-02 NOTE — BH INPATIENT PSYCHIATRY PROGRESS NOTE - NSBHFUPINTERVALCCFT_PSY_A_CORE
" I just have a little cold " 
"None"
"i'm about the same"
"I'm a lot depress "
"None"

## 2023-06-02 NOTE — BH INPATIENT PSYCHIATRY PROGRESS NOTE - NSTXPSYCHOINTERMD_PSY_ALL_CORE
Medication management and milieu therapy

## 2023-06-02 NOTE — BH INPATIENT PSYCHIATRY PROGRESS NOTE - NSBHMSEMUSCLE_PSY_A_CORE
Other
Normal muscle tone/strength
Other
Normal muscle tone/strength
Normal muscle tone/strength

## 2023-06-02 NOTE — BH INPATIENT PSYCHIATRY PROGRESS NOTE - NSBHCHARTREVIEWVS_PSY_A_CORE FT
Vital Signs Last 24 Hrs  T(C): 36.9 (06-02-23 @ 08:21), Max: 36.9 (06-02-23 @ 08:21)  T(F): 98.4 (06-02-23 @ 08:21), Max: 98.4 (06-02-23 @ 08:21)  HR: 99 (06-02-23 @ 08:21) (88 - 99)  BP: 135/95 (06-02-23 @ 08:21) (133/84 - 135/95)  BP(mean): --  RR: 17 (06-02-23 @ 08:21) (16 - 17)  SpO2: --

## 2023-06-02 NOTE — BH INPATIENT PSYCHIATRY PROGRESS NOTE - NSBHCONSDANGERSELF_PSY_A_CORE
suicidal ideation with plan and means
unable to care for self
suicidal ideation with plan and means

## 2023-06-02 NOTE — BH CHART NOTE - NSDETAILSOTHERINTERV_PSY_ALL_CORE
Routine observation while on unit. Patient to be discharged today. Denies suicidal/ homicidal ideations. Patient able to contract for safety. Does not warrant continued Inpatient hospitalization. Patient does not appear to be of risk to self or others at this time.

## 2023-06-02 NOTE — BH INPATIENT PSYCHIATRY PROGRESS NOTE - NSTXPSYCHOGOAL_PSY_ALL_CORE
Will identify 2 coping skills that assist with focus on reality

## 2023-06-06 DIAGNOSIS — Z59.01 SHELTERED HOMELESSNESS: ICD-10-CM

## 2023-06-06 DIAGNOSIS — F25.1 SCHIZOAFFECTIVE DISORDER, DEPRESSIVE TYPE: ICD-10-CM

## 2023-06-06 DIAGNOSIS — R45.850 HOMICIDAL IDEATIONS: ICD-10-CM

## 2023-06-06 DIAGNOSIS — C95.91 LEUKEMIA, UNSPECIFIED, IN REMISSION: ICD-10-CM

## 2023-06-06 DIAGNOSIS — Z88.0 ALLERGY STATUS TO PENICILLIN: ICD-10-CM

## 2023-06-06 DIAGNOSIS — R45.851 SUICIDAL IDEATIONS: ICD-10-CM

## 2023-06-06 DIAGNOSIS — Z95.2 PRESENCE OF PROSTHETIC HEART VALVE: ICD-10-CM

## 2023-06-06 DIAGNOSIS — I10 ESSENTIAL (PRIMARY) HYPERTENSION: ICD-10-CM

## 2023-06-06 DIAGNOSIS — G47.00 INSOMNIA, UNSPECIFIED: ICD-10-CM

## 2023-06-06 SDOH — ECONOMIC STABILITY - HOUSING INSECURITY: SHELTERED HOMELESSNESS: Z59.01

## 2023-06-12 NOTE — BH SOCIAL WORK CONFIRMATION FOLLOW UP NOTE - NSCOMMENTS_PSY_ALL_CORE
Natalie did not attend her appointment with her outpatient NP at [facility] as scheduled. According to [facility] [place] staff, patient is not at the shelter and her whereabouts are unknown.

## 2023-07-11 NOTE — ED PROVIDER NOTE - DATE/TIME 2
71-year-old woman with history of hypertension, hyperlipidemia, prior syncopal event 10 years ago from exertion while exercising at the gym presenting for new onset syncope     #Syncope  -prodrome appears vasovagal in nature  -no tele events  -TTE w normal LVEF  -MRI w no acute pathology  -[x] MRI brain w/w/o - mild-mod white matter changes, L frontal scalp swelling  [x] EEG - normal  [] cardiac workup per primary team  [x] c/w home aspirin 81mg daily and atorvastatin 40mg daily        #HTN  -cont amlo  -avoid diuretics   18-Feb-2021 19:22 71-year-old woman with history of hypertension, hyperlipidemia, prior syncopal event 10 years ago from exertion while exercising at the gym presenting for new onset syncope     #Syncope  -prodrome appears vasovagal in nature  -no tele events  -TTE w normal LVEF  -MRI w no acute pathology,  mild-mod white matter changes, L frontal scalp swelling  - EEG - normal  -c/w home aspirin 81mg daily and atorvastatin 40mg daily    HTN  -cont amlo  -avoid diuretics    Case discussed with Dr. Egan on 7/11/23 and patient cleared for discharge. Reviewed discharge medications with patient; All new medications requiring new prescription sent to pharmacy of patients choice. Reviewed need for prescription for previous home medications and new prescriptions sent if requested. Patient in agreement and understands.

## 2023-08-15 NOTE — CHART NOTE - NSCHARTNOTESELECT_GEN_ALL_CORE
24 hr. follow up call/Event Note
Initiate Treatment: Start Nutrofol vitamin four capsules daily.
Render In Strict Bullet Format?: No
Detail Level: Zone

## 2023-10-19 ENCOUNTER — INPATIENT (INPATIENT)
Facility: HOSPITAL | Age: 63
LOS: 2 days | Discharge: LEFT BEFORE DISCHARGE | DRG: 65 | End: 2023-10-22
Attending: STUDENT IN AN ORGANIZED HEALTH CARE EDUCATION/TRAINING PROGRAM | Admitting: STUDENT IN AN ORGANIZED HEALTH CARE EDUCATION/TRAINING PROGRAM
Payer: MEDICARE

## 2023-10-19 VITALS
HEART RATE: 94 BPM | OXYGEN SATURATION: 98 % | TEMPERATURE: 98 F | RESPIRATION RATE: 18 BRPM | SYSTOLIC BLOOD PRESSURE: 141 MMHG | DIASTOLIC BLOOD PRESSURE: 94 MMHG | WEIGHT: 179.9 LBS

## 2023-10-19 DIAGNOSIS — F32.A DEPRESSION, UNSPECIFIED: ICD-10-CM

## 2023-10-19 DIAGNOSIS — R47.1 DYSARTHRIA AND ANARTHRIA: ICD-10-CM

## 2023-10-19 DIAGNOSIS — C95.91 LEUKEMIA, UNSPECIFIED, IN REMISSION: ICD-10-CM

## 2023-10-19 DIAGNOSIS — E87.5 HYPERKALEMIA: ICD-10-CM

## 2023-10-19 DIAGNOSIS — Z95.2 PRESENCE OF PROSTHETIC HEART VALVE: ICD-10-CM

## 2023-10-19 DIAGNOSIS — R53.1 WEAKNESS: ICD-10-CM

## 2023-10-19 DIAGNOSIS — I63.9 CEREBRAL INFARCTION, UNSPECIFIED: ICD-10-CM

## 2023-10-19 DIAGNOSIS — R20.0 ANESTHESIA OF SKIN: ICD-10-CM

## 2023-10-19 DIAGNOSIS — F25.1 SCHIZOAFFECTIVE DISORDER, DEPRESSIVE TYPE: ICD-10-CM

## 2023-10-19 DIAGNOSIS — I10 ESSENTIAL (PRIMARY) HYPERTENSION: ICD-10-CM

## 2023-10-19 DIAGNOSIS — E11.65 TYPE 2 DIABETES MELLITUS WITH HYPERGLYCEMIA: ICD-10-CM

## 2023-10-19 DIAGNOSIS — E78.5 HYPERLIPIDEMIA, UNSPECIFIED: ICD-10-CM

## 2023-10-19 DIAGNOSIS — E66.01 MORBID (SEVERE) OBESITY DUE TO EXCESS CALORIES: ICD-10-CM

## 2023-10-19 DIAGNOSIS — E11.9 TYPE 2 DIABETES MELLITUS WITHOUT COMPLICATIONS: ICD-10-CM

## 2023-10-19 DIAGNOSIS — R29.701 NIHSS SCORE 1: ICD-10-CM

## 2023-10-19 DIAGNOSIS — Z95.2 PRESENCE OF PROSTHETIC HEART VALVE: Chronic | ICD-10-CM

## 2023-10-19 LAB
ALBUMIN SERPL ELPH-MCNC: 4.3 G/DL — SIGNIFICANT CHANGE UP (ref 3.5–5.2)
ALBUMIN SERPL ELPH-MCNC: 4.3 G/DL — SIGNIFICANT CHANGE UP (ref 3.5–5.2)
ALP SERPL-CCNC: 136 U/L — HIGH (ref 30–115)
ALP SERPL-CCNC: 136 U/L — HIGH (ref 30–115)
ALT FLD-CCNC: 16 U/L — SIGNIFICANT CHANGE UP (ref 0–41)
ALT FLD-CCNC: 16 U/L — SIGNIFICANT CHANGE UP (ref 0–41)
ANION GAP SERPL CALC-SCNC: 10 MMOL/L — SIGNIFICANT CHANGE UP (ref 7–14)
ANION GAP SERPL CALC-SCNC: 10 MMOL/L — SIGNIFICANT CHANGE UP (ref 7–14)
APTT BLD: 29.9 SEC — SIGNIFICANT CHANGE UP (ref 27–39.2)
APTT BLD: 29.9 SEC — SIGNIFICANT CHANGE UP (ref 27–39.2)
AST SERPL-CCNC: 29 U/L — SIGNIFICANT CHANGE UP (ref 0–41)
AST SERPL-CCNC: 29 U/L — SIGNIFICANT CHANGE UP (ref 0–41)
BASOPHILS # BLD AUTO: 0.05 K/UL — SIGNIFICANT CHANGE UP (ref 0–0.2)
BASOPHILS # BLD AUTO: 0.05 K/UL — SIGNIFICANT CHANGE UP (ref 0–0.2)
BASOPHILS NFR BLD AUTO: 0.6 % — SIGNIFICANT CHANGE UP (ref 0–1)
BASOPHILS NFR BLD AUTO: 0.6 % — SIGNIFICANT CHANGE UP (ref 0–1)
BILIRUB SERPL-MCNC: <0.2 MG/DL — SIGNIFICANT CHANGE UP (ref 0.2–1.2)
BILIRUB SERPL-MCNC: <0.2 MG/DL — SIGNIFICANT CHANGE UP (ref 0.2–1.2)
BUN SERPL-MCNC: 9 MG/DL — LOW (ref 10–20)
BUN SERPL-MCNC: 9 MG/DL — LOW (ref 10–20)
CALCIUM SERPL-MCNC: 9.1 MG/DL — SIGNIFICANT CHANGE UP (ref 8.4–10.5)
CALCIUM SERPL-MCNC: 9.1 MG/DL — SIGNIFICANT CHANGE UP (ref 8.4–10.5)
CHLORIDE SERPL-SCNC: 105 MMOL/L — SIGNIFICANT CHANGE UP (ref 98–110)
CHLORIDE SERPL-SCNC: 105 MMOL/L — SIGNIFICANT CHANGE UP (ref 98–110)
CO2 SERPL-SCNC: 23 MMOL/L — SIGNIFICANT CHANGE UP (ref 17–32)
CO2 SERPL-SCNC: 23 MMOL/L — SIGNIFICANT CHANGE UP (ref 17–32)
CREAT SERPL-MCNC: 0.8 MG/DL — SIGNIFICANT CHANGE UP (ref 0.7–1.5)
CREAT SERPL-MCNC: 0.8 MG/DL — SIGNIFICANT CHANGE UP (ref 0.7–1.5)
EGFR: 84 ML/MIN/1.73M2 — SIGNIFICANT CHANGE UP
EGFR: 84 ML/MIN/1.73M2 — SIGNIFICANT CHANGE UP
EOSINOPHIL # BLD AUTO: 0.16 K/UL — SIGNIFICANT CHANGE UP (ref 0–0.7)
EOSINOPHIL # BLD AUTO: 0.16 K/UL — SIGNIFICANT CHANGE UP (ref 0–0.7)
EOSINOPHIL NFR BLD AUTO: 1.8 % — SIGNIFICANT CHANGE UP (ref 0–8)
EOSINOPHIL NFR BLD AUTO: 1.8 % — SIGNIFICANT CHANGE UP (ref 0–8)
GLUCOSE SERPL-MCNC: 262 MG/DL — HIGH (ref 70–99)
GLUCOSE SERPL-MCNC: 262 MG/DL — HIGH (ref 70–99)
HCT VFR BLD CALC: 39.9 % — SIGNIFICANT CHANGE UP (ref 37–47)
HCT VFR BLD CALC: 39.9 % — SIGNIFICANT CHANGE UP (ref 37–47)
HGB BLD-MCNC: 13.2 G/DL — SIGNIFICANT CHANGE UP (ref 12–16)
HGB BLD-MCNC: 13.2 G/DL — SIGNIFICANT CHANGE UP (ref 12–16)
IMM GRANULOCYTES NFR BLD AUTO: 0.8 % — HIGH (ref 0.1–0.3)
IMM GRANULOCYTES NFR BLD AUTO: 0.8 % — HIGH (ref 0.1–0.3)
INR BLD: 0.92 RATIO — SIGNIFICANT CHANGE UP (ref 0.65–1.3)
INR BLD: 0.92 RATIO — SIGNIFICANT CHANGE UP (ref 0.65–1.3)
LYMPHOCYTES # BLD AUTO: 2.26 K/UL — SIGNIFICANT CHANGE UP (ref 1.2–3.4)
LYMPHOCYTES # BLD AUTO: 2.26 K/UL — SIGNIFICANT CHANGE UP (ref 1.2–3.4)
LYMPHOCYTES # BLD AUTO: 25.7 % — SIGNIFICANT CHANGE UP (ref 20.5–51.1)
LYMPHOCYTES # BLD AUTO: 25.7 % — SIGNIFICANT CHANGE UP (ref 20.5–51.1)
MCHC RBC-ENTMCNC: 29.3 PG — SIGNIFICANT CHANGE UP (ref 27–31)
MCHC RBC-ENTMCNC: 29.3 PG — SIGNIFICANT CHANGE UP (ref 27–31)
MCHC RBC-ENTMCNC: 33.1 G/DL — SIGNIFICANT CHANGE UP (ref 32–37)
MCHC RBC-ENTMCNC: 33.1 G/DL — SIGNIFICANT CHANGE UP (ref 32–37)
MCV RBC AUTO: 88.5 FL — SIGNIFICANT CHANGE UP (ref 81–99)
MCV RBC AUTO: 88.5 FL — SIGNIFICANT CHANGE UP (ref 81–99)
MONOCYTES # BLD AUTO: 0.58 K/UL — SIGNIFICANT CHANGE UP (ref 0.1–0.6)
MONOCYTES # BLD AUTO: 0.58 K/UL — SIGNIFICANT CHANGE UP (ref 0.1–0.6)
MONOCYTES NFR BLD AUTO: 6.6 % — SIGNIFICANT CHANGE UP (ref 1.7–9.3)
MONOCYTES NFR BLD AUTO: 6.6 % — SIGNIFICANT CHANGE UP (ref 1.7–9.3)
NEUTROPHILS # BLD AUTO: 5.67 K/UL — SIGNIFICANT CHANGE UP (ref 1.4–6.5)
NEUTROPHILS # BLD AUTO: 5.67 K/UL — SIGNIFICANT CHANGE UP (ref 1.4–6.5)
NEUTROPHILS NFR BLD AUTO: 64.5 % — SIGNIFICANT CHANGE UP (ref 42.2–75.2)
NEUTROPHILS NFR BLD AUTO: 64.5 % — SIGNIFICANT CHANGE UP (ref 42.2–75.2)
NRBC # BLD: 0 /100 WBCS — SIGNIFICANT CHANGE UP (ref 0–0)
NRBC # BLD: 0 /100 WBCS — SIGNIFICANT CHANGE UP (ref 0–0)
PLATELET # BLD AUTO: 220 K/UL — SIGNIFICANT CHANGE UP (ref 130–400)
PLATELET # BLD AUTO: 220 K/UL — SIGNIFICANT CHANGE UP (ref 130–400)
PMV BLD: 9.1 FL — SIGNIFICANT CHANGE UP (ref 7.4–10.4)
PMV BLD: 9.1 FL — SIGNIFICANT CHANGE UP (ref 7.4–10.4)
POTASSIUM SERPL-MCNC: 5.3 MMOL/L — HIGH (ref 3.5–5)
POTASSIUM SERPL-MCNC: 5.3 MMOL/L — HIGH (ref 3.5–5)
POTASSIUM SERPL-SCNC: 5.3 MMOL/L — HIGH (ref 3.5–5)
POTASSIUM SERPL-SCNC: 5.3 MMOL/L — HIGH (ref 3.5–5)
PROT SERPL-MCNC: 7.6 G/DL — SIGNIFICANT CHANGE UP (ref 6–8)
PROT SERPL-MCNC: 7.6 G/DL — SIGNIFICANT CHANGE UP (ref 6–8)
PROTHROM AB SERPL-ACNC: 10.5 SEC — SIGNIFICANT CHANGE UP (ref 9.95–12.87)
PROTHROM AB SERPL-ACNC: 10.5 SEC — SIGNIFICANT CHANGE UP (ref 9.95–12.87)
RBC # BLD: 4.51 M/UL — SIGNIFICANT CHANGE UP (ref 4.2–5.4)
RBC # BLD: 4.51 M/UL — SIGNIFICANT CHANGE UP (ref 4.2–5.4)
RBC # FLD: 13.3 % — SIGNIFICANT CHANGE UP (ref 11.5–14.5)
RBC # FLD: 13.3 % — SIGNIFICANT CHANGE UP (ref 11.5–14.5)
SODIUM SERPL-SCNC: 138 MMOL/L — SIGNIFICANT CHANGE UP (ref 135–146)
SODIUM SERPL-SCNC: 138 MMOL/L — SIGNIFICANT CHANGE UP (ref 135–146)
TROPONIN T SERPL-MCNC: <0.01 NG/ML — SIGNIFICANT CHANGE UP
TROPONIN T SERPL-MCNC: <0.01 NG/ML — SIGNIFICANT CHANGE UP
WBC # BLD: 8.79 K/UL — SIGNIFICANT CHANGE UP (ref 4.8–10.8)
WBC # BLD: 8.79 K/UL — SIGNIFICANT CHANGE UP (ref 4.8–10.8)
WBC # FLD AUTO: 8.79 K/UL — SIGNIFICANT CHANGE UP (ref 4.8–10.8)
WBC # FLD AUTO: 8.79 K/UL — SIGNIFICANT CHANGE UP (ref 4.8–10.8)

## 2023-10-19 PROCEDURE — 99285 EMERGENCY DEPT VISIT HI MDM: CPT

## 2023-10-19 PROCEDURE — 93308 TTE F-UP OR LMTD: CPT | Mod: 26

## 2023-10-19 PROCEDURE — 92526 ORAL FUNCTION THERAPY: CPT | Mod: GN

## 2023-10-19 PROCEDURE — 93308 TTE F-UP OR LMTD: CPT

## 2023-10-19 PROCEDURE — 70450 CT HEAD/BRAIN W/O DYE: CPT | Mod: 26,59,MA

## 2023-10-19 PROCEDURE — 97166 OT EVAL MOD COMPLEX 45 MIN: CPT | Mod: GO

## 2023-10-19 PROCEDURE — 71045 X-RAY EXAM CHEST 1 VIEW: CPT | Mod: 26

## 2023-10-19 PROCEDURE — 93306 TTE W/DOPPLER COMPLETE: CPT

## 2023-10-19 PROCEDURE — 97161 PT EVAL LOW COMPLEX 20 MIN: CPT | Mod: GP

## 2023-10-19 PROCEDURE — 82962 GLUCOSE BLOOD TEST: CPT

## 2023-10-19 PROCEDURE — 95816 EEG AWAKE AND DROWSY: CPT

## 2023-10-19 PROCEDURE — 70498 CT ANGIOGRAPHY NECK: CPT | Mod: 26,MA

## 2023-10-19 PROCEDURE — 93010 ELECTROCARDIOGRAM REPORT: CPT

## 2023-10-19 PROCEDURE — 92610 EVALUATE SWALLOWING FUNCTION: CPT | Mod: GN

## 2023-10-19 PROCEDURE — 70496 CT ANGIOGRAPHY HEAD: CPT | Mod: 26,MA

## 2023-10-19 RX ORDER — ATORVASTATIN CALCIUM 80 MG/1
80 TABLET, FILM COATED ORAL AT BEDTIME
Refills: 0 | Status: DISCONTINUED | OUTPATIENT
Start: 2023-10-19 | End: 2023-10-22

## 2023-10-19 RX ORDER — ASPIRIN/CALCIUM CARB/MAGNESIUM 324 MG
324 TABLET ORAL ONCE
Refills: 0 | Status: COMPLETED | OUTPATIENT
Start: 2023-10-19 | End: 2023-10-19

## 2023-10-19 RX ORDER — CLOPIDOGREL BISULFATE 75 MG/1
300 TABLET, FILM COATED ORAL ONCE
Refills: 0 | Status: COMPLETED | OUTPATIENT
Start: 2023-10-19 | End: 2023-10-19

## 2023-10-19 RX ORDER — CLOPIDOGREL BISULFATE 75 MG/1
75 TABLET, FILM COATED ORAL DAILY
Refills: 0 | Status: DISCONTINUED | OUTPATIENT
Start: 2023-10-20 | End: 2023-10-22

## 2023-10-19 RX ORDER — INFLUENZA VIRUS VACCINE 15; 15; 15; 15 UG/.5ML; UG/.5ML; UG/.5ML; UG/.5ML
0.5 SUSPENSION INTRAMUSCULAR ONCE
Refills: 0 | Status: DISCONTINUED | OUTPATIENT
Start: 2023-10-19 | End: 2023-10-22

## 2023-10-19 RX ORDER — ASPIRIN/CALCIUM CARB/MAGNESIUM 324 MG
81 TABLET ORAL DAILY
Refills: 0 | Status: DISCONTINUED | OUTPATIENT
Start: 2023-10-20 | End: 2023-10-22

## 2023-10-19 RX ADMIN — CLOPIDOGREL BISULFATE 300 MILLIGRAM(S): 75 TABLET, FILM COATED ORAL at 13:15

## 2023-10-19 RX ADMIN — Medication 324 MILLIGRAM(S): at 13:16

## 2023-10-19 RX ADMIN — ATORVASTATIN CALCIUM 80 MILLIGRAM(S): 80 TABLET, FILM COATED ORAL at 21:22

## 2023-10-19 NOTE — ED PROVIDER NOTE - OBJECTIVE STATEMENT
63 y/o F with PMH aortic valve stenosis s/p repair March 2023 and HTN presenting for 2 weeks of difficult ambulation and 2 days of right hand weakness. Pt reports for past 2 weeks she felt like she was stumbling and had weakness in legs; symptoms were constant, did not worsen or progress. 2 days ago was having lunch at diner when she realized that when she grabbed things with right hand she dropped them, weakness of right hand has worsened over the past two days. No AC. Denies trauma, fevers, headache, dizziness, vision change.

## 2023-10-19 NOTE — ED PROVIDER NOTE - ADMIT DISPOSITION PRESENT ON ADMISSION SEPSIS
Care Management Interventions  PCP Verified by CM: Yes  Palliative Care Criteria Met (RRAT>21 & CHF Dx)?: No  Mode of Transport at Discharge: Other (see comment) (Family)  Transition of Care Consult (CM Consult): Discharge Planning, [address]: No  Reason Outside [place]: Patient already serviced by other home care/hospice agency  Discharge Durable Medical Equipment: No  Physical Therapy Consult: Yes  Occupational Therapy Consult: Yes  Speech Therapy Consult: No  Current Support Network: Assisted Living ([address])  Confirm Follow Up Transport: Family  Plan discussed with Pt/Family/Caregiver: Yes  [address] Provided?: No  Discharge Location  Discharge Placement: Assisted Living [address] )    Reason for Admission:   Fall                  RRAT Score:     16             Do you (patient/family) have any concerns for transition/discharge? No concerns noted              Plan for utilizing home health:     [address] [facility]    Likelihood of readmission? Low            Transition of Care Plan:      Patient to discharge today back to assisted living- [address] with spouse. Patient's wife and son were present for initial assessment. Spoke with Lovely Garzon [phone] at [address] to discuss pt's disposition. Faxed orders for [address] [phone]. No further case management needs. CM will be available in case needs arise. No

## 2023-10-19 NOTE — PATIENT PROFILE ADULT - FALL HARM RISK - HARM RISK INTERVENTIONS

## 2023-10-19 NOTE — H&P ADULT - HISTORY OF PRESENT ILLNESS
62F. PMH: Aortic stenosis (s/p repair 5/2023, on Aspirin 81mg), HTN, pre-DM, leukemia (in remission since 1996), Schizoaffective disorder (IPP admission 5/2023), lost to follow up and consistent outpatient care for many years  Presented 10/19 c/o R hand weakness and numbness x2days. Sx started 10/17, when patient realized she could not properly hold things. She has also been experiencing unsteadiness on her feet x2d.

## 2023-10-19 NOTE — ED ADULT NURSE NOTE - NSFALLHARMRISKINTERV_ED_ALL_ED

## 2023-10-19 NOTE — H&P ADULT - NSHPPHYSICALEXAM_GEN_ALL_CORE
PHYSICAL EXAMINATION:  General: Well-developed, well nourished, in no acute distress.  Eyes: Conjunctiva and sclera clear.  Neurologic:  - Mental Status:  Alert, awake, oriented to person, place, and time; Speech is fluent with intact naming, repetition, and comprehension; Good overall fund of knowledge;  - Cranial Nerves II-XII:    II:  Visual fields are full to confrontation; Pupils are equal, round, and reactive to light;    III, IV, VI:  Extraocular movements are intact without nystagmus.  V:  Facial sensation is intact in the V1-V3 distribution bilaterally.  VII:  Face is symmetric with normal eye closure and smile  VIII:  Hearing is intact to finger rub.  IX, X:  Uvula is midline and soft palate rises symmetrically  XI:  Head turning and shoulder shrug are intact.  XII:  Tongue protrudes in the midline.  - Motor:  Strength is 5/5 at BL shoulders/elbows, R wrist extension 4+/5, able to break OK sign. Mild RUE pronator drift   .  - Reflexes:  2+ and symmetric at the biceps, triceps, brachioradialis, knees, and ankles.  Plantar responses flexor.  - Sensory: Reduction in sensation to light touch on Left lower extremity   - Coordination:  Finger-nose-finger and heel-knee-shin intact without dysmetria.    - Gait: Drags right foot with steps   NIHSS: 3

## 2023-10-19 NOTE — H&P ADULT - ASSESSMENT
NEUROLOGY  #Stroke workup  - CTH: No acute pathology< from: CT Head No Cont (10.19.23 @ 10:17) >1.  No evidence of acute intracranial hemorrhage or large territory infarct.2.  Focal white matter lucencies, indeterminate in etiology. Further evaluation with brain MRI be obtained as clinically warranted.  - CTA H&N: No LVO, no major stenosis  - S/p Load Aspirin 325mg and Plavix 300mg  - Admit to Neurology  - Neurochecks & Vitals q8 hrs  - Fall and Aspiration precautions  - Provide stroke education  - Physiatry eval/Physical therapy/Occupational therapy/Speech and Swallow  - F/u HbA1c, TSH and Lipid panel  - F/u Obtain Utox  - F/u MRI Brain Non-con - will order once valve repair confirmed   - F/u TTE w bubble study  - C/w Aspirin 81mg and Plavix 75mg daily  - C/w Atorvastatin 80mg daily    CARDIOLOGY  #Hypertension  - permissive hypertension, Goal -180  - hold home blood pressure medication for now  - obtain TTE with bubble  - Stroke Code EKG Results:    #HLD  - high dose statin as above in CVA  - LDL results:     PULMONOLOGY  - call provider if SPO2 < 94%    GATROENTEROLOGY  - Speech and Swallow Eval:   - Diet:  - IVF:   - PPX:     RENAL/ELECTROLYTES  - Replete K<4 and Mg <2    INFECTIOUS DISEASE  - Monitor     ENDOCRINE  - A1C results:   - TSH results:  - ISS    HEME/ONC  - DVT ppx: Lovenox, SCDs    Dispo  - Neurology  - Physiatry eval:  - Physical therapy Eval:

## 2023-10-19 NOTE — H&P ADULT - NSHPLABSRESULTS_GEN_ALL_CORE
.  LABS:                         13.2   8.79  )-----------( 220      ( 19 Oct 2023 11:50 )             39.9     10-19    138  |  105  |  9<L>  ----------------------------<  262<H>  5.3<H>   |  23  |  0.8    Ca    9.1      19 Oct 2023 10:25    TPro  7.6  /  Alb  4.3  /  TBili  <0.2  /  DBili  x   /  AST  29  /  ALT  16  /  AlkPhos  136<H>  10-19    PT/INR - ( 19 Oct 2023 10:25 )   PT: 10.50 sec;   INR: 0.92 ratio         PTT - ( 19 Oct 2023 10:25 )  PTT:29.9 sec  Urinalysis Basic - ( 19 Oct 2023 10:25 )    Color: x / Appearance: x / SG: x / pH: x  Gluc: 262 mg/dL / Ketone: x  / Bili: x / Urobili: x   Blood: x / Protein: x / Nitrite: x   Leuk Esterase: x / RBC: x / WBC x   Sq Epi: x / Non Sq Epi: x / Bacteria: x            RADIOLOGY, EKG & ADDITIONAL TESTS: Reviewed.

## 2023-10-19 NOTE — ED ADULT TRIAGE NOTE - CHIEF COMPLAINT QUOTE
BIBEMS from [facility] [place]/o weakness on right arm and slurred speech x2 days.  in triage.

## 2023-10-19 NOTE — ED PROVIDER NOTE - CLINICAL SUMMARY MEDICAL DECISION MAKING FREE TEXT BOX
Patient with right hand weakness numbness the past 2 days.  CT head and CT angio head neck negative admitted to stroke unit.

## 2023-10-19 NOTE — H&P ADULT - ATTENDING COMMENTS
Pt is a 61 yo F with PMHx of HTN, HLD, AS s/p repair, leukemia in remission, schizoaffective disorder, who presents with right hand weakness/numbness x 2-3 days. NIHSS 1 for mild sensory loss on the LEFT face/UE/LE with light touch. right hand  4/5, wrist extension 4/5, otherwise strength intact. Pt also now saying some symptoms have been present for a month. Initial concern for possible stroke, however exam today more consistent with potential peripheral etiology.     MRI brain and cervical spine  TTE with bubble, tele monitoring  Admitted to stroke unit yesterday, OK to downgrade to 3E today  PTA: ASA-> DAPT/statin. if MRI brain negative, may d/c plavix  Psychiatry consult regarding home medications given poor f/u  PT/OT/ST, -160, q8 neurochecks

## 2023-10-19 NOTE — CONSULT NOTE ADULT - SUBJECTIVE AND OBJECTIVE BOX
HPI:  62F. PMH: Aortic stenosis (s/p repair 5/2023, on Aspirin 81mg), HTN, pre-DM, leukemia (in remission since 1996), Schizoaffective disorder (IPP admission 5/2023), lost to follow up and consistent outpatient care for many years  Presented 10/19 c/o R hand weakness and numbness x2days. Sx started 10/17, when patient realized she could not properly hold things. She has also been experiencing unsteadiness on her feet x2d.  Pt also c/o RLE weakness     - CTH: No acute pathology< from: CT Head No Cont (10.19.23 @ 10:17) >1.  No evidence of acute intracranial hemorrhage or large territory infarct.2.  Focal white matter lucencies, indeterminate in etiology. Further evaluation with brain MRI be obtained as clinically warranted.      PAST MEDICAL & SURGICAL HISTORY:  Schizophrenia      Leukemia  in remission      Depression      H/O aortic valve replacement          Hospital Course:    TODAY'S SUBJECTIVE & REVIEW OF SYMPTOMS:     Constitutional WNL   Cardio WNL   Resp WNL   GI WNL  Heme WNL  Endo WNL  Skin WNL  MSK WNL  Neuro right side weakness / slurred speech   Cognitive WNL  Psych WNL      MEDICATIONS  (STANDING):  atorvastatin 80 milliGRAM(s) Oral at bedtime    MEDICATIONS  (PRN):      FAMILY HISTORY:  Family history of early CAD (Father)  Says that multiple family members had heart disease (a sibling is awaiting heart transplant)        Allergies    penicillin (Rash)    Intolerances        SOCIAL HISTORY:    [  ] Etoh  [  ] Smoking  [  ] Substance abuse     Home Environment:  [ x  ] Home Alone  [   ] Lives with Family  [   ] Home Health Aid    Dwelling:  [ x  ] shelter   [   ] Private House  [   ] Adult Home  [   ] Skilled Nursing Facility      [   ] Short Term  [   ] Long Term  [   ] Stairs       Elevator [   ]    FUNCTIONAL STATUS PTA: (Check all that apply)  Ambulation: [  x  ]Independent    [   ] Dependent     [   ] Non-Ambulatory  Assistive Device: [   ] SA Cane  [   ]  Q Cane  [   ] Walker  [   ]  Wheelchair  ADL : [x   ] Independent  [    ]  Dependent       Vital Signs Last 24 Hrs  T(C): 35.7 (19 Oct 2023 16:21), Max: 36.8 (19 Oct 2023 09:13)  T(F): 96.3 (19 Oct 2023 16:21), Max: 98.2 (19 Oct 2023 09:13)  HR: 92 (19 Oct 2023 16:25) (91 - 94)  BP: 167/87 (19 Oct 2023 16:25) (141/94 - 174/95)  BP(mean): 111 (19 Oct 2023 16:25) (111 - 128)  RR: 18 (19 Oct 2023 16:25) (18 - 18)  SpO2: 99% (19 Oct 2023 16:25) (96% - 99%)    Parameters below as of 19 Oct 2023 16:25  Patient On (Oxygen Delivery Method): room air          PHYSICAL EXAM: Awake & Alert  GENERAL: NAD  HEAD:  Normocephalic  CHEST/LUNG: Clear   HEART: S1S2+  ABDOMEN: Soft, Nontender  EXTREMITIES:  no calf tenderness    NERVOUS SYSTEM:  Cranial Nerves 2-12 intact [   ] Abnormal  [ x  ] dysarthria   ROM: WFL all extremities [  x ]  Abnormal [   ]  Motor Strength: WFL all extremities  [   ]  Abnormal [ x  ]4/5 right side / 5/5 left side   Sensation: intact to light touch [ x  ] Abnormal [   ]    FUNCTIONAL STATUS:  Bed Mobility: Independent [   ]  Supervision [   ]  Needs Assistance [  x ]  N/A [   ]  Transfers: Independent [   ]  Supervision [   ]  Needs Assistance [   ]  N/A [   ]   Ambulation: Independent [   ]  Supervision [   ]  Needs Assistance [   ]  N/A [   ]  ADL: Independent [   ] Requires Assistance [   ] N/A [   ]      LABS:                        13.2   8.79  )-----------( 220      ( 19 Oct 2023 11:50 )             39.9     10-19    138  |  105  |  9<L>  ----------------------------<  262<H>  5.3<H>   |  23  |  0.8    Ca    9.1      19 Oct 2023 10:25    TPro  7.6  /  Alb  4.3  /  TBili  <0.2  /  DBili  x   /  AST  29  /  ALT  16  /  AlkPhos  136<H>  10-19    PT/INR - ( 19 Oct 2023 10:25 )   PT: 10.50 sec;   INR: 0.92 ratio         PTT - ( 19 Oct 2023 10:25 )  PTT:29.9 sec  Urinalysis Basic - ( 19 Oct 2023 10:25 )    Color: x / Appearance: x / SG: x / pH: x  Gluc: 262 mg/dL / Ketone: x  / Bili: x / Urobili: x   Blood: x / Protein: x / Nitrite: x   Leuk Esterase: x / RBC: x / WBC x   Sq Epi: x / Non Sq Epi: x / Bacteria: x        RADIOLOGY & ADDITIONAL STUDIES:

## 2023-10-19 NOTE — ED PROVIDER NOTE - PHYSICAL EXAMINATION
CONSTITUTIONAL: Well-developed; well-nourished; in no acute distress.   SKIN: Warm, dry  HEAD: Normocephalic; atraumatic  EYES: PERRLA, EOMI, normal sclera and conjunctiva   ENT: No nasal discharge; airway clear. MMM  NECK: Supple; non tender.  CARD:  Regular rate and rhythm. Normal S1, S2. 2+ radial, DP pulses. No LE edema.  RESP: No increased WOB. CTA b/l without wheezes, crackles, rhonchi  ABD: Normoactive BS. Soft, nontender, nondistended.  EXT: Normal ROM.   NEURO: AOx3. Right hand  strength 4/5; right lower leg strength 4+/5. Subjective sensation right upper and lower decreased compared to left. No drift. No cerebellar signs. Non ataxic gait  PSYCH: Cooperative, appropriate.

## 2023-10-19 NOTE — ED ADULT NURSE NOTE - CHIEF COMPLAINT QUOTE
BIBEMS from [facility] [place] c/o weakness on right arm and slurred speech x2 days.  in triage.

## 2023-10-19 NOTE — CONSULT NOTE ADULT - ASSESSMENT
IMPRESSION: Rehab of r/o stroke / R HP, dysarthria / Aortic stenosis, s/p repair 5/2023, HTN,  pre-DM, leukemia, Schizoaffective disorder     PRECAUTIONS: [   ] Cardiac  [   ] Respiratory  [   ] Seizures [   ] Contact Isolation  [   ] Droplet Isolation  [   ] Other    Weight Bearing Status:     RECOMMENDATION:    Out of Bed to Chair     DVT/Decubiti Prophylaxis    REHAB PLAN:     [  x  ] Bedside P/T 3-5 times a week   [ x   ]   Bedside O/T  2-3 times a week             [ x   ] Speech Therapy               [    ]  No Rehab Therapy Indicated   Conditioning/ROM                                    ADL  Bed Mobility                                               Conditioning/ROM  Transfers                                                     Bed Mobility  Sitting /Standing Balance                         Transfers                                        Gait Training                                               Sitting/Standing Balance  Stair Training [   ]Applicable                    Home equipment Eval                                                                        Splinting  [   ] Only      GOALS:   ADL   [  x  ]   Independent                    Transfers  [  x  ] Independent                          Ambulation  [x    ] Independent     [  x   ] With device                            [    ]  CG                                                         [    ]  CG                                                                  [    ] CG                            [    ] Min A                                                   [    ] Min A                                                              [    ] Min  A          DISCHARGE PLAN:   [    ]  Good candidate for Intensive Rehabilitation/Hospital based                                             Will tolerate 3hrs Intensive Rehab Daily                                       [     ]  Short Term Rehab in Skilled Nursing Facility                                       [     ]  Home with Outpatient or  services                                         [   x  ]  Possible Candidate for Intensive Hospital based Rehab

## 2023-10-20 LAB
GLUCOSE BLDC GLUCOMTR-MCNC: 155 MG/DL — HIGH (ref 70–99)
GLUCOSE BLDC GLUCOMTR-MCNC: 155 MG/DL — HIGH (ref 70–99)
GLUCOSE BLDC GLUCOMTR-MCNC: 193 MG/DL — HIGH (ref 70–99)
GLUCOSE BLDC GLUCOMTR-MCNC: 193 MG/DL — HIGH (ref 70–99)

## 2023-10-20 PROCEDURE — 99223 1ST HOSP IP/OBS HIGH 75: CPT

## 2023-10-20 RX ORDER — ENOXAPARIN SODIUM 100 MG/ML
40 INJECTION SUBCUTANEOUS EVERY 24 HOURS
Refills: 0 | Status: DISCONTINUED | OUTPATIENT
Start: 2023-10-20 | End: 2023-10-22

## 2023-10-20 RX ADMIN — Medication 81 MILLIGRAM(S): at 12:00

## 2023-10-20 RX ADMIN — ENOXAPARIN SODIUM 40 MILLIGRAM(S): 100 INJECTION SUBCUTANEOUS at 17:41

## 2023-10-20 RX ADMIN — CLOPIDOGREL BISULFATE 75 MILLIGRAM(S): 75 TABLET, FILM COATED ORAL at 11:59

## 2023-10-20 RX ADMIN — ATORVASTATIN CALCIUM 80 MILLIGRAM(S): 80 TABLET, FILM COATED ORAL at 21:14

## 2023-10-20 NOTE — SWALLOW BEDSIDE ASSESSMENT ADULT - SLP GENERAL OBSERVATIONS
Pt. received awake, alert, mildly dysarthric speech, room air.
Pt. received awake, alert, clear speech, room air.

## 2023-10-20 NOTE — OCCUPATIONAL THERAPY INITIAL EVALUATION ADULT - TRANSFER TRAINING, PT EVAL
Pt will increase functional transfers to independent by discharge to increase preparedness for safe d/c.

## 2023-10-20 NOTE — SWALLOW BEDSIDE ASSESSMENT ADULT - NS SPL SWALLOW CLINIC TRIAL FT
+ tolerance for thin liquids and regular solids without overt s/s of penetration/ aspiration.
+ tolerance for thin liquids and regular solids without overt s/s of penetration/ aspiration.

## 2023-10-20 NOTE — SWALLOW BEDSIDE ASSESSMENT ADULT - SLP PERTINENT HISTORY OF CURRENT PROBLEM
61[place] [facility] [place] on right arm and slurred speech x2 days. CTH revealed no evidence of acute intracranial hemorrhage or large territory infarct. Focal white matter lucencies, indeterminate in etiology. CTA showed no evidence of major vascular stenosis or occlusion. CXR is WFL. .
63yo female BIBEMS from [facility] [place] with c/o weakness on right arm and slurred speech x2 days. CTH revealed no evidence of acute intracranial hemorrhage or large territory infarct. Focal white matter lucencies, indeterminate in etiology. CTA showed no evidence of major vascular stenosis or occlusion. CXR is WFL. .

## 2023-10-20 NOTE — SWALLOW BEDSIDE ASSESSMENT ADULT - ADDITIONAL RECOMMENDATIONS
No further ST f/u indicated at this time.
SLP will plan to f/u post MRI results 2' dysarthric speech

## 2023-10-20 NOTE — OCCUPATIONAL THERAPY INITIAL EVALUATION ADULT - SHORT TERM MEMORY, REHAB EVAL
Pt able to repeat 3/3 words, recall 2/3 words with increased time and single prompt. Pt denies any new changes in her STM/ recall

## 2023-10-20 NOTE — SWALLOW BEDSIDE ASSESSMENT ADULT - SWALLOW EVAL: DIAGNOSIS
Pt. continues with tolerance for thin liquids and regular solids without overt s/s of penetration/ aspiration.
+ tolerance for thin liquids and regular solids without overt s/s of penetration/ aspiration.

## 2023-10-20 NOTE — CONSULT NOTE ADULT - ASSESSMENT
62F. PMH: Aortic stenosis (s/p repair 5/2023, on Aspirin 81mg), HTN, pre-DM, leukemia (in remission since 1996), Schizoaffective disorder ([facility] admission 5/2023), lost to follow up and consistent outpatient care for many years Presented 10/19 c/o R hand weakness and numbness x2days. Sx started few months ago, and now in the past 2-3 days getting worse. CTH, CTA neg. Admitted for stroke workup.    #Rt hand weakness, decreased sens in Rt hand, RLE R/p acute stroke vs cervical pathology  - CTH: No acute pathology< from: CT Head No Cont (10.19.23 @ 10:17) >1.  No evidence of acute intracranial hemorrhage or large territory infarct.2.  Focal white matter lucencies, indeterminate in etiology. Further evaluation with brain MRI be obtained as clinically warranted.  - CTA H&N: No LVO, no major stenosis  - S/p Load Aspirin 325mg and Plavix 300mg  - C/w Aspirin 81mg and Plavix 75mg daily  - Neurochecks & Vitals q8 hrs  - Fall and Aspiration precautions  - Provide stroke education  - pt refusing tele monitoring  - Physiatry eval/Physical therapy/Occupational therapy/Speech and Swallow  - F/u HbA1c, TSH and Lipid panel  - F/u Obtain Utox  - F/u MRI Brain Non-con and MRI c spine  - F/u TTE   - C/w Atorvastatin 80mg daily    #Schizoaffective disorder  - not on any meds currently  - speaking with herself when no one is around as per staff  - f/u psych recs for meds     #Hypertension  -  Goal -160  - hold home blood pressure medication for now  - obtain TTE    #HLD  - high dose statin as above in CVA  - LDL results: 89    #Hyperglycemia/H/o Prediabetes and likely new Dx of DM/Morbid Obesity  monitor FS  start Insulin if FS persistently high  wt loss diet exercise  further recs based on FS, A1C      # Misc  - DVT Prophylaxis: Intermittent Pneumatic Compression, Lovenox  - GI Prophylaxis: - Diet: Diet, DASH/TLC  - Code Status: Full    #Progress Note Handoff  Pending: Clinical improvement and stability__x___MRI, TTE  Pt/Family discussion: Pt informed and agrees with the current plan  Disposition: Home______/SNF_______/4A______/To be determined____x____    My note supersedes the residents note should a discrepancy arise.    Chart and notes personally reviewed.  Care Discussed with Consultants/Other Providers/ Housestaff [ x] YES [ ] NO   Radiology, labs, old records personally reviewed.    discussed w/ housestaff, nursing, case management, neuro team    Attestation Statements:    Attestation Statements:  Risk Statement (NON-critical care).     On this date of service, level of risk to patient is considered: High.     Due to: r/o acute stroke, stroke unit care, neuro checks, telemetry monitoring, suspected new Dx of DM    Time-based billing (NON-critical care).     55 minutes spent on total encounter. The necessity of the time spent during the encounter on this date of service was due to:     time spent on review of labs, imaging studies, old records, obtaining history, personally examining patient, counselling and communicating with patient/ family, entering orders for medications/tests/etc, discussions with other health care providers, documentation in electronic health records, independent interpretation of labs, imaging/procedure results and care coordination.     62F. PMH: Aortic stenosis (s/p repair 5/2023, on Aspirin 81mg), HTN, pre-DM, leukemia (in remission since 1996), Schizoaffective disorder ([facility] admission 5/2023), lost to follow up and consistent outpatient care for many years Presented 10/19 c/o R hand weakness and numbness x2days. Sx started few months ago, and now in the past 2-3 days getting worse. CTH, CTA neg. Admitted for stroke workup.    #Rt hand weakness, decreased sens in Rt hand, RLE R/p acute stroke vs cervical pathology  - CTH: No acute pathology< from: CT Head No Cont (10.19.23 @ 10:17) >1.  No evidence of acute intracranial hemorrhage or large territory infarct.2.  Focal white matter lucencies, indeterminate in etiology. Further evaluation with brain MRI be obtained as clinically warranted.  - CTA H&N: No LVO, no major stenosis  - S/p Load Aspirin 325mg and Plavix 300mg  - C/w Aspirin 81mg and Plavix 75mg daily  - Neurochecks & Vitals q8 hrs  - Fall and Aspiration precautions  - Provide stroke education  - pt refusing tele monitoring  - Physiatry eval/Physical therapy/Occupational therapy/Speech and Swallow  - F/u HbA1c, TSH and Lipid panel  - F/u Obtain Utox  - F/u MRI Brain Non-con and MRI c spine  - F/u TTE   - C/w Atorvastatin 80mg daily    #Schizoaffective disorder  - not on any meds currently  - speaking with herself when no one is around as per staff  - f/u psych recs for meds     #Hypertension  -  Goal -160  - hold home blood pressure medication for now  - obtain TTE    #HLD  - high dose statin as above in CVA  - LDL results: 89    #Hyperglycemia/H/o Prediabetes and likely new Dx of DM/Morbid Obesity  monitor FS  start Insulin if FS persistently high i.e ISS  wt loss diet exercise  further recs based on FS, A1C    Hyperkalemia  repeat labs (d/w PA)    # Misc  - DVT Prophylaxis: Intermittent Pneumatic Compression, Lovenox  - GI Prophylaxis: - Diet: Diet, DASH/TLC  - Code Status: Full    #Progress Note Handoff  Pending: Clinical improvement and stability__x___MRI, TTE  Pt/Family discussion: Pt informed and agrees with the current plan  Disposition: Home______/SNF_______/4A______/To be determined____x____    My note supersedes the residents note should a discrepancy arise.    Chart and notes personally reviewed.  Care Discussed with Consultants/Other Providers/ Housestaff [ x] YES [ ] NO   Radiology, labs, old records personally reviewed.    discussed w/ housestaff, nursing, case management, neuro team    Attestation Statements:    Attestation Statements:  Risk Statement (NON-critical care).     On this date of service, level of risk to patient is considered: High.     Due to: r/o acute stroke, stroke unit care, neuro checks, telemetry monitoring, suspected new Dx of DM    Time-based billing (NON-critical care).     55 minutes spent on total encounter. The necessity of the time spent during the encounter on this date of service was due to:     time spent on review of labs, imaging studies, old records, obtaining history, personally examining patient, counselling and communicating with patient/ family, entering orders for medications/tests/etc, discussions with other health care providers, documentation in electronic health records, independent interpretation of labs, imaging/procedure results and care coordination.

## 2023-10-20 NOTE — PATIENT PROFILE BEHAVIORAL HEALTH - PT NEEDS ASSIST
Patient here with his dtr for weekly on treatment visit for radiation treatment to brain.   Treatment # 1/1 completed.   Patient pain 0/10.   Seeing Dr Teran 10/30/23 for his 2nd treatment  Wt Readings from Last 10 Encounters:   10/12/23 61.9 kg (136 lb 6.4 oz)   10/10/23 61.2 kg (134 lb 14.7 oz)   10/09/23 62.6 kg (138 lb)   10/09/23 61.7 kg (136 lb)   10/05/23 61.3 kg (135 lb 3.2 oz)   10/04/23 59.8 kg (131 lb 15.1 oz)   09/27/23 62.8 kg (138 lb 5.4 oz)   09/15/23 63.1 kg (139 lb 1.8 oz)   09/05/23 62.5 kg (137 lb 11.2 oz)     Review of Systems - Oncology   Educated on follow up appt.  Patient is concerned about NA.  Questions answered and verbalized understanding.   Report given to Dr. Monson          no

## 2023-10-20 NOTE — CONSULT NOTE ADULT - SUBJECTIVE AND OBJECTIVE BOX
ESPINOZA CARRINGTON  62y  Female    Patient is a 62y old  Female who presents with a chief complaint of right hand weakness/numbness (20 Oct 2023 08:33)      HPI:  62F. PMH: Aortic stenosis (s/p repair 5/2023, on Aspirin 81mg), HTN, pre-DM, leukemia (in remission since 1996), Schizoaffective disorder ([facility] admission 5/2023), lost to follow up and consistent outpatient care for many years, presented 10/19 c/o R hand weakness and numbness x2days. Sx started 10/17, when patient realized she could not properly hold things. She has also been experiencing unsteadiness on her feet x2d.  (19 Oct 2023 16:00)    S: Patient was examined and seen at bedside. This morning pt is resting comfortably in bed and reports no new issues or overnight events. No new complaints. Still w/ Sx in Rt hand.  Denies CP, SOB, N/V/D/C/AP, cough, F, chills, dizziness, new focal weakness, HA, vision changes, dysuria, or urinary symptoms, blood in stool.  ROS: all other systems reviewed and are negative    PAST MEDICAL & SURGICAL HISTORY:  Schizophrenia      Leukemia  in remission      Depression      H/O aortic valve replacement        SOCIAL HISTORY:  Tobacco: negative  Illicit drugs: negative  Alcohol: social  Family history reviewed and otherwise non-contributory No clotting disorders, CVAs at early age.  ALLERGIES: NKDA    MEDICATIONS  (STANDING):  aspirin  chewable 81 milliGRAM(s) Oral daily  atorvastatin 80 milliGRAM(s) Oral at bedtime  clopidogrel Tablet 75 milliGRAM(s) Oral daily  enoxaparin Injectable 40 milliGRAM(s) SubCutaneous every 24 hours  influenza   Vaccine 0.5 milliLiter(s) IntraMuscular once    MEDICATIONS  (PRN):    Home Medications:          Vital Signs Last 24 Hrs  T(C): 36.3 (20 Oct 2023 12:00), Max: 36.3 (20 Oct 2023 12:00)  T(F): 97.3 (20 Oct 2023 12:00), Max: 97.3 (20 Oct 2023 12:00)  HR: 98 (20 Oct 2023 12:00) (78 - 98)  BP: 164/76 (20 Oct 2023 12:00) (120/75 - 164/76)  BP(mean): 116 (20 Oct 2023 12:00) (90 - 116)  RR: 18 (20 Oct 2023 12:00) (18 - 18)  SpO2: 97% (20 Oct 2023 12:00) (97% - 99%)    Parameters below as of 20 Oct 2023 00:00  Patient On (Oxygen Delivery Method): room air      CAPILLARY BLOOD GLUCOSE      POCT Blood Glucose.: 193 mg/dL (20 Oct 2023 17:32)  POCT Blood Glucose.: 155 mg/dL (20 Oct 2023 11:57)      General: NAD. Looks stated age.  HEENT: clean oropharynx, EOMI, no LAD  Neck: trachea midline, no thyromegaly  CV: nl S1 S2; no m/r/g  Resp: decreased breath sounds at base  GI: NT/ND/S +BS  MS: no clubbing/cyanosis/edema, +pulses  Neuro: motor intact, decr sensory Rt hand and RLE; + reflexes  Skin: no rashes, nl turgor  Psychiatric: AA0x3 w/ intact insight and judgement    tele: SR, nonspecific changes (on my own evaluation of tele monitor)        LABS:                        13.2   8.79  )-----------( 220      ( 19 Oct 2023 11:50 )             39.9     10-19    138  |  105  |  9<L>  ----------------------------<  262<H>  5.3<H>   |  23  |  0.8    Ca    9.1      19 Oct 2023 10:25    TPro  7.6  /  Alb  4.3  /  TBili  <0.2  /  DBili  x   /  AST  29  /  ALT  16  /  AlkPhos  136<H>  10-19    LIVER FUNCTIONS - ( 19 Oct 2023 10:25 )  Alb: 4.3 g/dL / Pro: 7.6 g/dL / ALK PHOS: 136 U/L / ALT: 16 U/L / AST: 29 U/L / GGT: x           CARDIAC MARKERS ( 19 Oct 2023 10:25 )  x     / <0.01 ng/mL / x     / x     / x          PT/INR - ( 19 Oct 2023 10:25 )   PT: 10.50 sec;   INR: 0.92 ratio         PTT - ( 19 Oct 2023 10:25 )  PTT:29.9 sec  Urinalysis Basic - ( 19 Oct 2023 10:25 )    Color: x / Appearance: x / SG: x / pH: x  Gluc: 262 mg/dL / Ketone: x  / Bili: x / Urobili: x   Blood: x / Protein: x / Nitrite: x   Leuk Esterase: x / RBC: x / WBC x   Sq Epi: x / Non Sq Epi: x / Bacteria: x            EKG - SR, nonspecific changes (my read)  Chart and consultant noted personally reviewed.  Care Discussed with Consultants/Other Providers/ Housestaff [ x] YES [ ] NO   Radiology, labs, old records personally reviewed.

## 2023-10-20 NOTE — PROGRESS NOTE ADULT - SUBJECTIVE AND OBJECTIVE BOX
Neurology Stroke Progress Note    INTERVAL HPI/OVERNIGHT EVENTS:  Patient seen and examined.  number ______ used.    MEDICATIONS  (STANDING):  aspirin  chewable 81 milliGRAM(s) Oral daily  atorvastatin 80 milliGRAM(s) Oral at bedtime  clopidogrel Tablet 75 milliGRAM(s) Oral daily  influenza   Vaccine 0.5 milliLiter(s) IntraMuscular once    MEDICATIONS  (PRN):    Allergies    penicillin (Rash)    Intolerances      Vital Signs Last 24 Hrs  T(C): 36.1 (20 Oct 2023 00:00), Max: 36.8 (19 Oct 2023 09:13)  T(F): 96.9 (20 Oct 2023 00:00), Max: 98.2 (19 Oct 2023 09:13)  HR: 78 (20 Oct 2023 00:00) (78 - 98)  BP: 133/87 (20 Oct 2023 00:00) (120/75 - 174/95)  BP(mean): 101 (20 Oct 2023 00:00) (90 - 128)  RR: 18 (20 Oct 2023 00:00) (18 - 18)  SpO2: 97% (20 Oct 2023 00:00) (96% - 99%)    Parameters below as of 20 Oct 2023 00:00  Patient On (Oxygen Delivery Method): room air        Physical exam:  General: No acute distress, awake and alert  Eyes: Anicteric sclerae, moist conjunctivae, see below for CNs  Neck: trachea midline, FROM, supple, no thyromegaly or lymphadenopathy  Cardiovascular: Regular rate and rhythm, no murmurs, rubs, or gallops. No carotid bruits.   Pulmonary: Anterior breath sounds clear bilaterally, no crackles or wheezing. No use of accessory muscles  GI: Abdomen soft, non-distended, non-tender  Extremities: Radial and DP pulses +2, no edema    Neurologic:  -Mental status: Awake, alert, oriented to person, place, and time. Speech is fluent with intact naming, repetition, and comprehension, no dysarthria. Recent and remote memory intact. Follows commands. Attention/concentration intact. Fund of knowledge appropriate.  -Cranial nerves:   II: Visual fields are full to confrontation.  III, IV, VI: Extraocular movements are intact without nystagmus. Pupils equally round and reactive to light  V:  Facial sensation V1-V3 equal and intact   VII: Face is symmetric with normal eye closure and smile  VIII: Hearing is bilaterally intact to finger rub  IX, X: Uvula is midline and soft palate rises symmetrically  XI: Head turning and shoulder shrug are intact.  XII: Tongue protrudes midline  Motor: Normal bulk and tone. No pronator drift. Strength bilateral upper extremity 5/5, bilateral lower extremities 5/5.  Rapid alternating movements intact and symmetric  Sensation: Intact to light touch bilaterally. No neglect or extinction on double simultaneous testing.  Coordination: No dysmetria on finger-to-nose and heel-to-shin bilaterally  Reflexes: Downgoing toes bilaterally   Gait: Narrow gait and steady    LABS:                        13.2   8.79  )-----------( 220      ( 19 Oct 2023 11:50 )             39.9     10-19    138  |  105  |  9<L>  ----------------------------<  262<H>  5.3<H>   |  23  |  0.8    Ca    9.1      19 Oct 2023 10:25    TPro  7.6  /  Alb  4.3  /  TBili  <0.2  /  DBili  x   /  AST  29  /  ALT  16  /  AlkPhos  136<H>  10-19    PT/INR - ( 19 Oct 2023 10:25 )   PT: 10.50 sec;   INR: 0.92 ratio         PTT - ( 19 Oct 2023 10:25 )  PTT:29.9 sec  Urinalysis Basic - ( 19 Oct 2023 10:25 )    Color: x / Appearance: x / SG: x / pH: x  Gluc: 262 mg/dL / Ketone: x  / Bili: x / Urobili: x   Blood: x / Protein: x / Nitrite: x   Leuk Esterase: x / RBC: x / WBC x   Sq Epi: x / Non Sq Epi: x / Bacteria: x        RADIOLOGY & ADDITIONAL TESTS:     Neurology Stroke Progress Note    INTERVAL HPI/OVERNIGHT EVENTS:  Patient seen and examined. Overnight refusing VS and labs. No other event. Pt this morning still complaining of some weakness in RUE, and was talking to herself when seen today before I greeted her.    MEDICATIONS  (STANDING):  aspirin  chewable 81 milliGRAM(s) Oral daily  atorvastatin 80 milliGRAM(s) Oral at bedtime  clopidogrel Tablet 75 milliGRAM(s) Oral daily  influenza   Vaccine 0.5 milliLiter(s) IntraMuscular once    MEDICATIONS  (PRN):    Allergies    penicillin (Rash)    Intolerances      Vital Signs Last 24 Hrs  T(C): 36.1 (20 Oct 2023 00:00), Max: 36.8 (19 Oct 2023 09:13)  T(F): 96.9 (20 Oct 2023 00:00), Max: 98.2 (19 Oct 2023 09:13)  HR: 78 (20 Oct 2023 00:00) (78 - 98)  BP: 133/87 (20 Oct 2023 00:00) (120/75 - 174/95)  BP(mean): 101 (20 Oct 2023 00:00) (90 - 128)  RR: 18 (20 Oct 2023 00:00) (18 - 18)  SpO2: 97% (20 Oct 2023 00:00) (96% - 99%)    Parameters below as of 20 Oct 2023 00:00  Patient On (Oxygen Delivery Method): room air        Physical Exam: PHYSICAL EXAMINATION:  General: Well-developed, well nourished, in no acute distress.  Eyes: Conjunctiva and sclera clear.  Neurologic:  - Mental Status:  Alert, awake, oriented to person, place, and time; Speech is fluent with intact naming, repetition, and comprehension; Good overall fund of knowledge;  - Cranial Nerves II-XII:    II:  Visual fields are full to confrontation; Pupils are equal, round, and reactive to light;    III, IV, VI:  Extraocular movements are intact without nystagmus.  V:  Facial sensation is intact in the V1-V3 distribution bilaterally.  VII:  Face is symmetric with normal eye closure and smile  VIII:  Hearing is intact to finger rub.  IX, X:  Uvula is midline and soft palate rises symmetrically  XI:  Head turning and shoulder shrug are intact.  XII:  Tongue protrudes in the midline.  - Motor:  Strength is 5/5 at BL shoulders/elbows, R wrist extension 4+/5, able to break OK sign. Mild RUE pronator drift   .  - Reflexes:  2+ and symmetric at the biceps, triceps, brachioradialis, knees, and ankles.  Plantar responses flexor.  - Sensory: Reduction in sensation to light touch on Left lower extremity   - Coordination:  Finger-nose-finger and heel-knee-shin intact without dysmetria.    - Gait: Drags right foot with steps   NIHSS: 3      LABS:                        13.2   8.79  )-----------( 220      ( 19 Oct 2023 11:50 )             39.9     10-19    138  |  105  |  9<L>  ----------------------------<  262<H>  5.3<H>   |  23  |  0.8    Ca    9.1      19 Oct 2023 10:25    TPro  7.6  /  Alb  4.3  /  TBili  <0.2  /  DBili  x   /  AST  29  /  ALT  16  /  AlkPhos  136<H>  10-19    PT/INR - ( 19 Oct 2023 10:25 )   PT: 10.50 sec;   INR: 0.92 ratio         PTT - ( 19 Oct 2023 10:25 )  PTT:29.9 sec  Urinalysis Basic - ( 19 Oct 2023 10:25 )    Color: x / Appearance: x / SG: x / pH: x  Gluc: 262 mg/dL / Ketone: x  / Bili: x / Urobili: x   Blood: x / Protein: x / Nitrite: x   Leuk Esterase: x / RBC: x / WBC x   Sq Epi: x / Non Sq Epi: x / Bacteria: x        RADIOLOGY & ADDITIONAL TESTS: reviewed

## 2023-10-20 NOTE — OCCUPATIONAL THERAPY INITIAL EVALUATION ADULT - LEVEL OF CONSCIOUSNESS, OT EVAL
awake, cooperative, outside of assessment, pt observed with off topic, tangential, verbose monologues. However, during assessment pt was attentive, responding appropriately to questions

## 2023-10-20 NOTE — OCCUPATIONAL THERAPY INITIAL EVALUATION ADULT - PERTINENT HX OF CURRENT PROBLEM, REHAB EVAL
62 year old R handed female with PMH: Aortic stenosis (s/p repair 5/2023, on Aspirin 81mg), HTN, pre-DM, leukemia (in remission since 1996), Schizoaffective disorder ([facility] admission 5/2023), lost to follow up and consistent outpatient care for many years. Presented 10/19 c/o R hand weakness and numbness x2days. Sx started 10/17, when patient realized she could not properly hold things. She has also been experiencing unsteadiness on her feet x2d.

## 2023-10-20 NOTE — PROGRESS NOTE ADULT - ASSESSMENT
NEUROLOGY  #Stroke workup  - CTH: No acute pathology< from: CT Head No Cont (10.19.23 @ 10:17) >1.  No evidence of acute intracranial hemorrhage or large territory infarct.2.  Focal white matter lucencies, indeterminate in etiology. Further evaluation with brain MRI be obtained as clinically warranted.  - CTA H&N: No LVO, no major stenosis  - S/p Load Aspirin 325mg and Plavix 300mg  - Admit to Neurology  - Neurochecks & Vitals q8 hrs  - Fall and Aspiration precautions  - Provide stroke education  - Physiatry eval/Physical therapy/Occupational therapy/Speech and Swallow  - F/u HbA1c, TSH and Lipid panel  - F/u Obtain Utox  - F/u MRI Brain Non-con - will order once valve repair confirmed   - F/u TTE w bubble study  - C/w Aspirin 81mg and Plavix 75mg daily  - C/w Atorvastatin 80mg daily    CARDIOLOGY  #Hypertension  - permissive hypertension, Goal -180  - hold home blood pressure medication for now  - obtain TTE with bubble  - Stroke Code EKG Results:    #HLD  - high dose statin as above in CVA  - LDL results:     PULMONOLOGY  - call provider if SPO2 < 94%    GATROENTEROLOGY  - Speech and Swallow Eval:   - Diet:  - IVF:   - PPX:     RENAL/ELECTROLYTES  - Replete K<4 and Mg <2    INFECTIOUS DISEASE  - Monitor     ENDOCRINE  - A1C results:   - TSH results:  - ISS    HEME/ONC  - DVT ppx: Lovenox, SCDs    Dispo  - Neurology  - Physiatry eval:  - Physical therapy Eval:     62F. PMH: Aortic stenosis (s/p repair 2023, on Aspirin 81mg), HTN, pre-DM, leukemia (in remission since ), Schizoaffective disorder ([facility] admission 2023), lost to follow up and consistent outpatient care for many years Presented 10/19 c/o R hand weakness and numbness x2days. Sx started few months ago, and now in the past 2-3 days getting worse. CTH, CTA neg. Admitted for stroke workup.    #Stroke workup  - CTH: No acute pathology< from: CT Head No Cont (10.19.23 @ 10:17) >1.  No evidence of acute intracranial hemorrhage or large territory infarct.2.  Focal white matter lucencies, indeterminate in etiology. Further evaluation with brain MRI be obtained as clinically warranted.  - CTA H&N: No LVO, no major stenosis  - S/p Load Aspirin 325mg and Plavix 300mg  - C/w Aspirin 81mg and Plavix 75mg daily  - Neurochecks & Vitals q8 hrs  - Fall and Aspiration precautions  - Provide stroke education  - pt refusing tele monitoring  - Physiatry eval/Physical therapy/Occupational therapy/Speech and Swallow  - F/u HbA1c, TSH and Lipid panel  - F/u Obtain Utox  - F/u MRI Brain Non-con and MRI c spine  - F/u TTE   - C/w Atorvastatin 80mg daily      #Schizoaffective disorder  - not on any meds currently  - speaking with herself when no one is around  - f/u psych recs for meds     #Hypertension  -  Goal SBP   - hold home blood pressure medication for now  - obtain TTE    #HLD  - high dose statin as above in CVA  - LDL results: pending      # Misc  - DVT Prophylaxis: Intermittent Pneumatic Compression, Lovenox  - GI Prophylaxis: - Diet: Diet, DASH/TLC  - Code Status: Full    Pendin) MRI brain, c spine  2) psych consult

## 2023-10-20 NOTE — OCCUPATIONAL THERAPY INITIAL EVALUATION ADULT - GENERAL OBSERVATIONS, REHAB EVAL
Pt encountered semi reclined in bed. Pt stated she "doesn't think he needs OT" however was agreeable to bedside assessment. Pt not on any monitoring at time of IE, RN aware. Pt's /90. No c/o throughout asssessment. +nonsensical, tangential speech noted outside of direct assessment. Pt able to follow commands of assessment components. Patient left as found in NAD. Pt noted with drainage from L UE IV. RN made aware.

## 2023-10-20 NOTE — SWALLOW BEDSIDE ASSESSMENT ADULT - CONSISTENCIES ADMINISTERED
4oz water, 4oz regular solids./thin liquid/regular solid
4oz water, 4oz regular solids./thin liquid/regular solid

## 2023-10-21 PROCEDURE — 95816 EEG AWAKE AND DROWSY: CPT | Mod: 26

## 2023-10-21 PROCEDURE — 99232 SBSQ HOSP IP/OBS MODERATE 35: CPT

## 2023-10-21 PROCEDURE — 99222 1ST HOSP IP/OBS MODERATE 55: CPT

## 2023-10-21 RX ORDER — ARIPIPRAZOLE 15 MG/1
15 TABLET ORAL DAILY
Refills: 0 | Status: DISCONTINUED | OUTPATIENT
Start: 2023-10-21 | End: 2023-10-22

## 2023-10-21 RX ORDER — LISINOPRIL 2.5 MG/1
5 TABLET ORAL DAILY
Refills: 0 | Status: DISCONTINUED | OUTPATIENT
Start: 2023-10-21 | End: 2023-10-22

## 2023-10-21 RX ADMIN — Medication 81 MILLIGRAM(S): at 11:16

## 2023-10-21 RX ADMIN — ARIPIPRAZOLE 15 MILLIGRAM(S): 15 TABLET ORAL at 16:28

## 2023-10-21 RX ADMIN — CLOPIDOGREL BISULFATE 75 MILLIGRAM(S): 75 TABLET, FILM COATED ORAL at 11:16

## 2023-10-21 RX ADMIN — LISINOPRIL 5 MILLIGRAM(S): 2.5 TABLET ORAL at 16:28

## 2023-10-21 RX ADMIN — ATORVASTATIN CALCIUM 80 MILLIGRAM(S): 80 TABLET, FILM COATED ORAL at 21:23

## 2023-10-21 RX ADMIN — ENOXAPARIN SODIUM 40 MILLIGRAM(S): 100 INJECTION SUBCUTANEOUS at 14:38

## 2023-10-21 NOTE — PHYSICAL THERAPY INITIAL EVALUATION ADULT - GENERAL OBSERVATIONS, REHAB EVAL
Pt encountered semi-dudley in bed, in NAD and agreeable to PT IE. Pt reports that she does not think she needs PT. Pt tolerates bed mobility independently, sit to stand transfers independently, and ambulation with supervision. Pt left as found in NAD, semi-dudley in bed.

## 2023-10-21 NOTE — PHYSICAL THERAPY INITIAL EVALUATION ADULT - ADDITIONAL COMMENTS
According to pt and care note pt is living in a shelter and plans to go back to the shelter after discharge.

## 2023-10-21 NOTE — BH CONSULTATION LIAISON ASSESSMENT NOTE - NSBHCONSULTMEDAGITATION_PSY_A_CORE FT
#Severe agitation/aggression  -For severe agitation not responding to behavioral intervention, may give haldol 5 mg po q6h prn, ativan 2 mg po q6h prn, Benadryl 50 mg po q6h prn, with escalation to IM if patient refusing PO and remains an imminent danger to self or others.   -If IM antipsychotic is administered, please perform follow-up ECG for QTc monitoring (<500).

## 2023-10-21 NOTE — BH CONSULTATION LIAISON ASSESSMENT NOTE - SUMMARY
Natalie Turner is a 62F, living at [facility], on disability since 2020, PMH of HTN, aortic valve replacement, PPH of schizoaffective disorder depressive type, no suicide attempts or NSSIB, no violence, no substance use, 7 prior hospitalizations (last at [facility] in May 2023 for SI), admitted to medicine for stroke workup, psychiatry consulted for medication management.    Upon assessment, patient is calm, cooperative, oriented. Denies symptoms of depression, denies suicidal ideations intent or plans, denies AH/VH. Is agreeable to restarting abilify 15mg qdaily and has tolerated medication and dose well during prior IPP admission in June. Patient does not meet criteria for inpatient psychiatric hospitalization at this time, and does not require constant observation. No psychiatric contraindications to discharge.  psychiatry will continue to follow.

## 2023-10-21 NOTE — BH CONSULTATION LIAISON ASSESSMENT NOTE - NSBHCHARTREVIEWLAB_PSY_A_CORE FT
Complete Blood Count + Automated Diff (10.19.23 @ 11:50)   WBC Count: 8.79 K/uL  RBC Count: 4.51 M/uL  Hemoglobin: 13.2 g/dL  Hematocrit: 39.9 %  Mean Cell Volume: 88.5 fL  Mean Cell Hemoglobin: 29.3 pg  Mean Cell Hemoglobin Conc: 33.1 g/dL  Red Cell Distrib Width: 13.3 %  Platelet Count - Automated: 220 K/uL  MPV: 9.1 fL  Auto Neutrophil #: 5.67 K/uL  Auto Lymphocyte #: 2.26 K/uL  Auto Monocyte #: 0.58 K/uL  Auto Eosinophil #: 0.16 K/uL  Auto Basophil #: 0.05 K/uL  Auto Neutrophil %: 64.5: Differential percentages must be correlated with absolute numbers for   clinical significance. %  Auto Lymphocyte %: 25.7 %  Auto Monocyte %: 6.6 %  Auto Eosinophil %: 1.8 %  Auto Basophil %: 0.6 %  Auto Immature Granulocyte %: 0.8: (Includes meta, myelo and promyelocytes). Mild elevations in immature   granulocytes may be seen with many inflammatory processes and pregnancy;   clinical correlation suggested. %  Nucleated RBC: 0 /100 WBCs    Comprehensive Metabolic Panel (10.19.23 @ 10:25)   Sodium: 138 mmol/L  Potassium: 5.3: Hemolyzed. Interpret with caution mmol/L  Chloride: 105 mmol/L  Carbon Dioxide: 23 mmol/L  Anion Gap: 10 mmol/L  Blood Urea Nitrogen: 9 mg/dL  Creatinine: 0.8 mg/dL  Glucose: 262 mg/dL  Calcium: 9.1 mg/dL  Protein Total: 7.6 g/dL  Albumin: 4.3 g/dL  Bilirubin Total: <0.2 mg/dL  Alkaline Phosphatase: 136 U/L  Aspartate Aminotransferase (AST/SGOT): 29: Hemolyzed. Interpret with caution U/L  Alanine Aminotransferase (ALT/SGPT): 16: Hemolyzed. Interpret with caution U/L  eGFR: 84

## 2023-10-21 NOTE — BH CONSULTATION LIAISON ASSESSMENT NOTE - NSBHCONSULTFOLLOWAFTERCARE_PSY_A_CORE FT
Upon discharge, if agreeable, can refer patient to [facility], located at [address] [place] [address]. Patient can be given [phone] to make appointment themselves. Please ensure that this referral information is included in discharge document.

## 2023-10-21 NOTE — EEG REPORT - NS EEG TEXT BOX
[facility] Department of Neurology  Inpatient Routine  ElectroEncephaloGram (EEG)    Patient Name:	ESPINOZA CARRINGTON    :	1960  MRN:	-    Study Start Date/Time:	10/21/2023, 4:51:52 PM      Brief Clinical History:  ESPINOZA CARRINGTON is a 62 year old Female; study performed to investigate for seizures or markers of epilepsy.   Technologist notes: R53.1 WEAKNESS    Diagnosis Code:  R56.9 convulsions/seizure  CPT:   82846 (awake/sleep)     Pertinent Medication:	  n/a    Acquisition Details:  Electroencephalography was acquired using a minimum of 21 channels on an [employer] Neu[employer] system v 9.3.1 with electrode placement according to the standard International 10-20 system following ACNS (American Clinical Neurophysiology Society) guidelines.  Anterior temporal T1 and T2 electrodes were utilized whenever possible    Findings:  Background:  continuous, with predominantly alpha and beta frequencies.  Voltage:  Normal (20+ uV)  Generalized Slowing:  Mild   Organization:  Appropriate anterior-posterior gradient  Posterior Dominant Rhythm:  7 Hz symmetric, well-organized, and well-modulated  Focal abnormalities:  No persistent asymmetries of voltage or frequency.  Sleep:  Symmetric, synchronous spindles and K complexes.  Spontaneous Activity:  No epileptiform discharges    Events:  No electrographic seizures or significant clinical events occurred during this study  Provocations:  1)	Hyperventilation: was not performed.  2)	Photic stimulation: was not performed.  Impression:  Abnormal routine EEG, awake and asleep      1)	There was mild excess intermittent slow wave activity    Final Clinical Correlation:  1)	There were indicators of Diffuse or multifocal cerebral dysfunction    Angi Multani MD  Attending Neurologist, [facility]

## 2023-10-21 NOTE — BH CONSULTATION LIAISON ASSESSMENT NOTE - CURRENT MEDICATION
MEDICATIONS  (STANDING):  aspirin  chewable 81 milliGRAM(s) Oral daily  atorvastatin 80 milliGRAM(s) Oral at bedtime  clopidogrel Tablet 75 milliGRAM(s) Oral daily  enoxaparin Injectable 40 milliGRAM(s) SubCutaneous every 24 hours  influenza   Vaccine 0.5 milliLiter(s) IntraMuscular once  lisinopril 5 milliGRAM(s) Oral daily    MEDICATIONS  (PRN):

## 2023-10-21 NOTE — PROGRESS NOTE ADULT - ASSESSMENT
62F. PMH: Aortic stenosis (s/p repair 5/2023, on Aspirin 81mg), HTN, pre-DM, leukemia (in remission since 1996), Schizoaffective disorder ([facility] admission 5/2023), lost to follow up and consistent outpatient care for many years Presented 10/19 c/o R hand weakness and numbness x2days. Sx started few months ago, and now in the past 2-3 days getting worse. CTH, CTA neg. Admitted for stroke workup.    #Stroke workup  - CTH: No acute pathology  - CTA H&N: No LVO, no major stenosis  - Neurochecks & Vitals q8 hrs  - Fall and Aspiration precautions  - Provide stroke education  - pt refusing tele monitoring  - Physiatry eval/Physical therapy/Occupational therapy/Speech and Swallow  - F/u HbA1c, TSH and Lipid panel  - F/u Obtain Utox  - F/u MRI Brain Non-con and MRI c spine  - obtain routine EEG to rule out seizure  - F/u TTE   - S/p Load Aspirin 325mg and Plavix 300mg  - C/w Aspirin 81mg and Plavix 75mg daily  - C/w Atorvastatin 80mg daily      #Schizoaffective disorder  - not on any meds currently  - speaking with herself when no one is around  - f/u psych recs for meds     #Hypertension  - Goal -160  - obtain TTE    #HLD  - high dose statin   - LDL results: pending    DVT Prophylaxis: Intermittent Pneumatic Compression, Lovenox  Diet: Diet, DASH/TLC     62F. PMH: Aortic stenosis (s/p repair 5/2023, on Aspirin 81mg), HTN, pre-DM, leukemia (in remission since 1996), Schizoaffective disorder ([facility] admission 5/2023), lost to follow up and consistent outpatient care for many years Presented 10/19 c/o R hand weakness and numbness x2days. Sx started few months ago, and now in the past 2-3 days getting worse. CTH, CTA neg. Admitted for stroke workup.    #Stroke workup  - CTH: No acute pathology  - CTA H&N: No LVO, no major stenosis  - Neurochecks & Vitals q8 hrs  - Fall and Aspiration precautions  - Provide stroke education  - pt refusing tele monitoring  - Physiatry eval/Physical therapy/Occupational therapy/Speech and Swallow  - F/u HbA1c, TSH and Lipid panel  - F/u Obtain Utox  - F/u MRI Brain Non-con and MRI c spine  - obtain routine EEG to rule out seizure  - F/u TTE   - S/p Load Aspirin 325mg and Plavix 300mg  - C/w Aspirin 81mg and Plavix 75mg daily  - C/w Atorvastatin 80mg daily      #Schizoaffective disorder  - not on any meds currently  - speaking with herself when no one is around  - f/u psych recs for meds     #Hypertension  - Goal -160  - start lisinpril 5 mg qd  - obtain TTE    #HLD  - high dose statin   - LDL results: pending    DVT Prophylaxis: Intermittent Pneumatic Compression, Lovenox  Diet: Diet, DASH/TLC     62F. PMH: Aortic stenosis (s/p repair 5/2023, on Aspirin 81mg), HTN, pre-DM, leukemia (in remission since 1996), Schizoaffective disorder ([facility] admission 5/2023), lost to follow up and consistent outpatient care for many years Presented 10/19 c/o R hand weakness and numbness x2days. Sx started few months ago, and now in the past 2-3 days getting worse. CTH, CTA neg. Admitted for stroke workup.    #Stroke workup  - CTH: No acute pathology  - CTA H&N: No LVO, no major stenosis  - Neurochecks & Vitals q8 hrs  - Fall and Aspiration precautions  - Provide stroke education  - pt refusing tele monitoring  - Physiatry eval/Physical therapy/Occupational therapy/Speech and Swallow  - F/u HbA1c, TSH and Lipid panel  - obtain routine EEG to rule out seizure  - f/u MRI brain and c-spine  - F/u TTE   - S/p Load Aspirin 325mg and Plavix 300mg  - C/w Aspirin 81mg and Plavix 75mg daily  - C/w Atorvastatin 80mg daily      #Schizoaffective disorder  - not on any meds currently  - speaking with herself when no one is around  - f/u psych recs for meds     #Hypertension  - Goal -160  - start lisinpril 5 mg qd  - obtain TTE    #HLD  - high dose statin   - LDL results: pending    DVT Prophylaxis: Intermittent Pneumatic Compression, Lovenox  Diet: Diet, DASH/TLC

## 2023-10-21 NOTE — PROGRESS NOTE ADULT - ATTENDING COMMENTS
please refer to attending attestation on H&P
Symptoms resolved. Lasted for 48 hours. Suspect TIA vs non organic. Brain MRI and C spine pending. Pending psych consult. Continue DAPT. Likely discharge after MRIs

## 2023-10-21 NOTE — PROGRESS NOTE ADULT - SUBJECTIVE AND OBJECTIVE BOX
Neurology Progress Note    Interval History:    Patient was seen and examined, no acute event over night.     Medications:  aspirin  chewable 81 milliGRAM(s) Oral daily  atorvastatin 80 milliGRAM(s) Oral at bedtime  clopidogrel Tablet 75 milliGRAM(s) Oral daily  enoxaparin Injectable 40 milliGRAM(s) SubCutaneous every 24 hours  influenza   Vaccine 0.5 milliLiter(s) IntraMuscular once      Vital Signs Last 24 Hrs  T(C): 37.6 (21 Oct 2023 04:46), Max: 37.6 (21 Oct 2023 04:46)  T(F): 99.6 (21 Oct 2023 04:46), Max: 99.6 (21 Oct 2023 04:46)  HR: 73 (21 Oct 2023 04:46) (73 - 98)  BP: 173/88 (21 Oct 2023 04:46) (142/90 - 173/88)  BP(mean): 123 (21 Oct 2023 04:46) (112 - 123)  RR: 18 (21 Oct 2023 04:46) (18 - 18)  SpO2: 97% (20 Oct 2023 12:00) (97% - 97%)    Parameters below as of 21 Oct 2023 04:46  Patient On (Oxygen Delivery Method): room air        Neurological Examination:  General:  Appearance is consistent with chronologic age.   Cognitive/Language:  Awake, alert, and oriented to person, place, time and date.  Recent and remote memory intact.  Fund of knowledge is appropriate.  Naming, repetition and comprehension intact. Nondysarthric.    Cranial Nerves  - Eyes: Visual acuity intact, Visual fields full.  EOMI w/o nystagmus, skew or reported double vision.  PERRL.  No ptosis/weakness of eyelid closure.    - Face:  Facial sensation normal V1 - 3, no facial asymmetry.    - Ears/Nose/Throat:  Hearing grossly intact b/l to finger rub.  Palate elevates midline.  Tongue and uvula midline.   Motor exam: Normal tone and bulk. No tenderness, twitching, tremors or involuntary movements.            Upper extremity                  Bicep     Tricep     HG                                                 R      5/5        5/5          5/5                                                    L       5/5        5/5          5/5              Lower extremity                   HF        KE        DF         PF                                                  R     5/5       5/5       5/5       5/5                                               L      5/5      5/5       5/5        5/5    Sensory examination:  decreased sensationi in R hand  Reflexes: 2+ b/l biceps, triceps, patella and achilles.  Plantar response downgoing b/l.  Yana, clonus absent.  Cerebellum: FTN/HKS intact.  No dysmetria.      NIHSS: 1 for mild sensory loss    Labs:  CBC Full  -  ( 19 Oct 2023 11:50 )  WBC Count : 8.79 K/uL  RBC Count : 4.51 M/uL  Hemoglobin : 13.2 g/dL  Hematocrit : 39.9 %  Platelet Count - Automated : 220 K/uL  Mean Cell Volume : 88.5 fL  Mean Cell Hemoglobin : 29.3 pg  Mean Cell Hemoglobin Concentration : 33.1 g/dL  Auto Neutrophil # : 5.67 K/uL  Auto Lymphocyte # : 2.26 K/uL  Auto Monocyte # : 0.58 K/uL  Auto Eosinophil # : 0.16 K/uL  Auto Basophil # : 0.05 K/uL  Auto Neutrophil % : 64.5 %  Auto Lymphocyte % : 25.7 %  Auto Monocyte % : 6.6 %  Auto Eosinophil % : 1.8 %  Auto Basophil % : 0.6 %                  RADIOLOGY & ADDITIONAL TESTS:

## 2023-10-21 NOTE — BH CONSULTATION LIAISON ASSESSMENT NOTE - NSBHCHARTREVIEWVS_PSY_A_CORE FT
Vital Signs Last 24 Hrs  T(C): 36.7 (21 Oct 2023 14:05), Max: 37.6 (21 Oct 2023 04:46)  T(F): 98.1 (21 Oct 2023 14:05), Max: 99.6 (21 Oct 2023 04:46)  HR: 87 (21 Oct 2023 14:05) (73 - 95)  BP: 138/78 (21 Oct 2023 14:05) (138/78 - 173/88)  BP(mean): 103 (21 Oct 2023 14:05) (103 - 123)  RR: 18 (21 Oct 2023 04:46) (18 - 18)  SpO2: --    Parameters below as of 21 Oct 2023 04:46  Patient On (Oxygen Delivery Method): room air

## 2023-10-21 NOTE — PROGRESS NOTE ADULT - SUBJECTIVE AND OBJECTIVE BOX
ESPINOZA CARRINGTON  62y  Female      Patient is a 62y old  Female who presents with a chief complaint of right hand weakness/numbness (21 Oct 2023 10:48)      INTERVAL HPI/OVERNIGHT EVENTS:      REVIEW OF SYSTEMS:  CONSTITUTIONAL: No fever, weight loss, or fatigue  RESPIRATORY: No cough, wheezing, chills or hemoptysis; No shortness of breath  CARDIOVASCULAR: No chest pain, palpitations, dizziness, or leg swelling  GASTROINTESTINAL: No abdominal or epigastric pain. No nausea, vomiting, or hematemesis; No diarrhea or constipation. No melena or hematochezia.  GENITOURINARY: No dysuria, frequency, hematuria, or incontinence  NEUROLOGICAL: No headaches, memory loss, loss of strength, numbness, or tremors  SKIN: No itching, burning, rashes, or lesions   MUSCULOSKELETAL: No joint pain or swelling; No muscle, back, or extremity pain  PSYCHIATRIC: No depression, anxiety, mood swings, or difficulty sleeping  All other review of systems negative    T(C): 37.6 (10-21-23 @ 04:46), Max: 37.6 (10-21-23 @ 04:46)  HR: 73 (10-21-23 @ 04:46) (73 - 98)  BP: 173/88 (10-21-23 @ 04:46) (142/90 - 173/88)  RR: 18 (10-21-23 @ 04:46) (18 - 18)  SpO2: 97% (10-20-23 @ 12:00) (97% - 97%)  Wt(kg): --Vital Signs Last 24 Hrs  T(C): 37.6 (21 Oct 2023 04:46), Max: 37.6 (21 Oct 2023 04:46)  T(F): 99.6 (21 Oct 2023 04:46), Max: 99.6 (21 Oct 2023 04:46)  HR: 73 (21 Oct 2023 04:46) (73 - 98)  BP: 173/88 (21 Oct 2023 04:46) (142/90 - 173/88)  BP(mean): 123 (21 Oct 2023 04:46) (112 - 123)  RR: 18 (21 Oct 2023 04:46) (18 - 18)  SpO2: 97% (20 Oct 2023 12:00) (97% - 97%)    Parameters below as of 21 Oct 2023 04:46  Patient On (Oxygen Delivery Method): room air            PHYSICAL EXAM:  GENERAL: NAD, well-groomed, well-developed  PSYCH: no agitation, baseline mentation  NERVOUS SYSTEM:  Alert & Oriented X3, Motor Strength 5/5 B/L upper and lower extremities; Sensory intact; FTN WNL  PULMONARY: Clear to percussion bilaterally; No rales, rhonchi, wheezing, or rubs  CARDIOVASCULAR: Regular rate and rhythm; No murmurs, rubs, or gallops  GI: Soft, Nontender, Nondistended; Bowel sounds present  EXTREMITIES:  2+ Peripheral Pulses, No clubbing, cyanosis, or edema  LYMPH: No lymphadenopathy noted  SKIN: No rashes or lesions    Consultant(s) Notes Reviewed:  [x ] YES  [ ] NO    Discussed with Consultants/Other Providers [ x] YES     LABS                          13.2   8.79  )-----------( 220      ( 19 Oct 2023 11:50 )             39.9                 Lactate Trend        CAPILLARY BLOOD GLUCOSE      POCT Blood Glucose.: 193 mg/dL (20 Oct 2023 17:32)        RADIOLOGY & ADDITIONAL TESTS:    Imaging Personally Reviewed:  [ ] YES  [ ] NO    HEALTH ISSUES - PROBLEM Dx:         ESPINOZA CARRINGTON  62y  Female      Patient is a 62y old  Female who presents with a chief complaint of right hand weakness/numbness (21 Oct 2023 10:48)      INTERVAL HPI/OVERNIGHT EVENTS: no acute events overnight. no major complaints or issues. Notes mood is up and down      REVIEW OF SYSTEMS:  CONSTITUTIONAL: No fever, weight loss, or fatigue  RESPIRATORY: No cough, wheezing, chills or hemoptysis; No shortness of breath  CARDIOVASCULAR: No chest pain, palpitations, dizziness, or leg swelling  GASTROINTESTINAL: No abdominal or epigastric pain. No nausea, vomiting, or hematemesis; No diarrhea or constipation. No melena or hematochezia.  GENITOURINARY: No dysuria, frequency, hematuria, or incontinence  NEUROLOGICAL: No headaches, memory loss, loss of strength, numbness, or tremors  SKIN: No itching, burning, rashes, or lesions   MUSCULOSKELETAL: No joint pain or swelling; No muscle, back, or extremity pain  PSYCHIATRIC: No depression, anxiety, mood swings, or difficulty sleeping  All other review of systems negative    T(C): 37.6 (10-21-23 @ 04:46), Max: 37.6 (10-21-23 @ 04:46)  HR: 73 (10-21-23 @ 04:46) (73 - 98)  BP: 173/88 (10-21-23 @ 04:46) (142/90 - 173/88)  RR: 18 (10-21-23 @ 04:46) (18 - 18)  SpO2: 97% (10-20-23 @ 12:00) (97% - 97%)  Wt(kg): --Vital Signs Last 24 Hrs  T(C): 37.6 (21 Oct 2023 04:46), Max: 37.6 (21 Oct 2023 04:46)  T(F): 99.6 (21 Oct 2023 04:46), Max: 99.6 (21 Oct 2023 04:46)  HR: 73 (21 Oct 2023 04:46) (73 - 98)  BP: 173/88 (21 Oct 2023 04:46) (142/90 - 173/88)  BP(mean): 123 (21 Oct 2023 04:46) (112 - 123)  RR: 18 (21 Oct 2023 04:46) (18 - 18)  SpO2: 97% (20 Oct 2023 12:00) (97% - 97%)    Parameters below as of 21 Oct 2023 04:46  Patient On (Oxygen Delivery Method): room air            PHYSICAL EXAM:  GENERAL: middle age/elder, NAD, non toxic, watching tv  PSYCH: no agitation, friendly and cooperative, no pressured speech, no flight of ideas  NERVOUS SYSTEM:  Alert & Oriented X3, normal mentation  PULMONARY: symmetrical chest rise, no accessory muscle use  CARDIOVASCULAR: non tachycardic  GI: Soft, Nontender, Nondistended; Bowel sounds present  EXTREMITIES:  2+ Peripheral Pulses, No clubbing, cyanosis, or edema  SKIN: No rashes or lesions    Consultant(s) Notes Reviewed:  [x ] YES  [ ] NO    Discussed with Consultants/Other Providers [ x] YES     LABS                          13.2   8.79  )-----------( 220      ( 19 Oct 2023 11:50 )             39.9                 Lactate Trend        CAPILLARY BLOOD GLUCOSE      POCT Blood Glucose.: 193 mg/dL (20 Oct 2023 17:32)        RADIOLOGY & ADDITIONAL TESTS:    Imaging Personally Reviewed:  [ ] YES  [ ] NO    HEALTH ISSUES - PROBLEM Dx:

## 2023-10-21 NOTE — PHYSICAL THERAPY INITIAL EVALUATION ADULT - ASSISTIVE DEVICE FOR TRANSFER: SIT/STAND, REHAB EVAL
Podiatry Progress Note    Subjective:   FERNANDEZ GUZMAN is a pleasant well-nourished, well developed 64y Male in no acute distress, alert awake, and oriented to person, place and time.  Patient is a 64y old  Male who presents with a chief complaint of wound to Right foot with drainage. Pt reports taking antibiotics for the past 3 weeks but noticed the drainage a week and a half ago. He also reports feeling listless and tired. Denies N/V/F/C/sob.    PAST MEDICAL & SURGICAL HISTORY:  MATA on CPAP    DM (diabetes mellitus)    HTN (hypertension)    High cholesterol    Charcot ankle, right    History of percutaneous angioplasty    H/O skin graft    Left toe amputee  4 TOES    Diabetic Charcot foot  Fixation with external device         Objective:  Vital Signs Last 24 Hrs  T(C): 37.2 (28 May 2021 18:13), Max: 37.2 (28 May 2021 18:13)  T(F): 99 (28 May 2021 18:13), Max: 99 (28 May 2021 18:13)  HR: 118 (28 May 2021 18:13) (118 - 118)  BP: 131/86 (28 May 2021 18:13) (131/86 - 131/86)  BP(mean): --  RR: 17 (28 May 2021 18:13) (17 - 17)  SpO2: 100% (28 May 2021 18:13) (100% - 100%)                        9.5    12.54 )-----------( 341      ( 28 May 2021 18:28 )             31.3                 05-28    133<L>  |  94<L>  |  52<H>  ----------------------------<  397<H>  4.6   |  27  |  3.0<H>    Ca    8.4<L>      28 May 2021 18:28    TPro  9.0<H>  /  Alb  2.9<L>  /  TBili  0.2  /  DBili  x   /  AST  20  /  ALT  22  /  AlkPhos  121<H>  05-28      Physical Exam - Lower Extremity Focused:   Derm:   Open Right Plantar Ulcer   Serous Drainage from proximal wound  Probes to deep tissue  Macerated periwound    Vascular: DP and PT Pulses Diminished; Foot is Warm to Warm to the touch   Neuro: Protective Sensation Diminished    Assessment:   DFU, Charcot Right Foot - Drainage from ulcer site    Plan:  Chart reviewed and Patient evaluated. All Questions and Concerns Addressed and Answered  Discussed diagnosis and treatment with patient  Wound Flushed w/ NS; Wound Packed w/ Betadine Soaked Gauze / DSD / Kerlix   Local Wound Care; As Stated Above; Q24   Wound Culture Obtained; Sent to Pathology; Pending Results   XR Imaging Right Foot; Pending Results  WBAT in [facility]  Recommend; Lower Extremity Arterial Duplex B/L; ESR/CRP  Request ID Consult  Possible Surgical Debridement; Pending Arterial Duplex, ESR/CRP and XR Imaging  Discussed Plan w/ Attending    Podiatry [id number]     Podiatry Progress Note    Subjective:   FERNANDEZ GUZMAN is a pleasant well-nourished, well developed 64y Male in no acute distress, alert awake, and oriented to person, place and time.  Patient is a 64y old  Male who presents with a chief complaint of wound to Right foot with drainage. Pt reports taking antibiotics for the past 3 weeks but noticed the drainage a week and a half ago. He also reports feeling listless and tired. Denies N/V/F/C/sob.    PAST MEDICAL & SURGICAL HISTORY:  MATA on CPAP    DM (diabetes mellitus)    HTN (hypertension)    High cholesterol    Charcot ankle, right    History of percutaneous angioplasty    H/O skin graft    Left toe amputee  4 TOES    Diabetic Charcot foot  Fixation with external device         Objective:  Vital Signs Last 24 Hrs  T(C): 37.2 (28 May 2021 18:13), Max: 37.2 (28 May 2021 18:13)  T(F): 99 (28 May 2021 18:13), Max: 99 (28 May 2021 18:13)  HR: 118 (28 May 2021 18:13) (118 - 118)  BP: 131/86 (28 May 2021 18:13) (131/86 - 131/86)  BP(mean): --  RR: 17 (28 May 2021 18:13) (17 - 17)  SpO2: 100% (28 May 2021 18:13) (100% - 100%)                        9.5    12.54 )-----------( 341      ( 28 May 2021 18:28 )             31.3                 05-28    133<L>  |  94<L>  |  52<H>  ----------------------------<  397<H>  4.6   |  27  |  3.0<H>    Ca    8.4<L>      28 May 2021 18:28    TPro  9.0<H>  /  Alb  2.9<L>  /  TBili  0.2  /  DBili  x   /  AST  20  /  ALT  22  /  AlkPhos  121<H>  05-28      Physical Exam - Lower Extremity Focused:   Derm:   Open Right Plantar Ulcer   Serous Drainage from proximal wound  Probes to deep tissue  Macerated periwound    Vascular: DP and PT Pulses Diminished; Foot is Warm to Warm to the touch   Neuro: Protective Sensation Diminished    Assessment:   DFU, Charcot Right Foot - Drainage from ulcer site    Plan:  Chart reviewed and Patient evaluated. All Questions and Concerns Addressed and Answered  Discussed diagnosis and treatment with patient  Wound Flushed w/ NS; Wound Packed w/ Betadine Soaked Gauze / DSD / Kerlix   Local Wound Care; As Stated Above; Q24   Wound Culture Obtained; Sent to Pathology; Pending Results   XR Imaging Right Foot; Pending Results  WBAT in [facility]  Recommend; Lower Extremity Arterial Duplex B/L; ESR/CRP  Request ID Consult  Recommend Nephro Consult  Possible Surgical Debridement; Pending Arterial Duplex, ESR/CRP and XR Imaging  Attending to see pt Tomorrow 5/29  Discussed Plan w/ Attending    Podiatry [id number]     Podiatry Progress Note    Subjective:   FERNANDEZ GUZMAN is a pleasant well-nourished, well developed 64y Male in no acute distress, alert awake, and oriented to person, place and time.  Patient is a 64y old  Male who presents with a chief complaint of wound to Right foot with drainage. Pt reports taking antibiotics for the past 3 weeks but noticed the drainage a week and a half ago. He also reports feeling listless and tired. Denies N/V/F/C/sob.    PAST MEDICAL & SURGICAL HISTORY:  MATA on CPAP    DM (diabetes mellitus)    HTN (hypertension)    High cholesterol    Charcot ankle, right    History of percutaneous angioplasty    H/O skin graft    Left toe amputee  4 TOES    Diabetic Charcot foot  Fixation with external device         Objective:  Vital Signs Last 24 Hrs  T(C): 37.2 (28 May 2021 18:13), Max: 37.2 (28 May 2021 18:13)  T(F): 99 (28 May 2021 18:13), Max: 99 (28 May 2021 18:13)  HR: 118 (28 May 2021 18:13) (118 - 118)  BP: 131/86 (28 May 2021 18:13) (131/86 - 131/86)  BP(mean): --  RR: 17 (28 May 2021 18:13) (17 - 17)  SpO2: 100% (28 May 2021 18:13) (100% - 100%)                        9.5    12.54 )-----------( 341      ( 28 May 2021 18:28 )             31.3                 05-28    133<L>  |  94<L>  |  52<H>  ----------------------------<  397<H>  4.6   |  27  |  3.0<H>    Ca    8.4<L>      28 May 2021 18:28    TPro  9.0<H>  /  Alb  2.9<L>  /  TBili  0.2  /  DBili  x   /  AST  20  /  ALT  22  /  AlkPhos  121<H>  05-28      Physical Exam - Lower Extremity Focused:   Derm:   Open Right Plantar Ulcer   Serous Drainage from proximal wound  Probes to deep tissue  Macerated periwound    Vascular: DP and PT Pulses Diminished; Foot is Warm to Warm to the touch   Neuro: Protective Sensation Diminished    Assessment:   DFU, Charcot Right Foot - Drainage from ulcer site    Plan:  Chart reviewed and Patient evaluated. All Questions and Concerns Addressed and Answered  Discussed diagnosis and treatment with patient  Wound Flushed w/ NS; Wound Packed w/ Betadine Soaked Gauze / DSD / Kerlix   Local Wound Care; As Stated Above; Q24   Wound Culture Obtained; Sent to Pathology; Pending Results   XR Imaging Right Foot; Pending Results  WBAT in [facility]  Recommend; Lower Extremity Arterial Duplex B/L; ESR/CRP  Request ID Consult  Recommend Nephro Consult  Possible Surgical Debridement; Pending Arterial Duplex, ESR/CRP and XR Imaging  Discussed Plan w/ Attending    Podiatry      no AD

## 2023-10-21 NOTE — BH CONSULTATION LIAISON ASSESSMENT NOTE - HPI (INCLUDE ILLNESS QUALITY, SEVERITY, DURATION, TIMING, CONTEXT, MODIFYING FACTORS, ASSOCIATED SIGNS AND SYMPTOMS)
Natalie Turner is a 62F, living at [facility], on disability since 2020, PMH of HTN, aortic valve replacement, PPH of schizoaffective disorder depressive type, no suicide attempts or NSSIB, no violence, no substance use, 7 prior hospitalizations (last at [facility] in May 2023 for SI), admitted to medicine for stroke workup, psychiatry consulted for medication management.    Upon approach, patient is lying in bed comfortably. Patient is alert and AOx4 engages with interviewer. Patient is cooperative and answers all questions appropriately. Patient reports she is currently living in a shelter, awaiting housing application. Reports "I could use abilify again." When asked why, she reports because of her racing thoughts that are on and off. Reports she used to hear voices in past, but denies AH currently. Patient denies any current passive or active suicidal ideation, intent or plan. Patient denies any persecutory delusions and denies any auditory or visual hallucinations. Denies depressed mood, changes to sleep, appetite, anhedonia. Denies alcohol or other substance use. Reports allergy to penicillin, no other allergies.     Spoke with nursing staff who report patient is calm, pleasant, no issues. They report she is seen talking to self in room at times.

## 2023-10-21 NOTE — BH CONSULTATION LIAISON ASSESSMENT NOTE - NSHPLANGLIMITEDENGLISH_GEN_A_CORE
Start amoxicillin 875mg bid for 7 days.   Tessalon perles as needed for cough.   flonase 2 sprays each nostril daily.   She will apply ice and elevation, she will monitor lesion as it is not bothering her today, f/u if symptoms persist.   Increase fluid intake, get plenty of rest.   Patient agrees with plan of care and understands instructions. Call if worsening symptoms or any problems or concerns.     
No

## 2023-10-21 NOTE — PROGRESS NOTE ADULT - ASSESSMENT
63 y/o F with Aortic stenosis (s/p repair 5/2023, on Aspirin 81mg), HTN, pre-DM, leukemia (in remission since 1996), Schizoaffective disorder ([facility] admission 5/2023), lost to follow up and consistent outpatient care for many years Presented 10/19 c/o R hand weakness and numbness x2days. Sx started few months ago, and now in the past 2-3 days getting worse. CTH, CTA neg. Admitted for stroke workup.    #Rt hand weakness, decreased sens in Rt hand, RLE R/p acute stroke vs cervical pathology  -CT Head No Cont (10.19.23 @ 10:17) >1.  No evidence of acute intracranial hemorrhage or large territory infarct.2.  Focal white matter lucencies, indeterminate in etiology. Further evaluation with brain MRI be obtained as clinically warranted.  - CTA H&N: No LVO, no major stenosis  - S/p Load Aspirin 325mg and Plavix 300mg  - C/w Aspirin 81mg and Plavix 75mg daily  - Neurochecks & Vitals q8 hrs  - Fall and Aspiration precautions  - Provide stroke education  - pt refusing tele monitoring  - Physical therapy: outpatient therapy  - Occupational therapy: home  - Speech and Swallow: regular diet with thin liquids  - F/u HbA1c, TSH and Lipid panel  - F/u Obtain Utox  - F/u MRI Brain Non-con and MRI c spine: cancelled. follow up with Neuro regarding plan for complete work up  -  TTE ordered by Neuro; follow up with Neuro regarding plan for complete work up  - C/w Atorvastatin 80mg daily    #Schizoaffective disorder  - not on any meds currently  - speaking with herself when no one is around as per staff  - f/u psych recs for meds     #Hypertension  -  Goal -160  - hold home blood pressure medication for now  - obtain TTE    #HLD  - high dose statin as above in CVA  - LDL results: 89    #Hyperglycemia/H/o Prediabetes and likely new Dx of DM/Morbid Obesity  monitor FS  start Insulin if FS persistently high i.e ISS  wt loss diet exercise  further recs based on FS, A1C    Hyperkalemia  repeat labs (d/w PA)    # Misc  - DVT Prophylaxis: Intermittent Pneumatic Compression, Lovenox  - GI Prophylaxis: - Diet: Diet, DASH/TLC  - Code Status: Full    #Progress Note Handoff  Pending: MRI, TTE, Neuro follow up  Pt/Family discussion: Pt informed and agrees with the current plan  Disposition: Home______/SNF_______/4A______/To be determined____x____   63 y/o F with Aortic stenosis (s/p repair 5/2023, on Aspirin 81mg), HTN, pre-DM, leukemia (in remission since 1996), Schizoaffective disorder ([facility] admission 5/2023), lost to follow up and consistent outpatient care for many years Presented 10/19 c/o R hand weakness and numbness x2days. Sx started few months ago, and now in the past 2-3 days getting worse. CTH, CTA neg. Admitted for stroke workup.    #Rt hand weakness, decreased sens in Rt hand, RLE R/p acute stroke vs cervical pathology  -CT Head No Cont (10.19.23 @ 10:17) >1.  No evidence of acute intracranial hemorrhage or large territory infarct.2.  Focal white matter lucencies, indeterminate in etiology. Further evaluation with brain MRI be obtained as clinically warranted.  - CTA H&N: No LVO, no major stenosis  - S/p Load Aspirin 325mg and Plavix 300mg  - C/w Aspirin 81mg and Plavix 75mg daily  - Neurochecks & Vitals q8 hrs  - Fall and Aspiration precautions  - Provide stroke education  - pt refusing tele monitoring  - Physical therapy: outpatient therapy  - Occupational therapy: home  - Speech and Swallow: regular diet with thin liquids  - F/u HbA1c, TSH and Lipid panel  - F/u Obtain Utox  - F/u MRI Brain Non-con and MRI c spine: cancelled. follow up with Neuro regarding plan for complete work up. not needed   - EEG pending  -  TTE ordered by Neuro; follow up with Neuro regarding plan for complete work up  - C/w Atorvastatin 80mg daily    #Schizoaffective disorder  - not on any meds currently  - speaking with herself when no one is around as per staff  - f/u psych recs for meds     #Hypertension  -  Goal -160  - hold home blood pressure medication for now  - obtain TTE    #HLD  - high dose statin as above in CVA  - LDL results: 89    #Hyperglycemia/H/o Prediabetes and likely new Dx of DM/Morbid Obesity  monitor FS  start Insulin if FS persistently high i.e ISS  wt loss diet exercise  further recs based on FS, A1C    Hyperkalemia  repeat labs (d/w PA)    # Misc  - DVT Prophylaxis: Intermittent Pneumatic Compression, Lovenox  - GI Prophylaxis: - Diet: Diet, DASH/TLC  - Code Status: Full    #Progress Note Handoff  Pending: Neuro follow up, EEG, Psych eval  Pt/Family discussion: Pt informed and agrees with the current plan  Disposition: Home______/SNF_______/4A______/To be determined____x____

## 2023-10-21 NOTE — BH CONSULTATION LIAISON ASSESSMENT NOTE - NSBHCHARTREVIEWINVESTIGATE_PSY_A_CORE FT
Ventricular Rate 81 BPM    Atrial Rate 81 BPM    P-R Interval 162 ms    QRS Duration 92 ms    Q-T Interval 382 ms    QTC Calculation(Bazett) 443 ms    P Axis 56 degrees    R Axis -18 degrees    T Axis 72 degrees    Diagnosis Line Normal sinus rhythm  Minimal voltage criteria for LVH, may be normal variant  Cannot rule out Anterior infarct , age undetermined  Abnormal ECG    Confirmed by Truong Rankin ([id number]) on 10/19/2023 3:21:22 PM      ACC: [id number] EXAM:  CT ANGIO NECK (W)AW IC   ORDERED BY: SPENSER BURGOS     ACC: [id number] EXAM:  CT ANGIO BRAIN (W)AW IC   ORDERED BY: SPENSER BURGOS     PROCEDURE DATE:  10/19/2023          INTERPRETATION:  Clinical History / Reason forexam: Right lower   extremity weakness    Technique: CT angiogram of the head and neck. Contiguous CT axial images   of the head and neck were obtained following the intravenous   administration of 100 cc Omnipaque 350 (0 cc discarded) with coronal,   sagittal and multiple 3-D MIP and volume rendered reformats.    Comparison: None available    Findings:    NECK:  The visualized aortic arch and great vessel origins are patent.    The common, internal and external carotid arteries are patent.    The vertebral arteries are patent.    HEAD:  Mild calcific plaque of the carotid siphons without significant stenosis.   The anterior and middle cerebral arteries are patent.    The distal vertebral arteries are patent.  The basilar artery is patent.   The posterior cerebral arteries are patent.    IMPRESSION:    No evidence of major vascular stenosis or occlusion.    --- End of Report ---            WIL LAZARO MD; Attending Radiologist  This document has been electronically signed. Oct 19 2023 11:07AM

## 2023-10-21 NOTE — BH CONSULTATION LIAISON ASSESSMENT NOTE - RISK ASSESSMENT
Modifiable risk factors include hx psychiatric disorder, medical illness, lack of social support/living alone/single.  Static risk factors include  race, chronic mental illness, chronic medical illness  Protective factors seeking out treatment/ hopefulness.

## 2023-10-21 NOTE — BH CONSULTATION LIAISON ASSESSMENT NOTE - NSBHMSEBEHAV_PSY_A_CORE
Watch for signs of worsening drainage and swelling. If those occur please go to emergency department for evaluation.
Cooperative

## 2023-10-22 ENCOUNTER — TRANSCRIPTION ENCOUNTER (OUTPATIENT)
Age: 63
End: 2023-10-22

## 2023-10-22 VITALS — DIASTOLIC BLOOD PRESSURE: 66 MMHG | HEART RATE: 96 BPM | TEMPERATURE: 96 F | SYSTOLIC BLOOD PRESSURE: 120 MMHG

## 2023-10-22 LAB
GLUCOSE BLDC GLUCOMTR-MCNC: 153 MG/DL — HIGH (ref 70–99)
GLUCOSE BLDC GLUCOMTR-MCNC: 153 MG/DL — HIGH (ref 70–99)

## 2023-10-22 PROCEDURE — 93306 TTE W/DOPPLER COMPLETE: CPT | Mod: 26

## 2023-10-22 PROCEDURE — 99231 SBSQ HOSP IP/OBS SF/LOW 25: CPT

## 2023-10-22 PROCEDURE — 99238 HOSP IP/OBS DSCHRG MGMT 30/<: CPT

## 2023-10-22 RX ORDER — ASPIRIN/CALCIUM CARB/MAGNESIUM 324 MG
1 TABLET ORAL
Qty: 30 | Refills: 4
Start: 2023-10-22 | End: 2024-03-19

## 2023-10-22 RX ORDER — CLOPIDOGREL BISULFATE 75 MG/1
1 TABLET, FILM COATED ORAL
Qty: 19 | Refills: 0
Start: 2023-10-22 | End: 2023-11-09

## 2023-10-22 RX ORDER — ARIPIPRAZOLE 15 MG/1
1 TABLET ORAL
Qty: 30 | Refills: 4
Start: 2023-10-22 | End: 2024-03-19

## 2023-10-22 RX ORDER — LISINOPRIL 2.5 MG/1
1 TABLET ORAL
Qty: 30 | Refills: 5
Start: 2023-10-22 | End: 2024-04-18

## 2023-10-22 RX ORDER — ATORVASTATIN CALCIUM 80 MG/1
1 TABLET, FILM COATED ORAL
Qty: 30 | Refills: 4
Start: 2023-10-22 | End: 2024-03-19

## 2023-10-22 RX ORDER — LISINOPRIL 2.5 MG/1
1 TABLET ORAL
Qty: 30 | Refills: 4
Start: 2023-10-22 | End: 2024-03-19

## 2023-10-22 RX ADMIN — CLOPIDOGREL BISULFATE 75 MILLIGRAM(S): 75 TABLET, FILM COATED ORAL at 11:03

## 2023-10-22 RX ADMIN — LISINOPRIL 5 MILLIGRAM(S): 2.5 TABLET ORAL at 05:36

## 2023-10-22 RX ADMIN — Medication 81 MILLIGRAM(S): at 11:03

## 2023-10-22 RX ADMIN — ARIPIPRAZOLE 15 MILLIGRAM(S): 15 TABLET ORAL at 11:03

## 2023-10-22 NOTE — DISCHARGE NOTE NURSING/CASE MANAGEMENT/SOCIAL WORK - NSPROMEDSBROUGHTTOHOSP_GEN_A_NUR
Pt resting on stretcher with eyes closed. Awakened pt to drink more oral contrast.  Pt drank half of second bottle and is now resting with eyes closed. Respirations regular and unlabored. no

## 2023-10-22 NOTE — DISCHARGE NOTE PROVIDER - CARE PROVIDER_API CALL
Cesar Paz  Neurology  [address] [place] [address]  Phone: [phone]  Fax: [phone]  Follow Up Time:     Paramjit Moreno  Psychiatry  [address] [place] [id number]  Phone: [phone]  Fax: [phone]  Follow Up Time:

## 2023-10-22 NOTE — DISCHARGE NOTE NURSING/CASE MANAGEMENT/SOCIAL WORK - PATIENT PORTAL LINK FT
You can access the FollowMyHealth Patient Portal offered by [facility] by registering at the following website: [email or web address]. By joining [employer]’s FollowMyHealth portal, you will also be able to view your health information using other applications (apps) compatible with our system.

## 2023-10-22 NOTE — DISCHARGE NOTE NURSING/CASE MANAGEMENT/SOCIAL WORK - NSDCVIVACCINE_GEN_ALL_CORE_FT
COVID-19, mRNA, LNP-S, PF, 100 mcg/ 0.5 mL dose (Moderna); 16-Dec-2021 12:11; Alana Solares (RN); [employer] [id number] (Exp. Date: 30-Dec-2021); IntraMuscular; Deltoid Right.; 0.5 milliLiter(s);

## 2023-10-22 NOTE — PROGRESS NOTE ADULT - SUBJECTIVE AND OBJECTIVE BOX
ESPINOZA CARRINGTON  62y  Female      Patient is a 62y old  Female who presents with a chief complaint of right hand weakness/numbness (22 Oct 2023 10:26)      INTERVAL HPI/OVERNIGHT EVENTS:      REVIEW OF SYSTEMS:  CONSTITUTIONAL: No fever, weight loss, or fatigue  RESPIRATORY: No cough, wheezing, chills or hemoptysis; No shortness of breath  CARDIOVASCULAR: No chest pain, palpitations, dizziness, or leg swelling  GASTROINTESTINAL: No abdominal or epigastric pain. No nausea, vomiting, or hematemesis; No diarrhea or constipation. No melena or hematochezia.  GENITOURINARY: No dysuria, frequency, hematuria, or incontinence  NEUROLOGICAL: No headaches, memory loss, loss of strength, numbness, or tremors  SKIN: No itching, burning, rashes, or lesions   MUSCULOSKELETAL: No joint pain or swelling; No muscle, back, or extremity pain  PSYCHIATRIC: No depression, anxiety, mood swings, or difficulty sleeping  All other review of systems negative    T(C): 36.1 (10-22-23 @ 05:05), Max: 36.7 (10-21-23 @ 14:05)  HR: 82 (10-22-23 @ 05:05) (82 - 92)  BP: 127/60 (10-22-23 @ 05:05) (127/60 - 157/94)  RR: 18 (10-22-23 @ 05:05) (18 - 18)  SpO2: --  Wt(kg): --Vital Signs Last 24 Hrs  T(C): 36.1 (22 Oct 2023 05:05), Max: 36.7 (21 Oct 2023 14:05)  T(F): 97 (22 Oct 2023 05:05), Max: 98.1 (21 Oct 2023 14:05)  HR: 82 (22 Oct 2023 05:05) (82 - 92)  BP: 127/60 (22 Oct 2023 05:05) (127/60 - 157/94)  BP(mean): 87 (22 Oct 2023 05:05) (87 - 110)  RR: 18 (22 Oct 2023 05:05) (18 - 18)  SpO2: --    Parameters below as of 21 Oct 2023 21:29  Patient On (Oxygen Delivery Method): room air          10-21-23 @ 07:01  -  10-22-23 @ 07:00  --------------------------------------------------------  IN: 0 mL / OUT: 750 mL / NET: -750 mL        PHYSICAL EXAM:  GENERAL: NAD, well-groomed, well-developed  PSYCH: no agitation, baseline mentation  NERVOUS SYSTEM:  Alert & Oriented X3, Motor Strength 5/5 B/L upper and lower extremities; Sensory intact; FTN WNL  PULMONARY: Clear to percussion bilaterally; No rales, rhonchi, wheezing, or rubs  CARDIOVASCULAR: Regular rate and rhythm; No murmurs, rubs, or gallops  GI: Soft, Nontender, Nondistended; Bowel sounds present  EXTREMITIES:  2+ Peripheral Pulses, No clubbing, cyanosis, or edema  LYMPH: No lymphadenopathy noted  SKIN: No rashes or lesions    Consultant(s) Notes Reviewed:  [x ] YES  [ ] NO    Discussed with Consultants/Other Providers [ x] YES     LABS  - no labs 10/22    POCT Blood Glucose.: 153 mg/dL (22 Oct 2023 07:46)      RADIOLOGY & ADDITIONAL TESTS:  - no images   Imaging Personally Reviewed:  [ ] YES  [ ] NO    MEDICATIONS  (STANDING):  ARIPiprazole 15 milliGRAM(s) Oral daily  aspirin  chewable 81 milliGRAM(s) Oral daily  atorvastatin 80 milliGRAM(s) Oral at bedtime  clopidogrel Tablet 75 milliGRAM(s) Oral daily  enoxaparin Injectable 40 milliGRAM(s) SubCutaneous every 24 hours  influenza   Vaccine 0.5 milliLiter(s) IntraMuscular once  lisinopril 5 milliGRAM(s) Oral daily    MEDICATIONS  (PRN):     ESPINOZA CARRINGTON  62y  Female      Patient is a 62y old  Female who presents with a chief complaint of right hand weakness/numbness (22 Oct 2023 10:26)      INTERVAL HPI/OVERNIGHT EVENTS: no acute events overnight. no major complaints this AM.       REVIEW OF SYSTEMS:  CONSTITUTIONAL: No fever, weight loss, or fatigue  RESPIRATORY: No cough, wheezing, chills or hemoptysis; No shortness of breath  CARDIOVASCULAR: No chest pain, palpitations, dizziness, or leg swelling  GASTROINTESTINAL: No abdominal or epigastric pain. No nausea, vomiting, or hematemesis; No diarrhea or constipation. No melena or hematochezia.  GENITOURINARY: No dysuria, frequency, hematuria, or incontinence  NEUROLOGICAL: No headaches, memory loss, loss of strength, numbness, or tremors  SKIN: No itching, burning, rashes, or lesions   MUSCULOSKELETAL: No joint pain or swelling; No muscle, back, or extremity pain  PSYCHIATRIC: No depression, anxiety, mood swings, or difficulty sleeping  All other review of systems negative    T(C): 36.1 (10-22-23 @ 05:05), Max: 36.7 (10-21-23 @ 14:05)  HR: 82 (10-22-23 @ 05:05) (82 - 92)  BP: 127/60 (10-22-23 @ 05:05) (127/60 - 157/94)  RR: 18 (10-22-23 @ 05:05) (18 - 18)  SpO2: --  Wt(kg): --Vital Signs Last 24 Hrs  T(C): 36.1 (22 Oct 2023 05:05), Max: 36.7 (21 Oct 2023 14:05)  T(F): 97 (22 Oct 2023 05:05), Max: 98.1 (21 Oct 2023 14:05)  HR: 82 (22 Oct 2023 05:05) (82 - 92)  BP: 127/60 (22 Oct 2023 05:05) (127/60 - 157/94)  BP(mean): 87 (22 Oct 2023 05:05) (87 - 110)  RR: 18 (22 Oct 2023 05:05) (18 - 18)  SpO2: --    Parameters below as of 21 Oct 2023 21:29  Patient On (Oxygen Delivery Method): room air          10-21-23 @ 07:01  -  10-22-23 @ 07:00  --------------------------------------------------------  IN: 0 mL / OUT: 750 mL / NET: -750 mL        PHYSICAL EXAM:  GENERAL: middle age/elder, NAD, non toxic, watching tv  PSYCH: no agitation, friendly and cooperative, no pressured speech, no flight of ideas  NERVOUS SYSTEM:  Alert & Oriented X3, normal mentation  PULMONARY: symmetrical chest rise, no accessory muscle use  CARDIOVASCULAR: non tachycardic  GI: Soft, Nontender, Nondistended; Bowel sounds present  EXTREMITIES:  2+ Peripheral Pulses, No clubbing, cyanosis, or edema  SKIN: No rashes or lesion    Consultant(s) Notes Reviewed:  [x ] YES  [ ] NO    Discussed with Consultants/Other Providers [ x] YES     LABS  - no labs 10/22    POCT Blood Glucose.: 153 mg/dL (22 Oct 2023 07:46)      RADIOLOGY & ADDITIONAL TESTS:  - no images   Imaging Personally Reviewed:  [ ] YES  [ ] NO    MEDICATIONS  (STANDING):  ARIPiprazole 15 milliGRAM(s) Oral daily  aspirin  chewable 81 milliGRAM(s) Oral daily  atorvastatin 80 milliGRAM(s) Oral at bedtime  clopidogrel Tablet 75 milliGRAM(s) Oral daily  enoxaparin Injectable 40 milliGRAM(s) SubCutaneous every 24 hours  influenza   Vaccine 0.5 milliLiter(s) IntraMuscular once  lisinopril 5 milliGRAM(s) Oral daily    MEDICATIONS  (PRN):

## 2023-10-22 NOTE — DISCHARGE NOTE PROVIDER - NSDCCPCAREPLAN_GEN_ALL_CORE_FT
PRINCIPAL DISCHARGE DIAGNOSIS  Diagnosis: Weakness of extremity  Assessment and Plan of Treatment: During this hospital admission, you likely had an ischemic stroke. During an ischemic stroke, blood stops flowing to part of your brain because of a blockage in the blood vessel. This can damage areas in the brain that control other parts of the body.  Please take your aspirin and plavix for 21 days and then Aspirin daily for blood thinning and Atorvastatin for cholesterol medication/blood vessel protection as prescribed to prevent further strokes. Do not skip doses and do not run low on your medication. If you run low on your medication, please contact your doctor.  You were also found to have a hole in your heart that can contribute on having a stroke. You were asked to stay and get further workup but you wanted to leave against medical advice. Please follow up with MRI and further stroke workup. Follow up with stroke neurologist and psych. Take all meds as prescribed.  Psych follow up:  [facility], located at [address] [place] [address]. Please call  [phone] to make appointment.

## 2023-10-22 NOTE — PROGRESS NOTE ADULT - SUBJECTIVE AND OBJECTIVE BOX
Neurology Stroke Progress Note    INTERVAL HPI/OVERNIGHT EVENTS:  Patient seen and examined.  number ______ used.    MEDICATIONS  (STANDING):  ARIPiprazole 15 milliGRAM(s) Oral daily  aspirin  chewable 81 milliGRAM(s) Oral daily  atorvastatin 80 milliGRAM(s) Oral at bedtime  clopidogrel Tablet 75 milliGRAM(s) Oral daily  enoxaparin Injectable 40 milliGRAM(s) SubCutaneous every 24 hours  influenza   Vaccine 0.5 milliLiter(s) IntraMuscular once  lisinopril 5 milliGRAM(s) Oral daily    MEDICATIONS  (PRN):    Allergies    penicillin (Rash)    Intolerances      Vital Signs Last 24 Hrs  T(C): 36.1 (22 Oct 2023 05:05), Max: 36.7 (21 Oct 2023 14:05)  T(F): 97 (22 Oct 2023 05:05), Max: 98.1 (21 Oct 2023 14:05)  HR: 82 (22 Oct 2023 05:05) (82 - 92)  BP: 127/60 (22 Oct 2023 05:05) (127/60 - 157/94)  BP(mean): 87 (22 Oct 2023 05:05) (87 - 110)  RR: 18 (22 Oct 2023 05:05) (18 - 18)  SpO2: --    Parameters below as of 21 Oct 2023 21:29  Patient On (Oxygen Delivery Method): room air        Physical exam:  General: No acute distress, awake and alert  Eyes: Anicteric sclerae, moist conjunctivae, see below for CNs  Neck: trachea midline, FROM, supple, no thyromegaly or lymphadenopathy  Cardiovascular: Regular rate and rhythm, no murmurs, rubs, or gallops. No carotid bruits.   Pulmonary: Anterior breath sounds clear bilaterally, no crackles or wheezing. No use of accessory muscles  GI: Abdomen soft, non-distended, non-tender  Extremities: Radial and DP pulses +2, no edema    Neurologic:  -Mental status: Awake, alert, oriented to person, place, and time. Speech is fluent with intact naming, repetition, and comprehension, no dysarthria. Recent and remote memory intact. Follows commands. Attention/concentration intact. Fund of knowledge appropriate.  -Cranial nerves:   II: Visual fields are full to confrontation.  III, IV, VI: Extraocular movements are intact without nystagmus. Pupils equally round and reactive to light  V:  Facial sensation V1-V3 equal and intact   VII: Face is symmetric with normal eye closure and smile  VIII: Hearing is bilaterally intact to finger rub  IX, X: Uvula is midline and soft palate rises symmetrically  XI: Head turning and shoulder shrug are intact.  XII: Tongue protrudes midline  Motor: Normal bulk and tone. No pronator drift. Strength bilateral upper extremity 5/5, bilateral lower extremities 5/5.  Rapid alternating movements intact and symmetric  Sensation: Intact to light touch bilaterally. No neglect or extinction on double simultaneous testing.  Coordination: No dysmetria on finger-to-nose and heel-to-shin bilaterally  Reflexes: Downgoing toes bilaterally   Gait: Narrow gait and steady    LABS:                RADIOLOGY & ADDITIONAL TESTS:

## 2023-10-22 NOTE — DISCHARGE NOTE PROVIDER - HOSPITAL COURSE
Hospital course:  62F. PMH: Aortic stenosis (s/p repair 5/2023, on Aspirin 81mg), HTN, pre-DM, leukemia (in remission since 1996), Schizoaffective disorder ([facility] admission 5/2023), lost to follow up and consistent outpatient care for many years  Presented 10/19 c/o R hand weakness and numbness x2days. Sx started 10/17, when patient realized she could not properly hold things. She has also been experiencing unsteadiness on her feet x2d.  (19 Oct 2023 16:00)    During this hospital course, patient likely had a ischemic stroke located in left side of the brain. Pt refused to get MRI or repeat follow up CTH.  The stroke etiology is unknown as pt doesn't want to complete workup at this time.    Patient had the following workup done in house:  CT Head: negative for acute infarct. Focal white matter lucencies, indeterminate in etiology  MR Head Non Contrast: pt refused  CT Angio Head and neck: negative  []echo TTE with bubble: EF normal, PFO present  []labs pt refused labs    Pt was asked to get atleast repear CTH before leaving but she refused. Psych was consulted given schizoaffective disorder. No barrier to dc as per psych, and start abilify. o/p psych follow up.    Physical exam at discharge:  General:  Appearance is consistent with chronologic age.   Cognitive/Language:  Awake, alert, and oriented to person, place, time and date.  Recent and remote memory intact.  Fund of knowledge is appropriate.  Naming, repetition and comprehension intact. Nondysarthric.    Cranial Nerves  - Eyes: Visual acuity intact, Visual fields full.  EOMI w/o nystagmus, skew or reported double vision.  PERRL.  No ptosis/weakness of eyelid closure.    - Face:  Facial sensation normal V1 - 3, no facial asymmetry.    - Ears/Nose/Throat:  Hearing grossly intact b/l to finger rub.  Palate elevates midline.  Tongue and uvula midline.   Motor exam: Normal tone and bulk. No tenderness, twitching, tremors or involuntary movements.            Upper extremity                  Bicep     Tricep     HG                                                 R      5/5        5/5          5/5                                                    L       5/5        5/5          5/5              Lower extremity                   HF        KE        DF         PF                                                  R     5/5       5/5       5/5       5/5                                               L      5/5      5/5       5/5        5/5    Sensory examination:  decreased sensationi in R hand  Reflexes: 2+ b/l biceps, triceps, patella and achilles.  Plantar response downgoing b/l.  Yana, clonus absent.  Cerebellum: FTN/HKS intact.  No dysmetria.      NIHSS during discharge: 1 for mild sensory loss  mRS during discharge: 1    New medications on discharge: ASA and plavix for 21 days then ASA daily, statin, Abilify   Labs to be followed up: Stroke work up labs  Imaging to be done as outpatient: MRI brain and c spine  Further outpatient workup: stroke f/u in 1 week, psych follow up    pt refusing blood tests, imagings, and leaving AMA today. AMA signed form is in the chart.

## 2023-10-22 NOTE — PROGRESS NOTE ADULT - REASON FOR ADMISSION
right hand weakness/numbness

## 2023-10-22 NOTE — PROGRESS NOTE ADULT - ASSESSMENT
61 y/o F with Aortic stenosis (s/p repair 5/2023, on Aspirin 81mg), HTN, pre-DM, leukemia (in remission since 1996), Schizoaffective disorder ([facility] admission 5/2023), lost to follow up and consistent outpatient care for many years Presented 10/19 c/o R hand weakness and numbness x2days. Sx started few months ago, and now in the past 2-3 days getting worse. CTH, CTA neg. Admitted for stroke workup.    #Rt hand weakness, decreased sens in Rt hand, RLE R/p acute stroke vs cervical pathology  -CT Head No Cont (10.19.23 @ 10:17) >1.  No evidence of acute intracranial hemorrhage or large territory infarct.2.  Focal white matter lucencies, indeterminate in etiology. Further evaluation with brain MRI be obtained as clinically warranted.  - CTA H&N: No LVO, no major stenosis  - S/p Load Aspirin 325mg and Plavix 300mg  - C/w Aspirin 81mg and Plavix 75mg daily  - Neurochecks & Vitals q8 hrs  - Fall and Aspiration precautions  - Provide stroke education  - pt refusing tele monitoring  - Physical therapy: outpatient therapy  - Occupational therapy: home  - Speech and Swallow: regular diet with thin liquids  - F/u HbA1c, TSH and Lipid panel  - F/u Obtain Utox  - F/u MRI Brain Non-con and MRI c spine: cancelled. follow up with Neuro regarding plan for complete work up. not needed   - EEG: Impression- Abnormal routine EEG, awake and asleep . There was mild excess intermittent slow wave activity  -  TTE ordered by Neuro  -  follow up with Neuro regarding plan for complete work up  - C/w Atorvastatin 80mg daily    #Schizoaffective disorder  - not on any meds currently  - speaking with herself when no one is around as per staff  - psych c/s:  restarting abilify 15mg qdaily     #Hypertension  -  Goal -160  - hold home blood pressure medication for now  - obtain TTE    #HLD  - high dose statin as above in CVA  - LDL results: 89    #Hyperglycemia/H/o Prediabetes and likely new Dx of DM/Morbid Obesity  monitor FS  start Insulin if FS persistently high i.e ISS  wt loss diet exercise  further recs based on FS, A1C    Hyperkalemia  repeat labs (d/w PA)    # Misc  - DVT Prophylaxis: Intermittent Pneumatic Compression, Lovenox  - GI Prophylaxis: - Diet: Diet, DASH/TLC  - Code Status: Full    #Progress Note Handoff  Pending: Neuro follow up  Pt/Family discussion: Pt informed and agrees with the current plan  Disposition: Home______/SNF_______/4A______/To be determined____x____     61 y/o F with Aortic stenosis (s/p repair 5/2023, on Aspirin 81mg), HTN, pre-DM, leukemia (in remission since 1996), Schizoaffective disorder ([facility] admission 5/2023), lost to follow up and consistent outpatient care for many years Presented 10/19 c/o R hand weakness and numbness x2days. Sx started few months ago, and now in the past 2-3 days getting worse. CTH, CTA neg. Admitted for stroke workup.    #Rt hand weakness, decreased sens in Rt hand, RLE R/p acute stroke vs cervical pathology  -CT Head No Cont (10.19.23 @ 10:17) >1.  No evidence of acute intracranial hemorrhage or large territory infarct.2.  Focal white matter lucencies, indeterminate in etiology. Further evaluation with brain MRI be obtained as clinically warranted.  - CTA H&N: No LVO, no major stenosis  - S/p Load Aspirin 325mg and Plavix 300mg  - C/w Aspirin 81mg and Plavix 75mg daily  - Neurochecks & Vitals q8 hrs  - Fall and Aspiration precautions  - Provide stroke education  - pt refusing tele monitoring  - Physical therapy: outpatient therapy  - Occupational therapy: home  - Speech and Swallow: regular diet with thin liquids  - F/u HbA1c, TSH and Lipid panel  - F/u Obtain Utox  - F/u MRI Brain Non-con and MRI c spine: cancelled. follow up with Neuro regarding plan for complete work up. not needed   - EEG: Impression- Abnormal routine EEG, awake and asleep . There was mild excess intermittent slow wave activity  -  TTE ordered by Neuro  -  follow up with Neuro regarding plan for complete work up  - C/w Atorvastatin 80mg daily    #Schizoaffective disorder  - not on any meds currently  - speaking with herself when no one is around as per staff  - psych c/s:  restarting abilify 15mg qdaily     #Hypertension  -  Goal -160  - hold home blood pressure medication for now  - obtain TTE    #HLD  - high dose statin as above in CVA  - LDL results: 89    #Hyperglycemia/H/o Prediabetes and likely new Dx of DM/Morbid Obesity  monitor FS  start Insulin if FS persistently high i.e ISS  wt loss diet exercise  further recs based on FS, A1C    Hyperkalemia  repeat labs (d/w PA)    # Misc  - DVT Prophylaxis: Intermittent Pneumatic Compression, Lovenox  - GI Prophylaxis: - Diet: Diet, DASH/TLC  - Code Status: Full    #Progress Note Handoff  Pending: Neuro follow up  Pt/Family discussion: Pt informed and agrees with the current plan  Disposition: Home today

## 2023-10-22 NOTE — DISCHARGE NOTE PROVIDER - NSDCCONDITION_GEN_ALL_CORE
I advised Anay at [facility] that it's okay for patient to come for 1:30.   Stable

## 2023-10-22 NOTE — DISCHARGE NOTE PROVIDER - NSDCMRMEDTOKEN_GEN_ALL_CORE_FT
ARIPiprazole 15 mg oral tablet: 1 tab(s) orally once a day  aspirin 81 mg oral tablet, chewable: 1 tab(s) orally once a day Continue until told otherwise by your provider  atorvastatin 80 mg oral tablet: 1 tab(s) orally once a day (at bedtime)  clopidogrel 75 mg oral tablet: 1 tab(s) orally once a day  lisinopril 5 mg oral tablet: 1 tab(s) orally once a day

## 2023-10-27 ENCOUNTER — INPATIENT (INPATIENT)
Facility: HOSPITAL | Age: 63
LOS: 8 days | Discharge: ANOTHER HOSPITAL | DRG: 885 | End: 2023-11-05
Attending: PSYCHIATRY & NEUROLOGY | Admitting: PSYCHIATRY & NEUROLOGY
Payer: MEDICARE

## 2023-10-27 VITALS
HEART RATE: 74 BPM | HEIGHT: 61 IN | WEIGHT: 179.9 LBS | TEMPERATURE: 97 F | OXYGEN SATURATION: 96 % | SYSTOLIC BLOOD PRESSURE: 131 MMHG | DIASTOLIC BLOOD PRESSURE: 74 MMHG | RESPIRATION RATE: 18 BRPM

## 2023-10-27 DIAGNOSIS — Z95.2 PRESENCE OF PROSTHETIC HEART VALVE: Chronic | ICD-10-CM

## 2023-10-27 LAB
ANION GAP SERPL CALC-SCNC: 13 MMOL/L — SIGNIFICANT CHANGE UP (ref 7–14)
ANION GAP SERPL CALC-SCNC: 13 MMOL/L — SIGNIFICANT CHANGE UP (ref 7–14)
APAP SERPL-MCNC: <5 UG/ML — LOW (ref 10–30)
APAP SERPL-MCNC: <5 UG/ML — LOW (ref 10–30)
BASOPHILS # BLD AUTO: 0.03 K/UL — SIGNIFICANT CHANGE UP (ref 0–0.2)
BASOPHILS # BLD AUTO: 0.03 K/UL — SIGNIFICANT CHANGE UP (ref 0–0.2)
BASOPHILS NFR BLD AUTO: 0.4 % — SIGNIFICANT CHANGE UP (ref 0–1)
BASOPHILS NFR BLD AUTO: 0.4 % — SIGNIFICANT CHANGE UP (ref 0–1)
BUN SERPL-MCNC: 20 MG/DL — SIGNIFICANT CHANGE UP (ref 10–20)
BUN SERPL-MCNC: 20 MG/DL — SIGNIFICANT CHANGE UP (ref 10–20)
CALCIUM SERPL-MCNC: 9.2 MG/DL — SIGNIFICANT CHANGE UP (ref 8.4–10.5)
CALCIUM SERPL-MCNC: 9.2 MG/DL — SIGNIFICANT CHANGE UP (ref 8.4–10.5)
CHLORIDE SERPL-SCNC: 101 MMOL/L — SIGNIFICANT CHANGE UP (ref 98–110)
CHLORIDE SERPL-SCNC: 101 MMOL/L — SIGNIFICANT CHANGE UP (ref 98–110)
CO2 SERPL-SCNC: 22 MMOL/L — SIGNIFICANT CHANGE UP (ref 17–32)
CO2 SERPL-SCNC: 22 MMOL/L — SIGNIFICANT CHANGE UP (ref 17–32)
CREAT SERPL-MCNC: 1 MG/DL — SIGNIFICANT CHANGE UP (ref 0.7–1.5)
CREAT SERPL-MCNC: 1 MG/DL — SIGNIFICANT CHANGE UP (ref 0.7–1.5)
EGFR: 64 ML/MIN/1.73M2 — SIGNIFICANT CHANGE UP
EGFR: 64 ML/MIN/1.73M2 — SIGNIFICANT CHANGE UP
EOSINOPHIL # BLD AUTO: 0.11 K/UL — SIGNIFICANT CHANGE UP (ref 0–0.7)
EOSINOPHIL # BLD AUTO: 0.11 K/UL — SIGNIFICANT CHANGE UP (ref 0–0.7)
EOSINOPHIL NFR BLD AUTO: 1.4 % — SIGNIFICANT CHANGE UP (ref 0–8)
EOSINOPHIL NFR BLD AUTO: 1.4 % — SIGNIFICANT CHANGE UP (ref 0–8)
ETHANOL SERPL-MCNC: <10 MG/DL — SIGNIFICANT CHANGE UP
ETHANOL SERPL-MCNC: <10 MG/DL — SIGNIFICANT CHANGE UP
GLUCOSE SERPL-MCNC: 116 MG/DL — HIGH (ref 70–99)
GLUCOSE SERPL-MCNC: 116 MG/DL — HIGH (ref 70–99)
HCT VFR BLD CALC: 37.9 % — SIGNIFICANT CHANGE UP (ref 37–47)
HCT VFR BLD CALC: 37.9 % — SIGNIFICANT CHANGE UP (ref 37–47)
HGB BLD-MCNC: 13 G/DL — SIGNIFICANT CHANGE UP (ref 12–16)
HGB BLD-MCNC: 13 G/DL — SIGNIFICANT CHANGE UP (ref 12–16)
IMM GRANULOCYTES NFR BLD AUTO: 0.2 % — SIGNIFICANT CHANGE UP (ref 0.1–0.3)
IMM GRANULOCYTES NFR BLD AUTO: 0.2 % — SIGNIFICANT CHANGE UP (ref 0.1–0.3)
LYMPHOCYTES # BLD AUTO: 1.74 K/UL — SIGNIFICANT CHANGE UP (ref 1.2–3.4)
LYMPHOCYTES # BLD AUTO: 1.74 K/UL — SIGNIFICANT CHANGE UP (ref 1.2–3.4)
LYMPHOCYTES # BLD AUTO: 21.6 % — SIGNIFICANT CHANGE UP (ref 20.5–51.1)
LYMPHOCYTES # BLD AUTO: 21.6 % — SIGNIFICANT CHANGE UP (ref 20.5–51.1)
MCHC RBC-ENTMCNC: 29.7 PG — SIGNIFICANT CHANGE UP (ref 27–31)
MCHC RBC-ENTMCNC: 29.7 PG — SIGNIFICANT CHANGE UP (ref 27–31)
MCHC RBC-ENTMCNC: 34.3 G/DL — SIGNIFICANT CHANGE UP (ref 32–37)
MCHC RBC-ENTMCNC: 34.3 G/DL — SIGNIFICANT CHANGE UP (ref 32–37)
MCV RBC AUTO: 86.7 FL — SIGNIFICANT CHANGE UP (ref 81–99)
MCV RBC AUTO: 86.7 FL — SIGNIFICANT CHANGE UP (ref 81–99)
MONOCYTES # BLD AUTO: 0.51 K/UL — SIGNIFICANT CHANGE UP (ref 0.1–0.6)
MONOCYTES # BLD AUTO: 0.51 K/UL — SIGNIFICANT CHANGE UP (ref 0.1–0.6)
MONOCYTES NFR BLD AUTO: 6.3 % — SIGNIFICANT CHANGE UP (ref 1.7–9.3)
MONOCYTES NFR BLD AUTO: 6.3 % — SIGNIFICANT CHANGE UP (ref 1.7–9.3)
NEUTROPHILS # BLD AUTO: 5.63 K/UL — SIGNIFICANT CHANGE UP (ref 1.4–6.5)
NEUTROPHILS # BLD AUTO: 5.63 K/UL — SIGNIFICANT CHANGE UP (ref 1.4–6.5)
NEUTROPHILS NFR BLD AUTO: 70.1 % — SIGNIFICANT CHANGE UP (ref 42.2–75.2)
NEUTROPHILS NFR BLD AUTO: 70.1 % — SIGNIFICANT CHANGE UP (ref 42.2–75.2)
NRBC # BLD: 0 /100 WBCS — SIGNIFICANT CHANGE UP (ref 0–0)
NRBC # BLD: 0 /100 WBCS — SIGNIFICANT CHANGE UP (ref 0–0)
PLATELET # BLD AUTO: 238 K/UL — SIGNIFICANT CHANGE UP (ref 130–400)
PLATELET # BLD AUTO: 238 K/UL — SIGNIFICANT CHANGE UP (ref 130–400)
PMV BLD: 9 FL — SIGNIFICANT CHANGE UP (ref 7.4–10.4)
PMV BLD: 9 FL — SIGNIFICANT CHANGE UP (ref 7.4–10.4)
POTASSIUM SERPL-MCNC: 4.1 MMOL/L — SIGNIFICANT CHANGE UP (ref 3.5–5)
POTASSIUM SERPL-MCNC: 4.1 MMOL/L — SIGNIFICANT CHANGE UP (ref 3.5–5)
POTASSIUM SERPL-SCNC: 4.1 MMOL/L — SIGNIFICANT CHANGE UP (ref 3.5–5)
POTASSIUM SERPL-SCNC: 4.1 MMOL/L — SIGNIFICANT CHANGE UP (ref 3.5–5)
RBC # BLD: 4.37 M/UL — SIGNIFICANT CHANGE UP (ref 4.2–5.4)
RBC # BLD: 4.37 M/UL — SIGNIFICANT CHANGE UP (ref 4.2–5.4)
RBC # FLD: 13.1 % — SIGNIFICANT CHANGE UP (ref 11.5–14.5)
RBC # FLD: 13.1 % — SIGNIFICANT CHANGE UP (ref 11.5–14.5)
SALICYLATES SERPL-MCNC: <0.3 MG/DL — LOW (ref 4–30)
SALICYLATES SERPL-MCNC: <0.3 MG/DL — LOW (ref 4–30)
SODIUM SERPL-SCNC: 136 MMOL/L — SIGNIFICANT CHANGE UP (ref 135–146)
SODIUM SERPL-SCNC: 136 MMOL/L — SIGNIFICANT CHANGE UP (ref 135–146)
WBC # BLD: 8.04 K/UL — SIGNIFICANT CHANGE UP (ref 4.8–10.8)
WBC # BLD: 8.04 K/UL — SIGNIFICANT CHANGE UP (ref 4.8–10.8)
WBC # FLD AUTO: 8.04 K/UL — SIGNIFICANT CHANGE UP (ref 4.8–10.8)
WBC # FLD AUTO: 8.04 K/UL — SIGNIFICANT CHANGE UP (ref 4.8–10.8)

## 2023-10-27 PROCEDURE — 99285 EMERGENCY DEPT VISIT HI MDM: CPT

## 2023-10-27 PROCEDURE — 93010 ELECTROCARDIOGRAM REPORT: CPT

## 2023-10-27 NOTE — ED PROVIDER NOTE - OBJECTIVE STATEMENT
62-year-old female with past medical history of schizoaffective disorder, leukemia (in remission since 1996), aortic stenosis s/p repair 5/23, HTN, prediabetes presents with complaint of suicidal ideation.  Reports that she feels depressed and wants to kill herself by jumping in front of a truck.  Also endorses homicidal ideation (without plan).  Patient reports she is homeless and wants to be evaluated by psychiatry due to concern of harming herself or others.  States she follows with a psychiatrist outpatient but does not remember his name.  Denies use of drugs or alcohol.  Denies fever/chills, chest pain, shortness of breath, abdominal pain, nausea/vomiting/diarrhea, change in bowel/bladder habits, melena/hematochezia, dysuria/frequency/urgency/hematuria, numbness/tingling.

## 2023-10-27 NOTE — ED PROVIDER NOTE - CLINICAL SUMMARY MEDICAL DECISION MAKING FREE TEXT BOX
patient presents for evaluation of increasing depression with suicidal ideations as well as homicidal ideation we obtain EKG per my independent valuation nonexistent with STEMI not consistent with QT prolongation or arrhythmia we obtain labs for medical clearance patient is medically cleared we consulted telepsychiatry who recommended admission to [facility] for further management

## 2023-10-27 NOTE — ED ADULT TRIAGE NOTE - CHIEF COMPLAINT QUOTE
PT states she is suicidal and wants to "jump in front of a truck". States she wants to hurt people also.

## 2023-10-27 NOTE — ED ADULT NURSE NOTE - NSFALLUNIVINTERV_ED_ALL_ED
Bed/Stretcher in lowest position, wheels locked, appropriate side rails in place/Call bell, personal items and telephone in reach/Instruct patient to call for assistance before getting out of bed/chair/stretcher/Non-slip footwear applied when patient is off stretcher/[place] to call system/Physically safe environment - no spills, clutter or unnecessary equipment/Purposeful proactive rounding/Room/bathroom lighting operational, light cord in reach

## 2023-10-27 NOTE — ED PROVIDER NOTE - NS ED MD DISPO DIVISION
Preop screen completed.  Preop instructions and preop medication instructions reviewed with patient.  Patient instructed to hold/stop all blood thinning medications, prior to surgery, following the pre-surgery recommended guidelines.  Patient verbalized understanding.  
[facility]

## 2023-10-27 NOTE — ED PROVIDER NOTE - ATTENDING APP SHARED VISIT CONTRIBUTION OF CARE
I have personally performed a history and physical exam on this patient and personally directed the management of the patient. Patient is a 62-year-old female past medical history of schizoaffective disorder history of leukemia however in remission as well as hypertension presents for evaluation to the emergency department today with complaints of suicidal ideation states that she feels depressed she specifically plans to kill herself by jumping in front of a truck endorses homicidal ideation but denies any specific person she denies any other symptoms denies headache visual changes chest pain shortness of breath abdominal pain back pain or extremity pain denies any fevers or chills patient denies any illicit substance use    On physical exam overall patient is well-appearing normocephalic atraumatic pupils equal round react to light and combination extraocular muscles intact oropharynx clear chest clear to auscultation bilaterally abdomen soft nontender nondistended bowel sounds positive no guarding or rebound pedal pulse 2+ radial pulses 2+ patient is able to ambulate    Assessment plan patient presents for evaluation of increasing depression with suicidal ideations as well as homicidal ideation we obtain EKG per my independent valuation nonexistent with STEMI not consistent with QT prolongation or arrhythmia we obtain labs for medical clearance patient is medically cleared we consulted telepsychiatry who recommended admission to [facility] for further management

## 2023-10-27 NOTE — ED PROVIDER NOTE - PHYSICAL EXAMINATION
Physical Exam    Vital Signs: I have reviewed the initial vital signs.  Constitutional: appears stated age, no acute distress  Eyes: Sclera clear, EOMI.  Cardiovascular: S1 and S2, regular rate, regular rhythm, well-perfused extremities, radial pulses equal and 2+, pedal pulses 2+ and equal  Respiratory: unlabored respiratory effort, clear to auscultation bilaterally no wheezing, rales, or rhonchi  Gastrointestinal:  abdomen soft, non-tender  Musculoskeletal: supple neck, no lower extremity edema  Integumentary: warm, dry, no rash  Neurologic: awake, alert, oriented x3, extremities’ motor and sensory functions grossly intact

## 2023-10-27 NOTE — ED ADULT NURSE NOTE - CCCP TRG CHIEF CMPLNT
I spoke with wife per LAURA, she states pt was working out in the yard on 7/2 in the heat & ever since then he has been feeling light-heaeded. His BP has been elevated 170/90s & HR 50s.   Pt has been pushing fluids & Gatorade Zero & feels better at times, suicidal thoughts

## 2023-10-27 NOTE — ED ADULT NURSE NOTE - OBJECTIVE STATEMENT
pt complaining of feeling depressed, suicidal, and homicidal   pt stated she "would run into front of a truck"   pt denies plans for homicidal feelings   pt denies having access to weapons   pt denies drugs/smoking/drinking

## 2023-10-27 NOTE — ED PROVIDER NOTE - IV ALTEPLASE EXCL ABS HIDDEN
show s/p vaginal delivery.    limited physical and sexual activities for 5-6 weeks;   to office in 5-6 weeks; regular diet

## 2023-10-28 DIAGNOSIS — R45.851 SUICIDAL IDEATIONS: ICD-10-CM

## 2023-10-28 DIAGNOSIS — F25.1 SCHIZOAFFECTIVE DISORDER, DEPRESSIVE TYPE: ICD-10-CM

## 2023-10-28 LAB
APPEARANCE UR: ABNORMAL
APPEARANCE UR: ABNORMAL
BACTERIA # UR AUTO: ABNORMAL /HPF
BACTERIA # UR AUTO: ABNORMAL /HPF
BILIRUB UR-MCNC: NEGATIVE — SIGNIFICANT CHANGE UP
BILIRUB UR-MCNC: NEGATIVE — SIGNIFICANT CHANGE UP
COLOR SPEC: YELLOW — SIGNIFICANT CHANGE UP
COLOR SPEC: YELLOW — SIGNIFICANT CHANGE UP
DIFF PNL FLD: NEGATIVE — SIGNIFICANT CHANGE UP
DIFF PNL FLD: NEGATIVE — SIGNIFICANT CHANGE UP
EPI CELLS # UR: PRESENT
EPI CELLS # UR: PRESENT
GLUCOSE UR QL: NEGATIVE MG/DL — SIGNIFICANT CHANGE UP
GLUCOSE UR QL: NEGATIVE MG/DL — SIGNIFICANT CHANGE UP
KETONES UR-MCNC: 40 MG/DL
KETONES UR-MCNC: 40 MG/DL
LEUKOCYTE ESTERASE UR-ACNC: ABNORMAL
LEUKOCYTE ESTERASE UR-ACNC: ABNORMAL
NITRITE UR-MCNC: NEGATIVE — SIGNIFICANT CHANGE UP
NITRITE UR-MCNC: NEGATIVE — SIGNIFICANT CHANGE UP
PH UR: 5.5 — SIGNIFICANT CHANGE UP (ref 5–8)
PH UR: 5.5 — SIGNIFICANT CHANGE UP (ref 5–8)
PROT UR-MCNC: 30 MG/DL
PROT UR-MCNC: 30 MG/DL
RBC CASTS # UR COMP ASSIST: 4 /HPF — SIGNIFICANT CHANGE UP (ref 0–4)
RBC CASTS # UR COMP ASSIST: 4 /HPF — SIGNIFICANT CHANGE UP (ref 0–4)
SARS-COV-2 RNA SPEC QL NAA+PROBE: SIGNIFICANT CHANGE UP
SARS-COV-2 RNA SPEC QL NAA+PROBE: SIGNIFICANT CHANGE UP
SP GR SPEC: 1.02 — SIGNIFICANT CHANGE UP (ref 1–1.03)
SP GR SPEC: 1.02 — SIGNIFICANT CHANGE UP (ref 1–1.03)
UROBILINOGEN FLD QL: 1 MG/DL — SIGNIFICANT CHANGE UP (ref 0.2–1)
UROBILINOGEN FLD QL: 1 MG/DL — SIGNIFICANT CHANGE UP (ref 0.2–1)
WBC UR QL: 25 /HPF — HIGH (ref 0–5)
WBC UR QL: 25 /HPF — HIGH (ref 0–5)

## 2023-10-28 PROCEDURE — 83036 HEMOGLOBIN GLYCOSYLATED A1C: CPT

## 2023-10-28 PROCEDURE — 97110 THERAPEUTIC EXERCISES: CPT | Mod: GP

## 2023-10-28 PROCEDURE — 84484 ASSAY OF TROPONIN QUANT: CPT

## 2023-10-28 PROCEDURE — 36415 COLL VENOUS BLD VENIPUNCTURE: CPT

## 2023-10-28 PROCEDURE — 83880 ASSAY OF NATRIURETIC PEPTIDE: CPT

## 2023-10-28 PROCEDURE — 80061 LIPID PANEL: CPT

## 2023-10-28 PROCEDURE — 99222 1ST HOSP IP/OBS MODERATE 55: CPT

## 2023-10-28 PROCEDURE — 97162 PT EVAL MOD COMPLEX 30 MIN: CPT | Mod: GP

## 2023-10-28 PROCEDURE — 97116 GAIT TRAINING THERAPY: CPT | Mod: GP

## 2023-10-28 PROCEDURE — 93005 ELECTROCARDIOGRAM TRACING: CPT

## 2023-10-28 PROCEDURE — 84443 ASSAY THYROID STIM HORMONE: CPT

## 2023-10-28 PROCEDURE — 85027 COMPLETE CBC AUTOMATED: CPT

## 2023-10-28 PROCEDURE — 80053 COMPREHEN METABOLIC PANEL: CPT

## 2023-10-28 RX ORDER — INFLUENZA VIRUS VACCINE 15; 15; 15; 15 UG/.5ML; UG/.5ML; UG/.5ML; UG/.5ML
0.5 SUSPENSION INTRAMUSCULAR ONCE
Refills: 0 | Status: DISCONTINUED | OUTPATIENT
Start: 2023-10-28 | End: 2023-11-05

## 2023-10-28 RX ORDER — ATORVASTATIN CALCIUM 80 MG/1
80 TABLET, FILM COATED ORAL AT BEDTIME
Refills: 0 | Status: DISCONTINUED | OUTPATIENT
Start: 2023-10-28 | End: 2023-10-30

## 2023-10-28 RX ORDER — ARIPIPRAZOLE 15 MG/1
15 TABLET ORAL DAILY
Refills: 0 | Status: DISCONTINUED | OUTPATIENT
Start: 2023-10-28 | End: 2023-10-30

## 2023-10-28 RX ORDER — CLOPIDOGREL BISULFATE 75 MG/1
75 TABLET, FILM COATED ORAL DAILY
Refills: 0 | Status: DISCONTINUED | OUTPATIENT
Start: 2023-10-28 | End: 2023-10-30

## 2023-10-28 RX ORDER — LISINOPRIL 2.5 MG/1
5 TABLET ORAL DAILY
Refills: 0 | Status: DISCONTINUED | OUTPATIENT
Start: 2023-10-28 | End: 2023-11-05

## 2023-10-28 RX ORDER — HALOPERIDOL DECANOATE 100 MG/ML
5 INJECTION INTRAMUSCULAR EVERY 6 HOURS
Refills: 0 | Status: DISCONTINUED | OUTPATIENT
Start: 2023-10-28 | End: 2023-11-05

## 2023-10-28 RX ORDER — ASPIRIN/CALCIUM CARB/MAGNESIUM 324 MG
81 TABLET ORAL DAILY
Refills: 0 | Status: DISCONTINUED | OUTPATIENT
Start: 2023-10-28 | End: 2023-10-30

## 2023-10-28 RX ORDER — ACETAMINOPHEN 500 MG
650 TABLET ORAL EVERY 6 HOURS
Refills: 0 | Status: DISCONTINUED | OUTPATIENT
Start: 2023-10-28 | End: 2023-11-05

## 2023-10-28 RX ADMIN — ARIPIPRAZOLE 15 MILLIGRAM(S): 15 TABLET ORAL at 12:29

## 2023-10-28 RX ADMIN — LISINOPRIL 5 MILLIGRAM(S): 2.5 TABLET ORAL at 12:29

## 2023-10-28 RX ADMIN — Medication 81 MILLIGRAM(S): at 12:29

## 2023-10-28 RX ADMIN — Medication 1 TABLET(S): at 12:29

## 2023-10-28 NOTE — BH INPATIENT PSYCHIATRY ASSESSMENT NOTE - NSICDXPASTMEDICALHX_GEN_ALL_CORE_FT
PAST MEDICAL HISTORY:  H/O aortic valve disorder     Leukemia in remission    Schizoaffective disorder, depressive type

## 2023-10-28 NOTE — BH PATIENT PROFILE - NSINDCRISISTRIGGER_PSY_ALL_CORE
Being ignored/Hearing voices/Rejection/Someone in personal space/Temperature/Heat/Crowded spaces/Loud noises/Suspiciousness

## 2023-10-28 NOTE — ED BEHAVIORAL HEALTH ASSESSMENT NOTE - SUMMARY
The patient is a 62-year-old woman, living at [facility] shelter, on disability since 2020, PMH of HTN, aortic valve replacement 5/2023, prediabetes, leukemia in remission since 1996, recent medical admission (10/19/2023-10/22/2023) for possible ischemic stroke of left side (patient refused full work-up), PPH of schizoaffective disorder depressive type, 7 prior hospitalizations (last at [facility] in May 2023 for SI), no suicide attempts or NSSIB, no violence, no substance use, no outpatient treatment, who presents with suicidal ideation and homicidal ideation.     The patient presents with suicidal ideation with plan to jump in front of a truck and homicidal ideation in the context of homelessness. She is coherent and organized though also presents somewhat guarded, delusional with paranoid thoughts around government and Jewish. She has poor outpatient follow-up, no social support, limited insight though some good judgment in reaching out for help. She is wanting inpatient admission and meets criteria for psychiatric admission due to safety concerns for self and others. The patient is NOT psychiatrically cleared for discharge.    Plan:  -- 9.13 admission  -- continue Abilify 15mg daily  -- Haldol 5mg/Ativan 2mg/Benadryl 50mg PO/IM q4h PRN for agitation

## 2023-10-28 NOTE — ED BEHAVIORAL HEALTH NOTE - BEHAVIORAL HEALTH NOTE
==================     PRE-HOSPITAL COURSE     ===================     SOURCE:  RN Jenny and secondhand ED documentation     DETAILS: Pt arrived via walk-in presenting with SI w/ plan to jump in front of truck. Per RN, pt has remained calm and cooperative amid ED course.      =========     ED COURSE     =========     SOURCE:  RN and secondhand ED documentation.     ARRIVAL:  Per chart and RN, patient arrived via walk-in with no social supports. Per RN, patient was calm upon arrival, and cooperative with triage process    BELONGINGS: all belongings were provided to hospital security, and patient currently in a gown with a 1:1 staff member.     BEHAVIOR: RN described patient to be calm, presenting with organized thought process, (AAOx4), presenting with depressed mood and congruent affect, remains in behavioral and impulse control, is not violent/aggressive. RN stated that the patient is  endorsing SI and denying HI/A/VH. RN stated that there are no visible marks, bruises, or lacerations on the body. RN stated that the patient appears to be well groomed, maintains good hygiene, and reports independent ADLs, ambulates without assistance.     TREATMENT:  Per chart and RN, patient did not require PRN medications.     VISITORS:  Per RN, there are no social supports accompanying pt in the ER.

## 2023-10-28 NOTE — BH INPATIENT PSYCHIATRY ASSESSMENT NOTE - DETAILS
Haldol patient with SI and plan to jump in front of a truck  Patient reports ego-dystonic thoughts of suicide, which she felt were not intrusive or coupled with any urge or inclination to act on them   While in the hospital

## 2023-10-28 NOTE — ED BEHAVIORAL HEALTH ASSESSMENT NOTE - DETAILS
patient came alone Haldol spoke to charge nurse patient with SI and plan to jump in front of a truck

## 2023-10-28 NOTE — BH PATIENT PROFILE - NSBHSNSOFSAFETY_PSY_A_CORE
thinking about my future/calling significant other/family member/counting to 10/drinking tea or coffee/exercising/fresh air breaks/going to their room/listening to music/reading/showering/taking a nap/talking to someone/other

## 2023-10-28 NOTE — BH INPATIENT PSYCHIATRY ASSESSMENT NOTE - NSCOMMENTSUICPROTRISKFACT_PSY_ALL_CORE
patient said she previously did that she was "homicidal" but had no thoughts  or history of hurting anyone.

## 2023-10-28 NOTE — BH PATIENT PROFILE - NSINDCRISISSIGNS_PSY_ALL_CORE
none/Becoming argumentative/Glaring/Rolling of eyes/Clenching fists/Scratching self/Swallowing objects/Punching walls/Self-injury/Cursing/Yelling
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
## 2023-10-28 NOTE — BH INPATIENT PSYCHIATRY ASSESSMENT NOTE - ADDITIONAL DETAILS / COMMENTS
On the treatment unit Insight and judgment were such that she identified herself as having a psychiatric problem and needing medication   And the structure of an inpatient unit.

## 2023-10-28 NOTE — ED BEHAVIORAL HEALTH ASSESSMENT NOTE - AXIS III
HTN, aortic valve replacement 5/2023, prediabetes, leukemia in remission since 1996, recent medical admission (10/19/2023-10/22/2023) for possible ischemic stroke of left side (patient refused full work-up)

## 2023-10-28 NOTE — BH INPATIENT PSYCHIATRY ASSESSMENT NOTE - NSBHMETABOLIC_PSY_ALL_CORE_FT
BMI: BMI (kg/m2): 35.7 (10-28-23 @ 04:32)  HbA1c: A1C with Estimated Average Glucose Result: 6.0 % (05-25-23 @ 08:59)    Glucose: POCT Blood Glucose.: 153 mg/dL (10-22-23 @ 07:46)    BP: 166/79 (10-28-23 @ 12:30) (125/69 - 191/72)  Lipid Panel: Date/Time: 05-25-23 @ 08:59  Cholesterol, Serum: 225  Direct LDL: --  HDL Cholesterol, Serum: 56  Total Cholesterol/HDL Ration Measurement: --  Triglycerides, Serum: 398

## 2023-10-28 NOTE — BH INPATIENT PSYCHIATRY ASSESSMENT NOTE - CURRENT MEDICATION
MEDICATIONS  (STANDING):  ARIPiprazole 15 milliGRAM(s) Oral daily  aspirin  chewable 81 milliGRAM(s) Oral daily  atorvastatin 80 milliGRAM(s) Oral at bedtime  clopidogrel Tablet 75 milliGRAM(s) Oral daily  influenza   Vaccine 0.5 milliLiter(s) IntraMuscular once  lisinopril 5 milliGRAM(s) Oral daily  trimethoprim  160 mG/sulfamethoxazole 800 mG 1 Tablet(s) Oral daily    MEDICATIONS  (PRN):  acetaminophen     Tablet .. 650 milliGRAM(s) Oral every 6 hours PRN Temp greater or equal to 38C (100.4F), Mild Pain (1 - 3)  haloperidol     Tablet 5 milliGRAM(s) Oral every 6 hours PRN agitation  LORazepam     Tablet 2 milliGRAM(s) Oral every 6 hours PRN Agitation

## 2023-10-28 NOTE — BH INPATIENT PSYCHIATRY ASSESSMENT NOTE - NSBHASSESSSUMMFT_PSY_ALL_CORE
62F, living at [facility], on disability since 2020, PMH of HTN, aortic valve replacement, psych hx of schizoaffective disorder depressive type, no suicide attempts or NSSIB, no violence, no substance use, 6 prior hospitalizations (last at [facility] in november 2022 for SI), now BIBS reporting suicide ideation, HI, intermittent CAHKS.   12/28  patient is calm and cooperative on the unit      # Aortic stenosis and history of valve replacement  aspirin  chewable 81 milliGRAM(s) Oral daily  atorvastatin 80 milliGRAM(s) Oral at bedtime  clopidogrel Tablet 75 milliGRAM(s) Oral daily  influenza   Vaccine 0.5 milliLiter(s) IntraMuscular once  lisinopril 5 milliGRAM(s) Oral daily        #Schizoaffective disorder  ARIPiprazole 15 milliGRAM(s) Oral daily    -Tylenol PRN for pain    #Agitation  -for agitation not amenable to verbal redirection,   -Hydroxyzine 50mg Q6 PRN for anxiety/insomnia  -Haldol 5mg Q6 PRN for agitation/psychosis  -Benadryl 50mg Q6 PRN got EPS  -Lorazepam 2mg Q6 PRN for aggression      #UTI  -trimethoprim  160 mG/sulfamethoxazole 800 mG 1 Tablet(s) Oral daily

## 2023-10-28 NOTE — ED BEHAVIORAL HEALTH ASSESSMENT NOTE - RISK ASSESSMENT
Risk: multiple prior hospitalizations, no social support, homelessness, unemployed, chronic medical conditions  Protective: no prior SA/NSSI, help-seeking demeanor, no substance abuse, no history of violence

## 2023-10-28 NOTE — BH INPATIENT PSYCHIATRY ASSESSMENT NOTE - NSBHCHARTREVIEWVS_PSY_A_CORE FT
Vital Signs Last 24 Hrs  T(C): 36.4 (10-28-23 @ 08:59), Max: 36.4 (10-28-23 @ 08:59)  T(F): 97.6 (10-28-23 @ 08:59), Max: 97.6 (10-28-23 @ 08:59)  HR: 95 (10-28-23 @ 12:30) (74 - 118)  BP: 166/79 (10-28-23 @ 12:30) (125/69 - 191/72)  BP(mean): --  RR: 17 (10-28-23 @ 08:59) (16 - 18)  SpO2: 100% (10-28-23 @ 02:16) (96% - 100%)

## 2023-10-28 NOTE — ED BEHAVIORAL HEALTH ASSESSMENT NOTE - OTHER PAST PSYCHIATRIC HISTORY (INCLUDE DETAILS REGARDING ONSET, COURSE OF ILLNESS, INPATIENT/OUTPATIENT TREATMENT)
Diagnoses: schizoaffective disorder, depressive type     Inpatient: most recent 5/24/2023 - 6/2/2023 for SI, HI, CAH     Outpatient: has appointment with an outpatient provider in about a month     SA: none     NSSI: none

## 2023-10-28 NOTE — ED BEHAVIORAL HEALTH ASSESSMENT NOTE - DESCRIPTION
The patient is calm, cooperative. HTN, aortic valve replacement 5/2023, prediabetes, leukemia in remission since 1996, recent medical admission (10/19/2023-10/22/2023) for possible ischemic stroke of left side (patient refused full work-up) homeless, never , no children, on disability

## 2023-10-28 NOTE — BH PATIENT PROFILE - HOME MEDICATIONS
ARIPiprazole 15 mg oral tablet , 1 tab(s) orally once a day  clopidogrel 75 mg oral tablet , 1 tab(s) orally once a day  aspirin 81 mg oral tablet, chewable , 1 tab(s) orally once a day Continue until told otherwise by your provider  atorvastatin 80 mg oral tablet , 1 tab(s) orally once a day (at bedtime)  lisinopril 5 mg oral tablet , 1 tab(s) orally once a day

## 2023-10-28 NOTE — ED BEHAVIORAL HEALTH ASSESSMENT NOTE - CURRENT PLAN
Arthroscopic Knee Surgery         Dressing/Bandage:      After surgery, you will have a white thigh high stocking on your operated leg. Five days after the surgery, you may remove the stocking and the dressing underneath the stocking. The small incisions around the knee will have Steri-Strips (butterfly bandages) on them. You may leave the Steri-Strips on your knee until your first visit back about ten days after the surgery.      ?   If the incisions are still draining five days after the surgery, put Band-Aids on the incisions until two days pass without any drainage. There are no stitches to be removed. You should put the stocking back on your leg until the swelling in your knee is gone.       ?   Crutches:       Use your crutches until you are able to walk without a limp and without pain. The amount of time that patients need crutches is variable, but on average it is less than a week.      ?   Pain Medications/Ice:      Your prescriptions for pain medications with be sent to the pharmacy at the [facility]. This is on the main floor by the elevators. You should pick them up before you leave the surgery center.  Most patients receive either Tramadol or Tylenol 3. This prescription will not be refilled. Dr. Alas will want you to be off pain medications 14 days after surgery.          Blood clots are rare after arthroscopic knee surgery; however, to help prevent them, you will be given a prescription for Lovenox ?      You may apply ice for two hours at a time (with two hours off) in the first few days after surgery to help with swelling and pain.      ?      Exercise:       After surgery there are three exercises that you should do five times a day.      Straight leg raises   Tightening of your thigh muscle (isometric quadriceps contractions)   Bending of your knee when seated in a chair to the point where you feel a stretch, but no pain.      ?   Shower/Hot Tubs:       You can shower five days after the  surgery. Do not go into a hot tub until at least three weeks after your surgery and your incisions are completely healed.   ?      Physical Therapy:       Your first physical therapy appointment will be about ten days after the surgery. Usually this is the same day as your first appointment to see Dr. Alas after the surgery.   ?      Date: 11/7/22                            Time: 9:45am                                       Place: [facility]         ?   Return to Work/Sports:       It is reasonable to expect that by six to eight weeks you should be able to engage in most of your former physical activities as long as they do not involve significant weight-bearing impact. Twisting maneuvers may have to be avoided for a longer time.       ?  If your job involves heavy work, such as a , you may require more time to return to your job (six to eight weeks) than if you have a sedentary job (days).                      Remember, though, that people who have arthroscopy can have many different diagnoses and preexisting conditions, so each patient's arthroscopic surgery is unique to that person. Recovery time will reflect that individuality      Call Dr. Alas ([phone]) immediately if you experience any of the following:       Fever   Chills   Persistent warmth or redness around the knee   Persistent or increased pain   Significant swelling in your knee  Increasing pain in your calf muscle   Shortness of breath or chest pain      Follow up appointment with Dr. Alas:      Date: 11/7/22                 Time: 8:40am                                       Place: [facility]             Yes

## 2023-10-28 NOTE — ED BEHAVIORAL HEALTH ASSESSMENT NOTE - NSACTIVEVENT_PSY_ALL_CORE
Chronic pain or other acute medical condition/Pending incarceration or homelessness/Inadequate social supports

## 2023-10-29 LAB
CHOLEST SERPL-MCNC: 165 MG/DL — SIGNIFICANT CHANGE UP
CHOLEST SERPL-MCNC: 165 MG/DL — SIGNIFICANT CHANGE UP
HDLC SERPL-MCNC: 57 MG/DL — SIGNIFICANT CHANGE UP
HDLC SERPL-MCNC: 57 MG/DL — SIGNIFICANT CHANGE UP
LIPID PNL WITH DIRECT LDL SERPL: 90 MG/DL — SIGNIFICANT CHANGE UP
LIPID PNL WITH DIRECT LDL SERPL: 90 MG/DL — SIGNIFICANT CHANGE UP
NON HDL CHOLESTEROL: 108 MG/DL — SIGNIFICANT CHANGE UP
NON HDL CHOLESTEROL: 108 MG/DL — SIGNIFICANT CHANGE UP
TRIGL SERPL-MCNC: 91 MG/DL — SIGNIFICANT CHANGE UP
TRIGL SERPL-MCNC: 91 MG/DL — SIGNIFICANT CHANGE UP

## 2023-10-29 PROCEDURE — 99231 SBSQ HOSP IP/OBS SF/LOW 25: CPT

## 2023-10-29 RX ADMIN — Medication 81 MILLIGRAM(S): at 08:57

## 2023-10-29 RX ADMIN — Medication 1 TABLET(S): at 08:57

## 2023-10-29 RX ADMIN — ARIPIPRAZOLE 15 MILLIGRAM(S): 15 TABLET ORAL at 08:57

## 2023-10-29 RX ADMIN — LISINOPRIL 5 MILLIGRAM(S): 2.5 TABLET ORAL at 08:57

## 2023-10-29 NOTE — BH INPATIENT PSYCHIATRY PROGRESS NOTE - NSBHMETABOLIC_PSY_ALL_CORE_FT
BMI: BMI (kg/m2): 35.7 (10-28-23 @ 04:32)  HbA1c: A1C with Estimated Average Glucose Result: 6.0 % (05-25-23 @ 08:59)    Glucose: POCT Blood Glucose.: 153 mg/dL (10-22-23 @ 07:46)    BP: 125/66 (10-29-23 @ 08:45) (124/73 - 191/72)  Lipid Panel: Date/Time: 10-29-23 @ 08:45  Cholesterol, Serum: 165  Direct LDL: --  HDL Cholesterol, Serum: 57  Total Cholesterol/HDL Ration Measurement: --  Triglycerides, Serum: 91

## 2023-10-29 NOTE — BH INPATIENT PSYCHIATRY PROGRESS NOTE - NSBHASSESSSUMMFT_PSY_ALL_CORE
62F, living at [facility], on disability since 2020, PMH of HTN, aortic valve replacement, psych hx of schizoaffective disorder depressive type, no suicide attempts or NSSIB, no violence, no substance use, 6 prior hospitalizations (last at [facility] in november 2022 for SI), now BIBS reporting suicide ideation, HI, intermittent CAHKS.   12/28  patient is calm and cooperative on the unit      # Aortic stenosis and history of valve replacement  aspirin  chewable 81 milliGRAM(s) Oral daily  atorvastatin 80 milliGRAM(s) Oral at bedtime  clopidogrel Tablet 75 milliGRAM(s) Oral daily  influenza   Vaccine 0.5 milliLiter(s) IntraMuscular once  lisinopril 5 milliGRAM(s) Oral daily        #Schizoaffective disorder  ARIPiprazole 15 milliGRAM(s) Oral daily    -Tylenol PRN for pain    #Agitation  -for agitation not amenable to verbal redirection,   -Hydroxyzine 50mg Q6 PRN for anxiety/insomnia  -Haldol 5mg Q6 PRN for agitation/psychosis  -Benadryl 50mg Q6 PRN got EPS  -Lorazepam 2mg Q6 PRN for aggression      #UTI  -trimethoprim  160 mG/sulfamethoxazole 800 mG 1 Tablet(s) Oral daily

## 2023-10-29 NOTE — BH INPATIENT PSYCHIATRY PROGRESS NOTE - NSBHFUPINTERVALHXFT_PSY_A_CORE
As per nurses, patient has been isolative, looks internally preoccupied, with passive SI, adherent to psychiatric medications but refusing Plavix and statin drug. Patient was seen in her room, reports feeling "so-so, still depressed and very forgetful", and wants to check if she has dementia. She makes multiple mistakes on elements of MMSE, reports that the current month is November and year 2024, that she is in [facility] and in [facility], recalls only one word out of 3 for immediate recall. At the same time she is fully able to participate in conversation, provides details and answers questions appropriately. She reports feeling "frightened and scared" but can not specify why; when asked about her past thoughts of ISAIAH etc., she denied having these thoughts currently. She denied SI. She states she refused Plavix because her MD did not want her to take it, but she is willing to reconsider.

## 2023-10-29 NOTE — BH INPATIENT PSYCHIATRY PROGRESS NOTE - NSBHCHARTREVIEWVS_PSY_A_CORE FT
Vital Signs Last 24 Hrs  T(C): 35.8 (10-29-23 @ 08:45), Max: 35.8 (10-29-23 @ 08:45)  T(F): 96.4 (10-29-23 @ 08:45), Max: 96.4 (10-29-23 @ 08:45)  HR: 82 (10-29-23 @ 08:45) (81 - 82)  BP: 125/66 (10-29-23 @ 08:45) (124/73 - 125/66)  BP(mean): --  RR: 18 (10-29-23 @ 08:45) (16 - 18)  SpO2: --

## 2023-10-30 LAB
A1C WITH ESTIMATED AVERAGE GLUCOSE RESULT: 7.1 % — HIGH (ref 4–5.6)
A1C WITH ESTIMATED AVERAGE GLUCOSE RESULT: 7.1 % — HIGH (ref 4–5.6)
ESTIMATED AVERAGE GLUCOSE: 157 MG/DL — HIGH (ref 68–114)
ESTIMATED AVERAGE GLUCOSE: 157 MG/DL — HIGH (ref 68–114)
TSH SERPL-MCNC: 5.04 UIU/ML — HIGH (ref 0.27–4.2)
TSH SERPL-MCNC: 5.04 UIU/ML — HIGH (ref 0.27–4.2)

## 2023-10-30 PROCEDURE — 99231 SBSQ HOSP IP/OBS SF/LOW 25: CPT

## 2023-10-30 RX ORDER — ARIPIPRAZOLE 15 MG/1
15 TABLET ORAL DAILY
Refills: 0 | Status: DISCONTINUED | OUTPATIENT
Start: 2023-10-30 | End: 2023-11-01

## 2023-10-30 RX ORDER — DIPHENHYDRAMINE HCL 50 MG
50 CAPSULE ORAL EVERY 6 HOURS
Refills: 0 | Status: DISCONTINUED | OUTPATIENT
Start: 2023-10-30 | End: 2023-11-05

## 2023-10-30 RX ORDER — CLOPIDOGREL BISULFATE 75 MG/1
75 TABLET, FILM COATED ORAL DAILY
Refills: 0 | Status: DISCONTINUED | OUTPATIENT
Start: 2023-10-30 | End: 2023-11-05

## 2023-10-30 RX ORDER — ASPIRIN/CALCIUM CARB/MAGNESIUM 324 MG
81 TABLET ORAL DAILY
Refills: 0 | Status: DISCONTINUED | OUTPATIENT
Start: 2023-10-30 | End: 2023-11-05

## 2023-10-30 RX ORDER — ATORVASTATIN CALCIUM 80 MG/1
80 TABLET, FILM COATED ORAL AT BEDTIME
Refills: 0 | Status: DISCONTINUED | OUTPATIENT
Start: 2023-10-30 | End: 2023-11-05

## 2023-10-30 RX ORDER — HYDROXYZINE HCL 10 MG
50 TABLET ORAL EVERY 6 HOURS
Refills: 0 | Status: DISCONTINUED | OUTPATIENT
Start: 2023-10-30 | End: 2023-11-05

## 2023-10-30 RX ADMIN — ARIPIPRAZOLE 15 MILLIGRAM(S): 15 TABLET ORAL at 08:12

## 2023-10-30 RX ADMIN — Medication 81 MILLIGRAM(S): at 08:12

## 2023-10-30 RX ADMIN — CLOPIDOGREL BISULFATE 75 MILLIGRAM(S): 75 TABLET, FILM COATED ORAL at 08:12

## 2023-10-30 RX ADMIN — ATORVASTATIN CALCIUM 80 MILLIGRAM(S): 80 TABLET, FILM COATED ORAL at 20:36

## 2023-10-30 RX ADMIN — Medication 1 TABLET(S): at 08:12

## 2023-10-30 RX ADMIN — LISINOPRIL 5 MILLIGRAM(S): 2.5 TABLET ORAL at 08:11

## 2023-10-30 NOTE — BH PATIENT CHARACTERISTICS SURVEY - NSPCSLIVINGSITUA_PSY_ALL_CORE
Homeless (Economic hardship "doubled up", shelter, hotel or motel, car, park, bus station, train station, campsite, transitional housing or other temporary living situation)

## 2023-10-30 NOTE — BH INPATIENT PSYCHIATRY PROGRESS NOTE - NSBHFUPINTERVALHXFT_PSY_A_CORE
Patient seen and evaluated as per nursing report no acute events. Verbalizes feeling better. Still admits to periods of depression. Patient on Abilify. Will continue to monitor and titrate accordingly. Patient verbalizes as with previous admissions Abilify is the only medication that works for her. States she has been unable to follow up with outpatient treatment but has been compliant with her Abilify. States she has been going to the hospital when she needs it. States she is sleeping well and appetite is good. Denies suicidal/homicidal ideations. Denies any A/V hallucinations. Was refusing Plavix and statin but now states she will take them. Patient visible on the unit attending groups.    Case reviewed with Attending psychiatrist Dr. Anderson

## 2023-10-30 NOTE — PHYSICAL THERAPY INITIAL EVALUATION ADULT - PATIENT/FAMILY AGREES WITH PLAN
[facility] Counseling: I discussed with the patient the risks of risankizumab-rzaa including but not limited to immunosuppression, and serious infections.  The patient understands that monitoring is required including a PPD at baseline and must alert us or the primary physician if symptoms of infection or other concerning signs are noted. yes

## 2023-10-30 NOTE — BH TREATMENT PLAN - NSTXDCOPLKINTERSW_PSY_ALL_CORE
SW will meet with patient daily to provide support, education, collateral and referrals. Patient will be given information about outpatient treatment and an appropriate referral will be made when patient is stable for discharge. Patient will be encouraged to attend groups for support and education while on the unit.

## 2023-10-30 NOTE — PSYCHIATRIC REHAB INITIAL EVALUATION - NSBHEDULITERACY_PSY_ALL_CORE
Chief Complaint   Patient presents with   • Derm Problem     started 2 days ago - small area crusted below lip       Skin rash    HISTORY OF PRESENT ILLNESS:  Koko Gill is a 17 year old who presents with skin rash to the chin area. Has a history of impetigo. Rashes the same. No contact sports. He plays soccer. No rash elsewhere. He does have psoriasis but this is different. No fever he has not use any medications on this. Does not itch or drain. No surrounding redness    PAST MEDICAL HISTORY, PAST SURGICAL HISTORY, SOCIAL HISTORY, MEDICATIONS, AND ALLERGIES:  Reviewed per electronic chart.  Past Medical History:   Diagnosis Date   • Allergy     has epi pen     Past Surgical History:   Procedure Laterality Date   • Hernia repair       Social History     Socioeconomic History   • Marital status: Single     Spouse name: None   • Number of children: None   • Years of education: None   • Highest education level: None   Social Needs   • Financial resource strain: None   • Food insecurity - worry: None   • Food insecurity - inability: None   • Transportation needs - medical: None   • Transportation needs - non-medical: None   Occupational History   • None   Tobacco Use   • Smoking status: Never Smoker   • Smokeless tobacco: Never Used   Substance and Sexual Activity   • Alcohol use: No   • Drug use: No   • Sexual activity: No   Other Topics Concern   • None   Social History Narrative   • None     Current Outpatient Medications   Medication Sig Dispense Refill   • sertraline (ZOLOFT) 100 MG tablet Take 150 mg by mouth daily.     • mupirocin (BACTROBAN) 2 % ointment Apply 1 application topically 3 times daily for 7 days. 22 g 0     No current facility-administered medications for this visit.      ALLERGIES:   Allergen Reactions   • Peanut - Dietary Use Only ANAPHYLAXIS   • Penicillins HIVES     History reviewed. No pertinent family history.    REVIEW OF SYSTEMS:  Constitutional:  Denies fever.   Skin:  Denies  rash.  HEENT:  Complains of rash to chin otherwise no other complaints.    Musculoskeletal:  Denies rash elsewhere other than his chronic rash   Neurologic:   Denies tingling numbness        PHYSICAL EXAM:  Vitals:    Visit Vitals  /70 (BP Location: E, Patient Position: Sitting, Cuff Size: Regular)   Pulse 74   Temp 96.5 °F (35.8 °C) (Temporal)   Resp 16   Ht 5' 7\" (1.702 m)   Wt 63.5 kg   SpO2 100%   BMI 21.93 kg/m²     Constitutional:  No acute distress. Non-toxic appearance. Patient is interactive and pleasant.  Patient appears well-hydrated.  Skin:  Warm. Non-diaphoretic.  No erythema. No rash.  HENT:  Normocephalic. Atraumatic. Patient has impetigo type rash below the lower lip on the chin. Is localized about dime size. No signs of infection. No drainage.   Eyes:    Bilateral conjunctivae are normal.    Musculoskeletal:  Full range of motion of the upper extremities and the lower extremities.    Neurologicl: Awake  Alert, oriented ×3.   Psychiatric:    Cooperative    Patient has small area of impetigo to the chin below the lower lip. He's had it before. It responds to Bactroban. He'll be discharged with Bactroban 3 times a day for 7 days. Good handwashing do not touch area this is contagious. Given information regarding impetigo.    LABS/RADIOLOGY:  Orders Placed This Encounter   • mupirocin (BACTROBAN) 2 % ointment       ASSESSMENT:  Encounter Diagnoses   Name Primary?   • Impetigo Yes       PLAN:    As above    Follow up with primary if no improvement of symptoms or if symptoms worsen, please be evaluated at the EMERGENCY ROOM.     Patient acknowledged understanding of the instructions.       Able to read and write English

## 2023-10-30 NOTE — BH INPATIENT PSYCHIATRY PROGRESS NOTE - NSBHCHARTREVIEWVS_PSY_A_CORE FT
Vital Signs Last 24 Hrs  T(C): 35.4 (10-29-23 @ 17:23), Max: 35.4 (10-29-23 @ 16:11)  T(F): 95.8 (10-29-23 @ 17:23), Max: 95.8 (10-29-23 @ 16:11)  HR: 87 (10-30-23 @ 11:28) (74 - 87)  BP: 115/86 (10-30-23 @ 11:28) (105/68 - 115/86)  BP(mean): --  RR: 18 (10-30-23 @ 11:28) (18 - 18)  SpO2: --     Vital Signs Last 24 Hrs  T(C): 36 (10-31-23 @ 08:00), Max: 36.8 (10-30-23 @ 15:39)  T(F): 96.8 (10-31-23 @ 08:00), Max: 98.2 (10-30-23 @ 15:39)  HR: 84 (10-31-23 @ 08:00) (84 - 87)  BP: 129/89 (10-31-23 @ 08:00) (115/86 - 129/89)  BP(mean): --  RR: 18 (10-31-23 @ 08:00) (18 - 20)  SpO2: --

## 2023-10-30 NOTE — BH TREATMENT PLAN - NSTXPLANTHERAPYSESSIONSFT_PSY_ALL_CORE
10-30-23  Type of therapy: Coping skills, Creative arts therapy, Inspiration and motiviation, Leisure development, Self esteem, Social skills training, Stress management  Type of session: Group  Level of patient participation: Engaged, Participates  Duration of participation: 45 minutes  Therapy conducted by: Psych rehab  Therapy Summary: Pt has attended RT sessions , she enjoys creative arts music and simple conversation .

## 2023-10-30 NOTE — PHYSICAL THERAPY INITIAL EVALUATION ADULT - PERTINENT HX OF CURRENT PROBLEM, REHAB EVAL
62-year-old female with past medical history of schizoaffective disorder, leukemia (in remission since 1996), aortic stenosis s/p repair 5/23, HTN, prediabetes presents with complaint of suicidal ideation.  Reports that she feels depressed and wants to kill herself by jumping in front of a truck.  Also endorses homicidal ideation (without plan).  Patient reports she is homeless and wants to be evaluated by psychiatry due to concern of harming herself or others.  States she follows with a psychiatrist outpatient but does not remember his name.  Denies use of drugs or alcohol.

## 2023-10-30 NOTE — UM REPORT PROGRESS NOTE - NSUMRMPROVIDER_GEN_A_CORE FT
10/30/23  Maureen  Patient: Natalie Turner  : 1960  INSURANCE: [employer]   ID# [id number]  [phone]      Spoke to Amrit Langley # [phone]. Patient is approved for 7 days from 10/28-11/3, review is due on 11/3 with [facility] [phone].  10/30/23  Maureen  Patient: Natalie Turner  : 1960  INSURANCE: [employer]   ID# [id number]  [phone]      Spoke to Laura- Shivam # [phone]. Patient is approved for 7 days from 10/28-11/3, review is due on 11/3 with [facility] [phone].   11/3/23 Maureen - patient approved for additional days. LCD 23 with review on 23

## 2023-10-30 NOTE — BH TREATMENT PLAN - NSTXDEPRESINTERRN_PSY_ALL_CORE
Pt will notify staff of any extreme feelings.    Pt will notify staff of any feelings of hurting onself or others.

## 2023-10-30 NOTE — BH INPATIENT PSYCHIATRY PROGRESS NOTE - NSBHASSESSSUMMFT_PSY_ALL_CORE
62F, living at [facility], on disability since 2020, PMH of HTN, aortic valve replacement, psych hx of schizoaffective disorder depressive type, no suicide attempts or NSSIB, no violence, no substance use, 6 prior hospitalizations (last at [facility] in november 2022 for SI), now BIBS reporting suicide ideation, HI, intermittent CAHKS.     Patient seen and evaluated as per nursing report no acute events. Verbalizes feeling better. Still admits to periods of depression. Patient on Abilify. Will continue to monitor and titrate accordingly. Patient verbalizes as with previous admissions Abilify is the only medication that works for her. States she has been unable to follow up with outpatient treatment but has been compliant with her Abilify. States she has been going to the hospital when she needs it. States she is sleeping well and appetite is good. Denies suicidal/homicidal ideations. Denies any A/V hallucinations. Was refusing Plavix and statin but now states she will take them. Patient visible on the unit attending groups.    Case reviewed with Attending psychiatrist Dr. Anderson    #Schizoaffective disorder  -Abilify 10mg Daily    -Hydroxyzine 50mg Q6 PRN for anxiety/insomnia  -Haldol 5mg Q6 PRN for agitation/psychosis  -Benadryl 50mg Q6 PRN got EPS  -Lorazepam 2mg Q6 PRN for aggression    # Aortic stenosis and history of valve replacement  aspirin  chewable 81 milliGRAM(s) Oral daily  atorvastatin 80 milliGRAM(s) Oral at bedtime  clopidogrel Tablet 75 milliGRAM(s) Oral daily  lisinopril 5 milliGRAM(s) Oral daily    -Tylenol PRN for pain    #UTI  -trimethoprim  160 mG/sulfamethoxazole 800 mG 1 Tablet(s) Oral daily    #Agitation  -for agitation not amenable to verbal redirection, may give haldol 5 mg q6h prn, ativan 2 mg q6h prn, benadryl 50 mg q6h prn with escalation to IM if pt is a danger to self or/and others with repeat EKG toensure QTc <500 ms     62F, living at [facility], on disability since 2020, PMH of HTN, aortic valve replacement, psych hx of schizoaffective disorder depressive type, no suicide attempts or NSSIB, no violence, no substance use, 6 prior hospitalizations (last at [facility] in november 2022 for SI), now BIBS reporting suicide ideation, HI, intermittent CAHKS.     Patient seen and evaluated as per nursing report no acute events. Verbalizes feeling better. Still admits to periods of depression. Patient on Abilify. Will continue to monitor and titrate accordingly. Patient verbalizes as with previous admissions Abilify is the only medication that works for her. States she has been unable to follow up with outpatient treatment but has been compliant with her Abilify. States she has been going to the hospital when she needs it. States she is sleeping well and appetite is good. Denies suicidal/homicidal ideations. Denies any A/V hallucinations. Was refusing Plavix and statin but now states she will take them. Patient visible on the unit attending groups.    Case reviewed with Attending psychiatrist Dr. Anderson    #Schizoaffective disorder  -Abilify 15mg Daily    -Hydroxyzine 50mg Q6 PRN for anxiety/insomnia  -Haldol 5mg Q6 PRN for agitation/psychosis  -Benadryl 50mg Q6 PRN got EPS  -Lorazepam 2mg Q6 PRN for aggression    # Aortic stenosis and history of valve replacement  aspirin  chewable 81 milliGRAM(s) Oral daily  atorvastatin 80 milliGRAM(s) Oral at bedtime  clopidogrel Tablet 75 milliGRAM(s) Oral daily  lisinopril 5 milliGRAM(s) Oral daily    -Tylenol PRN for pain    #UTI  -trimethoprim  160 mG/sulfamethoxazole 800 mG 1 Tablet(s) Oral daily    #Agitation  -for agitation not amenable to verbal redirection, may give haldol 5 mg q6h prn, ativan 2 mg q6h prn, benadryl 50 mg q6h prn with escalation to IM if pt is a danger to self or/and others with repeat EKG toensure QTc <500 ms

## 2023-10-30 NOTE — BH SOCIAL WORK INITIAL PSYCHOSOCIAL EVALUATION - NSBHHOUSECONTACTFT_PSY_ALL_CORE
[address]  [facility]  [phone] [address] [facility]  [phone]) Natanael Gudino Director.  [facility] ID #[id number]

## 2023-10-30 NOTE — PHYSICAL THERAPY INITIAL EVALUATION ADULT - ADDITIONAL COMMENTS
pt lives in a shelter on the 4-th floor with 4 flights of stairs up with rail.  Pt amb without prior to admission

## 2023-10-30 NOTE — BH TREATMENT PLAN - NSTXPSYCHOINTERRN_PSY_ALL_CORE
RN will assess for command auditory hallucination and delsuional thoughts  RN will provide daily medication regimen  RN will offer PRN medication for worsening hallucinations  RN will ensure the enviroment is safe  RN will educate on coping skills/ encourage distractions to help manage auditory hallucination

## 2023-10-30 NOTE — BH SAFETY PLAN - ENVIRONMENT SAFETY 1:
One way I would make my environment safe would be to call fro help once I get into one of theses moods.

## 2023-10-30 NOTE — BH SAFETY PLAN - ENVIRONMENT SAFETY 2:
Staying away from certain people, places and things is another way I would make my environment safe.

## 2023-10-30 NOTE — BH INPATIENT PSYCHIATRY PROGRESS NOTE - NSBHMETABOLIC_PSY_ALL_CORE_FT
BMI: BMI (kg/m2): 35.7 (10-28-23 @ 04:32)  HbA1c: A1C with Estimated Average Glucose Result: 7.1 % (10-29-23 @ 08:45)    Glucose: POCT Blood Glucose.: 153 mg/dL (10-22-23 @ 07:46)    BP: 115/86 (10-30-23 @ 11:28) (105/68 - 191/72)  Lipid Panel: Date/Time: 10-29-23 @ 08:45  Cholesterol, Serum: 165  Direct LDL: --  HDL Cholesterol, Serum: 57  Total Cholesterol/HDL Ration Measurement: --  Triglycerides, Serum: 91   BMI: BMI (kg/m2): 35.7 (10-28-23 @ 04:32)  HbA1c: A1C with Estimated Average Glucose Result: 7.1 % (10-29-23 @ 08:45)    Glucose: POCT Blood Glucose.: 153 mg/dL (10-22-23 @ 07:46)    BP: 129/89 (10-31-23 @ 08:00) (105/68 - 166/79)  Lipid Panel: Date/Time: 10-29-23 @ 08:45  Cholesterol, Serum: 165  Direct LDL: --  HDL Cholesterol, Serum: 57  Total Cholesterol/HDL Ration Measurement: --  Triglycerides, Serum: 91

## 2023-10-30 NOTE — BH TREATMENT PLAN - NSTXSUICIDINTERRN_PSY_ALL_CORE
Establish a trusting and safe relationship with patient and family; promote positive communication patterns (e.g., available to listen; calm, nonjudgmental approach).    Discuss patient and family’s present perception, precipitating events, stressors, warning signs; determine short-term goals and potential alternative solutions.    Identify coping strategies and promote personal protective factors (e.g., social support, personal resilience and strengths).    Encourage positive health behaviors, such as optimal nutrition and sleep patterns.    Encourage support system involvement to enhance patient’s sense of connectedness; identify additional resources for support.    Establish or reconnect linkage with outpatient mental health providers and community-based services for support and treatment.

## 2023-10-30 NOTE — BH SOCIAL WORK INITIAL PSYCHOSOCIAL EVALUATION - OTHER PAST PSYCHIATRIC HISTORY (INCLUDE DETAILS REGARDING ONSET, COURSE OF ILLNESS, INPATIENT/OUTPATIENT TREATMENT)
PPH of schizoaffective disorder depressive type, 7 prior hospitalizations (last at [facility] in May 2023 for SI), no suicide attempts or NSSIB, no violence, no substance use, no outpatient treatment, who presents with suicidal ideation and homicidal ideation.

## 2023-10-31 PROCEDURE — 99231 SBSQ HOSP IP/OBS SF/LOW 25: CPT

## 2023-10-31 RX ADMIN — ARIPIPRAZOLE 15 MILLIGRAM(S): 15 TABLET ORAL at 08:45

## 2023-10-31 RX ADMIN — ATORVASTATIN CALCIUM 80 MILLIGRAM(S): 80 TABLET, FILM COATED ORAL at 20:39

## 2023-10-31 RX ADMIN — CLOPIDOGREL BISULFATE 75 MILLIGRAM(S): 75 TABLET, FILM COATED ORAL at 08:45

## 2023-10-31 RX ADMIN — LISINOPRIL 5 MILLIGRAM(S): 2.5 TABLET ORAL at 08:45

## 2023-10-31 RX ADMIN — Medication 81 MILLIGRAM(S): at 08:45

## 2023-10-31 RX ADMIN — Medication 1 TABLET(S): at 08:46

## 2023-10-31 NOTE — BH INPATIENT PSYCHIATRY PROGRESS NOTE - NSBHASSESSSUMMFT_PSY_ALL_CORE
62F, living at [facility], on disability since 2020, PMH of HTN, aortic valve replacement, psych hx of schizoaffective disorder depressive type, no suicide attempts or NSSIB, no violence, no substance use, 6 prior hospitalizations (last at [facility] in november 2022 for SI), now BIBS reporting suicide ideation, HI, intermittent CAHKS.     Patient seen and evaluated as per nursing report no acute events. Verbalizes starting to feel better. Still admits to periods of depression but they appear to be decreasing. States she is sleeping well and appetite is good. Denies suicidal/homicidal ideations. Denies any A/V hallucinations.  Patient visible on the unit attending groups.    #Schizoaffective disorder  -Abilify 15mg Daily    -Hydroxyzine 50mg Q6 PRN for anxiety/insomnia  -Haldol 5mg Q6 PRN for agitation/psychosis  -Benadryl 50mg Q6 PRN got EPS  -Lorazepam 2mg Q6 PRN for aggression    # Aortic stenosis and history of valve replacement  aspirin  chewable 81 milliGRAM(s) Oral daily  atorvastatin 80 milliGRAM(s) Oral at bedtime  clopidogrel Tablet 75 milliGRAM(s) Oral daily  lisinopril 5 milliGRAM(s) Oral daily    -Tylenol PRN for pain    #UTI  -trimethoprim  160 mG/sulfamethoxazole 800 mG 1 Tablet(s) Oral daily    #Agitation  -for agitation not amenable to verbal redirection, may give haldol 5 mg q6h prn, ativan 2 mg q6h prn, benadryl 50 mg q6h prn with escalation to IM if pt is a danger to self or/and others with repeat EKG toensure QTc <500 ms

## 2023-10-31 NOTE — BH INPATIENT PSYCHIATRY PROGRESS NOTE - NSBHMETABOLIC_PSY_ALL_CORE_FT
BMI: BMI (kg/m2): 35.7 (10-28-23 @ 04:32)  HbA1c: A1C with Estimated Average Glucose Result: 7.1 % (10-29-23 @ 08:45)    Glucose: POCT Blood Glucose.: 153 mg/dL (10-22-23 @ 07:46)    BP: 129/89 (10-31-23 @ 08:00) (105/68 - 166/79)  Lipid Panel: Date/Time: 10-29-23 @ 08:45  Cholesterol, Serum: 165  Direct LDL: --  HDL Cholesterol, Serum: 57  Total Cholesterol/HDL Ration Measurement: --  Triglycerides, Serum: 91

## 2023-10-31 NOTE — BH INPATIENT PSYCHIATRY PROGRESS NOTE - NSBHCHARTREVIEWVS_PSY_A_CORE FT
Vital Signs Last 24 Hrs  T(C): 36 (10-31-23 @ 08:00), Max: 36.8 (10-30-23 @ 15:39)  T(F): 96.8 (10-31-23 @ 08:00), Max: 98.2 (10-30-23 @ 15:39)  HR: 84 (10-31-23 @ 08:00) (84 - 87)  BP: 129/89 (10-31-23 @ 08:00) (115/86 - 129/89)  BP(mean): --  RR: 18 (10-31-23 @ 08:00) (18 - 20)  SpO2: --

## 2023-10-31 NOTE — BH INPATIENT PSYCHIATRY PROGRESS NOTE - NSBHFUPINTERVALHXFT_PSY_A_CORE
Patient seen and evaluated as per nursing report no acute events. Verbalizes starting to feel better. Still admits to periods of depression but they appear to be decreasing. States she is sleeping well and appetite is good. Denies suicidal/homicidal ideations. Denies any A/V hallucinations.  Patient visible on the unit attending groups.

## 2023-11-01 PROCEDURE — 99231 SBSQ HOSP IP/OBS SF/LOW 25: CPT

## 2023-11-01 RX ORDER — ARIPIPRAZOLE 15 MG/1
20 TABLET ORAL DAILY
Refills: 0 | Status: DISCONTINUED | OUTPATIENT
Start: 2023-11-02 | End: 2023-11-05

## 2023-11-01 RX ADMIN — ATORVASTATIN CALCIUM 80 MILLIGRAM(S): 80 TABLET, FILM COATED ORAL at 20:05

## 2023-11-01 RX ADMIN — ARIPIPRAZOLE 15 MILLIGRAM(S): 15 TABLET ORAL at 08:29

## 2023-11-01 RX ADMIN — Medication 1 TABLET(S): at 08:30

## 2023-11-01 RX ADMIN — LISINOPRIL 5 MILLIGRAM(S): 2.5 TABLET ORAL at 08:30

## 2023-11-01 RX ADMIN — CLOPIDOGREL BISULFATE 75 MILLIGRAM(S): 75 TABLET, FILM COATED ORAL at 08:30

## 2023-11-01 RX ADMIN — Medication 81 MILLIGRAM(S): at 08:29

## 2023-11-01 NOTE — BH INPATIENT PSYCHIATRY PROGRESS NOTE - NSBHFUPINTERVALHXFT_PSY_A_CORE
Patient seen and evaluated as per nursing report no acute events. Verbalizes not doing well today. States her mood is "down". When asked about voices hesitated and said "no really". Writer has seen patient talking to self in room. Writer discussed increasing Abilify. Patient in agreement. States she has had some passive SI last night. No intent or plan. Denies any active suicidal/homicidal ideations at this time. Able to contract for safety. Patient visible on the unit engaging with peers and attending groups.     Patient seen and evaluated as per nursing report no acute events. Verbalizes not doing well today. States her mood is "down". When asked about voices hesitated and said "no really". Writer has seen patient talking to self in room. Writer discussed increasing Abilify. Patient in agreement. States she has had some passive SI last night. No intent or plan. Denies any active suicidal/homicidal ideations at this time. Able to contract for safety. Patient visible on the unit engaging with peers and attending groups.    Patient seen by PT the other day and rec a walker if patient continues to have trouble with her leg. Patient states her leg feel much better and declined walker. Patient ambulating well without an assistive device.

## 2023-11-01 NOTE — BH INPATIENT PSYCHIATRY PROGRESS NOTE - NSBHASSESSSUMMFT_PSY_ALL_CORE
62F, living at [facility], on disability since 2020, PMH of HTN, aortic valve replacement, psych hx of schizoaffective disorder depressive type, no suicide attempts or NSSIB, no violence, no substance use, 6 prior hospitalizations (last at [facility] in november 2022 for SI), now BIBS reporting suicide ideation, HI, intermittent CAHKS.     Patient seen and evaluated as per nursing report no acute events. Verbalizes not doing well today. States her mood is "down". When asked about voices hesitated and said "no really". Writer has seen patient talking to self in room. Writer discussed increasing Abilify. Patient in agreement. States she has had some passive SI last night. No intent or plan. Denies any active suicidal/homicidal ideations at this time. Able to contract for safety. Patient visible on the unit engaging with peers and attending groups.    #Schizoaffective disorder  -Abilify 20mg Daily    -Hydroxyzine 50mg Q6 PRN for anxiety/insomnia  -Haldol 5mg Q6 PRN for agitation/psychosis  -Benadryl 50mg Q6 PRN got EPS  -Lorazepam 2mg Q6 PRN for aggression    # Aortic stenosis and history of valve replacement  aspirin  chewable 81 milliGRAM(s) Oral daily  atorvastatin 80 milliGRAM(s) Oral at bedtime  clopidogrel Tablet 75 milliGRAM(s) Oral daily  lisinopril 5 milliGRAM(s) Oral daily    -Tylenol PRN for pain    #UTI  -trimethoprim  160 mG/sulfamethoxazole 800 mG 1 Tablet(s) Oral daily    #Agitation  -for agitation not amenable to verbal redirection, may give haldol 5 mg q6h prn, ativan 2 mg q6h prn, benadryl 50 mg q6h prn with escalation to IM if pt is a danger to self or/and others with repeat EKG toensure QTc <500 ms     62F, living at [facility], on disability since 2020, PMH of HTN, aortic valve replacement, psych hx of schizoaffective disorder depressive type, no suicide attempts or NSSIB, no violence, no substance use, 6 prior hospitalizations (last at [facility] in november 2022 for SI), now BIBS reporting suicide ideation, HI, intermittent CAHKS.     Patient seen and evaluated as per nursing report no acute events. Verbalizes not doing well today. States her mood is "down". When asked about voices hesitated and said "no really". Writer has seen patient talking to self in room. Writer discussed increasing Abilify. Patient in agreement. States she has had some passive SI last night. No intent or plan. Denies any active suicidal/homicidal ideations at this time. Able to contract for safety. Patient visible on the unit engaging with peers and attending groups.    Patient seen by PT the other day and rec a walker if patient continues to have trouble with her leg. Patient states her leg feel much better and declined walker. Patient ambulating well without an assistive device.    #Schizoaffective disorder  -Abilify 20mg Daily    -Hydroxyzine 50mg Q6 PRN for anxiety/insomnia  -Haldol 5mg Q6 PRN for agitation/psychosis  -Benadryl 50mg Q6 PRN got EPS  -Lorazepam 2mg Q6 PRN for aggression    # Aortic stenosis and history of valve replacement  aspirin  chewable 81 milliGRAM(s) Oral daily  atorvastatin 80 milliGRAM(s) Oral at bedtime  clopidogrel Tablet 75 milliGRAM(s) Oral daily  lisinopril 5 milliGRAM(s) Oral daily    -Tylenol PRN for pain    #UTI  -trimethoprim  160 mG/sulfamethoxazole 800 mG 1 Tablet(s) Oral daily    #Agitation  -for agitation not amenable to verbal redirection, may give haldol 5 mg q6h prn, ativan 2 mg q6h prn, benadryl 50 mg q6h prn with escalation to IM if pt is a danger to self or/and others with repeat EKG toensure QTc <500 ms

## 2023-11-01 NOTE — BH INPATIENT PSYCHIATRY PROGRESS NOTE - NSBHMETABOLIC_PSY_ALL_CORE_FT
BMI: BMI (kg/m2): 35.7 (10-28-23 @ 04:32)  HbA1c: A1C with Estimated Average Glucose Result: 7.1 % (10-29-23 @ 08:45)    Glucose: POCT Blood Glucose.: 153 mg/dL (10-22-23 @ 07:46)    BP: 176/96 (11-01-23 @ 08:00) (105/68 - 176/96)  Lipid Panel: Date/Time: 10-29-23 @ 08:45  Cholesterol, Serum: 165  Direct LDL: --  HDL Cholesterol, Serum: 57  Total Cholesterol/HDL Ration Measurement: --  Triglycerides, Serum: 91

## 2023-11-01 NOTE — BH INPATIENT PSYCHIATRY PROGRESS NOTE - NSBHCHARTREVIEWVS_PSY_A_CORE FT
Vital Signs Last 24 Hrs  T(C): 35.6 (11-01-23 @ 08:00), Max: 35.6 (11-01-23 @ 08:00)  T(F): 96.1 (11-01-23 @ 08:00), Max: 96.1 (11-01-23 @ 08:00)  HR: 90 (11-01-23 @ 08:00) (77 - 90)  BP: 176/96 (11-01-23 @ 08:00) (164/77 - 176/96)  BP(mean): --  RR: 18 (11-01-23 @ 08:00) (18 - 18)  SpO2: --

## 2023-11-01 NOTE — CONSULT NOTE ADULT - ASSESSMENT
#schizophrenia/suicidal ideation - treatment and mngmt per psych   #hx of leukemia in remission - outpt follow up  #DMII - not on meds  #bioprosthetic aortic valve - recent echo wnl   #HTn - cont to monitor - if remains elevated can increase dose of lisinopril   #recent CVA with right handed weakness - dc on 10/22.23 - refused full work up   - cont DAPt for now - plavix was to be continued 21 days from 10/22.23, high dose statin  #UTI ? on bactrim - +epithelial cell - likely asymptomatic bacturia - can likely dc    recall prn
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
## 2023-11-02 PROCEDURE — 99231 SBSQ HOSP IP/OBS SF/LOW 25: CPT

## 2023-11-02 PROCEDURE — 99232 SBSQ HOSP IP/OBS MODERATE 35: CPT

## 2023-11-02 RX ADMIN — Medication 1 TABLET(S): at 08:18

## 2023-11-02 RX ADMIN — LISINOPRIL 5 MILLIGRAM(S): 2.5 TABLET ORAL at 08:18

## 2023-11-02 RX ADMIN — Medication 81 MILLIGRAM(S): at 08:18

## 2023-11-02 RX ADMIN — ATORVASTATIN CALCIUM 80 MILLIGRAM(S): 80 TABLET, FILM COATED ORAL at 20:06

## 2023-11-02 RX ADMIN — ARIPIPRAZOLE 20 MILLIGRAM(S): 15 TABLET ORAL at 08:17

## 2023-11-02 RX ADMIN — CLOPIDOGREL BISULFATE 75 MILLIGRAM(S): 75 TABLET, FILM COATED ORAL at 08:18
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
## 2023-11-02 NOTE — BH INPATIENT PSYCHIATRY PROGRESS NOTE - NSBHMETABOLIC_PSY_ALL_CORE_FT
BMI: BMI (kg/m2): 35.7 (10-28-23 @ 04:32)  HbA1c: A1C with Estimated Average Glucose Result: 7.1 % (10-29-23 @ 08:45)    Glucose: POCT Blood Glucose.: 153 mg/dL (10-22-23 @ 07:46)    BP: 126/78 (11-01-23 @ 16:29) (126/78 - 176/96)  Lipid Panel: Date/Time: 10-29-23 @ 08:45  Cholesterol, Serum: 165  Direct LDL: --  HDL Cholesterol, Serum: 57  Total Cholesterol/HDL Ration Measurement: --  Triglycerides, Serum: 91

## 2023-11-02 NOTE — BH INPATIENT PSYCHIATRY PROGRESS NOTE - OTHER
varied from euthymic to depressed
Appears internally preoccupied at times
Appears internally preoccupied at times
 varied from euthymic to depressed
 varied from euthymic to depressed

## 2023-11-02 NOTE — BH INPATIENT PSYCHIATRY PROGRESS NOTE - NSBHFUPINTERVALHXFT_PSY_A_CORE
Patient seen and evaluated as per nursing report no acute events. On approach patient calm and cooperative. No agitation or aggression noted. Verbalizes feeling better today. Abilify increased. Denies any side effects/adverse reactions. no side effects/adverse reactions noted. Will continue to monitor. Denies any suicidal/homicidal ideations at this time. Able to contract for safety. Denies any A/V hallucinations. States she slept well last night and appetite is good. Patient visible on the unit engaging with peers and attending groups.

## 2023-11-02 NOTE — BH INPATIENT PSYCHIATRY PROGRESS NOTE - NSBHCHARTREVIEWVS_PSY_A_CORE FT
Vital Signs Last 24 Hrs  T(C): 35.6 (11-01-23 @ 16:29), Max: 35.6 (11-01-23 @ 16:29)  T(F): 96.1 (11-01-23 @ 16:29), Max: 96.1 (11-01-23 @ 16:29)  HR: 82 (11-01-23 @ 16:29) (82 - 82)  BP: 126/78 (11-01-23 @ 16:29) (126/78 - 126/78)  BP(mean): --  RR: 18 (11-01-23 @ 16:29) (18 - 18)  SpO2: --

## 2023-11-03 PROBLEM — Z86.79 PERSONAL HISTORY OF OTHER DISEASES OF THE CIRCULATORY SYSTEM: Chronic | Status: ACTIVE | Noted: 2023-10-28

## 2023-11-03 PROCEDURE — 99232 SBSQ HOSP IP/OBS MODERATE 35: CPT

## 2023-11-03 RX ADMIN — LISINOPRIL 5 MILLIGRAM(S): 2.5 TABLET ORAL at 08:36

## 2023-11-03 RX ADMIN — ARIPIPRAZOLE 20 MILLIGRAM(S): 15 TABLET ORAL at 08:37

## 2023-11-03 RX ADMIN — CLOPIDOGREL BISULFATE 75 MILLIGRAM(S): 75 TABLET, FILM COATED ORAL at 08:36

## 2023-11-03 RX ADMIN — Medication 81 MILLIGRAM(S): at 08:36

## 2023-11-03 RX ADMIN — ATORVASTATIN CALCIUM 80 MILLIGRAM(S): 80 TABLET, FILM COATED ORAL at 20:07

## 2023-11-03 NOTE — BH INPATIENT PSYCHIATRY PROGRESS NOTE - NSBHMETABOLIC_PSY_ALL_CORE_FT
BMI: BMI (kg/m2): 35.7 (10-28-23 @ 04:32)  HbA1c: A1C with Estimated Average Glucose Result: 7.1 % (10-29-23 @ 08:45)    Glucose: POCT Blood Glucose.: 153 mg/dL (10-22-23 @ 07:46)    BP: 184/96 (11-03-23 @ 08:45) (126/78 - 184/96)  Lipid Panel: Date/Time: 10-29-23 @ 08:45  Cholesterol, Serum: 165  Direct LDL: --  HDL Cholesterol, Serum: 57  Total Cholesterol/HDL Ration Measurement: --  Triglycerides, Serum: 91

## 2023-11-03 NOTE — BH INPATIENT PSYCHIATRY PROGRESS NOTE - NSTXSUICIDINTERMD_PSY_ALL_CORE
Medication management and milieu therapy

## 2023-11-03 NOTE — BH INPATIENT PSYCHIATRY PROGRESS NOTE - PRN MEDS
MEDICATIONS  (PRN):  acetaminophen     Tablet .. 650 milliGRAM(s) Oral every 6 hours PRN Temp greater or equal to 38C (100.4F), Mild Pain (1 - 3)  haloperidol     Tablet 5 milliGRAM(s) Oral every 6 hours PRN agitation  LORazepam     Tablet 2 milliGRAM(s) Oral every 6 hours PRN Agitation  
MEDICATIONS  (PRN):  acetaminophen     Tablet .. 650 milliGRAM(s) Oral every 6 hours PRN Temp greater or equal to 38C (100.4F), Mild Pain (1 - 3)  diphenhydrAMINE 50 milliGRAM(s) Oral every 6 hours PRN Eps  haloperidol     Tablet 5 milliGRAM(s) Oral every 6 hours PRN agitation  hydrOXYzine hydrochloride 50 milliGRAM(s) Oral every 6 hours PRN Anxiety/insomnia  LORazepam     Tablet 2 milliGRAM(s) Oral every 6 hours PRN Agitation  

## 2023-11-03 NOTE — BH INPATIENT PSYCHIATRY PROGRESS NOTE - NSBHMSEMOVE_PSY_A_CORE
[place] [place]  [address]
Phone:  [phone]                    ELECTROCARDIOGRAM REPORT      
_______________________________________________________________________________
 
Name:       LEONIDAS JARAMILLO                Room #:                     DEP NHAN JESSICA#:      [id number]     Account #:      [id number]  
Admission:  21    Attend Phys:                          
Discharge:  21    Date of Birth:  [birth year]  
                                                          Report #: [id number]
   [id number]
_______________________________________________________________________________
                         [facility] ED
                                       
Test Date:    2021               Test Time:    22:28:10
Pat Name:     LEONIDAS JARAMILLO           Department:   
Patient ID:   [id number]            Room:          
Gender:       M                        Technician:   shashi
:          1949               Requested By: Suzanna Gallo
Order Number: [id number] MD:   Willard Nye
                                 Measurements
Intervals                              Axis          
Rate:         77                       P:            41
NY:           136                      QRS:          -20
QRSD:         91                       T:            11
QT:           445                                    
QTc:          504                                    
                           Interpretive Statements
Sinus rhythm
Leftward axis
Nonspecific T wave abnormality
Baseline wander in lead(s) V1
Compared to ECG 2020 16:26:07
No significant change was found
Electronically Signed On 2021 17:12:45 CDT by Willard Nye
[email or web address]
 
 
 
 
 
 
 
 
 
 
 
 
 
 
 
 
 
 
 
  <ELECTRONICALLY SIGNED>
   By: Willard Nye MD, [facility]   
  21     1712
D: 218                           _____________________________________
T: 21                           Willard Nye MD, FACC     /EPI
No abnormal movements

## 2023-11-03 NOTE — BH INPATIENT PSYCHIATRY PROGRESS NOTE - CURRENT MEDICATION
MEDICATIONS  (STANDING):  ARIPiprazole 15 milliGRAM(s) Oral daily  aspirin  chewable 81 milliGRAM(s) Oral daily  atorvastatin 80 milliGRAM(s) Oral at bedtime  clopidogrel Tablet 75 milliGRAM(s) Oral daily  influenza   Vaccine 0.5 milliLiter(s) IntraMuscular once  lisinopril 5 milliGRAM(s) Oral daily  trimethoprim  160 mG/sulfamethoxazole 800 mG 1 Tablet(s) Oral daily    MEDICATIONS  (PRN):  acetaminophen     Tablet .. 650 milliGRAM(s) Oral every 6 hours PRN Temp greater or equal to 38C (100.4F), Mild Pain (1 - 3)  diphenhydrAMINE 50 milliGRAM(s) Oral every 6 hours PRN Eps  haloperidol     Tablet 5 milliGRAM(s) Oral every 6 hours PRN agitation  hydrOXYzine hydrochloride 50 milliGRAM(s) Oral every 6 hours PRN Anxiety/insomnia  LORazepam     Tablet 2 milliGRAM(s) Oral every 6 hours PRN Agitation  
MEDICATIONS  (STANDING):  aspirin  chewable 81 milliGRAM(s) Oral daily  atorvastatin 80 milliGRAM(s) Oral at bedtime  clopidogrel Tablet 75 milliGRAM(s) Oral daily  influenza   Vaccine 0.5 milliLiter(s) IntraMuscular once  lisinopril 5 milliGRAM(s) Oral daily  trimethoprim  160 mG/sulfamethoxazole 800 mG 1 Tablet(s) Oral daily    MEDICATIONS  (PRN):  acetaminophen     Tablet .. 650 milliGRAM(s) Oral every 6 hours PRN Temp greater or equal to 38C (100.4F), Mild Pain (1 - 3)  diphenhydrAMINE 50 milliGRAM(s) Oral every 6 hours PRN Eps  haloperidol     Tablet 5 milliGRAM(s) Oral every 6 hours PRN agitation  hydrOXYzine hydrochloride 50 milliGRAM(s) Oral every 6 hours PRN Anxiety/insomnia  LORazepam     Tablet 2 milliGRAM(s) Oral every 6 hours PRN Agitation  
MEDICATIONS  (STANDING):  ARIPiprazole 20 milliGRAM(s) Oral daily  aspirin  chewable 81 milliGRAM(s) Oral daily  atorvastatin 80 milliGRAM(s) Oral at bedtime  clopidogrel Tablet 75 milliGRAM(s) Oral daily  influenza   Vaccine 0.5 milliLiter(s) IntraMuscular once  lisinopril 5 milliGRAM(s) Oral daily    MEDICATIONS  (PRN):  acetaminophen     Tablet .. 650 milliGRAM(s) Oral every 6 hours PRN Temp greater or equal to 38C (100.4F), Mild Pain (1 - 3)  diphenhydrAMINE 50 milliGRAM(s) Oral every 6 hours PRN Eps  haloperidol     Tablet 5 milliGRAM(s) Oral every 6 hours PRN agitation  hydrOXYzine hydrochloride 50 milliGRAM(s) Oral every 6 hours PRN Anxiety/insomnia  LORazepam     Tablet 2 milliGRAM(s) Oral every 6 hours PRN Agitation  
MEDICATIONS  (STANDING):  ARIPiprazole 15 milliGRAM(s) Oral daily  aspirin  chewable 81 milliGRAM(s) Oral daily  atorvastatin 80 milliGRAM(s) Oral at bedtime  clopidogrel Tablet 75 milliGRAM(s) Oral daily  influenza   Vaccine 0.5 milliLiter(s) IntraMuscular once  lisinopril 5 milliGRAM(s) Oral daily  trimethoprim  160 mG/sulfamethoxazole 800 mG 1 Tablet(s) Oral daily    MEDICATIONS  (PRN):  acetaminophen     Tablet .. 650 milliGRAM(s) Oral every 6 hours PRN Temp greater or equal to 38C (100.4F), Mild Pain (1 - 3)  diphenhydrAMINE 50 milliGRAM(s) Oral every 6 hours PRN Eps  haloperidol     Tablet 5 milliGRAM(s) Oral every 6 hours PRN agitation  hydrOXYzine hydrochloride 50 milliGRAM(s) Oral every 6 hours PRN Anxiety/insomnia  LORazepam     Tablet 2 milliGRAM(s) Oral every 6 hours PRN Agitation  
MEDICATIONS  (STANDING):  ARIPiprazole 15 milliGRAM(s) Oral daily  aspirin  chewable 81 milliGRAM(s) Oral daily  atorvastatin 80 milliGRAM(s) Oral at bedtime  clopidogrel Tablet 75 milliGRAM(s) Oral daily  influenza   Vaccine 0.5 milliLiter(s) IntraMuscular once  lisinopril 5 milliGRAM(s) Oral daily  trimethoprim  160 mG/sulfamethoxazole 800 mG 1 Tablet(s) Oral daily    MEDICATIONS  (PRN):  acetaminophen     Tablet .. 650 milliGRAM(s) Oral every 6 hours PRN Temp greater or equal to 38C (100.4F), Mild Pain (1 - 3)  haloperidol     Tablet 5 milliGRAM(s) Oral every 6 hours PRN agitation  LORazepam     Tablet 2 milliGRAM(s) Oral every 6 hours PRN Agitation  
MEDICATIONS  (STANDING):  ARIPiprazole 20 milliGRAM(s) Oral daily  aspirin  chewable 81 milliGRAM(s) Oral daily  atorvastatin 80 milliGRAM(s) Oral at bedtime  clopidogrel Tablet 75 milliGRAM(s) Oral daily  influenza   Vaccine 0.5 milliLiter(s) IntraMuscular once  lisinopril 5 milliGRAM(s) Oral daily    MEDICATIONS  (PRN):  acetaminophen     Tablet .. 650 milliGRAM(s) Oral every 6 hours PRN Temp greater or equal to 38C (100.4F), Mild Pain (1 - 3)  diphenhydrAMINE 50 milliGRAM(s) Oral every 6 hours PRN Eps  haloperidol     Tablet 5 milliGRAM(s) Oral every 6 hours PRN agitation  hydrOXYzine hydrochloride 50 milliGRAM(s) Oral every 6 hours PRN Anxiety/insomnia  LORazepam     Tablet 2 milliGRAM(s) Oral every 6 hours PRN Agitation

## 2023-11-03 NOTE — BH INPATIENT PSYCHIATRY PROGRESS NOTE - NSBHATTESTTYPEVISIT_PSY_A_CORE
On-site Attending supervising BECKY (99XXX codes)
On-site Attending supervising BECKY (99XXX codes)
Attending Only
On-site Attending supervising BECKY (99XXX codes)

## 2023-11-03 NOTE — BH INPATIENT PSYCHIATRY PROGRESS NOTE - NSTXPSYCHOINTERMD_PSY_ALL_CORE
Medication management and milieu therapy

## 2023-11-03 NOTE — BH INPATIENT PSYCHIATRY PROGRESS NOTE - NSBHFUPINTERVALHXFT_PSY_A_CORE
Patient seen and evaluated as per nursing report no acute events. On approach patient calm and cooperative. No agitation or aggression noted. Verbalizes feeling better today. Abilify recently increased. Appears to be responding well. Denies any suicidal/homicidal ideations at this time. Able to contract for safety. Denies any A/V hallucinations. States she slept well last night and appetite is good. Patient visible on the unit engaging with peers and attending groups. Anticipated discharge early next week provided patient continues to improve over the weekend.

## 2023-11-03 NOTE — BH INPATIENT PSYCHIATRY PROGRESS NOTE - NSTXSUICIDGOAL_PSY_ALL_CORE
Will develop a suicide prevention/safety plan

## 2023-11-03 NOTE — BH INPATIENT PSYCHIATRY PROGRESS NOTE - NSBHFUPINTERVALCCFT_PSY_A_CORE
"I am still depressed and very forgetful"
"Im not doing so well today"
"Im feeling a little better"
"Im starting to feel better"
"I feel better"
"I still have some depression"

## 2023-11-03 NOTE — BH INPATIENT PSYCHIATRY PROGRESS NOTE - NSBHASSESSSUMMFT_PSY_ALL_CORE
62F, living at [facility], on disability since 2020, PMH of HTN, aortic valve replacement, psych hx of schizoaffective disorder depressive type, no suicide attempts or NSSIB, no violence, no substance use, 6 prior hospitalizations (last at [facility] in november 2022 for SI), now BIBS reporting suicide ideation, HI, intermittent CAHKS.     Patient seen and evaluated as per nursing report no acute events. On approach patient calm and cooperative. No agitation or aggression noted. Verbalizes feeling better today. Abilify recently increased. Appears to be responding well. Denies any suicidal/homicidal ideations at this time. Able to contract for safety. Denies any A/V hallucinations. States she slept well last night and appetite is good. Patient visible on the unit engaging with peers and attending groups. Anticipated discharge early next week provided patient continues to improve over the weekend.    #Schizoaffective disorder  -Abilify 20mg Daily    -Hydroxyzine 50mg Q6 PRN for anxiety/insomnia  -Haldol 5mg Q6 PRN for agitation/psychosis  -Benadryl 50mg Q6 PRN got EPS  -Lorazepam 2mg Q6 PRN for aggression    # Aortic stenosis and history of valve replacement  aspirin  chewable 81 milliGRAM(s) Oral daily  atorvastatin 80 milliGRAM(s) Oral at bedtime  clopidogrel Tablet 75 milliGRAM(s) Oral daily  lisinopril 5 milliGRAM(s) Oral daily    -Tylenol PRN for pain    #UTI  -trimethoprim  160 mG/sulfamethoxazole 800 mG 1 Tablet(s) Oral daily    #Agitation  -for agitation not amenable to verbal redirection, may give haldol 5 mg q6h prn, ativan 2 mg q6h prn, benadryl 50 mg q6h prn with escalation to IM if pt is a danger to self or/and others with repeat EKG toensure QTc <500 ms

## 2023-11-03 NOTE — BH INPATIENT PSYCHIATRY PROGRESS NOTE - NSTXDEPRESGOAL_PSY_ALL_CORE
Attend and participate in at least 2 groups daily despite low mood/energy

## 2023-11-03 NOTE — BH INPATIENT PSYCHIATRY PROGRESS NOTE - NSDCCRITERIA_PSY_ALL_CORE
To be discharged to shelter when deemed no longer a danger to self or others

## 2023-11-03 NOTE — BH INPATIENT PSYCHIATRY PROGRESS NOTE - NSTXDCOPLKPROGRES_PSY_ALL_CORE
Called and left voicemail for patient to return call regarding test results
Improving

## 2023-11-03 NOTE — BH INPATIENT PSYCHIATRY PROGRESS NOTE - NSBHATTESTBILLING_PSY_A_CORE
10216-Rxnuhntwfg OBS or IP - moderate complexity OR 35-49 mins
98409-Cnbfkphune OBS or IP - moderate complexity OR 35-49 mins
77039-Ggnvgnwznr OBS or IP - low complexity OR 25-34 mins
31363-Tbypujclni OBS or IP - moderate complexity OR 35-49 mins
03540-Uqgnbrzygn OBS or IP - moderate complexity OR 35-49 mins
60883-Rkkacphadh OBS or IP - moderate complexity OR 35-49 mins

## 2023-11-03 NOTE — BH INPATIENT PSYCHIATRY PROGRESS NOTE - NSBHCHARTREVIEWVS_PSY_A_CORE FT
Vital Signs Last 24 Hrs  T(C): 36.2 (11-03-23 @ 08:45), Max: 36.2 (11-03-23 @ 08:45)  T(F): 97.1 (11-03-23 @ 08:45), Max: 97.1 (11-03-23 @ 08:45)  HR: 90 (11-03-23 @ 08:45) (90 - 91)  BP: 184/96 (11-03-23 @ 08:45) (138/77 - 184/96)  BP(mean): --  RR: 16 (11-03-23 @ 08:45) (16 - 18)  SpO2: --

## 2023-11-03 NOTE — BH INPATIENT PSYCHIATRY PROGRESS NOTE - NSTXCOPEINTERMD_PSY_ALL_CORE
Calm
Medication management and milieu therapy

## 2023-11-04 ENCOUNTER — INPATIENT (INPATIENT)
Facility: HOSPITAL | Age: 63
LOS: 1 days | Discharge: HOME | DRG: 313 | End: 2023-11-06
Attending: HOSPITALIST | Admitting: INTERNAL MEDICINE
Payer: MEDICARE

## 2023-11-04 VITALS
RESPIRATION RATE: 18 BRPM | OXYGEN SATURATION: 100 % | SYSTOLIC BLOOD PRESSURE: 110 MMHG | DIASTOLIC BLOOD PRESSURE: 81 MMHG | HEART RATE: 62 BPM

## 2023-11-04 VITALS
OXYGEN SATURATION: 100 % | HEART RATE: 62 BPM | DIASTOLIC BLOOD PRESSURE: 81 MMHG | SYSTOLIC BLOOD PRESSURE: 110 MMHG | RESPIRATION RATE: 18 BRPM

## 2023-11-04 DIAGNOSIS — Z95.2 PRESENCE OF PROSTHETIC HEART VALVE: Chronic | ICD-10-CM

## 2023-11-04 DIAGNOSIS — R07.9 CHEST PAIN, UNSPECIFIED: ICD-10-CM

## 2023-11-04 LAB
ALBUMIN SERPL ELPH-MCNC: 4 G/DL — SIGNIFICANT CHANGE UP (ref 3.5–5.2)
ALBUMIN SERPL ELPH-MCNC: 4 G/DL — SIGNIFICANT CHANGE UP (ref 3.5–5.2)
ALP SERPL-CCNC: 118 U/L — HIGH (ref 30–115)
ALP SERPL-CCNC: 118 U/L — HIGH (ref 30–115)
ALT FLD-CCNC: 14 U/L — SIGNIFICANT CHANGE UP (ref 0–41)
ALT FLD-CCNC: 14 U/L — SIGNIFICANT CHANGE UP (ref 0–41)
ANION GAP SERPL CALC-SCNC: 10 MMOL/L — SIGNIFICANT CHANGE UP (ref 7–14)
ANION GAP SERPL CALC-SCNC: 10 MMOL/L — SIGNIFICANT CHANGE UP (ref 7–14)
AST SERPL-CCNC: 14 U/L — SIGNIFICANT CHANGE UP (ref 0–41)
AST SERPL-CCNC: 14 U/L — SIGNIFICANT CHANGE UP (ref 0–41)
BILIRUB SERPL-MCNC: <0.2 MG/DL — SIGNIFICANT CHANGE UP (ref 0.2–1.2)
BILIRUB SERPL-MCNC: <0.2 MG/DL — SIGNIFICANT CHANGE UP (ref 0.2–1.2)
BUN SERPL-MCNC: 19 MG/DL — SIGNIFICANT CHANGE UP (ref 10–20)
BUN SERPL-MCNC: 19 MG/DL — SIGNIFICANT CHANGE UP (ref 10–20)
CALCIUM SERPL-MCNC: 9.5 MG/DL — SIGNIFICANT CHANGE UP (ref 8.4–10.5)
CALCIUM SERPL-MCNC: 9.5 MG/DL — SIGNIFICANT CHANGE UP (ref 8.4–10.5)
CHLORIDE SERPL-SCNC: 102 MMOL/L — SIGNIFICANT CHANGE UP (ref 98–110)
CHLORIDE SERPL-SCNC: 102 MMOL/L — SIGNIFICANT CHANGE UP (ref 98–110)
CO2 SERPL-SCNC: 26 MMOL/L — SIGNIFICANT CHANGE UP (ref 17–32)
CO2 SERPL-SCNC: 26 MMOL/L — SIGNIFICANT CHANGE UP (ref 17–32)
CREAT SERPL-MCNC: 1 MG/DL — SIGNIFICANT CHANGE UP (ref 0.7–1.5)
CREAT SERPL-MCNC: 1 MG/DL — SIGNIFICANT CHANGE UP (ref 0.7–1.5)
EGFR: 64 ML/MIN/1.73M2 — SIGNIFICANT CHANGE UP
EGFR: 64 ML/MIN/1.73M2 — SIGNIFICANT CHANGE UP
GLUCOSE SERPL-MCNC: 191 MG/DL — HIGH (ref 70–99)
GLUCOSE SERPL-MCNC: 191 MG/DL — HIGH (ref 70–99)
HCT VFR BLD CALC: 37.2 % — SIGNIFICANT CHANGE UP (ref 37–47)
HCT VFR BLD CALC: 37.2 % — SIGNIFICANT CHANGE UP (ref 37–47)
HGB BLD-MCNC: 12.6 G/DL — SIGNIFICANT CHANGE UP (ref 12–16)
HGB BLD-MCNC: 12.6 G/DL — SIGNIFICANT CHANGE UP (ref 12–16)
MCHC RBC-ENTMCNC: 29.6 PG — SIGNIFICANT CHANGE UP (ref 27–31)
MCHC RBC-ENTMCNC: 29.6 PG — SIGNIFICANT CHANGE UP (ref 27–31)
MCHC RBC-ENTMCNC: 33.9 G/DL — SIGNIFICANT CHANGE UP (ref 32–37)
MCHC RBC-ENTMCNC: 33.9 G/DL — SIGNIFICANT CHANGE UP (ref 32–37)
MCV RBC AUTO: 87.5 FL — SIGNIFICANT CHANGE UP (ref 81–99)
MCV RBC AUTO: 87.5 FL — SIGNIFICANT CHANGE UP (ref 81–99)
NRBC # BLD: 0 /100 WBCS — SIGNIFICANT CHANGE UP (ref 0–0)
NRBC # BLD: 0 /100 WBCS — SIGNIFICANT CHANGE UP (ref 0–0)
NT-PROBNP SERPL-SCNC: <36 PG/ML — SIGNIFICANT CHANGE UP (ref 0–300)
NT-PROBNP SERPL-SCNC: <36 PG/ML — SIGNIFICANT CHANGE UP (ref 0–300)
PLATELET # BLD AUTO: 235 K/UL — SIGNIFICANT CHANGE UP (ref 130–400)
PLATELET # BLD AUTO: 235 K/UL — SIGNIFICANT CHANGE UP (ref 130–400)
PMV BLD: 9.2 FL — SIGNIFICANT CHANGE UP (ref 7.4–10.4)
PMV BLD: 9.2 FL — SIGNIFICANT CHANGE UP (ref 7.4–10.4)
POTASSIUM SERPL-MCNC: 4.6 MMOL/L — SIGNIFICANT CHANGE UP (ref 3.5–5)
POTASSIUM SERPL-MCNC: 4.6 MMOL/L — SIGNIFICANT CHANGE UP (ref 3.5–5)
POTASSIUM SERPL-SCNC: 4.6 MMOL/L — SIGNIFICANT CHANGE UP (ref 3.5–5)
POTASSIUM SERPL-SCNC: 4.6 MMOL/L — SIGNIFICANT CHANGE UP (ref 3.5–5)
PROT SERPL-MCNC: 6.7 G/DL — SIGNIFICANT CHANGE UP (ref 6–8)
PROT SERPL-MCNC: 6.7 G/DL — SIGNIFICANT CHANGE UP (ref 6–8)
RBC # BLD: 4.25 M/UL — SIGNIFICANT CHANGE UP (ref 4.2–5.4)
RBC # BLD: 4.25 M/UL — SIGNIFICANT CHANGE UP (ref 4.2–5.4)
RBC # FLD: 13.2 % — SIGNIFICANT CHANGE UP (ref 11.5–14.5)
RBC # FLD: 13.2 % — SIGNIFICANT CHANGE UP (ref 11.5–14.5)
SODIUM SERPL-SCNC: 138 MMOL/L — SIGNIFICANT CHANGE UP (ref 135–146)
SODIUM SERPL-SCNC: 138 MMOL/L — SIGNIFICANT CHANGE UP (ref 135–146)
TROPONIN T SERPL-MCNC: <0.01 NG/ML — SIGNIFICANT CHANGE UP
TROPONIN T SERPL-MCNC: <0.01 NG/ML — SIGNIFICANT CHANGE UP
WBC # BLD: 7.54 K/UL — SIGNIFICANT CHANGE UP (ref 4.8–10.8)
WBC # BLD: 7.54 K/UL — SIGNIFICANT CHANGE UP (ref 4.8–10.8)
WBC # FLD AUTO: 7.54 K/UL — SIGNIFICANT CHANGE UP (ref 4.8–10.8)
WBC # FLD AUTO: 7.54 K/UL — SIGNIFICANT CHANGE UP (ref 4.8–10.8)

## 2023-11-04 PROCEDURE — 84484 ASSAY OF TROPONIN QUANT: CPT

## 2023-11-04 PROCEDURE — 85027 COMPLETE CBC AUTOMATED: CPT

## 2023-11-04 PROCEDURE — 80053 COMPREHEN METABOLIC PANEL: CPT

## 2023-11-04 PROCEDURE — 85025 COMPLETE CBC W/AUTO DIFF WBC: CPT

## 2023-11-04 PROCEDURE — 36415 COLL VENOUS BLD VENIPUNCTURE: CPT

## 2023-11-04 PROCEDURE — 93010 ELECTROCARDIOGRAM REPORT: CPT

## 2023-11-04 PROCEDURE — 71045 X-RAY EXAM CHEST 1 VIEW: CPT

## 2023-11-04 PROCEDURE — 83735 ASSAY OF MAGNESIUM: CPT

## 2023-11-04 PROCEDURE — 99223 1ST HOSP IP/OBS HIGH 75: CPT

## 2023-11-04 RX ORDER — HALOPERIDOL DECANOATE 100 MG/ML
1 INJECTION INTRAMUSCULAR
Qty: 0 | Refills: 0 | DISCHARGE
Start: 2023-11-04

## 2023-11-04 RX ORDER — DIPHENHYDRAMINE HCL 50 MG
1 CAPSULE ORAL
Qty: 0 | Refills: 0 | DISCHARGE
Start: 2023-11-04

## 2023-11-04 RX ORDER — ASPIRIN/CALCIUM CARB/MAGNESIUM 324 MG
81 TABLET ORAL DAILY
Refills: 0 | Status: DISCONTINUED | OUTPATIENT
Start: 2023-11-04 | End: 2023-11-06

## 2023-11-04 RX ORDER — ONDANSETRON 8 MG/1
4 TABLET, FILM COATED ORAL EVERY 6 HOURS
Refills: 0 | Status: DISCONTINUED | OUTPATIENT
Start: 2023-11-04 | End: 2023-11-06

## 2023-11-04 RX ORDER — ACETAMINOPHEN 500 MG
650 TABLET ORAL EVERY 6 HOURS
Refills: 0 | Status: DISCONTINUED | OUTPATIENT
Start: 2023-11-04 | End: 2023-11-06

## 2023-11-04 RX ORDER — LISINOPRIL 2.5 MG/1
1 TABLET ORAL
Qty: 0 | Refills: 0 | DISCHARGE
Start: 2023-11-04

## 2023-11-04 RX ORDER — PANTOPRAZOLE SODIUM 20 MG/1
40 TABLET, DELAYED RELEASE ORAL
Refills: 0 | Status: DISCONTINUED | OUTPATIENT
Start: 2023-11-04 | End: 2023-11-06

## 2023-11-04 RX ORDER — HYDROXYZINE HCL 10 MG
1 TABLET ORAL
Qty: 0 | Refills: 0 | DISCHARGE
Start: 2023-11-04

## 2023-11-04 RX ORDER — ARIPIPRAZOLE 15 MG/1
1 TABLET ORAL
Qty: 0 | Refills: 0 | DISCHARGE
Start: 2023-11-04

## 2023-11-04 RX ORDER — CLOPIDOGREL BISULFATE 75 MG/1
1 TABLET, FILM COATED ORAL
Qty: 0 | Refills: 0 | DISCHARGE
Start: 2023-11-04

## 2023-11-04 RX ORDER — SENNA PLUS 8.6 MG/1
2 TABLET ORAL AT BEDTIME
Refills: 0 | Status: DISCONTINUED | OUTPATIENT
Start: 2023-11-04 | End: 2023-11-06

## 2023-11-04 RX ORDER — ATORVASTATIN CALCIUM 80 MG/1
1 TABLET, FILM COATED ORAL
Qty: 0 | Refills: 0 | DISCHARGE
Start: 2023-11-04

## 2023-11-04 RX ORDER — ASPIRIN/CALCIUM CARB/MAGNESIUM 324 MG
1 TABLET ORAL
Qty: 0 | Refills: 0 | DISCHARGE
Start: 2023-11-04

## 2023-11-04 RX ADMIN — LISINOPRIL 5 MILLIGRAM(S): 2.5 TABLET ORAL at 08:29

## 2023-11-04 RX ADMIN — ARIPIPRAZOLE 20 MILLIGRAM(S): 15 TABLET ORAL at 08:28

## 2023-11-04 RX ADMIN — Medication 81 MILLIGRAM(S): at 08:28

## 2023-11-04 RX ADMIN — CLOPIDOGREL BISULFATE 75 MILLIGRAM(S): 75 TABLET, FILM COATED ORAL at 08:28

## 2023-11-04 NOTE — PATIENT PROFILE ADULT - FALL HARM RISK - HARM RISK INTERVENTIONS

## 2023-11-04 NOTE — PATIENT PROFILE ADULT - FUNCTIONAL ASSESSMENT - BASIC MOBILITY 6.
4-calculated by average/Not able to assess (calculate score using [facility] averaging method)

## 2023-11-04 NOTE — BH INPATIENT PSYCHIATRY DISCHARGE NOTE - NSDCCPCAREPLAN_GEN_ALL_CORE_FT
PRINCIPAL DISCHARGE DIAGNOSIS  Diagnosis: Schizoaffective disorder  Assessment and Plan of Treatment:     
2.287

## 2023-11-04 NOTE — PATIENT PROFILE ADULT - HOME ACCESSIBILITY CONCERNS - OTHER
[address]   HISTORY AND PHYSICAL      Iveth Duff.  MR#: [id number]  : 1924  ACCOUNT #: [de-identified]   ADMIT DATE: 2018    HISTORY OF PRESENT ILLNESS:  This 74-year-old woman was admitted to the hospital on 2018 with shortness of breath, generalized malaise, increased cough and the sensation of weakness. Temperature elevation was present to 101.8. We were asked to see her for evaluation of her cardiac status. The patient has been followed by our group. She has a history of coronary artery bypass grafting and prior coronary stents post-bypass while living in [place]. She has been in [place] since about , not had any further cardiac invasive procedures. An echo was performed on  which showed an EF down to 20-25% but with evidence of anterior infarction, previous echo from  showed similarly an EF in the 20-25% range. She was not having orthopnea, PND, worsening edema and no presyncope or syncope, palpitations. No chest discomfort. In hospital, she has been receiving antibiotics, using Levaquin along with prednisone for wheezing and hydration, feeling improved. She is currently afebrile, alert, comfortable, able to give a good history, somewhat hard of hearing, but fully coherent. Her monitor shows sinus rhythm. There had been a history of paroxysmal atrial fibrillation in the past.  She is chronically anticoagulated with Eliquis. PAST MEDICAL HISTORY:  Thyroid disease, prior episodes of pneumonia, pulmonary embolus in the past, osteoporosis with compression fractures, hypertension, fibromyalgia, depression, coronary artery disease with bypass surgery in ,  and apparent stenting procedures perhaps thereafter, asthma and episodes of asthmatic bronchitis, paroxysmal atrial fibrillation and during some of her illness that she has had altered mental status.     CURRENT MEDICATIONS:  Dual jet nebulizers, apixaban 2.5 twice daily, Pulmicort inhaler, B12 500 mcg daily, Colace 100 twice a day, donepezil 5 mg daily, Lasix 40 IV q. 12. No I's and O's available, Mucinex 1200 q. 12, Levaquin 250 IV q. 24, lisinopril 10 daily, metoprolol XL 25 mg daily, MiraLax daily, Pravachol 20 at bedtime, prednisone 40 daily, Barbara-Colace daily, tramadol 25 at bedtime, Effexor 75 daily with breakfast.    ALLERGIES:  NEURONTIN. FAMILY HISTORY:  Noncontributory. SOCIAL HISTORY:  , never smoked. No alcohol. Still teaches craft classes. REVIEW OF SYSTEMS:  Aside from mentioned in HPI has been assessed and is negative. PHYSICAL EXAMINATION:  GENERAL:  No acute distress, pleasant, coherent elderly lady. VITAL SIGNS:  Blood pressure 119/66, pulse 76, sinus rhythm with PACs. HEENT:  Sitting up, there is no jugular venous distention. Carotids are full without bruits. Thyroid not palpable. Pupils equal, round, react to light and accommodation. Extraocular muscles full without nystagmus. LUNGS:  Without wheezes. Somewhat distant tones. HEART:  Regular rate and rhythm with ectopics. No murmur. ABDOMEN:  Soft, nontender. EXTREMITIES:  Trace edema. Pedal pulses are intact. SKIN:  Intact. MUSCULOSKELETAL:  No skeletal deformities. NEUROLOGIC:  Nonfocal.      Echo from 08/18 shows an EF of 20-25% with evidence of anteroapical infarct. Chest x-ray:  No acute abnormality. AP portable looks congested to me. LABORATORY DATA:  Sodium of 143, potassium 4.8, chloride 106, CO2 29, glucose 130, BUN 45, creatinine 1.66 up from 1.39 on admission. Troponins are negative x 3. ProBNP was 21,054 when she was admitted, hemoglobin 13.6, hematocrit 43.2, white count 13,500. Blood culture negative. PROBLEMS:  1. Acute on chronic systolic congestive heart failure. 2.  History of paroxysmal atrial fibrillation, currently in sinus rhythm. 3.  Acute asthmatic bronchitis. 4.  History of thyroid disease. 5.  Prior history of pulmonary embolism.   6. Hyperlipidemia. 7.  Hypertension. 8.  Fibromyalgia. 9.  History of depression. 10.  Coronary artery disease with 2 bypass operation and stenting procedures in the past while living in [place]. 11.  Degenerative joint disease. PLAN/RECOMMENDATIONS:  Switch to oral Lasix, but otherwise no changes. It appears that the mechanism has been an upper respiratory infection and exacerbation of her underlying problems with chronic systolic heart failure. We will follow with you.       MD BUNNY Allison/[place]  D: 08/19/2018 13:25     T: 08/19/2018 15:37  JOB #: [id number] Homeless

## 2023-11-04 NOTE — PATIENT PROFILE ADULT - FALL HARM RISK - HARM RISK INTERVENTIONS

## 2023-11-04 NOTE — BH INPATIENT PSYCHIATRY DISCHARGE NOTE - HOSPITAL COURSE
IMPROVED PSYCHIATRICALLY  NO HOMO/SUICIDAL/COMBATIVE IDEAS OR IMPULSES  COMPLAINED OF CHEST PAIN AND TRANSFERRED TO TELEMETRY
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
## 2023-11-04 NOTE — BH INPATIENT PSYCHIATRY DISCHARGE NOTE - OTHER PAST PSYCHIATRIC HISTORY (INCLUDE DETAILS REGARDING ONSET, COURSE OF ILLNESS, INPATIENT/OUTPATIENT TREATMENT)
PPH of schizoaffective disorder depressive type, 7 prior hospitalizations (last at [facility] in May 2023 for SI), no suicide attempts or NSSIB, no violence, no substance use, no outpatient treatment, who presents with suicidal ideation and homicidal ideation.

## 2023-11-04 NOTE — BH INPATIENT PSYCHIATRY DISCHARGE NOTE - NSDCMRMEDTOKEN_GEN_ALL_CORE_FT
ARIPiprazole 20 mg oral tablet: 1 tab(s) orally once a day  aspirin 81 mg oral tablet, chewable: 1 tab(s) orally once a day  atorvastatin 80 mg oral tablet: 1 tab(s) orally once a day (at bedtime)  clopidogrel 75 mg oral tablet: 1 tab(s) orally once a day  diphenhydrAMINE 50 mg oral capsule: 1 cap(s) orally every 6 hours As needed Eps  haloperidol 5 mg oral tablet: 1 tab(s) orally every 6 hours As needed agitation  hydrOXYzine hydrochloride 50 mg oral tablet: 1 tab(s) orally every 6 hours As needed Anxiety/insomnia  lisinopril 5 mg oral tablet: 1 tab(s) orally once a day  LORazepam 2 mg oral tablet: 1 tab(s) orally every 6 hours As needed Agitation

## 2023-11-04 NOTE — BH INPATIENT PSYCHIATRY DISCHARGE NOTE - NSBHASSESSSUMMFT_PSY_ALL_CORE
62F, living at [facility], on disability since 2020, PMH of HTN, aortic valve replacement, psych hx of schizoaffective disorder depressive type, no suicide attempts or NSSIB, no violence, no substance use, 6 prior hospitalizations (last at [facility] in november 2022 for SI), now BIBS reporting suicide ideation, HI, intermittent CAHKS.     Patient seen and evaluated as per nursing report no acute events. On approach patient calm and cooperative. No agitation or aggression noted. Verbalizes feeling better today. Abilify recently increased. Appears to be responding well. Denies any suicidal/homicidal ideations at this time. Able to contract for safety. Denies any A/V hallucinations. States she slept well last night and appetite is good. Patient visible on the unit engaging with peers and attending groups. Anticipated discharge early next week provided patient continues to improve over the weekend.    #Schizoaffective disorder  -Abilify 20mg Daily    -Hydroxyzine 50mg Q6 PRN for anxiety/insomnia  -Haldol 5mg Q6 PRN for agitation/psychosis  -Benadryl 50mg Q6 PRN got EPS  -Lorazepam 2mg Q6 PRN for aggression    # Aortic stenosis and history of valve replacement  aspirin  chewable 81 milliGRAM(s) Oral daily  atorvastatin 80 milliGRAM(s) Oral at bedtime  clopidogrel Tablet 75 milliGRAM(s) Oral daily  lisinopril 5 milliGRAM(s) Oral daily    -Tylenol PRN for pain    #UTI  -trimethoprim  160 mG/sulfamethoxazole 800 mG 1 Tablet(s) Oral daily    #Agitation  -for agitation not amenable to verbal redirection, may give haldol 5 mg q6h prn, ativan 2 mg q6h prn, benadryl 50 mg q6h prn with escalation to IM if pt is a danger to self or/and others with repeat EKG toensure QTc <500 ms

## 2023-11-04 NOTE — BH INPATIENT PSYCHIATRY DISCHARGE NOTE - NSBHMETABOLIC_PSY_ALL_CORE_FT
BMI: BMI (kg/m2): 35.7 (10-28-23 @ 04:32)  HbA1c: A1C with Estimated Average Glucose Result: 7.1 % (10-29-23 @ 08:45)    Glucose: POCT Blood Glucose.: 153 mg/dL (10-22-23 @ 07:46)    BP: 174/77 (11-04-23 @ 18:59) (116/94 - 184/96)  Lipid Panel: Date/Time: 10-29-23 @ 08:45  Cholesterol, Serum: 165  Direct LDL: --  HDL Cholesterol, Serum: 57  Total Cholesterol/HDL Ration Measurement: --  Triglycerides, Serum: 91

## 2023-11-04 NOTE — CHART NOTE - NSCHARTNOTEFT_GEN_A_CORE
called  BY  NURSE  61 Y/O  PATIENT ADMITTED  TO [facility] WITH  SCHIZOAFFECTIVE  DO  WITH  PMH  LEUKEMIA  IN REMISSION, AORTIC STENOSIS  S/P  AVR, HTN, DM, H/O  MI ONE  MONTH AGO IN [place], , H./O CVA NOW  WITH  C/O  CHEST  PAIN - MID STERNAL- RADIATING  TO  LEFT  ARM X 1/2 HR  O/E  NO SOB, NO FEVER, NO NVD,   V/S   T 97.8,  /86,  HR 77,  RR 16,   O2  SAT 98 %  ON RA  PE  AXOX3  PULM  CTA  CVS S1S2  ABD  + BS  SOFT                          12.6   7.54  )-----------( 235      ( 04 Nov 2023 20:30 )             37.2   11-04    138  |  102  |  19  ----------------------------<  191<H>  4.6   |  26  |  1.0    Ca    9.5      04 Nov 2023 20:30    TPro  6.7  /  Alb  4.0  /  TBili  <0.2  /  DBili  x   /  AST  14  /  ALT  14  /  AlkPhos  118<H>  11-04        Ventricular Rate 79 BPM    Atrial Rate 79 BPM    P-R Interval 142 ms    QRS Duration 96 ms    Q-T Interval 382 ms    QTC Calculation(Bazett) 438 ms    P Axis 11 degrees    R Axis -23 degrees    T Axis 94 degrees    Diagnosis Line Normal sinus rhythm  Moderate voltage criteria for LVH, may be normal variant  Abnormal QRS-T angle, consider primary T wave abnormality  Abnormal ECG    Confirmed by MARCELO NAVA MD [id number]) on 10/28/2023 10:48:20 AM    A&P  CHEST  PAIN  EKG  ORDERED  STAT  D/W  DR KAUR  LABS  ORDERED  DR KAUR WILL FOLLOW called  BY  NURSE  61 Y/O  PATIENT ADMITTED  TO [facility] WITH  SCHIZOAFFECTIVE  DO  WITH  PMH  LEUKEMIA  IN REMISSION, AORTIC STENOSIS  S/P  AVR, HTN, DM, H/O  MI ONE  MONTH AGO IN [place], , H./O CVA NOW  WITH  C/O  CHEST  PAIN - MID STERNAL- RADIATING  TO  LEFT  ARM X 1/2 HR  O/E  NO SOB, NO FEVER, NO NVD,   V/S   T 97.8,  /86,  HR 77,  RR 16,   O2  SAT 98 %  ON RA  PE  AXOX3  PULM  CTA  CVS S1S2  ABD  + BS  SOFT                          12.6   7.54  )-----------( 235      ( 04 Nov 2023 20:30 )             37.2   11-04    138  |  102  |  19  ----------------------------<  191<H>  4.6   |  26  |  1.0    Ca    9.5      04 Nov 2023 20:30    TPro  6.7  /  Alb  4.0  /  TBili  <0.2  /  DBili  x   /  AST  14  /  ALT  14  /  AlkPhos  118<H>  11-04  troponin  < 0.01      Ventricular Rate 79 BPM    Atrial Rate 79 BPM    P-R Interval 142 ms    QRS Duration 96 ms    Q-T Interval 382 ms    QTC Calculation(Bazett) 438 ms    P Axis 11 degrees    R Axis -23 degrees    T Axis 94 degrees    Diagnosis Line Normal sinus rhythm  Moderate voltage criteria for LVH, may be normal variant  Abnormal QRS-T angle, consider primary T wave abnormality  Abnormal ECG    Confirmed by MARCELO NAVA MD [id number]) on 10/28/2023 10:48:20 AM    A&P  CHEST  PAIN  EKG  ORDERED  STAT  D/W  DR KAUR  LABS  ORDERED  DR KAUR WILL FOLLOW

## 2023-11-04 NOTE — PROVIDER CONTACT NOTE (OTHER) - SITUATION
pt c/o chest pain, radiating to her left shoulder, pt not diaphorethic or nauseaous, vs 174/86 hr 77 rr 18, pox 98% on r/a, pt cruz in any other distress. md KYA montiel made aware.

## 2023-11-05 LAB
ALBUMIN SERPL ELPH-MCNC: 3.9 G/DL — SIGNIFICANT CHANGE UP (ref 3.5–5.2)
ALBUMIN SERPL ELPH-MCNC: 3.9 G/DL — SIGNIFICANT CHANGE UP (ref 3.5–5.2)
ALP SERPL-CCNC: 125 U/L — HIGH (ref 30–115)
ALP SERPL-CCNC: 125 U/L — HIGH (ref 30–115)
ALT FLD-CCNC: 12 U/L — SIGNIFICANT CHANGE UP (ref 0–41)
ALT FLD-CCNC: 12 U/L — SIGNIFICANT CHANGE UP (ref 0–41)
ANION GAP SERPL CALC-SCNC: 9 MMOL/L — SIGNIFICANT CHANGE UP (ref 7–14)
ANION GAP SERPL CALC-SCNC: 9 MMOL/L — SIGNIFICANT CHANGE UP (ref 7–14)
AST SERPL-CCNC: 15 U/L — SIGNIFICANT CHANGE UP (ref 0–41)
AST SERPL-CCNC: 15 U/L — SIGNIFICANT CHANGE UP (ref 0–41)
BILIRUB SERPL-MCNC: 0.3 MG/DL — SIGNIFICANT CHANGE UP (ref 0.2–1.2)
BILIRUB SERPL-MCNC: 0.3 MG/DL — SIGNIFICANT CHANGE UP (ref 0.2–1.2)
BUN SERPL-MCNC: 16 MG/DL — SIGNIFICANT CHANGE UP (ref 10–20)
BUN SERPL-MCNC: 16 MG/DL — SIGNIFICANT CHANGE UP (ref 10–20)
CALCIUM SERPL-MCNC: 9.3 MG/DL — SIGNIFICANT CHANGE UP (ref 8.4–10.5)
CALCIUM SERPL-MCNC: 9.3 MG/DL — SIGNIFICANT CHANGE UP (ref 8.4–10.5)
CHLORIDE SERPL-SCNC: 102 MMOL/L — SIGNIFICANT CHANGE UP (ref 98–110)
CHLORIDE SERPL-SCNC: 102 MMOL/L — SIGNIFICANT CHANGE UP (ref 98–110)
CO2 SERPL-SCNC: 26 MMOL/L — SIGNIFICANT CHANGE UP (ref 17–32)
CO2 SERPL-SCNC: 26 MMOL/L — SIGNIFICANT CHANGE UP (ref 17–32)
CREAT SERPL-MCNC: 0.9 MG/DL — SIGNIFICANT CHANGE UP (ref 0.7–1.5)
CREAT SERPL-MCNC: 0.9 MG/DL — SIGNIFICANT CHANGE UP (ref 0.7–1.5)
EGFR: 73 ML/MIN/1.73M2 — SIGNIFICANT CHANGE UP
EGFR: 73 ML/MIN/1.73M2 — SIGNIFICANT CHANGE UP
GLUCOSE SERPL-MCNC: 131 MG/DL — HIGH (ref 70–99)
GLUCOSE SERPL-MCNC: 131 MG/DL — HIGH (ref 70–99)
HCT VFR BLD CALC: 38.2 % — SIGNIFICANT CHANGE UP (ref 37–47)
HCT VFR BLD CALC: 38.2 % — SIGNIFICANT CHANGE UP (ref 37–47)
HGB BLD-MCNC: 12.9 G/DL — SIGNIFICANT CHANGE UP (ref 12–16)
HGB BLD-MCNC: 12.9 G/DL — SIGNIFICANT CHANGE UP (ref 12–16)
MCHC RBC-ENTMCNC: 28.9 PG — SIGNIFICANT CHANGE UP (ref 27–31)
MCHC RBC-ENTMCNC: 28.9 PG — SIGNIFICANT CHANGE UP (ref 27–31)
MCHC RBC-ENTMCNC: 33.8 G/DL — SIGNIFICANT CHANGE UP (ref 32–37)
MCHC RBC-ENTMCNC: 33.8 G/DL — SIGNIFICANT CHANGE UP (ref 32–37)
MCV RBC AUTO: 85.7 FL — SIGNIFICANT CHANGE UP (ref 81–99)
MCV RBC AUTO: 85.7 FL — SIGNIFICANT CHANGE UP (ref 81–99)
NRBC # BLD: 0 /100 WBCS — SIGNIFICANT CHANGE UP (ref 0–0)
NRBC # BLD: 0 /100 WBCS — SIGNIFICANT CHANGE UP (ref 0–0)
PLATELET # BLD AUTO: 232 K/UL — SIGNIFICANT CHANGE UP (ref 130–400)
PLATELET # BLD AUTO: 232 K/UL — SIGNIFICANT CHANGE UP (ref 130–400)
PMV BLD: 9.3 FL — SIGNIFICANT CHANGE UP (ref 7.4–10.4)
PMV BLD: 9.3 FL — SIGNIFICANT CHANGE UP (ref 7.4–10.4)
POTASSIUM SERPL-MCNC: 5 MMOL/L — SIGNIFICANT CHANGE UP (ref 3.5–5)
POTASSIUM SERPL-MCNC: 5 MMOL/L — SIGNIFICANT CHANGE UP (ref 3.5–5)
POTASSIUM SERPL-SCNC: 5 MMOL/L — SIGNIFICANT CHANGE UP (ref 3.5–5)
POTASSIUM SERPL-SCNC: 5 MMOL/L — SIGNIFICANT CHANGE UP (ref 3.5–5)
PROT SERPL-MCNC: 6.9 G/DL — SIGNIFICANT CHANGE UP (ref 6–8)
PROT SERPL-MCNC: 6.9 G/DL — SIGNIFICANT CHANGE UP (ref 6–8)
RBC # BLD: 4.46 M/UL — SIGNIFICANT CHANGE UP (ref 4.2–5.4)
RBC # BLD: 4.46 M/UL — SIGNIFICANT CHANGE UP (ref 4.2–5.4)
RBC # FLD: 13.4 % — SIGNIFICANT CHANGE UP (ref 11.5–14.5)
RBC # FLD: 13.4 % — SIGNIFICANT CHANGE UP (ref 11.5–14.5)
SODIUM SERPL-SCNC: 137 MMOL/L — SIGNIFICANT CHANGE UP (ref 135–146)
SODIUM SERPL-SCNC: 137 MMOL/L — SIGNIFICANT CHANGE UP (ref 135–146)
TROPONIN T SERPL-MCNC: <0.01 NG/ML — SIGNIFICANT CHANGE UP
WBC # BLD: 6.38 K/UL — SIGNIFICANT CHANGE UP (ref 4.8–10.8)
WBC # BLD: 6.38 K/UL — SIGNIFICANT CHANGE UP (ref 4.8–10.8)
WBC # FLD AUTO: 6.38 K/UL — SIGNIFICANT CHANGE UP (ref 4.8–10.8)
WBC # FLD AUTO: 6.38 K/UL — SIGNIFICANT CHANGE UP (ref 4.8–10.8)

## 2023-11-05 PROCEDURE — 99222 1ST HOSP IP/OBS MODERATE 55: CPT

## 2023-11-05 PROCEDURE — 71045 X-RAY EXAM CHEST 1 VIEW: CPT | Mod: 26

## 2023-11-05 RX ORDER — HEPARIN SODIUM 5000 [USP'U]/ML
5000 INJECTION INTRAVENOUS; SUBCUTANEOUS EVERY 12 HOURS
Refills: 0 | Status: DISCONTINUED | OUTPATIENT
Start: 2023-11-05 | End: 2023-11-06

## 2023-11-05 RX ORDER — LISINOPRIL 2.5 MG/1
5 TABLET ORAL DAILY
Refills: 0 | Status: DISCONTINUED | OUTPATIENT
Start: 2023-11-04 | End: 2023-11-06

## 2023-11-05 RX ORDER — HYDROXYZINE HCL 10 MG
50 TABLET ORAL EVERY 6 HOURS
Refills: 0 | Status: DISCONTINUED | OUTPATIENT
Start: 2023-11-05 | End: 2023-11-06

## 2023-11-05 RX ORDER — ARIPIPRAZOLE 15 MG/1
20 TABLET ORAL DAILY
Refills: 0 | Status: DISCONTINUED | OUTPATIENT
Start: 2023-11-05 | End: 2023-11-06

## 2023-11-05 RX ORDER — ATORVASTATIN CALCIUM 80 MG/1
80 TABLET, FILM COATED ORAL AT BEDTIME
Refills: 0 | Status: DISCONTINUED | OUTPATIENT
Start: 2023-11-05 | End: 2023-11-06

## 2023-11-05 RX ORDER — CLOPIDOGREL BISULFATE 75 MG/1
75 TABLET, FILM COATED ORAL DAILY
Refills: 0 | Status: DISCONTINUED | OUTPATIENT
Start: 2023-11-04 | End: 2023-11-06

## 2023-11-05 RX ADMIN — LISINOPRIL 5 MILLIGRAM(S): 2.5 TABLET ORAL at 05:26

## 2023-11-05 RX ADMIN — ATORVASTATIN CALCIUM 80 MILLIGRAM(S): 80 TABLET, FILM COATED ORAL at 00:51

## 2023-11-05 RX ADMIN — CLOPIDOGREL BISULFATE 75 MILLIGRAM(S): 75 TABLET, FILM COATED ORAL at 12:52

## 2023-11-05 RX ADMIN — ARIPIPRAZOLE 20 MILLIGRAM(S): 15 TABLET ORAL at 14:17

## 2023-11-05 RX ADMIN — HEPARIN SODIUM 5000 UNIT(S): 5000 INJECTION INTRAVENOUS; SUBCUTANEOUS at 17:09

## 2023-11-05 RX ADMIN — PANTOPRAZOLE SODIUM 40 MILLIGRAM(S): 20 TABLET, DELAYED RELEASE ORAL at 05:25

## 2023-11-05 RX ADMIN — HEPARIN SODIUM 5000 UNIT(S): 5000 INJECTION INTRAVENOUS; SUBCUTANEOUS at 05:25

## 2023-11-05 RX ADMIN — Medication 81 MILLIGRAM(S): at 12:52

## 2023-11-05 NOTE — H&P ADULT - HISTORY OF PRESENT ILLNESS
Patient is 61 yo female with hx of AS s/p aortic valve replacement, leukemia, CAD, and depression presenting with chest pain that started several hours ago on the psych unit while sitting, chest pain is aching in nature, radiating to the left shoulder has no relieving or exacerbating factors, and associated with SOB.  denies any headache, blurry vision, dizziness, N/V/D, numbness or weakness  afebrile  Offers no other complaints

## 2023-11-05 NOTE — CONSULT NOTE ADULT - ASSESSMENT
Patient is 63 yo female with hx of AS s/p aortic valve replacement, leukemia, CAD, and depression presenting with chest pain while sitting in the inpatient  psych unit . Chest wall now tender. She claims AVR done 3/23. Told at that time coronary arteries good. Would r/o mi. But pain appears non cardiac.

## 2023-11-05 NOTE — H&P ADULT - ASSESSMENT
Patient is 63 yo female with hx of AS s/p aortic valve replacement, leukemia, CAD, and depression presenting with    #Chest pain  -EKG shows no acute ischemic changes  -Trop negative X1  -Will obtain serial trop, and cardiology consult   -recent TTE shows EF of 57%      #HTN/Depression  -Continue with home medications  -sitter consult   -Psych consult       Please note, patient was transferred from the psych unit to the medical service due to chest pain in high risk patient, discussed with dr. milton, and staff    #Progress Note Handoff  Pending (specify):  as above   Family discussion:  plan of care was discussed with patient   in details.  all questions were answered.  seems to understand, and in agreement  Disposition:  home

## 2023-11-05 NOTE — H&P ADULT - NSHPPHYSICALEXAM_GEN_ALL_CORE
Vital Signs Last 24 Hrs  T(C): 35.4 (04 Nov 2023 23:30), Max: 36.1 (04 Nov 2023 08:39)  T(F): 95.8 (04 Nov 2023 23:30), Max: 97 (04 Nov 2023 08:39)  HR: 62 (04 Nov 2023 23:17) (62 - 92)  BP: 110/81 (04 Nov 2023 23:17) (110/81 - 176/85)  BP(mean): 91 (04 Nov 2023 23:17) (91 - 91)  RR: 18 (04 Nov 2023 23:17) (16 - 18)  SpO2: 100% (04 Nov 2023 23:17) (98% - 100%)    Parameters below as of 04 Nov 2023 23:09  Patient On (Oxygen Delivery Method): room air          PHYSICAL EXAM-  GENERAL: NAD, well-groomed, well-developed  HEAD:  Atraumatic, Normocephalic  EYES: EOMI, PERRLA, conjunctiva and sclera clear  NECK: Supple, No JVD, Normal thyroid  NERVOUS SYSTEM:  Alert & Oriented X3, Motor Strength 5/5 B/L upper and lower extremities; DTRs 2+ intact and symmetric  CHEST/LUNG: Clear to percussion bilaterally; No rales, rhonchi, wheezing, or rubs  HEART: Regular rate and rhythm; No murmurs, rubs, or gallops  ABDOMEN: Soft, Nontender, Nondistended; Bowel sounds present  EXTREMITIES:  2+ Peripheral Pulses, No clubbing, cyanosis, or edema  SKIN: No rashes or lesions

## 2023-11-05 NOTE — CONSULT NOTE ADULT - SUBJECTIVE AND OBJECTIVE BOX
CARDIOLOGY CONSULT NOTE     CHIEF COMPLAINT/REASON FOR CONSULT:    HPI:  Patient is 61 yo female with hx of AS s/p aortic valve replacement, leukemia, CAD, and depression presenting with chest pain that started several hours ago on the psych unit while sitting, chest pain is aching in nature, radiating to the left shoulder has no relieving or exacerbating factors, and associated with SOB.  denies any headache, blurry vision, dizziness, N/V/D, numbness or weakness  afebrile  Offers no other complaints  (05 Nov 2023 00:08)      PAST MEDICAL & SURGICAL HISTORY:  Leukemia  in remission      Schizoaffective disorder, depressive type      H/O aortic valve disorder      H/O aortic valve replacement          Cardiac Risks:   [ ]HTN, [ ] DM, [ ] Smoking, [ ] FH,  [ ] Lipids        MEDICATIONS:  MEDICATIONS  (STANDING):  ARIPiprazole 20 milliGRAM(s) Oral daily  aspirin enteric coated 81 milliGRAM(s) Oral daily  atorvastatin 80 milliGRAM(s) Oral at bedtime  clopidogrel Tablet 75 milliGRAM(s) Oral daily  heparin   Injectable 5000 Unit(s) SubCutaneous every 12 hours  lisinopril 5 milliGRAM(s) Oral daily  pantoprazole    Tablet 40 milliGRAM(s) Oral before breakfast      FAMILY HISTORY:  Family history of early CAD (Father)  Says that multiple family members had heart disease (a sibling is awaiting heart transplant)        SOCIAL HISTORY:        Allergies    penicillin (Rash)        	    REVIEW OF SYSTEMS:  CONSTITUTIONAL: No fever, weight loss, or fatigue  EYES: No eye pain, visual disturbances, or discharge  ENMT:  No difficulty hearing, tinnitus, vertigo; No sinus or throat pain  NECK: No pain or stiffness  RESPIRATORY: No cough, wheezing, chills or hemoptysis; No Shortness of Breath  CARDIOVASCULAR: See above  GASTROINTESTINAL: No abdominal or epigastric pain. No nausea, vomiting, or hematemesis; No diarrhea or constipation. No melena or hematochezia.  GENITOURINARY: No dysuria, frequency, hematuria, or incontinence  NEUROLOGICAL: No headaches, memory loss, loss of strength, numbness, or tremors  SKIN: No itching, burning, rashes, or lesions   	      PHYSICAL EXAM:  T(C): 35.6 (11-05-23 @ 07:02), Max: 35.6 (11-04-23 @ 16:23)  HR: 74 (11-05-23 @ 07:02) (59 - 92)  BP: 114/71 (11-05-23 @ 07:02) (110/81 - 174/77)  RR: 18 (11-05-23 @ 07:02) (16 - 18)  SpO2: 98% (11-05-23 @ 07:02) (97% - 100%)  Wt(kg): --  I&O's Summary      Appearance: Normal	  Psychiatry: A & O x 3, Mood & affect appropriate  HEENT:   Normal oral mucosa, PERRL, EOMI	  Lymphatic: No lymphadenopathy  Cardiovascular: Normal S1 S2,RRR, No JVD, No murmurs  Respiratory: Lungs clear to auscultation	  Gastrointestinal:  Soft, Non-tender, + BS	  Skin: No rashes, No ecchymoses, No cyanosis	  Neurologic: Non-focal  Extremities: Normal range of motion, No clubbing, cyanosis or edema  Vascular: Peripheral pulses palpable 2+ bilaterally      ECG:  	  < from: 12 Lead ECG (10.27.23 @ 22:43) >    Diagnosis Line Normal sinus rhythm  Moderate voltage criteria for LVH, may be normal variant  Abnormal QRS-T angle, consider primary T wave abnormality  Abnormal ECG    Confirmed by MARCELO NAVA MD ([id number]) on 10/28/2023 10:48:20 AM    < end of copied text >    	  ECHO :    < from: TTE Echo Complete w/o Contrast w/ Doppler (10.22.23 @ 09:00) >  Summary:   1. Normal global left ventricular systolic function with a biplane EF of   56%. Mild (grade I) diastolic dysfunction. No regional wall motion   abnormalities noted.  2. Normal right ventricular size and function.   3. Normal left atrial size.   4. S/p bioprosthetic aortic valve replacement. Normal function with no   valvular or paravalvular regurgitation (Vmax 2.8m/s, mPG 17mmHg, DI 0.47,   AT 70msec, MAL 1.7cm2).   5. Adequate TR velocity was not obtained to accurately assess RVSP.   6. There is no evidence of pericardial effusion.   7. Intravenous injection of agitated saline demonstrates the presence of   a patent foramen ovale.    < end of copied text >  LABS:	 	    CARDIAC MARKERS:                                    12.9   6.38  )-----------( 232      ( 05 Nov 2023 08:00 )             38.2     11-04    138  |  102  |  19  ----------------------------<  191<H>  4.6   |  26  |  1.0    Ca    9.5      04 Nov 2023 20:30    TPro  6.7  /  Alb  4.0  /  TBili  <0.2  /  DBili  x   /  AST  14  /  ALT  14  /  AlkPhos  118<H>  11-04

## 2023-11-06 ENCOUNTER — TRANSCRIPTION ENCOUNTER (OUTPATIENT)
Age: 63
End: 2023-11-06

## 2023-11-06 VITALS
OXYGEN SATURATION: 98 % | RESPIRATION RATE: 16 BRPM | DIASTOLIC BLOOD PRESSURE: 79 MMHG | TEMPERATURE: 97 F | HEART RATE: 89 BPM | SYSTOLIC BLOOD PRESSURE: 125 MMHG

## 2023-11-06 DIAGNOSIS — R07.9 CHEST PAIN, UNSPECIFIED: ICD-10-CM

## 2023-11-06 LAB
ALBUMIN SERPL ELPH-MCNC: 3.9 G/DL — SIGNIFICANT CHANGE UP (ref 3.5–5.2)
ALBUMIN SERPL ELPH-MCNC: 3.9 G/DL — SIGNIFICANT CHANGE UP (ref 3.5–5.2)
ALP SERPL-CCNC: 125 U/L — HIGH (ref 30–115)
ALP SERPL-CCNC: 125 U/L — HIGH (ref 30–115)
ALT FLD-CCNC: 19 U/L — SIGNIFICANT CHANGE UP (ref 0–41)
ALT FLD-CCNC: 19 U/L — SIGNIFICANT CHANGE UP (ref 0–41)
ANION GAP SERPL CALC-SCNC: 10 MMOL/L — SIGNIFICANT CHANGE UP (ref 7–14)
ANION GAP SERPL CALC-SCNC: 10 MMOL/L — SIGNIFICANT CHANGE UP (ref 7–14)
AST SERPL-CCNC: 16 U/L — SIGNIFICANT CHANGE UP (ref 0–41)
AST SERPL-CCNC: 16 U/L — SIGNIFICANT CHANGE UP (ref 0–41)
BASOPHILS # BLD AUTO: 0.03 K/UL — SIGNIFICANT CHANGE UP (ref 0–0.2)
BASOPHILS # BLD AUTO: 0.03 K/UL — SIGNIFICANT CHANGE UP (ref 0–0.2)
BASOPHILS NFR BLD AUTO: 0.5 % — SIGNIFICANT CHANGE UP (ref 0–1)
BASOPHILS NFR BLD AUTO: 0.5 % — SIGNIFICANT CHANGE UP (ref 0–1)
BILIRUB SERPL-MCNC: 0.2 MG/DL — SIGNIFICANT CHANGE UP (ref 0.2–1.2)
BILIRUB SERPL-MCNC: 0.2 MG/DL — SIGNIFICANT CHANGE UP (ref 0.2–1.2)
BUN SERPL-MCNC: 19 MG/DL — SIGNIFICANT CHANGE UP (ref 10–20)
BUN SERPL-MCNC: 19 MG/DL — SIGNIFICANT CHANGE UP (ref 10–20)
CALCIUM SERPL-MCNC: 9.2 MG/DL — SIGNIFICANT CHANGE UP (ref 8.4–10.5)
CALCIUM SERPL-MCNC: 9.2 MG/DL — SIGNIFICANT CHANGE UP (ref 8.4–10.5)
CHLORIDE SERPL-SCNC: 104 MMOL/L — SIGNIFICANT CHANGE UP (ref 98–110)
CHLORIDE SERPL-SCNC: 104 MMOL/L — SIGNIFICANT CHANGE UP (ref 98–110)
CO2 SERPL-SCNC: 26 MMOL/L — SIGNIFICANT CHANGE UP (ref 17–32)
CO2 SERPL-SCNC: 26 MMOL/L — SIGNIFICANT CHANGE UP (ref 17–32)
CREAT SERPL-MCNC: 1 MG/DL — SIGNIFICANT CHANGE UP (ref 0.7–1.5)
CREAT SERPL-MCNC: 1 MG/DL — SIGNIFICANT CHANGE UP (ref 0.7–1.5)
EGFR: 64 ML/MIN/1.73M2 — SIGNIFICANT CHANGE UP
EGFR: 64 ML/MIN/1.73M2 — SIGNIFICANT CHANGE UP
EOSINOPHIL # BLD AUTO: 0.13 K/UL — SIGNIFICANT CHANGE UP (ref 0–0.7)
EOSINOPHIL # BLD AUTO: 0.13 K/UL — SIGNIFICANT CHANGE UP (ref 0–0.7)
EOSINOPHIL NFR BLD AUTO: 2.4 % — SIGNIFICANT CHANGE UP (ref 0–8)
EOSINOPHIL NFR BLD AUTO: 2.4 % — SIGNIFICANT CHANGE UP (ref 0–8)
GLUCOSE SERPL-MCNC: 141 MG/DL — HIGH (ref 70–99)
GLUCOSE SERPL-MCNC: 141 MG/DL — HIGH (ref 70–99)
HCT VFR BLD CALC: 36.7 % — LOW (ref 37–47)
HCT VFR BLD CALC: 36.7 % — LOW (ref 37–47)
HGB BLD-MCNC: 12.4 G/DL — SIGNIFICANT CHANGE UP (ref 12–16)
HGB BLD-MCNC: 12.4 G/DL — SIGNIFICANT CHANGE UP (ref 12–16)
IMM GRANULOCYTES NFR BLD AUTO: 0.5 % — HIGH (ref 0.1–0.3)
IMM GRANULOCYTES NFR BLD AUTO: 0.5 % — HIGH (ref 0.1–0.3)
LYMPHOCYTES # BLD AUTO: 1.74 K/UL — SIGNIFICANT CHANGE UP (ref 1.2–3.4)
LYMPHOCYTES # BLD AUTO: 1.74 K/UL — SIGNIFICANT CHANGE UP (ref 1.2–3.4)
LYMPHOCYTES # BLD AUTO: 31.5 % — SIGNIFICANT CHANGE UP (ref 20.5–51.1)
LYMPHOCYTES # BLD AUTO: 31.5 % — SIGNIFICANT CHANGE UP (ref 20.5–51.1)
MAGNESIUM SERPL-MCNC: 2.1 MG/DL — SIGNIFICANT CHANGE UP (ref 1.8–2.4)
MAGNESIUM SERPL-MCNC: 2.1 MG/DL — SIGNIFICANT CHANGE UP (ref 1.8–2.4)
MCHC RBC-ENTMCNC: 28.3 PG — SIGNIFICANT CHANGE UP (ref 27–31)
MCHC RBC-ENTMCNC: 28.3 PG — SIGNIFICANT CHANGE UP (ref 27–31)
MCHC RBC-ENTMCNC: 33.8 G/DL — SIGNIFICANT CHANGE UP (ref 32–37)
MCHC RBC-ENTMCNC: 33.8 G/DL — SIGNIFICANT CHANGE UP (ref 32–37)
MCV RBC AUTO: 83.8 FL — SIGNIFICANT CHANGE UP (ref 81–99)
MCV RBC AUTO: 83.8 FL — SIGNIFICANT CHANGE UP (ref 81–99)
MONOCYTES # BLD AUTO: 0.31 K/UL — SIGNIFICANT CHANGE UP (ref 0.1–0.6)
MONOCYTES # BLD AUTO: 0.31 K/UL — SIGNIFICANT CHANGE UP (ref 0.1–0.6)
MONOCYTES NFR BLD AUTO: 5.6 % — SIGNIFICANT CHANGE UP (ref 1.7–9.3)
MONOCYTES NFR BLD AUTO: 5.6 % — SIGNIFICANT CHANGE UP (ref 1.7–9.3)
NEUTROPHILS # BLD AUTO: 3.28 K/UL — SIGNIFICANT CHANGE UP (ref 1.4–6.5)
NEUTROPHILS # BLD AUTO: 3.28 K/UL — SIGNIFICANT CHANGE UP (ref 1.4–6.5)
NEUTROPHILS NFR BLD AUTO: 59.5 % — SIGNIFICANT CHANGE UP (ref 42.2–75.2)
NEUTROPHILS NFR BLD AUTO: 59.5 % — SIGNIFICANT CHANGE UP (ref 42.2–75.2)
NRBC # BLD: 0 /100 WBCS — SIGNIFICANT CHANGE UP (ref 0–0)
NRBC # BLD: 0 /100 WBCS — SIGNIFICANT CHANGE UP (ref 0–0)
PLATELET # BLD AUTO: 219 K/UL — SIGNIFICANT CHANGE UP (ref 130–400)
PLATELET # BLD AUTO: 219 K/UL — SIGNIFICANT CHANGE UP (ref 130–400)
PMV BLD: 9.5 FL — SIGNIFICANT CHANGE UP (ref 7.4–10.4)
PMV BLD: 9.5 FL — SIGNIFICANT CHANGE UP (ref 7.4–10.4)
POTASSIUM SERPL-MCNC: 4.5 MMOL/L — SIGNIFICANT CHANGE UP (ref 3.5–5)
POTASSIUM SERPL-MCNC: 4.5 MMOL/L — SIGNIFICANT CHANGE UP (ref 3.5–5)
POTASSIUM SERPL-SCNC: 4.5 MMOL/L — SIGNIFICANT CHANGE UP (ref 3.5–5)
POTASSIUM SERPL-SCNC: 4.5 MMOL/L — SIGNIFICANT CHANGE UP (ref 3.5–5)
PROT SERPL-MCNC: 6.8 G/DL — SIGNIFICANT CHANGE UP (ref 6–8)
PROT SERPL-MCNC: 6.8 G/DL — SIGNIFICANT CHANGE UP (ref 6–8)
RBC # BLD: 4.38 M/UL — SIGNIFICANT CHANGE UP (ref 4.2–5.4)
RBC # BLD: 4.38 M/UL — SIGNIFICANT CHANGE UP (ref 4.2–5.4)
RBC # FLD: 13 % — SIGNIFICANT CHANGE UP (ref 11.5–14.5)
RBC # FLD: 13 % — SIGNIFICANT CHANGE UP (ref 11.5–14.5)
SODIUM SERPL-SCNC: 140 MMOL/L — SIGNIFICANT CHANGE UP (ref 135–146)
SODIUM SERPL-SCNC: 140 MMOL/L — SIGNIFICANT CHANGE UP (ref 135–146)
WBC # BLD: 5.52 K/UL — SIGNIFICANT CHANGE UP (ref 4.8–10.8)
WBC # BLD: 5.52 K/UL — SIGNIFICANT CHANGE UP (ref 4.8–10.8)
WBC # FLD AUTO: 5.52 K/UL — SIGNIFICANT CHANGE UP (ref 4.8–10.8)
WBC # FLD AUTO: 5.52 K/UL — SIGNIFICANT CHANGE UP (ref 4.8–10.8)

## 2023-11-06 PROCEDURE — 99232 SBSQ HOSP IP/OBS MODERATE 35: CPT

## 2023-11-06 RX ORDER — PANTOPRAZOLE SODIUM 20 MG/1
1 TABLET, DELAYED RELEASE ORAL
Qty: 0 | Refills: 0 | DISCHARGE
Start: 2023-11-06

## 2023-11-06 RX ORDER — LISINOPRIL 2.5 MG/1
1 TABLET ORAL
Qty: 0 | Refills: 0 | DISCHARGE
Start: 2023-11-06

## 2023-11-06 RX ORDER — ARIPIPRAZOLE 15 MG/1
1 TABLET ORAL
Qty: 0 | Refills: 0 | DISCHARGE
Start: 2023-11-06

## 2023-11-06 RX ORDER — CLOPIDOGREL BISULFATE 75 MG/1
1 TABLET, FILM COATED ORAL
Qty: 0 | Refills: 0 | DISCHARGE
Start: 2023-11-06

## 2023-11-06 RX ORDER — ATORVASTATIN CALCIUM 80 MG/1
1 TABLET, FILM COATED ORAL
Qty: 0 | Refills: 0 | DISCHARGE
Start: 2023-11-06

## 2023-11-06 RX ORDER — SENNA PLUS 8.6 MG/1
2 TABLET ORAL
Qty: 0 | Refills: 0 | DISCHARGE
Start: 2023-11-06

## 2023-11-06 RX ORDER — ASPIRIN/CALCIUM CARB/MAGNESIUM 324 MG
1 TABLET ORAL
Qty: 0 | Refills: 0 | DISCHARGE
Start: 2023-11-06

## 2023-11-06 RX ORDER — HYDROXYZINE HCL 10 MG
1 TABLET ORAL
Qty: 0 | Refills: 0 | DISCHARGE
Start: 2023-11-06

## 2023-11-06 RX ADMIN — CLOPIDOGREL BISULFATE 75 MILLIGRAM(S): 75 TABLET, FILM COATED ORAL at 12:01

## 2023-11-06 RX ADMIN — HEPARIN SODIUM 5000 UNIT(S): 5000 INJECTION INTRAVENOUS; SUBCUTANEOUS at 06:09

## 2023-11-06 RX ADMIN — Medication 81 MILLIGRAM(S): at 12:01

## 2023-11-06 RX ADMIN — ARIPIPRAZOLE 20 MILLIGRAM(S): 15 TABLET ORAL at 12:01

## 2023-11-06 RX ADMIN — LISINOPRIL 5 MILLIGRAM(S): 2.5 TABLET ORAL at 06:09

## 2023-11-06 NOTE — DISCHARGE NOTE PROVIDER - NSDCMRMEDTOKEN_GEN_ALL_CORE_FT
ARIPiprazole 20 mg oral tablet: 1 tab(s) orally once a day  aspirin 81 mg oral delayed release tablet: 1 tab(s) orally once a day  atorvastatin 80 mg oral tablet: 1 tab(s) orally once a day (at bedtime)  clopidogrel 75 mg oral tablet: 1 tab(s) orally once a day  hydrOXYzine hydrochloride 50 mg oral tablet: 1 tab(s) orally every 6 hours As needed Agitation  lisinopril 5 mg oral tablet: 1 tab(s) orally once a day  LORazepam 2 mg oral tablet: 1 tab(s) orally every 6 hours As needed Agitation  pantoprazole 40 mg oral delayed release tablet: 1 tab(s) orally once a day (before a meal)  senna leaf extract oral tablet: 2 tab(s) orally once a day (at bedtime) As needed Constipation   ARIPiprazole 20 mg oral tablet: 1 tab(s) orally once a day  aspirin 81 mg oral delayed release tablet: 1 tab(s) orally once a day  atorvastatin 80 mg oral tablet: 1 tab(s) orally once a day (at bedtime)  clopidogrel 75 mg oral tablet: 1 tab(s) orally once a day  lisinopril 5 mg oral tablet: 1 tab(s) orally once a day

## 2023-11-06 NOTE — BH CHART NOTE - NSEVENTNOTEFT_PSY_ALL_CORE
Writer made aware patient admitted to medicine over the weekend. Patient now medically cleared for discharge. Patient does not need to return to [facility]. Patient cleared psychiatrically. Patient was scheduled to be discharged on Tuesday 11/7/23. Compliant with medication. Does not warrant continued Inpatient hospitalization. Patient does not present a risk to self or others at this time.    Patient seen Friday 11/3/23: "Patient seen and evaluated as per nursing report no acute events. On approach patient calm and cooperative. No agitation or aggression noted. Verbalizes feeling better today. Abilify recently increased. Appears to be responding well. Denies any suicidal/homicidal ideations at this time. Able to contract for safety. Denies any A/V hallucinations. States she slept well last night and appetite is good. Patient visible on the unit engaging with peers and attending groups. Anticipated discharge early next week provided patient continues to improve over the weekend."    #Schizoaffective disorder  -Abilify 20mg Daily    Patient to return to [facility] at [address] [place] [address]   Follow up at [facility]      Writer made aware patient was admitted to medicine over the weekend. Patient now medically cleared for discharge. Patient does not need to return to [facility]. Patient cleared psychiatrically. Patient was scheduled to be discharged on Tuesday 11/7/23. Compliant with medication. Does not warrant continued Inpatient hospitalization. Patient does not present a risk to self or others at this time.    Patient seen Friday 11/3/23: "Patient seen and evaluated as per nursing report no acute events. On approach patient calm and cooperative. No agitation or aggression noted. Verbalizes feeling better today. Abilify recently increased. Appears to be responding well. Denies any suicidal/homicidal ideations at this time. Able to contract for safety. Denies any A/V hallucinations. States she slept well last night and appetite is good. Patient visible on the unit engaging with peers and attending groups. Anticipated discharge early next week provided patient continues to improve over the weekend."    #Schizoaffective disorder  -Abilify 20mg Daily    Patient to return to [facility] at [address], [place]  [place]   Follow up at [facility]

## 2023-11-06 NOTE — PROGRESS NOTE ADULT - ASSESSMENT
Patient is 61 yo female with hx of AS s/p aortic valve replacement, leukemia, CAD, and depression presenting with chest pain that started while in the psych unit      Impression:  #Chest pain: ACS ruled out  #Hx of AS s/p TAVR      Pt with atypical CP. Currently pain free  Trop flat x3  ECG: Sinus with, no ischemic changes  Cxr: Unremarkable   ECHO: EF: 56%, normal global LVSF, No WMA      Plan:  Consider outpatient ischemic workup  Cont with cardiac home meds   Patient is 63 yo female with hx of AS s/p aortic valve replacement, leukemia, CAD, and depression presenting with chest pain that started while in the psych unit      Impression:  #Chest pain: ACS ruled out  #Hx of AS s/p TAVR      Pt with atypical CP. Currently pain free  Trop flat x3  ECG: Sinus with, no ischemic changes  Cxr: Unremarkable   ECHO: EF: 56%, normal global LVSF, No WMA      Plan:  Consider outpatient ischemic workup  No further inpatient cardiac workup  All other workup per primary team  Cont with cardiac home meds

## 2023-11-06 NOTE — PROGRESS NOTE ADULT - ASSESSMENT
62F w/ Schizophrenia,  bioprosthetic AVR, Hx of Leukemia in remission, Hx of CVA, HTN, diet-controlled DM2 initially admitted to inpatient psychiatry for SI transferred to medicine for evaluation of chest pain now per cardiology no longer requires inpatient evaluation for chest pain.    - I spoke with Cardiology NP  David who confirms that the patient does not require further cardiac inpatient evaluation, and could complete further testing as an outpatient. The patient has had no acute events on tele, is trop neg x3.    - I informed Dr. Bakair Bhatt (1:30pm) that patient is medically optimized for transfer back to inpatient psych for ongoing treatment of SI/Schizophrenia as Cardiology indicates no further inpatient cardiac testing is warranted.

## 2023-11-06 NOTE — DISCHARGE NOTE NURSING/CASE MANAGEMENT/SOCIAL WORK - NSDCVIVACCINE_GEN_ALL_CORE_FT
COVID-19, mRNA, LNP-S, PF, 100 mcg/ 0.5 mL dose (Moderna); 16-Dec-2021 12:11; Alana Solares (RN); [employer] [id number] (Exp. Date: 30-Dec-2021); IntraMuscular; Deltoid Right.; 0.5 milliLiter(s);

## 2023-11-06 NOTE — DISCHARGE NOTE NURSING/CASE MANAGEMENT/SOCIAL WORK - PATIENT PORTAL LINK FT
You can access the FollowMyHealth Patient Portal offered by [facility] by registering at the following website: [email or web address]. By joining [employer]’s FollowMyHealth portal, you will also be able to view your health information using other applications (apps) compatible with our system.

## 2023-11-06 NOTE — DISCHARGE NOTE PROVIDER - ATTENDING DISCHARGE PHYSICAL EXAMINATION:
Vital Signs Last 24 Hrs  T(C): 36.1 (06 Nov 2023 13:40), Max: 36.2 (05 Nov 2023 22:16)  T(F): 96.9 (06 Nov 2023 13:40), Max: 97.1 (05 Nov 2023 22:16)  HR: 89 (06 Nov 2023 13:40) (76 - 89)  BP: 125/79 (06 Nov 2023 13:40) (112/71 - 135/72)  RR: 16 (06 Nov 2023 13:40) (16 - 19)  SpO2: 98% (06 Nov 2023 13:40) (96% - 98%)    Parameters below as of 06 Nov 2023 13:40  Patient On (Oxygen Delivery Method): room air    GENERAL: NAD  HEAD:  Atraumatic, Normocephalic  EYES: EOMI, PERRL, conjunctiva and sclera clear  ENMT: MMM, no angular cheilitis appreciated  NECK: Supple, trachea midline  Lung: normal work of breathing, cta b/l  Cardiovascular: S1&S2+, rrr, no m/r/g appreciated  ABDOMEN: soft, nt  : No stephenson catheter, no suprapubic pain to palpation  Neuro: Alert & follows commands, no flaccid paralysis in extremities appreciated  SKIN: warm and dry, no visible purulence in exposed areas

## 2023-11-06 NOTE — DISCHARGE NOTE PROVIDER - NSDCCPCAREPLAN_GEN_ALL_CORE_FT
PRINCIPAL DISCHARGE DIAGNOSIS  Diagnosis: Chest pain  Assessment and Plan of Treatment: You completed evaluation by a Cardiologist and no acute cardiac disease was identified and no further inpatient cardiac testing was required. You can continue treatment with the inpatient psychiatry team.     PRINCIPAL DISCHARGE DIAGNOSIS  Diagnosis: Chest pain  Assessment and Plan of Treatment: You completed evaluation by a Cardiologist and no acute cardiac disease was identified and no further inpatient cardiac testing was required. Follow up with your primary care doctor within 1 week of discharge      SECONDARY DISCHARGE DIAGNOSES  Diagnosis: Suicide ideation  Assessment and Plan of Treatment: You compelted inpatient evalaution with psychiatry and can be discharged on Aripiprazole.

## 2023-11-06 NOTE — DISCHARGE NOTE PROVIDER - HOSPITAL COURSE
62F w/ Schizophrenia,  bioprosthetic AVR, Hx of Leukemia in remission, Hx of CVA, HTN, diet-controlled DM2 initially admitted to inpatient psychiatry for SI transferred to medicine for evaluation of chest pain now per cardiology no longer requires inpatient evaluation for chest pain.    - I spoke with Cardiology NP  David who confirms that the patient does not require further cardiac inpatient evaluation, and could complete further testing as an outpatient. The patient has had no acute events on tele, is trop neg x3.    - I informed Dr. Bakari Bhatt (1:30pm) that patient is medically optimized for transfer back to inpatient psych for ongoing treatment of SI/Schizophrenia as Cardiology indicates no further inpatient cardiac testing is warranted. 62F w/ Schizophrenia,  bioprosthetic AVR, Hx of Leukemia in remission, Hx of CVA, HTN, diet-controlled DM2 initially admitted to inpatient psychiatry for SI transferred to medicine for evaluation of chest pain now per cardiology no longer requires inpatient evaluation for chest pain.    - I spoke with Cardiology NP  David who confirms that the patient does not require further cardiac inpatient evaluation, and could complete further testing as an outpatient. The patient has had no acute events on tele, is trop neg x3.    - I informed Dr. Bakari Bhatt (1:30pm) that patient is medically optimized for transfer back to inpatient psych for ongoing treatment of SI/Schizophrenia as Cardiology indicates no further inpatient cardiac testing is warranted. Dr. Bhatt informed that the patient no longer requires inpatient evaluation from Psych perspective and can be discharged home

## 2023-11-06 NOTE — DISCHARGE NOTE PROVIDER - PROVIDER TOKENS
PROVIDER:[TOKEN:[[id number]]] PROVIDER:[TOKEN:[[id number]]],PROVIDER:[TOKEN:[[id number]]]

## 2023-11-06 NOTE — PROGRESS NOTE ADULT - SUBJECTIVE AND OBJECTIVE BOX
CC: 62F w/ CP (now resolved)    SUBJECTIVE / OVERNIGHT EVENTS:  No acute events overnight. Patient seen and evaluated at bedside. NO cp, palpitations, or sob.    ROS:  No fever    MEDICATIONS  (STANDING):  ARIPiprazole 20 milliGRAM(s) Oral daily  aspirin enteric coated 81 milliGRAM(s) Oral daily  atorvastatin 80 milliGRAM(s) Oral at bedtime  clopidogrel Tablet 75 milliGRAM(s) Oral daily  heparin   Injectable 5000 Unit(s) SubCutaneous every 12 hours  lisinopril 5 milliGRAM(s) Oral daily  pantoprazole    Tablet 40 milliGRAM(s) Oral before breakfast    MEDICATIONS  (PRN):  acetaminophen     Tablet .. 650 milliGRAM(s) Oral every 6 hours PRN Temp greater or equal to 38C (100.4F), Mild Pain (1 - 3)  hydrOXYzine hydrochloride 50 milliGRAM(s) Oral every 6 hours PRN Agitation  LORazepam     Tablet 2 milliGRAM(s) Oral every 6 hours PRN Agitation  ondansetron Injectable 4 milliGRAM(s) IV Push every 6 hours PRN Nausea  senna 2 Tablet(s) Oral at bedtime PRN Constipation      CAPILLARY BLOOD GLUCOSE        I&O's Summary    05 Nov 2023 07:01  -  06 Nov 2023 07:00  --------------------------------------------------------  IN: 660 mL / OUT: 0 mL / NET: 660 mL        Physical Exam  Vital Signs Last 24 Hrs  T(C): 36.1 (06 Nov 2023 05:00), Max: 36.2 (05 Nov 2023 22:16)  T(F): 96.9 (06 Nov 2023 05:00), Max: 97.1 (05 Nov 2023 22:16)  HR: 77 (06 Nov 2023 05:00) (76 - 84)  BP: 112/71 (06 Nov 2023 05:00) (112/71 - 135/72)  RR: 19 (06 Nov 2023 05:00) (19 - 19)  SpO2: 96% (06 Nov 2023 05:00) (96% - 98%)    Parameters below as of 06 Nov 2023 05:00  Patient On (Oxygen Delivery Method): room air        GENERAL: NAD  HEAD:  Atraumatic, Normocephalic  EYES: EOMI, PERRL, conjunctiva and sclera clear  ENMT: MMM, no angular cheilitis appreciated  NECK: Supple, trachea midline  Lung: normal work of breathing, cta b/l  Cardiovascular: S1&S2+, rrr, no m/r/g appreciated  ABDOMEN: soft, nt  : No stephenson catheter, no suprapubic pain to palpation  Neuro: Alert & follows commands, no flaccid paralysis in extremities appreciated  SKIN: warm and dry, no visible purulence in exposed areas    LABS:                        12.4   5.52  )-----------( 219      ( 06 Nov 2023 07:27 )             36.7     11-06    140  |  104  |  19  ----------------------------<  141<H>  4.5   |  26  |  1.0    Ca    9.2      06 Nov 2023 07:27  Mg     2.1     11-06    TPro  6.8  /  Alb  3.9  /  TBili  0.2  /  DBili  x   /  AST  16  /  ALT  19  /  AlkPhos  125<H>  11-06      CARDIAC MARKERS ( 05 Nov 2023 08:00 )  x     / <0.01 ng/mL / x     / x     / x      CARDIAC MARKERS ( 04 Nov 2023 23:58 )  x     / <0.01 ng/mL / x     / x     / x      CARDIAC MARKERS ( 04 Nov 2023 20:30 )  x     / <0.01 ng/mL / x     / x     / x          Urinalysis Basic - ( 06 Nov 2023 07:27 )    Color: x / Appearance: x / SG: x / pH: x  Gluc: 141 mg/dL / Ketone: x  / Bili: x / Urobili: x   Blood: x / Protein: x / Nitrite: x   Leuk Esterase: x / RBC: x / WBC x   Sq Epi: x / Non Sq Epi: x / Bacteria: x          RADIOLOGY & ADDITIONAL TESTS:  Results Reviewed:   Imaging Personally Reviewed:  Electrocardiogram Personally Reviewed:    COORDINATION OF CARE:  Care Discussed with Consultants/Other Providers [Y/N]:  Prior or Outpatient Records Reviewed [Y/N]:

## 2023-11-06 NOTE — PROGRESS NOTE ADULT - SUBJECTIVE AND OBJECTIVE BOX
Subjective/Objective:     HPI-Cardiology/Events/Updates  Pt evaluated at bedside. No overnight events. Pt denies any CP and has no other complaints. Radiology tests and hospital records, were reviewed, as well as previous notes on this patient.         MEDICATIONS  (STANDING):  ARIPiprazole 20 milliGRAM(s) Oral daily  aspirin enteric coated 81 milliGRAM(s) Oral daily  atorvastatin 80 milliGRAM(s) Oral at bedtime  clopidogrel Tablet 75 milliGRAM(s) Oral daily  heparin   Injectable 5000 Unit(s) SubCutaneous every 12 hours  lisinopril 5 milliGRAM(s) Oral daily  pantoprazole    Tablet 40 milliGRAM(s) Oral before breakfast    MEDICATIONS  (PRN):  acetaminophen     Tablet .. 650 milliGRAM(s) Oral every 6 hours PRN Temp greater or equal to 38C (100.4F), Mild Pain (1 - 3)  hydrOXYzine hydrochloride 50 milliGRAM(s) Oral every 6 hours PRN Agitation  LORazepam     Tablet 2 milliGRAM(s) Oral every 6 hours PRN Agitation  ondansetron Injectable 4 milliGRAM(s) IV Push every 6 hours PRN Nausea  senna 2 Tablet(s) Oral at bedtime PRN Constipation          Vital Signs Last 24 Hrs  T(C): 36.1 (06 Nov 2023 05:00), Max: 36.2 (05 Nov 2023 22:16)  T(F): 96.9 (06 Nov 2023 05:00), Max: 97.1 (05 Nov 2023 22:16)  HR: 77 (06 Nov 2023 05:00) (76 - 84)  BP: 112/71 (06 Nov 2023 05:00) (112/71 - 135/72)  BP(mean): --  RR: 19 (06 Nov 2023 05:00) (19 - 19)  SpO2: 96% (06 Nov 2023 05:00) (96% - 98%)    Parameters below as of 06 Nov 2023 05:00  Patient On (Oxygen Delivery Method): room air      I&O's Detail    05 Nov 2023 07:01  -  06 Nov 2023 07:00  --------------------------------------------------------  IN:    Oral Fluid: 660 mL  Total IN: 660 mL    OUT:  Total OUT: 0 mL    Total NET: 660 mL      Physical Exam:  GENERAL:  61y/o Female NAD, resting comfortably.  HEAD:  Atraumatic, Normocephalic  EYES: EOMI, PERRLA, conjunctiva and sclera clear  NECK: Supple, No JVD, no cervical lymphadenopathy, non-tender  CHEST/LUNG: Clear to auscultation bilaterally; No wheeze, rhonchi, or rales  HEART: Regular rate and rhythm; S1&S2  ABDOMEN: Soft, Nontender, Nondistended x 4 quadrants; Bowel sounds present  EXTREMITIES:   Peripheral Pulses Present, No clubbing, no cyanosis, or no edema, no calf tenderness  PSYCH: AAOx3, cooperative, appropriate  NEUROLOGY: WNL  SKIN: WNL        EKG/ TELEM:  < from: 12 Lead ECG (11.04.23 @ 19:52) >  Normal sinus rhythm  Poor R wave progression  Confirmed by BRIAN ONTIVEROS MD ([id number]) on 11/5/2023 10:01:27 AM    < end of copied text >    LABS:                          12.4   5.52  )-----------( 219      ( 06 Nov 2023 07:27 )             36.7       06 Nov 2023 07:27    140    |  104    |  19     ----------------------------<  141<H>  4.5     |  26     |  1.0    05 Nov 2023 08:00    137    |  102    |  16     ----------------------------<  131<H>  5.0     |  26     |  0.9      Ca    9.2        06 Nov 2023 07:27  Ca    9.3        05 Nov 2023 08:00  Mg     2.1       06 Nov 2023 07:27    TPro  6.8    /  Alb  3.9    /  TBili  0.2    /  DBili  x      /  AST  16     /  ALT  19     /  AlkPhos  125<H>  06 Nov 2023 07:27  TPro  6.9    /  Alb  3.9    /  TBili  0.3    /  DBili  x      /  AST  15     /  ALT  12     /  AlkPhos  125<H>  05 Nov 2023 08:00    CARDIAC MARKERS ( 05 Nov 2023 08:00 )  x     / <0.01 ng/mL / x     / x     / x      CARDIAC MARKERS ( 04 Nov 2023 23:58 )  x     / <0.01 ng/mL / x     / x     / x      CARDIAC MARKERS ( 04 Nov 2023 20:30 )  x     / <0.01 ng/mL / x     / x     / x              Diagnostic testing:  < from: Xray Chest 1 View- PORTABLE-Urgent (Xray Chest 1 View- PORTABLE-Urgent .) (11.05.23 @ 09:02) >  Impression:  No evidence of focal consolidation, pleural effusion or pneumothorax.            --- End of Report ---      < end of copied text >        < from: TTE Echo Complete w/o Contrast w/ Doppler (10.22.23 @ 09:00) >      Summary:   1. Normal global left ventricular systolic function with a biplane EF of   56%. Mild (grade I) diastolic dysfunction. No regional wall motion   abnormalities noted.  2. Normal right ventricular size and function.   3. Normal left atrial size.   4. S/p bioprosthetic aortic valve replacement. Normal function with no   valvular or paravalvular regurgitation (Vmax 2.8m/s, mPG 17mmHg, DI 0.47,   AT 70msec, MAL 1.7cm2).   5. Adequate TR velocity was not obtained to accurately assess RVSP.   6. There is no evidence of pericardial effusion.   7. Intravenous injection of agitated saline demonstrates the presence of   a patent foramen ovale.    < end of copied text >

## 2023-11-06 NOTE — DISCHARGE NOTE PROVIDER - CARE PROVIDER_API CALL
Bakari Bhatt  Psychiatry  [address] [place] [id number]  Phone: [phone]  Fax: [phone]  Follow Up Time:    Bakari Bhatt  Psychiatry  [address] [place] [id number]  Phone: [phone]  Fax: [phone]  Follow Up Time:     SAM MELGAR  [address] [place] [id number]  Phone: [phone]  Fax: [phone]  Follow Up Time:

## 2023-11-10 DIAGNOSIS — Z79.02 LONG TERM (CURRENT) USE OF ANTITHROMBOTICS/ANTIPLATELETS: ICD-10-CM

## 2023-11-10 DIAGNOSIS — R07.89 OTHER CHEST PAIN: ICD-10-CM

## 2023-11-10 DIAGNOSIS — Z86.73 PERSONAL HISTORY OF TRANSIENT ISCHEMIC ATTACK (TIA), AND CEREBRAL INFARCTION WITHOUT RESIDUAL DEFICITS: ICD-10-CM

## 2023-11-10 DIAGNOSIS — Z88.0 ALLERGY STATUS TO PENICILLIN: ICD-10-CM

## 2023-11-10 DIAGNOSIS — I10 ESSENTIAL (PRIMARY) HYPERTENSION: ICD-10-CM

## 2023-11-10 DIAGNOSIS — F25.1 SCHIZOAFFECTIVE DISORDER, DEPRESSIVE TYPE: ICD-10-CM

## 2023-11-10 DIAGNOSIS — E11.9 TYPE 2 DIABETES MELLITUS WITHOUT COMPLICATIONS: ICD-10-CM

## 2023-11-10 DIAGNOSIS — C95.91 LEUKEMIA, UNSPECIFIED, IN REMISSION: ICD-10-CM

## 2023-11-10 DIAGNOSIS — R45.851 SUICIDAL IDEATIONS: ICD-10-CM

## 2023-11-10 DIAGNOSIS — Z95.3 PRESENCE OF XENOGENIC HEART VALVE: ICD-10-CM

## 2023-11-10 DIAGNOSIS — Z79.899 OTHER LONG TERM (CURRENT) DRUG THERAPY: ICD-10-CM

## 2023-11-10 DIAGNOSIS — Z79.82 LONG TERM (CURRENT) USE OF ASPIRIN: ICD-10-CM

## 2023-11-10 DIAGNOSIS — I25.10 ATHEROSCLEROTIC HEART DISEASE OF NATIVE CORONARY ARTERY WITHOUT ANGINA PECTORIS: ICD-10-CM

## 2023-11-13 DIAGNOSIS — C95.91 LEUKEMIA, UNSPECIFIED, IN REMISSION: ICD-10-CM

## 2023-11-13 DIAGNOSIS — F25.1 SCHIZOAFFECTIVE DISORDER, DEPRESSIVE TYPE: ICD-10-CM

## 2023-11-13 DIAGNOSIS — N39.0 URINARY TRACT INFECTION, SITE NOT SPECIFIED: ICD-10-CM

## 2023-11-13 DIAGNOSIS — R07.9 CHEST PAIN, UNSPECIFIED: ICD-10-CM

## 2023-11-13 DIAGNOSIS — I69.331 MONOPLEGIA OF UPPER LIMB FOLLOWING CEREBRAL INFARCTION AFFECTING RIGHT DOMINANT SIDE: ICD-10-CM

## 2023-11-13 DIAGNOSIS — R45.1 RESTLESSNESS AND AGITATION: ICD-10-CM

## 2023-11-13 DIAGNOSIS — Z79.82 LONG TERM (CURRENT) USE OF ASPIRIN: ICD-10-CM

## 2023-11-13 DIAGNOSIS — R45.851 SUICIDAL IDEATIONS: ICD-10-CM

## 2023-11-13 DIAGNOSIS — Z95.3 PRESENCE OF XENOGENIC HEART VALVE: ICD-10-CM

## 2023-11-13 DIAGNOSIS — Z79.2 LONG TERM (CURRENT) USE OF ANTIBIOTICS: ICD-10-CM

## 2023-11-13 DIAGNOSIS — Z79.02 LONG TERM (CURRENT) USE OF ANTITHROMBOTICS/ANTIPLATELETS: ICD-10-CM

## 2023-11-13 DIAGNOSIS — Z88.0 ALLERGY STATUS TO PENICILLIN: ICD-10-CM

## 2023-11-13 DIAGNOSIS — Z59.01 SHELTERED HOMELESSNESS: ICD-10-CM

## 2023-11-13 DIAGNOSIS — I10 ESSENTIAL (PRIMARY) HYPERTENSION: ICD-10-CM

## 2023-11-13 DIAGNOSIS — R73.03 PREDIABETES: ICD-10-CM

## 2023-11-13 SDOH — ECONOMIC STABILITY - HOUSING INSECURITY: SHELTERED HOMELESSNESS: Z59.01

## 2024-02-18 ENCOUNTER — INPATIENT (INPATIENT)
Facility: HOSPITAL | Age: 64
LOS: 2 days | Discharge: HOME | DRG: 923 | End: 2024-02-21
Attending: STUDENT IN AN ORGANIZED HEALTH CARE EDUCATION/TRAINING PROGRAM | Admitting: HOSPITALIST
Payer: MEDICARE

## 2024-02-18 VITALS — WEIGHT: 184.97 LBS

## 2024-02-18 DIAGNOSIS — T68.XXXA HYPOTHERMIA, INITIAL ENCOUNTER: ICD-10-CM

## 2024-02-18 DIAGNOSIS — Z95.2 PRESENCE OF PROSTHETIC HEART VALVE: Chronic | ICD-10-CM

## 2024-02-18 LAB
ALBUMIN SERPL ELPH-MCNC: 4.3 G/DL — SIGNIFICANT CHANGE UP (ref 3.5–5.2)
ALP SERPL-CCNC: 171 U/L — HIGH (ref 30–115)
ALT FLD-CCNC: 34 U/L — SIGNIFICANT CHANGE UP (ref 0–41)
ANION GAP SERPL CALC-SCNC: 16 MMOL/L — HIGH (ref 7–14)
AST SERPL-CCNC: 52 U/L — HIGH (ref 0–41)
B-OH-BUTYR SERPL-SCNC: 0.5 MMOL/L — HIGH
BASOPHILS # BLD AUTO: 0.03 K/UL — SIGNIFICANT CHANGE UP (ref 0–0.2)
BASOPHILS NFR BLD AUTO: 0.3 % — SIGNIFICANT CHANGE UP (ref 0–1)
BILIRUB SERPL-MCNC: 0.4 MG/DL — SIGNIFICANT CHANGE UP (ref 0.2–1.2)
BUN SERPL-MCNC: 16 MG/DL — SIGNIFICANT CHANGE UP (ref 10–20)
CALCIUM SERPL-MCNC: 9.6 MG/DL — SIGNIFICANT CHANGE UP (ref 8.4–10.5)
CHLORIDE SERPL-SCNC: 99 MMOL/L — SIGNIFICANT CHANGE UP (ref 98–110)
CO2 SERPL-SCNC: 23 MMOL/L — SIGNIFICANT CHANGE UP (ref 17–32)
CREAT SERPL-MCNC: 0.8 MG/DL — SIGNIFICANT CHANGE UP (ref 0.7–1.5)
EGFR: 84 ML/MIN/1.73M2 — SIGNIFICANT CHANGE UP
EOSINOPHIL # BLD AUTO: 0 K/UL — SIGNIFICANT CHANGE UP (ref 0–0.7)
EOSINOPHIL NFR BLD AUTO: 0 % — SIGNIFICANT CHANGE UP (ref 0–8)
GAS PNL BLDV: SIGNIFICANT CHANGE UP
GLUCOSE BLDC GLUCOMTR-MCNC: 157 MG/DL — HIGH (ref 70–99)
GLUCOSE BLDC GLUCOMTR-MCNC: 174 MG/DL — HIGH (ref 70–99)
GLUCOSE BLDC GLUCOMTR-MCNC: 310 MG/DL — HIGH (ref 70–99)
GLUCOSE SERPL-MCNC: 325 MG/DL — HIGH (ref 70–99)
HCT VFR BLD CALC: 43.4 % — SIGNIFICANT CHANGE UP (ref 37–47)
HGB BLD-MCNC: 14.6 G/DL — SIGNIFICANT CHANGE UP (ref 12–16)
IMM GRANULOCYTES NFR BLD AUTO: 0.5 % — HIGH (ref 0.1–0.3)
LYMPHOCYTES # BLD AUTO: 0.62 K/UL — LOW (ref 1.2–3.4)
LYMPHOCYTES # BLD AUTO: 6.7 % — LOW (ref 20.5–51.1)
MAGNESIUM SERPL-MCNC: 2.1 MG/DL — SIGNIFICANT CHANGE UP (ref 1.8–2.4)
MCHC RBC-ENTMCNC: 28.8 PG — SIGNIFICANT CHANGE UP (ref 27–31)
MCHC RBC-ENTMCNC: 33.6 G/DL — SIGNIFICANT CHANGE UP (ref 32–37)
MCV RBC AUTO: 85.6 FL — SIGNIFICANT CHANGE UP (ref 81–99)
MONOCYTES # BLD AUTO: 0.48 K/UL — SIGNIFICANT CHANGE UP (ref 0.1–0.6)
MONOCYTES NFR BLD AUTO: 5.2 % — SIGNIFICANT CHANGE UP (ref 1.7–9.3)
NEUTROPHILS # BLD AUTO: 8.14 K/UL — HIGH (ref 1.4–6.5)
NEUTROPHILS NFR BLD AUTO: 87.3 % — HIGH (ref 42.2–75.2)
NRBC # BLD: 0 /100 WBCS — SIGNIFICANT CHANGE UP (ref 0–0)
NT-PROBNP SERPL-SCNC: 174 PG/ML — SIGNIFICANT CHANGE UP (ref 0–300)
PLATELET # BLD AUTO: 225 K/UL — SIGNIFICANT CHANGE UP (ref 130–400)
PMV BLD: 9.3 FL — SIGNIFICANT CHANGE UP (ref 7.4–10.4)
POTASSIUM SERPL-MCNC: 4.6 MMOL/L — SIGNIFICANT CHANGE UP (ref 3.5–5)
POTASSIUM SERPL-SCNC: 4.6 MMOL/L — SIGNIFICANT CHANGE UP (ref 3.5–5)
PROT SERPL-MCNC: 7.7 G/DL — SIGNIFICANT CHANGE UP (ref 6–8)
RBC # BLD: 5.07 M/UL — SIGNIFICANT CHANGE UP (ref 4.2–5.4)
RBC # FLD: 13.5 % — SIGNIFICANT CHANGE UP (ref 11.5–14.5)
SODIUM SERPL-SCNC: 138 MMOL/L — SIGNIFICANT CHANGE UP (ref 135–146)
TROPONIN SAMPLING TIME: 1040 — SIGNIFICANT CHANGE UP
TROPONIN T, HIGH SENSITIVITY RESULT: 18 NG/L — HIGH (ref 6–13)
TROPONIN T, HIGH SENSITIVITY RESULT: 20 NG/L — HIGH (ref 6–13)
TROPONIN T, HIGH SENSITIVITY RESULT: 31 NG/L — HIGH (ref 6–13)
WBC # BLD: 9.32 K/UL — SIGNIFICANT CHANGE UP (ref 4.8–10.8)
WBC # FLD AUTO: 9.32 K/UL — SIGNIFICANT CHANGE UP (ref 4.8–10.8)

## 2024-02-18 PROCEDURE — 83605 ASSAY OF LACTIC ACID: CPT

## 2024-02-18 PROCEDURE — 84295 ASSAY OF SERUM SODIUM: CPT

## 2024-02-18 PROCEDURE — 81001 URINALYSIS AUTO W/SCOPE: CPT

## 2024-02-18 PROCEDURE — 82962 GLUCOSE BLOOD TEST: CPT

## 2024-02-18 PROCEDURE — 93010 ELECTROCARDIOGRAM REPORT: CPT | Mod: 76

## 2024-02-18 PROCEDURE — 94760 N-INVAS EAR/PLS OXIMETRY 1: CPT

## 2024-02-18 PROCEDURE — 82803 BLOOD GASES ANY COMBINATION: CPT

## 2024-02-18 PROCEDURE — 85018 HEMOGLOBIN: CPT

## 2024-02-18 PROCEDURE — 84132 ASSAY OF SERUM POTASSIUM: CPT

## 2024-02-18 PROCEDURE — 85027 COMPLETE CBC AUTOMATED: CPT

## 2024-02-18 PROCEDURE — 84484 ASSAY OF TROPONIN QUANT: CPT

## 2024-02-18 PROCEDURE — 71045 X-RAY EXAM CHEST 1 VIEW: CPT | Mod: 26

## 2024-02-18 PROCEDURE — 85014 HEMATOCRIT: CPT

## 2024-02-18 PROCEDURE — 97162 PT EVAL MOD COMPLEX 30 MIN: CPT | Mod: GP

## 2024-02-18 PROCEDURE — 99232 SBSQ HOSP IP/OBS MODERATE 35: CPT

## 2024-02-18 PROCEDURE — 99285 EMERGENCY DEPT VISIT HI MDM: CPT

## 2024-02-18 PROCEDURE — 82330 ASSAY OF CALCIUM: CPT

## 2024-02-18 PROCEDURE — 80053 COMPREHEN METABOLIC PANEL: CPT

## 2024-02-18 PROCEDURE — 80048 BASIC METABOLIC PNL TOTAL CA: CPT

## 2024-02-18 PROCEDURE — 36415 COLL VENOUS BLD VENIPUNCTURE: CPT

## 2024-02-18 RX ORDER — DEXTROSE 50 % IN WATER 50 %
25 SYRINGE (ML) INTRAVENOUS ONCE
Refills: 0 | Status: DISCONTINUED | OUTPATIENT
Start: 2024-02-18 | End: 2024-02-21

## 2024-02-18 RX ORDER — DEXTROSE 50 % IN WATER 50 %
15 SYRINGE (ML) INTRAVENOUS ONCE
Refills: 0 | Status: DISCONTINUED | OUTPATIENT
Start: 2024-02-18 | End: 2024-02-21

## 2024-02-18 RX ORDER — SODIUM CHLORIDE 9 MG/ML
1000 INJECTION, SOLUTION INTRAVENOUS
Refills: 0 | Status: DISCONTINUED | OUTPATIENT
Start: 2024-02-18 | End: 2024-02-21

## 2024-02-18 RX ORDER — INFLUENZA VIRUS VACCINE 15; 15; 15; 15 UG/.5ML; UG/.5ML; UG/.5ML; UG/.5ML
0.5 SUSPENSION INTRAMUSCULAR ONCE
Refills: 0 | Status: DISCONTINUED | OUTPATIENT
Start: 2024-02-18 | End: 2024-02-21

## 2024-02-18 RX ORDER — DEXTROSE 50 % IN WATER 50 %
12.5 SYRINGE (ML) INTRAVENOUS ONCE
Refills: 0 | Status: DISCONTINUED | OUTPATIENT
Start: 2024-02-18 | End: 2024-02-21

## 2024-02-18 RX ORDER — LIDOCAINE 4 G/100G
1 CREAM TOPICAL EVERY 24 HOURS
Refills: 0 | Status: DISCONTINUED | OUTPATIENT
Start: 2024-02-18 | End: 2024-02-21

## 2024-02-18 RX ORDER — ASPIRIN/CALCIUM CARB/MAGNESIUM 324 MG
81 TABLET ORAL DAILY
Refills: 0 | Status: DISCONTINUED | OUTPATIENT
Start: 2024-02-19 | End: 2024-02-21

## 2024-02-18 RX ORDER — INSULIN LISPRO 100/ML
VIAL (ML) SUBCUTANEOUS
Refills: 0 | Status: DISCONTINUED | OUTPATIENT
Start: 2024-02-18 | End: 2024-02-21

## 2024-02-18 RX ORDER — ENOXAPARIN SODIUM 100 MG/ML
40 INJECTION SUBCUTANEOUS EVERY 24 HOURS
Refills: 0 | Status: DISCONTINUED | OUTPATIENT
Start: 2024-02-18 | End: 2024-02-21

## 2024-02-18 RX ORDER — GLUCAGON INJECTION, SOLUTION 0.5 MG/.1ML
1 INJECTION, SOLUTION SUBCUTANEOUS ONCE
Refills: 0 | Status: DISCONTINUED | OUTPATIENT
Start: 2024-02-18 | End: 2024-02-21

## 2024-02-18 RX ORDER — ARIPIPRAZOLE 15 MG/1
15 TABLET ORAL DAILY
Refills: 0 | Status: DISCONTINUED | OUTPATIENT
Start: 2024-02-19 | End: 2024-02-21

## 2024-02-18 RX ADMIN — SODIUM CHLORIDE 60 MILLILITER(S): 9 INJECTION, SOLUTION INTRAVENOUS at 22:07

## 2024-02-18 RX ADMIN — LIDOCAINE 1 PATCH: 4 CREAM TOPICAL at 22:08

## 2024-02-18 RX ADMIN — ENOXAPARIN SODIUM 40 MILLIGRAM(S): 100 INJECTION SUBCUTANEOUS at 22:08

## 2024-02-18 NOTE — ED PROVIDER NOTE - OBJECTIVE STATEMENT
pt presents to ED after being found in the woods. pt reports has been having CP for the last few days. left her shelter in the [place] a few days ago, ultimately would like to be placed in SI. Denies fever/chill/HA/dizziness/chest pain/palpitation/sob/abd pain/n/v/d/ black stool/bloody stool/urinary sxs

## 2024-02-18 NOTE — ED PROVIDER NOTE - ATTENDING APP SHARED VISIT CONTRIBUTION OF CARE
I have personally performed a history and physical exam on this patient and personally directed the management of the patient. Patient is a 63-year-old female presents for evaluation for medical evaluation patient has been living in the woods for the past 48 hours outside after leaving a shelter in the [place] a few days ago started developing chest pain prompting visit here to the emergency department chest pain described as moderate throbbing in nature no fevers no chills no vomiting no diaphoresis no abdominal pain no back pain    On physical exam patient is normocephalic atraumatic pupils equally round react light commendation extraocular muscles intact oropharynx clear chest clear to auscultation bilaterally abdomen soft nontender nondistended bowel sounds positive no guarding or rebound extremities full range of motion no focal deficits noted pedal pulse 2+ radial pulses 2+ no signs of trauma noted    Assessment plan patient found to be profoundly hypothermic however very responsive to warming blanket temperature probe placed improved with warming blanket considering chest pain we obtained EKG per my depend evaluation not consistent with STEMI not consistent with QT prolongation consistent with arrhythmia in addition maintain chest x-ray per my depend evaluation not consistent with pneumonia or pneumothorax history is corroborated by EMS we obtain labs including troponin initial 18 repeat at 20 delta less than 5 we discussed case with intensivist who advises admission to the floor patient's hypothermia improved with external warming blanket I will admit for further evaluation at this time

## 2024-02-18 NOTE — H&P ADULT - ASSESSMENT
A 64 y/o F with pmhx of Dementia , pre diabetes,  H/O aortic valve disorder  03/2023 at [facility] , Leukemia in remission, Schizoaffective disorder, depressive type found [place]  [place] the woods with hypothermia and chest pain .     #hypothermia   Temp 88.7 on arrival now 94.1  ICU consulted , rec stable for floor  cw  warming blanket   monitor temp    #chest pain  s/p trauma likely musculoskeletal  r/o acs  #hx   aortic valve disorder  03/2023  ECHO 10/2023 EF 56 , grad I dysfunction   -troponin level 18 will trend   -cardiac monitor   -pain meds  -cardiology consult    -cw asa     #hyperglycemia   #hx pre diabetes   monitor FS   ISS  check A1c    #hx Schizoaffective disorder  #hx depressive  cw ability     #chronic elevated LFT  will monitor       DVT prophylaxis    A 64 y/o F with pmhx of Dementia , pre diabetes,  H/O aortic valve disorder  03/2023 at [facility] , Leukemia in remission, Schizoaffective disorder, depressive type found sleeping  in the woods with hypothermia and chest pain .     #hypothermia   Temp 88.7 on arrival now 94.1  ICU consulted , rec stable for floor  cw  warming blanket   monitor temp    #chest pain  s/p trauma likely musculoskeletal  r/o acs  #hx   aortic valve disorder  03/2023  ECHO 10/2023 EF 56 , grad I dysfunction   -troponin level 18 will trend   -cardiac monitor   -pain meds  -cardiology consult    -cw asa     #hyperglycemia   #hx pre diabetes   A1c 7.1 on  10/2023  monitor FS   ISS  check A1c    #hx Schizoaffective disorder  #hx depressive  cw ability     #chronic elevated LFT  will monitor       DVT prophylaxis    A 64 y/o F with pmhx of Dementia , pre diabetes,  H/O aortic valve disorder  03/2023 at [facility] , Leukemia in remission, Schizoaffective disorder, depressive type found sleeping  in the woods with hypothermia and chest pain .     #hypothermia   Temp 88.7 on arrival now 94.1  ICU consulted , rec stable for floor  cw  warming blanket   monitor temp    #chest pain  s/p trauma likely musculoskeletal  r/o acs  #hx   aortic valve disorder  03/2023  ECHO 10/2023 EF 56 , grad I dysfunction   -troponin level 18 will trend   -cardiac monitor   -pain meds  -cw asa     #hyperglycemia   #hx pre diabetes   A1c 7.1 on  10/2023  monitor FS   ISS  check A1c  vbg  beta hydroxy     #hx Schizoaffective disorder  #hx depressive  cw ability     #chronic elevated LFT  will monitor       DVT prophylaxis    A 64 y/o F with pmhx of Dementia , pre diabetes,  H/O aortic valve disorder  03/2023 at [facility] , Leukemia in remission, Schizoaffective disorder, depressive type found sleeping  in the woods with hypothermia and chest pain .     #hypothermia   Temp 88.7 on arrival now 94.1  ICU consulted , rec stable for floor  cw  warming blanket   monitor temp    #chest pain  s/p trauma likely musculoskeletal  r/o acs  #hx   aortic valve disorder  03/2023  ECHO 10/2023 EF 56 , grad I dysfunction   -troponin level 18 will trend   -cardiac monitor   -pain meds  -cw asa     #hyperglycemia   #hx pre diabetes   A1c 7.1 on  10/2023  monitor FS   ISS  check A1c  vbg  beta hydroxy     #hx Schizoaffective disorder  #hx depressive  cw ability     #chronic elevated LFT  will monitor       DVT prophylaxis

## 2024-02-18 NOTE — ED PROVIDER NOTE - IV ALTEPLASE EXCL REL HIDDEN
Called Nisha in pharmacy to ask whether patient can receive TDAP as she has an allergy to eggs and is allergic to the flu vaccine. She says there are no warnings with TDAP for patients who have egg allergy. Patient says she may have had a rash with the flu vaccine in the past but because she gets hives with egg products, her dermatologist advised her not to take the flu vaccine. Patients believes she has had TDAP in the past without a reaction. Patient denies ever having a severe reaction to any vaccine. We will proceed with TDAP admnistration and observe her. show

## 2024-02-18 NOTE — H&P ADULT - NS ATTEND AMEND GEN_ALL_CORE FT
A 64 y/o F with pmhx of Dementia , pre diabetes,  H/O aortic valve disorder  03/2023 at [facility] , Leukemia in remission, Schizoaffective disorder, depressive type found sleeping  in the woods with hypothermia and chest pain. Physical exam unremarkable aside for TTP of chest wall, AOx3. Patient placed on a annabelle hugger and admitted to telemetry.       Problem List:    #Hypothermia   #Lactic acidosis   #Chest pain s/p trauma likely musculoskeletal  r/o acs  #Hx  aortic valve disorder  03/2023  #Hyperglycemia   #Hx pre diabetes   #Chronic elevated LFT w/ ALP predominance  #Hx Schizoaffective disorder  #Hx depressive      Plan:  -c/w  warming blanket (hypothermia improved)  -troponin x2 with negative delta   -continuous telemetry monitoring   -pain meds PRN, consider lidocaine patch  -cw aspirin 81mg daily    -FG TIDAC/HS   -ISS for glucose >200   -f/u A1c, vbg, beta hydroxy   -c/w ability       FULL CODE  DVT prophylaxis: Lovenox   Dispo: Tele A 62 y/o F with pmhx of Dementia , pre diabetes,  H/O aortic valve disorder  03/2023 at [facility] , Leukemia in remission, Schizoaffective disorder, depressive type found sleeping  in the woods with hypothermia and chest pain. Physical exam unremarkable aside for TTP of chest wall, AOx3. Patient placed on a annabelle hugger and admitted to telemetry.       Problem List:    #Hypothermia   #Lactic acidosis   #Chest pain s/p trauma likely musculoskeletal  r/o acs  #Hx  aortic valve disorder  03/2023  #Hyperglycemia   #Hx pre diabetes   #Chronic elevated LFT w/ ALP predominance  #Hx of Dementia   #Hx Schizoaffective disorder  #Hx depressive      Plan:  -c/w  warming blanket (hypothermia improved)  -IVF hydration: LR @ 60   -troponin x2 with negative delta   -continuous telemetry monitoring   -pain meds PRN, consider lidocaine patch  -cw aspirin 81mg daily    -FG TIDAC/HS   -ISS for glucose >200   -f/u A1c, vbg, beta hydroxy   -c/w ability   -CM eval       FULL CODE  DVT prophylaxis: Lovenox   Dispo: Tele

## 2024-02-18 NOTE — H&P ADULT - NSHPPHYSICALEXAM_GEN_ALL_CORE
VITALS:     ICU Vital Signs Last 24 Hrs  T(C): 34.4 (18 Feb 2024 13:18), Max: 34.4 (18 Feb 2024 13:18)  T(F): 93.9 (18 Feb 2024 13:18), Max: 93.9 (18 Feb 2024 13:18)  HR: 74 (18 Feb 2024 12:43) (74 - 91)  BP: 124/86 (18 Feb 2024 12:43) (121/84 - 166/82)  RR: 20 (18 Feb 2024 12:43) (18 - 20)  SpO2: 100% (18 Feb 2024 12:43) (98% - 100%)    O2 Parameters below as of 18 Feb 2024 12:43  Patient On (Oxygen Delivery Method): room air    GENERAL: NAD, lying in bed comfortably  HEAD:  Atraumatic, Normocephalic  EYES: EOMI, PERRLA, conjunctiva and sclera clear  ENT: Moist mucous membranes  NECK: Supple, No JVD  CHEST/LUNG: Clear to auscultation bilaterally; No rales, rhonchi, wheezing, or rubs. Unlabored respirations, old surgical scar, tenderness to palpation , no sign of injury .   HEART: Regular rate and rhythm;  90 bpm, No murmurs, rubs, or gallops  ABDOMEN: Soft, Nontender, Nondistended. No hepatomegally  EXTREMITIES:  no edema or tenderness.   NERVOUS SYSTEM:  Alert & Oriented X3, speech clear. No deficits   MSK: FROM all 4 extremities, full and equal strength, UE strength 5/5 , no drift , sensation intact.   SKIN: No rashes or lesions VITALS:     ICU Vital Signs Last 24 Hrs  T(C): 34.4 (18 Feb 2024 13:18), Max: 34.4 (18 Feb 2024 13:18)  T(F): 93.9 (18 Feb 2024 13:18), Max: 93.9 (18 Feb 2024 13:18)  HR: 74 (18 Feb 2024 12:43) (74 - 91)  BP: 124/86 (18 Feb 2024 12:43) (121/84 - 166/82)  RR: 20 (18 Feb 2024 12:43) (18 - 20)  SpO2: 100% (18 Feb 2024 12:43) (98% - 100%)    O2 Parameters below as of 18 Feb 2024 12:43  Patient On (Oxygen Delivery Method): room air    GENERAL: NAD, lying in bed comfortably  HEAD:  Atraumatic, Normocephalic  EYES: EOMI, PERRLA, conjunctiva and sclera clear  ENT: Moist mucous membranes  NECK: Supple, No JVD  CHEST/LUNG: Clear to auscultation bilaterally; No rales, rhonchi, wheezing, or rubs. Unlabored respirations, old surgical scar, tenderness to palpation , no sign of injury .   HEART: Regular rate and rhythm;  90 bpm, No murmurs, rubs, or gallops  ABDOMEN: Soft, Nontender, Nondistended. No hepatomegaly  EXTREMITIES:  no edema or tenderness.   NERVOUS SYSTEM:  Alert & Oriented X3, speech clear. No deficits   MSK: FROM all 4 extremities, full and equal strength, UE strength 5/5 , no drift , sensation intact.   SKIN: No rashes or lesions

## 2024-02-18 NOTE — ED ADULT NURSE NOTE - NSFALLRISKINTERV_ED_ALL_ED
Assistance OOB with selected safe patient handling equipment if applicable/Assistance with ambulation/Communicate fall risk and risk factors to all staff, patient, and family/Monitor gait and stability/Monitor for mental status changes and reorient to person, place, and time, as needed/Provide visual cue: yellow wristband, yellow gown, etc/Reinforce activity limits and safety measures with patient and family/Toileting schedule using arm’s reach rule for commode and bathroom/Use of alarms - bed, stretcher, chair and/or video monitoring/Call bell, personal items and telephone in reach/Instruct patient to call for assistance before getting out of bed/chair/stretcher/Non-slip footwear applied when patient is off stretcher/[place] to call system/Physically safe environment - no spills, clutter or unnecessary equipment/Purposeful Proactive Rounding/Room/bathroom lighting operational, light cord in reach

## 2024-02-18 NOTE — H&P ADULT - NSHPLABSRESULTS_GEN_ALL_CORE
14.6   9.32  )-----------( 225      ( 18 Feb 2024 10:40 )             43.4       02-18    138  |  99  |  16  ----------------------------<  325<H>  4.6   |  23  |  0.8    Ca    9.6      18 Feb 2024 10:40  Mg     2.1     02-18    TPro  7.7  /  Alb  4.3  /  TBili  0.4  /  DBili  x   /  AST  52<H>  /  ALT  34  /  AlkPhos  171<H>  02-18          Magnesium: 2.1 mg/dL (02-18-24 @ 10:40)            Urinalysis Basic - ( 18 Feb 2024 10:40 )    Color: x / Appearance: x / SG: x / pH: x  Gluc: 325 mg/dL / Ketone: x  / Bili: x / Urobili: x   Blood: x / Protein: x / Nitrite: x   Leuk Esterase: x / RBC: x / WBC x   Sq Epi: x / Non Sq Epi: x / Bacteria: x            Lactate Trend 14.6   9.32  )-----------( 225      ( 18 Feb 2024 10:40 )               43.4       02-18    138  |  99  |  16  ----------------------------<  325<H>  4.6   |  23  |  0.8    Ca    9.6      18 Feb 2024 10:40  Mg     2.1     02-18    TPro  7.7  /  Alb  4.3  /  TBili  0.4  /  DBili  x   /  AST  52<H>  /  ALT  34  /  AlkPhos  171<H>  02-18          Magnesium: 2.1 mg/dL (02-18-24 @ 10:40)            Urinalysis Basic - ( 18 Feb 2024 10:40 )    Color: x / Appearance: x / SG: x / pH: x  Gluc: 325 mg/dL / Ketone: x  / Bili: x / Urobili: x   Blood: x / Protein: x / Nitrite: x   Leuk Esterase: x / RBC: x / WBC x   Sq Epi: x / Non Sq Epi: x / Bacteria: x            Lactate Trend

## 2024-02-18 NOTE — ED ADULT NURSE NOTE - NSICDXPASTMEDICALHX_GEN_ALL_CORE_FT
PAST MEDICAL HISTORY:  Dementia     H/O aortic valve disorder     Leukemia in remission    Schizoaffective disorder, depressive type

## 2024-02-18 NOTE — H&P ADULT - HISTORY OF PRESENT ILLNESS
A 62 y/o F with pmhx of Dementia , pre diabetes,  H/O aortic valve disorder  03/2023 at [facility] , Leukemia in remission, Schizoaffective disorder, depressive type found sleeping  in the woods. Pt reports has been sleeping outside for past two nights because shed did  not like the shelter in the [place]. PT reports chest pain sine last night at 11 pm after got hit by the bus door. Pt reports chest pain intermittent, 8/10  initially, middle of the chest, non radiating. Pt reports L arm weakness for few days, denies numbness or tingling.  Pt denies chest pain at this time. PT reports chronic chest pain post surgery. Pt denies  shortness of breath. Pt reports dizziness and nausea before now resolved. Pt denies fever, chills, burning with urination, diarrhea, constipation.

## 2024-02-18 NOTE — ED PROVIDER NOTE - PHYSICAL EXAMINATION
CONSTITUTIONAL: Well-appearing; well-nourished; in no apparent distress.   NECK: Supple; non-tender; no cervical lymphadenopathy. No JVD.   CARDIOVASCULAR: Normal S1, S2; no murmurs, rubs, or gallops.   RESPIRATORY: Normal chest excursion with respiration; breath sounds clear and equal bilaterally; no wheezes, rhonchi, or rales.  GI/: Normal bowel sounds; non-distended; non-tender; no palpable organomegaly.   MS: No evidence of trauma or deformity. No CVA tenderness. Normal ROM in all four extremities; non-tender to palpation; distal pulses are normal.   SKIN: Normal for age and race; warm; dry; good turgor; no apparent lesions or exudate.   NEURO/PSYCH: A & O x 4; grossly unremarkable. mood and manner are appropriate.

## 2024-02-18 NOTE — ED PROVIDER NOTE - CLINICAL SUMMARY MEDICAL DECISION MAKING FREE TEXT BOX
patient found to be profoundly hypothermic however very responsive to warming blanket temperature probe placed improved with warming blanket considering chest pain we obtained EKG per my depend evaluation not consistent with STEMI not consistent with QT prolongation consistent with arrhythmia in addition maintain chest x-ray per my depend evaluation not consistent with pneumonia or pneumothorax history is corroborated by EMS we obtain labs including troponin initial 18 repeat at 20 delta less than 5 we discussed case with intensivist who advises admission to the floor patient's hypothermia improved with external warming blanket I will admit for further evaluation at this time

## 2024-02-18 NOTE — PATIENT PROFILE ADULT - FALL HARM RISK - HARM RISK INTERVENTIONS

## 2024-02-19 LAB
ALBUMIN SERPL ELPH-MCNC: 3.7 G/DL — SIGNIFICANT CHANGE UP (ref 3.5–5.2)
ALP SERPL-CCNC: 122 U/L — HIGH (ref 30–115)
ALT FLD-CCNC: 25 U/L — SIGNIFICANT CHANGE UP (ref 0–41)
ANION GAP SERPL CALC-SCNC: 9 MMOL/L — SIGNIFICANT CHANGE UP (ref 7–14)
APPEARANCE UR: CLEAR — SIGNIFICANT CHANGE UP
AST SERPL-CCNC: 37 U/L — SIGNIFICANT CHANGE UP (ref 0–41)
BILIRUB SERPL-MCNC: 0.3 MG/DL — SIGNIFICANT CHANGE UP (ref 0.2–1.2)
BILIRUB UR-MCNC: NEGATIVE — SIGNIFICANT CHANGE UP
BUN SERPL-MCNC: 20 MG/DL — SIGNIFICANT CHANGE UP (ref 10–20)
CALCIUM SERPL-MCNC: 8.9 MG/DL — SIGNIFICANT CHANGE UP (ref 8.4–10.5)
CHLORIDE SERPL-SCNC: 103 MMOL/L — SIGNIFICANT CHANGE UP (ref 98–110)
CO2 SERPL-SCNC: 26 MMOL/L — SIGNIFICANT CHANGE UP (ref 17–32)
COLOR SPEC: YELLOW — SIGNIFICANT CHANGE UP
CREAT SERPL-MCNC: 0.9 MG/DL — SIGNIFICANT CHANGE UP (ref 0.7–1.5)
DIFF PNL FLD: NEGATIVE — SIGNIFICANT CHANGE UP
EGFR: 73 ML/MIN/1.73M2 — SIGNIFICANT CHANGE UP
GLUCOSE BLDC GLUCOMTR-MCNC: 141 MG/DL — HIGH (ref 70–99)
GLUCOSE BLDC GLUCOMTR-MCNC: 155 MG/DL — HIGH (ref 70–99)
GLUCOSE BLDC GLUCOMTR-MCNC: 200 MG/DL — HIGH (ref 70–99)
GLUCOSE BLDC GLUCOMTR-MCNC: 211 MG/DL — HIGH (ref 70–99)
GLUCOSE SERPL-MCNC: 160 MG/DL — HIGH (ref 70–99)
GLUCOSE UR QL: NEGATIVE MG/DL — SIGNIFICANT CHANGE UP
HCT VFR BLD CALC: 32.9 % — LOW (ref 37–47)
HGB BLD-MCNC: 11.5 G/DL — LOW (ref 12–16)
KETONES UR-MCNC: NEGATIVE MG/DL — SIGNIFICANT CHANGE UP
LACTATE SERPL-SCNC: 1 MMOL/L — SIGNIFICANT CHANGE UP (ref 0.7–2)
LEUKOCYTE ESTERASE UR-ACNC: ABNORMAL
MCHC RBC-ENTMCNC: 29.3 PG — SIGNIFICANT CHANGE UP (ref 27–31)
MCHC RBC-ENTMCNC: 35 G/DL — SIGNIFICANT CHANGE UP (ref 32–37)
MCV RBC AUTO: 83.7 FL — SIGNIFICANT CHANGE UP (ref 81–99)
NITRITE UR-MCNC: NEGATIVE — SIGNIFICANT CHANGE UP
NRBC # BLD: 0 /100 WBCS — SIGNIFICANT CHANGE UP (ref 0–0)
PH UR: 6 — SIGNIFICANT CHANGE UP (ref 5–8)
PLATELET # BLD AUTO: 189 K/UL — SIGNIFICANT CHANGE UP (ref 130–400)
PMV BLD: 9.5 FL — SIGNIFICANT CHANGE UP (ref 7.4–10.4)
POTASSIUM SERPL-MCNC: 4 MMOL/L — SIGNIFICANT CHANGE UP (ref 3.5–5)
POTASSIUM SERPL-SCNC: 4 MMOL/L — SIGNIFICANT CHANGE UP (ref 3.5–5)
PROT SERPL-MCNC: 5.9 G/DL — LOW (ref 6–8)
PROT UR-MCNC: NEGATIVE MG/DL — SIGNIFICANT CHANGE UP
RBC # BLD: 3.93 M/UL — LOW (ref 4.2–5.4)
RBC # FLD: 13.8 % — SIGNIFICANT CHANGE UP (ref 11.5–14.5)
SODIUM SERPL-SCNC: 138 MMOL/L — SIGNIFICANT CHANGE UP (ref 135–146)
SP GR SPEC: 1.01 — SIGNIFICANT CHANGE UP (ref 1–1.03)
TROPONIN SAMPLING TIME: SIGNIFICANT CHANGE UP
TROPONIN T, HIGH SENSITIVITY RESULT: 28 NG/L — HIGH (ref 6–13)
UROBILINOGEN FLD QL: 0.2 MG/DL — SIGNIFICANT CHANGE UP (ref 0.2–1)
WBC # BLD: 5.58 K/UL — SIGNIFICANT CHANGE UP (ref 4.8–10.8)
WBC # FLD AUTO: 5.58 K/UL — SIGNIFICANT CHANGE UP (ref 4.8–10.8)

## 2024-02-19 RX ADMIN — Medication 2: at 17:26

## 2024-02-19 RX ADMIN — ENOXAPARIN SODIUM 40 MILLIGRAM(S): 100 INJECTION SUBCUTANEOUS at 21:30

## 2024-02-19 RX ADMIN — LIDOCAINE 1 PATCH: 4 CREAM TOPICAL at 08:20

## 2024-02-19 RX ADMIN — LIDOCAINE 1 PATCH: 4 CREAM TOPICAL at 15:50

## 2024-02-19 RX ADMIN — Medication 1: at 12:32

## 2024-02-19 RX ADMIN — ARIPIPRAZOLE 15 MILLIGRAM(S): 15 TABLET ORAL at 17:26

## 2024-02-19 RX ADMIN — Medication 81 MILLIGRAM(S): at 12:30

## 2024-02-19 NOTE — PROGRESS NOTE ADULT - SUBJECTIVE AND OBJECTIVE BOX
SUBJECTIVE:    Patient is a 63y old Female who presents with a chief complaint of hypothermia , chest pain (2024 13:35)    Currently admitted to medicine with the primary diagnosis of Hypothermia       Today is hospital day 1d. This morning she is resting comfortably in bed and reports no new issues or overnight events.     ROS:   CONSTITUTIONAL: No weakness, fevers or chills   EYES/ENT: No visual changes; No vertigo or throat pain   NECK: No pain or stiffness   RESPIRATORY: No cough, wheezing, hemoptysis; No shortness of breath   CARDIOVASCULAR: No chest pain or palpitations   GASTROINTESTINAL: No abdominal or epigastric pain. No nausea, vomiting, or hematemesis; No diarrhea or constipation. No melena or hematochezia.  GENITOURINARY: No dysuria, frequency or hematuria  NEUROLOGICAL: No numbness or weakness  SKIN: No itching, rashes      PAST MEDICAL & SURGICAL HISTORY  Leukemia  in remission    Schizoaffective disorder, depressive type    H/O aortic valve disorder    Dementia    H/O aortic valve replacement      SOCIAL HISTORY:  Negative for smoking/alcohol/drug use.   ALLERGIES:  penicillin (Rash)    MEDICATIONS:  STANDING MEDICATIONS  ARIPiprazole 15 milliGRAM(s) Oral daily  aspirin enteric coated 81 milliGRAM(s) Oral daily  dextrose 5%. 1000 milliLiter(s) IV Continuous <Continuous>  dextrose 5%. 1000 milliLiter(s) IV Continuous <Continuous>  dextrose 50% Injectable 12.5 Gram(s) IV Push once  dextrose 50% Injectable 25 Gram(s) IV Push once  dextrose 50% Injectable 25 Gram(s) IV Push once  enoxaparin Injectable 40 milliGRAM(s) SubCutaneous every 24 hours  glucagon  Injectable 1 milliGRAM(s) IntraMuscular once  influenza   Vaccine 0.5 milliLiter(s) IntraMuscular once  insulin lispro (ADMELOG) corrective regimen sliding scale   SubCutaneous three times a day before meals  lactated ringers. 1000 milliLiter(s) IV Continuous <Continuous>  lidocaine   4% Patch 1 Patch Transdermal every 24 hours    PRN MEDICATIONS  dextrose Oral Gel 15 Gram(s) Oral once PRN    VITALS:   T(F): 96.5  HR: 93  BP: 118/64  RR: 18  SpO2: 96%    LABS:                          14.6   9.32  )-----------( 225      ( 2024 10:40 )             43.4     02-18    138  |  99  |  16  ----------------------------<  325<H>  4.6   |  23  |  0.8    Ca    9.6      2024 10:40  Mg     2.1         TPro  7.7  /  Alb  4.3  /  TBili  0.4  /  DBili  x   /  AST  52<H>  /  ALT  34  /  AlkPhos  171<H>        Urinalysis Basic - ( 2024 06:28 )    Color: Yellow / Appearance: Clear / S.011 / pH: x  Gluc: x / Ketone: Negative mg/dL  / Bili: Negative / Urobili: 0.2 mg/dL   Blood: x / Protein: Negative mg/dL / Nitrite: Negative   Leuk Esterase: Small / RBC: x / WBC x   Sq Epi: x / Non Sq Epi: x / Bacteria: x                RADIOLOGY:    PHYSICAL EXAM:  GEN: No acute distress  HEENT: normocephalic, atraumatic, aniceteric  LUNGS: Clear to auscultation bilaterally, no rales/wheezing/ rhonchi  HEART: S1/S2 present. RRR, no murmurs  ABD: Soft, non-tender, non-distended. Bowel sounds present  EXT: NC/NC/NE/2+PP/VALERA  NEURO: AAOX3, normal affect      ASSESSMENT AND PLAN: SUBJECTIVE:    Patient is a 63y old Female who presents with a chief complaint of hypothermia , chest pain (19 Feb 2024 08:35)    Currently admitted to medicine with the primary diagnosis of Hypothermia       Today is hospital day 1d. This morning she is resting comfortably in bed and reports no new issues or overnight events. Requesting so speak with . No acute complaints.    ROS:   CONSTITUTIONAL: No weakness, fevers or chills   EYES/ENT: No visual changes; No vertigo or throat pain   NECK: No pain or stiffness   RESPIRATORY: No cough, wheezing, hemoptysis; No shortness of breath   CARDIOVASCULAR: No chest pain or palpitations   GASTROINTESTINAL: No abdominal or epigastric pain. No nausea, vomiting, or hematemesis; No diarrhea or constipation. No melena or hematochezia.  GENITOURINARY: No dysuria, frequency or hematuria  NEUROLOGICAL: No numbness or weakness  SKIN: No itching, rashes      PAST MEDICAL & SURGICAL HISTORY  Leukemia  in remission    Schizoaffective disorder, depressive type    H/O aortic valve disorder    Dementia    H/O aortic valve replacement      SOCIAL HISTORY:  Negative for smoking/alcohol/drug use.   ALLERGIES:  penicillin (Rash)    MEDICATIONS:  STANDING MEDICATIONS  ARIPiprazole 15 milliGRAM(s) Oral daily  aspirin enteric coated 81 milliGRAM(s) Oral daily  dextrose 5%. 1000 milliLiter(s) IV Continuous <Continuous>  dextrose 5%. 1000 milliLiter(s) IV Continuous <Continuous>  dextrose 50% Injectable 12.5 Gram(s) IV Push once  dextrose 50% Injectable 25 Gram(s) IV Push once  dextrose 50% Injectable 25 Gram(s) IV Push once  enoxaparin Injectable 40 milliGRAM(s) SubCutaneous every 24 hours  glucagon  Injectable 1 milliGRAM(s) IntraMuscular once  influenza   Vaccine 0.5 milliLiter(s) IntraMuscular once  insulin lispro (ADMELOG) corrective regimen sliding scale   SubCutaneous three times a day before meals  lactated ringers. 1000 milliLiter(s) IV Continuous <Continuous>  lidocaine   4% Patch 1 Patch Transdermal every 24 hours    PRN MEDICATIONS  dextrose Oral Gel 15 Gram(s) Oral once PRN    VITALS:   T(F): 98.6  HR: 95  BP: 118/59  RR: 18  SpO2: 96%    LABS:                          11.5   5.58  )-----------( 189      ( 19 Feb 2024 07:43 )             32.9     02-19    138  |  103  |  20  ----------------------------<  160<H>  4.0   |  26  |  0.9    Ca    8.9      19 Feb 2024 07:43  Mg     2.1     02-18    TPro  5.9<L>  /  Alb  3.7  /  TBili  0.3  /  DBili  x   /  AST  37  /  ALT  25  /  AlkPhos  122<H>  02-19      Urinalysis Basic - ( 19 Feb 2024 07:43 )    Color: x / Appearance: x / SG: x / pH: x  Gluc: 160 mg/dL / Ketone: x  / Bili: x / Urobili: x   Blood: x / Protein: x / Nitrite: x   Leuk Esterase: x / RBC: x / WBC x   Sq Epi: x / Non Sq Epi: x / Bacteria: x        Lactate, Blood: 1.0 mmol/L (02-19-24 @ 07:43)          RADIOLOGY:    PHYSICAL EXAM:  GEN: No acute distress  HEENT: normocephalic, atraumatic, aniceteric  LUNGS: Clear to auscultation bilaterally, no rales/wheezing/ rhonchi  HEART: S1/S2 present. RRR, no murmurs  ABD: Soft, non-tender, non-distended. Bowel sounds present  EXT: NC/NC/NE/2+PP/VALERA  NEURO: AAOX3, normal affect      ASSESSMENT AND PLAN:

## 2024-02-19 NOTE — PROGRESS NOTE ADULT - ASSESSMENT
A 64 y/o F with pmhx of Dementia , pre diabetes,  H/O aortic valve disorder  03/2023 at [facility] , Leukemia in remission, Schizoaffective disorder, depressive type found sleeping  in the woods with hypothermia and chest pain. Physical exam unremarkable aside for TTP of chest wall, AOx3. Patient placed on a annabelle hugger and admitted to telemetry.       Problem List:    #Hypothermia   #Lactic acidosis   #Chest pain s/p trauma likely musculoskeletal  r/o acs  #Hx  aortic valve disorder  03/2023  #Hyperglycemia   #Hx pre diabetes   #Chronic elevated LFT w/ ALP predominance  #Hx of Dementia   #Hx Schizoaffective disorder  #Hx depressive      Plan:  -c/w  warming blanket (hypothermia improved)  -IVF hydration: LR @ 60   -troponin x2 with negative delta   -continuous telemetry monitoring   -pain meds PRN, consider lidocaine patch  -cw aspirin 81mg daily    -FG TIDAC/HS   -ISS for glucose >200   -f/u A1c, vbg, beta hydroxy   -c/w ability   -CM eval       FULL CODE  DVT prophylaxis: Lovenox   Dispo: Shelter A 62 y/o F with pmhx of Dementia , pre diabetes,  H/O aortic valve disorder  03/2023 at [facility] , Leukemia in remission, Schizoaffective disorder, depressive type found sleeping  in the woods with hypothermia and chest pain. Physical exam unremarkable aside for TTP of chest wall, AOx3. Patient placed on a annabelle hugger and admitted to telemetry. Patient has improved and was downgraded from telemetry and annabelle hugger was removed. Likely d/c in the AM.      Problem List:    #Hypothermia (improved)  #Lactic acidosis (improved)  #Chest pain s/p trauma likely musculoskeletal  r/o acs  #Hx  aortic valve disorder  03/2023  #Hyperglycemia   #Hx pre diabetes   #Chronic elevated LFT w/ ALP predominance  #Hx of Dementia   #Hx Schizoaffective disorder  #Hx depressive      Plan:  -IVF hydration: LR @ 60   -pain meds PRN, lidocaine patch  -c/w aspirin 81mg daily    -FG TIDAC/HS   -ISS for glucose >200   -c/w ability   -CM/SW eval       FULL CODE  DVT prophylaxis: Lovenox   Dispo: Shelter

## 2024-02-20 ENCOUNTER — TRANSCRIPTION ENCOUNTER (OUTPATIENT)
Age: 64
End: 2024-02-20

## 2024-02-20 LAB
ANION GAP SERPL CALC-SCNC: 8 MMOL/L — SIGNIFICANT CHANGE UP (ref 7–14)
BUN SERPL-MCNC: 17 MG/DL — SIGNIFICANT CHANGE UP (ref 10–20)
CALCIUM SERPL-MCNC: 8.9 MG/DL — SIGNIFICANT CHANGE UP (ref 8.4–10.5)
CHLORIDE SERPL-SCNC: 107 MMOL/L — SIGNIFICANT CHANGE UP (ref 98–110)
CO2 SERPL-SCNC: 26 MMOL/L — SIGNIFICANT CHANGE UP (ref 17–32)
CREAT SERPL-MCNC: 0.7 MG/DL — SIGNIFICANT CHANGE UP (ref 0.7–1.5)
EGFR: 98 ML/MIN/1.73M2 — SIGNIFICANT CHANGE UP
GLUCOSE BLDC GLUCOMTR-MCNC: 139 MG/DL — HIGH (ref 70–99)
GLUCOSE BLDC GLUCOMTR-MCNC: 157 MG/DL — HIGH (ref 70–99)
GLUCOSE BLDC GLUCOMTR-MCNC: 187 MG/DL — HIGH (ref 70–99)
GLUCOSE BLDC GLUCOMTR-MCNC: 195 MG/DL — HIGH (ref 70–99)
GLUCOSE SERPL-MCNC: 147 MG/DL — HIGH (ref 70–99)
HCT VFR BLD CALC: 33.5 % — LOW (ref 37–47)
HGB BLD-MCNC: 11.1 G/DL — LOW (ref 12–16)
MCHC RBC-ENTMCNC: 28.9 PG — SIGNIFICANT CHANGE UP (ref 27–31)
MCHC RBC-ENTMCNC: 33.1 G/DL — SIGNIFICANT CHANGE UP (ref 32–37)
MCV RBC AUTO: 87.2 FL — SIGNIFICANT CHANGE UP (ref 81–99)
NRBC # BLD: 0 /100 WBCS — SIGNIFICANT CHANGE UP (ref 0–0)
PLATELET # BLD AUTO: 167 K/UL — SIGNIFICANT CHANGE UP (ref 130–400)
PMV BLD: 9.5 FL — SIGNIFICANT CHANGE UP (ref 7.4–10.4)
POTASSIUM SERPL-MCNC: 4.4 MMOL/L — SIGNIFICANT CHANGE UP (ref 3.5–5)
POTASSIUM SERPL-SCNC: 4.4 MMOL/L — SIGNIFICANT CHANGE UP (ref 3.5–5)
RBC # BLD: 3.84 M/UL — LOW (ref 4.2–5.4)
RBC # FLD: 13.7 % — SIGNIFICANT CHANGE UP (ref 11.5–14.5)
SODIUM SERPL-SCNC: 141 MMOL/L — SIGNIFICANT CHANGE UP (ref 135–146)
WBC # BLD: 4.98 K/UL — SIGNIFICANT CHANGE UP (ref 4.8–10.8)
WBC # FLD AUTO: 4.98 K/UL — SIGNIFICANT CHANGE UP (ref 4.8–10.8)

## 2024-02-20 PROCEDURE — 99231 SBSQ HOSP IP/OBS SF/LOW 25: CPT

## 2024-02-20 RX ORDER — METHOCARBAMOL 500 MG/1
1 TABLET, FILM COATED ORAL
Qty: 5 | Refills: 0
Start: 2024-02-20 | End: 2024-02-24

## 2024-02-20 RX ORDER — HYDRALAZINE HCL 50 MG
10 TABLET ORAL ONCE
Refills: 0 | Status: COMPLETED | OUTPATIENT
Start: 2024-02-20 | End: 2024-02-20

## 2024-02-20 RX ORDER — ONDANSETRON 8 MG/1
4 TABLET, FILM COATED ORAL ONCE
Refills: 0 | Status: COMPLETED | OUTPATIENT
Start: 2024-02-20 | End: 2024-02-21

## 2024-02-20 RX ADMIN — Medication 81 MILLIGRAM(S): at 11:52

## 2024-02-20 RX ADMIN — Medication 10 MILLIGRAM(S): at 21:34

## 2024-02-20 RX ADMIN — Medication 1: at 11:50

## 2024-02-20 RX ADMIN — ENOXAPARIN SODIUM 40 MILLIGRAM(S): 100 INJECTION SUBCUTANEOUS at 21:34

## 2024-02-20 RX ADMIN — ARIPIPRAZOLE 15 MILLIGRAM(S): 15 TABLET ORAL at 14:36

## 2024-02-20 NOTE — DISCHARGE NOTE PROVIDER - NSDCCPCAREPLAN_GEN_ALL_CORE_FT
PRINCIPAL DISCHARGE DIAGNOSIS  Diagnosis: Hypothermia  Assessment and Plan of Treatment: Ms. Turner is a pleasant 62 y/o F with pmhx of Dementia , pre diabetes,  H/O aortic valve disorder  03/2023 at [facility], Leukemia in remission, Schizoaffective disorder, depressive type found sleeping  in the woods with hypothermia and chest pain. Physical exam unremarkable aside for TTP of chest wall, AOx3. Patient placed on a annabelle hugger and admitted to telemetry. Patient has improved and was downgraded from telemetry and annabelle hugger was removed. Patient seen my Case Management to address her concern and she has been medically optimized for discharge.   Things to do:  -Robaxin as need for muscle pain for 3 days sent to pharmacy  -continue aspirin 81mg daily     -continue ability    -follow-up with Primary Doctor for elevated liver enzymes      SECONDARY DISCHARGE DIAGNOSES  Diagnosis: Chest pain  Assessment and Plan of Treatment:      PRINCIPAL DISCHARGE DIAGNOSIS  Diagnosis: Hypothermia  Assessment and Plan of Treatment: Likely due to being found sleeping in the woods. You were placed on a annabelle hugger blaket for warming and admitted to telemetry. You then improved and were downgraded from telemetry and annabelle hugger was removed.   Things to do:  -Robaxin as need for muscle pain for 3 days and tylenol prn   -continue aspirin 81mg daily     -continue ability    -follow-up with Primary Doctor for elevated liver enzymes (referral given)      SECONDARY DISCHARGE DIAGNOSES  Diagnosis: Chest pain  Assessment and Plan of Treatment: Unlikely due to cardiovascular reasons. Your troponins remained near normal and your EKG did not show signs of ischemia.

## 2024-02-20 NOTE — PROGRESS NOTE ADULT - ASSESSMENT
A 64 y/o F with pmhx of Dementia , pre diabetes,  H/O aortic valve disorder  03/2023 at [facility] , Leukemia in remission, Schizoaffective disorder, depressive type found sleeping  in the woods with hypothermia and chest pain. Physical exam unremarkable aside for TTP of chest wall, AOx3. Patient placed on a annabelle hugger and admitted to telemetry. Patient has improved and was downgraded from telemetry and annabelle hugger was removed. Likely d/c in the AM.      Problem List:    #Hypothermia (improved)  #Lactic acidosis (improved)  #Chest pain s/p trauma likely musculoskeletal  r/o acs  #Hx  aortic valve disorder  03/2023  #Hyperglycemia   #Hx pre diabetes   #Chronic elevated LFT w/ ALP predominance  #Hx of Dementia   #Hx Schizoaffective disorder  #Hx depressive      Plan:  -IVF hydration: LR @ 60   -pain meds PRN, lidocaine patch  -c/w aspirin 81mg daily    -FG TIDAC/HS   -ISS for glucose >200   -c/w ability   -CM/SW eval for placement       FULL CODE  DVT prophylaxis: Lovenox   Dispo: Shelter

## 2024-02-20 NOTE — DISCHARGE NOTE PROVIDER - ATTENDING DISCHARGE PHYSICAL EXAMINATION:
Vital Signs Last 24 Hrs  T(C): 35.7 (20 Feb 2024 04:53), Max: 37 (19 Feb 2024 13:11)  T(F): 96.2 (20 Feb 2024 04:53), Max: 98.6 (19 Feb 2024 13:11)  HR: 79 (20 Feb 2024 04:53) (79 - 95)  BP: 145/75 (20 Feb 2024 04:53) (112/64 - 145/75)  RR: 18 (20 Feb 2024 04:53) (18 - 18)  SpO2: 96% (20 Feb 2024 04:53) (96% - 96%)    Parameters below as of 20 Feb 2024 04:53  Patient On (Oxygen Delivery Method): room air    GEN: No acute distress  HEENT: normocephalic, atraumatic, aniceteric  LUNGS: Clear to auscultation bilaterally, no rales/wheezing/ rhonchi  HEART: S1/S2 present. RRR, no murmurs  ABD: Soft, non-tender, non-distended. Bowel sounds present  EXT: No lower extremity pitting edema, no erythema, no tenderness to palpation  NEURO: AAOX3, normal affect

## 2024-02-20 NOTE — PHYSICAL THERAPY INITIAL EVALUATION ADULT - PERTINENT HX OF CURRENT PROBLEM, REHAB EVAL
A 64 y/o F with pmhx of Dementia , pre diabetes,  H/O aortic valve disorder  03/2023 at [facility] , Leukemia in remission, Schizoaffective disorder, depressive type found sleeping  in the woods. Pt reports has been sleeping outside for past two nights because shed did  not like the shelter in the [place]. PT reports chest pain sine last night at 11 pm after got hit by the bus door. Pt reports chest pain intermittent, 8/10  initially, middle of the chest, non radiating. Pt reports L arm weakness for few days, denies numbness or tingling.  Pt denies chest pain at this time. PT reports chronic chest pain post surgery. Pt denies  shortness of breath. Pt reports dizziness and nausea before now resolved.

## 2024-02-20 NOTE — DISCHARGE NOTE PROVIDER - CARE PROVIDERS DIRECT ADDRESSES
,DirectAddress_Unknown ,[email or web address]

## 2024-02-20 NOTE — PROGRESS NOTE ADULT - SUBJECTIVE AND OBJECTIVE BOX
SUBJECTIVE:    Patient is a 63y old Female who presents with a chief complaint of hypothermia , chest pain (20 Feb 2024 07:36)    Currently admitted to medicine with the primary diagnosis of Hypothermia       Today is hospital day 2d. This morning she is resting comfortably in bed and reports no new issues or overnight events. Requesting placement. No acute complaints.    ROS:   CONSTITUTIONAL: No weakness, fevers or chills   EYES/ENT: No visual changes; No vertigo or throat pain   NECK: No pain or stiffness   RESPIRATORY: No cough, wheezing, hemoptysis; No shortness of breath   CARDIOVASCULAR: No chest pain or palpitations   GASTROINTESTINAL: No abdominal or epigastric pain. No nausea, vomiting, or hematemesis; No diarrhea or constipation. No melena or hematochezia.  GENITOURINARY: No dysuria, frequency or hematuria  NEUROLOGICAL: No numbness or weakness  SKIN: No itching, rashes      PAST MEDICAL & SURGICAL HISTORY  Leukemia  in remission    Schizoaffective disorder, depressive type    H/O aortic valve disorder    Dementia    H/O aortic valve replacement      SOCIAL HISTORY:  Negative for smoking/alcohol/drug use.   ALLERGIES:  penicillin (Rash)    MEDICATIONS:  STANDING MEDICATIONS  ARIPiprazole 15 milliGRAM(s) Oral daily  aspirin enteric coated 81 milliGRAM(s) Oral daily  dextrose 5%. 1000 milliLiter(s) IV Continuous <Continuous>  dextrose 5%. 1000 milliLiter(s) IV Continuous <Continuous>  dextrose 50% Injectable 12.5 Gram(s) IV Push once  dextrose 50% Injectable 25 Gram(s) IV Push once  dextrose 50% Injectable 25 Gram(s) IV Push once  enoxaparin Injectable 40 milliGRAM(s) SubCutaneous every 24 hours  glucagon  Injectable 1 milliGRAM(s) IntraMuscular once  influenza   Vaccine 0.5 milliLiter(s) IntraMuscular once  insulin lispro (ADMELOG) corrective regimen sliding scale   SubCutaneous three times a day before meals  lactated ringers. 1000 milliLiter(s) IV Continuous <Continuous>  lidocaine   4% Patch 1 Patch Transdermal every 24 hours    PRN MEDICATIONS  dextrose Oral Gel 15 Gram(s) Oral once PRN    VITALS:   T(F): 98.2  HR: 87  BP: 99/64  RR: 18  SpO2: 93%    LABS:                          11.1   4.98  )-----------( 167      ( 20 Feb 2024 06:31 )             33.5     02-20    141  |  107  |  17  ----------------------------<  147<H>  4.4   |  26  |  0.7    Ca    8.9      20 Feb 2024 06:31    TPro  5.9<L>  /  Alb  3.7  /  TBili  0.3  /  DBili  x   /  AST  37  /  ALT  25  /  AlkPhos  122<H>  02-19      Urinalysis Basic - ( 20 Feb 2024 06:31 )    Color: x / Appearance: x / SG: x / pH: x  Gluc: 147 mg/dL / Ketone: x  / Bili: x / Urobili: x   Blood: x / Protein: x / Nitrite: x   Leuk Esterase: x / RBC: x / WBC x   Sq Epi: x / Non Sq Epi: x / Bacteria: x                RADIOLOGY:    PHYSICAL EXAM:  GEN: No acute distress  HEENT: normocephalic, atraumatic, aniceteric  LUNGS: Clear to auscultation bilaterally, no rales/wheezing/ rhonchi  HEART: S1/S2 present. RRR, no murmurs  ABD: Soft, non-tender, non-distended. Bowel sounds present  EXT: NC/NC/NE/2+PP/VALERA  NEURO: AAOX3, normal affect      ASSESSMENT AND PLAN:

## 2024-02-20 NOTE — PHYSICAL THERAPY INITIAL EVALUATION ADULT - GENERAL OBSERVATIONS, REHAB EVAL
11;00-11;25 pt was seen for PT IE at bed side, pt is agreeable, chart thoroughly reviewed, RN Susie is aware.  Pt was received semi dudley in bed, in no apparent distress, +hep lock, +call bell within reach, bed side table at reach

## 2024-02-20 NOTE — DISCHARGE NOTE PROVIDER - HOSPITAL COURSE
A 62 y/o F with pmhx of Dementia , pre diabetes,  H/O aortic valve disorder  03/2023 at [facility] , Leukemia in remission, Schizoaffective disorder, depressive type [place]  [place] with hypothermia and chest pain. Physical exam unremarkable aside for TTP of chest wall, AOx3. Patient placed on a annabelle hugger and admitted to telemetry. Patient has improved and was downgraded from telemetry and annabelle hugger was removed. Patient seen my Case Management to address her concern and she has been medically optimized for discharge.     #Hypothermia (improved)  #Lactic acidosis (improved)  #Chest pain s/p trauma likely musculoskeletal  r/o acs  #Hx  aortic valve disorder  03/2023  #Hyperglycemia   #Hx pre diabetes   #Chronic elevated LFT w/ ALP predominance  #Hx of Dementia   #Hx Schizoaffective disorder  #Hx depressive      Plan:  -IVF hydration: LR @ 60   -pain meds PRN, lidocaine patch  -c/w aspirin 81mg daily    -FG TIDAC/HS   -ISS for glucose >200   -c/w ability   -CM/SW eval       FULL CODE  DVT prophylaxis: Lovenox   Dispo: Shelter A 64 y/o F with pmhx of Dementia , pre diabetes,  H/O aortic valve disorder  03/2023 at [facility] , Leukemia in remission, Schizoaffective disorder, depressive type found sleeping  in the woods with hypothermia and chest pain. Physical exam unremarkable aside for TTP of chest wall, AOx3. Patient placed on a annabelle hugger and admitted to telemetry. Patient has improved and was downgraded from telemetry and annabelle hugger was removed. Patient seen my Case Management to address her concern and she has been medically optimized for discharge.     #Hypothermia (improved)  #Lactic acidosis (improved)  #Chest pain s/p trauma likely musculoskeletal  r/o acs  #Hx  aortic valve disorder  03/2023  #Hyperglycemia   #Hx pre diabetes   #Chronic elevated LFT w/ ALP predominance  #Hx of Dementia   #Hx Schizoaffective disorder  #Hx depressive      Plan:  - S/p IVF hydration: LR @ 60   -pain meds PRN, lidocaine patch  -c/w aspirin 81mg daily    -FG TIDAC/HS   -ISS for glucose >200   -c/w abilify   -CM/SW: SNF at [place] facility       FULL CODE  DVT prophylaxis: Lovenox     Pt seen and examined today medically ready for d/c to SNF

## 2024-02-20 NOTE — PHYSICAL THERAPY INITIAL EVALUATION ADULT - ADDITIONAL COMMENTS
pt lived in a shelter in the [place] with elevator.  Pt stated she will never return back.  Pt was able to amb without AD prior to admission, but was limping for 5-6 months 2/2 pain in the R knee

## 2024-02-20 NOTE — DISCHARGE NOTE PROVIDER - PROVIDER TOKENS
FREE:[LAST:[PCP],PHONE:[(   )    -],FAX:[(   )    -],FOLLOWUP:[1 week]] PROVIDER:[TOKEN:[[id number]]]

## 2024-02-20 NOTE — DISCHARGE NOTE PROVIDER - NSDCFUSCHEDAPPT_GEN_ALL_CORE_FT
Cesar Paz  [facility] Physician [facility]  NEUROLOGY [address] [place] [address]  Scheduled Appointment: 05/17/2024

## 2024-02-20 NOTE — DISCHARGE NOTE PROVIDER - NSDCQMSTAIRS_GEN_ALL_CORE
Problem: Mobility Impaired (Adult and Pediatric) Goal: *Acute Goals and Plan of Care (Insert Text) Description GOALS (1-4 days): 
(1.)Ms. Flores Jean-Baptiste will move from supine to sit and sit to supine  in bed with STAND BY ASSIST. 
(2.)Ms. Flores Jean-Baptiste will transfer from bed to chair and chair to bed with STAND BY ASSIST using the least restrictive device. (3.)Ms. Flores Jean-Baptiste will ambulate with STAND BY ASSIST for 300 feet with the least restrictive device. (4.)Ms. Flores Jean-Baptiste will ambulate up/down 2 steps with bilateral  railing with CONTACT GUARD ASSIST with no device. (5.)Ms. Flores Jean-Baptiste will increase right knee ROM to 5°-80°. 
________________________________________________________________________________________________ Outcome: Progressing Towards PHYSICAL THERAPY JOINT CAMP TKA: Daily Note, Treatment Day: 1st and PM 5/17/2019 INPATIENT: Hospital Day: 2 [place] / [place] / Product Type: Medicare /  
  
NAME/AGE/GENDER: Jennie Dickinson is a 69 y.o. female PRIMARY DIAGNOSIS:  Unilateral primary osteoarthritis, right knee [M17.11] Procedure(s) and Anesthesia Type: 
   * RIGHT TOTAL KNEE ARTHROPLASTY - Spinal (Right) ICD-10: Treatment Diagnosis:  
 · Pain in Right Knee (M25.561) · Stiffness of Right Knee, Not elsewhere classified (M25.661) · Difficulty in walking, Not elsewhere classified (R26.2) ASSESSMENT:  
Ms. Flores Jean-Baptiste presents up in chair on contact and agreeable to therapy although stating she could not walk. Rolled her down to the gym where she participated in TKA exercises with verbal cues. Encouraged her to try and walk a part to the way back to her room but she refused. Rolled her back to her room and she then stated she needed to use the restroom. Pt walked in room to bathroom and then voided. Walked back into room and returned supine, ice in place and needs in reach. She called for pain medicine. Hope to progress tomorrow as her pain decreases. This section established at most recent assessment PROBLEM LIST (Impairments causing functional limitations): 1. Decreased Strength 2. Decreased ADL/Functional Activities 3. Decreased Transfer Abilities 4. Decreased Ambulation Ability/Technique 5. Increased Pain 6. Decreased Flexibility/Joint Mobility 7. Decreased [place] with Home Exercise Program 
 INTERVENTIONS PLANNED: (Benefits and precautions of physical therapy have been discussed with the patient.) 1. Cold 2. bed mobility 3. gait training 4. home exercise program (HEP) 5. Range of Motion: active/assisted/passive 6. Therapeutic Activities 7. therapeutic exercise/strengthening 8. transfer training 9. Group Therapy TREATMENT PLAN: Frequency/Duration: Follow patient BID for duration of hospital stay to address above goals. Rehabilitation Potential For Stated Goals: Good RECOMMENDED REHABILITATION/EQUIPMENT: (at time of discharge pending progress): Continue Skilled Therapy, Home Health: Physical Therapy and Outpatient: Physical Therapy. HISTORY:  
History of Present Injury/Illness (Reason for Referral): 
Pt s/p right TKA on 5/16/19 Past Medical History/Comorbidities:  
Ms. Wily Ghosh  has a past medical history of Acute pancreatitis (2/2008), Aneurysm ([address] Anxiety, Bilateral sacroiliitis ([address] Breast cyst, CAD (coronary artery disease), Cellulitis (6/16/15), Chest pain, Chronic kidney disease, Chronic pain, CKD (chronic kidney disease), Degenerative disc disease, Depression (12/23/2015), Diabetes mellitus type II, Dyslipidemia, Dyspnea, Edema (12/23/2015), Fatty liver, Fibromyalgia, Food allergy, GERD (gastroesophageal reflux disease), Heart failure ([address] echocardiogram (08/21/2015), Hypertension, Hypertriglyceridemia, Hypothyroidism, Lumbar herniated disc (Aug 2013), Lumbar radiculopathy, Macrocytic anemia, Microcytic anemia, Mild pulmonary hypertension ([address] Nausea & vomiting, Neuropathy, Obesity (BMI 30.0-34.9) (10/2/14), Osteoarthritis, Panic attacks, Sacroiliac dysfunction, Seasonal allergic reaction, Sleep apnea, and Syncope and collapse (12/23/2015). Ms. Riya Sin  has a past surgical history that includes hx partial thyroidectomy (2008); hx breast lumpectomy (Right); hx breast biopsy (Bilateral, R/1988  L/2012); hx premalig/benign skin lesion excision (09/29/2014); hx cyst removal (Left, 02/2019); hx heart catheterization (2005); hx jorge and bso (1995); hx knee arthroscopy (Left, 2004); hx orthopaedic (Left, 2010); hx orthopaedic (Right, 2009); hx knee arthroscopy (10/03/2014); hx back surgery (08/30/2013); hx other surgical (12/22/09); hx other surgical; hx other surgical (10/28/13); hx knee replacement (Left, 03/14/2017); and hx orthopaedic (04/03/2019). Social History/Living Environment:  
Home Environment: Private residence # Steps to Enter: 2 One/Two Story Residence: One story Living Alone: No 
Support Systems: Spouse/Significant Other/Partner Patient Expects to be Discharged to[de-identified] Private residence Current DME Used/Available at Home: Cane, quad, Michael Sherif, straight, Grab bars, Raised toilet seat, Walker, rolling Tub or Shower Type: (walk in tub) Prior Level of Function/Work/Activity: 
Pt living at home, independent with gait and ADLs Number of Personal Factors/Comorbidities that affect the Plan of Care: 0: LOW COMPLEXITY EXAMINATION:  
Most Recent Physical Functioning: RLE AROM 
R Knee Flexion: 70 R Knee Extension: 8 Bed Mobility Supine to Sit: Stand-by assistance Sit to Supine: Stand-by assistance;Contact guard assistance Transfers Sit to Stand: Contact guard assistance;Minimum assistance Stand to Sit: Contact guard assistance;Minimum assistance Balance Sitting: Intact Standing: Pull to stand; With support Gait Training: Yes 
 
Weight Bearing Status Left Side Weight Bearing: As tolerated Distance (ft): 15 Feet (ft)(and another 15 feet) Ambulation - Level of Assistance: Contact guard assistance Assistive Device: Walker, rolling Speed/Lindsay: Pace decreased (<100 feet/min) Step Length: Right shortened Stance: Left decreased Gait Abnormalities: Antalgic Interventions: Safety awareness training;Verbal cues Braces/Orthotics: none Right Knee Cold Type: Cryocuff Patient Position: Sitting Body Structures Involved: 1. Joints 2. Muscles Body Functions Affected: 1. Movement Related Activities and Participation Affected: 1. Mobility 2. Self Care Number of elements that affect the Plan of Care: 4+: HIGH COMPLEXITY CLINICAL PRESENTATION:  
Presentation: Stable and uncomplicated: LOW COMPLEXITY CLINICAL DECISION MAKIN[address] [place] [address] AM-PAC 6 Clicks Basic Mobility Inpatient Short Form How much difficulty does the patient currently have. .. Unable A Lot A Little None 1. Turning over in bed (including adjusting bedclothes, sheets and blankets)? ? 1   ? 2   ? 3   ? 4  
2. Sitting down on and standing up from a chair with arms ( e.g., wheelchair, bedside commode, etc.)   ? 1   ? 2   ? 3   ? 4  
3. Moving from lying on back to sitting on the side of the bed?   ? 1   ? 2   ? 3   ? 4 How much help from another person does the patient currently need. .. Total A Lot A Little None 4. Moving to and from a bed to a chair (including a wheelchair)? ? 1   ? 2   ? 3   ? 4  
5. Need to walk in hospital room? ? 1   ? 2   ? 3   ? 4  
6. Climbing 3-5 steps with a railing? ? 1   ? 2   ? 3   ? 4  
© , Trustees of [address] [place] [id number], under license to [employer]. All rights reserved Score:  Initial: 18 Most Recent: X (Date: -- ) Interpretation of Tool:  Represents activities that are increasingly more difficult (i.e. Bed mobility, Transfers, Gait).  
 
Medical Necessity:    
· Patient is expected to demonstrate progress in strength, range of motion and functional technique ·  to decrease assistance required with functional mobility and TKA HEP · . Reason for Services/Other Comments: 
· Patient continues to require skilled intervention due to inability to complete functional mobility and TKA HEP independently · . Use of outcome tool(s) and clinical judgement create a POC that gives a: Clear prediction of patient's progress: LOW COMPLEXITY  
  
 
 
 
TREATMENT:  
(In addition to Assessment/Re-Assessment sessions the following treatments were rendered) Pre-treatment Symptoms/Complaints:  having pain Pain Initial: having some pain, had pain medicine already Pain Intensity 1: 6  Post Session:  still hurting Therapeutic Exercise: (45 Minutes(group)):  Exercises per grid below to improve mobility and strength. Required minimal verbal and manual cues to promote proper body alignment and promote proper body posture. Progressed repetitions as indicated. Gait Training (15 Minutes):  Gait training to improve and/or restore physical functioning as related to mobility and strength. Ambulated 15 Feet (ft)(and another 15 feet) with Contact guard assistance using a Walker, rolling and minimal Safety awareness training;Verbal cues related to their stance phase and stride length to promote proper body alignment and promote proper body posture. Instruction in performance of walker use and gait sequencing to correct stance phase and stride length. Date: 
5/16/19 Date: 
5/17/19 Date: 
  
ACTIVITY/EXERCISE AM PM AM PM AM PM  
GROUP THERAPY  ? ?  ?  X  ?  ? Ankle Pumps  10 15 15 Quad Sets  10 15 15 Gluteal Sets  10 15 15 Hip ABd/ADduction  10 15 15 Straight Leg Raises   15 15 Knee Slides  10 15 15 Short Arc Quads    15 [address] [facility] Chair Slides    15    
        
B = bilateral; AA = active assistive; A = active; P = passive Treatment/Session Assessment: Response to Treatment:  tolerated with pain, walked a small amount Education: 
X Home Exercises X Fall Precautions X D/C Instruction Review X Knee Prosthesis Review Lindy Brittle Management/Safety ? Adaptive Equipment as Needed Interdisciplinary Collaboration:  
o Registered Nurse 
o Rehabilitation Attendant After treatment position/precautions:  
o Supine in bed 
o Bed/Chair-wheels locked 
o Bed in low position 
o Call light within reach 
o Family at bedside Compliance with Program/Exercises: Will assess as treatment progresses. Recommendations/Intent for next treatment session:  Treatment next visit will focus on increasing Ms. Daley's independence with bed mobility, transfers, gait training, strength/ROM exercises, modalities for pain, and patient education. Total Treatment Duration: PT Patient Time In/Time Out Time In: 1300 Time Out: 1400 Chan Mae PT  
    
 
 
 No

## 2024-02-20 NOTE — DISCHARGE NOTE PROVIDER - CARE PROVIDER_API CALL
PCP,   Phone: (   )    -  Fax: (   )    -  Follow Up Time: 1 week   Marsha Rankin  Internal Medicine  [address] [place] [id number]  Phone: [phone]  Fax: [phone]  Follow Up Time:

## 2024-02-20 NOTE — DISCHARGE NOTE PROVIDER - PROVIDER RX CONTACT NUMBER
03/09/22 0931   Wound 03/09/22 0400 Bilateral medial gluteal Pressure Injury   Placement Date/Time: 03/09/22 0400   Side: Bilateral  Orientation: medial  Location: gluteal  Primary Wound Type: Pressure Injury   Base dry;maroon/purple;non-blanchable  (rt sacrum with maroon/ purple DTI POA, Left buttocks with dry scuffy skin)   Periwound intact;dry   Periwound Temperature warm   Periwound Skin Turgor soft   Wound Length (cm) 2 cm   Wound Width (cm) 1.5 cm   Wound Surface Area (cm^2) 3 cm^2   Drainage Amount none   Dressing Care   (Optifoam dressing peeled back to assess wounds.)   Wound 03/09/22 0400 Left anterior foot   Placement Date/Time: 03/09/22 0400   Present on Hospital Admission: Yes  Side: Left  Orientation: anterior  Location: foot   Base scab;blanchable;non-blanchable   Periwound blanchable   Periwound Temperature warm   Periwound Skin Turgor firm   Wound Length (cm) 1 cm   Wound Width (cm) 1 cm   Wound Surface Area (cm^2) 1 cm^2   Care, Wound   (open to air, heel boots in place, will add venelex)     CWON note: pt seen for evaluation of skin issues POA. Pt remains in ICU, was extubated this AM. H&P noted, pt from a facility, non-verbal, incontinent of bowel and bladder. Pt is on a progressa bed for adequate pressure redistribution. Dylan heel and heels with multiple areas of blanchable and non blanchable erythema, feet are deformed with foot drop and contractures, heel boots are in place for off-loading. Will add Venelex ointment. Sacral skin with small DTI POA, will add Venelex and sacral optifoam. Perineal area has IAD with yeast component, will add Magic Barrier cream. All wound care and prevention standing orders have been placed in Epic and discussed with RNBhanu. Please re-consult for any additional needs.   
CWON note: pt seen for follow up evaluation of skin issues POA. Pt on accumax mattress with added pump.  H&P noted, pt from a facility, non-verbal, incontinent of bowel and bladder.  Dylan feet and heels with multiple areas of blanchable and non blanchable erythema, feet are deformed with foot drop and contractures, heel boots are in place for off-loading. Continue Venelex ointment. Sacral skin with blanchable erythema and light purple chronic discoloration. DTI is resolved. He has several skin tags to his gluteal cleft area. Perineal area has IAD, continue barrier cream with each incontinent episode. Discussed with RN, Yfn. Pt has D/C orders for later today.   
Informed Facility Caregiver Major & Guardian Cahnell about new bed on [address]E [address]  
Major called back and said that he also needed a COVID test for the pt and that it was a requirement.  Covid test sent down to lab.  Major also stated that the Keppra needed to be in tablet form if possible.  Spoke to Dr. Donahue and he fixed the meds on the AVS.  
Plan of Care Reviewed with: Patient, Guardian  Patient was admitted to ICU. Patient remains intubated on vent. Patient remains on propofol gtt and NS at 75mL/hr. Labs and blood cultures collected. Spoke with pt's Guardian and reviewed paperwork (obtained by ED) regarding code status (placed in chart), stated pt wishes to be DNR and is listed on paperwork, call placed to notify MD, waiting for response. Continue to monitor.  
Spoke to Major Soni form the Group Home that the pt resides.  Curious if pt was still on Oxygen and questioned some of the meds.  Went into room and pt was off the Oxygen and satting 94% on RA.  SHIRA left for Major on his cell phone [phone] regarding the RA and the med questions that he had.  He said that since it is late in the day, that he may not get to him until tomorrow morning but will try to call me back in a timely manner.  Will cont to monitor the situation.  
Spoke to Major regarding pt's DC.  He said that he would give the info to the nurse and they would call us back when possible regarding DC today.  
[phone]

## 2024-02-20 NOTE — DISCHARGE NOTE PROVIDER - NSDCMRMEDTOKEN_GEN_ALL_CORE_FT
Abilify 15 mg oral tablet: 1 tab(s) orally once a day  Aspirin Low Dose 81 mg oral delayed release tablet: 1 tab(s) orally once a day  methocarbamol 500 mg oral tablet: 1 tab(s) orally once a day as needed for  moderate pain   Abilify 15 mg oral tablet: 1 tab(s) orally once a day  Aspirin Low Dose 81 mg oral delayed release tablet: 1 tab(s) orally once a day  methocarbamol 500 mg oral tablet: 1 tab(s) orally once a day as needed for  moderate pain  Tylenol 325 mg oral tablet: 2 tab(s) orally 4 times a day as needed for  mild pain

## 2024-02-21 ENCOUNTER — TRANSCRIPTION ENCOUNTER (OUTPATIENT)
Age: 64
End: 2024-02-21

## 2024-02-21 VITALS
TEMPERATURE: 95 F | HEART RATE: 87 BPM | DIASTOLIC BLOOD PRESSURE: 67 MMHG | SYSTOLIC BLOOD PRESSURE: 145 MMHG | RESPIRATION RATE: 18 BRPM

## 2024-02-21 LAB
ANION GAP SERPL CALC-SCNC: 11 MMOL/L — SIGNIFICANT CHANGE UP (ref 7–14)
BUN SERPL-MCNC: 15 MG/DL — SIGNIFICANT CHANGE UP (ref 10–20)
CALCIUM SERPL-MCNC: 9.4 MG/DL — SIGNIFICANT CHANGE UP (ref 8.4–10.5)
CHLORIDE SERPL-SCNC: 102 MMOL/L — SIGNIFICANT CHANGE UP (ref 98–110)
CO2 SERPL-SCNC: 28 MMOL/L — SIGNIFICANT CHANGE UP (ref 17–32)
CREAT SERPL-MCNC: 0.8 MG/DL — SIGNIFICANT CHANGE UP (ref 0.7–1.5)
EGFR: 84 ML/MIN/1.73M2 — SIGNIFICANT CHANGE UP
GLUCOSE BLDC GLUCOMTR-MCNC: 136 MG/DL — HIGH (ref 70–99)
GLUCOSE BLDC GLUCOMTR-MCNC: 228 MG/DL — HIGH (ref 70–99)
GLUCOSE SERPL-MCNC: 153 MG/DL — HIGH (ref 70–99)
HCT VFR BLD CALC: 36.7 % — LOW (ref 37–47)
HGB BLD-MCNC: 12.5 G/DL — SIGNIFICANT CHANGE UP (ref 12–16)
MCHC RBC-ENTMCNC: 29.1 PG — SIGNIFICANT CHANGE UP (ref 27–31)
MCHC RBC-ENTMCNC: 34.1 G/DL — SIGNIFICANT CHANGE UP (ref 32–37)
MCV RBC AUTO: 85.5 FL — SIGNIFICANT CHANGE UP (ref 81–99)
NRBC # BLD: 0 /100 WBCS — SIGNIFICANT CHANGE UP (ref 0–0)
PLATELET # BLD AUTO: 184 K/UL — SIGNIFICANT CHANGE UP (ref 130–400)
PMV BLD: 9.8 FL — SIGNIFICANT CHANGE UP (ref 7.4–10.4)
POTASSIUM SERPL-MCNC: 5.1 MMOL/L — HIGH (ref 3.5–5)
POTASSIUM SERPL-SCNC: 5.1 MMOL/L — HIGH (ref 3.5–5)
RBC # BLD: 4.29 M/UL — SIGNIFICANT CHANGE UP (ref 4.2–5.4)
RBC # FLD: 13.7 % — SIGNIFICANT CHANGE UP (ref 11.5–14.5)
SODIUM SERPL-SCNC: 141 MMOL/L — SIGNIFICANT CHANGE UP (ref 135–146)
WBC # BLD: 6.94 K/UL — SIGNIFICANT CHANGE UP (ref 4.8–10.8)
WBC # FLD AUTO: 6.94 K/UL — SIGNIFICANT CHANGE UP (ref 4.8–10.8)

## 2024-02-21 PROCEDURE — 99239 HOSP IP/OBS DSCHRG MGMT >30: CPT

## 2024-02-21 RX ORDER — ACETAMINOPHEN 500 MG
2 TABLET ORAL
Qty: 80 | Refills: 0
Start: 2024-02-21 | End: 2024-03-01

## 2024-02-21 RX ADMIN — Medication 81 MILLIGRAM(S): at 12:02

## 2024-02-21 RX ADMIN — Medication 2: at 11:56

## 2024-02-21 RX ADMIN — ONDANSETRON 4 MILLIGRAM(S): 8 TABLET, FILM COATED ORAL at 02:07

## 2024-02-21 RX ADMIN — ARIPIPRAZOLE 15 MILLIGRAM(S): 15 TABLET ORAL at 12:02

## 2024-02-21 NOTE — DISCHARGE NOTE NURSING/CASE MANAGEMENT/SOCIAL WORK - PATIENT PORTAL LINK FT
You can access the FollowMyHealth Patient Portal offered by [facility] by registering at the following website: [email or web address]. By joining [employer]’s FollowMyHealth portal, you will also be able to view your health information using other applications (apps) compatible with our system.

## 2024-02-21 NOTE — PATIENT PROFILE BEHAVIORAL HEALTH - NSBHIDPST6MNTH_PSY_A_CORE
Name: CHANDNI AMARO       Question 1   Follow Up Completed   Have you completed a follow-up visit with your doctor? Press 1 if you have completed a follow-up, press 2 if you have not scheduled your appointment, press 3 if you missed your appointment, press 4 if you have an appointment scheduled for a follow-up   Appointment Not Scheduled          Call Status:  Attempt 1  Voicemail Left  Attempt 2  Answered    Follow Up Appointment - Issues List:  Other (Add Details in Comments)   Comments:  Patient has contacted PCP, awaiting call back too schedule appt.    
yes..

## 2024-02-21 NOTE — DISCHARGE NOTE NURSING/CASE MANAGEMENT/SOCIAL WORK - NSDCVIVACCINE_GEN_ALL_CORE_FT
COVID-19, mRNA, LNP-S, PF, 100 mcg/ 0.5 mL dose (Moderna); 16-Dec-2021 12:11; Alana Solares (RN); [employer] [id number] (Exp. Date: 30-Dec-2021); IntraMuscular; Deltoid Right.; 0.5 milliLiter(s);

## 2024-02-27 DIAGNOSIS — C95.91 LEUKEMIA, UNSPECIFIED, IN REMISSION: ICD-10-CM

## 2024-02-27 DIAGNOSIS — T68.XXXA HYPOTHERMIA, INITIAL ENCOUNTER: ICD-10-CM

## 2024-02-27 DIAGNOSIS — F25.1 SCHIZOAFFECTIVE DISORDER, DEPRESSIVE TYPE: ICD-10-CM

## 2024-02-27 DIAGNOSIS — F03.90 UNSPECIFIED DEMENTIA WITHOUT BEHAVIORAL DISTURBANCE: ICD-10-CM

## 2024-02-27 DIAGNOSIS — R07.9 CHEST PAIN, UNSPECIFIED: ICD-10-CM

## 2024-02-27 DIAGNOSIS — E87.20 ACIDOSIS, UNSPECIFIED: ICD-10-CM

## 2024-02-27 DIAGNOSIS — R73.9 HYPERGLYCEMIA, UNSPECIFIED: ICD-10-CM
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
## 2024-02-29 NOTE — PATIENT PROFILE BEHAVIORAL HEALTH - NSBHADMAGGR_PSY_A_CORE
Patient called and stated he has been dealing with heartburn and stomach pain since anther provider took him off his heartburn medication and told him to take over the counter medication to help with SX. Patient could not remember the name of medication or why he was taken off of it since it worked so well. Patient stated the pain is making him do a \"reverse sneeze, as dogs do\". Patient can be reached at  [phone] to discuss further. Please review and advise.    no

## 2024-03-07 ENCOUNTER — EMERGENCY (EMERGENCY)
Facility: HOSPITAL | Age: 64
LOS: 0 days | Discharge: LEFT BEFORE DISCHARGE | End: 2024-03-07
Attending: STUDENT IN AN ORGANIZED HEALTH CARE EDUCATION/TRAINING PROGRAM
Payer: MEDICARE

## 2024-03-07 VITALS
SYSTOLIC BLOOD PRESSURE: 182 MMHG | HEIGHT: 61 IN | OXYGEN SATURATION: 97 % | DIASTOLIC BLOOD PRESSURE: 97 MMHG | HEART RATE: 93 BPM | TEMPERATURE: 98 F | RESPIRATION RATE: 18 BRPM

## 2024-03-07 DIAGNOSIS — F25.9 SCHIZOAFFECTIVE DISORDER, UNSPECIFIED: ICD-10-CM

## 2024-03-07 DIAGNOSIS — R07.89 OTHER CHEST PAIN: ICD-10-CM

## 2024-03-07 DIAGNOSIS — Z88.0 ALLERGY STATUS TO PENICILLIN: ICD-10-CM

## 2024-03-07 DIAGNOSIS — Z95.2 PRESENCE OF PROSTHETIC HEART VALVE: Chronic | ICD-10-CM

## 2024-03-07 DIAGNOSIS — Z86.79 PERSONAL HISTORY OF OTHER DISEASES OF THE CIRCULATORY SYSTEM: ICD-10-CM

## 2024-03-07 DIAGNOSIS — R07.9 CHEST PAIN, UNSPECIFIED: ICD-10-CM

## 2024-03-07 DIAGNOSIS — Z53.29 PROCEDURE AND TREATMENT NOT CARRIED OUT BECAUSE OF PATIENT'S DECISION FOR OTHER REASONS: ICD-10-CM

## 2024-03-07 DIAGNOSIS — C95.91 LEUKEMIA, UNSPECIFIED, IN REMISSION: ICD-10-CM

## 2024-03-07 DIAGNOSIS — F03.90 UNSPECIFIED DEMENTIA WITHOUT BEHAVIORAL DISTURBANCE: ICD-10-CM

## 2024-03-07 PROCEDURE — 93005 ELECTROCARDIOGRAM TRACING: CPT

## 2024-03-07 PROCEDURE — 93010 ELECTROCARDIOGRAM REPORT: CPT

## 2024-03-07 PROCEDURE — 99283 EMERGENCY DEPT VISIT LOW MDM: CPT | Mod: 25

## 2024-03-07 PROCEDURE — 99284 EMERGENCY DEPT VISIT MOD MDM: CPT

## 2024-03-07 NOTE — ED PROVIDER NOTE - PHYSICAL EXAMINATION
GENERAL: Well-nourished, Well-developed. NAD.  HEAD: No visible or palpable bumps or hematomas. No ecchymosis behind ears B/L.  Eyes: PERRLA, EOMI.   ENMT: MMM.   CVS: No reproducible chest wall tenderness. Normal S1,S2. No murmurs appreciated on auscultation   RESP: No use of accessory muscles. Chest rise symmetrical with good expansion. Lungs clear to auscultation B/L. No wheezing, rales, or rhonchi auscultated.  Skin: Warm, Dry. No rashes or lesions. Good cap refill < 2 sec B/L.  EXT: Radial and pedal pulses present B/L. No calf tenderness or swelling B/L. No palpable cords. No pedal edema B/L.

## 2024-03-07 NOTE — ED PROVIDER NOTE - OBJECTIVE STATEMENT
62 y/o F with pmhx of Dementia , pre diabetes,  H/O aortic valve disorder  03/2023 at [facility] , Leukemia in remission, Schizoaffective disorder presenting to the Ed for evaluation of midsternal chest pressure radiating to L chest that began 30 minutes prior to arrival while sitting down. no exertional. denies any associated fever, chills, cough, sob, calf pain, nausea, vomiting, abd pain

## 2024-03-07 NOTE — ED PROVIDER NOTE - CLINICAL SUMMARY MEDICAL DECISION MAKING FREE TEXT BOX
Patient was evaluated by midlevel however left prior to initiation of workup and before my evaluation.

## 2024-03-08 PROBLEM — F03.90 UNSPECIFIED DEMENTIA, UNSPECIFIED SEVERITY, WITHOUT BEHAVIORAL DISTURBANCE, PSYCHOTIC DISTURBANCE, MOOD DISTURBANCE, AND ANXIETY: Chronic | Status: ACTIVE | Noted: 2024-02-18
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
## 2024-04-01 ENCOUNTER — EMERGENCY (EMERGENCY)
Facility: HOSPITAL | Age: 64
LOS: 0 days | Discharge: HOME | End: 2024-04-01
Attending: EMERGENCY MEDICINE
Payer: MEDICARE

## 2024-04-01 VITALS
HEART RATE: 109 BPM | RESPIRATION RATE: 19 BRPM | DIASTOLIC BLOOD PRESSURE: 96 MMHG | TEMPERATURE: 98 F | SYSTOLIC BLOOD PRESSURE: 206 MMHG | OXYGEN SATURATION: 100 % | HEIGHT: 61 IN

## 2024-04-01 VITALS — HEART RATE: 92 BPM

## 2024-04-01 DIAGNOSIS — S81.852A OPEN BITE, LEFT LOWER LEG, INITIAL ENCOUNTER: ICD-10-CM

## 2024-04-01 DIAGNOSIS — R03.0 ELEVATED BLOOD-PRESSURE READING, WITHOUT DIAGNOSIS OF HYPERTENSION: ICD-10-CM

## 2024-04-01 DIAGNOSIS — C95.91 LEUKEMIA, UNSPECIFIED, IN REMISSION: ICD-10-CM
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
## 2024-04-01 DIAGNOSIS — Z79.82 LONG TERM (CURRENT) USE OF ASPIRIN: ICD-10-CM

## 2024-04-01 DIAGNOSIS — F25.9 SCHIZOAFFECTIVE DISORDER, UNSPECIFIED: ICD-10-CM

## 2024-04-01 DIAGNOSIS — Z88.0 ALLERGY STATUS TO PENICILLIN: ICD-10-CM

## 2024-04-01 DIAGNOSIS — Z20.3 CONTACT WITH AND (SUSPECTED) EXPOSURE TO RABIES: ICD-10-CM

## 2024-04-01 DIAGNOSIS — Z23 ENCOUNTER FOR IMMUNIZATION: ICD-10-CM

## 2024-04-01 DIAGNOSIS — Z95.2 PRESENCE OF PROSTHETIC HEART VALVE: Chronic | ICD-10-CM

## 2024-04-01 PROCEDURE — 90377 RABIES IG HT&SOL HUMAN IM/SC: CPT

## 2024-04-01 PROCEDURE — 90472 IMMUNIZATION ADMIN EACH ADD: CPT

## 2024-04-01 PROCEDURE — 90715 TDAP VACCINE 7 YRS/> IM: CPT

## 2024-04-01 PROCEDURE — 93010 ELECTROCARDIOGRAM REPORT: CPT

## 2024-04-01 PROCEDURE — 93005 ELECTROCARDIOGRAM TRACING: CPT

## 2024-04-01 PROCEDURE — 99284 EMERGENCY DEPT VISIT MOD MDM: CPT

## 2024-04-01 PROCEDURE — 96372 THER/PROPH/DIAG INJ SC/IM: CPT

## 2024-04-01 PROCEDURE — 90675 RABIES VACCINE IM: CPT

## 2024-04-01 PROCEDURE — 90471 IMMUNIZATION ADMIN: CPT

## 2024-04-01 PROCEDURE — 99283 EMERGENCY DEPT VISIT LOW MDM: CPT | Mod: 25

## 2024-04-01 RX ORDER — HUMAN RABIES VIRUS IMMUNE GLOBULIN 150 [IU]/ML
1600 INJECTION, SOLUTION INTRAMUSCULAR ONCE
Refills: 0 | Status: COMPLETED | OUTPATIENT
Start: 2024-04-01 | End: 2024-04-01

## 2024-04-01 RX ORDER — MOXIFLOXACIN HYDROCHLORIDE TABLETS, 400 MG 400 MG/1
1 TABLET, FILM COATED ORAL
Qty: 7 | Refills: 0
Start: 2024-04-01 | End: 2024-04-07

## 2024-04-01 RX ORDER — RABIES VACC, HUMAN DIPLOID/PF 2.5 UNIT
1 VIAL (EA) INTRAMUSCULAR ONCE
Refills: 0 | Status: COMPLETED | OUTPATIENT
Start: 2024-04-01 | End: 2024-04-01

## 2024-04-01 RX ORDER — TETANUS TOXOID, REDUCED DIPHTHERIA TOXOID AND ACELLULAR PERTUSSIS VACCINE, ADSORBED 5; 2.5; 8; 8; 2.5 [IU]/.5ML; [IU]/.5ML; UG/.5ML; UG/.5ML; UG/.5ML
0.5 SUSPENSION INTRAMUSCULAR ONCE
Refills: 0 | Status: COMPLETED | OUTPATIENT
Start: 2024-04-01 | End: 2024-04-01

## 2024-04-01 RX ADMIN — TETANUS TOXOID, REDUCED DIPHTHERIA TOXOID AND ACELLULAR PERTUSSIS VACCINE, ADSORBED 0.5 MILLILITER(S): 5; 2.5; 8; 8; 2.5 SUSPENSION INTRAMUSCULAR at 14:58

## 2024-04-01 RX ADMIN — HUMAN RABIES VIRUS IMMUNE GLOBULIN 1600 UNIT(S): 150 INJECTION, SOLUTION INTRAMUSCULAR at 16:00

## 2024-04-01 RX ADMIN — Medication 1 MILLILITER(S): at 15:01

## 2024-04-01 NOTE — ED PROVIDER NOTE - DIFFERENTIAL DIAGNOSIS
see mdm    Independent interpretation of the EKG performed by MD Caraballo Differential Diagnosis see mdm
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
## 2024-04-01 NOTE — ED PROVIDER NOTE - NSFOLLOWUPINSTRUCTIONS_ED_ALL_ED_FT
Please come back to the emergency department for your next rabies shot, 4/4 (second dose), 4/8 (third dose), and 4/15 (fourth/last dose).     Please also follow up with Primary Care. Our Emergency Department Referral Coordinators will be reaching out to you in the next 24-48 hours (during business hours) from 9:00am to 5:00pm with a follow up appointment(s). Please expect a phone call from the hospital in that time frame. If you do not receive a call or if you have any questions or concerns, you can reach them at [phone].  ------------------------------------------------------------------------------------------------------------------------  Animal Bite    Animal bites can range from mild to serious. An animal bite can result in a scratch on the skin, a deep open cut, a puncture of the skin, a crush injury, or tearing away of the skin or a body part. Treatment includes wound care, updating your tetanus shot, and in some cases, administering a rabies vaccine. If you were prescribed an antibiotic, take it as told by your health care provider. Do not stop using the antibiotic even if your condition improves.      SEEK IMMEDIATE MEDICAL CARE IF YOU HAVE ANY OF THE FOLLOWING SYMPTOMS: red streaking away from the wound, fluid/blood/pus coming from the wound, fever or chills, trouble moving the injured area, numbness or tingling extending beyond the wound.  ------------------------------------------------------------------------------------------------------------------------  Hypertension    Hypertension, commonly called high blood pressure, is when the force of blood pumping through your arteries is too strong. Hypertension forces your heart to work harder to pump blood. Your arteries may become narrow or stiff.     If you do not have any signs or symptoms of end-damage (such as confusion, loss of vision, problems with speech, muscle weakness, loss of balance, trouble walking, passing out, shortness of breath, decrease in urination), then the goal is to gradually lower your blood pressure on an outpatient basis. It is not recommended to quickly lower your blood pressure in these scenarios, as it could reduce blood flow to the brain.     Having untreated or uncontrolled hypertension for a long period of time can cause heart attack, stroke, kidney disease, and other problems. If started on a medication, take exactly as prescribed by your health care professional. Maintain a healthy lifestyle and follow up with your primary care physician.    SEEK IMMEDIATE MEDICAL CARE IF YOU HAVE ANY OF THE FOLLOWING SYMPTOMS: severe headache, confusion, chest pain, abdominal pain, vomiting, or shortness of breath.

## 2024-04-01 NOTE — ED PROVIDER NOTE - PATIENT PORTAL LINK FT
You can access the FollowMyHealth Patient Portal offered by [facility] by registering at the following website: [email or web address]. By joining [employer]’s FollowMyHealth portal, you will also be able to view your health information using other applications (apps) compatible with our system.

## 2024-04-01 NOTE — ED ADULT NURSE NOTE - IN THE PAST 12 MONTHS HAVE YOU USED DRUGS OTHER THAN THOSE REQUIRED FOR MEDICAL REASON?
No - Patient is interested in going home on medications to assist in alcohol cessation  - CIWA, symptom triggered ativan  - FAll and seizure precautions  - Continue folic acid and thiamine

## 2024-04-01 NOTE — ED PROVIDER NOTE - PROGRESS NOTE DETAILS
Resident MORALES Sommer: Blood pressure noted, however patient has no signs or symptoms of endorgan damage, specifically no mental status changes, chest pain, shortness of breath, focal neurological deficits, or change in urinary output.  Patient has not followed up with a primary care physician because he retired.  Provide patient with a rapid referral and repeat exam.

## 2024-04-01 NOTE — ED PROVIDER NOTE - CLINICAL SUMMARY MEDICAL DECISION MAKING FREE TEXT BOX
Independent interpretation of the EKG performed by MD Caraballo    Patient is not immunocompromised, ? puncture, no lacerations, no bullae, pain out of proportion, or rapid progression concerning for necrotizing fasciitis. Wound care instructions provided. They were instructed on signs and symptoms of wound infection, including pain, fever, redness, swelling, lymphangitis, sensory/motor deficits, and instructed for them to return to PCP or ED immediately should these symptoms present. They verbally expressed understanding and all questions were addressed to their satisfaction. Will return for next does rabies vaccine.

## 2024-04-01 NOTE — ED PROVIDER NOTE - PHYSICAL EXAMINATION
Vital signs reviewed  General: Well nourished, comfortable  Skin: Warm, dry; 2 punctures sized abrasions on the left lower leg on the anterior surface no surrounding erythema/focal edema/fluctuance/induration/discharge  Head & neck: NCAT, supple neck  Eyes: EOMI, PER B/L, no scleral icterus, no conjunctival injection, no conjunctival pallor  ENT: MMM  Card: RRR, S1, S2; + systolic murmur, no rubs, no gallops  Resp: Normal respiratory effort, CTAB, no rales, no wheezing  Ext: Pulses palpable distally  NeuroMSK: Grossly intact  Psych: AAOx3, cooperative, appropriate

## 2024-04-03 NOTE — CHART NOTE - NSCHARTNOTEFT_GEN_A_CORE
[facility] MRN [id number] / Left message 4/2 & 4/3 - ANDRE    Specialty: PCP

## 2024-04-05 NOTE — PROGRESS NOTE BEHAVIORAL HEALTH - NSBHADMITDANGERSELF_PSY_A_CORE
Patient made aware  
suicidal ideation with plan and means

## 2024-04-09 ENCOUNTER — INPATIENT (INPATIENT)
Facility: HOSPITAL | Age: 64
LOS: 14 days | Discharge: HOME | DRG: 885 | End: 2024-04-24
Attending: PSYCHIATRY & NEUROLOGY | Admitting: PSYCHIATRY & NEUROLOGY
Payer: MEDICARE

## 2024-04-09 VITALS
WEIGHT: 182.1 LBS | RESPIRATION RATE: 17 BRPM | OXYGEN SATURATION: 100 % | SYSTOLIC BLOOD PRESSURE: 145 MMHG | HEIGHT: 61 IN | TEMPERATURE: 98 F | DIASTOLIC BLOOD PRESSURE: 88 MMHG | HEART RATE: 91 BPM

## 2024-04-09 DIAGNOSIS — Z95.2 PRESENCE OF PROSTHETIC HEART VALVE: Chronic | ICD-10-CM

## 2024-04-09 DIAGNOSIS — F29 UNSPECIFIED PSYCHOSIS NOT DUE TO A SUBSTANCE OR KNOWN PHYSIOLOGICAL CONDITION: ICD-10-CM

## 2024-04-09 DIAGNOSIS — F25.9 SCHIZOAFFECTIVE DISORDER, UNSPECIFIED: ICD-10-CM

## 2024-04-09 LAB
ALBUMIN SERPL ELPH-MCNC: 3.9 G/DL — SIGNIFICANT CHANGE UP (ref 3.5–5.2)
ALP SERPL-CCNC: 130 U/L — HIGH (ref 30–115)
ALT FLD-CCNC: 27 U/L — SIGNIFICANT CHANGE UP (ref 0–41)
ANION GAP SERPL CALC-SCNC: 11 MMOL/L — SIGNIFICANT CHANGE UP (ref 7–14)
APAP SERPL-MCNC: <5 UG/ML — LOW (ref 10–30)
AST SERPL-CCNC: 14 U/L — SIGNIFICANT CHANGE UP (ref 0–41)
BASOPHILS # BLD AUTO: 0.04 K/UL — SIGNIFICANT CHANGE UP (ref 0–0.2)
BASOPHILS NFR BLD AUTO: 0.6 % — SIGNIFICANT CHANGE UP (ref 0–1)
BILIRUB DIRECT SERPL-MCNC: <0.2 MG/DL — SIGNIFICANT CHANGE UP (ref 0–0.3)
BILIRUB INDIRECT FLD-MCNC: SIGNIFICANT CHANGE UP MG/DL (ref 0.2–1.2)
BILIRUB SERPL-MCNC: <0.2 MG/DL — SIGNIFICANT CHANGE UP (ref 0.2–1.2)
BUN SERPL-MCNC: 16 MG/DL — SIGNIFICANT CHANGE UP (ref 10–20)
CALCIUM SERPL-MCNC: 8.8 MG/DL — SIGNIFICANT CHANGE UP (ref 8.4–10.5)
CHLORIDE SERPL-SCNC: 106 MMOL/L — SIGNIFICANT CHANGE UP (ref 98–110)
CO2 SERPL-SCNC: 24 MMOL/L — SIGNIFICANT CHANGE UP (ref 17–32)
CREAT SERPL-MCNC: 0.9 MG/DL — SIGNIFICANT CHANGE UP (ref 0.7–1.5)
EGFR: 73 ML/MIN/1.73M2 — SIGNIFICANT CHANGE UP
EOSINOPHIL # BLD AUTO: 0.2 K/UL — SIGNIFICANT CHANGE UP (ref 0–0.7)
EOSINOPHIL NFR BLD AUTO: 3 % — SIGNIFICANT CHANGE UP (ref 0–8)
ETHANOL SERPL-MCNC: <10 MG/DL — SIGNIFICANT CHANGE UP
FLUAV AG NPH QL: SIGNIFICANT CHANGE UP
FLUBV AG NPH QL: SIGNIFICANT CHANGE UP
GLUCOSE SERPL-MCNC: 241 MG/DL — HIGH (ref 70–99)
HCT VFR BLD CALC: 32.1 % — LOW (ref 37–47)
HGB BLD-MCNC: 11.2 G/DL — LOW (ref 12–16)
IMM GRANULOCYTES NFR BLD AUTO: 0.9 % — HIGH (ref 0.1–0.3)
LYMPHOCYTES # BLD AUTO: 2.05 K/UL — SIGNIFICANT CHANGE UP (ref 1.2–3.4)
LYMPHOCYTES # BLD AUTO: 30.3 % — SIGNIFICANT CHANGE UP (ref 20.5–51.1)
MCHC RBC-ENTMCNC: 29.7 PG — SIGNIFICANT CHANGE UP (ref 27–31)
MCHC RBC-ENTMCNC: 34.9 G/DL — SIGNIFICANT CHANGE UP (ref 32–37)
MCV RBC AUTO: 85.1 FL — SIGNIFICANT CHANGE UP (ref 81–99)
MONOCYTES # BLD AUTO: 0.42 K/UL — SIGNIFICANT CHANGE UP (ref 0.1–0.6)
MONOCYTES NFR BLD AUTO: 6.2 % — SIGNIFICANT CHANGE UP (ref 1.7–9.3)
NEUTROPHILS # BLD AUTO: 4 K/UL — SIGNIFICANT CHANGE UP (ref 1.4–6.5)
NEUTROPHILS NFR BLD AUTO: 59 % — SIGNIFICANT CHANGE UP (ref 42.2–75.2)
NRBC # BLD: 0 /100 WBCS — SIGNIFICANT CHANGE UP (ref 0–0)
PLATELET # BLD AUTO: 179 K/UL — SIGNIFICANT CHANGE UP (ref 130–400)
PMV BLD: 9.2 FL — SIGNIFICANT CHANGE UP (ref 7.4–10.4)
POTASSIUM SERPL-MCNC: 3.9 MMOL/L — SIGNIFICANT CHANGE UP (ref 3.5–5)
POTASSIUM SERPL-SCNC: 3.9 MMOL/L — SIGNIFICANT CHANGE UP (ref 3.5–5)
PROT SERPL-MCNC: 6 G/DL — SIGNIFICANT CHANGE UP (ref 6–8)
RBC # BLD: 3.77 M/UL — LOW (ref 4.2–5.4)
RBC # FLD: 14.1 % — SIGNIFICANT CHANGE UP (ref 11.5–14.5)
RSV RNA NPH QL NAA+NON-PROBE: SIGNIFICANT CHANGE UP
SALICYLATES SERPL-MCNC: <0.3 MG/DL — LOW (ref 4–30)
SARS-COV-2 RNA SPEC QL NAA+PROBE: SIGNIFICANT CHANGE UP
SODIUM SERPL-SCNC: 141 MMOL/L — SIGNIFICANT CHANGE UP (ref 135–146)
WBC # BLD: 6.77 K/UL — SIGNIFICANT CHANGE UP (ref 4.8–10.8)
WBC # FLD AUTO: 6.77 K/UL — SIGNIFICANT CHANGE UP (ref 4.8–10.8)

## 2024-04-09 PROCEDURE — 82962 GLUCOSE BLOOD TEST: CPT

## 2024-04-09 PROCEDURE — 83036 HEMOGLOBIN GLYCOSYLATED A1C: CPT

## 2024-04-09 PROCEDURE — 84443 ASSAY THYROID STIM HORMONE: CPT

## 2024-04-09 PROCEDURE — 93005 ELECTROCARDIOGRAM TRACING: CPT

## 2024-04-09 PROCEDURE — 99233 SBSQ HOSP IP/OBS HIGH 50: CPT

## 2024-04-09 PROCEDURE — 80061 LIPID PANEL: CPT

## 2024-04-09 PROCEDURE — 99285 EMERGENCY DEPT VISIT HI MDM: CPT | Mod: FS

## 2024-04-09 RX ORDER — ASPIRIN/CALCIUM CARB/MAGNESIUM 324 MG
81 TABLET ORAL DAILY
Refills: 0 | Status: DISCONTINUED | OUTPATIENT
Start: 2024-04-09 | End: 2024-04-24

## 2024-04-09 RX ORDER — ARIPIPRAZOLE 15 MG/1
15 TABLET ORAL DAILY
Refills: 0 | Status: DISCONTINUED | OUTPATIENT
Start: 2024-04-10 | End: 2024-04-12

## 2024-04-09 RX ORDER — INFLUENZA VIRUS VACCINE 15; 15; 15; 15 UG/.5ML; UG/.5ML; UG/.5ML; UG/.5ML
0.5 SUSPENSION INTRAMUSCULAR ONCE
Refills: 0 | Status: DISCONTINUED | OUTPATIENT
Start: 2024-04-09 | End: 2024-04-24

## 2024-04-09 RX ORDER — HALOPERIDOL DECANOATE 100 MG/ML
5 INJECTION INTRAMUSCULAR EVERY 6 HOURS
Refills: 0 | Status: DISCONTINUED | OUTPATIENT
Start: 2024-04-09 | End: 2024-04-24

## 2024-04-09 RX ORDER — ARIPIPRAZOLE 15 MG/1
15 TABLET ORAL DAILY
Refills: 0 | Status: DISCONTINUED | OUTPATIENT
Start: 2024-04-09 | End: 2024-04-09

## 2024-04-09 RX ORDER — HYDROXYZINE HCL 10 MG
50 TABLET ORAL EVERY 6 HOURS
Refills: 0 | Status: DISCONTINUED | OUTPATIENT
Start: 2024-04-09 | End: 2024-04-24

## 2024-04-09 RX ORDER — ACETAMINOPHEN 500 MG
650 TABLET ORAL EVERY 6 HOURS
Refills: 0 | Status: DISCONTINUED | OUTPATIENT
Start: 2024-04-09 | End: 2024-04-24

## 2024-04-09 RX ORDER — DIPHENHYDRAMINE HCL 50 MG
50 CAPSULE ORAL EVERY 6 HOURS
Refills: 0 | Status: DISCONTINUED | OUTPATIENT
Start: 2024-04-09 | End: 2024-04-24

## 2024-04-09 RX ADMIN — Medication 81 MILLIGRAM(S): at 11:58

## 2024-04-09 RX ADMIN — ARIPIPRAZOLE 15 MILLIGRAM(S): 15 TABLET ORAL at 11:58

## 2024-04-09 NOTE — ED BEHAVIORAL HEALTH ASSESSMENT NOTE - SUMMARY
64 yo female with PPHx of Schizoaffective DO with hx of psych admission who presented to ED with paranoia.    Pt presented to ED in context of paranoia and disorganized thoughts. She had delusions about president being killed and was offering delusional theories about it. She was hard to interrupt and had poor reality testing. She is decompensating with psychosis and will need involuntary admission due to acute psychotic state.

## 2024-04-09 NOTE — BH INPATIENT PSYCHIATRY ASSESSMENT NOTE - NSBHCHARTREVIEWINVESTIGATE_PSY_A_CORE FT
< from: 12 Lead ECG (04.09.24 @ 01:28) >    Ventricular Rate 89 BPM    Atrial Rate 89 BPM    P-R Interval 160 ms    QRS Duration 82 ms    Q-T Interval 360 ms    QTC Calculation(Bazett) 438 ms    P Axis 55 degrees    R Axis 51 degrees    T Axis -31 degrees    Diagnosis Line Normal sinus rhythm  Nonspecific T wave abnormality  Abnormal ECG

## 2024-04-09 NOTE — BH PATIENT PROFILE - PATIENT'S SEXUAL ORIENTATION
s/p PCIOL-Pt doing well at 1 week s/p PCIOL. -Continue post-op gtts according to instruction sheet.-Okay to resume usual activites and d/c eye shield. rx given Heterosexual

## 2024-04-09 NOTE — BH PATIENT PROFILE - FUNCTIONAL ASSESSMENT - BASIC MOBILITY 5.
Detail Level: Detailed
Add 90246 Cpt? (Important Note: In 2017 The Use Of 33037 Is Being Tracked By Cms To Determine Future Global Period Reimbursement For Global Periods): yes
3 = A little assistance

## 2024-04-09 NOTE — BH INPATIENT PSYCHIATRY ASSESSMENT NOTE - NSBHASSESSSUMMFT_PSY_ALL_CORE
Pt is a 62 yo female with PPHx of Schizoaffective DO with hx of psych admission who presented to ED with paranoia.    Pt was limited with cooperation and was going on in her conversation in a tangential manner. She reported "ISAIAH says [address] does not work regularly because president will be assassinated." She was hard to interrupt and stated, "The government keep talking to me because they are scared of what is going on.' She reported AH of hearing voices telling her that the president is going to blow his head. She reported "They are also going to kill  because he is giving blessing to homosexual marriage." he was preoccupied with her paranoid delusions and was hard to challenge. She stated her mood is "very happy' because they are going to kill Elias Nguyen because he is evil. She did not provide any collaterals because she does not want anyone from her family to know about it. She denied any SI/HI. She accused the writer for using tax money by hospitalizing patients.    Patient seen and evaluated on IPP. Well known to writer. Patient presents with paranoia, delusions and disorganized thoughts. She has delusions about president being killed and was offering delusional theories about it. Patient very tangential. Patient states she has been compliant with meds the last few days, but may have missed doses before. Appears to be a poor historian at this time. Will need to titrate Abilify but will start at same dosage for a few days then consider titration. Patient denies suicidal/homicidal ideations. Denies any ETOH or drug use. Does not provide any information for collateral.    #Schizoaffective disorder  -Abilify 15mg Daily    -Hydroxyzine 50mg Q6 PRN for anxiety/insomnia  -Haldol 5mg Q6 PRN for agitation/psychosis  -Benadryl 50mg Q6 PRN got EPS  -Lorazepam 2mg Q6 PRN for aggression    # Aortic stenosis and history of valve replacement  -aspirin 81 milliGRAM(s) Oral daily    -Tylenol PRN for pain    #Agitation  -for agitation not amenable to verbal redirection, may give haldol 5 mg q6h prn, ativan 2 mg q6h prn, benadryl 50 mg q6h prn with escalation to IM if pt is a danger to self or/and others with repeat EKG toensure QTc <500 ms Pt is a 64 yo female with PPHx of Schizoaffective DO with hx of psych admission who presented to ED with paranoia.    Pt was limited with cooperation and was going on in her conversation in a tangential manner. She reported "ISA[facility]H says [address] does not work regularly because president will be assassinated." She was hard to interrupt and stated, "The government keep talking to me because they are scared of what is going on.' She reported AH of hearing voices telling her that the president is going to blow his head. She reported "They are also going to kill  because he is giving blessing to homosexual marriage." he was preoccupied with her paranoid delusions and was hard to challenge. She stated her mood is "very happy' because they are going to kill Elias Nguyen because he is evil. She did not provide any collaterals because she does not want anyone from her family to know about it. She denied any SI/HI. She accused the writer for using tax money by hospitalizing patients.    Patient seen and evaluated on IPP. Well known to writer. Patient presents with paranoia, delusions and disorganized thoughts. She has delusions about president being killed and was offering delusional theories about it. Patient very tangential. Patient states she has been compliant with meds the last few days, but may have missed doses before. Appears to be a poor historian at this time. Will need to titrate Abilify but will start at same dosage for a few days then consider titration. Patient denies suicidal/homicidal ideations. Denies any ETOH or drug use. Does not provide any information for collateral. Writer asked patient about Rabies injections mention in a previous not. Patient states she believes she only got 1 shot. only received one shot.    Writer spoke to ED regaring Rabies shot series mentioned in an earlier    #Schizoaffective disorder  -Abilify 15mg Daily    -Hydroxyzine 50mg Q6 PRN for anxiety/insomnia  -Haldol 5mg Q6 PRN for agitation/psychosis  -Benadryl 50mg Q6 PRN got EPS  -Lorazepam 2mg Q6 PRN for aggression    # Aortic stenosis and history of valve replacement  -aspirin 81 milliGRAM(s) Oral daily    -Tylenol PRN for pain    #Agitation  -for agitation not amenable to verbal redirection, may give haldol 5 mg q6h prn, ativan 2 mg q6h prn, benadryl 50 mg q6h prn with escalation to IM if pt is a danger to self or/and others with repeat EKG toensure QTc <500 ms Pt is a 64 yo female with PPHx of Schizoaffective DO with hx of psych admission who presented to ED with paranoia.    Pt was limited with cooperation and was going on in her conversation in a tangential manner. She reported "[facility]H says [address] does not work regularly because president will be assassinated." She was hard to interrupt and stated, "The government keep talking to me because they are scared of what is going on.' She reported AH of hearing voices telling her that the president is going to blow his head. She reported "They are also going to kill  because he is giving blessing to homosexual marriage." he was preoccupied with her paranoid delusions and was hard to challenge. She stated her mood is "very happy' because they are going to kill Elias Nguyen because he is evil. She did not provide any collaterals because she does not want anyone from her family to know about it. She denied any SI/HI. She accused the writer for using tax money by hospitalizing patients.    Patient seen and evaluated on IPP. Well known to writer. Patient presents with paranoia, delusions and disorganized thoughts. She has delusions about president being killed and was offering delusional theories about it. Patient very tangential. Patient states she has been compliant with meds the last few days, but may have missed doses before. Appears to be a poor historian at this time. Will need to titrate Abilify but will start at same dosage for a few days then consider titration. Patient denies suicidal/homicidal ideations. Denies any ETOH or drug use. Does not provide any information for collateral. Writer asked patient about Rabies injections mention in a previous not. Patient states she believes she only got 1 shot. only received one shot.    Writer spoke to ED regarding Rabies shot series mentioned in an earlier note 4/1. Per ED patient unreliable. Does not appear to have and symptoms of Rabies. Decided not to re-start series.    #Schizoaffective disorder  -Abilify 15mg Daily    -Hydroxyzine 50mg Q6 PRN for anxiety/insomnia  -Haldol 5mg Q6 PRN for agitation/psychosis  -Benadryl 50mg Q6 PRN got EPS  -Lorazepam 2mg Q6 PRN for aggression    # Aortic stenosis and history of valve replacement  -aspirin 81 milliGRAM(s) Oral daily    -Tylenol PRN for pain    #Agitation  -for agitation not amenable to verbal redirection, may give haldol 5 mg q6h prn, ativan 2 mg q6h prn, benadryl 50 mg q6h prn with escalation to IM if pt is a danger to self or/and others with repeat EKG toensure QTc <500 ms

## 2024-04-09 NOTE — BH INPATIENT PSYCHIATRY ASSESSMENT NOTE - CURRENT MEDICATION
MEDICATIONS  (STANDING):  ARIPiprazole 15 milliGRAM(s) Oral daily  aspirin enteric coated 81 milliGRAM(s) Oral daily  influenza   Vaccine 0.5 milliLiter(s) IntraMuscular once    MEDICATIONS  (PRN):   MEDICATIONS  (STANDING):  ARIPiprazole 15 milliGRAM(s) Oral daily  aspirin enteric coated 81 milliGRAM(s) Oral daily  influenza   Vaccine 0.5 milliLiter(s) IntraMuscular once    MEDICATIONS  (PRN):  acetaminophen     Tablet .. 650 milliGRAM(s) Oral every 6 hours PRN Temp greater or equal to 38C (100.4F), Mild Pain (1 - 3)  diphenhydrAMINE 50 milliGRAM(s) Oral every 6 hours PRN Eps  haloperidol     Tablet 5 milliGRAM(s) Oral every 6 hours PRN agitation  hydrOXYzine hydrochloride 50 milliGRAM(s) Oral every 6 hours PRN Agitation  LORazepam     Tablet 2 milliGRAM(s) Oral every 6 hours PRN Agitation

## 2024-04-09 NOTE — BH INPATIENT PSYCHIATRY ASSESSMENT NOTE - NSBHCHARTREVIEWVS_PSY_A_CORE FT
Vital Signs Last 24 Hrs  T(C): 36 (04-09-24 @ 09:26), Max: 36.4 (04-09-24 @ 00:18)  T(F): 96.8 (04-09-24 @ 09:26), Max: 97.6 (04-09-24 @ 00:18)  HR: 78 (04-09-24 @ 09:26) (77 - 91)  BP: 171/100 (04-09-24 @ 09:26) (145/88 - 171/100)  BP(mean): --  RR: 18 (04-09-24 @ 09:26) (17 - 18)  SpO2: 97% (04-09-24 @ 09:26) (97% - 100%)     Vital Signs Last 24 Hrs  T(C): 35.4 (04-09-24 @ 15:54), Max: 36 (04-09-24 @ 09:26)  T(F): 95.8 (04-09-24 @ 15:54), Max: 96.8 (04-09-24 @ 09:26)  HR: 84 (04-09-24 @ 15:54) (78 - 84)  BP: 161/77 (04-09-24 @ 15:54) (161/77 - 171/100)  BP(mean): --  RR: 16 (04-09-24 @ 15:54) (16 - 18)  SpO2: 97% (04-09-24 @ 09:26) (97% - 97%)     Vital Signs Last 24 Hrs  T(C): 35.9 (04-10-24 @ 07:43), Max: 36 (04-09-24 @ 09:26)  T(F): 96.6 (04-10-24 @ 07:43), Max: 96.8 (04-09-24 @ 09:26)  HR: 86 (04-10-24 @ 07:43) (78 - 86)  BP: 163/64 (04-10-24 @ 07:43) (161/77 - 171/100)  BP(mean): --  RR: 18 (04-10-24 @ 07:43) (16 - 18)  SpO2: 97% (04-09-24 @ 09:26) (97% - 97%)

## 2024-04-09 NOTE — BH INPATIENT PSYCHIATRY ASSESSMENT NOTE - NSBHMETABOLIC_PSY_ALL_CORE_FT
BMI: BMI (kg/m2): 34.4 (04-09-24 @ 00:18)  HbA1c: A1C with Estimated Average Glucose Result: 7.1 % (10-29-23 @ 08:45)    Glucose: POCT Blood Glucose.: 121 mg/dL (02-21-24 @ 16:17)    BP: 171/100 (04-09-24 @ 09:26) (145/88 - 171/100)Vital Signs Last 24 Hrs  T(C): 36 (04-09-24 @ 09:26), Max: 36.4 (04-09-24 @ 00:18)  T(F): 96.8 (04-09-24 @ 09:26), Max: 97.6 (04-09-24 @ 00:18)  HR: 78 (04-09-24 @ 09:26) (77 - 91)  BP: 171/100 (04-09-24 @ 09:26) (145/88 - 171/100)  BP(mean): --  RR: 18 (04-09-24 @ 09:26) (17 - 18)  SpO2: 97% (04-09-24 @ 09:26) (97% - 100%)      Lipid Panel: Date/Time: 10-29-23 @ 08:45  Cholesterol, Serum: 165  LDL Cholesterol Calculated: 90  HDL Cholesterol, Serum: 57  Total Cholesterol/HDL Ration Measurement: --  Triglycerides, Serum: 91   BMI: BMI (kg/m2): 34.4 (04-09-24 @ 00:18)  HbA1c: A1C with Estimated Average Glucose Result: 7.1 % (10-29-23 @ 08:45)    Glucose: POCT Blood Glucose.: 121 mg/dL (02-21-24 @ 16:17)    BP: 161/77 (04-09-24 @ 15:54) (145/88 - 171/100)Vital Signs Last 24 Hrs  T(C): 35.4 (04-09-24 @ 15:54), Max: 36 (04-09-24 @ 09:26)  T(F): 95.8 (04-09-24 @ 15:54), Max: 96.8 (04-09-24 @ 09:26)  HR: 84 (04-09-24 @ 15:54) (78 - 84)  BP: 161/77 (04-09-24 @ 15:54) (161/77 - 171/100)  BP(mean): --  RR: 16 (04-09-24 @ 15:54) (16 - 18)  SpO2: 97% (04-09-24 @ 09:26) (97% - 97%)      Lipid Panel: Date/Time: 10-29-23 @ 08:45  Cholesterol, Serum: 165  LDL Cholesterol Calculated: 90  HDL Cholesterol, Serum: 57  Total Cholesterol/HDL Ration Measurement: --  Triglycerides, Serum: 91   BMI: BMI (kg/m2): 34.4 (04-09-24 @ 00:18)  HbA1c: A1C with Estimated Average Glucose Result: 7.1 % (10-29-23 @ 08:45)    Glucose: POCT Blood Glucose.: 121 mg/dL (02-21-24 @ 16:17)    BP: 163/64 (04-10-24 @ 07:43) (145/88 - 171/100)Vital Signs Last 24 Hrs  T(C): 35.9 (04-10-24 @ 07:43), Max: 36 (04-09-24 @ 09:26)  T(F): 96.6 (04-10-24 @ 07:43), Max: 96.8 (04-09-24 @ 09:26)  HR: 86 (04-10-24 @ 07:43) (78 - 86)  BP: 163/64 (04-10-24 @ 07:43) (161/77 - 171/100)  BP(mean): --  RR: 18 (04-10-24 @ 07:43) (16 - 18)  SpO2: 97% (04-09-24 @ 09:26) (97% - 97%)      Lipid Panel: Date/Time: 10-29-23 @ 08:45  Cholesterol, Serum: 165  LDL Cholesterol Calculated: 90  HDL Cholesterol, Serum: 57  Total Cholesterol/HDL Ration Measurement: --  Triglycerides, Serum: 91

## 2024-04-09 NOTE — BH INPATIENT PSYCHIATRY ASSESSMENT NOTE - NSBHCHARTREVIEWLAB_PSY_A_CORE FT
Basic Metabolic Panel (04.09.24 @ 01:06)   Sodium: 141 mmol/L  Potassium: 3.9 mmol/L  Chloride: 106 mmol/L  Carbon Dioxide: 24 mmol/L  Anion Gap: 11 mmol/L  Blood Urea Nitrogen: 16 mg/dL  Creatinine: 0.9 mg/dL  Glucose: 241 mg/dL  Calcium: 8.8 mg/dL

## 2024-04-09 NOTE — ED BEHAVIORAL HEALTH ASSESSMENT NOTE - HPI (INCLUDE ILLNESS QUALITY, SEVERITY, DURATION, TIMING, CONTEXT, MODIFYING FACTORS, ASSOCIATED SIGNS AND SYMPTOMS)
Pt is a 64 yo female with PPHx of Schizoaffective DO with hx of psych admission who presented to ED with paranoia.    Pt was limited with cooperation and was going on in her conversation in a tangential manner. She reported "[employer] says [address] does not work regularly because president will be assassinated." She was hard to interrupt and stated, "The government keep talking to me because they are scared of what is going on.' She reported AH of hearing voices telling her that the president is going to blow his head. She reported "They are also going to kill  because he is giving blessing to homosexual marriage." he was preoccupied with her paranoid delusions and was hard to challenge. She stated her mood is "very happy' because they are going to kill Elias Nguyen because he is evil. She did not provide any collaterals because she does not want anyone from her family to know about it. She denied any SI/HI. She accused the writer for using tax money by hospitalizing patients.

## 2024-04-09 NOTE — ED PROVIDER NOTE - OBJECTIVE STATEMENT
63 year old female past medical history of schizo on ability which patient takes daily comes to emergency room for hearing voices. Pt states that she normally hears voices and can usually handle them but states that recently the voices have been louder and more numerous. patient states that she feels "People are out to get me." patient states that the FBI is communicating with her about the "echos" and now all she hears are echos. patient also thinks the FBI is after her and is not sure why.

## 2024-04-09 NOTE — BH INPATIENT PSYCHIATRY ASSESSMENT NOTE - NSBHATTESTAPPBILLTIME_PSY_A_CORE
I attest my time as BECYK is greater than 50% of the total combined time spent on qualifying patient care activities. I have reviewed and verified the documentation.

## 2024-04-09 NOTE — BH PATIENT PROFILE - HOME MEDICATIONS
moxifloxacin 400 mg oral tablet , 1 tab(s) orally once a day  Tylenol 325 mg oral tablet , 2 tab(s) orally 4 times a day as needed for  mild pain  methocarbamol 500 mg oral tablet , 1 tab(s) orally once a day as needed for  moderate pain  Abilify 15 mg oral tablet , 1 tab(s) orally once a day  Aspirin Low Dose 81 mg oral delayed release tablet , 1 tab(s) orally once a day

## 2024-04-10 LAB
A1C WITH ESTIMATED AVERAGE GLUCOSE RESULT: 7.3 % — HIGH (ref 4–5.6)
CHOLEST SERPL-MCNC: 254 MG/DL — HIGH
ESTIMATED AVERAGE GLUCOSE: 163 MG/DL — HIGH (ref 68–114)
HDLC SERPL-MCNC: 78 MG/DL — SIGNIFICANT CHANGE UP
LIPID PNL WITH DIRECT LDL SERPL: 144 MG/DL — HIGH
NON HDL CHOLESTEROL: 176 MG/DL — HIGH
TRIGL SERPL-MCNC: 158 MG/DL — HIGH
TSH SERPL-MCNC: 4.89 UIU/ML — HIGH (ref 0.27–4.2)

## 2024-04-10 PROCEDURE — 99232 SBSQ HOSP IP/OBS MODERATE 35: CPT

## 2024-04-10 PROCEDURE — 99222 1ST HOSP IP/OBS MODERATE 55: CPT

## 2024-04-10 RX ADMIN — ARIPIPRAZOLE 15 MILLIGRAM(S): 15 TABLET ORAL at 08:24

## 2024-04-10 RX ADMIN — Medication 81 MILLIGRAM(S): at 08:24

## 2024-04-10 NOTE — BH INPATIENT PSYCHIATRY PROGRESS NOTE - NSBHFUPINTERVALHXFT_PSY_A_CORE
Patient seen and evaluated. On approach patient calm and cooperative. No agitation or aggression noted. Patient compliant with meds. Appears to be responding well to medication, but still has some mild paranoia, delusions. Zyprexa increased yesterday. Denies any side effects/adverse reactions. No side effects/adverse reactions noted. Will continue to monitor. Denies any A/V hallucinations.  Verbalizes an improved mood but patient still expresses guilt and tends to be very negative, Zoloft recently increased. Will continue to monitor. Patient is eating better and sleeping well. No incontinence for some time.  Patient denies any suicidal/homicidal ideations. Patient visible on the unit attending groups. Patient seen and evaluated as per nursing report no acute events. Patient continues to present with paranoia, delusions and disorganized thoughts. Patient states she continues to hear voices regarding the death of the president and the FBI. Patient has no thoughts of wanting to kill the president herself. Patient re-started on Abilify. Will continue to monitor and titrate accordingly. Patient denies suicidal/homicidal ideations. Patient isolative to the room.

## 2024-04-10 NOTE — BH INPATIENT PSYCHIATRY PROGRESS NOTE - NSBHCHARTREVIEWVS_PSY_A_CORE FT
Vital Signs Last 24 Hrs  T(C): 35.9 (04-10-24 @ 07:43), Max: 35.9 (04-10-24 @ 07:43)  T(F): 96.6 (04-10-24 @ 07:43), Max: 96.6 (04-10-24 @ 07:43)  HR: 86 (04-10-24 @ 07:43) (84 - 86)  BP: 163/64 (04-10-24 @ 07:43) (161/77 - 163/64)  BP(mean): --  RR: 18 (04-10-24 @ 07:43) (16 - 18)  SpO2: --

## 2024-04-10 NOTE — BH INPATIENT PSYCHIATRY PROGRESS NOTE - NSBHMETABOLIC_PSY_ALL_CORE_FT
BMI: BMI (kg/m2): 34.4 (04-09-24 @ 00:18)  HbA1c: A1C with Estimated Average Glucose Result: 7.1 % (10-29-23 @ 08:45)    Glucose: POCT Blood Glucose.: 121 mg/dL (02-21-24 @ 16:17)    BP: 163/64 (04-10-24 @ 07:43) (145/88 - 171/100)Vital Signs Last 24 Hrs  T(C): 35.9 (04-10-24 @ 07:43), Max: 35.9 (04-10-24 @ 07:43)  T(F): 96.6 (04-10-24 @ 07:43), Max: 96.6 (04-10-24 @ 07:43)  HR: 86 (04-10-24 @ 07:43) (84 - 86)  BP: 163/64 (04-10-24 @ 07:43) (161/77 - 163/64)  BP(mean): --  RR: 18 (04-10-24 @ 07:43) (16 - 18)  SpO2: --      Lipid Panel: Date/Time: 04-10-24 @ 08:14  Cholesterol, Serum: 254  LDL Cholesterol Calculated: 144  HDL Cholesterol, Serum: 78  Total Cholesterol/HDL Ration Measurement: --  Triglycerides, Serum: 158

## 2024-04-10 NOTE — BH INPATIENT PSYCHIATRY PROGRESS NOTE - NSBHASSESSSUMMFT_PSY_ALL_CORE
Pt is a 62 yo female with PPHx of Schizoaffective DO with hx of psych admission who presented to ED with paranoia.    Patient seen and evaluated. On approach patient calm and cooperative. No agitation or aggression noted. Patient compliant with meds. Appears to be responding well to medication, but still has some mild paranoia, delusions. Zyprexa increased yesterday. Denies any side effects/adverse reactions. No side effects/adverse reactions noted. Will continue to monitor. Denies any A/V hallucinations.  Verbalizes an improved mood but patient still expresses guilt and tends to be very negative, Zoloft recently increased. Will continue to monitor. Patient is eating better and sleeping well. No incontinence for some time.  Patient denies any suicidal/homicidal ideations. Patient visible on the unit attending groups.    #Schizoaffective disorder  -Abilify 15mg Daily    -Hydroxyzine 50mg Q6 PRN for anxiety/insomnia  -Haldol 5mg Q6 PRN for agitation/psychosis  -Benadryl 50mg Q6 PRN got EPS  -Lorazepam 2mg Q6 PRN for aggression    # Aortic stenosis and history of valve replacement  -aspirin 81 milliGRAM(s) Oral daily    -Tylenol PRN for pain    #Agitation  -for agitation not amenable to verbal redirection, may give haldol 5 mg q6h prn, ativan 2 mg q6h prn, benadryl 50 mg q6h prn with escalation to IM if pt is a danger to self or/and others with repeat EKG toensure QTc <500 ms Pt is a 62 yo female with PPHx of Schizoaffective DO with hx of psych admission who presented to ED with paranoia.    Patient seen and evaluated as per nursing report no acute events. Patient continues to present with paranoia, delusions and disorganized thoughts. Patient states she continues to hear voices regarding the death of the president and the FBI. Patient has no thoughts of wanting to kill the president herself. Patient re-started on Abilify. Will continue to monitor and titrate accordingly. Patient denies suicidal/homicidal ideations. Patient isolative to the room.     #Schizoaffective disorder  -Abilify 15mg Daily    -Hydroxyzine 50mg Q6 PRN for anxiety/insomnia  -Haldol 5mg Q6 PRN for agitation/psychosis  -Benadryl 50mg Q6 PRN got EPS  -Lorazepam 2mg Q6 PRN for aggression    # Aortic stenosis and history of valve replacement  -aspirin 81 milliGRAM(s) Oral daily    -Tylenol PRN for pain    #Agitation  -for agitation not amenable to verbal redirection, may give haldol 5 mg q6h prn, ativan 2 mg q6h prn, benadryl 50 mg q6h prn with escalation to IM if pt is a danger to self or/and others with repeat EKG toensure QTc <500 ms

## 2024-04-10 NOTE — BH INPATIENT PSYCHIATRY PROGRESS NOTE - CURRENT MEDICATION
MEDICATIONS  (STANDING):  ARIPiprazole 15 milliGRAM(s) Oral daily  aspirin enteric coated 81 milliGRAM(s) Oral daily  influenza   Vaccine 0.5 milliLiter(s) IntraMuscular once    MEDICATIONS  (PRN):  acetaminophen     Tablet .. 650 milliGRAM(s) Oral every 6 hours PRN Temp greater or equal to 38C (100.4F), Mild Pain (1 - 3)  diphenhydrAMINE 50 milliGRAM(s) Oral every 6 hours PRN Eps  haloperidol     Tablet 5 milliGRAM(s) Oral every 6 hours PRN agitation  hydrOXYzine hydrochloride 50 milliGRAM(s) Oral every 6 hours PRN Agitation  LORazepam     Tablet 2 milliGRAM(s) Oral every 6 hours PRN Agitation

## 2024-04-11 PROCEDURE — 99232 SBSQ HOSP IP/OBS MODERATE 35: CPT

## 2024-04-11 RX ADMIN — ARIPIPRAZOLE 15 MILLIGRAM(S): 15 TABLET ORAL at 10:01

## 2024-04-11 RX ADMIN — Medication 81 MILLIGRAM(S): at 08:33

## 2024-04-11 NOTE — BH INPATIENT PSYCHIATRY PROGRESS NOTE - NSBHASSESSSUMMFT_PSY_ALL_CORE
Pt is a 64 yo female with PPHx of Schizoaffective DO with hx of psych admission who presented to ED with paranoia.    Patient seen and evaluated as per nursing report no acute events. Patient continues to present with paranoia, delusions and disorganized thoughts. Patient states she continues to hear voices regarding the death of the president and the FBI. Patient has no thoughts of wanting to kill the president herself. Patient recently re-started on Abilify. Will continue to monitor and titrate accordingly. Discussed titration with patient. Patient states she wants to stay at this dose and give it a few more days. If no improvement with voices is agreeable to increasing. States she is eating well and sleeping well. Patient denies suicidal/homicidal ideations. Patient isolative to the room.     #Schizoaffective disorder  -Abilify 15mg Daily    -Hydroxyzine 50mg Q6 PRN for anxiety/insomnia  -Haldol 5mg Q6 PRN for agitation/psychosis  -Benadryl 50mg Q6 PRN got EPS  -Lorazepam 2mg Q6 PRN for aggression    # Aortic stenosis and history of valve replacement  -aspirin 81 milliGRAM(s) Oral daily    -Tylenol PRN for pain    #Agitation  -for agitation not amenable to verbal redirection, may give haldol 5 mg q6h prn, ativan 2 mg q6h prn, benadryl 50 mg q6h prn with escalation to IM if pt is a danger to self or/and others with repeat EKG toensure QTc <500 ms

## 2024-04-11 NOTE — BH TREATMENT PLAN - NSTXPLANTHERAPYSESSIONSFT_PSY_ALL_CORE
04-11-24  Type of therapy: Coping skills, Psychoeducation  Type of session: Group  Level of patient participation: Attentive, Quiet  Duration of participation: 30 minutes  Therapy conducted by: Social work  Therapy Summary: SWI facilitated effective coping strategies are a critical aspect of managing stress. These strategies will enable individuals to identify and respond to stressors in a healthy manner, which in turn reduces the negative effects of stress on their overall well-being. Employing healthy coping strategies such as exercise, mindfulness, or seeking support from friends and family can significantly contribute to reducing stress levels.   Throughout the group session, the patient appeared to be in a stable behavior but quiet only participates when encouraged.

## 2024-04-11 NOTE — BH INPATIENT PSYCHIATRY PROGRESS NOTE - NSBHCHARTREVIEWVS_PSY_A_CORE FT
Vital Signs Last 24 Hrs  T(C): 35.2 (04-11-24 @ 08:00), Max: 35.8 (04-10-24 @ 15:45)  T(F): 95.4 (04-11-24 @ 08:00), Max: 96.4 (04-10-24 @ 15:45)  HR: 72 (04-11-24 @ 08:00) (72 - 89)  BP: 174/87 (04-11-24 @ 08:00) (154/71 - 174/87)  BP(mean): --  RR: 18 (04-11-24 @ 08:00) (18 - 20)  SpO2: --

## 2024-04-11 NOTE — BH INPATIENT PSYCHIATRY PROGRESS NOTE - NSBHMETABOLIC_PSY_ALL_CORE_FT
BMI: BMI (kg/m2): 34.4 (04-09-24 @ 00:18)  HbA1c: A1C with Estimated Average Glucose Result: 7.3 % (04-10-24 @ 08:14)    Glucose: POCT Blood Glucose.: 121 mg/dL (02-21-24 @ 16:17)    BP: 174/87 (04-11-24 @ 08:00) (145/88 - 174/87)Vital Signs Last 24 Hrs  T(C): 35.2 (04-11-24 @ 08:00), Max: 35.8 (04-10-24 @ 15:45)  T(F): 95.4 (04-11-24 @ 08:00), Max: 96.4 (04-10-24 @ 15:45)  HR: 72 (04-11-24 @ 08:00) (72 - 89)  BP: 174/87 (04-11-24 @ 08:00) (154/71 - 174/87)  BP(mean): --  RR: 18 (04-11-24 @ 08:00) (18 - 20)  SpO2: --      Lipid Panel: Date/Time: 04-10-24 @ 08:14  Cholesterol, Serum: 254  LDL Cholesterol Calculated: 144  HDL Cholesterol, Serum: 78  Total Cholesterol/HDL Ration Measurement: --  Triglycerides, Serum: 158

## 2024-04-11 NOTE — BH INPATIENT PSYCHIATRY PROGRESS NOTE - NSBHFUPINTERVALHXFT_PSY_A_CORE
Patient seen and evaluated as per nursing report no acute events. Patient continues to present with paranoia, delusions and disorganized thoughts. Patient states she continues to hear voices regarding the death of the president and the FBI. Patient has no thoughts of wanting to kill the president herself. Patient recently re-started on Abilify. Will continue to monitor and titrate accordingly. Discussed titration with patient. Patient states she wants to stay at this dose and give it a few more days. If no improvement with voices is agreeable to increasing. States she is eating well and sleeping well. Patient denies suicidal/homicidal ideations. Patient isolative to the room.

## 2024-04-12 PROCEDURE — 99232 SBSQ HOSP IP/OBS MODERATE 35: CPT

## 2024-04-12 RX ORDER — ARIPIPRAZOLE 15 MG/1
20 TABLET ORAL DAILY
Refills: 0 | Status: DISCONTINUED | OUTPATIENT
Start: 2024-04-13 | End: 2024-04-24

## 2024-04-12 RX ADMIN — Medication 81 MILLIGRAM(S): at 08:53

## 2024-04-12 RX ADMIN — ARIPIPRAZOLE 15 MILLIGRAM(S): 15 TABLET ORAL at 08:53

## 2024-04-12 NOTE — BH INPATIENT PSYCHIATRY PROGRESS NOTE - NSBHFUPINTERVALHXFT_PSY_A_CORE
Patient seen and evaluated as per nursing report no acute events. Patient continues to present with paranoia, delusions and disorganized thoughts. Patient states she continues to hear voices regarding the death of the president and the [employer]. Patient has no thoughts of wanting to kill the president herself. Again discussed titration with patient. Patient in agreement. States she is eating well and sleeping well. Patient denies suicidal/homicidal ideations. Patient isolative to the room.

## 2024-04-12 NOTE — BH INPATIENT PSYCHIATRY PROGRESS NOTE - NSBHMETABOLIC_PSY_ALL_CORE_FT
BMI: BMI (kg/m2): 34.4 (04-09-24 @ 00:18)  HbA1c: A1C with Estimated Average Glucose Result: 7.3 % (04-10-24 @ 08:14)    Glucose: POCT Blood Glucose.: 121 mg/dL (02-21-24 @ 16:17)    BP: 150/94 (04-12-24 @ 09:11) (150/94 - 174/87)Vital Signs Last 24 Hrs  T(C): 36.1 (04-12-24 @ 09:11), Max: 36.8 (04-11-24 @ 15:46)  T(F): 96.9 (04-12-24 @ 09:11), Max: 98.2 (04-11-24 @ 15:46)  HR: 82 (04-12-24 @ 09:11) (82 - 86)  BP: 150/94 (04-12-24 @ 09:11) (150/94 - 159/94)  BP(mean): --  RR: 18 (04-12-24 @ 09:11) (18 - 18)  SpO2: --      Lipid Panel: Date/Time: 04-10-24 @ 08:14  Cholesterol, Serum: 254  LDL Cholesterol Calculated: 144  HDL Cholesterol, Serum: 78  Total Cholesterol/HDL Ration Measurement: --  Triglycerides, Serum: 158   BMI: BMI (kg/m2): 34.4 (04-09-24 @ 00:18)  HbA1c: A1C with Estimated Average Glucose Result: 7.3 % (04-10-24 @ 08:14)    Glucose: POCT Blood Glucose.: 121 mg/dL (02-21-24 @ 16:17)    BP: 150/94 (04-12-24 @ 09:11) (150/94 - 174/87)Vital Signs Last 24 Hrs  T(C): 36.1 (04-12-24 @ 09:11), Max: 36.1 (04-12-24 @ 09:11)  T(F): 96.9 (04-12-24 @ 09:11), Max: 96.9 (04-12-24 @ 09:11)  HR: 82 (04-12-24 @ 09:11) (82 - 82)  BP: 150/94 (04-12-24 @ 09:11) (150/94 - 150/94)  BP(mean): --  RR: 18 (04-12-24 @ 09:11) (18 - 18)  SpO2: --      Lipid Panel: Date/Time: 04-10-24 @ 08:14  Cholesterol, Serum: 254  LDL Cholesterol Calculated: 144  HDL Cholesterol, Serum: 78  Total Cholesterol/HDL Ration Measurement: --  Triglycerides, Serum: 158

## 2024-04-12 NOTE — BH INPATIENT PSYCHIATRY PROGRESS NOTE - NSBHASSESSSUMMFT_PSY_ALL_CORE
Pt is a 64 yo female with PPHx of Schizoaffective DO with hx of psych admission who presented to ED with paranoia.    Patient seen and evaluated as per nursing report no acute events. Patient continues to present with paranoia, delusions and disorganized thoughts. Patient states she continues to hear voices regarding the death of the president and the FBI. Patient has no thoughts of wanting to kill the president herself. Again discussed titration with patient. Patient in agreement. States she is eating well and sleeping well. Patient denies suicidal/homicidal ideations. Patient isolative to the room.     #Schizoaffective disorder  -Abilify 15mg Daily    -Hydroxyzine 50mg Q6 PRN for anxiety/insomnia  -Haldol 5mg Q6 PRN for agitation/psychosis  -Benadryl 50mg Q6 PRN got EPS  -Lorazepam 2mg Q6 PRN for aggression    # Aortic stenosis and history of valve replacement  -aspirin 81 milliGRAM(s) Oral daily    -Tylenol PRN for pain    #Agitation  -for agitation not amenable to verbal redirection, may give haldol 5 mg q6h prn, ativan 2 mg q6h prn, benadryl 50 mg q6h prn with escalation to IM if pt is a danger to self or/and others with repeat EKG toensure QTc <500 ms Pt is a 64 yo female with PPHx of Schizoaffective DO with hx of psych admission who presented to ED with paranoia.    Patient seen and evaluated as per nursing report no acute events. Patient continues to present with paranoia, delusions and disorganized thoughts. Patient states she continues to hear voices regarding the death of the president and the FBI. Patient has no thoughts of wanting to kill the president herself. Again discussed titration with patient. Patient in agreement. States she is eating well and sleeping well. Patient denies suicidal/homicidal ideations. Patient isolative to the room.     #Schizoaffective disorder  -Abilify 15mg Daily--> increase to 20mg daily    -Hydroxyzine 50mg Q6 PRN for anxiety/insomnia  -Haldol 5mg Q6 PRN for agitation/psychosis  -Benadryl 50mg Q6 PRN got EPS  -Lorazepam 2mg Q6 PRN for aggression    # Aortic stenosis and history of valve replacement  -aspirin 81 milliGRAM(s) Oral daily    -Tylenol PRN for pain    #Agitation  -for agitation not amenable to verbal redirection, may give haldol 5 mg q6h prn, ativan 2 mg q6h prn, benadryl 50 mg q6h prn with escalation to IM if pt is a danger to self or/and others with repeat EKG toensure QTc <500 ms

## 2024-04-12 NOTE — BH INPATIENT PSYCHIATRY PROGRESS NOTE - CURRENT MEDICATION
MEDICATIONS  (STANDING):  ARIPiprazole 15 milliGRAM(s) Oral daily  aspirin enteric coated 81 milliGRAM(s) Oral daily  influenza   Vaccine 0.5 milliLiter(s) IntraMuscular once    MEDICATIONS  (PRN):  acetaminophen     Tablet .. 650 milliGRAM(s) Oral every 6 hours PRN Temp greater or equal to 38C (100.4F), Mild Pain (1 - 3)  diphenhydrAMINE 50 milliGRAM(s) Oral every 6 hours PRN Eps  haloperidol     Tablet 5 milliGRAM(s) Oral every 6 hours PRN agitation  hydrOXYzine hydrochloride 50 milliGRAM(s) Oral every 6 hours PRN Agitation  LORazepam     Tablet 2 milliGRAM(s) Oral every 6 hours PRN Agitation   MEDICATIONS  (STANDING):  aspirin enteric coated 81 milliGRAM(s) Oral daily  influenza   Vaccine 0.5 milliLiter(s) IntraMuscular once    MEDICATIONS  (PRN):  acetaminophen     Tablet .. 650 milliGRAM(s) Oral every 6 hours PRN Temp greater or equal to 38C (100.4F), Mild Pain (1 - 3)  diphenhydrAMINE 50 milliGRAM(s) Oral every 6 hours PRN Eps  haloperidol     Tablet 5 milliGRAM(s) Oral every 6 hours PRN agitation  hydrOXYzine hydrochloride 50 milliGRAM(s) Oral every 6 hours PRN Agitation  LORazepam     Tablet 2 milliGRAM(s) Oral every 6 hours PRN Agitation

## 2024-04-12 NOTE — BH INPATIENT PSYCHIATRY PROGRESS NOTE - NSBHCHARTREVIEWVS_PSY_A_CORE FT
Vital Signs Last 24 Hrs  T(C): 36.1 (04-12-24 @ 09:11), Max: 36.8 (04-11-24 @ 15:46)  T(F): 96.9 (04-12-24 @ 09:11), Max: 98.2 (04-11-24 @ 15:46)  HR: 82 (04-12-24 @ 09:11) (82 - 86)  BP: 150/94 (04-12-24 @ 09:11) (150/94 - 159/94)  BP(mean): --  RR: 18 (04-12-24 @ 09:11) (18 - 18)  SpO2: --     Vital Signs Last 24 Hrs  T(C): 36.1 (04-12-24 @ 09:11), Max: 36.1 (04-12-24 @ 09:11)  T(F): 96.9 (04-12-24 @ 09:11), Max: 96.9 (04-12-24 @ 09:11)  HR: 82 (04-12-24 @ 09:11) (82 - 82)  BP: 150/94 (04-12-24 @ 09:11) (150/94 - 150/94)  BP(mean): --  RR: 18 (04-12-24 @ 09:11) (18 - 18)  SpO2: --

## 2024-04-13 LAB
GLUCOSE BLDC GLUCOMTR-MCNC: 177 MG/DL — HIGH (ref 70–99)
GLUCOSE BLDC GLUCOMTR-MCNC: 196 MG/DL — HIGH (ref 70–99)

## 2024-04-13 PROCEDURE — 93010 ELECTROCARDIOGRAM REPORT: CPT

## 2024-04-13 PROCEDURE — 99232 SBSQ HOSP IP/OBS MODERATE 35: CPT | Mod: GC

## 2024-04-13 RX ORDER — INSULIN LISPRO 100/ML
VIAL (ML) SUBCUTANEOUS AT BEDTIME
Refills: 0 | Status: DISCONTINUED | OUTPATIENT
Start: 2024-04-13 | End: 2024-04-17

## 2024-04-13 RX ORDER — AMLODIPINE BESYLATE 2.5 MG/1
5 TABLET ORAL DAILY
Refills: 0 | Status: DISCONTINUED | OUTPATIENT
Start: 2024-04-13 | End: 2024-04-24

## 2024-04-13 RX ORDER — INSULIN LISPRO 100/ML
VIAL (ML) SUBCUTANEOUS
Refills: 0 | Status: DISCONTINUED | OUTPATIENT
Start: 2024-04-13 | End: 2024-04-17

## 2024-04-13 RX ORDER — LEVOTHYROXINE SODIUM 125 MCG
25 TABLET ORAL DAILY
Refills: 0 | Status: DISCONTINUED | OUTPATIENT
Start: 2024-04-13 | End: 2024-04-24

## 2024-04-13 RX ORDER — ATORVASTATIN CALCIUM 80 MG/1
40 TABLET, FILM COATED ORAL AT BEDTIME
Refills: 0 | Status: DISCONTINUED | OUTPATIENT
Start: 2024-04-13 | End: 2024-04-24

## 2024-04-13 RX ADMIN — AMLODIPINE BESYLATE 5 MILLIGRAM(S): 2.5 TABLET ORAL at 18:07

## 2024-04-13 RX ADMIN — Medication 81 MILLIGRAM(S): at 08:35

## 2024-04-13 RX ADMIN — Medication 1: at 20:27

## 2024-04-13 RX ADMIN — ARIPIPRAZOLE 20 MILLIGRAM(S): 15 TABLET ORAL at 08:35

## 2024-04-13 RX ADMIN — ATORVASTATIN CALCIUM 40 MILLIGRAM(S): 80 TABLET, FILM COATED ORAL at 20:30

## 2024-04-13 NOTE — BH INPATIENT PSYCHIATRY PROGRESS NOTE - NSBHMETABOLIC_PSY_ALL_CORE_FT
BMI: BMI (kg/m2): 34.4 (04-09-24 @ 00:18)  HbA1c: A1C with Estimated Average Glucose Result: 7.3 % (04-10-24 @ 08:14)    Glucose: POCT Blood Glucose.: 121 mg/dL (02-21-24 @ 16:17)    BP: 158/91 (04-13-24 @ 09:18) (150/94 - 174/87)Vital Signs Last 24 Hrs  T(C): 36.8 (04-13-24 @ 09:18), Max: 36.9 (04-12-24 @ 15:53)  T(F): 98.2 (04-13-24 @ 09:18), Max: 98.4 (04-12-24 @ 15:53)  HR: 97 (04-13-24 @ 09:18) (88 - 97)  BP: 158/91 (04-13-24 @ 09:18) (154/91 - 158/91)  BP(mean): --  RR: 18 (04-13-24 @ 09:18) (18 - 20)  SpO2: --      Lipid Panel: Date/Time: 04-10-24 @ 08:14  Cholesterol, Serum: 254  LDL Cholesterol Calculated: 144  HDL Cholesterol, Serum: 78  Total Cholesterol/HDL Ration Measurement: --  Triglycerides, Serum: 158

## 2024-04-13 NOTE — BH INPATIENT PSYCHIATRY PROGRESS NOTE - NSBHATTESTCOMMENTATTENDFT_PSY_A_CORE
Interviewed patient with the resident Dr. Barone. Patient reports "still hearing voices" of "people from heaven" and "echo from the government". Remains delusional. Denies si/hi. Agree with resident's assessment and plan.

## 2024-04-13 NOTE — BH INPATIENT PSYCHIATRY PROGRESS NOTE - NSBHASSESSSUMMFT_PSY_ALL_CORE
Pt is a 62 yo female with PPHx of Schizoaffective DO with hx of psych admission who presented to ED with paranoia.    Patient seen and evaluated as per nursing report no acute events. Patient continues to present with paranoia, delusions and disorganized thoughts. Patient states she continues to hear voices regarding the death of the president and the FBI. Patient has no thoughts of wanting to kill the president herself. Again discussed titration with patient. Patient in agreement. States she is eating well and sleeping well. Patient denies suicidal/homicidal ideations. Patient isolative to the room.     #Schizoaffective disorder  -Abilify 15mg Daily--> increased to 20mg daily  - seroquel 50mg qhs    #PRN  -Hydroxyzine 50mg Q6 PRN for anxiety/insomnia  -Haldol 5mg Q6 PRN for agitation/psychosis  -Benadryl 50mg Q6 PRN got EPS  -Lorazepam 2mg Q6 PRN for aggression    # Aortic stenosis and history of valve replacement  -aspirin 81 milliGRAM(s) Oral daily    -Tylenol PRN for pain    #Agitation  -for agitation not amenable to verbal redirection, may give haldol 5 mg q6h prn, ativan 2 mg q6h prn, benadryl 50 mg q6h prn with escalation to IM if pt is a danger to self or/and others with repeat EKG toensure QTc <500 ms

## 2024-04-13 NOTE — BH INPATIENT PSYCHIATRY PROGRESS NOTE - CURRENT MEDICATION
MEDICATIONS  (STANDING):  ARIPiprazole 20 milliGRAM(s) Oral daily  aspirin enteric coated 81 milliGRAM(s) Oral daily  influenza   Vaccine 0.5 milliLiter(s) IntraMuscular once    MEDICATIONS  (PRN):  acetaminophen     Tablet .. 650 milliGRAM(s) Oral every 6 hours PRN Temp greater or equal to 38C (100.4F), Mild Pain (1 - 3)  diphenhydrAMINE 50 milliGRAM(s) Oral every 6 hours PRN Eps  haloperidol     Tablet 5 milliGRAM(s) Oral every 6 hours PRN agitation  hydrOXYzine hydrochloride 50 milliGRAM(s) Oral every 6 hours PRN Agitation  LORazepam     Tablet 2 milliGRAM(s) Oral every 6 hours PRN Agitation

## 2024-04-13 NOTE — BH INPATIENT PSYCHIATRY PROGRESS NOTE - NSBHFUPINTERVALHXFT_PSY_A_CORE
Nursing reports no acute events overnight.     Chart reviewed, patient seen and evaluated in AM. On approach, patient reports she continues to hear voices x5d, "on and off" which are telling her "political stuff." When asked to elaborate, pt states that the voices are 4 familiar people who are telling her that people are out to get her. States that she feels safe on the unit, but did not feel safe outside of the hospital. Also states that she can hear a "echo" which is reportedly from the [employer]. States that her mood is "OK." Patient endorses poor sleep and Ok appetite.     Patient reports no suicidal ideation, intent or plan. Patient compliant with medications; reports no side effects of current medications. However, endorses that prolizin and zyprexa give her "akathesia" and that haldol gives her constipation as well as other side effects. Amenable to seroquel PRN for sleep.

## 2024-04-13 NOTE — BH SAFETY PLAN - WARNING SIGN 2
Another situation I need help with is I am homeless and I would like to get referred to an adult assisted living place.

## 2024-04-13 NOTE — BH INPATIENT PSYCHIATRY PROGRESS NOTE - NSBHCHARTREVIEWVS_PSY_A_CORE FT
Vital Signs Last 24 Hrs  T(C): 36.8 (04-13-24 @ 09:18), Max: 36.9 (04-12-24 @ 15:53)  T(F): 98.2 (04-13-24 @ 09:18), Max: 98.4 (04-12-24 @ 15:53)  HR: 97 (04-13-24 @ 09:18) (88 - 97)  BP: 158/91 (04-13-24 @ 09:18) (154/91 - 158/91)  BP(mean): --  RR: 18 (04-13-24 @ 09:18) (18 - 20)  SpO2: --

## 2024-04-13 NOTE — CHART NOTE - NSCHARTNOTEFT_GEN_A_CORE
RN called, patient w. elevated BP   Patient w/ hx of HTN, was used to be on Norvasc 5mg QD however was stopped because BP was stable.   Now BP elevated, systolic in high 150s and diastolic ~100.   Will restart Norvasc 5mg QD  Low Na diet   Monitor BP    Recall prn 7415
recd  in  report  PATIENT WITH  C/O  CHEST PAIN EARLIER  ROUTINE  MEDICATIONS  ORDERED    NOW  SEEN AND  EXAMINED  AXOX3  NO CHEST PAIN NOW--CP  RESOLVED  VS  T 975, /91,  HR 79, RR18  PULM CLEAR  CVS S1S2  EKG  7 PM  NSR 79  WILL CONTINUE TO FOLLOW
reviewed pt previous admission and med rec from last month   pt has h/o DM / HLD / Hypothyroidism   on   statin   januvia   synthroid   which none have been continued upon admission   confirmed with labs drawn 3 days ago - see results     will c/w meds with one dose now   fs qac   ekg and to compare to previous ones   will s/o to night staff  rn aware of plan   repeat vitals at 730 pm

## 2024-04-14 LAB
GLUCOSE BLDC GLUCOMTR-MCNC: 149 MG/DL — HIGH (ref 70–99)
GLUCOSE BLDC GLUCOMTR-MCNC: 204 MG/DL — HIGH (ref 70–99)
GLUCOSE BLDC GLUCOMTR-MCNC: 209 MG/DL — HIGH (ref 70–99)

## 2024-04-14 RX ADMIN — ATORVASTATIN CALCIUM 40 MILLIGRAM(S): 80 TABLET, FILM COATED ORAL at 20:14

## 2024-04-14 RX ADMIN — ARIPIPRAZOLE 20 MILLIGRAM(S): 15 TABLET ORAL at 08:44

## 2024-04-14 RX ADMIN — AMLODIPINE BESYLATE 5 MILLIGRAM(S): 2.5 TABLET ORAL at 08:44

## 2024-04-14 RX ADMIN — Medication 81 MILLIGRAM(S): at 08:43

## 2024-04-14 RX ADMIN — Medication 2: at 16:38

## 2024-04-14 RX ADMIN — Medication 2: at 12:21

## 2024-04-14 RX ADMIN — Medication 25 MICROGRAM(S): at 08:44

## 2024-04-14 NOTE — BH INPATIENT PSYCHIATRY PROGRESS NOTE - NSBHFUPINTERVALHXFT_PSY_A_CORE
Chart reviewed, patient seen and evaluated in AM.   No acute event overnight . She is compliant with medications and  reports that   medications are helping her.      voices a States that she feels safe on the unit, but did not feel safe outside of the hospital. She   stats that she heard voiced last night saying you are ok.   She denies  s/h ideations intent or plan . fragmented  sleep.    denies appetite problem      Patient reports no suicidal ideation, intent or plan. Patient compliant with medications; reports no side effects of current medications. However, endorses that prolizin and zyprexa give her "akathesia" and that haldol gives her constipation as well as other side effects. Amenable to seroquel PRN for sleep.

## 2024-04-14 NOTE — BH INPATIENT PSYCHIATRY PROGRESS NOTE - CURRENT MEDICATION
MEDICATIONS  (STANDING):  amLODIPine   Tablet 5 milliGRAM(s) Oral daily  ARIPiprazole 20 milliGRAM(s) Oral daily  aspirin enteric coated 81 milliGRAM(s) Oral daily  atorvastatin 40 milliGRAM(s) Oral at bedtime  influenza   Vaccine 0.5 milliLiter(s) IntraMuscular once  insulin lispro (ADMELOG) corrective regimen sliding scale   SubCutaneous at bedtime  insulin lispro (ADMELOG) corrective regimen sliding scale   SubCutaneous three times a day before meals  levothyroxine 25 MICROGram(s) Oral daily    MEDICATIONS  (PRN):  acetaminophen     Tablet .. 650 milliGRAM(s) Oral every 6 hours PRN Temp greater or equal to 38C (100.4F), Mild Pain (1 - 3)  diphenhydrAMINE 50 milliGRAM(s) Oral every 6 hours PRN Eps  haloperidol     Tablet 5 milliGRAM(s) Oral every 6 hours PRN agitation  hydrOXYzine hydrochloride 50 milliGRAM(s) Oral every 6 hours PRN Agitation  LORazepam     Tablet 2 milliGRAM(s) Oral every 6 hours PRN Agitation

## 2024-04-14 NOTE — BH INPATIENT PSYCHIATRY PROGRESS NOTE - NSBHMETABOLIC_PSY_ALL_CORE_FT
BMI: BMI (kg/m2): 34.4 (04-09-24 @ 00:18)  HbA1c: A1C with Estimated Average Glucose Result: 7.3 % (04-10-24 @ 08:14)    Glucose: POCT Blood Glucose.: 204 mg/dL (04-14-24 @ 11:37)    BP: 168/91 (04-13-24 @ 19:31) (150/94 - 170/103)Vital Signs Last 24 Hrs  T(C): 36.4 (04-13-24 @ 19:31), Max: 36.4 (04-13-24 @ 19:31)  T(F): 97.5 (04-13-24 @ 19:31), Max: 97.5 (04-13-24 @ 19:31)  HR: 79 (04-13-24 @ 19:31) (79 - 101)  BP: 168/91 (04-13-24 @ 19:31) (156/113 - 170/103)  BP(mean): --  RR: 17 (04-13-24 @ 19:31) (16 - 17)  SpO2: 99% (04-13-24 @ 19:31) (99% - 100%)      Lipid Panel: Date/Time: 04-10-24 @ 08:14  Cholesterol, Serum: 254  LDL Cholesterol Calculated: 144  HDL Cholesterol, Serum: 78  Total Cholesterol/HDL Ration Measurement: --  Triglycerides, Serum: 158

## 2024-04-14 NOTE — BH INPATIENT PSYCHIATRY PROGRESS NOTE - NSBHCHARTREVIEWVS_PSY_A_CORE FT
Vital Signs Last 24 Hrs  T(C): 36.4 (04-13-24 @ 19:31), Max: 36.4 (04-13-24 @ 19:31)  T(F): 97.5 (04-13-24 @ 19:31), Max: 97.5 (04-13-24 @ 19:31)  HR: 79 (04-13-24 @ 19:31) (79 - 101)  BP: 168/91 (04-13-24 @ 19:31) (156/113 - 170/103)  BP(mean): --  RR: 17 (04-13-24 @ 19:31) (16 - 17)  SpO2: 99% (04-13-24 @ 19:31) (99% - 100%)

## 2024-04-15 LAB
GLUCOSE BLDC GLUCOMTR-MCNC: 173 MG/DL — HIGH (ref 70–99)
GLUCOSE BLDC GLUCOMTR-MCNC: 180 MG/DL — HIGH (ref 70–99)
GLUCOSE BLDC GLUCOMTR-MCNC: 195 MG/DL — HIGH (ref 70–99)

## 2024-04-15 PROCEDURE — 99232 SBSQ HOSP IP/OBS MODERATE 35: CPT

## 2024-04-15 RX ADMIN — Medication 1: at 16:21

## 2024-04-15 RX ADMIN — ATORVASTATIN CALCIUM 40 MILLIGRAM(S): 80 TABLET, FILM COATED ORAL at 20:17

## 2024-04-15 RX ADMIN — Medication 1: at 07:18

## 2024-04-15 RX ADMIN — Medication 81 MILLIGRAM(S): at 08:20

## 2024-04-15 RX ADMIN — AMLODIPINE BESYLATE 5 MILLIGRAM(S): 2.5 TABLET ORAL at 08:21

## 2024-04-15 RX ADMIN — ARIPIPRAZOLE 20 MILLIGRAM(S): 15 TABLET ORAL at 08:21

## 2024-04-15 RX ADMIN — Medication 25 MICROGRAM(S): at 08:21

## 2024-04-15 NOTE — BH INPATIENT PSYCHIATRY PROGRESS NOTE - NSBHFUPINTERVALHXFT_PSY_A_CORE
Patient seen and evaluated as per nursing report patient had an episode of chest pain. EKG normal. Patient re-started on Norvasc. Patient continues to present with paranoia, delusions and disorganized thoughts. Abilify increased this weekend. Patient states she is feeling better, the voices are decreasing. Will continue to monitor. Patient denies and suicidal/homicidal ideations. States she is eating well and sleeping well. Patient denies suicidal/homicidal ideations. Patient isolative to the room.     Patient re-started on Norvasc as well and insulin sliding scale over the weekend.

## 2024-04-15 NOTE — BH INPATIENT PSYCHIATRY PROGRESS NOTE - CURRENT MEDICATION
MEDICATIONS  (STANDING):  amLODIPine   Tablet 5 milliGRAM(s) Oral daily  ARIPiprazole 20 milliGRAM(s) Oral daily  aspirin enteric coated 81 milliGRAM(s) Oral daily  atorvastatin 40 milliGRAM(s) Oral at bedtime  influenza   Vaccine 0.5 milliLiter(s) IntraMuscular once  insulin lispro (ADMELOG) corrective regimen sliding scale   SubCutaneous three times a day before meals  insulin lispro (ADMELOG) corrective regimen sliding scale   SubCutaneous at bedtime  levothyroxine 25 MICROGram(s) Oral daily    MEDICATIONS  (PRN):  acetaminophen     Tablet .. 650 milliGRAM(s) Oral every 6 hours PRN Temp greater or equal to 38C (100.4F), Mild Pain (1 - 3)  diphenhydrAMINE 50 milliGRAM(s) Oral every 6 hours PRN Eps  haloperidol     Tablet 5 milliGRAM(s) Oral every 6 hours PRN agitation  hydrOXYzine hydrochloride 50 milliGRAM(s) Oral every 6 hours PRN Agitation  LORazepam     Tablet 2 milliGRAM(s) Oral every 6 hours PRN Agitation

## 2024-04-15 NOTE — BH INPATIENT PSYCHIATRY PROGRESS NOTE - NSBHASSESSSUMMFT_PSY_ALL_CORE
Pt is a 62 yo female with PPHx of Schizoaffective DO with hx of psych admission who presented to ED with paranoia.    Patient seen and evaluated as per nursing report patient had an episode of chest pain. EKG normal. Patient re-started on Norvasc. Patient continues to present with paranoia, delusions and disorganized thoughts. Abilify increased this weekend. Patient states she is feeling better, the voices are decreasing. Will continue to monitor. Patient denies and suicidal/homicidal ideations. States she is eating well and sleeping well. Patient denies suicidal/homicidal ideations. Patient isolative to the room.     Patient re-started on Norvasc as well and insulin sliding scale over the weekend.     #Schizoaffective disorder  -Abilify 15mg Daily--> increase to 20mg daily    -Hydroxyzine 50mg Q6 PRN for anxiety/insomnia  -Haldol 5mg Q6 PRN for agitation/psychosis  -Benadryl 50mg Q6 PRN got EPS  -Lorazepam 2mg Q6 PRN for aggression    # Aortic stenosis and history of valve replacement  -aspirin 81 milliGRAM(s) Oral daily    #HTN  -Norvasc    #DM  -Insulin sliding scale    -Tylenol PRN for pain    #Agitation  -for agitation not amenable to verbal redirection, may give haldol 5 mg q6h prn, ativan 2 mg q6h prn, benadryl 50 mg q6h prn with escalation to IM if pt is a danger to self or/and others with repeat EKG toensure QTc <500 ms

## 2024-04-15 NOTE — BH INPATIENT PSYCHIATRY PROGRESS NOTE - NSBHMETABOLIC_PSY_ALL_CORE_FT
BMI: BMI (kg/m2): 34.4 (04-09-24 @ 00:18)  HbA1c: A1C with Estimated Average Glucose Result: 7.3 % (04-10-24 @ 08:14)    Glucose: POCT Blood Glucose.: 173 mg/dL (04-15-24 @ 06:42)    BP: 151/92 (04-15-24 @ 08:26) (143/91 - 170/103)Vital Signs Last 24 Hrs  T(C): 35.7 (04-14-24 @ 15:48), Max: 35.7 (04-14-24 @ 15:48)  T(F): 96.3 (04-14-24 @ 15:48), Max: 96.3 (04-14-24 @ 15:48)  HR: 89 (04-15-24 @ 08:26) (89 - 89)  BP: 151/92 (04-15-24 @ 08:26) (143/91 - 151/92)  BP(mean): --  RR: 17 (04-15-24 @ 08:26) (17 - 20)  SpO2: --      Lipid Panel: Date/Time: 04-10-24 @ 08:14  Cholesterol, Serum: 254  LDL Cholesterol Calculated: 144  HDL Cholesterol, Serum: 78  Total Cholesterol/HDL Ration Measurement: --  Triglycerides, Serum: 158

## 2024-04-15 NOTE — BH INPATIENT PSYCHIATRY PROGRESS NOTE - NSBHCHARTREVIEWVS_PSY_A_CORE FT
Vital Signs Last 24 Hrs  T(C): 35.7 (04-14-24 @ 15:48), Max: 35.7 (04-14-24 @ 15:48)  T(F): 96.3 (04-14-24 @ 15:48), Max: 96.3 (04-14-24 @ 15:48)  HR: 89 (04-15-24 @ 08:26) (89 - 89)  BP: 151/92 (04-15-24 @ 08:26) (143/91 - 151/92)  BP(mean): --  RR: 17 (04-15-24 @ 08:26) (17 - 20)  SpO2: --

## 2024-04-16 LAB
GLUCOSE BLDC GLUCOMTR-MCNC: 143 MG/DL — HIGH (ref 70–99)
GLUCOSE BLDC GLUCOMTR-MCNC: 174 MG/DL — HIGH (ref 70–99)
GLUCOSE BLDC GLUCOMTR-MCNC: 222 MG/DL — HIGH (ref 70–99)

## 2024-04-16 PROCEDURE — 99232 SBSQ HOSP IP/OBS MODERATE 35: CPT

## 2024-04-16 RX ORDER — SITAGLIPTIN 50 MG/1
100 TABLET, FILM COATED ORAL DAILY
Refills: 0 | Status: DISCONTINUED | OUTPATIENT
Start: 2024-04-16 | End: 2024-04-24

## 2024-04-16 RX ADMIN — ATORVASTATIN CALCIUM 40 MILLIGRAM(S): 80 TABLET, FILM COATED ORAL at 20:19

## 2024-04-16 RX ADMIN — ARIPIPRAZOLE 20 MILLIGRAM(S): 15 TABLET ORAL at 08:32

## 2024-04-16 RX ADMIN — AMLODIPINE BESYLATE 5 MILLIGRAM(S): 2.5 TABLET ORAL at 08:32

## 2024-04-16 RX ADMIN — Medication 1: at 07:24

## 2024-04-16 RX ADMIN — Medication 25 MICROGRAM(S): at 08:32

## 2024-04-16 RX ADMIN — Medication 81 MILLIGRAM(S): at 08:32

## 2024-04-16 NOTE — BH INPATIENT PSYCHIATRY PROGRESS NOTE - NSBHCHARTREVIEWVS_PSY_A_CORE FT
Vital Signs Last 24 Hrs  T(C): 36.1 (04-16-24 @ 08:00), Max: 36.1 (04-16-24 @ 08:00)  T(F): 96.9 (04-16-24 @ 08:00), Max: 96.9 (04-16-24 @ 08:00)  HR: 100 (04-16-24 @ 08:00) (86 - 100)  BP: 155/82 (04-16-24 @ 08:00) (155/82 - 177/89)  BP(mean): --  RR: 16 (04-16-24 @ 08:00) (16 - 18)  SpO2: --

## 2024-04-16 NOTE — BH INPATIENT PSYCHIATRY PROGRESS NOTE - NSBHFUPINTERVALHXFT_PSY_A_CORE
Patient seen and evaluated as per nursing report no acute events. Patient continues to present with paranoia, delusions, and disorganized thoughts, although less so. Abilify recently increased. Patient states she is feeling better, the voices are continuing to decrease. Will continue to monitor. Patient denies and suicidal/homicidal ideations. States she is eating well and sleeping well. Patient denies suicidal/homicidal ideations. Patient isolative to the room. Patient encouraged to get out of her room and attend groups.    Per Endocrine can switch to oral agents. recommended Januvia. Will switch once acquired from Pharmacy. Non formulary submitted.

## 2024-04-16 NOTE — CONSULT NOTE ADULT - ASSESSMENT
#schizoaffective/ paranoia/ dementia - treatement per psych   monitor qtc for any change in meds   < from: 12 Lead ECG (04.09.24 @ 01:28) >  QTC Calculation(Bazett) 438 ms    P Axis 55 degrees    R Axis 51 degrees    T Axis -31 degrees    Diagnosis Line Normal sinus rhythm  Nonspecific T wave abnormality  Abnormal ECG    < end of copied text >    reviewed myself     #hx of aortic valve repair - bioprosthetic - last echo from 2023 reviewed - normal ef, no signs of valulopathy - outpt follow up   #hx of leukemia in remission - unclear hx - no current lab abnormalities    recall prn 
Type 2 DM-not optimally controlled  Begin Januvia 100 mg daily.  Fingersticks only required once daily.  D/C insulin sliding scale.

## 2024-04-16 NOTE — BH INPATIENT PSYCHIATRY PROGRESS NOTE - NSBHASSESSSUMMFT_PSY_ALL_CORE
Pt is a 64 yo female with PPHx of Schizoaffective DO with hx of psych admission who presented to ED with paranoia.    Patient seen and evaluated as per nursing report no acute events. Patient continues to present with paranoia, delusions, and disorganized thoughts, although less so. Abilify recently increased. Patient states she is feeling better, the voices are continuing to decrease. Will continue to monitor. Patient denies and suicidal/homicidal ideations. States she is eating well and sleeping well. Patient denies suicidal/homicidal ideations. Patient isolative to the room. Patient encouraged to get out of her room and attend groups.    Per Endocrine can switch to oral agents. recommended Januvia. Will switch once acquired from Pharmacy. Non formulary submitted.      #Schizoaffective disorder  -Abilify 15mg Daily--> increase to 20mg daily    -Hydroxyzine 50mg Q6 PRN for anxiety/insomnia  -Haldol 5mg Q6 PRN for agitation/psychosis  -Benadryl 50mg Q6 PRN got EPS  -Lorazepam 2mg Q6 PRN for aggression    # Aortic stenosis and history of valve replacement  -aspirin 81 milliGRAM(s) Oral daily    #HTN  -Norvasc    #DM  -Insulin sliding scale    -Tylenol PRN for pain    #Agitation  -for agitation not amenable to verbal redirection, may give haldol 5 mg q6h prn, ativan 2 mg q6h prn, benadryl 50 mg q6h prn with escalation to IM if pt is a danger to self or/and others with repeat EKG toensure QTc <500 ms

## 2024-04-16 NOTE — CONSULT NOTE ADULT - SUBJECTIVE AND OBJECTIVE BOX
HOSPITALIST CONSULT for IPP History and Physical     CARRINGTON, ESPINOZA  63y, Female  Allergy: penicillin (Rash)      CHIEF COMPLAINT:     HPI:    HPI:  Pt is a 62 yo female with PPHx of Schizoaffective DO with hx of psych admission who presented to ED with paranoia.    Pt was limited with cooperation and was going on in her conversation in a tangential manner. She reported "ISAIAH says [address] does not work regularly because president will be assassinated." She was hard to interrupt and stated, "The government keep talking to me because they are scared of what is going on.' She reported AH of hearing voices telling her that the president is going to blow his head. She reported "They are also going to kill  because he is giving blessing to homosexual marriage." he was preoccupied with her paranoid delusions and was hard to challenge. She stated her mood is "very happy' because they are going to kill Elias Nguyen because he is evil. She did not provide any collaterals because she does not want anyone from her family to know about it. She denied any SI/HI. She accused the writer for using tax money by hospitalizing patients. (09 Apr 2024 15:24)    FAMILY HISTORY:  Family history of early CAD (Father)  Says that multiple family members had heart disease (a sibling is awaiting heart transplant)      PAST MEDICAL & SURGICAL HISTORY:  Leukemia  in remission      Schizoaffective disorder, depressive type      H/O aortic valve disorder      Dementia      H/O aortic valve replacement          SOCIAL HISTORY  Social History:  denies smoking or drinking (18 Feb 2024 13:35)      Home Medications:  Abilify 15 mg oral tablet: 1 tab(s) orally once a day (18 Feb 2024 14:04)  Aspirin Low Dose 81 mg oral delayed release tablet: 1 tab(s) orally once a day (18 Feb 2024 14:04)      ROS  General: Denies fevers, chills, nightsweats, weight loss  HEENT: Denies headache, rhinorrhea, sore throat, eye pain  CV: Denies CP, palpitations  PULM: Denies SOB, cough  GI: Denies abdominal pain, diarrhea  : Denies dysuria, hematuria  MSK: Denies arthralgias  SKIN: Denies rash   NEURO: Denies paresthesias, weakness  PSYCH: Denies depression    VITALS:  T(F): 96.6, Max: 96.6 (04-10-24 @ 07:43)  HR: 86  BP: 163/64  RR: 18Vital Signs Last 24 Hrs  T(C): 35.9 (10 Apr 2024 07:43), Max: 35.9 (10 Apr 2024 07:43)  T(F): 96.6 (10 Apr 2024 07:43), Max: 96.6 (10 Apr 2024 07:43)  HR: 86 (10 Apr 2024 07:43) (84 - 86)  BP: 163/64 (10 Apr 2024 07:43) (161/77 - 163/64)  BP(mean): --  RR: 18 (10 Apr 2024 07:43) (16 - 18)  SpO2: --        PHYSICAL EXAM:  Gen: NAD, resting in bed  HEENT: Normocephalic, atraumatic  Neck: supple, no lymphadenopathy  CV: Regular rate & regular rhythm  Lungs: CTABL no wheeze  Abdomen: Soft, NTND+ BS present  Ext: Warm, well perfused no CCE  Neuro: non focal, awake, CN II-XII intact   Skin: no rash, no erythema      TESTS & MEASUREMENTS:                        11.2   6.77  )-----------( 179      ( 09 Apr 2024 01:06 )             32.1     04-09    141  |  106  |  16  ----------------------------<  241<H>  3.9   |  24  |  0.9    Ca    8.8      09 Apr 2024 01:06    TPro  6.0  /  Alb  3.9  /  TBili  <0.2  /  DBili  <0.2  /  AST  14  /  ALT  27  /  AlkPhos  130<H>  04-09      LIVER FUNCTIONS - ( 09 Apr 2024 01:06 )  Alb: 3.9 g/dL / Pro: 6.0 g/dL / ALK PHOS: 130 U/L / ALT: 27 U/L / AST: 14 U/L / GGT: x           Urinalysis Basic - ( 09 Apr 2024 01:06 )    Color: x / Appearance: x / SG: x / pH: x  Gluc: 241 mg/dL / Ketone: x  / Bili: x / Urobili: x   Blood: x / Protein: x / Nitrite: x   Leuk Esterase: x / RBC: x / WBC x   Sq Epi: x / Non Sq Epi: x / Bacteria: x        COVID-19 PCR: NotDetec (27 Oct 2023 22:28)      QRS axis to [] ° and NSR at a rate of [] BPM. There was no atrial enlargement. There was no ventricular hypertrophy. There were no ST-T changes and all intervals were normal.      INFECTIOUS DISEASES TESTING      RADIOLOGY & ADDITIONAL TESTS:  I have personally reviewed the last Chest xray  CXR      CT      CARDIOLOGY TESTING  12 Lead ECG:   Ventricular Rate 89 BPM    Atrial Rate 89 BPM    P-R Interval 160 ms    QRS Duration 82 ms    Q-T Interval 360 ms    QTC Calculation(Bazett) 438 ms    P Axis 55 degrees    R Axis 51 degrees    T Axis -31 degrees    Diagnosis Line Normal sinus rhythm  Nonspecific T wave abnormality  Abnormal ECG    Confirmed by BRIAN ONTIVEROS MD ([id number]) on 4/9/2024 6:42:30 AM (04-09-24 @ 01:28)  12 Lead ECG:   Ventricular Rate 87 BPM    Atrial Rate 87 BPM    P-R Interval 160 ms    QRS Duration 84 ms    Q-T Interval 368 ms    QTC Calculation(Bazett) 442 ms    P Axis 54 degrees    R Axis 50 degrees    T Axis -28 degrees    Diagnosis Line Normal sinus rhythm  Poor R wave progression  Confirmed by BRIAN ONTIVEROS MD ([id number]) on 4/9/2024 6:42:27 AM (04-09-24 @ 01:27)      MEDICATIONS  (STANDING):  ARIPiprazole 15 milliGRAM(s) Oral daily  aspirin enteric coated 81 milliGRAM(s) Oral daily  influenza   Vaccine 0.5 milliLiter(s) IntraMuscular once    MEDICATIONS  (PRN):  acetaminophen     Tablet .. 650 milliGRAM(s) Oral every 6 hours PRN Temp greater or equal to 38C (100.4F), Mild Pain (1 - 3)  diphenhydrAMINE 50 milliGRAM(s) Oral every 6 hours PRN Eps  haloperidol     Tablet 5 milliGRAM(s) Oral every 6 hours PRN agitation  hydrOXYzine hydrochloride 50 milliGRAM(s) Oral every 6 hours PRN Agitation  LORazepam     Tablet 2 milliGRAM(s) Oral every 6 hours PRN Agitation      ANTIBIOTICS:      All available historical data has been reviewed    ASSESSMENT  63y F admitted with Non-organic psychosis        PROBLEMS  
HPI: 63y Female with h/o "pre-diabetes"; previously intolerant to metformin but tolerated Januvia in past.  Denies polyuria/polydipsia/pedal paresthesias.  Recently initiated on levothyroxine for mild hypothyroidism and statin for hyperlipidemia.      PAST MEDICAL & SURGICAL HISTORY:  Leukemia  in remission      Schizoaffective disorder, depressive type      H/O aortic valve disorder      Dementia      H/O aortic valve replacement        FAMILY HISTORY:  Family history of early CAD (Father)  Says that multiple family members had heart disease (a sibling is awaiting heart transplant)        Home Medications:  Abilify 15 mg oral tablet: 1 tab(s) orally once a day (18 Feb 2024 14:04)  Aspirin Low Dose 81 mg oral delayed release tablet: 1 tab(s) orally once a day (18 Feb 2024 14:04)      Current (Non-Endocrine) Meds:  acetaminophen     Tablet .. 650 milliGRAM(s) Oral every 6 hours PRN  amLODIPine   Tablet 5 milliGRAM(s) Oral daily  ARIPiprazole 20 milliGRAM(s) Oral daily  aspirin enteric coated 81 milliGRAM(s) Oral daily  diphenhydrAMINE 50 milliGRAM(s) Oral every 6 hours PRN  haloperidol     Tablet 5 milliGRAM(s) Oral every 6 hours PRN  hydrOXYzine hydrochloride 50 milliGRAM(s) Oral every 6 hours PRN  influenza   Vaccine 0.5 milliLiter(s) IntraMuscular once  LORazepam     Tablet 2 milliGRAM(s) Oral every 6 hours PRN      Current Endocrine Meds:   atorvastatin 40 milliGRAM(s) Oral at bedtime  insulin lispro (ADMELOG) corrective regimen sliding scale   SubCutaneous three times a day before meals  insulin lispro (ADMELOG) corrective regimen sliding scale   SubCutaneous at bedtime  levothyroxine 25 MICROGram(s) Oral daily      Allergies:  penicillin (Rash)          Vital Signs Last 24 Hrs  T(C): 36.1 (16 Apr 2024 08:00), Max: 36.1 (16 Apr 2024 08:00)  T(F): 96.9 (16 Apr 2024 08:00), Max: 96.9 (16 Apr 2024 08:00)  HR: 100 (16 Apr 2024 08:00) (86 - 100)  BP: 155/82 (16 Apr 2024 08:00) (155/82 - 177/89)  BP(mean): --  RR: 16 (16 Apr 2024 08:00) (16 - 18)  SpO2: --      Constitutional: WN/WD in NAD.   Neck: ~ 35 gm thyroid; no palpable thyroid nodules   Respiratory: lungs CTAB.  Cardiovascular: regular rate and rhythm, normal S1 and S2; + II/VI systolic murmur ULSB  Ext: no edema, no ulcers, pedal pulses palpable bilaterally  Psychiatric: A&O x 3, normal affect/mood.    CAPILLARY BLOOD GLUCOSE      POCT Blood Glucose.: 174 mg/dL (16 Apr 2024 07:22)  POCT Blood Glucose.: 195 mg/dL (15 Apr 2024 20:05)  POCT Blood Glucose.: 180 mg/dL (15 Apr 2024 16:14)                         Thyroid Stimulating Hormone, Serum: 4.89 (04-10 @ 08:14)  Thyroid Stimulating Hormone, Serum: 5.04 (10-29 @ 08:45)      A1c (4/10/24)=7.3

## 2024-04-16 NOTE — BH INPATIENT PSYCHIATRY PROGRESS NOTE - NSBHMETABOLIC_PSY_ALL_CORE_FT
Group Topic:  Group Psychotherapy    Date: 3/15/2024  Start Time:  9:00 AM  End Time: 11:45 AM  Facilitators: Humphrey Zabala        Method: Group  Participation: Active  Patient report of any safety concerns: No  Physical concerns reported: No  Drugs/alcohol reported since last session: No  Mood: Mood: optimistic and hopeful   Affect: Affect: calm and pleasant   Thought Process: Congruent  Perceptual Problems: None  Speech: Clear/articulate  Behavior/Socialization: Appropriate to Group  Task Performance: Follows directions.  Patient Response: Attentive    [facility] Group Program Description: BRIGHT 2 (Building Recovery by Improving Goals, Habits and Thoughts) is an 18 session evidenced based cognitive behavioral therapy intervention program for co-occurring depression and substance use experiences. The goal is to improve emotional and cognitive strength to sustain recovery by making connections between thoughts, activities, and social interactions. The BRIGHT 2 program consists of 3 modules with 6 sessions each.    Module 1: Thoughts Alcohol/Drug Use, And Your Mood   Module 2: Activities Alcohol/Drug Use, And Your Mood  Module 3: People Interactions, Alcohol/Drug Use, And Your Mood    Special Note: Ken VELASCO PHP also provides supplemental education on anxiety and its impact on substance use, thoughts, activities, people interactions and mood.    Ancillary Content: Ancillary information: None   Group Type: Process/Group Psychotherapy   BRIGHT-2 Content: Module 1: Thoughts Can be Harmful or Helpful   BRIGHT-2 Session: Morning Check-In  Group Handouts: Bright-2 Daily Check-in Form  Start Time: 9:00  End Time: 10:00  Attendance: present for at least 45 minutes (billable)  Number of group participants: 9  Individual Response to Group: Patient was fully engaged in group. Patient was receptive to feedback from peers and offered feedback to peers.    Patient/Resident Check-in Self report    Last self-reported use:  2/5/24  Substance(s) of choice: Stimulants  Number of hours of sleep: 7  Difficulty falling Asleep: No   Difficulty staying asleep: No   Current dreams about consuming substances or night terrors: Yes    Patient reported appetite as: good  Number of meals in the last 24 hours: 2    My Mood is: \"ok\"    Depressive symptoms: No   If yes, (0=none, 10= worst) 6    Anxiety symptoms: No   If yes, (0=none, 10= worst) 9    Cravings: Yes  If yes, (0=none, 10= worst) 9    Attended a recovery meeting last night and/or this morning: No   Number of recovery meetings attended since Sunday: 0    Taking medications as prescribed: Yes    Feelings of hopelessness or helplessness: No   Thoughts of wising you were dead or would harm self: No   Thoughts of harming someone else: No     Recovery goal/task for the day: \"I plan to stay positive today.\"    Preferred Level of Care: [facility] IOP  and Recovery meetings  Aftercare Concerns: No      Group Type: Process/Group Psychotherapy   Gigamon Content: Check-In  [employer]-2 Session: N/A  Group Handouts: Check-In  Start Time: 10:15  End Time: 11:00  Attendance: present for at least 45 minutes (billable)  Number of group participants: 9  Individual Response to Group: Patient stated that discussing staying clean can be tough and it is a good topic. Patient stated that tools were provided to use when times get tough. Patient stated that reflecting on what you did yesterday is good, and it also prepares you for the upcoming day. The group also lets you hear other issues which let you know that you are not alone.      Group Type: Process/Group Psychotherapy   BRIGHT-TIME PLUS Q Content: Other Movie: 28 Days  BRIGHT-2 Session: N/A  Group Handouts: Check-In Sheet  Start Time: 11:00  End Time: 11:45  Attendance: present for at least 45 minutes (billable)  Number of group participants: 9  Individual Response to Group: Patient stated that I must be vigilant during recovery, the disease will be strongly present, and I  BMI: BMI (kg/m2): 34.4 (04-09-24 @ 00:18)  HbA1c: A1C with Estimated Average Glucose Result: 7.3 % (04-10-24 @ 08:14)    Glucose: POCT Blood Glucose.: 174 mg/dL (04-16-24 @ 07:22)    BP: 155/82 (04-16-24 @ 08:00) (143/91 - 177/89)Vital Signs Last 24 Hrs  T(C): 36.1 (04-16-24 @ 08:00), Max: 36.1 (04-16-24 @ 08:00)  T(F): 96.9 (04-16-24 @ 08:00), Max: 96.9 (04-16-24 @ 08:00)  HR: 100 (04-16-24 @ 08:00) (86 - 100)  BP: 155/82 (04-16-24 @ 08:00) (155/82 - 177/89)  BP(mean): --  RR: 16 (04-16-24 @ 08:00) (16 - 18)  SpO2: --      Lipid Panel: Date/Time: 04-10-24 @ 08:14  Cholesterol, Serum: 254  LDL Cholesterol Calculated: 144  HDL Cholesterol, Serum: 78  Total Cholesterol/HDL Ration Measurement: --  Triglycerides, Serum: 158   must always have a plan to prevent me from ending up in the wrong place and making bad choices. I must also learn to set boundaries with people and myself. I have learned that I must commit to recovery in order to be successful.    GAD7 Score: Non-scoring day   PHQ9 Score: Non-scoring day    Therapist: PREMA Rowell M.S. [facility]

## 2024-04-17 LAB
GLUCOSE BLDC GLUCOMTR-MCNC: 166 MG/DL — HIGH (ref 70–99)
GLUCOSE BLDC GLUCOMTR-MCNC: 177 MG/DL — HIGH (ref 70–99)

## 2024-04-17 PROCEDURE — 99232 SBSQ HOSP IP/OBS MODERATE 35: CPT

## 2024-04-17 RX ADMIN — ARIPIPRAZOLE 20 MILLIGRAM(S): 15 TABLET ORAL at 08:39

## 2024-04-17 RX ADMIN — SITAGLIPTIN 100 MILLIGRAM(S): 50 TABLET, FILM COATED ORAL at 11:58

## 2024-04-17 RX ADMIN — AMLODIPINE BESYLATE 5 MILLIGRAM(S): 2.5 TABLET ORAL at 08:40

## 2024-04-17 RX ADMIN — Medication 81 MILLIGRAM(S): at 08:41

## 2024-04-17 RX ADMIN — ATORVASTATIN CALCIUM 40 MILLIGRAM(S): 80 TABLET, FILM COATED ORAL at 20:18

## 2024-04-17 RX ADMIN — Medication 25 MICROGRAM(S): at 11:59

## 2024-04-17 NOTE — BH INPATIENT PSYCHIATRY PROGRESS NOTE - NSBHASSESSSUMMFT_PSY_ALL_CORE
Pt is a 64 yo female with PPHx of Schizoaffective DO with hx of psych admission who presented to ED with paranoia.    Patient seen and evaluated as per nursing report no acute events. Patient linear today. Continues to present with paranoia, delusions, although less so. Abilify recently increased. Appears to be responding to the increased dose. Patient again states she is feeling better, the voices are continuing to decrease. Will continue to monitor. Patient denies and suicidal/homicidal ideations. States she is eating well and sleeping well. Patient denies suicidal/homicidal ideations. Patient isolative to the room. Patient encouraged to get out of her room and attend groups.  Per Endocrine can switch to oral agents. Recommended Januvia. Januvia started.      #Schizoaffective disorder  -Abilify 15mg Daily--> increase to 20mg daily    -Hydroxyzine 50mg Q6 PRN for anxiety/insomnia  -Haldol 5mg Q6 PRN for agitation/psychosis  -Benadryl 50mg Q6 PRN got EPS  -Lorazepam 2mg Q6 PRN for aggression    # Aortic stenosis and history of valve replacement  -aspirin 81 milliGRAM(s) Oral daily    #HTN  -Norvasc    #DM  -Insulin sliding scale    -Tylenol PRN for pain    #Agitation  -for agitation not amenable to verbal redirection, may give haldol 5 mg q6h prn, ativan 2 mg q6h prn, benadryl 50 mg q6h prn with escalation to IM if pt is a danger to self or/and others with repeat EKG toensure QTc <500 ms

## 2024-04-17 NOTE — BH INPATIENT PSYCHIATRY PROGRESS NOTE - NSBHCHARTREVIEWVS_PSY_A_CORE FT
Vital Signs Last 24 Hrs  T(C): 36.1 (04-17-24 @ 08:14), Max: 36.1 (04-17-24 @ 08:14)  T(F): 97 (04-17-24 @ 08:14), Max: 97 (04-17-24 @ 08:14)  HR: 83 (04-17-24 @ 08:14) (83 - 90)  BP: 151/103 (04-17-24 @ 08:14) (143/97 - 151/103)  BP(mean): --  RR: 16 (04-17-24 @ 08:14) (16 - 16)  SpO2: --

## 2024-04-17 NOTE — BH INPATIENT PSYCHIATRY PROGRESS NOTE - NSBHMETABOLIC_PSY_ALL_CORE_FT
BMI: BMI (kg/m2): 34.4 (04-09-24 @ 00:18)  HbA1c: A1C with Estimated Average Glucose Result: 7.3 % (04-10-24 @ 08:14)    Glucose: POCT Blood Glucose.: 166 mg/dL (04-17-24 @ 06:47)    BP: 151/103 (04-17-24 @ 08:14) (143/91 - 177/89)Vital Signs Last 24 Hrs  T(C): 36.1 (04-17-24 @ 08:14), Max: 36.1 (04-17-24 @ 08:14)  T(F): 97 (04-17-24 @ 08:14), Max: 97 (04-17-24 @ 08:14)  HR: 83 (04-17-24 @ 08:14) (83 - 90)  BP: 151/103 (04-17-24 @ 08:14) (143/97 - 151/103)  BP(mean): --  RR: 16 (04-17-24 @ 08:14) (16 - 16)  SpO2: --      Lipid Panel: Date/Time: 04-10-24 @ 08:14  Cholesterol, Serum: 254  LDL Cholesterol Calculated: 144  HDL Cholesterol, Serum: 78  Total Cholesterol/HDL Ration Measurement: --  Triglycerides, Serum: 158

## 2024-04-17 NOTE — BH INPATIENT PSYCHIATRY PROGRESS NOTE - NSBHFUPINTERVALHXFT_PSY_A_CORE
Patient seen and evaluated as per nursing report no acute events. Patient linear today. Continues to present with paranoia, delusions, although less so. Abilify recently increased. Appears to be responding to the increased dose. Patient again states she is feeling better, the voices are continuing to decrease. Will continue to monitor. Patient denies and suicidal/homicidal ideations. States she is eating well and sleeping well. Patient denies suicidal/homicidal ideations. Patient isolative to the room. Patient encouraged to get out of her room and attend groups.  Per Endocrine can switch to oral agents. Recommended Januvia. Januvia started.

## 2024-04-17 NOTE — BH INPATIENT PSYCHIATRY PROGRESS NOTE - CURRENT MEDICATION
MEDICATIONS  (STANDING):  amLODIPine   Tablet 5 milliGRAM(s) Oral daily  ARIPiprazole 20 milliGRAM(s) Oral daily  aspirin enteric coated 81 milliGRAM(s) Oral daily  atorvastatin 40 milliGRAM(s) Oral at bedtime  influenza   Vaccine 0.5 milliLiter(s) IntraMuscular once  levothyroxine 25 MICROGram(s) Oral daily  SITagliptin 100 milliGRAM(s) Oral daily    MEDICATIONS  (PRN):  acetaminophen     Tablet .. 650 milliGRAM(s) Oral every 6 hours PRN Temp greater or equal to 38C (100.4F), Mild Pain (1 - 3)  diphenhydrAMINE 50 milliGRAM(s) Oral every 6 hours PRN Eps  haloperidol     Tablet 5 milliGRAM(s) Oral every 6 hours PRN agitation  hydrOXYzine hydrochloride 50 milliGRAM(s) Oral every 6 hours PRN Agitation  LORazepam     Tablet 2 milliGRAM(s) Oral every 6 hours PRN Agitation

## 2024-04-18 LAB — GLUCOSE BLDC GLUCOMTR-MCNC: 176 MG/DL — HIGH (ref 70–99)

## 2024-04-18 PROCEDURE — 99232 SBSQ HOSP IP/OBS MODERATE 35: CPT

## 2024-04-18 RX ADMIN — Medication 25 MICROGRAM(S): at 08:16

## 2024-04-18 RX ADMIN — ARIPIPRAZOLE 20 MILLIGRAM(S): 15 TABLET ORAL at 08:17

## 2024-04-18 RX ADMIN — AMLODIPINE BESYLATE 5 MILLIGRAM(S): 2.5 TABLET ORAL at 08:16

## 2024-04-18 RX ADMIN — Medication 81 MILLIGRAM(S): at 08:21

## 2024-04-18 RX ADMIN — SITAGLIPTIN 100 MILLIGRAM(S): 50 TABLET, FILM COATED ORAL at 08:17

## 2024-04-18 RX ADMIN — ATORVASTATIN CALCIUM 40 MILLIGRAM(S): 80 TABLET, FILM COATED ORAL at 20:01

## 2024-04-18 NOTE — BH TREATMENT PLAN - NSDCCRITERIA_PSY_ALL_CORE
To be discharged once deemed not a danger to self or others.
To be discharged once deemed not a danger to self or others.

## 2024-04-18 NOTE — BH INPATIENT PSYCHIATRY PROGRESS NOTE - NSBHMETABOLIC_PSY_ALL_CORE_FT
BMI: BMI (kg/m2): 34.4 (04-09-24 @ 00:18)  HbA1c: A1C with Estimated Average Glucose Result: 7.3 % (04-10-24 @ 08:14)    Glucose: POCT Blood Glucose.: 176 mg/dL (04-18-24 @ 11:11)    BP: 138/80 (04-18-24 @ 08:00) (138/80 - 177/89)Vital Signs Last 24 Hrs  T(C): 35.6 (04-18-24 @ 08:00), Max: 35.6 (04-17-24 @ 16:15)  T(F): 96 (04-18-24 @ 08:00), Max: 96 (04-17-24 @ 16:15)  HR: 87 (04-18-24 @ 08:00) (87 - 93)  BP: 138/80 (04-18-24 @ 08:00) (138/80 - 160/80)  BP(mean): --  RR: 18 (04-18-24 @ 08:00) (16 - 18)  SpO2: --      Lipid Panel: Date/Time: 04-10-24 @ 08:14  Cholesterol, Serum: 254  LDL Cholesterol Calculated: 144  HDL Cholesterol, Serum: 78  Total Cholesterol/HDL Ration Measurement: --  Triglycerides, Serum: 158

## 2024-04-18 NOTE — BH TREATMENT PLAN - NSTXDCOPLKINTERSW_PSY_ALL_CORE
SW will meet with patient daily to provide support, education, collateral and referrals.
SW will meet with patient daily to provide support, education, collateral and referrals.

## 2024-04-18 NOTE — BH INPATIENT PSYCHIATRY PROGRESS NOTE - NSBHASSESSSUMMFT_PSY_ALL_CORE
Pt is a 64 yo female with PPHx of Schizoaffective DO with hx of psych admission who presented to ED with paranoia.    Patient seen and evaluated as per nursing report no acute events. Patient  calm and cooperative. No agitation or aggression noted. Continues to present with some mild paranoia, delusions, although less so. Abilify recently increased. Appears to be responding to the increased dose. Patient again states the voices are continuing to decrease. Will continue to monitor. Patient denies and suicidal/homicidal ideations. States she is eating well and sleeping well. Patient denies suicidal/homicidal ideations. Patient continues to be isolative to the room. Patient encouraged to get out of her room and attend groups.    #Schizoaffective disorder  -Abilify 15mg Daily--> increase to 20mg daily    -Hydroxyzine 50mg Q6 PRN for anxiety/insomnia  -Haldol 5mg Q6 PRN for agitation/psychosis  -Benadryl 50mg Q6 PRN got EPS  -Lorazepam 2mg Q6 PRN for aggression    # Aortic stenosis and history of valve replacement  -aspirin 81 milliGRAM(s) Oral daily    #HTN  -Norvasc    #DM  -Insulin sliding scale    -Tylenol PRN for pain    #Agitation  -for agitation not amenable to verbal redirection, may give haldol 5 mg q6h prn, ativan 2 mg q6h prn, benadryl 50 mg q6h prn with escalation to IM if pt is a danger to self or/and others with repeat EKG toensure QTc <500 ms

## 2024-04-18 NOTE — BH TREATMENT PLAN - NSTXPLANTHERAPYSESSIONSFT_PSY_ALL_CORE
04-11-24  Type of therapy: Coping skills, Creative arts therapy, Daily living skills, Inspiration and motiviation, Leisure development, Self esteem, Social skills training, Stress management, Symptom management  Type of session: Individual  Level of patient participation: Participated with encouragement, Quiet  Duration of participation: 15 minutes  Therapy conducted by: Psych rehab  Therapy Summary: Pt is familiar with unit , pt will usally participarte in most RT sessions when mood improves .    04-18-24  Type of therapy: Coping skills, Creative arts therapy, Inspiration and motiviation, Leisure development, Self esteem, Social skills training, Stress management, Symptom management  Type of session: Group  Level of patient participation: Engaged, Participates, Quiet  Duration of participation: 45 minutes  Therapy conducted by: Psych rehab  Therapy Summary: Pt is doing well in RT sessions , pt enjoys art , music and will engage in simple social conversation .

## 2024-04-18 NOTE — BH INPATIENT PSYCHIATRY PROGRESS NOTE - NSBHFUPINTERVALHXFT_PSY_A_CORE
Patient seen and evaluated as per nursing report no acute events. Patient  calm and cooperative. No agitation or aggression noted. Continues to present with some mild paranoia, delusions, although less so. Abilify recently increased. Appears to be responding to the increased dose. Patient again states the voices are continuing to decrease. Will continue to monitor. Patient denies and suicidal/homicidal ideations. States she is eating well and sleeping well. Patient denies suicidal/homicidal ideations. Patient continues to be isolative to the room. Patient encouraged to get out of her room and attend groups.

## 2024-04-18 NOTE — BH INPATIENT PSYCHIATRY PROGRESS NOTE - NSBHCHARTREVIEWVS_PSY_A_CORE FT
Vital Signs Last 24 Hrs  T(C): 35.6 (04-18-24 @ 08:00), Max: 35.6 (04-17-24 @ 16:15)  T(F): 96 (04-18-24 @ 08:00), Max: 96 (04-17-24 @ 16:15)  HR: 87 (04-18-24 @ 08:00) (87 - 93)  BP: 138/80 (04-18-24 @ 08:00) (138/80 - 160/80)  BP(mean): --  RR: 18 (04-18-24 @ 08:00) (16 - 18)  SpO2: --     2 seconds or less

## 2024-04-18 NOTE — BH TREATMENT PLAN - NSTXPSYCHOGOAL_PSY_ALL_CORE
Will identify 2 coping skills that assist with focus on reality
Will identify 2 coping skills that help mitigate hallucinations

## 2024-04-18 NOTE — BH TREATMENT PLAN - NSTXDCOPLKINTERRN_PSY_ALL_CORE
Patient informed that support is available. SW on board.
Patient informed that support is available. SW on board.

## 2024-04-19 LAB
GLUCOSE BLDC GLUCOMTR-MCNC: 144 MG/DL — HIGH (ref 70–99)
GLUCOSE BLDC GLUCOMTR-MCNC: 150 MG/DL — HIGH (ref 70–99)
GLUCOSE BLDC GLUCOMTR-MCNC: 176 MG/DL — HIGH (ref 70–99)

## 2024-04-19 PROCEDURE — 99232 SBSQ HOSP IP/OBS MODERATE 35: CPT

## 2024-04-19 RX ADMIN — Medication 25 MICROGRAM(S): at 08:08

## 2024-04-19 RX ADMIN — Medication 81 MILLIGRAM(S): at 08:11

## 2024-04-19 RX ADMIN — SITAGLIPTIN 100 MILLIGRAM(S): 50 TABLET, FILM COATED ORAL at 08:08

## 2024-04-19 RX ADMIN — ARIPIPRAZOLE 20 MILLIGRAM(S): 15 TABLET ORAL at 08:08

## 2024-04-19 RX ADMIN — AMLODIPINE BESYLATE 5 MILLIGRAM(S): 2.5 TABLET ORAL at 08:09

## 2024-04-19 RX ADMIN — ATORVASTATIN CALCIUM 40 MILLIGRAM(S): 80 TABLET, FILM COATED ORAL at 20:02

## 2024-04-19 NOTE — BH INPATIENT PSYCHIATRY DISCHARGE NOTE - NSDCCPCAREPLAN_GEN_ALL_CORE_FT
PRINCIPAL DISCHARGE DIAGNOSIS  Diagnosis: Schizoaffective disorder, unspecified type  Assessment and Plan of Treatment: Patient to continue with prescribed medication and follow up with outpatient

## 2024-04-19 NOTE — BH INPATIENT PSYCHIATRY DISCHARGE NOTE - NSBHASSESSSUMMFT_PSY_ALL_CORE
Pt is a 64 yo female with PPHx of Schizoaffective DO with hx of psych admission who presented to ED with paranoia.    Patient seen and evaluated. As per nursing report no acute events. States she feeling good today. States she is sleeping well and appetite is good. Denies any suicidal/homicidal ideations. Able to contract for safety. Patient is future oriented, and goal directed. Denies any A/V hallucinations. Discussed discharge and coping skills to use when discharged. Patient will return to her shelter. Anticipated discharge tomorrow 4/24/24. Compliant with medication. Does not warrant continued Inpatient hospitalization. Patient does not present a risk to self or others at this time.    #Schizoaffective disorder  -Abilify 20mg daily    # Aortic stenosis and history of valve replacement  -aspirin 81 milliGRAM(s) Oral daily    #HTN  -Norvasc    #DM  -Januvia    #HLD  -Atorvastatin    #Thyroid  -Synthroid

## 2024-04-19 NOTE — BH INPATIENT PSYCHIATRY PROGRESS NOTE - NSBHMETABOLIC_PSY_ALL_CORE_FT
BMI: BMI (kg/m2): 34.4 (04-09-24 @ 00:18)  HbA1c: A1C with Estimated Average Glucose Result: 7.3 % (04-10-24 @ 08:14)    Glucose: POCT Blood Glucose.: 150 mg/dL (04-19-24 @ 06:40)    BP: 118/84 (04-19-24 @ 07:50) (118/84 - 160/80)Vital Signs Last 24 Hrs  T(C): 36.7 (04-19-24 @ 07:50), Max: 36.7 (04-19-24 @ 07:50)  T(F): 98.1 (04-19-24 @ 07:50), Max: 98.1 (04-19-24 @ 07:50)  HR: 94 (04-19-24 @ 07:50) (81 - 94)  BP: 118/84 (04-19-24 @ 07:50) (118/84 - 140/84)  BP(mean): --  RR: 16 (04-19-24 @ 07:50) (16 - 16)  SpO2: --      Lipid Panel: Date/Time: 04-10-24 @ 08:14  Cholesterol, Serum: 254  LDL Cholesterol Calculated: 144  HDL Cholesterol, Serum: 78  Total Cholesterol/HDL Ration Measurement: --  Triglycerides, Serum: 158

## 2024-04-19 NOTE — ED ADULT TRIAGE NOTE - NSTRIAGECARE_GEN_A_ER
Transitional Care Management Telephone Call Attempt    Discharge Date: 4/18/24  Discharge Location: [facility]: [facility]    Call Attempt Date: 4/19/2024  Call Attempt: First    
Face Mask

## 2024-04-19 NOTE — BH INPATIENT PSYCHIATRY DISCHARGE NOTE - HPI (INCLUDE ILLNESS QUALITY, SEVERITY, DURATION, TIMING, CONTEXT, MODIFYING FACTORS, ASSOCIATED SIGNS AND SYMPTOMS)
Pt is a 64 yo female with PPHx of Schizoaffective DO with hx of psych admission who presented to ED with paranoia.    Pt was limited with cooperation and was going on in her conversation in a tangential manner. She reported "ISAIAH says [address] does not work regularly because president will be assassinated." She was hard to interrupt and stated, "The government keep talking to me because they are scared of what is going on.' She reported AH of hearing voices telling her that the president is going to blow his head. She reported "They are also going to kill  because he is giving blessing to homosexual marriage." he was preoccupied with her paranoid delusions and was hard to challenge. She stated her mood is "very happy' because they are going to kill Elias Nguyen because he is evil. She did not provide any collaterals because she does not want anyone from her family to know about it. She denied any SI/HI. She accused the writer for using tax money by hospitalizing patients. Pt is a 62 yo female with PPHx of Schizoaffective DO with hx of psych admission who presented to ED with paranoia.    Patient seen and evaluated 4/23/24. As per nursing report no acute events. States she feeling good today. States she is sleeping well and appetite is good. Denies any suicidal/homicidal ideations. Able to contract for safety. Patient is future oriented, and goal directed. Denies any A/V hallucinations. Discussed discharge and coping skills to use when discharged. Patient will return to her shelter. Anticipated discharge tomorrow 4/24/24. Compliant with medication. Does not warrant continued Inpatient hospitalization. Patient does not present a risk to self or others at this time.

## 2024-04-19 NOTE — BH INPATIENT PSYCHIATRY PROGRESS NOTE - NSBHFUPINTERVALHXFT_PSY_A_CORE
Patient seen and evaluated as per nursing report no acute events. Patient calm and cooperative. No agitation or aggression noted. Denies any paranoia. Did not mention any delusional content today. Appears to be responding well to the medication. Patient again states the voices are continuing to decrease. Will continue to monitor. Patient denies and suicidal/homicidal ideations. States she is eating well and sleeping well. Patient denies suicidal/homicidal ideations. Patient more visible on the unit yesterday, watching TV in the TV room. Patient encouraged to get out of her room and attend groups.

## 2024-04-19 NOTE — BH INPATIENT PSYCHIATRY DISCHARGE NOTE - HOSPITAL COURSE
Pt is a 62 yo female with PPHx of Schizoaffective DO with hx of psych admission who presented to ED with paranoia.    Pt was limited with cooperation and was going on in her conversation in a tangential manner. She reported "ISAIAH says [address] does not work regularly because president will be assassinated." She was hard to interrupt and stated, "The government keep talking to me because they are scared of what is going on.' She reported AH of hearing voices telling her that the president is going to blow his head. She reported "They are also going to kill  because he is giving blessing to homosexual marriage." he was preoccupied with her paranoid delusions and was hard to challenge. She stated her mood is "very happy' because they are going to kill Elias Nguyen because he is evil. She did not provide any collaterals because she does not want anyone from her family to know about it. She denied any SI/HI. She accused the writer for using tax money by hospitalizing patients.    Patient seen and evaluated on IPP. Well known to writer. Patient presents with paranoia, delusions and disorganized thoughts. She has delusions about president being killed and was offering delusional theories about it. Patient very tangential. Patient states she has been compliant with meds the last few days, but may have missed doses before. Appears to be a poor historian at this time. Will need to titrate Abilify but will start at same dosage for a few days then consider titration. Patient denies suicidal/homicidal ideations. Denies any ETOH or drug use. Does not provide any information for collateral. Writer asked patient about Rabies injections mention in a previous not. Patient states she believes she only got 1 shot. only received one shot.   Pt is a 62 yo female with PPHx of Schizoaffective DO with hx of psych admission who presented to ED with paranoia.    Pt was limited with cooperation and was going on in her conversation in a tangential manner. She reported "ISAIAH says [address] does not work regularly because president will be assassinated." She was hard to interrupt and stated, "The government keep talking to me because they are scared of what is going on.' She reported AH of hearing voices telling her that the president is going to blow his head. She reported "They are also going to kill  because he is giving blessing to homosexual marriage." he was preoccupied with her paranoid delusions and was hard to challenge. She stated her mood is "very happy' because they are going to kill Elias Nguyen because he is evil. She did not provide any collaterals because she does not want anyone from her family to know about it. She denied any SI/HI. She accused the writer for using tax money by hospitalizing patients.    Patient seen and evaluated on IPP. Well known to writer. Patient presents with paranoia, delusions and disorganized thoughts. She has delusions about president being killed and was offering delusional theories about it. Patient very tangential. Patient states she has been compliant with meds the last few days, but may have missed doses before. Appears to be a poor historian at this time. Will need to titrate Abilify but will start at same dosage for a few days then consider titration. Patient denies suicidal/homicidal ideations. Denies any ETOH or drug use. Does not provide any information for collateral. Writer asked patient about Rabies injections mention in a previous not. Patient states she believes she only got 1 shot. only received one shot.    Patient seen and evaluated 4/23/24. As per nursing report no acute events. States she feeling good today. States she is sleeping well and appetite is good. Denies any suicidal/homicidal ideations. Able to contract for safety. Patient is future oriented, and goal directed. Denies any A/V hallucinations. Discussed discharge and coping skills to use when discharged. Patient will return to her shelter. Anticipated discharge tomorrow 4/24/24. Compliant with medication. Does not warrant continued Inpatient hospitalization. Patient does not present a risk to self or others at this time.

## 2024-04-19 NOTE — BH INPATIENT PSYCHIATRY DISCHARGE NOTE - NSDCMRMEDTOKEN_GEN_ALL_CORE_FT
Abilify 15 mg oral tablet: 1 tab(s) orally once a day  Aspirin Low Dose 81 mg oral delayed release tablet: 1 tab(s) orally once a day  methocarbamol 500 mg oral tablet: 1 tab(s) orally once a day as needed for  moderate pain  moxifloxacin 400 mg oral tablet: 1 tab(s) orally once a day  Tylenol 325 mg oral tablet: 2 tab(s) orally 4 times a day as needed for  mild pain   amLODIPine 5 mg oral tablet: 1 tab(s) orally once a day x 14 days Continue until told otherwise by your provider.  ARIPiprazole 20 mg oral tablet: 1 tab(s) orally once a day x 30 days Continue until told otherwise by your provider.  aspirin 81 mg oral delayed release tablet: 1 tab(s) orally once a day x 14 days Continue until told otherwise by your provider  atorvastatin 40 mg oral tablet: 1 tab(s) orally once a day (at bedtime) x 14 days Continue until told otherwise by your provider.  levothyroxine 25 mcg (0.025 mg) oral tablet: 1 tab(s) orally once a day x 14 days Continue until told otherwise by your provider.  SITagliptin 100 mg oral tablet: 1 tab(s) orally once a day x 14 days Continue until told otherwise by your provider.

## 2024-04-19 NOTE — BH INPATIENT PSYCHIATRY PROGRESS NOTE - NSBHCHARTREVIEWVS_PSY_A_CORE FT
Vital Signs Last 24 Hrs  T(C): 36.7 (04-19-24 @ 07:50), Max: 36.7 (04-19-24 @ 07:50)  T(F): 98.1 (04-19-24 @ 07:50), Max: 98.1 (04-19-24 @ 07:50)  HR: 94 (04-19-24 @ 07:50) (81 - 94)  BP: 118/84 (04-19-24 @ 07:50) (118/84 - 140/84)  BP(mean): --  RR: 16 (04-19-24 @ 07:50) (16 - 16)  SpO2: --

## 2024-04-19 NOTE — BH INPATIENT PSYCHIATRY DISCHARGE NOTE - NSBHMETABOLIC_PSY_ALL_CORE_FT
BMI: BMI (kg/m2): 34.4 (04-09-24 @ 00:18)  HbA1c: A1C with Estimated Average Glucose Result: 7.3 % (04-10-24 @ 08:14)    Glucose: POCT Blood Glucose.: 144 mg/dL (04-19-24 @ 16:05)    BP: 118/84 (04-19-24 @ 07:50) (118/84 - 160/80)Vital Signs Last 24 Hrs  T(C): 36.7 (04-19-24 @ 07:50), Max: 36.7 (04-19-24 @ 07:50)  T(F): 98.1 (04-19-24 @ 07:50), Max: 98.1 (04-19-24 @ 07:50)  HR: 94 (04-19-24 @ 07:50) (94 - 94)  BP: 118/84 (04-19-24 @ 07:50) (118/84 - 118/84)  BP(mean): --  RR: 16 (04-19-24 @ 07:50) (16 - 16)  SpO2: --      Lipid Panel: Date/Time: 04-10-24 @ 08:14  Cholesterol, Serum: 254  LDL Cholesterol Calculated: 144  HDL Cholesterol, Serum: 78  Total Cholesterol/HDL Ration Measurement: --  Triglycerides, Serum: 158

## 2024-04-19 NOTE — BH INPATIENT PSYCHIATRY PROGRESS NOTE - NSBHASSESSSUMMFT_PSY_ALL_CORE
Pt is a 62 yo female with PPHx of Schizoaffective DO with hx of psych admission who presented to ED with paranoia.    Patient seen and evaluated as per nursing report no acute events. Patient calm and cooperative. No agitation or aggression noted. Denies any paranoia. Did not mention any delusional content today. Appears to be responding well to the medication. Patient again states the voices are continuing to decrease. Will continue to monitor. Patient denies and suicidal/homicidal ideations. States she is eating well and sleeping well. Patient denies suicidal/homicidal ideations. Patient more visible on the unit yesterday, watching TV in the TV room. Patient encouraged to get out of her room and attend groups.    #Schizoaffective disorder  -Abilify 15mg Daily--> increase to 20mg daily    -Hydroxyzine 50mg Q6 PRN for anxiety/insomnia  -Haldol 5mg Q6 PRN for agitation/psychosis  -Benadryl 50mg Q6 PRN got EPS  -Lorazepam 2mg Q6 PRN for aggression    # Aortic stenosis and history of valve replacement  -aspirin 81 milliGRAM(s) Oral daily    #HTN  -Norvasc    #DM  -Insulin sliding scale    -Tylenol PRN for pain    #Agitation  -for agitation not amenable to verbal redirection, may give haldol 5 mg q6h prn, ativan 2 mg q6h prn, benadryl 50 mg q6h prn with escalation to IM if pt is a danger to self or/and others with repeat EKG toensure QTc <500 ms

## 2024-04-19 NOTE — BH INPATIENT PSYCHIATRY DISCHARGE NOTE - NSBHFUPINTERVALHXFT_PSY_A_CORE
Patient seen and evaluated 4/23/24. As per nursing report no acute events. States she feeling good today. States she is sleeping well and appetite is good. Denies any suicidal/homicidal ideations. Able to contract for safety. Patient is future oriented, and goal directed. Denies any A/V hallucinations. Discussed discharge and coping skills to use when discharged. Patient will return to her shelter. Anticipated discharge tomorrow 4/24/24. Compliant with medication. Does not warrant continued Inpatient hospitalization. Patient does not present a risk to self or others at this time.

## 2024-04-20 LAB — GLUCOSE BLDC GLUCOMTR-MCNC: 160 MG/DL — HIGH (ref 70–99)

## 2024-04-20 RX ADMIN — ARIPIPRAZOLE 20 MILLIGRAM(S): 15 TABLET ORAL at 08:00

## 2024-04-20 RX ADMIN — SITAGLIPTIN 100 MILLIGRAM(S): 50 TABLET, FILM COATED ORAL at 08:00

## 2024-04-20 RX ADMIN — ATORVASTATIN CALCIUM 40 MILLIGRAM(S): 80 TABLET, FILM COATED ORAL at 20:06

## 2024-04-20 RX ADMIN — Medication 25 MICROGRAM(S): at 08:00

## 2024-04-20 RX ADMIN — Medication 81 MILLIGRAM(S): at 08:00

## 2024-04-20 RX ADMIN — AMLODIPINE BESYLATE 5 MILLIGRAM(S): 2.5 TABLET ORAL at 08:00

## 2024-04-21 LAB — GLUCOSE BLDC GLUCOMTR-MCNC: 150 MG/DL — HIGH (ref 70–99)

## 2024-04-21 RX ADMIN — ATORVASTATIN CALCIUM 40 MILLIGRAM(S): 80 TABLET, FILM COATED ORAL at 20:13

## 2024-04-21 RX ADMIN — AMLODIPINE BESYLATE 5 MILLIGRAM(S): 2.5 TABLET ORAL at 08:05

## 2024-04-21 RX ADMIN — ARIPIPRAZOLE 20 MILLIGRAM(S): 15 TABLET ORAL at 08:04

## 2024-04-21 RX ADMIN — Medication 25 MICROGRAM(S): at 08:05

## 2024-04-21 RX ADMIN — SITAGLIPTIN 100 MILLIGRAM(S): 50 TABLET, FILM COATED ORAL at 08:05

## 2024-04-21 RX ADMIN — Medication 81 MILLIGRAM(S): at 08:04

## 2024-04-22 LAB — GLUCOSE BLDC GLUCOMTR-MCNC: 152 MG/DL — HIGH (ref 70–99)

## 2024-04-22 PROCEDURE — 99232 SBSQ HOSP IP/OBS MODERATE 35: CPT

## 2024-04-22 RX ORDER — ASPIRIN/CALCIUM CARB/MAGNESIUM 324 MG
1 TABLET ORAL
Refills: 0 | DISCHARGE

## 2024-04-22 RX ORDER — ATORVASTATIN CALCIUM 80 MG/1
1 TABLET, FILM COATED ORAL
Qty: 14 | Refills: 0
Start: 2024-04-22 | End: 2024-05-05

## 2024-04-22 RX ORDER — LEVOTHYROXINE SODIUM 125 MCG
1 TABLET ORAL
Qty: 14 | Refills: 0
Start: 2024-04-22 | End: 2024-05-05

## 2024-04-22 RX ORDER — SITAGLIPTIN 50 MG/1
1 TABLET, FILM COATED ORAL
Qty: 14 | Refills: 0
Start: 2024-04-22 | End: 2024-05-05

## 2024-04-22 RX ORDER — AMLODIPINE BESYLATE 2.5 MG/1
1 TABLET ORAL
Qty: 14 | Refills: 0
Start: 2024-04-22 | End: 2024-05-05

## 2024-04-22 RX ORDER — ARIPIPRAZOLE 15 MG/1
1 TABLET ORAL
Qty: 30 | Refills: 0
Start: 2024-04-22 | End: 2024-05-21

## 2024-04-22 RX ORDER — ASPIRIN/CALCIUM CARB/MAGNESIUM 324 MG
1 TABLET ORAL
Qty: 14 | Refills: 0
Start: 2024-04-22 | End: 2024-05-05

## 2024-04-22 RX ORDER — ARIPIPRAZOLE 15 MG/1
1 TABLET ORAL
Refills: 0 | DISCHARGE

## 2024-04-22 RX ADMIN — Medication 81 MILLIGRAM(S): at 08:12

## 2024-04-22 RX ADMIN — AMLODIPINE BESYLATE 5 MILLIGRAM(S): 2.5 TABLET ORAL at 08:10

## 2024-04-22 RX ADMIN — ATORVASTATIN CALCIUM 40 MILLIGRAM(S): 80 TABLET, FILM COATED ORAL at 20:07

## 2024-04-22 RX ADMIN — SITAGLIPTIN 100 MILLIGRAM(S): 50 TABLET, FILM COATED ORAL at 08:11

## 2024-04-22 RX ADMIN — Medication 25 MICROGRAM(S): at 08:11

## 2024-04-22 RX ADMIN — ARIPIPRAZOLE 20 MILLIGRAM(S): 15 TABLET ORAL at 08:10

## 2024-04-22 NOTE — BH INPATIENT PSYCHIATRY PROGRESS NOTE - NSBHASSESSSUMMFT_PSY_ALL_CORE
Pt is a 64 yo female with PPHx of Schizoaffective DO with hx of psych admission who presented to ED with paranoia.    Patient seen and evaluated as per nursing report no acute events. Patient calm and cooperative. No agitation or aggression noted. Denies any paranoia. Did not mention any delusional content today. Appears to have responded well to the medication. Denies any A/V hallucinations. Patient denies and suicidal/homicidal ideations. States she is eating well and sleeping well. Patient more visible on the unit, watching TV in the TV room. Anticipated discharge later in week provided patient continues to improve.    #Schizoaffective disorder  -Abilify 15mg Daily--> increase to 20mg daily    -Hydroxyzine 50mg Q6 PRN for anxiety/insomnia  -Haldol 5mg Q6 PRN for agitation/psychosis  -Benadryl 50mg Q6 PRN got EPS  -Lorazepam 2mg Q6 PRN for aggression    # Aortic stenosis and history of valve replacement  -aspirin 81 milliGRAM(s) Oral daily    #HTN  -Norvasc    #DM  -Insulin sliding scale    -Tylenol PRN for pain    #Agitation  -for agitation not amenable to verbal redirection, may give haldol 5 mg q6h prn, ativan 2 mg q6h prn, benadryl 50 mg q6h prn with escalation to IM if pt is a danger to self or/and others with repeat EKG toensure QTc <500 ms

## 2024-04-22 NOTE — BH INPATIENT PSYCHIATRY PROGRESS NOTE - NSBHCHARTREVIEWVS_PSY_A_CORE FT
Vital Signs Last 24 Hrs  T(C): 35.2 (04-22-24 @ 09:03), Max: 35.6 (04-21-24 @ 20:11)  T(F): 95.3 (04-22-24 @ 09:03), Max: 96.1 (04-21-24 @ 20:11)  HR: 92 (04-22-24 @ 09:03) (73 - 92)  BP: 139/84 (04-22-24 @ 09:03) (139/84 - 183/77)  BP(mean): --  RR: 16 (04-22-24 @ 09:03) (16 - 18)  SpO2: --

## 2024-04-22 NOTE — BH INPATIENT PSYCHIATRY PROGRESS NOTE - NSBHFUPINTERVALHXFT_PSY_A_CORE
Patient seen and evaluated as per nursing report no acute events. Patient calm and cooperative. No agitation or aggression noted. Denies any paranoia. Did not mention any delusional content today. Appears to have responded well to the medication. Denies any A/V hallucinations. Patient denies and suicidal/homicidal ideations. States she is eating well and sleeping well. Patient more visible on the unit, watching TV in the TV room. Anticipated discharge later in week provided patient continues to improve.

## 2024-04-22 NOTE — BH INPATIENT PSYCHIATRY PROGRESS NOTE - NSBHMETABOLIC_PSY_ALL_CORE_FT
BMI: BMI (kg/m2): 34.4 (04-09-24 @ 00:18)  HbA1c: A1C with Estimated Average Glucose Result: 7.3 % (04-10-24 @ 08:14)    Glucose: POCT Blood Glucose.: 152 mg/dL (04-22-24 @ 06:42)    BP: 139/84 (04-22-24 @ 09:03) (139/84 - 183/77)Vital Signs Last 24 Hrs  T(C): 35.2 (04-22-24 @ 09:03), Max: 35.6 (04-21-24 @ 20:11)  T(F): 95.3 (04-22-24 @ 09:03), Max: 96.1 (04-21-24 @ 20:11)  HR: 92 (04-22-24 @ 09:03) (73 - 92)  BP: 139/84 (04-22-24 @ 09:03) (139/84 - 183/77)  BP(mean): --  RR: 16 (04-22-24 @ 09:03) (16 - 18)  SpO2: --      Lipid Panel: Date/Time: 04-10-24 @ 08:14  Cholesterol, Serum: 254  LDL Cholesterol Calculated: 144  HDL Cholesterol, Serum: 78  Total Cholesterol/HDL Ration Measurement: --  Triglycerides, Serum: 158

## 2024-04-23 LAB — GLUCOSE BLDC GLUCOMTR-MCNC: 149 MG/DL — HIGH (ref 70–99)

## 2024-04-23 PROCEDURE — 99232 SBSQ HOSP IP/OBS MODERATE 35: CPT

## 2024-04-23 RX ADMIN — Medication 25 MICROGRAM(S): at 08:11

## 2024-04-23 RX ADMIN — Medication 81 MILLIGRAM(S): at 08:07

## 2024-04-23 RX ADMIN — AMLODIPINE BESYLATE 5 MILLIGRAM(S): 2.5 TABLET ORAL at 08:07

## 2024-04-23 RX ADMIN — ATORVASTATIN CALCIUM 40 MILLIGRAM(S): 80 TABLET, FILM COATED ORAL at 20:10

## 2024-04-23 RX ADMIN — ARIPIPRAZOLE 20 MILLIGRAM(S): 15 TABLET ORAL at 08:07

## 2024-04-23 RX ADMIN — SITAGLIPTIN 100 MILLIGRAM(S): 50 TABLET, FILM COATED ORAL at 08:07

## 2024-04-23 NOTE — BH INPATIENT PSYCHIATRY PROGRESS NOTE - NSBHASSESSSUMMFT_PSY_ALL_CORE
Pt is a 62 yo female with PPHx of Schizoaffective DO with hx of psych admission who presented to ED with paranoia.    Patient seen and evaluated. As per nursing report no acute events. States she feeling good today. States she is sleeping well and appetite is good. Denies any suicidal/homicidal ideations. Able to contract for safety. Patient is future oriented, and goal directed. Denies any A/V hallucinations. Discussed discharge and coping skills to use when discharged. Patient will return to her shelter. Anticipated discharge tomorrow 4/24/24. Compliant with medication. Does not warrant continued Inpatient hospitalization. Patient does not present a risk to self or others at this time.    #Schizoaffective disorder  -Abilify 15mg Daily--> increase to 20mg daily    -Hydroxyzine 50mg Q6 PRN for anxiety/insomnia  -Haldol 5mg Q6 PRN for agitation/psychosis  -Benadryl 50mg Q6 PRN got EPS  -Lorazepam 2mg Q6 PRN for aggression    # Aortic stenosis and history of valve replacement  -aspirin 81 milliGRAM(s) Oral daily    #HTN  -Norvasc    #DM  -Insulin sliding scale    -Tylenol PRN for pain    #Agitation  -for agitation not amenable to verbal redirection, may give haldol 5 mg q6h prn, ativan 2 mg q6h prn, benadryl 50 mg q6h prn with escalation to IM if pt is a danger to self or/and others with repeat EKG toensure QTc <500 ms Pt is a 62 yo female with PPHx of Schizoaffective DO with hx of psych admission who presented to ED with paranoia.    Patient seen and evaluated. As per nursing report no acute events. States she feeling good today. States she is sleeping well and appetite is good. Denies any suicidal/homicidal ideations. Able to contract for safety. Patient is future oriented, and goal directed. Denies any A/V hallucinations. Discussed discharge and coping skills to use when discharged. Patient will return to her shelter. Anticipated discharge tomorrow 4/24/24. Compliant with medication. Does not warrant continued Inpatient hospitalization. Patient does not present a risk to self or others at this time.    #Schizoaffective disorder  -Abilify 15mg Daily--> increase to 20mg daily    -Hydroxyzine 50mg Q6 PRN for anxiety/insomnia  -Haldol 5mg Q6 PRN for agitation/psychosis  -Benadryl 50mg Q6 PRN got EPS  -Lorazepam 2mg Q6 PRN for aggression    # Aortic stenosis and history of valve replacement  -aspirin 81 milliGRAM(s) Oral daily    #HTN  -Norvasc    #DM  -Januvia    #HLD  -Atorvastatin    #Thyroid  -Synthroid    -Tylenol PRN for pain    #Agitation  -for agitation not amenable to verbal redirection, may give haldol 5 mg q6h prn, ativan 2 mg q6h prn, benadryl 50 mg q6h prn with escalation to IM if pt is a danger to self or/and others with repeat EKG toensure QTc <500 ms

## 2024-04-23 NOTE — BH INPATIENT PSYCHIATRY PROGRESS NOTE - NSBHFUPINTERVALHXFT_PSY_A_CORE
Patient seen and evaluated. As per nursing report no acute events. States she feeling good today. States she is sleeping well and appetite is good. Denies any suicidal/homicidal ideations. Able to contract for safety. Patient is future oriented, and goal directed. Denies any A/V hallucinations. Discussed discharge and coping skills to use when discharged. Patient will return to her shelter. Anticipated discharge tomorrow 4/24/24. Compliant with medication. Does not warrant continued Inpatient hospitalization. Patient does not present a risk to self or others at this time.

## 2024-04-23 NOTE — BH INPATIENT PSYCHIATRY PROGRESS NOTE - NSBHCHARTREVIEWVS_PSY_A_CORE FT
Vital Signs Last 24 Hrs  T(C): 35.7 (04-23-24 @ 08:44), Max: 35.8 (04-22-24 @ 16:06)  T(F): 96.3 (04-23-24 @ 08:44), Max: 96.5 (04-22-24 @ 16:06)  HR: 94 (04-23-24 @ 08:44) (84 - 94)  BP: 144/82 (04-23-24 @ 08:44) (144/82 - 165/74)  BP(mean): --  RR: 16 (04-23-24 @ 08:44) (16 - 16)  SpO2: --

## 2024-04-23 NOTE — BH DISCHARGE NOTE NURSING/SOCIAL WORK/PSYCH REHAB - NSCDUDCCRISIS_PSY_A_CORE
[facility]  1 [phone] [facility] [phone])  Text "WELL" to 04605  Website: www.[employer].Zaizher.im/.National Suicide Prevention Lifeline 8 [phone](954) 937-4781/988 Suicide and Crisis Lifeline

## 2024-04-23 NOTE — BH DISCHARGE NOTE NURSING/SOCIAL WORK/PSYCH REHAB - NSDCVIVACCINE_GEN_ALL_CORE_FT
COVID-19, mRNA, LNP-S, PF, 100 mcg/ 0.5 mL dose ([employer]); 16-Dec-2021 12:11; Alana Solares (RN); [employer] [id number] (Exp. Date: 30-Dec-2021); IntraMuscular; Deltoid Right.; 0.5 milliLiter(s);   rabies, intradermal injection; 01-Apr-2024 15:01; Pramod Kumari (RN); [employer]; [id number] (Exp. Date: 31-Jan-2026); IntraMuscular.; Deltoid Left...; 1 milliLiter(s); VIS (VIS Published: 01-Apr-2024, VIS Presented: 01-Apr-2024);   Tdap; 01-Apr-2024 14:58; Pramod Kumari (RN); [employer]; [id number] (Exp. Date: 23-Oct-2025); IntraMuscular; Deltoid Right.; 0.5 milliLiter(s); VIS (VIS Published: 09-May-2013, VIS Presented: 01-Apr-2024);

## 2024-04-23 NOTE — BH DISCHARGE NOTE NURSING/SOCIAL WORK/PSYCH REHAB - PATIENT PORTAL LINK FT
You can access the FollowMyHealth Patient Portal offered by [facility] by registering at the following website: [email or web address]. By joining [employer]’s FollowMyHealth portal, you will also be able to view your health information using other applications (apps) compatible with our system.

## 2024-04-23 NOTE — BH INPATIENT PSYCHIATRY PROGRESS NOTE - NSBHMETABOLIC_PSY_ALL_CORE_FT
BMI: BMI (kg/m2): 34.4 (04-09-24 @ 00:18)  HbA1c: A1C with Estimated Average Glucose Result: 7.3 % (04-10-24 @ 08:14)    Glucose: POCT Blood Glucose.: 149 mg/dL (04-23-24 @ 06:37)    BP: 144/82 (04-23-24 @ 08:44) (139/84 - 183/77)Vital Signs Last 24 Hrs  T(C): 35.7 (04-23-24 @ 08:44), Max: 35.8 (04-22-24 @ 16:06)  T(F): 96.3 (04-23-24 @ 08:44), Max: 96.5 (04-22-24 @ 16:06)  HR: 94 (04-23-24 @ 08:44) (84 - 94)  BP: 144/82 (04-23-24 @ 08:44) (144/82 - 165/74)  BP(mean): --  RR: 16 (04-23-24 @ 08:44) (16 - 16)  SpO2: --      Lipid Panel: Date/Time: 04-10-24 @ 08:14  Cholesterol, Serum: 254  LDL Cholesterol Calculated: 144  HDL Cholesterol, Serum: 78  Total Cholesterol/HDL Ration Measurement: --  Triglycerides, Serum: 158

## 2024-04-24 VITALS
HEART RATE: 87 BPM | SYSTOLIC BLOOD PRESSURE: 165 MMHG | DIASTOLIC BLOOD PRESSURE: 105 MMHG | TEMPERATURE: 98 F | RESPIRATION RATE: 19 BRPM

## 2024-04-24 LAB — GLUCOSE BLDC GLUCOMTR-MCNC: 154 MG/DL — HIGH (ref 70–99)

## 2024-04-24 PROCEDURE — 99238 HOSP IP/OBS DSCHRG MGMT 30/<: CPT

## 2024-04-24 RX ADMIN — Medication 81 MILLIGRAM(S): at 08:09

## 2024-04-24 RX ADMIN — SITAGLIPTIN 100 MILLIGRAM(S): 50 TABLET, FILM COATED ORAL at 08:09

## 2024-04-24 RX ADMIN — AMLODIPINE BESYLATE 5 MILLIGRAM(S): 2.5 TABLET ORAL at 08:09

## 2024-04-24 RX ADMIN — ARIPIPRAZOLE 20 MILLIGRAM(S): 15 TABLET ORAL at 08:09

## 2024-04-24 NOTE — BH INPATIENT PSYCHIATRY PROGRESS NOTE - NSBHATTESTTYPEVISIT_PSY_A_CORE
On-site Attending supervising BECKY (99XXX codes)
Attending Only
Attending with Resident/Fellow/Student
On-site Attending supervising BECKY (99XXX codes)

## 2024-04-24 NOTE — BH INPATIENT PSYCHIATRY PROGRESS NOTE - NSBHFUPINTERVALHXFT_PSY_A_CORE
D/C today. Transportation set up. Meds ordered and delivered from [facility] for patient to leave with meds. Patient appeared to be in good spirits today. Endorses a better mood. Insight and judgment have improved. Denies suicidal/ homicidal ideations. Patient able to contract for safety. Denies any A/V hallucinations. Appears to be at her baseline state. Compliant with medication. Does not warrant continued Inpatient hospitalization. Writer, PA student reviewed D/C papers with patient. Patient verbalized understanding. Patient does not appear to be of risk to self or others at this time.

## 2024-04-24 NOTE — BH INPATIENT PSYCHIATRY PROGRESS NOTE - NSTXPSYCHODATEEST_PSY_ALL_CORE
11-Apr-2024
09-Apr-2024
11-Apr-2024
11-Apr-2024
09-Apr-2024
11-Apr-2024

## 2024-04-24 NOTE — BH INPATIENT PSYCHIATRY PROGRESS NOTE - NSBHMSEBEHAV_PSY_A_CORE
Cooperative
Cooperative
limited cooperation
Cooperative
limited cooperation
Cooperative
limited cooperation

## 2024-04-24 NOTE — BH INPATIENT PSYCHIATRY PROGRESS NOTE - NSBHCHARTREVIEWVS_PSY_A_CORE FT
Vital Signs Last 24 Hrs  T(C): 36.7 (04-24-24 @ 08:56), Max: 36.7 (04-24-24 @ 08:56)  T(F): 98.1 (04-24-24 @ 08:56), Max: 98.1 (04-24-24 @ 08:56)  HR: 87 (04-24-24 @ 08:56) (87 - 98)  BP: 165/105 (04-24-24 @ 08:56) (146/84 - 165/105)  BP(mean): --  RR: 19 (04-24-24 @ 08:56) (16 - 19)  SpO2: --

## 2024-04-24 NOTE — BH INPATIENT PSYCHIATRY PROGRESS NOTE - NSCGISEVERILLNESS_PSY_ALL_CORE
5 = Markedly ill - intrusive symptoms that distinctly impair social/occupational function or cause intrusive levels of distress
4 = Moderately ill – overt symptoms causing noticeable, but modest, functional impairment or distress; symptom level may warrant medication
3 = Mildly ill – clearly established symptoms with minimal, if any, distress or difficulty in social and occupational function
5 = Markedly ill - intrusive symptoms that distinctly impair social/occupational function or cause intrusive levels of distress
5 = Markedly ill - intrusive symptoms that distinctly impair social/occupational function or cause intrusive levels of distress
3 = Mildly ill – clearly established symptoms with minimal, if any, distress or difficulty in social and occupational function
5 = Markedly ill - intrusive symptoms that distinctly impair social/occupational function or cause intrusive levels of distress
5 = Markedly ill - intrusive symptoms that distinctly impair social/occupational function or cause intrusive levels of distress
4 = Moderately ill – overt symptoms causing noticeable, but modest, functional impairment or distress; symptom level may warrant medication
5 = Markedly ill - intrusive symptoms that distinctly impair social/occupational function or cause intrusive levels of distress
5 = Markedly ill - intrusive symptoms that distinctly impair social/occupational function or cause intrusive levels of distress
 used

## 2024-04-24 NOTE — BH INPATIENT PSYCHIATRY PROGRESS NOTE - NSBHATTESTBILLING_PSY_A_CORE
61318-Laqnnzdurs OBS or IP - moderate complexity OR 35-49 mins
38410-Vitjcqjsux OBS or IP - moderate complexity OR 35-49 mins
69148-Gevallpcpp OBS or IP - moderate complexity OR 35-49 mins
73828-Iheywgxkhu OBS or IP - moderate complexity OR 35-49 mins
57038-Pxahqbxqlv OBS or IP - moderate complexity OR 35-49 mins
90546-Cxhmsesjxj OBS or IP - moderate complexity OR 35-49 mins
18684-Haxnkihzgz OBS or IP - moderate complexity OR 35-49 mins
19254-Xpunvqnlii OBS or IP - moderate complexity OR 35-49 mins
42042-Avimxwgfbz OBS or IP - moderate complexity OR 35-49 mins
43110-Dsqbibkgdb OBS or IP - moderate complexity OR 35-49 mins
85554-Mzebbicwqh OBS or IP - moderate complexity OR 35-49 mins
09458-Ahxtslymxn OBS or IP - moderate complexity OR 35-49 mins
96806-Ulhxxhcxbb OBS or IP - moderate complexity OR 35-49 mins

## 2024-04-24 NOTE — BH INPATIENT PSYCHIATRY PROGRESS NOTE - NSBHMSETHTPROC_PSY_A_CORE
Linear
Disorganized/Tangential
Linear
Disorganized/Tangential
Linear
Disorganized/Tangential
Linear
Linear
Disorganized/Tangential
Linear
Disorganized/Tangential

## 2024-04-24 NOTE — BH INPATIENT PSYCHIATRY PROGRESS NOTE - NSDCCRITERIA_PSY_ALL_CORE
To be discharged once deemed not a danger to self or others.

## 2024-04-24 NOTE — BH INPATIENT PSYCHIATRY PROGRESS NOTE - NSBHMSEPERCEPT_PSY_A_CORE
Auditory hallucinations
No abnormalities
Auditory hallucinations
Auditory hallucinations
No abnormalities
Auditory hallucinations
No abnormalities
Auditory hallucinations
No abnormalities
No abnormalities

## 2024-04-24 NOTE — BH INPATIENT PSYCHIATRY PROGRESS NOTE - NSTXPSYCHOGOAL_PSY_ALL_CORE
Will identify 2 coping skills that assist with focus on reality
Will identify 2 coping skills that assist with focus on reality
Will identify 2 coping skills that help mitigate hallucinations
Will identify 2 coping skills that assist with focus on reality
Will identify 2 coping skills that assist with focus on reality
Will identify 2 coping skills that help mitigate hallucinations
Will identify 2 coping skills that assist with focus on reality
Will identify 2 coping skills that help mitigate hallucinations

## 2024-04-24 NOTE — BH INPATIENT PSYCHIATRY PROGRESS NOTE - NSBHATTESTATTENDNAMEFT_PSY_A_CORE
Dr. Webb
Dr. Webb
Dr. Bhatt
Dr. Webb
Dr. Bhatt
Dr. Bhatt
Dr. Webb

## 2024-04-24 NOTE — BH INPATIENT PSYCHIATRY PROGRESS NOTE - NSICDXBHPRIMARYDX_PSY_ALL_CORE
Schizoaffective disorder, unspecified type   F25.9  

## 2024-04-24 NOTE — BH INPATIENT PSYCHIATRY PROGRESS NOTE - NSTXDCOPLKDATETRGT_PSY_ALL_CORE
30-Apr-2024
18-Apr-2024
18-Apr-2024
16-Apr-2024
18-Apr-2024
18-Apr-2024
30-Apr-2024
18-Apr-2024
30-Apr-2024
18-Apr-2024
18-Apr-2024

## 2024-04-24 NOTE — BH INPATIENT PSYCHIATRY PROGRESS NOTE - NSBHASSESSSUMMFT_PSY_ALL_CORE
Pt is a 62 yo female with PPHx of Schizoaffective DO with hx of psych admission who presented to ED with paranoia.    D/C today. Transportation set up. Meds ordered and delivered from [facility] for patient to leave with meds. Patient appeared to be in good spirits today. Endorses a better mood. Insight and judgment have improved. Denies suicidal/ homicidal ideations. Patient able to contract for safety. Denies any A/V hallucinations. Appears to be at her baseline state. Compliant with medication. Does not warrant continued Inpatient hospitalization. Writer, PA student reviewed D/C papers with patient. Patient verbalized understanding. Patient does not appear to be of risk to self or others at this time.    #Schizoaffective disorder  -Abilify 15mg Daily--> increase to 20mg daily    -Hydroxyzine 50mg Q6 PRN for anxiety/insomnia  -Haldol 5mg Q6 PRN for agitation/psychosis  -Benadryl 50mg Q6 PRN got EPS  -Lorazepam 2mg Q6 PRN for aggression    # Aortic stenosis and history of valve replacement  -aspirin 81 milliGRAM(s) Oral daily    #HTN  -Norvasc    #DM  -Januvia    #HLD  -Atorvastatin    #Thyroid  -Synthroid    -Tylenol PRN for pain    #Agitation  -for agitation not amenable to verbal redirection, may give haldol 5 mg q6h prn, ativan 2 mg q6h prn, benadryl 50 mg q6h prn with escalation to IM if pt is a danger to self or/and others with repeat EKG to ensure QTc <500 ms

## 2024-04-24 NOTE — BH INPATIENT PSYCHIATRY PROGRESS NOTE - NSBHMETABOLIC_PSY_ALL_CORE_FT
BMI: BMI (kg/m2): 34.4 (04-09-24 @ 00:18)  HbA1c: A1C with Estimated Average Glucose Result: 7.3 % (04-10-24 @ 08:14)    Glucose: POCT Blood Glucose.: 154 mg/dL (04-24-24 @ 06:33)    BP: 165/105 (04-24-24 @ 08:56) (139/84 - 183/77)Vital Signs Last 24 Hrs  T(C): 36.7 (04-24-24 @ 08:56), Max: 36.7 (04-24-24 @ 08:56)  T(F): 98.1 (04-24-24 @ 08:56), Max: 98.1 (04-24-24 @ 08:56)  HR: 87 (04-24-24 @ 08:56) (87 - 98)  BP: 165/105 (04-24-24 @ 08:56) (146/84 - 165/105)  BP(mean): --  RR: 19 (04-24-24 @ 08:56) (16 - 19)  SpO2: --      Lipid Panel: Date/Time: 04-10-24 @ 08:14  Cholesterol, Serum: 254  LDL Cholesterol Calculated: 144  HDL Cholesterol, Serum: 78  Total Cholesterol/HDL Ration Measurement: --  Triglycerides, Serum: 158

## 2024-04-24 NOTE — BH INPATIENT PSYCHIATRY PROGRESS NOTE - NSTXPSYCHOINTERMD_PSY_ALL_CORE
Medication management and milieu therapy

## 2024-04-24 NOTE — BH INPATIENT PSYCHIATRY PROGRESS NOTE - PRN MEDS
MEDICATIONS  (PRN):  acetaminophen     Tablet .. 650 milliGRAM(s) Oral every 6 hours PRN Temp greater or equal to 38C (100.4F), Mild Pain (1 - 3)  diphenhydrAMINE 50 milliGRAM(s) Oral every 6 hours PRN Eps  haloperidol     Tablet 5 milliGRAM(s) Oral every 6 hours PRN agitation  hydrOXYzine hydrochloride 50 milliGRAM(s) Oral every 6 hours PRN Agitation  LORazepam     Tablet 2 milliGRAM(s) Oral every 6 hours PRN Agitation  

## 2024-04-24 NOTE — BH INPATIENT PSYCHIATRY PROGRESS NOTE - NSBHMSETHTCONTENT_PSY_A_CORE
Delusions
Unremarkable
Unremarkable
Delusions
Unremarkable
Unremarkable

## 2024-04-24 NOTE — BH INPATIENT PSYCHIATRY PROGRESS NOTE - NSBHFUPINTERVALCCFT_PSY_A_CORE
"Im feeling better"
"my voices  are almost gone "
"Im feeling good"
"Im feeling better"
"I feel good"
"Im ok"
"Im feeling good"
"Im feeling better"
"Im ok"
"still hearing voices" since day before admission (x5d)
"Im ok"

## 2024-04-24 NOTE — BH INPATIENT PSYCHIATRY PROGRESS NOTE - NSTXPSYCHOPROGRES_PSY_ALL_CORE
Met - goal discontinued
Improving
Improving
No Change
Met - goal discontinued
Improving
No Change
Improving
No Change

## 2024-04-24 NOTE — BH INPATIENT PSYCHIATRY PROGRESS NOTE - NSTXDCOPLKPROGRES_PSY_ALL_CORE
No Change
Met - goal discontinued
No Change
No Change
Improving
No Change
Improving
No Change
Improving
Met - goal discontinued

## 2024-04-24 NOTE — BH INPATIENT PSYCHIATRY PROGRESS NOTE - NSTXPSYCHODATETRGT_PSY_ALL_CORE
18-Apr-2024
02-May-2024
25-Apr-2024
02-May-2024
25-Apr-2024
02-May-2024
25-Apr-2024
16-Apr-2024
25-Apr-2024
02-May-2024
02-May-2024

## 2024-04-24 NOTE — BH INPATIENT PSYCHIATRY PROGRESS NOTE - NSTXDCOPLKDATEEST_PSY_ALL_CORE
09-Apr-2024

## 2024-04-24 NOTE — BH INPATIENT PSYCHIATRY PROGRESS NOTE - NSBHATTESTBILLONSITE_PSY_A_CORE
BECKY to bill

## 2024-04-26 DIAGNOSIS — I35.0 NONRHEUMATIC AORTIC (VALVE) STENOSIS: ICD-10-CM

## 2024-04-26 DIAGNOSIS — F03.90 UNSPECIFIED DEMENTIA WITHOUT BEHAVIORAL DISTURBANCE: ICD-10-CM

## 2024-04-26 DIAGNOSIS — F25.9 SCHIZOAFFECTIVE DISORDER, UNSPECIFIED: ICD-10-CM

## 2024-04-26 DIAGNOSIS — E78.5 HYPERLIPIDEMIA, UNSPECIFIED: ICD-10-CM

## 2024-04-26 DIAGNOSIS — E11.9 TYPE 2 DIABETES MELLITUS WITHOUT COMPLICATIONS: ICD-10-CM

## 2024-04-26 DIAGNOSIS — I10 ESSENTIAL (PRIMARY) HYPERTENSION: ICD-10-CM

## 2024-04-29 NOTE — BH SOCIAL WORK CONFIRMATION FOLLOW UP NOTE - NSLINKEDTOLOC_PSY_ALL_CORE
Natalie was seen by the staff psychiatrist at [facility] ([facility]) as scheduled, [phone].

## 2024-05-02 NOTE — ED PROVIDER NOTE - BIRTH SEX
[Potential consequences of obesity discussed] : Potential consequences of obesity discussed [Benefits of weight loss discussed] : Benefits of weight loss discussed Female

## 2024-05-17 ENCOUNTER — APPOINTMENT (OUTPATIENT)
Dept: NEUROLOGY | Facility: CLINIC | Age: 64
End: 2024-05-17

## 2024-07-23 NOTE — ED BEHAVIORAL HEALTH ASSESSMENT NOTE - NS ED BHA BENZODIAZEPINES
MSK: right knee with mild swelling, +RLE non pitting edema from knee through foot  limited ROM of right knee 2/2 pain, no overlying warmth, no skin changes
None known

## 2024-07-30 NOTE — OCCUPATIONAL THERAPY INITIAL EVALUATION ADULT - PRECAUTIONS/LIMITATIONS, REHAB EVAL
Received a fax from the pharmacy asking if patient has another comorbidity (hypertension/hyperlipidemia). If not, they are unable to fill the prescription because she has a BMI of 30 and that is the cut off point for Phentermine.   
Spoke w/ monica Betancourt to add hyperlipidemia given Shira's history of elevated LDL. Dx added to fax and sent to pharmacy.   
-180/fall precautions

## 2024-08-08 NOTE — PROGRESS NOTE BEHAVIORAL HEALTH - NSBHFUPSUICINTENT_PSY_A_CORE
"History  Chief Complaint   Patient presents with    Flank Pain     R flank pain starting this morning. Hx of kidney stones. Known bladder stones. Frequent urination     67-year-old male with history of hypertension, diabetes, kidney stones presents with right flank pain for 1 day.  He states he has had multiple kidney stones in the past and this feels very similar.  He describes it as a constant crampy pain which sometimes becomes severe and slightly improved after urinating having a bowel movement.  He states he also has pain in the suprapubic area and \"between his testicles.\"  He states he does not feel he is radiating from the flank but different type of pain.  Patient denies abdominal pain, shortness of breath, chest pain, fevers or chills, nausea/vomiting.        Prior to Admission Medications   Prescriptions Last Dose Informant Patient Reported? Taking?   Blood Glucose Monitoring Suppl (Contour Next One) JIN  Self Yes No   Sig: Use as directed   Patient not taking: Reported on 7/15/2024   Contour Next Test test strip  Self No No   Sig: TEST UP TO FOUR TIMES A DAY BEFORE MEALS AND BEDTIME   Patient not taking: Reported on 7/15/2024   Microlet Lancets MISC  Self Yes No   Sig: TEST BLOOD SUGAR UP TO 4 TIMES DAILY (BEFORE MEALS AND AT BEDTIME)   Patient not taking: Reported on 7/15/2024   Omega-3 Fatty Acids (Omega-3 Fish Oil) 1000 MG CAPS  Self No No   Sig: Take 2,000 mg by mouth daily   Patient not taking: Reported on 5/28/2024   allopurinol (ZYLOPRIM) 100 mg tablet  Self No No   Sig: Take 1 tablet (100 mg total) by mouth daily   aspirin (ECOTRIN) 325 mg EC tablet  Self No No   Sig: Take 1 tablet (325 mg total) by mouth daily   Patient not taking: Reported on 5/28/2024   buPROPion (WELLBUTRIN XL) 150 mg 24 hr tablet   No No   Sig: Take 1 tablet (150 mg total) by mouth daily   cholecalciferol (VITAMIN D3) 1,000 units tablet  Self Yes No   Sig: Take 5,000 Units by mouth daily 10,000 units Patient stated that is " continuously taking   Patient not taking: Reported on 7/15/2024   glimepiride (AMARYL) 2 mg tablet   No No   Sig: Take 1 tablet (2 mg total) by mouth daily with breakfast   Patient not taking: Reported on 7/15/2024   glucosamine-chondroitin 500-400 MG tablet  Self Yes No   Sig: Take 2 tablets by mouth daily   losartan (COZAAR) 100 MG tablet   No No   Sig: TAKE 1 TABLET EVERY DAY   metoprolol-hydrochlorothiazide (LOPRESSOR HCT) 100-25 MG per tablet   No No   Sig: TAKE 1/2 TABLET EVERY DAY   oxyCODONE-acetaminophen (Percocet) 5-325 mg per tablet  Self No No   Sig: Take 1 tablet by mouth every 4 (four) hours as needed for moderate pain Max Daily Amount: 6 tablets   Patient not taking: Reported on 12/1/2023   rosuvastatin (CRESTOR) 20 MG tablet   No No   Sig: TAKE 1 TABLET EVERY DAY   semaglutide (Rybelsus) 14 MG tablet  Self No No   Sig: Take 1 tablet (14 mg total) by mouth daily before breakfast   Patient not taking: Reported on 7/15/2024   sertraline (ZOLOFT) 25 mg tablet   No No   Sig: Take 1 tablet (25 mg total) by mouth daily   Patient not taking: Reported on 7/15/2024      Facility-Administered Medications: None       Past Medical History:   Diagnosis Date    Diabetes mellitus (HCC)     HTN (hypertension)     Hyperlipidemia     Hypertension     Kidney stone     Osteomyelitis (HCC) 2020    Seizures (HCC)     Not since age 14       Past Surgical History:   Procedure Laterality Date    COLONOSCOPY      HAND SURGERY      HERNIA REPAIR      IR PICC PLACEMENT SINGLE LUMEN  12/08/2020    remove    VASCULAR SURGERY      WOUND DEBRIDEMENT N/A 11/30/2020    Procedure: DEBRIDEMENT OF ANTERIOR MANDIBLE BONE BIOPSY, CULTURES AND REMOVAL OF IMPLANT #27, USE OF PRP;  Surgeon: Kiah Ordoñez DDS;  Location: [facility];  Service: Maxillofacial       Family History   Problem Relation Age of Onset    Cancer Mother     Kidney disease Maternal Uncle      I have reviewed and agree with the history as documented.    E-Cigarette/Vaping     E-Cigarette Use Never User      E-Cigarette/Vaping Substances    Nicotine No     THC No     CBD No     Flavoring No     Other No     Unknown No      Social History     Tobacco Use    Smoking status: Former     Current packs/day: 0.00     Average packs/day: 2.0 packs/day for 21.1 years (42.2 ttl pk-yrs)     Types: Cigarettes     Start date: 1978     Quit date: 2000     Years since quittin.6    Smokeless tobacco: Never   Vaping Use    Vaping status: Never Used   Substance Use Topics    Alcohol use: Yes     Alcohol/week: 3.0 standard drinks of alcohol     Types: 2 Cans of beer, 1 Shots of liquor per week     Comment: Not regularily    Drug use: Never        Review of Systems   Constitutional:  Negative for chills and fever.   HENT:  Negative for congestion.    Eyes:  Negative for visual disturbance.   Respiratory:  Negative for shortness of breath.    Cardiovascular:  Negative for chest pain and palpitations.   Gastrointestinal:  Positive for abdominal pain (suprapubic). Negative for constipation, diarrhea, nausea and vomiting.   Genitourinary:  Positive for flank pain, frequency and testicular pain.   Musculoskeletal:  Negative for myalgias.   Skin:  Negative for rash.   Neurological:  Negative for light-headedness and headaches.       Physical Exam  ED Triage Vitals [24 1226]   Temperature Pulse Respirations Blood Pressure SpO2   97.8 °F (36.6 °C) (!) 50 18 (!) 171/85 96 %      Temp Source Heart Rate Source Patient Position - Orthostatic VS BP Location FiO2 (%)   Oral Monitor Sitting Right arm --      Pain Score       5             Orthostatic Vital Signs  Vitals:    24 1226 24 1228 24 1440 24 1652   BP: (!) 171/85 (!) 171/85 159/78 (!) 179/77   Pulse: (!) 50  (!) 52 (!) 51   Patient Position - Orthostatic VS: Sitting Sitting Lying Lying       Physical Exam  Vitals and nursing note reviewed.   Constitutional:       General: He is not in acute distress.     Appearance: He  is not ill-appearing.   HENT:      Head: Normocephalic and atraumatic.      Right Ear: External ear normal.      Left Ear: External ear normal.      Nose: Nose normal. No congestion or rhinorrhea.      Mouth/Throat:      Mouth: Mucous membranes are moist.      Pharynx: Oropharynx is clear.   Eyes:      General: No scleral icterus.     Extraocular Movements: Extraocular movements intact.   Cardiovascular:      Rate and Rhythm: Normal rate and regular rhythm.      Pulses: Normal pulses.      Heart sounds: Normal heart sounds.   Pulmonary:      Effort: Pulmonary effort is normal.      Breath sounds: Normal breath sounds.   Abdominal:      Palpations: Abdomen is soft.      Tenderness: There is no abdominal tenderness.   Musculoskeletal:         General: Normal range of motion.      Cervical back: Normal range of motion.   Skin:     General: Skin is warm and dry.   Neurological:      General: No focal deficit present.      Mental Status: He is alert and oriented to person, place, and time.   Psychiatric:         Mood and Affect: Mood normal.         Behavior: Behavior normal.         ED Medications  Medications   sodium chloride 0.9 % bolus 1,000 mL (0 mL Intravenous Stopped 8/8/24 1803)   sodium chloride 0.9 % bolus 1,000 mL (0 mL Intravenous Stopped 8/8/24 1803)   iohexol (OMNIPAQUE) 350 MG/ML injection (SINGLE-DOSE) 100 mL (100 mL Intravenous Given 8/8/24 1547)       Diagnostic Studies  Results Reviewed       Procedure Component Value Units Date/Time    Fingerstick Glucose (POCT) [[id number]]  (Abnormal) Collected: 08/08/24 1805    Lab Status: Final result Specimen: Blood Updated: 08/08/24 1806     POC Glucose 297 mg/dl     Fingerstick Glucose (POCT) [[id number]]  (Abnormal) Collected: 08/08/24 1656    Lab Status: Final result Specimen: Blood Updated: 08/08/24 1658     POC Glucose 300 mg/dl     Blood gas, venous [[id number]]  (Abnormal) Collected: 08/08/24 1505    Lab Status: Final result Specimen: Blood from Arm, Left  Updated: 08/08/24 1520     pH, Obdulio 7.399     pCO2, Obdulio 39.6 mm Hg      pO2, Obdulio 34.6 mm Hg      HCO3, Obdulio 23.9 mmol/L      Base Excess, Obdulio -0.7 mmol/L      O2 Content, Obdulio 14.3 ml/dL      O2 HGB, VENOUS 66.2 %     Beta Hydroxybutyrate [[id number]]  (Normal) Collected: 08/08/24 1229    Lab Status: Final result Specimen: Blood from Arm, Left Updated: 08/08/24 1520     Beta- Hydroxybutyrate 0.11 mmol/L     Comprehensive metabolic panel [[id number]]  (Abnormal) Collected: 08/08/24 1229    Lab Status: Final result Specimen: Blood from Arm, Left Updated: 08/08/24 1319     Sodium 128 mmol/L      Potassium 4.6 mmol/L      Chloride 94 mmol/L      CO2 26 mmol/L      ANION GAP 8 mmol/L      BUN 16 mg/dL      Creatinine 1.07 mg/dL      Glucose 457 mg/dL      Calcium 9.5 mg/dL      AST 35 U/L      ALT 60 U/L      Alkaline Phosphatase 62 U/L      Total Protein 6.9 g/dL      Albumin 4.4 g/dL      Total Bilirubin 0.67 mg/dL      eGFR 72 ml/min/1.73sq m     Narrative:      National Kidney Disease Foundation guidelines for Chronic Kidney Disease (CKD):     Stage 1 with normal or high GFR (GFR > 90 mL/min/1.73 square meters)    Stage 2 Mild CKD (GFR = 60-89 mL/min/1.73 square meters)    Stage 3A Moderate CKD (GFR = 45-59 mL/min/1.73 square meters)    Stage 3B Moderate CKD (GFR = 30-44 mL/min/1.73 square meters)    Stage 4 Severe CKD (GFR = 15-29 mL/min/1.73 square meters)    Stage 5 End Stage CKD (GFR <15 mL/min/1.73 square meters)  Note: GFR calculation is accurate only with a steady state creatinine    Lipase [[id number]]  (Abnormal) Collected: 08/08/24 1229    Lab Status: Final result Specimen: Blood from Arm, Left Updated: 08/08/24 1307     Lipase 95 u/L     Urine Microscopic [[id number]]  (Normal) Collected: 08/08/24 1229    Lab Status: Final result Specimen: Urine, Clean Catch Updated: 08/08/24 1253     RBC, UA 1-2 /hpf      WBC, UA 1-2 /hpf      Epithelial Cells None Seen /hpf      Bacteria, UA None Seen /hpf     UA w Reflex to  Microscopic w Reflex to Culture [[id number]]  (Abnormal) Collected: 08/08/24 1229    Lab Status: Final result Specimen: Urine, Clean Catch Updated: 08/08/24 1252     Color, UA Light Yellow     Clarity, UA Clear     Specific Gravity, UA 1.037     pH, UA 5.5     Leukocytes, UA Elevated glucose may cause decreased leukocyte values. See urine microscopic for UWBC result     Nitrite, UA Negative     Protein, UA Negative mg/dl      Glucose, UA >=1000 (1%) mg/dl      Ketones, UA Negative mg/dl      Urobilinogen, UA <2.0 mg/dl      Bilirubin, UA Negative     Occult Blood, UA Negative    CBC and differential [[id number]]  (Abnormal) Collected: 08/08/24 1229    Lab Status: Final result Specimen: Blood from Arm, Left Updated: 08/08/24 1241     WBC 3.71 Thousand/uL      RBC 5.26 Million/uL      Hemoglobin 15.3 g/dL      Hematocrit 43.4 %      MCV 83 fL      MCH 29.1 pg      MCHC 35.3 g/dL      RDW 12.6 %      MPV 10.4 fL      Platelets 181 Thousands/uL      nRBC 0 /100 WBCs      Segmented % 50 %      Immature Grans % 1 %      Lymphocytes % 31 %      Monocytes % 13 %      Eosinophils Relative 4 %      Basophils Relative 1 %      Absolute Neutrophils 1.91 Thousands/µL      Absolute Immature Grans 0.02 Thousand/uL      Absolute Lymphocytes 1.13 Thousands/µL      Absolute Monocytes 0.47 Thousand/µL      Eosinophils Absolute 0.13 Thousand/µL      Basophils Absolute 0.05 Thousands/µL                    CT abdomen pelvis with contrast   Final Result by Papo Gomez MD (08/08 1639)      Obstructing 5 mm right ureterovesicular junction calculus with resulting upstream moderate hydroureteronephrosis.      1.4 cm left and 1 cm right adrenal nodule.   Both of these nodules are grossly unchanged from the 2014 comparison, and therefore likely represent benign adenomas.   Biochemical analysis can be performed to rule out functioning adenoma.      Hepatomegaly with steatosis      The study was marked in EPIC for immediate notification..       Workstation performed: [id number]         US scrotum and testicles   Final Result by Elias Carlton MD (08/08 1550)      Normal testes.      Left varicocele.      Workstation performed: [id number]               Procedures  ECG 12 Lead Documentation Only    Date/Time: 8/8/2024 5:03 PM    Performed by: Romel Velasco DO  Authorized by: Romel Velasco DO    Indications / Diagnosis:  Bradycardia  Patient location:  ED  Previous ECG:     Previous ECG:  Unavailable  Interpretation:     Interpretation: abnormal    Rate:     ECG rate:  52    ECG rate assessment: bradycardic    Rhythm:     Rhythm: sinus bradycardia    Ectopy:     Ectopy: none    QRS:     QRS axis:  Normal    QRS intervals:  Normal  Conduction:     Conduction: normal    ST segments:     ST segments:  Non-specific    Elevation:  III  T waves:     T waves: non-specific          ED Course  ED Course as of 08/08/24 1812   Thu Aug 08, 2024   1527 GLUCOSE(!!): 457   1528 pH, Obdulio: 7.399   1528 Beta- Hydroxybutyrate: 0.11                                       Medical Decision Making  Differential includes but not limited to: Nephrolithiasis, pyelonephritis, orchitis/epididymitis, cystitis, DKA    Don came to the ER with right flank pain that radiated into his groin.  First, patient made it seem like the testicle and suprapubic pain was not radiation, for which an ultrasound of the testicles was ordered and normal aside from incidental finding of left varicocele.  No signs of orchitis, epididymitis.  Patient also noted to have hyperglycemia on initial labs.  VBG and beta-hydroxybutyrate were negative for signs of DKA.  Patient was offered medication to help control his hyperglycemia, which she denied at this time stating that they make him sick and he has multiple medications at home that he does not take already.  He was instructed to follow closely with his endocrinologist, and was given information to contact them.  Patient also not  "complaining of abdominal discomfort, nausea, vomiting.  CT scan of abdomen showed 5 mm kidney stone at the UVJ with moderate hydroureteronephrosis.  Patient is still able to urinate, pain is well-controlled, there is no signs of infection/pyelo.  At this time, patient is instructed to strain all his urine and follow closely with urology, for which he was given an ambulatory referral.  Patient also instructed to follow with his PCP about the incidental findings which are listed and discussed with him.  Patient understands and agrees with the plan.  Strict return precautions given if symptoms are worsening or not resolving.  Patient discharged in stable condition.      Portions of the record have been created with voice recognition software.  Occasional wrong word or \"sound a like\" substitution may have occurred due to the inherent limitations of voice recognition software.  Read the chart carefully and recognize, using context, where substitutions have occurred.       Amount and/or Complexity of Data Reviewed  Labs: ordered. Decision-making details documented in ED Course.  Radiology: ordered.    Risk  Prescription drug management.          Disposition  Final diagnoses:   Nephrolithiasis   Hyperglycemia   Left varicocele   Adrenal nodule (HCC)   Hepatomegaly   Steatosis (HCC)     Time reflects when diagnosis was documented in both MDM as applicable and the Disposition within this note       Time User Action Codes Description Comment    8/8/2024  4:46 PM Romel Velasco Add [N20.0] Nephrolithiasis     8/8/2024  4:47 PM Romel Velasco Add [R73.9] Hyperglycemia     8/8/2024  4:47 PM Romel Velasco Add [I86.1] Left varicocele     8/8/2024  4:47 PM Romel Velasco Add [E27.8] Adrenal nodule (HCC)     8/8/2024  4:53 PM Romel Velasco Add [R16.0] Hepatomegaly     8/8/2024  4:53 PM Romel Velasco Add [E88.89] Steatosis (HCC)           ED Disposition       ED Disposition   Discharge    Condition   Stable "    Date/Time   Thu Aug 8, 2024  4:46 PM    Comment   Sergio Ahumada discharge to home/self care.                   Follow-up Information       Follow up With Specialties Details Why Contact Info Additional Information    Steve Nguyen MD Internal Medicine Call  As needed, For ED follow-up [address] [place] [address] [id number] [phone]       [facility] Emergency Medicine Go to  If symptoms worsen or do not resolve [address] [place] [address]  [phone] [facility], [address] [place] [address], [id number] [place] [address] for Diabetes and Endocrinology [facility] Call in 2 days For ED follow-up [address] [facility] [address] [phone] [facility], [address] [place], [address], [phone]            Patient's Medications   Discharge Prescriptions    No medications on file         PDMP Review         Value Time User    PDMP Reviewed  Yes 11/18/2020  7:26 PM Fredy Matos MD             ED Provider  Attending physically available and evaluated Sergio Ahumada. I managed the patient along with the ED Attending.    Electronically Signed by           Romel Velasco DO  08/08/24 2170     none known

## 2024-08-27 NOTE — ED PROVIDER NOTE - CARE PLAN
PROGRESS NOTE     Subjective     Sudheer is a 63 year old here for Office Visit and Rash     Review of Systems   Constitutional:  Negative for fatigue and fever.   Musculoskeletal:  Negative for arthralgias and myalgias.   Skin:  Positive for rash.        Rash of right lower leg X 5 months. Not improving. Has tried OTC zyrtec and topical cortisone cream with no improvement noted. It is itchy, will bleed when scratched. Will have intermittent flares and will look blister like. Is potentially spreading up the lateral aspect of the right leg. Not painful. No changes to soaps, lotions or detergents. No other medication changes. No new plants or allergic reactions to the outside world.    All other systems reviewed and are negative.    As documented above.    SDOH Former Smoker (Quit 05/22/2017) Tobacco Pack Years 6.6    Objective   Vitals:    08/27/24 0849   BP: 139/88   Pulse: 75   Temp: 97.7 °F (36.5 °C)   TempSrc: Tympanic   Weight: 78.5 kg (173 lb)   Height: 5' 7\" (1.702 m)       Physical Exam  Vitals and nursing note reviewed.   Constitutional:       Appearance: Normal appearance.   Cardiovascular:      Rate and Rhythm: Normal rate and regular rhythm.      Pulses: Normal pulses.      Heart sounds: Normal heart sounds.   Skin:     Findings: Rash present.   Neurological:      Mental Status: She is alert.   Psychiatric:         Mood and Affect: Mood normal.         Behavior: Behavior normal.         Thought Content: Thought content normal.         Judgment: Judgment normal.                ASSESSMENT AND PLAN       1. Rash and nonspecific skin eruption    2. Hyperlipidemia, unspecified hyperlipidemia type      Etiology of rash unclear. Will treat with prednisone burst. Reviewed this medication in great detail with Sudheer. Educated patient on how it works, how to take it and common side effects. Reviewed follow up. If needed, a derm referral could be placed.     Discussed tinnitus, reviewed home therapies to help.      Orders Placed This Encounter   • simvastatin (ZOCOR) 20 MG tablet     Sig: Take 1 tablet by mouth nightly.     Dispense:  90 tablet     Refill:  4   • predniSONE (DELTASONE) 20 MG tablet     Sig: Take 2 tablets by mouth daily for 5 days.     Dispense:  10 tablet     Refill:  0         Follow Up  Return if symptoms worsen or fail to improve.         1 Principal Discharge DX:	Hypothermia  Secondary Diagnosis:	Chest pain

## 2024-09-28 ENCOUNTER — INPATIENT (INPATIENT)
Facility: HOSPITAL | Age: 64
LOS: 8 days | Discharge: HOME | DRG: 885 | End: 2024-10-07
Attending: PSYCHIATRY & NEUROLOGY | Admitting: PSYCHIATRY & NEUROLOGY
Payer: MEDICARE

## 2024-09-28 VITALS
HEART RATE: 95 BPM | OXYGEN SATURATION: 97 % | DIASTOLIC BLOOD PRESSURE: 110 MMHG | TEMPERATURE: 98 F | SYSTOLIC BLOOD PRESSURE: 165 MMHG | RESPIRATION RATE: 18 BRPM

## 2024-09-28 DIAGNOSIS — Z95.2 PRESENCE OF PROSTHETIC HEART VALVE: Chronic | ICD-10-CM

## 2024-09-28 LAB
APPEARANCE UR: CLEAR — SIGNIFICANT CHANGE UP
BASOPHILS # BLD AUTO: 0.05 K/UL — SIGNIFICANT CHANGE UP (ref 0–0.2)
BASOPHILS NFR BLD AUTO: 0.7 % — SIGNIFICANT CHANGE UP (ref 0–1)
BILIRUB UR-MCNC: NEGATIVE — SIGNIFICANT CHANGE UP
COLOR SPEC: YELLOW — SIGNIFICANT CHANGE UP
DIFF PNL FLD: NEGATIVE — SIGNIFICANT CHANGE UP
EOSINOPHIL # BLD AUTO: 0.21 K/UL — SIGNIFICANT CHANGE UP (ref 0–0.7)
EOSINOPHIL NFR BLD AUTO: 2.9 % — SIGNIFICANT CHANGE UP (ref 0–8)
GLUCOSE UR QL: >=1000 MG/DL
HCT VFR BLD CALC: 36.1 % — LOW (ref 37–47)
HGB BLD-MCNC: 12.3 G/DL — SIGNIFICANT CHANGE UP (ref 12–16)
IMM GRANULOCYTES NFR BLD AUTO: 0.7 % — HIGH (ref 0.1–0.3)
KETONES UR-MCNC: NEGATIVE MG/DL — SIGNIFICANT CHANGE UP
LEUKOCYTE ESTERASE UR-ACNC: NEGATIVE — SIGNIFICANT CHANGE UP
LYMPHOCYTES # BLD AUTO: 2.43 K/UL — SIGNIFICANT CHANGE UP (ref 1.2–3.4)
LYMPHOCYTES # BLD AUTO: 33.2 % — SIGNIFICANT CHANGE UP (ref 20.5–51.1)
MCHC RBC-ENTMCNC: 29.7 PG — SIGNIFICANT CHANGE UP (ref 27–31)
MCHC RBC-ENTMCNC: 34.1 G/DL — SIGNIFICANT CHANGE UP (ref 32–37)
MCV RBC AUTO: 87.2 FL — SIGNIFICANT CHANGE UP (ref 81–99)
MONOCYTES # BLD AUTO: 0.53 K/UL — SIGNIFICANT CHANGE UP (ref 0.1–0.6)
MONOCYTES NFR BLD AUTO: 7.2 % — SIGNIFICANT CHANGE UP (ref 1.7–9.3)
NEUTROPHILS # BLD AUTO: 4.06 K/UL — SIGNIFICANT CHANGE UP (ref 1.4–6.5)
NEUTROPHILS NFR BLD AUTO: 55.3 % — SIGNIFICANT CHANGE UP (ref 42.2–75.2)
NITRITE UR-MCNC: NEGATIVE — SIGNIFICANT CHANGE UP
NRBC # BLD: 0 /100 WBCS — SIGNIFICANT CHANGE UP (ref 0–0)
PH UR: 5.5 — SIGNIFICANT CHANGE UP (ref 5–8)
PLATELET # BLD AUTO: 189 K/UL — SIGNIFICANT CHANGE UP (ref 130–400)
PMV BLD: 9.3 FL — SIGNIFICANT CHANGE UP (ref 7.4–10.4)
PROT UR-MCNC: NEGATIVE MG/DL — SIGNIFICANT CHANGE UP
RBC # BLD: 4.14 M/UL — LOW (ref 4.2–5.4)
RBC # FLD: 13.2 % — SIGNIFICANT CHANGE UP (ref 11.5–14.5)
SP GR SPEC: >1.03 — HIGH (ref 1–1.03)
UROBILINOGEN FLD QL: 0.2 MG/DL — SIGNIFICANT CHANGE UP (ref 0.2–1)
WBC # BLD: 7.33 K/UL — SIGNIFICANT CHANGE UP (ref 4.8–10.8)
WBC # FLD AUTO: 7.33 K/UL — SIGNIFICANT CHANGE UP (ref 4.8–10.8)

## 2024-09-28 PROCEDURE — 93010 ELECTROCARDIOGRAM REPORT: CPT | Mod: 76

## 2024-09-28 PROCEDURE — 99285 EMERGENCY DEPT VISIT HI MDM: CPT | Mod: FS

## 2024-09-28 NOTE — ED PROVIDER NOTE - PRO INTERPRETER NEED 2
Pre-procedure:    Initial vital signs: /71, HR 68, RR 17  Allergies reviewed: yes   Rhythm: Sinus  Medications taken within 48 hours of procedure: see epic   Last Caffeine: none today  Lung sounds: CTA, no wheezing, crackles or rtx  Health History (COPD, Asthma, etc): none    Procedure: Lexiscan  Reaction/symptoms after receiving Rosalia injection: anxious and heart racing   Intensity of Pain: none  Rhythm:   1. Vital Signs:/67, HR 86, RR 17  2. Vital Signs:/64, HR 74, RR 17     Reversal agent: N/A    Post:   Resolution of symptoms?: YES  Vital signs: /57, HR 67, RR 15  Rhythm:   Walk: NO  Comment: Patient tolerated procedure well. Denies any chest pain , but continues with SOB.  Return to Radiology  
English

## 2024-09-28 NOTE — ED PROVIDER NOTE - CLINICAL SUMMARY MEDICAL DECISION MAKING FREE TEXT BOX
63-year-old female, history of schizoaffective disorder, valve replacement, presented with suicidal/homicidal ideation.  Medically cleared.  Seen by telepsych.  Will admit voluntarily.

## 2024-09-28 NOTE — ED PROVIDER NOTE - OBJECTIVE STATEMENT
63-year-old female with a past medical history of schizoaffective disorder and on Abilify, bioprosthetic AVR on aspirin, history of leukemia, history of CVA, hypertension, and diet-controlled diabetes presents to the ED for evaluation of suicidal and homicidal ideations.  Patient reports she takes 15 mg of Abilify and ran out yesterday.  Patient reports she is currently staying at a women shelter and sees therapist there.  Patient reports she thinks she saw her therapist about 1 month ago.  Patient reports the last time that she was admitted to the hospital for psychiatric reasons was about 6 months ago.  Patient was admitted from April 1 to April 19 for paranoia.  Patient reports she does not want to kill anyone in particular.  Patient reports her plan to hurt herself would be to run into traffic.  Patient reports she is hearing voices that are telling her "you can save the world.  "Patient denies fever, chills, chest pain, shortness of breath, abdominal pain, nausea, vomiting, diarrhea, constipation, illicit drug use, or use of alcohol.

## 2024-09-28 NOTE — ED PROVIDER NOTE - IV ALTEPLASE INCLUSION HIDDEN
TRIAGE CHIEF COMPLAINT:     Nursing and triage notes reviewed    Chief Complaint   Patient presents with   • Pain      HPI: Darius Crawley is a 37 y.o. male who presents to the emergency department complaining of right-sided flank pain and generalized weakness associated with nausea.  Patient states that his symptoms began earlier today.  Patient felt lightheaded and almost passed out.  Describes sharp stabbing pain.  Patient was in the emergency department recently where he was diagnosed with a hydrocele of the right testicle.  Patient does have a history of kidney stones and at the time of previous presentation had a right-sided kidney stone but no ureteral stone.    REVIEW OF SYSTEMS: All other systems reviewed and are negative     PAST MEDICAL HISTORY:   Past Medical History:   Diagnosis Date   • GERD (gastroesophageal reflux disease)    • HTN (hypertension)    • Kidney stone     2 ureter tubes   • Migraines    • Nerve damage     to the kidney area        FAMILY HISTORY:   Family History   Problem Relation Age of Onset   • Hypertension Mother    • Stroke Mother    • Colon cancer Maternal Grandmother    • Colon cancer Other         SOCIAL HISTORY:   Social History     Socioeconomic History   • Marital status: Single   Tobacco Use   • Smoking status: Former Smoker     Packs/day: 0.50     Types: Cigarettes   • Smokeless tobacco: Current User   • Tobacco comment: quit 2018 - pt vapes   Vaping Use   • Vaping Use: Every day   • Substances: Nicotine, Flavoring   • Devices: Disposable   Substance and Sexual Activity   • Alcohol use: No   • Drug use: No   • Sexual activity: Defer        SURGICAL HISTORY:   Past Surgical History:   Procedure Laterality Date   • CHOLECYSTECTOMY     • CYST REMOVAL      [place] back   • KIDNEY SURGERY      stent placed & removed causing nerve damage   • NERVE SURGERY          CURRENT MEDICATIONS:      Medication List      ASK your doctor about these medications    bacitracin 500 UNIT/GM  show ointment  Apply 1 application topically to the appropriate area as directed 2 (Two) Times a Day. Apply bacitracin to 4x4 then to skin (wipe on the 4x4 guaze first then place the guaze on the skin)     cefdinir 300 MG capsule  Commonly known as: OMNICEF  Take 1 capsule by mouth 2 (Two) Times a Day for 7 days.     cyclobenzaprine 5 MG tablet  Commonly known as: FLEXERIL  Take 1 tablet by mouth 3 (Three) Times a Day As Needed for Muscle Spasms.     gabapentin 600 MG tablet  Commonly known as: NEURONTIN     HYDROcodone-acetaminophen 5-325 MG per tablet  Commonly known as: NORCO  Take 1 tablet by mouth Every 6 (Six) Hours As Needed for Moderate Pain .     hydrOXYzine 10 MG tablet  Commonly known as: ATARAX     ibuprofen 600 MG tablet  Commonly known as: ADVIL,MOTRIN  Take 1 tablet by mouth Every 6 (Six) Hours As Needed for Mild Pain .     ketorolac 10 MG tablet  Commonly known as: TORADOL  Take 1 tablet by mouth Every 6 (Six) Hours As Needed for Moderate Pain .     LISINOPRIL PO     * ondansetron ODT 4 MG disintegrating tablet  Commonly known as: ZOFRAN-ODT  Place 1 tablet under the tongue Every 6 (Six) Hours As Needed for Nausea or Vomiting.     * ondansetron ODT 4 MG disintegrating tablet  Commonly known as: ZOFRAN-ODT  Place 1 tablet on the tongue Every 8 (Eight) Hours As Needed for Nausea or Vomiting.     pantoprazole 40 MG EC tablet  Commonly known as: PROTONIX     predniSONE 20 MG tablet  Commonly known as: DELTASONE  Take 2 tablets daily.     promethazine 25 MG tablet  Commonly known as: PHENERGAN  Take 1 tablet by mouth Every 6 (Six) Hours As Needed (unrelieved nausea/vomiting).     topiramate 200 MG tablet  Commonly known as: TOPAMAX         * This list has 2 medication(s) that are the same as other medications prescribed for you. Read the directions carefully, and ask your doctor or other care provider to review them with you.                 ALLERGIES: Patient has no known allergies.     PHYSICAL EXAM:   VITAL  SIGNS:   Vitals:    08/14/22 0139   BP: 140/84   Pulse: 89   Resp: 18   Temp: 98.4 °F (36.9 °C)   SpO2: 96%      CONSTITUTIONAL: Awake, oriented, appears uncomfortable, pale  HENT: Atraumatic, normocephalic, oral mucosa pink and moist, airway patent. Nares patent without drainage. External ears normal.   EYES: Conjunctivae clear  NECK: Trachea midline   CARDIOVASCULAR: Normal heart rate, Normal rhythm, No murmurs, rubs, gallops   PULMONARY/CHEST: Clear to auscultation, no rhonchi, wheezes, or rales. Symmetrical breath sounds.  ABDOMINAL: Nondistended, soft, right flank pain  NEUROLOGIC: Nonfocal, moving all four extremities, no gross sensory or motor deficits.   EXTREMITIES: No clubbing, cyanosis, or edema   SKIN: Warm, Dry, No erythema, No rash     ED COURSE / MEDICAL DECISION MAKING:   Darius Crawley is a 37 y.o. male who presents to the emergency department for evaluation of right-sided flank pain.  Patient does appear uncomfortable on arrival and is pale appearing.  Vital signs are stable.  Patient was helped into a stretcher and does have right-sided flank discomfort.  Will obtain labs and imaging for further evaluation.    Patient did have an elevated white blood cell count of 15.  Renal function was within the normal range.  Patient did not provide a urine specimen.  CT scan of the abdomen and pelvis per radiology interpretation reveals some nonspecific right inguinal subcutaneous stranding, this has been seen on previous images.  No other acute findings appreciated per report.  I did order a testicular ultrasound for further evaluation, however prior to that the patient decided to sign out AGAINST MEDICAL ADVICE.  Patient had been given some pain and nausea medication with some improvement in his symptoms.  Patient able to express understanding of leaving prior to the completion of his work-up.  Did provide a prescription for antibiotics for possible infection but advised him to return at any time for a  more comprehensive work-up.    DECISION TO DISCHARGE/ADMIT: see ED care timeline     FINAL IMPRESSION:   1 --right flank pain  2 --left AGAINST MEDICAL ADVICE  3 --     Electronically signed by: Emy Zimmerman MD, 8/14/2022 01:47 Emy Cota MD  08/14/22 0517

## 2024-09-28 NOTE — ED PROVIDER NOTE - PHYSICAL EXAMINATION
Raul Reaves(PA) Physical Exam    Vital Signs: I have reviewed the initial vital signs.  Constitutional: well-nourished, appears stated age, no acute distress  Eyes: Conjunctiva pink, Sclera clear  Cardiovascular: regular rate, regular rhythm, well-perfused extremities, radial pulses equal and 2+ b/l.   Respiratory: unlabored respiratory effort, clear to auscultation bilaterally no wheezing, rales and rhonchi. pt is speaking full sentences. no accessory muscle use.   Gastrointestinal: soft, non-tender, nondistended abdomen, no pulsatile mass, no rebound, no guarding  Musculoskeletal: FROM of b/l upper and lower extremities  Integumentary: warm, dry, no rash  Neurologic: awake, alert, a&o x 4  Psychiatric: appropriate mood, appropriate affect

## 2024-09-28 NOTE — ED PROVIDER NOTE - ATTENDING APP SHARED VISIT CONTRIBUTION OF CARE
63-year-old female past medical history of valve replacement on aspirin, history of schizoaffective disorder who presents for evaluation of suicidal and homicidal ideations.  Patient states that there is no 1 specific person she wants to hurt.  When asked if she has a plan to hurt herself patient states that she wants to run into traffic.  Patient has history of IPP admission in the past last 1 about 6 months ago.  Patient states she is compliant with medication however ran out of her Abilify yesterday.  Patient is without chest pain or shortness of breath, exam patient in NAD, AAOx3, no signs of trauma, lungs CTA B/L, extremities atraumatic

## 2024-09-29 DIAGNOSIS — F20.9 SCHIZOPHRENIA, UNSPECIFIED: ICD-10-CM

## 2024-09-29 DIAGNOSIS — F25.9 SCHIZOAFFECTIVE DISORDER, UNSPECIFIED: ICD-10-CM

## 2024-09-29 LAB
ANION GAP SERPL CALC-SCNC: 14 MMOL/L — SIGNIFICANT CHANGE UP (ref 7–14)
APAP SERPL-MCNC: <5 UG/ML — LOW (ref 10–30)
BUN SERPL-MCNC: 13 MG/DL — SIGNIFICANT CHANGE UP (ref 10–20)
CALCIUM SERPL-MCNC: 9.3 MG/DL — SIGNIFICANT CHANGE UP (ref 8.4–10.5)
CHLORIDE SERPL-SCNC: 97 MMOL/L — LOW (ref 98–110)
CO2 SERPL-SCNC: 21 MMOL/L — SIGNIFICANT CHANGE UP (ref 17–32)
CREAT SERPL-MCNC: 1 MG/DL — SIGNIFICANT CHANGE UP (ref 0.7–1.5)
EGFR: 64 ML/MIN/1.73M2 — SIGNIFICANT CHANGE UP
ETHANOL SERPL-MCNC: <10 MG/DL — SIGNIFICANT CHANGE UP
GLUCOSE SERPL-MCNC: 428 MG/DL — HIGH (ref 70–99)
POTASSIUM SERPL-MCNC: 4 MMOL/L — SIGNIFICANT CHANGE UP (ref 3.5–5)
POTASSIUM SERPL-SCNC: 4 MMOL/L — SIGNIFICANT CHANGE UP (ref 3.5–5)
SALICYLATES SERPL-MCNC: 2.5 MG/DL — LOW (ref 4–30)
SARS-COV-2 RNA SPEC QL NAA+PROBE: SIGNIFICANT CHANGE UP
SODIUM SERPL-SCNC: 132 MMOL/L — LOW (ref 135–146)

## 2024-09-29 PROCEDURE — 84443 ASSAY THYROID STIM HORMONE: CPT

## 2024-09-29 PROCEDURE — 80061 LIPID PANEL: CPT

## 2024-09-29 PROCEDURE — 76705 ECHO EXAM OF ABDOMEN: CPT

## 2024-09-29 PROCEDURE — 85027 COMPLETE CBC AUTOMATED: CPT

## 2024-09-29 PROCEDURE — 90792 PSYCH DIAG EVAL W/MED SRVCS: CPT | Mod: 2W

## 2024-09-29 PROCEDURE — 80048 BASIC METABOLIC PNL TOTAL CA: CPT

## 2024-09-29 PROCEDURE — 36415 COLL VENOUS BLD VENIPUNCTURE: CPT

## 2024-09-29 PROCEDURE — 82962 GLUCOSE BLOOD TEST: CPT

## 2024-09-29 PROCEDURE — 80307 DRUG TEST PRSMV CHEM ANLYZR: CPT

## 2024-09-29 PROCEDURE — 80053 COMPREHEN METABOLIC PANEL: CPT

## 2024-09-29 PROCEDURE — 83036 HEMOGLOBIN GLYCOSYLATED A1C: CPT

## 2024-09-29 PROCEDURE — 80354 DRUG SCREENING FENTANYL: CPT

## 2024-09-29 RX ORDER — ACETAMINOPHEN 325 MG
650 TABLET ORAL EVERY 6 HOURS
Refills: 0 | Status: DISCONTINUED | OUTPATIENT
Start: 2024-09-29 | End: 2024-10-07

## 2024-09-29 RX ORDER — ARIPIPRAZOLE 5 MG/1
15 TABLET ORAL DAILY
Refills: 0 | Status: DISCONTINUED | OUTPATIENT
Start: 2024-09-29 | End: 2024-10-02

## 2024-09-29 RX ORDER — ASPIRIN 325 MG
81 TABLET ORAL DAILY
Refills: 0 | Status: DISCONTINUED | OUTPATIENT
Start: 2024-09-29 | End: 2024-10-07

## 2024-09-29 RX ORDER — HALOPERIDOL LACTATE 2 MG/ML
5 CONCENTRATE, ORAL ORAL EVERY 6 HOURS
Refills: 0 | Status: DISCONTINUED | OUTPATIENT
Start: 2024-09-29 | End: 2024-10-07

## 2024-09-29 RX ADMIN — Medication 81 MILLIGRAM(S): at 08:15

## 2024-09-29 RX ADMIN — ARIPIPRAZOLE 15 MILLIGRAM(S): 5 TABLET ORAL at 08:16

## 2024-09-29 NOTE — BH PATIENT PROFILE - HOME MEDICATIONS
levothyroxine 25 mcg (0.025 mg) oral tablet , 1 tab(s) orally once a day x 14 days Continue until told otherwise by your provider.  amLODIPine 5 mg oral tablet , 1 tab(s) orally once a day x 14 days Continue until told otherwise by your provider.  ARIPiprazole 20 mg oral tablet , 1 tab(s) orally once a day x 30 days Continue until told otherwise by your provider.  aspirin 81 mg oral delayed release tablet , 1 tab(s) orally once a day x 14 days Continue until told otherwise by your provider  atorvastatin 40 mg oral tablet , 1 tab(s) orally once a day (at bedtime) x 14 days Continue until told otherwise by your provider.  SITagliptin 100 mg oral tablet , 1 tab(s) orally once a day x 14 days Continue until told otherwise by your provider.

## 2024-09-29 NOTE — BH INPATIENT PSYCHIATRY ASSESSMENT NOTE - NSCOMMENTSUICRISKFACT_PSY_ALL_CORE
Patient has active and passive SI but did not give a plan that could be carried out on inpatient psych unit. Patient reports feeling safe on the unit.

## 2024-09-29 NOTE — BH INPATIENT PSYCHIATRY ASSESSMENT NOTE - RISK ASSESSMENT
Modifiable risk factors include suicide plan, psychosis, depression, medical illness, lack of social support, single, unemployment.  Static risk factors include age >45, prior SA.  Protective factors include seeking out treatment.
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
## 2024-09-29 NOTE — BH INPATIENT PSYCHIATRY ASSESSMENT NOTE - NSBHMSEMUSCLE_PSY_A_CORE
@[id number] spoke to patient and father,last night fell off trampoline and hit head and back on grass.No LOC or vomiting but complaining of headache.Directed to ER and verbalized understanding  
Unable to assess

## 2024-09-29 NOTE — ED BEHAVIORAL HEALTH ASSESSMENT NOTE - DESCRIPTION
From: Lara Morales  To: Lindsay Abdi  Sent: 5/14/2022 3:34 PM CDT  Subject: Refill     Hey! I know I just saw you. I forgot to refill this before the date. Could I please have another prescription of it? The name is minocycline 100mg capsules    single, on SSD, lives in shelter bioprosthetic AVR on aspirin, history of leukemia, history of CVA, hypertension, and diet-controlled diabetes the patient has been calm and cooperative

## 2024-09-29 NOTE — BH INPATIENT PSYCHIATRY ASSESSMENT NOTE - NSBHATTESTCOMMENTATTENDFT_PSY_A_CORE
Patient evaluated at bedside. Patient reports medication compliance but reports sudden onset of CAH to kill herself and to hurt others. Currently the patient reported feeling better and denied active si/hi but remains irritable.  Given history of prior suicide attempt, known history of psychiatric decompensation, the patient would benefit from psychiatric admission to stabilize her symptoms. Patient has not yet demonstrated sustained improvement of symptoms.
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
## 2024-09-29 NOTE — BH INPATIENT PSYCHIATRY ASSESSMENT NOTE - NSBHMETABOLIC_PSY_ALL_CORE_FT
BMI: BMI (kg/m2): 38.2 (09-29-24 @ 04:42)  HbA1c: A1C with Estimated Average Glucose Result: 7.3 % (04-10-24 @ 08:14)    Glucose: POCT Blood Glucose.: 154 mg/dL (04-24-24 @ 06:33)    BP: 152/108 (09-29-24 @ 09:37) (152/108 - 165/110)Vital Signs Last 24 Hrs  T(C): 36.3 (09-29-24 @ 09:37), Max: 36.7 (09-28-24 @ 22:45)  T(F): 97.4 (09-29-24 @ 09:37), Max: 98.1 (09-28-24 @ 22:45)  HR: 82 (09-29-24 @ 04:42) (82 - 95)  BP: 152/108 (09-29-24 @ 09:37) (152/108 - 165/110)  BP(mean): --  RR: 20 (09-29-24 @ 09:37) (17 - 20)  SpO2: 97% (09-28-24 @ 22:45) (97% - 97%)      Lipid Panel: Date/Time: 04-10-24 @ 08:14  Cholesterol, Serum: 254  LDL Cholesterol Calculated: 144  HDL Cholesterol, Serum: 78  Total Cholesterol/HDL Ration Measurement: --  Triglycerides, Serum: 158   BMI: BMI (kg/m2): 38.2 (09-29-24 @ 04:42)  HbA1c: A1C with Estimated Average Glucose Result: 7.3 % (04-10-24 @ 08:14)    Glucose: POCT Blood Glucose.: 154 mg/dL (04-24-24 @ 06:33)    BP: 168/94 (09-29-24 @ 16:10) (152/108 - 168/94)Vital Signs Last 24 Hrs  T(C): 36.7 (09-29-24 @ 16:10), Max: 36.7 (09-28-24 @ 22:45)  T(F): 98 (09-29-24 @ 16:10), Max: 98.1 (09-28-24 @ 22:45)  HR: 92 (09-29-24 @ 16:10) (82 - 95)  BP: 168/94 (09-29-24 @ 16:10) (152/108 - 168/94)  BP(mean): --  RR: 17 (09-29-24 @ 16:10) (17 - 20)  SpO2: 97% (09-28-24 @ 22:45) (97% - 97%)      Lipid Panel: Date/Time: 04-10-24 @ 08:14  Cholesterol, Serum: 254  LDL Cholesterol Calculated: 144  HDL Cholesterol, Serum: 78  Total Cholesterol/HDL Ration Measurement: --  Triglycerides, Serum: 158
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
## 2024-09-29 NOTE — BH PATIENT PROFILE - NSDYSPHAGSECTTWO_PSY_ALL_CORE
The patient presents today for evaluation of his surgical wound.     This patient was seen in our office yesterday and had sutures removed following excisions of epidermoid cyst.  The site that is in the superior gluteal cleft, he states he was working yest
lvm to offer 8/4 with Dr Mauricio at 330 for sports physical   
N/A

## 2024-09-29 NOTE — ED BEHAVIORAL HEALTH ASSESSMENT NOTE - RISK ASSESSMENT
Risk: prior admissions, prior SA, lives in shelter, inconsistent outpatient treatment, poor social support  Protective: help-seeking demeanor, no substance abuse
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
## 2024-09-29 NOTE — BH PATIENT PROFILE - NS PRO ABUSE SCREEN SUSPICION NEGLECT YN
Problem: Pain  Goal: #Acceptable pain level achieved/maintained at rest using NRS/Faces  This goal is used for patients who can self-report.  Acceptable means the level is at or below the identified comfort/function goal.  Outcome: Outcome Not Met, Continue to Monitor   09/27/18 1726   Pain Evaluation at Rest   Patient's Comfort/Function (Pain) GOAL At Rest 5   Comfort/Function (Pain) SCORE at Rest 6   Pain Evaluation at Rest Pain level unacceptable - refuses added analgesics   Pain reduced after administration of IV dilaudid.    Problem: At Risk for Falls  Intervention: Risk for Fall Interventions (specify)  Selectively initiate interventions based on patient-specific fall risks. Document in  Associated Rows on Daily Cares.   09/27/18 1630   Safety Measures   Environmental Safety Measures Maintained Done   Patient Specific Safety Measures in Use Safe lighting as appropriate   Risk for Falls Interventions   Patient's Personal Risk Factors Taking medications that cause drowsiness/problems walking   Mobility and Gait Measures Mobilize (Early and progressive ambulation unless contraindicated)   Altered Mental Status/Unable to Participate Measures Monitor for needs frequently;Hourly or more frequent monitoring   OTHER   Elimination Risk Interventions Offer toileting with every interaction (patient able to identify need)            no

## 2024-09-29 NOTE — BH INPATIENT PSYCHIATRY ASSESSMENT NOTE - NSBHCHARTREVIEWVS_PSY_A_CORE FT
Vital Signs Last 24 Hrs  T(C): 36.3 (09-29-24 @ 09:37), Max: 36.7 (09-28-24 @ 22:45)  T(F): 97.4 (09-29-24 @ 09:37), Max: 98.1 (09-28-24 @ 22:45)  HR: 82 (09-29-24 @ 04:42) (82 - 95)  BP: 152/108 (09-29-24 @ 09:37) (152/108 - 165/110)  BP(mean): --  RR: 20 (09-29-24 @ 09:37) (17 - 20)  SpO2: 97% (09-28-24 @ 22:45) (97% - 97%)     Vital Signs Last 24 Hrs  T(C): 36.7 (09-29-24 @ 16:10), Max: 36.7 (09-28-24 @ 22:45)  T(F): 98 (09-29-24 @ 16:10), Max: 98.1 (09-28-24 @ 22:45)  HR: 92 (09-29-24 @ 16:10) (82 - 95)  BP: 168/94 (09-29-24 @ 16:10) (152/108 - 168/94)  BP(mean): --  RR: 17 (09-29-24 @ 16:10) (17 - 20)  SpO2: 97% (09-28-24 @ 22:45) (97% - 97%)

## 2024-09-29 NOTE — BH INPATIENT PSYCHIATRY ASSESSMENT NOTE - NSBHCHARTREVIEWLAB_PSY_A_CORE FT
.                      12.3   7.33  )-----------( 189      ( 28 Sep 2024 23:51 )             36.1   09-28    132[L]  |  97[L]  |  13  ----------------------------<  428[H]  4.0   |  21  |  1.0    Ca    9.3      28 Sep 2024 23:51

## 2024-09-29 NOTE — BH PATIENT PROFILE - FALL HARM RISK - UNIVERSAL INTERVENTIONS
Bed in lowest position, wheels locked, appropriate side rails in place/Call bell, personal items and telephone in reach/Instruct patient to call for assistance before getting out of bed or chair/Non-slip footwear when patient is out of bed/[place] to call system/Physically safe environment - no spills, clutter or unnecessary equipment/Purposeful Proactive Rounding/Room/bathroom lighting operational, light cord in reach

## 2024-09-29 NOTE — ED BEHAVIORAL HEALTH ASSESSMENT NOTE - AXIS III
bioprosthetic AVR on aspirin, history of leukemia, history of CVA, hypertension, and diet-controlled diabetes

## 2024-09-29 NOTE — BH INPATIENT PSYCHIATRY ASSESSMENT NOTE - NSBHCHARTREVIEWINVESTIGATE_PSY_A_CORE FT
< from: 12 Lead ECG (09.28.24 @ 23:42) >    QTC Calculation(Bazett) 428 ms    P Axis 60 degrees    R Axis 36 degrees    T Axis -2 degrees    Diagnosis Line Normal sinus rhythm  NS T wave change   Poor R wave progression V1-V3     Confirmed by SAHARA CHAVEZ, ROBERT ([id number]) on 9/29/2024 9:07:40 AM    < end of copied text >

## 2024-09-29 NOTE — BH INPATIENT PSYCHIATRY ASSESSMENT NOTE - CURRENT MEDICATION
MEDICATIONS  (STANDING):  ARIPiprazole 15 milliGRAM(s) Oral daily  aspirin  chewable 81 milliGRAM(s) Oral daily    MEDICATIONS  (PRN):  acetaminophen     Tablet .. 650 milliGRAM(s) Oral every 6 hours PRN Temp greater or equal to 38C (100.4F), Mild Pain (1 - 3)  haloperidol     Tablet 5 milliGRAM(s) Oral every 6 hours PRN agitation  LORazepam     Tablet 2 milliGRAM(s) Oral every 6 hours PRN Agitation

## 2024-09-30 LAB
A1C WITH ESTIMATED AVERAGE GLUCOSE RESULT: 10 % — HIGH (ref 4–5.6)
CHOLEST SERPL-MCNC: 222 MG/DL — HIGH
ESTIMATED AVERAGE GLUCOSE: 240 MG/DL — HIGH (ref 68–114)
HDLC SERPL-MCNC: 52 MG/DL — SIGNIFICANT CHANGE UP
LIPID PNL WITH DIRECT LDL SERPL: 133 MG/DL — HIGH
NON HDL CHOLESTEROL: 170 MG/DL — HIGH
TRIGL SERPL-MCNC: 185 MG/DL — HIGH
TSH SERPL-MCNC: 2.45 UIU/ML — SIGNIFICANT CHANGE UP (ref 0.27–4.2)

## 2024-09-30 PROCEDURE — 99223 1ST HOSP IP/OBS HIGH 75: CPT

## 2024-09-30 PROCEDURE — 99231 SBSQ HOSP IP/OBS SF/LOW 25: CPT

## 2024-09-30 RX ADMIN — Medication 81 MILLIGRAM(S): at 09:45

## 2024-09-30 RX ADMIN — ARIPIPRAZOLE 15 MILLIGRAM(S): 5 TABLET ORAL at 09:45

## 2024-09-30 NOTE — CONSULT NOTE ADULT - ASSESSMENT
#schizoprenia / hx of SA high risk due to worsening suicidal and homicidal ideation   - treatement and mngmt per psych   - ekg reviewed - qtc acceptable - cont ot monitor for change in meds   #bioprosthetic valve hx  #hx of leukemia in remission  #htn - resume home meds - noncompliance - resume norvasc  #hyperlipidemia - check lipid profile       incomplete note -  # schizophrenia / hx of SA high risk due to worsening suicidal and homicidal ideation   - treatement and mngmt per psych   - ekg reviewed - qtc acceptable - cont ot monitor for change in meds   #bioprosthetic valve hx  #hx of leukemia in remission  #htn - resume home meds - noncompliance - resume norvasc  #hyperlipidemia - check lipid profile       incomplete note -  # schizophrenia / hx of SA high risk due to worsening suicidal and homicidal ideation   - treatement and mngmt per psych   - ekg reviewed myself - qtc acceptable - cont ot monitor for change in meds   #bioprosthetic valve hx  #hx of leukemia in remission  #htn - resume home meds - noncompliance - resume norvasc  #hyperlipidemia - check lipid profile - noncompliant with meds  -restart Lipitor 40mg - check LFTs to ensure wnl   #hx of hypothyroid ? previously on synthroid - TSH wnl -pt noncompliant with meds -recheck TFTs outpt  #DMII - non compliant a1c 10 - restart januvia 100mg , start metformin 500mg q12 - monitor for GI side effects , insulin SS coverage - check FS qac/hs, carb consistent diet    discussed with psychiatry team
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
## 2024-09-30 NOTE — PSYCHIATRIC REHAB INITIAL EVALUATION - NSBHPRTOOLBOX_PSY_ALL_CORE
Pt reports following through with outpatient psychiatric care at her shelter in [place]/Entertainment/Music/Treatment team provider support
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
## 2024-09-30 NOTE — BH SAFETY PLAN - WARNING SIGN 1
The crisis is that I was lapsing in my meds for only one day and I became suicidal. I wanted to run into traffic but instead I called 911.  I have been very depressed lately especially with the loss of my mother and sister within one day of each other. Mom had pancreatic cancer and sister had ovarian cancer. The grief made me depressed.

## 2024-09-30 NOTE — BH TREATMENT PLAN - NSTXDEPRESINTERSW_PSY_ALL_CORE
SW will meet with patient daily to provide support, education, collateral and referrals. Patient will be encouraged to attend groups to gain coping skills.

## 2024-09-30 NOTE — BH SAFETY PLAN - ENVIRONMENT SAFETY 1:
I feel my environment would be safer if I could get back on medicaid /medicare again so I have more psych coverage. I need my medication and it falls off a lot. I also need to remain in my shelter where I feel safe until I get the voucher. I stay at the [facility].

## 2024-09-30 NOTE — BH TREATMENT PLAN - NSTXPLANTHERAPYSESSIONSFT_PSY_ALL_CORE
09-30-24  Type of therapy: Coping skills, Creative arts therapy, Inspiration and motiviation, Leisure development, Self esteem, Social skills training, Stress management, Symptom management  Type of session: Individual  Level of patient participation: Resistance to participation  Duration of participation: Less than 15 minutes  Therapy conducted by: Psych rehab  Therapy Summary: Pt is spending most time in her room but will become visible on the unit with encouragement from staff. Pt will need ongoing support and increased encouragement to attend RT sessions

## 2024-09-30 NOTE — BH INPATIENT PSYCHIATRY PROGRESS NOTE - NSBHFUPINTERVALHXFT_PSY_A_CORE
Patient seen and evaluated on IPP. Well known to writer. On approach patient anxious, cooperative. States she has been increasingly depressed, hearing voices to kill herself and hurt others. States she has no intent or plan on acting on these thoughts and brought herself into the hospital. Patient presents as internally preoccupied at times. Patient states she has been compliant with her psych meds the last few days, but may have missed doses before. Presents as disorganized, delusional, stating people think she works for the [employer].  Has not been complaint with her medical meds for about a month. Had issues with insurance and couldn’t afford to pay for all the meds. Denies any ETOH or drug use. Does not provide any information for collateral.

## 2024-09-30 NOTE — BH SOCIAL WORK INITIAL PSYCHOSOCIAL EVALUATION - NSBHCOGDIS_PSY_ALL_CORE
PSR spoke to patient.  Patient stated that she is waiting for an estimate for the lab draw.  Patient stated that she knows that her insurance approved, but she still has to pay a certain amount.   No

## 2024-10-01 PROCEDURE — 99231 SBSQ HOSP IP/OBS SF/LOW 25: CPT

## 2024-10-01 RX ORDER — SITAGLIPTIN PHOSPHATE 100 MG
100 TABLET ORAL DAILY
Refills: 0 | Status: DISCONTINUED | OUTPATIENT
Start: 2024-10-01 | End: 2024-10-07

## 2024-10-01 RX ORDER — METFORMIN HCL 500 MG
500 TABLET ORAL
Refills: 0 | Status: DISCONTINUED | OUTPATIENT
Start: 2024-10-01 | End: 2024-10-07

## 2024-10-01 RX ORDER — AMLODIPINE BESYLATE 5 MG
5 TABLET ORAL DAILY
Refills: 0 | Status: DISCONTINUED | OUTPATIENT
Start: 2024-10-01 | End: 2024-10-07

## 2024-10-01 RX ORDER — ATORVASTATIN CALCIUM 10 MG/1
40 TABLET, FILM COATED ORAL AT BEDTIME
Refills: 0 | Status: DISCONTINUED | OUTPATIENT
Start: 2024-10-01 | End: 2024-10-07

## 2024-10-01 RX ORDER — ATORVASTATIN CALCIUM 10 MG/1
40 TABLET, FILM COATED ORAL AT BEDTIME
Refills: 0 | Status: DISCONTINUED | OUTPATIENT
Start: 2024-10-01 | End: 2024-10-01

## 2024-10-01 RX ORDER — METFORMIN HCL 500 MG
500 TABLET ORAL
Refills: 0 | Status: DISCONTINUED | OUTPATIENT
Start: 2024-10-01 | End: 2024-10-01

## 2024-10-01 RX ADMIN — ATORVASTATIN CALCIUM 40 MILLIGRAM(S): 10 TABLET, FILM COATED ORAL at 21:48

## 2024-10-01 RX ADMIN — Medication 500 MILLIGRAM(S): at 21:48

## 2024-10-01 RX ADMIN — Medication 81 MILLIGRAM(S): at 08:58

## 2024-10-01 RX ADMIN — Medication 5 MILLIGRAM(S): at 12:11

## 2024-10-01 RX ADMIN — ARIPIPRAZOLE 15 MILLIGRAM(S): 5 TABLET ORAL at 08:58

## 2024-10-01 RX ADMIN — Medication 100 MILLIGRAM(S): at 13:58

## 2024-10-01 NOTE — BH INPATIENT PSYCHIATRY PROGRESS NOTE - NSBHFUPINTERVALHXFT_PSY_A_CORE
Patient seen and evaluated as per nursing report no acute events. Patient well known to writer.  Patient continues to present with disorganized, delusions and paranoid. Recently re-started on Abilify. Will continue to monitor and titrate accordingly. Was on a higher dose. Patient denies suicidal/homicidal ideations. Patient isolative to the room as she has been with other admissions. Patient encouraged to get out of her room and attend groups.  Patient seen and evaluated as per nursing report no acute events. Patient well known to writer.  Patient continues to present with disorganized, delusional and paranoid. Recently re-started on Abilify. Will continue to monitor and titrate accordingly. Was on a higher dose. Patient denies suicidal/homicidal ideations. Patient isolative to the room as she has been with other admissions. Patient encouraged to get out of her room and attend groups.
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
## 2024-10-02 LAB
DRUG SCREEN 1, URINE RESULT: SIGNIFICANT CHANGE UP
GLUCOSE BLDC GLUCOMTR-MCNC: 264 MG/DL — HIGH (ref 70–99)
GLUCOSE BLDC GLUCOMTR-MCNC: 357 MG/DL — HIGH (ref 70–99)

## 2024-10-02 PROCEDURE — 99231 SBSQ HOSP IP/OBS SF/LOW 25: CPT

## 2024-10-02 RX ORDER — ARIPIPRAZOLE 5 MG/1
20 TABLET ORAL DAILY
Refills: 0 | Status: DISCONTINUED | OUTPATIENT
Start: 2024-10-03 | End: 2024-10-07

## 2024-10-02 RX ADMIN — Medication 5 MILLIGRAM(S): at 08:53

## 2024-10-02 RX ADMIN — ARIPIPRAZOLE 15 MILLIGRAM(S): 5 TABLET ORAL at 08:53

## 2024-10-02 RX ADMIN — Medication 500 MILLIGRAM(S): at 08:53

## 2024-10-02 RX ADMIN — Medication 100 MILLIGRAM(S): at 08:54

## 2024-10-02 RX ADMIN — ATORVASTATIN CALCIUM 40 MILLIGRAM(S): 10 TABLET, FILM COATED ORAL at 20:12

## 2024-10-02 RX ADMIN — Medication 500 MILLIGRAM(S): at 20:12

## 2024-10-02 RX ADMIN — Medication 81 MILLIGRAM(S): at 08:53

## 2024-10-02 NOTE — BH INPATIENT PSYCHIATRY PROGRESS NOTE - NSBHFUPINTERVALHXFT_PSY_A_CORE
Patient seen and evaluated as per nursing report no acute events. Patient well known to writer.  Patient states she is eating well and sleeping well. Continues to present as disorganized, delusional and paranoid. Recently re-started on Abilify. Will continue to monitor and titrate accordingly. Patient denies suicidal/homicidal ideations. Patient re-started on all medical meds and is compliant. Patient isolative to the room as she has been with other admissions. Patient encouraged to get out of her room and attend groups.
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
## 2024-10-03 LAB
ALBUMIN SERPL ELPH-MCNC: 4.1 G/DL — SIGNIFICANT CHANGE UP (ref 3.5–5.2)
ALP SERPL-CCNC: 139 U/L — HIGH (ref 30–115)
ALT FLD-CCNC: 17 U/L — SIGNIFICANT CHANGE UP (ref 0–41)
ANION GAP SERPL CALC-SCNC: 8 MMOL/L — SIGNIFICANT CHANGE UP (ref 7–14)
AST SERPL-CCNC: 15 U/L — SIGNIFICANT CHANGE UP (ref 0–41)
BILIRUB SERPL-MCNC: 0.3 MG/DL — SIGNIFICANT CHANGE UP (ref 0.2–1.2)
BUN SERPL-MCNC: 17 MG/DL — SIGNIFICANT CHANGE UP (ref 10–20)
CALCIUM SERPL-MCNC: 9.4 MG/DL — SIGNIFICANT CHANGE UP (ref 8.4–10.5)
CHLORIDE SERPL-SCNC: 99 MMOL/L — SIGNIFICANT CHANGE UP (ref 98–110)
CO2 SERPL-SCNC: 30 MMOL/L — SIGNIFICANT CHANGE UP (ref 17–32)
CREAT SERPL-MCNC: 0.9 MG/DL — SIGNIFICANT CHANGE UP (ref 0.7–1.5)
EGFR: 73 ML/MIN/1.73M2 — SIGNIFICANT CHANGE UP
GLUCOSE BLDC GLUCOMTR-MCNC: 268 MG/DL — HIGH (ref 70–99)
GLUCOSE BLDC GLUCOMTR-MCNC: 277 MG/DL — HIGH (ref 70–99)
GLUCOSE BLDC GLUCOMTR-MCNC: 305 MG/DL — HIGH (ref 70–99)
GLUCOSE BLDC GLUCOMTR-MCNC: 315 MG/DL — HIGH (ref 70–99)
GLUCOSE SERPL-MCNC: 253 MG/DL — HIGH (ref 70–99)
HCT VFR BLD CALC: 43.1 % — SIGNIFICANT CHANGE UP (ref 37–47)
HGB BLD-MCNC: 15 G/DL — SIGNIFICANT CHANGE UP (ref 12–16)
MCHC RBC-ENTMCNC: 29.9 PG — SIGNIFICANT CHANGE UP (ref 27–31)
MCHC RBC-ENTMCNC: 34.8 G/DL — SIGNIFICANT CHANGE UP (ref 32–37)
MCV RBC AUTO: 85.9 FL — SIGNIFICANT CHANGE UP (ref 81–99)
NRBC # BLD: 0 /100 WBCS — SIGNIFICANT CHANGE UP (ref 0–0)
PLATELET # BLD AUTO: 237 K/UL — SIGNIFICANT CHANGE UP (ref 130–400)
PMV BLD: 9.4 FL — SIGNIFICANT CHANGE UP (ref 7.4–10.4)
POTASSIUM SERPL-MCNC: 4.7 MMOL/L — SIGNIFICANT CHANGE UP (ref 3.5–5)
POTASSIUM SERPL-SCNC: 4.7 MMOL/L — SIGNIFICANT CHANGE UP (ref 3.5–5)
PROT SERPL-MCNC: 7.2 G/DL — SIGNIFICANT CHANGE UP (ref 6–8)
RBC # BLD: 5.02 M/UL — SIGNIFICANT CHANGE UP (ref 4.2–5.4)
RBC # FLD: 13.2 % — SIGNIFICANT CHANGE UP (ref 11.5–14.5)
SODIUM SERPL-SCNC: 137 MMOL/L — SIGNIFICANT CHANGE UP (ref 135–146)
WBC # BLD: 8.46 K/UL — SIGNIFICANT CHANGE UP (ref 4.8–10.8)
WBC # FLD AUTO: 8.46 K/UL — SIGNIFICANT CHANGE UP (ref 4.8–10.8)

## 2024-10-03 PROCEDURE — 76705 ECHO EXAM OF ABDOMEN: CPT | Mod: 26

## 2024-10-03 PROCEDURE — 99231 SBSQ HOSP IP/OBS SF/LOW 25: CPT

## 2024-10-03 RX ORDER — ALCOHOL ANTISEPTIC PADS
12.5 PADS, MEDICATED (EA) TOPICAL ONCE
Refills: 0 | Status: DISCONTINUED | OUTPATIENT
Start: 2024-10-03 | End: 2024-10-07

## 2024-10-03 RX ORDER — INSULIN LISPRO 100/ML
VIAL (ML) SUBCUTANEOUS
Refills: 0 | Status: DISCONTINUED | OUTPATIENT
Start: 2024-10-03 | End: 2024-10-07

## 2024-10-03 RX ORDER — ALCOHOL ANTISEPTIC PADS
25 PADS, MEDICATED (EA) TOPICAL ONCE
Refills: 0 | Status: DISCONTINUED | OUTPATIENT
Start: 2024-10-03 | End: 2024-10-07

## 2024-10-03 RX ORDER — GLUCAGON INJECTION, SOLUTION 0.5 MG/.1ML
1 INJECTION, SOLUTION SUBCUTANEOUS ONCE
Refills: 0 | Status: DISCONTINUED | OUTPATIENT
Start: 2024-10-03 | End: 2024-10-07

## 2024-10-03 RX ORDER — SODIUM CHLORIDE IRRIG SOLUTION 0.9 %
1000 SOLUTION, IRRIGATION IRRIGATION
Refills: 0 | Status: DISCONTINUED | OUTPATIENT
Start: 2024-10-03 | End: 2024-10-07

## 2024-10-03 RX ORDER — ALCOHOL ANTISEPTIC PADS
15 PADS, MEDICATED (EA) TOPICAL ONCE
Refills: 0 | Status: DISCONTINUED | OUTPATIENT
Start: 2024-10-03 | End: 2024-10-07

## 2024-10-03 RX ORDER — ONDANSETRON HCL/PF 4 MG/2 ML
4 VIAL (ML) INJECTION ONCE
Refills: 0 | Status: DISCONTINUED | OUTPATIENT
Start: 2024-10-03 | End: 2024-10-07

## 2024-10-03 RX ADMIN — Medication 4: at 16:27

## 2024-10-03 RX ADMIN — Medication 500 MILLIGRAM(S): at 08:36

## 2024-10-03 RX ADMIN — Medication 500 MILLIGRAM(S): at 20:39

## 2024-10-03 RX ADMIN — Medication 81 MILLIGRAM(S): at 08:34

## 2024-10-03 RX ADMIN — ATORVASTATIN CALCIUM 40 MILLIGRAM(S): 10 TABLET, FILM COATED ORAL at 20:39

## 2024-10-03 RX ADMIN — Medication 100 MILLIGRAM(S): at 10:14

## 2024-10-03 RX ADMIN — ARIPIPRAZOLE 20 MILLIGRAM(S): 5 TABLET ORAL at 08:34

## 2024-10-03 RX ADMIN — Medication 5 MILLIGRAM(S): at 08:34

## 2024-10-03 NOTE — BH INPATIENT PSYCHIATRY PROGRESS NOTE - NSBHFUPINTERVALHXFT_PSY_A_CORE
Patient seen and evaluated as per nursing report no acute events. Patient reports that she tolerated aripiprazole dose increase well. She feels that her mood is improving since resuming aripiprazole. Auditory hallucinations are diminishing in frequency and intensity. She denies new concerns today.

## 2024-10-03 NOTE — CHART NOTE - NSCHARTNOTEFT_GEN_A_CORE
RN called, patient complaining of RUQ abdominal pain    64yo F w/ pmhx of AS s/p AVR, HTN, HLD, DM, Hypothyroid, DM, schizoaffective disorder admitted on 09/28th for SI.   Patient seen at bedside, reports RUQ abdominal pain since after dinner last night w/ nausea. Reports initially the pain was constant, now intermittent, worst when moving side ways. Reports feels like vomiting but not able to yet. Reports ate a light breakfast this morning w/ return of pain, now no Apetite. Denies fever, chills, chest pain, sob, acid reflux, back injury, changes in BMs, urinary sxs.     Vital Signs Last 24 Hrs  T(C): 36.5 (03 Oct 2024 16:21), Max: 36.6 (03 Oct 2024 08:24)  T(F): 97.7 (03 Oct 2024 16:21), Max: 97.8 (03 Oct 2024 08:24)  HR: 105 (03 Oct 2024 16:21) (85 - 105)  BP: 138/86 (03 Oct 2024 16:21) (138/86 - 139/94)  RR: 16 (03 Oct 2024 16:21) (16 - 16)      GENERAL:  64y/o Female NAD, resting comfortably.  EYES: EOMI, conjunctiva and sclera clear  CHEST/LUNG: Clear to auscultation bilaterally; No wheeze, rhonchi, or rales  HEART: Regular rate and rhythm; S1&S2  ABDOMEN: Soft, Nondistended x 4 quadrants; Bowel sounds present, + Kaminski sign  EXTREMITIES:   Peripheral Pulses Present, No clubbing, no cyanosis, or no edema, no calf tenderness  PSYCH: AAOx3, cooperative, appropriate  NEUROLOGY: WNL  SKIN: WNL    #RUQ abdominal pain, r/o Biliary colic   - CBC, CMP, RUQ sono   - Zofran x1 for nausea   - Tylenol prn for pain/fevers  - Will follow  - c/w rest of care per primary team     Above discussed w/ hospitalist Dr. Lam
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
## 2024-10-04 LAB
ANION GAP SERPL CALC-SCNC: 12 MMOL/L — SIGNIFICANT CHANGE UP (ref 7–14)
BUN SERPL-MCNC: 19 MG/DL — SIGNIFICANT CHANGE UP (ref 10–20)
CALCIUM SERPL-MCNC: 9.5 MG/DL — SIGNIFICANT CHANGE UP (ref 8.4–10.5)
CHLORIDE SERPL-SCNC: 101 MMOL/L — SIGNIFICANT CHANGE UP (ref 98–110)
CO2 SERPL-SCNC: 24 MMOL/L — SIGNIFICANT CHANGE UP (ref 17–32)
CREAT SERPL-MCNC: 0.9 MG/DL — SIGNIFICANT CHANGE UP (ref 0.7–1.5)
EGFR: 73 ML/MIN/1.73M2 — SIGNIFICANT CHANGE UP
GLUCOSE BLDC GLUCOMTR-MCNC: 271 MG/DL — HIGH (ref 70–99)
GLUCOSE BLDC GLUCOMTR-MCNC: 274 MG/DL — HIGH (ref 70–99)
GLUCOSE BLDC GLUCOMTR-MCNC: 311 MG/DL — HIGH (ref 70–99)
GLUCOSE BLDC GLUCOMTR-MCNC: 313 MG/DL — HIGH (ref 70–99)
GLUCOSE SERPL-MCNC: 326 MG/DL — HIGH (ref 70–99)
POTASSIUM SERPL-MCNC: 4.9 MMOL/L — SIGNIFICANT CHANGE UP (ref 3.5–5)
POTASSIUM SERPL-SCNC: 4.9 MMOL/L — SIGNIFICANT CHANGE UP (ref 3.5–5)
SODIUM SERPL-SCNC: 137 MMOL/L — SIGNIFICANT CHANGE UP (ref 135–146)

## 2024-10-04 PROCEDURE — 99231 SBSQ HOSP IP/OBS SF/LOW 25: CPT

## 2024-10-04 RX ADMIN — Medication 4: at 16:16

## 2024-10-04 RX ADMIN — Medication 500 MILLIGRAM(S): at 20:15

## 2024-10-04 RX ADMIN — Medication 5 MILLIGRAM(S): at 08:52

## 2024-10-04 RX ADMIN — Medication 3: at 12:10

## 2024-10-04 RX ADMIN — ARIPIPRAZOLE 20 MILLIGRAM(S): 5 TABLET ORAL at 08:53

## 2024-10-04 RX ADMIN — ATORVASTATIN CALCIUM 40 MILLIGRAM(S): 10 TABLET, FILM COATED ORAL at 20:15

## 2024-10-04 RX ADMIN — Medication 81 MILLIGRAM(S): at 08:52

## 2024-10-04 NOTE — BH INPATIENT PSYCHIATRY PROGRESS NOTE - NSBHATTESTBILLING_PSY_A_CORE
07864-Qoumcmdego OBS or IP - low complexity OR 25-34 mins
99221-Initial OBS or IP - low complexity OR 40-54 mins
42551-Vyjkdxogpo OBS or IP - low complexity OR 25-34 mins

## 2024-10-04 NOTE — BH DISCHARGE NOTE NURSING/SOCIAL WORK/PSYCH REHAB - NSDCVIVACCINE_GEN_ALL_CORE_FT
COVID-19, mRNA, LNP-S, PF, 100 mcg/ 0.5 mL dose (Moderna); 16-Dec-2021 12:11; Alana Solares (RN); [employer] [id number] (Exp. Date: 30-Dec-2021); IntraMuscular; Deltoid Right.; 0.5 milliLiter(s);   rabies, intradermal injection; 01-Apr-2024 15:01; Pramod uKmari (RN); [employer]; [id number] (Exp. Date: 31-Jan-2026); IntraMuscular.; Deltoid Left...; 1 milliLiter(s); VIS (VIS Published: 01-Apr-2024, VIS Presented: 01-Apr-2024);   Tdap; 01-Apr-2024 14:58; Pramod Kumari (RN); [employer]; [id number] (Exp. Date: 23-Oct-2025); IntraMuscular; Deltoid Right.; 0.5 milliLiter(s); VIS (VIS Published: 09-May-2013, VIS Presented: 01-Apr-2024);
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
## 2024-10-04 NOTE — BH INPATIENT PSYCHIATRY PROGRESS NOTE - NSBHFUPINTERVALHXFT_PSY_A_CORE
Patient seen and evaluated as per nursing report no acute events. Patient reports that she tolerated aripiprazole dose increase well. Her mood is "better" today. She denies AVH, ideas of reference, thought insertion/withdrawal, paranoia, SI/HI. She is tolerating medications well. She denies new concerns.

## 2024-10-04 NOTE — BH DISCHARGE NOTE NURSING/SOCIAL WORK/PSYCH REHAB - PATIENT PORTAL LINK FT
You can access the FollowMyHealth Patient Portal offered by [facility] by registering at the following website: [email or web address]. By joining [employer]’s FollowMyHealth portal, you will also be able to view your health information using other applications (apps) compatible with our system.

## 2024-10-04 NOTE — BH DISCHARGE NOTE NURSING/SOCIAL WORK/PSYCH REHAB - NSCDUDCCRISIS_PSY_A_CORE
[facility] Well  1 [phone] Count includes the [facility] ([phone])  Text "WELL" to 49077  Website: [email or web address] Suicide Prevention Lifeline 5 [phone] [phone]) 516-9180/988 Suicide and Crisis Lifeline

## 2024-10-05 LAB
GLUCOSE BLDC GLUCOMTR-MCNC: 219 MG/DL — HIGH (ref 70–99)
GLUCOSE BLDC GLUCOMTR-MCNC: 240 MG/DL — HIGH (ref 70–99)
GLUCOSE BLDC GLUCOMTR-MCNC: 251 MG/DL — HIGH (ref 70–99)
GLUCOSE BLDC GLUCOMTR-MCNC: 301 MG/DL — HIGH (ref 70–99)

## 2024-10-05 RX ADMIN — Medication 5 MILLIGRAM(S): at 08:05

## 2024-10-05 RX ADMIN — Medication 100 MILLIGRAM(S): at 08:06

## 2024-10-05 RX ADMIN — ATORVASTATIN CALCIUM 40 MILLIGRAM(S): 10 TABLET, FILM COATED ORAL at 20:25

## 2024-10-05 RX ADMIN — Medication 3: at 08:07

## 2024-10-05 RX ADMIN — Medication 500 MILLIGRAM(S): at 08:06

## 2024-10-05 RX ADMIN — Medication 2: at 16:19

## 2024-10-05 RX ADMIN — Medication 81 MILLIGRAM(S): at 08:06

## 2024-10-05 RX ADMIN — Medication 2: at 11:34

## 2024-10-05 RX ADMIN — Medication 500 MILLIGRAM(S): at 20:25

## 2024-10-05 RX ADMIN — ARIPIPRAZOLE 20 MILLIGRAM(S): 5 TABLET ORAL at 08:05

## 2024-10-06 LAB
GLUCOSE BLDC GLUCOMTR-MCNC: 244 MG/DL — HIGH (ref 70–99)
GLUCOSE BLDC GLUCOMTR-MCNC: 247 MG/DL — HIGH (ref 70–99)
GLUCOSE BLDC GLUCOMTR-MCNC: 253 MG/DL — HIGH (ref 70–99)

## 2024-10-06 RX ADMIN — Medication 5 MILLIGRAM(S): at 08:28

## 2024-10-06 RX ADMIN — Medication 650 MILLIGRAM(S): at 15:24

## 2024-10-06 RX ADMIN — Medication 3: at 07:31

## 2024-10-06 RX ADMIN — Medication 500 MILLIGRAM(S): at 20:13

## 2024-10-06 RX ADMIN — Medication 2: at 12:11

## 2024-10-06 RX ADMIN — Medication 81 MILLIGRAM(S): at 08:27

## 2024-10-06 RX ADMIN — Medication 100 MILLIGRAM(S): at 08:27

## 2024-10-06 RX ADMIN — Medication 3: at 16:30

## 2024-10-06 RX ADMIN — Medication 500 MILLIGRAM(S): at 08:28

## 2024-10-06 RX ADMIN — ATORVASTATIN CALCIUM 40 MILLIGRAM(S): 10 TABLET, FILM COATED ORAL at 20:12

## 2024-10-06 RX ADMIN — Medication 650 MILLIGRAM(S): at 16:15

## 2024-10-06 RX ADMIN — ARIPIPRAZOLE 20 MILLIGRAM(S): 5 TABLET ORAL at 08:28

## 2024-10-07 VITALS
TEMPERATURE: 98 F | DIASTOLIC BLOOD PRESSURE: 82 MMHG | RESPIRATION RATE: 20 BRPM | HEART RATE: 78 BPM | SYSTOLIC BLOOD PRESSURE: 143 MMHG

## 2024-10-07 LAB
GLUCOSE BLDC GLUCOMTR-MCNC: 240 MG/DL — HIGH (ref 70–99)
GLUCOSE BLDC GLUCOMTR-MCNC: 256 MG/DL — HIGH (ref 70–99)

## 2024-10-07 PROCEDURE — 99238 HOSP IP/OBS DSCHRG MGMT 30/<: CPT

## 2024-10-07 RX ORDER — ASPIRIN 325 MG
1 TABLET ORAL
Qty: 14 | Refills: 0
Start: 2024-10-07 | End: 2024-10-20

## 2024-10-07 RX ORDER — ARIPIPRAZOLE 5 MG/1
1 TABLET ORAL
Qty: 14 | Refills: 0
Start: 2024-10-07 | End: 2024-10-20

## 2024-10-07 RX ORDER — ATORVASTATIN CALCIUM 10 MG/1
1 TABLET, FILM COATED ORAL
Qty: 14 | Refills: 0
Start: 2024-10-07 | End: 2024-10-20

## 2024-10-07 RX ORDER — SITAGLIPTIN PHOSPHATE 100 MG
1 TABLET ORAL
Qty: 14 | Refills: 0
Start: 2024-10-07 | End: 2024-10-20

## 2024-10-07 RX ORDER — METFORMIN HCL 500 MG
1 TABLET ORAL
Qty: 28 | Refills: 0
Start: 2024-10-07 | End: 2024-10-20

## 2024-10-07 RX ORDER — AMLODIPINE BESYLATE 5 MG
1 TABLET ORAL
Qty: 14 | Refills: 0
Start: 2024-10-07 | End: 2024-10-20

## 2024-10-07 RX ADMIN — ARIPIPRAZOLE 20 MILLIGRAM(S): 5 TABLET ORAL at 08:35

## 2024-10-07 RX ADMIN — Medication 81 MILLIGRAM(S): at 08:07

## 2024-10-07 RX ADMIN — Medication 5 MILLIGRAM(S): at 08:07

## 2024-10-07 NOTE — BH INPATIENT PSYCHIATRY DISCHARGE NOTE - HOSPITAL COURSE
On initial presentation, patient presented as disorganized, delusional and paranoid. Patient was restarted on Abilify and titrated to 20 mg daily. Patient re-started on all medical meds and is compliant. Patient encouraged to get out of her room and attend groups. She is tolerating aripiprazole 20 mg well. With these interventions, her thought process and behavior became more organized, delusions and paranoia were not readily apparent. She is appropriate for discharge back to the community.
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
## 2024-10-07 NOTE — BH INPATIENT PSYCHIATRY DISCHARGE NOTE - NSBHDCMEDICALFT_PSY_A_CORE
# Aortic stenosis and history of valve replacement  -aspirin 81 milliGRAM(s) Oral daily    #HTN  - Re-start Norvasc    #DM  - Re-start Januvia  - Start Metformin    #HLD  Re-start Atorvastatin    -Tylenol PRN for pain

## 2024-10-07 NOTE — BH INPATIENT PSYCHIATRY DISCHARGE NOTE - NSBHMSEINTELL_PSY_A_CORE
[de-identified] : Pt is a 48 y/o female presenting a follow up visit. Pt is s/p bilateral mastectomy and bilateral sentinel node biopsy in October 2020 for inflammatory right breast cancer after neoadjuvant chemotherapy.  She had a complete pathologic response to treatment. She completed radiation therapy to the right chest wall and regional lymph nodes on January 8, 2021.\par She is currently on endocrine therapy as per medical oncology Dr. Hollis. \par \par Final Pathology:\par 1.  [place] lymph node, left axilla, biopsy\par - One lymph node negative for metastatic carcinoma (0/1).\par 2.  Breast, left, simple mastectomy\par - Atypical lobular hyperplasia.\par - Fibrocystic changes, usual ductal hyperplasia and benign\par epithelial calcifications.\par - Fibroadenomatous change.\par - Unremarkable nipple and skin.\par 3.  [place] lymph nodes, right axilla, biopsy\par - Two lymph nodes negative for metastatic carcinoma by\par routine staining (0/2).  Cytokeratin immunohistochemistry\par pending, to be reported in an addendum.\par - Changes consistent with treatment effect.\par 4.  Non-sentinel lymph node, right axilla, biopsy\par - One lymph node negative for metastatic carcinoma by\par routine staining (0/1).  Cytokeratin immunohistochemistry\par pending, to be reported in an addendum.\par 5.  Breast, right, simple mastectomy\par - No invasive carcinoma, carcinoma in situ or lymphovascular\par invasion identified.\par - Fibrocystic changes.\par - Nipple and skin negative for carcinoma.\par - Changes consistent with biopsy site/treatment effect. \par \par She is s/p neoadjuvant chemotherapy for inflammatory and metastatic right breast cancer as per Dr. Javi Hollis. \par \par She noted lump right breast 1-2:00 periareolar region in December 2019. \par Pt subsequently developed swelling and erythema 2/6/20 after mammo/sono which was neg. - BIRADS 1\par Diagnostic right mammo/sono 4/20 revealed skin thickening, right breast edema, slightly abnormal right axillary nodes - infection vs inflammatory ca - BIRADS 4A\par \par US guided core biopsy of right breast 12:00 performed on 4/17/2020 revealed Invasive lobular carcinoma, Apolinar score 6/9, invasive tumor measures at least 0.7 cm, DCIS not present, lymphovascular permeation by tumor not seen.  ER/MO(+) HER2(+). \par \par FNA of right axillary lymph node -  Positive for malignant cells-  Metastatic adenocarcinoma \par \par \par PERTINENT HISTORY:\par No FH breast/ovarian ca\par No prior bxs 
Average

## 2024-10-07 NOTE — BH INPATIENT PSYCHIATRY PROGRESS NOTE - NSBHMSEPERCEPT_PSY_A_CORE
Auditory hallucinations
No abnormalities
Auditory hallucinations
Auditory hallucinations

## 2024-10-07 NOTE — BH INPATIENT PSYCHIATRY DISCHARGE NOTE - NSBHSUICIDESTATUS_PSY_ALL_CORE
Suicidal ideation has resolved with the resumption of medications. Risk of suicide has returned to baseline.

## 2024-10-07 NOTE — BH INPATIENT PSYCHIATRY PROGRESS NOTE - NSBHCHARTREVIEWVS_PSY_A_CORE FT
Vital Signs Last 24 Hrs  T(C): 36.4 (10-07-24 @ 07:42), Max: 36.4 (10-07-24 @ 07:42)  T(F): 97.6 (10-07-24 @ 07:42), Max: 97.6 (10-07-24 @ 07:42)  HR: 78 (10-07-24 @ 07:42) (78 - 99)  BP: 143/82 (10-07-24 @ 07:42) (143/82 - 148/86)  BP(mean): --  RR: 20 (10-07-24 @ 07:42) (18 - 20)  SpO2: --    Orthostatic VS  10-06-24 @ 07:30  Lying BP: --/-- HR: 18  Sitting BP: --/-- HR: --  Standing BP: --/-- HR: --  Site: --  Mode: --  
Vital Signs Last 24 Hrs  T(C): 36.6 (10-01-24 @ 07:30), Max: 36.8 (09-30-24 @ 15:45)  T(F): 97.8 (10-01-24 @ 07:30), Max: 98.3 (09-30-24 @ 15:45)  HR: 80 (10-01-24 @ 07:30) (80 - 87)  BP: 155/91 (10-01-24 @ 07:30) (155/91 - 161/90)  BP(mean): --  RR: --  SpO2: --    
Vital Signs Last 24 Hrs  T(C): 36.6 (10-02-24 @ 07:35), Max: 36.6 (10-02-24 @ 07:35)  T(F): 97.8 (10-02-24 @ 07:35), Max: 97.8 (10-02-24 @ 07:35)  HR: 86 (10-02-24 @ 07:35) (86 - 86)  BP: 154/103 (10-02-24 @ 07:35) (154/103 - 154/103)  BP(mean): --  RR: 16 (10-02-24 @ 07:35) (16 - 16)  SpO2: --    
Vital Signs Last 24 Hrs  T(C): 36.7 (10-04-24 @ 15:51), Max: 36.7 (10-04-24 @ 15:51)  T(F): 98 (10-04-24 @ 15:51), Max: 98 (10-04-24 @ 15:51)  HR: 97 (10-04-24 @ 15:51) (96 - 97)  BP: 147/91 (10-04-24 @ 15:51) (131/85 - 147/91)  BP(mean): --  RR: 18 (10-04-24 @ 15:51) (18 - 18)  SpO2: --    
Vital Signs Last 24 Hrs  T(C): 36.6 (10-03-24 @ 08:24), Max: 36.6 (10-03-24 @ 08:24)  T(F): 97.8 (10-03-24 @ 08:24), Max: 97.8 (10-03-24 @ 08:24)  HR: 85 (10-03-24 @ 08:24) (85 - 85)  BP: 139/94 (10-03-24 @ 08:24) (139/94 - 139/94)  BP(mean): --  RR: 16 (10-03-24 @ 08:24) (16 - 16)  SpO2: --    
Vital Signs Last 24 Hrs  T(C): 36.7 (09-30-24 @ 09:35), Max: 36.7 (09-29-24 @ 16:10)  T(F): 98 (09-30-24 @ 09:35), Max: 98 (09-29-24 @ 16:10)  HR: 80 (09-30-24 @ 08:39) (80 - 92)  BP: 140/96 (09-30-24 @ 08:39) (140/96 - 168/94)  BP(mean): --  RR: 17 (09-29-24 @ 16:10) (17 - 17)  SpO2: --

## 2024-10-07 NOTE — BH INPATIENT PSYCHIATRY PROGRESS NOTE - NSBHMSETHTCONTENT_PSY_A_CORE
Unremarkable
Delusions/Suicidality/Homicidality
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
## 2024-10-07 NOTE — BH INPATIENT PSYCHIATRY PROGRESS NOTE - NSBHATTESTTYPEVISIT_PSY_A_CORE
On-site Attending supervising BECKY (99XXX codes)
Attending Only
On-site Attending supervising EBCKY (99XXX codes)
On-site Attending supervising BECKY (99XXX codes)

## 2024-10-07 NOTE — BH INPATIENT PSYCHIATRY PROGRESS NOTE - NSBHCONSBHPROVCNTCTNOFT_PSY_A_CORE
Will attempt to contact during weekday

## 2024-10-07 NOTE — BH INPATIENT PSYCHIATRY PROGRESS NOTE - NSDCCRITERIA_PSY_ALL_CORE
When patient is no longer a danger to self or others. 

## 2024-10-07 NOTE — BH INPATIENT PSYCHIATRY PROGRESS NOTE - NSBHMETABOLIC_PSY_ALL_CORE_FT
BMI: BMI (kg/m2): 38.2 (09-29-24 @ 04:42)  HbA1c: A1C with Estimated Average Glucose Result: 10.0 % (09-30-24 @ 07:31)    Glucose: POCT Blood Glucose.: 154 mg/dL (04-24-24 @ 06:33)    BP: 155/91 (10-01-24 @ 07:30) (140/96 - 168/94)Vital Signs Last 24 Hrs  T(C): 36.6 (10-01-24 @ 07:30), Max: 36.8 (09-30-24 @ 15:45)  T(F): 97.8 (10-01-24 @ 07:30), Max: 98.3 (09-30-24 @ 15:45)  HR: 80 (10-01-24 @ 07:30) (80 - 87)  BP: 155/91 (10-01-24 @ 07:30) (155/91 - 161/90)  BP(mean): --  RR: --  SpO2: --      Lipid Panel: Date/Time: 09-30-24 @ 07:31  Cholesterol, Serum: 222  LDL Cholesterol Calculated: 133  HDL Cholesterol, Serum: 52  Total Cholesterol/HDL Ration Measurement: --  Triglycerides, Serum: 185  
BMI: BMI (kg/m2): 38.2 (09-29-24 @ 04:42)  HbA1c: A1C with Estimated Average Glucose Result: 10.0 % (09-30-24 @ 07:31)    Glucose: POCT Blood Glucose.: 313 mg/dL (10-04-24 @ 16:09)    BP: 147/91 (10-04-24 @ 15:51) (131/85 - 154/103)Vital Signs Last 24 Hrs  T(C): 36.7 (10-04-24 @ 15:51), Max: 36.7 (10-04-24 @ 15:51)  T(F): 98 (10-04-24 @ 15:51), Max: 98 (10-04-24 @ 15:51)  HR: 97 (10-04-24 @ 15:51) (96 - 97)  BP: 147/91 (10-04-24 @ 15:51) (131/85 - 147/91)  BP(mean): --  RR: 18 (10-04-24 @ 15:51) (18 - 18)  SpO2: --      Lipid Panel: Date/Time: 09-30-24 @ 07:31  Cholesterol, Serum: 222  LDL Cholesterol Calculated: 133  HDL Cholesterol, Serum: 52  Total Cholesterol/HDL Ration Measurement: --  Triglycerides, Serum: 185  
BMI: BMI (kg/m2): 38.2 (09-29-24 @ 04:42)  HbA1c: A1C with Estimated Average Glucose Result: 10.0 % (09-30-24 @ 07:31)    Glucose: POCT Blood Glucose.: 240 mg/dL (10-07-24 @ 06:36)    BP: 143/82 (10-07-24 @ 07:42) (124/79 - 148/86)Vital Signs Last 24 Hrs  T(C): 36.4 (10-07-24 @ 07:42), Max: 36.4 (10-07-24 @ 07:42)  T(F): 97.6 (10-07-24 @ 07:42), Max: 97.6 (10-07-24 @ 07:42)  HR: 78 (10-07-24 @ 07:42) (78 - 99)  BP: 143/82 (10-07-24 @ 07:42) (143/82 - 148/86)  BP(mean): --  RR: 20 (10-07-24 @ 07:42) (18 - 20)  SpO2: --    Orthostatic VS  10-06-24 @ 07:30  Lying BP: --/-- HR: 18  Sitting BP: --/-- HR: --  Standing BP: --/-- HR: --  Site: --  Mode: --    Lipid Panel: Date/Time: 09-30-24 @ 07:31  Cholesterol, Serum: 222  LDL Cholesterol Calculated: 133  HDL Cholesterol, Serum: 52  Total Cholesterol/HDL Ration Measurement: --  Triglycerides, Serum: 185  
BMI: BMI (kg/m2): 38.2 (09-29-24 @ 04:42)  HbA1c: A1C with Estimated Average Glucose Result: 7.3 % (04-10-24 @ 08:14)    Glucose: POCT Blood Glucose.: 154 mg/dL (04-24-24 @ 06:33)    BP: 140/96 (09-30-24 @ 08:39) (140/96 - 168/94)Vital Signs Last 24 Hrs  T(C): 36.7 (09-30-24 @ 09:35), Max: 36.7 (09-29-24 @ 16:10)  T(F): 98 (09-30-24 @ 09:35), Max: 98 (09-29-24 @ 16:10)  HR: 80 (09-30-24 @ 08:39) (80 - 92)  BP: 140/96 (09-30-24 @ 08:39) (140/96 - 168/94)  BP(mean): --  RR: 17 (09-29-24 @ 16:10) (17 - 17)  SpO2: --      Lipid Panel: Date/Time: 09-30-24 @ 07:31  Cholesterol, Serum: 222  LDL Cholesterol Calculated: 133  HDL Cholesterol, Serum: 52  Total Cholesterol/HDL Ration Measurement: --  Triglycerides, Serum: 185  
BMI: BMI (kg/m2): 38.2 (09-29-24 @ 04:42)  HbA1c: A1C with Estimated Average Glucose Result: 10.0 % (09-30-24 @ 07:31)    Glucose: POCT Blood Glucose.: 154 mg/dL (04-24-24 @ 06:33)    BP: 154/103 (10-02-24 @ 07:35) (140/96 - 168/94)Vital Signs Last 24 Hrs  T(C): 36.6 (10-02-24 @ 07:35), Max: 36.6 (10-02-24 @ 07:35)  T(F): 97.8 (10-02-24 @ 07:35), Max: 97.8 (10-02-24 @ 07:35)  HR: 86 (10-02-24 @ 07:35) (86 - 86)  BP: 154/103 (10-02-24 @ 07:35) (154/103 - 154/103)  BP(mean): --  RR: 16 (10-02-24 @ 07:35) (16 - 16)  SpO2: --      Lipid Panel: Date/Time: 09-30-24 @ 07:31  Cholesterol, Serum: 222  LDL Cholesterol Calculated: 133  HDL Cholesterol, Serum: 52  Total Cholesterol/HDL Ration Measurement: --  Triglycerides, Serum: 185  
BMI: BMI (kg/m2): 38.2 (09-29-24 @ 04:42)  HbA1c: A1C with Estimated Average Glucose Result: 10.0 % (09-30-24 @ 07:31)    Glucose: POCT Blood Glucose.: 277 mg/dL (10-03-24 @ 11:30)    BP: 139/94 (10-03-24 @ 08:24) (139/94 - 155/91)Vital Signs Last 24 Hrs  T(C): 36.6 (10-03-24 @ 08:24), Max: 36.6 (10-03-24 @ 08:24)  T(F): 97.8 (10-03-24 @ 08:24), Max: 97.8 (10-03-24 @ 08:24)  HR: 85 (10-03-24 @ 08:24) (85 - 85)  BP: 139/94 (10-03-24 @ 08:24) (139/94 - 139/94)  BP(mean): --  RR: 16 (10-03-24 @ 08:24) (16 - 16)  SpO2: --      Lipid Panel: Date/Time: 09-30-24 @ 07:31  Cholesterol, Serum: 222  LDL Cholesterol Calculated: 133  HDL Cholesterol, Serum: 52  Total Cholesterol/HDL Ration Measurement: --  Triglycerides, Serum: 185

## 2024-10-07 NOTE — BH INPATIENT PSYCHIATRY DISCHARGE NOTE - NSBHFUPINTERVALHXFT_PSY_A_CORE
No overnight events. Chart reviewed. Patient reports continued improvement in her mood. She denies passive or active SI, intent, or plan. She denies AVH, paranoia, delusions, thought broadcasting/withdrawal, ideas of reference. No symptoms of psychosis readily apparent on interview. She is looking forward to discharge.

## 2024-10-07 NOTE — BH INPATIENT PSYCHIATRY PROGRESS NOTE - PRN MEDS
MEDICATIONS  (PRN):  acetaminophen     Tablet .. 650 milliGRAM(s) Oral every 6 hours PRN Temp greater or equal to 38C (100.4F), Mild Pain (1 - 3)  dextrose Oral Gel 15 Gram(s) Oral once PRN Blood Glucose LESS THAN 70 milliGRAM(s)/deciliter  haloperidol     Tablet 5 milliGRAM(s) Oral every 6 hours PRN agitation  LORazepam     Tablet 2 milliGRAM(s) Oral every 6 hours PRN Agitation  
MEDICATIONS  (PRN):  acetaminophen     Tablet .. 650 milliGRAM(s) Oral every 6 hours PRN Temp greater or equal to 38C (100.4F), Mild Pain (1 - 3)  dextrose Oral Gel 15 Gram(s) Oral once PRN Blood Glucose LESS THAN 70 milliGRAM(s)/deciliter  haloperidol     Tablet 5 milliGRAM(s) Oral every 6 hours PRN agitation  LORazepam     Tablet 2 milliGRAM(s) Oral every 6 hours PRN Agitation  
MEDICATIONS  (PRN):  acetaminophen     Tablet .. 650 milliGRAM(s) Oral every 6 hours PRN Temp greater or equal to 38C (100.4F), Mild Pain (1 - 3)  haloperidol     Tablet 5 milliGRAM(s) Oral every 6 hours PRN agitation  LORazepam     Tablet 2 milliGRAM(s) Oral every 6 hours PRN Agitation  
MEDICATIONS  (PRN):  acetaminophen     Tablet .. 650 milliGRAM(s) Oral every 6 hours PRN Temp greater or equal to 38C (100.4F), Mild Pain (1 - 3)  haloperidol     Tablet 5 milliGRAM(s) Oral every 6 hours PRN agitation  LORazepam     Tablet 2 milliGRAM(s) Oral every 6 hours PRN Agitation  
MEDICATIONS  (PRN):  acetaminophen     Tablet .. 650 milliGRAM(s) Oral every 6 hours PRN Temp greater or equal to 38C (100.4F), Mild Pain (1 - 3)  dextrose Oral Gel 15 Gram(s) Oral once PRN Blood Glucose LESS THAN 70 milliGRAM(s)/deciliter  haloperidol     Tablet 5 milliGRAM(s) Oral every 6 hours PRN agitation  
MEDICATIONS  (PRN):  acetaminophen     Tablet .. 650 milliGRAM(s) Oral every 6 hours PRN Temp greater or equal to 38C (100.4F), Mild Pain (1 - 3)  haloperidol     Tablet 5 milliGRAM(s) Oral every 6 hours PRN agitation  LORazepam     Tablet 2 milliGRAM(s) Oral every 6 hours PRN Agitation
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
## 2024-10-07 NOTE — BH INPATIENT PSYCHIATRY DISCHARGE NOTE - NSDCMRMEDTOKEN_GEN_ALL_CORE_FT
Abilify 20 mg oral tablet: 1 tab(s) orally once a day MDD: 1 tablet  amLODIPine 5 mg oral tablet: 1 tab(s) orally once a day Continue until told otherwise by your provider. MDD: 1 tablet  aspirin 81 mg oral delayed release tablet: 1 tab(s) orally once a day Continue until told otherwise by your provider MDD: 1 tablet  atorvastatin 40 mg oral tablet: 1 tab(s) orally once a day (at bedtime) Continue until told otherwise by your provider. MDD: 1 tablet  Januvia 100 mg oral tablet: 1 tab(s) orally once a day MDD: 1 tablet  levothyroxine 25 mcg (0.025 mg) oral tablet: 1 tab(s) orally once a day Continue until told otherwise by your provider. MDD: 1 tablet  metFORMIN 500 mg oral tablet: 1 tab(s) orally 2 times a day MDD: 1000 mg

## 2024-10-07 NOTE — BH INPATIENT PSYCHIATRY DISCHARGE NOTE - NSBHMETABOLIC_PSY_ALL_CORE_FT
BMI: BMI (kg/m2): 38.2 (09-29-24 @ 04:42)  HbA1c: A1C with Estimated Average Glucose Result: 10.0 % (09-30-24 @ 07:31)    Glucose: POCT Blood Glucose.: 240 mg/dL (10-07-24 @ 06:36)    BP: 143/82 (10-07-24 @ 07:42) (124/79 - 148/86)Vital Signs Last 24 Hrs  T(C): 36.4 (10-07-24 @ 07:42), Max: 36.4 (10-07-24 @ 07:42)  T(F): 97.6 (10-07-24 @ 07:42), Max: 97.6 (10-07-24 @ 07:42)  HR: 78 (10-07-24 @ 07:42) (78 - 99)  BP: 143/82 (10-07-24 @ 07:42) (143/82 - 148/86)  BP(mean): --  RR: 20 (10-07-24 @ 07:42) (18 - 20)  SpO2: --    Orthostatic VS  10-06-24 @ 07:30  Lying BP: --/-- HR: 18  Sitting BP: --/-- HR: --  Standing BP: --/-- HR: --  Site: --  Mode: --    Lipid Panel: Date/Time: 09-30-24 @ 07:31  Cholesterol, Serum: 222  LDL Cholesterol Calculated: 133  HDL Cholesterol, Serum: 52  Total Cholesterol/HDL Ration Measurement: --  Triglycerides, Serum: 185

## 2024-10-07 NOTE — BH INPATIENT PSYCHIATRY PROGRESS NOTE - CURRENT MEDICATION
MEDICATIONS  (STANDING):  amLODIPine   Tablet 5 milliGRAM(s) Oral daily  ARIPiprazole 15 milliGRAM(s) Oral daily  aspirin  chewable 81 milliGRAM(s) Oral daily  atorvastatin 40 milliGRAM(s) Oral at bedtime  metFORMIN 500 milliGRAM(s) Oral two times a day  SITagliptin 100 milliGRAM(s) Oral daily    MEDICATIONS  (PRN):  acetaminophen     Tablet .. 650 milliGRAM(s) Oral every 6 hours PRN Temp greater or equal to 38C (100.4F), Mild Pain (1 - 3)  haloperidol     Tablet 5 milliGRAM(s) Oral every 6 hours PRN agitation  LORazepam     Tablet 2 milliGRAM(s) Oral every 6 hours PRN Agitation  
MEDICATIONS  (STANDING):  amLODIPine   Tablet 5 milliGRAM(s) Oral daily  ARIPiprazole 20 milliGRAM(s) Oral daily  aspirin  chewable 81 milliGRAM(s) Oral daily  atorvastatin 40 milliGRAM(s) Oral at bedtime  dextrose 5%. 1000 milliLiter(s) (50 mL/Hr) IV Continuous <Continuous>  dextrose 5%. 1000 milliLiter(s) (100 mL/Hr) IV Continuous <Continuous>  dextrose 50% Injectable 25 Gram(s) IV Push once  dextrose 50% Injectable 25 Gram(s) IV Push once  dextrose 50% Injectable 12.5 Gram(s) IV Push once  glucagon  Injectable 1 milliGRAM(s) IntraMuscular once  insulin lispro (ADMELOG) corrective regimen sliding scale   SubCutaneous three times a day before meals  metFORMIN 500 milliGRAM(s) Oral two times a day  ondansetron Injectable 4 milliGRAM(s) IV Push once  SITagliptin 100 milliGRAM(s) Oral daily    MEDICATIONS  (PRN):  acetaminophen     Tablet .. 650 milliGRAM(s) Oral every 6 hours PRN Temp greater or equal to 38C (100.4F), Mild Pain (1 - 3)  dextrose Oral Gel 15 Gram(s) Oral once PRN Blood Glucose LESS THAN 70 milliGRAM(s)/deciliter  haloperidol     Tablet 5 milliGRAM(s) Oral every 6 hours PRN agitation  
MEDICATIONS  (STANDING):  ARIPiprazole 15 milliGRAM(s) Oral daily  aspirin  chewable 81 milliGRAM(s) Oral daily    MEDICATIONS  (PRN):  acetaminophen     Tablet .. 650 milliGRAM(s) Oral every 6 hours PRN Temp greater or equal to 38C (100.4F), Mild Pain (1 - 3)  haloperidol     Tablet 5 milliGRAM(s) Oral every 6 hours PRN agitation  LORazepam     Tablet 2 milliGRAM(s) Oral every 6 hours PRN Agitation  
MEDICATIONS  (STANDING):  amLODIPine   Tablet 5 milliGRAM(s) Oral daily  aspirin  chewable 81 milliGRAM(s) Oral daily  atorvastatin 40 milliGRAM(s) Oral at bedtime  metFORMIN 500 milliGRAM(s) Oral two times a day  SITagliptin 100 milliGRAM(s) Oral daily    MEDICATIONS  (PRN):  acetaminophen     Tablet .. 650 milliGRAM(s) Oral every 6 hours PRN Temp greater or equal to 38C (100.4F), Mild Pain (1 - 3)  haloperidol     Tablet 5 milliGRAM(s) Oral every 6 hours PRN agitation  LORazepam     Tablet 2 milliGRAM(s) Oral every 6 hours PRN Agitation  
MEDICATIONS  (STANDING):  amLODIPine   Tablet 5 milliGRAM(s) Oral daily  ARIPiprazole 20 milliGRAM(s) Oral daily  aspirin  chewable 81 milliGRAM(s) Oral daily  atorvastatin 40 milliGRAM(s) Oral at bedtime  dextrose 5%. 1000 milliLiter(s) (50 mL/Hr) IV Continuous <Continuous>  dextrose 5%. 1000 milliLiter(s) (100 mL/Hr) IV Continuous <Continuous>  dextrose 50% Injectable 25 Gram(s) IV Push once  dextrose 50% Injectable 25 Gram(s) IV Push once  dextrose 50% Injectable 12.5 Gram(s) IV Push once  glucagon  Injectable 1 milliGRAM(s) IntraMuscular once  insulin lispro (ADMELOG) corrective regimen sliding scale   SubCutaneous three times a day before meals  metFORMIN 500 milliGRAM(s) Oral two times a day  SITagliptin 100 milliGRAM(s) Oral daily    MEDICATIONS  (PRN):  acetaminophen     Tablet .. 650 milliGRAM(s) Oral every 6 hours PRN Temp greater or equal to 38C (100.4F), Mild Pain (1 - 3)  dextrose Oral Gel 15 Gram(s) Oral once PRN Blood Glucose LESS THAN 70 milliGRAM(s)/deciliter  haloperidol     Tablet 5 milliGRAM(s) Oral every 6 hours PRN agitation  LORazepam     Tablet 2 milliGRAM(s) Oral every 6 hours PRN Agitation  
MEDICATIONS  (STANDING):  amLODIPine   Tablet 5 milliGRAM(s) Oral daily  ARIPiprazole 20 milliGRAM(s) Oral daily  aspirin  chewable 81 milliGRAM(s) Oral daily  atorvastatin 40 milliGRAM(s) Oral at bedtime  dextrose 5%. 1000 milliLiter(s) (100 mL/Hr) IV Continuous <Continuous>  dextrose 5%. 1000 milliLiter(s) (50 mL/Hr) IV Continuous <Continuous>  dextrose 50% Injectable 12.5 Gram(s) IV Push once  dextrose 50% Injectable 25 Gram(s) IV Push once  dextrose 50% Injectable 25 Gram(s) IV Push once  glucagon  Injectable 1 milliGRAM(s) IntraMuscular once  insulin lispro (ADMELOG) corrective regimen sliding scale   SubCutaneous three times a day before meals  metFORMIN 500 milliGRAM(s) Oral two times a day  ondansetron Injectable 4 milliGRAM(s) IV Push once  SITagliptin 100 milliGRAM(s) Oral daily    MEDICATIONS  (PRN):  acetaminophen     Tablet .. 650 milliGRAM(s) Oral every 6 hours PRN Temp greater or equal to 38C (100.4F), Mild Pain (1 - 3)  dextrose Oral Gel 15 Gram(s) Oral once PRN Blood Glucose LESS THAN 70 milliGRAM(s)/deciliter  haloperidol     Tablet 5 milliGRAM(s) Oral every 6 hours PRN agitation  LORazepam     Tablet 2 milliGRAM(s) Oral every 6 hours PRN Agitation

## 2024-10-09 NOTE — BH SOCIAL WORK CONFIRMATION FOLLOW UP NOTE - NSCOMMENTS_PSY_ALL_CORE
Caring contact call attempted by  (10/8); voicemail was left requesting a return call.     Natalie landis her outpatient mental health appointment below:    [facility]  09-Oct-2024 10:00  [address] [facility] [address]  [phone]  Mental Health Treatment  Intake appointment with Zoe     left a message for staff at patient's shelter of residence, 10/9.    Caring contact call attempted by  (10/8); voicemail was left requesting a return call.

## 2024-10-15 DIAGNOSIS — Z79.82 LONG TERM (CURRENT) USE OF ASPIRIN: ICD-10-CM

## 2024-10-15 DIAGNOSIS — E03.9 HYPOTHYROIDISM, UNSPECIFIED: ICD-10-CM

## 2024-10-15 DIAGNOSIS — R45.851 SUICIDAL IDEATIONS: ICD-10-CM

## 2024-10-15 DIAGNOSIS — E78.5 HYPERLIPIDEMIA, UNSPECIFIED: ICD-10-CM

## 2024-10-15 DIAGNOSIS — F03.90 UNSPECIFIED DEMENTIA, UNSPECIFIED SEVERITY, WITHOUT BEHAVIORAL DISTURBANCE, PSYCHOTIC DISTURBANCE, MOOD DISTURBANCE, AND ANXIETY: ICD-10-CM

## 2024-10-15 DIAGNOSIS — R45.1 RESTLESSNESS AND AGITATION: ICD-10-CM

## 2024-10-15 DIAGNOSIS — Z88.0 ALLERGY STATUS TO PENICILLIN: ICD-10-CM

## 2024-10-15 DIAGNOSIS — I10 ESSENTIAL (PRIMARY) HYPERTENSION: ICD-10-CM

## 2024-10-15 DIAGNOSIS — F25.1 SCHIZOAFFECTIVE DISORDER, DEPRESSIVE TYPE: ICD-10-CM

## 2024-10-15 DIAGNOSIS — R45.850 HOMICIDAL IDEATIONS: ICD-10-CM

## 2024-10-15 DIAGNOSIS — Z86.73 PERSONAL HISTORY OF TRANSIENT ISCHEMIC ATTACK (TIA), AND CEREBRAL INFARCTION WITHOUT RESIDUAL DEFICITS: ICD-10-CM

## 2024-10-15 DIAGNOSIS — E11.9 TYPE 2 DIABETES MELLITUS WITHOUT COMPLICATIONS: ICD-10-CM

## 2024-10-15 DIAGNOSIS — C95.91 LEUKEMIA, UNSPECIFIED, IN REMISSION: ICD-10-CM

## 2024-10-16 ENCOUNTER — EMERGENCY (EMERGENCY)
Facility: HOSPITAL | Age: 64
LOS: 0 days | Discharge: HOME | End: 2024-10-16
Attending: STUDENT IN AN ORGANIZED HEALTH CARE EDUCATION/TRAINING PROGRAM
Payer: MEDICARE

## 2024-10-16 VITALS
RESPIRATION RATE: 18 BRPM | DIASTOLIC BLOOD PRESSURE: 94 MMHG | OXYGEN SATURATION: 97 % | WEIGHT: 202.38 LBS | HEIGHT: 61 IN | TEMPERATURE: 98 F | HEART RATE: 103 BPM | SYSTOLIC BLOOD PRESSURE: 146 MMHG

## 2024-10-16 VITALS
DIASTOLIC BLOOD PRESSURE: 81 MMHG | RESPIRATION RATE: 18 BRPM | OXYGEN SATURATION: 98 % | HEART RATE: 87 BPM | SYSTOLIC BLOOD PRESSURE: 137 MMHG

## 2024-10-16 DIAGNOSIS — R53.1 WEAKNESS: ICD-10-CM

## 2024-10-16 DIAGNOSIS — H53.131 SUDDEN VISUAL LOSS, RIGHT EYE: ICD-10-CM

## 2024-10-16 DIAGNOSIS — E03.9 HYPOTHYROIDISM, UNSPECIFIED: ICD-10-CM

## 2024-10-16 DIAGNOSIS — H53.8 OTHER VISUAL DISTURBANCES: ICD-10-CM

## 2024-10-16 DIAGNOSIS — E11.9 TYPE 2 DIABETES MELLITUS WITHOUT COMPLICATIONS: ICD-10-CM

## 2024-10-16 DIAGNOSIS — F25.1 SCHIZOAFFECTIVE DISORDER, DEPRESSIVE TYPE: ICD-10-CM

## 2024-10-16 DIAGNOSIS — Z79.82 LONG TERM (CURRENT) USE OF ASPIRIN: ICD-10-CM

## 2024-10-16 DIAGNOSIS — I10 ESSENTIAL (PRIMARY) HYPERTENSION: ICD-10-CM

## 2024-10-16 DIAGNOSIS — Z86.73 PERSONAL HISTORY OF TRANSIENT ISCHEMIC ATTACK (TIA), AND CEREBRAL INFARCTION WITHOUT RESIDUAL DEFICITS: ICD-10-CM

## 2024-10-16 DIAGNOSIS — Z95.2 PRESENCE OF PROSTHETIC HEART VALVE: Chronic | ICD-10-CM

## 2024-10-16 DIAGNOSIS — Z85.6 PERSONAL HISTORY OF LEUKEMIA: ICD-10-CM

## 2024-10-16 DIAGNOSIS — Z95.2 PRESENCE OF PROSTHETIC HEART VALVE: ICD-10-CM

## 2024-10-16 DIAGNOSIS — Z88.0 ALLERGY STATUS TO PENICILLIN: ICD-10-CM

## 2024-10-16 LAB
ALBUMIN SERPL ELPH-MCNC: 4 G/DL — SIGNIFICANT CHANGE UP (ref 3.5–5.2)
ALP SERPL-CCNC: 154 U/L — HIGH (ref 30–115)
ALT FLD-CCNC: 22 U/L — SIGNIFICANT CHANGE UP (ref 0–41)
ANION GAP SERPL CALC-SCNC: 11 MMOL/L — SIGNIFICANT CHANGE UP (ref 7–14)
APTT BLD: 28.8 SEC — SIGNIFICANT CHANGE UP (ref 27–39.2)
AST SERPL-CCNC: 15 U/L — SIGNIFICANT CHANGE UP (ref 0–41)
B-OH-BUTYR SERPL-SCNC: <0.2 MMOL/L — SIGNIFICANT CHANGE UP
BASE EXCESS BLDV CALC-SCNC: 2.1 MMOL/L — SIGNIFICANT CHANGE UP (ref -2–3)
BASOPHILS # BLD AUTO: 0.04 K/UL — SIGNIFICANT CHANGE UP (ref 0–0.2)
BASOPHILS NFR BLD AUTO: 0.6 % — SIGNIFICANT CHANGE UP (ref 0–1)
BILIRUB SERPL-MCNC: 0.3 MG/DL — SIGNIFICANT CHANGE UP (ref 0.2–1.2)
BUN SERPL-MCNC: 9 MG/DL — LOW (ref 10–20)
CA-I SERPL-SCNC: 1.21 MMOL/L — SIGNIFICANT CHANGE UP (ref 1.15–1.33)
CALCIUM SERPL-MCNC: 9.1 MG/DL — SIGNIFICANT CHANGE UP (ref 8.4–10.4)
CHLORIDE SERPL-SCNC: 99 MMOL/L — SIGNIFICANT CHANGE UP (ref 98–110)
CO2 SERPL-SCNC: 25 MMOL/L — SIGNIFICANT CHANGE UP (ref 17–32)
CREAT SERPL-MCNC: 0.8 MG/DL — SIGNIFICANT CHANGE UP (ref 0.7–1.5)
CRP SERPL-MCNC: 14.1 MG/L — HIGH
EGFR: 84 ML/MIN/1.73M2 — SIGNIFICANT CHANGE UP
EOSINOPHIL # BLD AUTO: 0.29 K/UL — SIGNIFICANT CHANGE UP (ref 0–0.7)
EOSINOPHIL NFR BLD AUTO: 4.1 % — SIGNIFICANT CHANGE UP (ref 0–8)
ERYTHROCYTE [SEDIMENTATION RATE] IN BLOOD: 49 MM/HR — HIGH (ref 0–20)
GAS PNL BLDV: 134 MMOL/L — LOW (ref 136–145)
GAS PNL BLDV: SIGNIFICANT CHANGE UP
GLUCOSE SERPL-MCNC: 319 MG/DL — HIGH (ref 70–99)
HCO3 BLDV-SCNC: 28 MMOL/L — SIGNIFICANT CHANGE UP (ref 22–29)
HCT VFR BLD CALC: 37.6 % — SIGNIFICANT CHANGE UP (ref 37–47)
HCT VFR BLDA CALC: 38 % — SIGNIFICANT CHANGE UP (ref 34.5–46.5)
HGB BLD CALC-MCNC: 12.5 G/DL — SIGNIFICANT CHANGE UP (ref 11.7–16.1)
HGB BLD-MCNC: 13 G/DL — SIGNIFICANT CHANGE UP (ref 12–16)
IMM GRANULOCYTES NFR BLD AUTO: 1.1 % — HIGH (ref 0.1–0.3)
INR BLD: 0.93 RATIO — SIGNIFICANT CHANGE UP (ref 0.65–1.3)
LACTATE BLDV-MCNC: 1.5 MMOL/L — SIGNIFICANT CHANGE UP (ref 0.5–2)
LYMPHOCYTES # BLD AUTO: 1.81 K/UL — SIGNIFICANT CHANGE UP (ref 1.2–3.4)
LYMPHOCYTES # BLD AUTO: 25.9 % — SIGNIFICANT CHANGE UP (ref 20.5–51.1)
MCHC RBC-ENTMCNC: 30.1 PG — SIGNIFICANT CHANGE UP (ref 27–31)
MCHC RBC-ENTMCNC: 34.6 G/DL — SIGNIFICANT CHANGE UP (ref 32–37)
MCV RBC AUTO: 87 FL — SIGNIFICANT CHANGE UP (ref 81–99)
MONOCYTES # BLD AUTO: 0.46 K/UL — SIGNIFICANT CHANGE UP (ref 0.1–0.6)
MONOCYTES NFR BLD AUTO: 6.6 % — SIGNIFICANT CHANGE UP (ref 1.7–9.3)
NEUTROPHILS # BLD AUTO: 4.31 K/UL — SIGNIFICANT CHANGE UP (ref 1.4–6.5)
NEUTROPHILS NFR BLD AUTO: 61.7 % — SIGNIFICANT CHANGE UP (ref 42.2–75.2)
NRBC # BLD: 0 /100 WBCS — SIGNIFICANT CHANGE UP (ref 0–0)
PCO2 BLDV: 47 MMHG — HIGH (ref 39–42)
PH BLDV: 7.38 — SIGNIFICANT CHANGE UP (ref 7.32–7.43)
PLATELET # BLD AUTO: 204 K/UL — SIGNIFICANT CHANGE UP (ref 130–400)
PMV BLD: 9.4 FL — SIGNIFICANT CHANGE UP (ref 7.4–10.4)
PO2 BLDV: 19 MMHG — LOW (ref 25–45)
POTASSIUM BLDV-SCNC: 3.8 MMOL/L — SIGNIFICANT CHANGE UP (ref 3.5–5.1)
POTASSIUM SERPL-MCNC: 3.9 MMOL/L — SIGNIFICANT CHANGE UP (ref 3.5–5)
POTASSIUM SERPL-SCNC: 3.9 MMOL/L — SIGNIFICANT CHANGE UP (ref 3.5–5)
PROT SERPL-MCNC: 7 G/DL — SIGNIFICANT CHANGE UP (ref 6–8)
PROTHROM AB SERPL-ACNC: 10.6 SEC — SIGNIFICANT CHANGE UP (ref 9.95–12.87)
RBC # BLD: 4.32 M/UL — SIGNIFICANT CHANGE UP (ref 4.2–5.4)
RBC # FLD: 13.1 % — SIGNIFICANT CHANGE UP (ref 11.5–14.5)
SAO2 % BLDV: 22.3 % — LOW (ref 67–88)
SODIUM SERPL-SCNC: 135 MMOL/L — SIGNIFICANT CHANGE UP (ref 135–146)
TROPONIN T, HIGH SENSITIVITY RESULT: 16 NG/L — HIGH (ref 6–13)
TROPONIN T, HIGH SENSITIVITY RESULT: 18 NG/L — HIGH (ref 6–13)
WBC # BLD: 6.99 K/UL — SIGNIFICANT CHANGE UP (ref 4.8–10.8)
WBC # FLD AUTO: 6.99 K/UL — SIGNIFICANT CHANGE UP (ref 4.8–10.8)

## 2024-10-16 PROCEDURE — 70450 CT HEAD/BRAIN W/O DYE: CPT | Mod: 26,MC

## 2024-10-16 PROCEDURE — 85018 HEMOGLOBIN: CPT

## 2024-10-16 PROCEDURE — 84295 ASSAY OF SERUM SODIUM: CPT

## 2024-10-16 PROCEDURE — 83605 ASSAY OF LACTIC ACID: CPT | Mod: 59

## 2024-10-16 PROCEDURE — 70450 CT HEAD/BRAIN W/O DYE: CPT | Mod: MC

## 2024-10-16 PROCEDURE — 70496 CT ANGIOGRAPHY HEAD: CPT | Mod: 26,MC

## 2024-10-16 PROCEDURE — 84132 ASSAY OF SERUM POTASSIUM: CPT

## 2024-10-16 PROCEDURE — 0042T: CPT

## 2024-10-16 PROCEDURE — 93010 ELECTROCARDIOGRAM REPORT: CPT

## 2024-10-16 PROCEDURE — 85652 RBC SED RATE AUTOMATED: CPT

## 2024-10-16 PROCEDURE — 70551 MRI BRAIN STEM W/O DYE: CPT | Mod: MC

## 2024-10-16 PROCEDURE — 80053 COMPREHEN METABOLIC PANEL: CPT

## 2024-10-16 PROCEDURE — 85610 PROTHROMBIN TIME: CPT

## 2024-10-16 PROCEDURE — 70496 CT ANGIOGRAPHY HEAD: CPT | Mod: MC

## 2024-10-16 PROCEDURE — 99223 1ST HOSP IP/OBS HIGH 75: CPT | Mod: FS

## 2024-10-16 PROCEDURE — 82962 GLUCOSE BLOOD TEST: CPT

## 2024-10-16 PROCEDURE — 82330 ASSAY OF CALCIUM: CPT | Mod: 59

## 2024-10-16 PROCEDURE — 70551 MRI BRAIN STEM W/O DYE: CPT | Mod: 26,MC

## 2024-10-16 PROCEDURE — 93005 ELECTROCARDIOGRAM TRACING: CPT

## 2024-10-16 PROCEDURE — 85014 HEMATOCRIT: CPT

## 2024-10-16 PROCEDURE — 36415 COLL VENOUS BLD VENIPUNCTURE: CPT

## 2024-10-16 PROCEDURE — 82010 KETONE BODYS QUAN: CPT

## 2024-10-16 PROCEDURE — 85730 THROMBOPLASTIN TIME PARTIAL: CPT

## 2024-10-16 PROCEDURE — 70498 CT ANGIOGRAPHY NECK: CPT | Mod: 26,MC

## 2024-10-16 PROCEDURE — 85025 COMPLETE CBC W/AUTO DIFF WBC: CPT

## 2024-10-16 PROCEDURE — 70498 CT ANGIOGRAPHY NECK: CPT | Mod: MC

## 2024-10-16 PROCEDURE — 82803 BLOOD GASES ANY COMBINATION: CPT | Mod: 59

## 2024-10-16 PROCEDURE — G0378: CPT

## 2024-10-16 PROCEDURE — 84484 ASSAY OF TROPONIN QUANT: CPT

## 2024-10-16 PROCEDURE — 0042T: CPT | Mod: MC

## 2024-10-16 PROCEDURE — 99285 EMERGENCY DEPT VISIT HI MDM: CPT | Mod: 25

## 2024-10-16 PROCEDURE — 86140 C-REACTIVE PROTEIN: CPT

## 2024-10-16 RX ORDER — ASPIRIN 325 MG
81 TABLET ORAL ONCE
Refills: 0 | Status: COMPLETED | OUTPATIENT
Start: 2024-10-16 | End: 2024-10-16

## 2024-10-16 RX ORDER — SODIUM CHLORIDE IRRIG SOLUTION 0.9 %
1000 SOLUTION, IRRIGATION IRRIGATION ONCE
Refills: 0 | Status: COMPLETED | OUTPATIENT
Start: 2024-10-16 | End: 2024-10-16

## 2024-10-16 RX ADMIN — Medication 81 MILLIGRAM(S): at 06:59

## 2024-10-16 RX ADMIN — Medication 1000 MILLILITER(S): at 06:59

## 2024-10-16 NOTE — ED PROVIDER NOTE - CARE PLAN
1 Principal Discharge DX:	Vision loss, right eye  Secondary Diagnosis:	Bilateral leg weakness  Secondary Diagnosis:	Weakness of right upper extremity

## 2024-10-16 NOTE — ED ADULT NURSE REASSESSMENT NOTE - NS ED NURSE REASSESS COMMENT FT1
Patient alert and oriented NIH stable. MRI and checklist done. Patient states she has titanium in sternum, MRI tech  made aware. done. Continue on OBS

## 2024-10-16 NOTE — ED CDU PROVIDER INITIAL DAY NOTE - CLINICAL SUMMARY MEDICAL DECISION MAKING FREE TEXT BOX
65 yo F, hx of HTN, HLD, hypothyroidism, schizophrenia, intermittently undomiciled here for assessment of R sided arm weakness, sensation of dragging R foot and worsening of poor vision in R eye that later was noted has been there for approx 1 month. Patient was placed in observation for MRI and optho consult. MR performed < from: MR Head No Cont (10.16.24 @ 08:28) >    No acute infarct, intracranial hemorrhage, or midline shift.3 mm lesion in the medial left dorsal soy questionable for cavernous malformation. No associated edema. Further characterization with contrast   can be considered.Nonspecific mild scattered foci of white matter signal abnormality which may be seen with chronic microvascular ischemic changes, demyelinating disease, migraine, or sequelae of remote inflammation/infection. Cleared by neuro and by optho. Patient discharged to follow up with optho outpatient. Patient a spoken to in detail about results  All questions addressed.  Results of ED work up discussed and patient given a copy of the results. Patient has proper follow up. Return precautions given.

## 2024-10-16 NOTE — CHART NOTE - NSCHARTNOTEFT_GEN_A_CORE
Pt seen in AM w Neurovascular attending, Dr. Delgado. MRI reported questionable cavernous malformation, no acute infarct.  No further in-patient workup from neurovascular perspective, pt should have evaluation by opthalmology due to pupillary obscuration and vision loss.  Pt can receive out-pt neuroendovascular f/u for possible cavernous malformation.     Discussed w Dr. Delgado

## 2024-10-16 NOTE — ED CDU PROVIDER INITIAL DAY NOTE - OBJECTIVE STATEMENT
63-year-old female with a past medical history of schizoaffective disorder, aortic valve replacement on aspirin, history of leukemia in remission, hypertension, diabetes, and history of CVA (noncompliant with any of her medications because she is "forgetful") presents the ED for evaluation of bilateral lower extremity weakness, right upper extremity weakness and right-sided blurry vision that began around 10 PM while she was out shopping. Patient reports over the past month she has been having issues with her right eye. Patient denies use of glasses or contacts, recent trauma, headaches, dizziness, chest pain, shortness of breath, abdominal pain, nausea, vomiting, diarrhea, constipation, fever, chills, urinary or bowel retention or incontinence. pending mri, esr, crp, and optho consult.

## 2024-10-16 NOTE — ED CDU PROVIDER DISPOSITION NOTE - PATIENT PORTAL LINK FT
You can access the FollowMyHealth Patient Portal offered by [facility] by registering at the following website: [email or web address]. By joining [facility]’s FollowMyHealth portal, you will also be able to view your health information using other applications (apps) compatible with our system.

## 2024-10-16 NOTE — ED CDU PROVIDER INITIAL DAY NOTE - ATTENDING APP SHARED VISIT CONTRIBUTION OF CARE
Patient is limited historian regarding PMHx which was largely obtained from chart, HPI is from patient.    63 yo F, hx of HTN, HLD, hypothyroidism, schizophrenia, intermittently undomiciled here for assessment of R sided arm weakness, sensation of dragging R foot and worsening of poor vision in R eye -- states symptoms began around 10pm, did not seek medical care at that time because she thought things would get better. Patient states that the vision in her R eye has been darkening for 1 month but today is got significantly worse and she can only see shadows.     Of note, on chart review, had similar episode of R sided sx 10/19/23 -- at that time had no ocular complaints and neuro note does not comment on abnormal pupil.     VS notable for mildly elevated BP, mild tachycardia. On exam has white discoloration/haziness to R pupil however PERRL, EOMI, can only see shadows on R so VA not able to be performed, has mild weakness 4/5 in RUE in flexion/extension at elbow, no pronator drift, 5/5 strength at lower extremities, CN II-XII intact, steady gait without assistance, clear speech.     Stroke code called, patient to CT scan, pending neuro eval.

## 2024-10-16 NOTE — ED CDU PROVIDER INITIAL DAY NOTE - PROGRESS NOTE DETAILS
Pt comfortable, awaiting mri, opthalmology eval s.o received. Pending MRI and Optho. States has aortic valve replacement non metallic Authored by Dr. Karthik Guidry spoke with Dr. Carty ophthalmologist who does not believe patient needs to be acutely evaluated as he believes theres no afferent pupillary defect and believes this is a cataract and despite asking to see the patient today in clinic they are unable to and recommend following up in 1 week will contact back for apt Authored by Dr. Karthik Guidry spoke with optho will see patient in clinic. Will wheel patient over. Patient ambulated from stretcher to wheelchair Patient cleared by Neuro. At [facility] now Patient back from [facility]. Per Dr Paige patient with a cataract. They made her a f/u appt. Can dc home Patient advised of ? cavernous malformation on MRI. Advised outpatient f/u with neuroendovascular for further evaluation/imaging.

## 2024-10-16 NOTE — ED CDU PROVIDER DISPOSITION NOTE - PROVIDER TOKENS
FREE:[LAST:[your PMD],PHONE:[(   )    -],FAX:[(   )    -],FOLLOWUP:[1-3 Days]],PROVIDER:[TOKEN:[[id number]]],PROVIDER:[TOKEN:[[id number]],FOLLOWUP:[1-3 Days]]

## 2024-10-16 NOTE — ED CDU PROVIDER DISPOSITION NOTE - CARE PROVIDERS DIRECT ADDRESSES
,DirectAddress_Unknown,[email or web address]

## 2024-10-16 NOTE — CONSULT NOTE ADULT - ASSESSMENT
Assessment  -Mature cataract, right eye    Plan  -Recommend outpatient follow up in the Eye[facility]; pt given an appt for 10/30/24 @ 9:45am  Pt examined along with Dr. Izaiah Paige MD Assessment  -Mature cataract, right eye    Plan  -Recommend  full outpatient follow up examination in the Ophthalmology clinic; pt given an appt for 10/30/24 @ 9:45am  Pt examined along with Dr. Izaiah Paige MD

## 2024-10-16 NOTE — ED PROVIDER NOTE - PHYSICAL EXAMINATION
Physical Exam    Vital Signs: I have reviewed the initial vital signs.  Constitutional: well-nourished, appears stated age, no acute distress  Eyes: Conjunctiva pink, Sclera clear, (+) right pupil appears white in color and pt right eye is deviated to the temporal area (pt reports eye deviation is chronic), EOMI. no foreign body in the right eye. (+) tonometry shows 16 mmhg in the right eye and 20 mmhg in the left eye. visual acuity is 20/20 in the left eye and 20/200 in the right eye.   Cardiovascular: regular rate, regular rhythm, well-perfused extremities, radial pulses equal and 2+ b/l.   Respiratory: unlabored respiratory effort, clear to auscultation bilaterally no wheezing, rales and rhonchi. pt is speaking full sentences. no accessory muscle use.   Gastrointestinal: soft, non-tender, nondistended abdomen, no pulsatile mass, no rebound, no guarding  Musculoskeletal: FROM of b/l upper and lower extremities  Integumentary: warm, dry, no rash  Neurologic: facial features are symmetric, awake, alert, cranial nerves II-XII grossly intact, extremities’ motor and sensory functions grossly intact. finger to nose intact. negative pronator drift. steady gait. 5/5 strength throughout.   Psychiatric: appropriate mood, appropriate affect

## 2024-10-16 NOTE — ED PROVIDER NOTE - CLINICAL SUMMARY MEDICAL DECISION MAKING FREE TEXT BOX
No acute neurological intervention recommended, has age indeterminate infarct on CT scan, unclear etiology of R sided vision loss over 1 month. Will need placement in obs for MRI imaging, ophtho eval.

## 2024-10-16 NOTE — ED CDU PROVIDER DISPOSITION NOTE - NSFOLLOWUPCLINICS_GEN_ALL_ED_FT
[facility]  Neurology  [address] [place] [address], [facility] [address]  [place] [address]  Phone: [phone]  Fax:   Follow Up Time: 1-3 Days

## 2024-10-16 NOTE — ED ADULT TRIAGE NOTE - CHIEF COMPLAINT QUOTE
Pt. complains of right eye blurry vision, right arm weakness and right leg weakness. Pt. reports that symptoms began at about 11pm.

## 2024-10-16 NOTE — ED CDU PROVIDER DISPOSITION NOTE - CARE PROVIDER_API CALL
your PMD,   Phone: (   )    -  Fax: (   )    -  Follow Up Time: 1-3 Days    Loco Paige  Ophthalmology  [address] [facility]  [place] [address]  Phone: [phone]  Fax: [phone]  Follow Up Time:     Juve Cyr  [facility]  [address] [facility] [address] [facility], [address]  [place] [address]  Phone: [phone]  Fax: [phone]  Follow Up Time: 1-3 Days

## 2024-10-16 NOTE — ED PROVIDER NOTE - OBJECTIVE STATEMENT
63-year-old female with a past medical history of schizoaffective disorder, aortic valve replacement on aspirin, history of leukemia in remission, hypertension, diabetes, and history of CVA presents the ED for evaluation of bilateral lower extremity weakness, right upper extremity weakness and right-sided blurry vision that began around 10 PM while she was out shopping. Patient reports over the past month she has been having issues with her right eye. Patient denies use of glasses or contacts, recent trauma, headaches, dizziness, chest pain, shortness of breath, abdominal pain, nausea, vomiting, diarrhea, constipation, fever, chills, urinary or bowel retention or incontinence. 63-year-old female with a past medical history of schizoaffective disorder, aortic valve replacement on aspirin, history of leukemia in remission, hypertension, diabetes, and history of CVA (noncompliant with any of her medications because she is "forgetful") presents the ED for evaluation of bilateral lower extremity weakness, right upper extremity weakness and right-sided blurry vision that began around 10 PM while she was out shopping. Patient reports over the past month she has been having issues with her right eye. Patient denies use of glasses or contacts, recent trauma, headaches, dizziness, chest pain, shortness of breath, abdominal pain, nausea, vomiting, diarrhea, constipation, fever, chills, urinary or bowel retention or incontinence.

## 2024-10-16 NOTE — CONSULT NOTE ADULT - ASSESSMENT
63-year-old female with a past medical history of schizoaffective disorder with SA, bioprosthetic AVR on aspirin, leukemia, hypertension, diabetes recently discharged from inpatient [facility] for worsening depression, suicidal and homicidal ideation, now presenting to the ED for right sided weakness and right eye blindness. Stroke code called, NIHSS 3 for OD blindness. Neuroimaging with no acute pathology. Of note, patient was previously admitted to neuro service about a year ago for transient right arm weakness that resolved. DDx include anterior ischemic optic neuropathy unclear if arteritic or non arteritic, vs cataracts vs less likely due to optic neuritis.     Recommendations:  - obtain MR brain non cont obs  - Obtain ophthalmology consult  - c/w aspirin 81mg daily  - c/w Lipitor home dose    Discussed with attending   63-year-old female with a past medical history of schizoaffective disorder with SA, bioprosthetic AVR on aspirin, leukemia, hypertension, diabetes recently discharged from inpatient [facility] for worsening depression, suicidal and homicidal ideation, now presenting to the ED for right sided weakness and right eye blindness. Stroke code called, NIHSS 3 for OD blindness. Neuroimaging with no acute pathology. Of note, patient was previously admitted to neuro service about a year ago for transient right arm weakness that resolved. DDx include anterior ischemic optic neuropathy unclear if arteritic or non arteritic, vs cataracts vs less likely due to optic neuritis.     Recommendations:  - obtain MR brain non cont obs  - Obtain ophthalmology consult  - c/w aspirin 81mg daily  - c/w Lipitor home dose       63-year-old female with a past medical history of schizoaffective disorder with SA, bioprosthetic AVR on aspirin, leukemia, hypertension, diabetes recently discharged from inpatient [facility] for worsening depression, suicidal and homicidal ideation, now presenting to the ED for right sided weakness and right eye blindness. Stroke code called, NIHSS 3 for OD blindness. Neuroimaging with no acute pathology. Of note, patient was previously admitted to neuro service about a year ago for transient right arm weakness that resolved. DDx include anterior ischemic optic neuropathy unclear if arteritic or non arteritic, vs cataracts vs less likely due to optic neuritis.     Recommendations:  - obtain MR brain non cont obs  - Obtain ophthalmology consult  - Obtain ESR, CRP  - c/w aspirin 81mg daily  - c/w Lipitor home dose

## 2024-10-16 NOTE — ED CDU PROVIDER INITIAL DAY NOTE - CONSULTANT FREE TEXT FOR MDM DISCUSSED CASE WITH QUESTION
[de-identified] : PHYSICAL EXAM  Constitutional:  Appears well, no apparent distress Ability to communicate: Normal Respiratory: non-labored breathing Skin: no rash noted Head: normocephalic, atraumatic Neck: no visible thyroid enlargement Eyes: extraocular movements intact Neurologic: alert and oriented x3 Psychiatric: normal mood, affect, and behavior  Lumbar Spine:  Palpation: left lumbar paraspinal spasm and left lumbar paraspinal tenderness to palpation. ROM: Diminished range of motion in all plains.  Patient notes pain with lateral bending to the left. MMT: Motor exam is 5/5 through out bilateral lower extremities. Sensation: Light touch and pain is intact throughout bilateral lower extremities. Reflexes: achilles and patella reflexes are intact and  symmetrical.  No sustained clonus. Special Testing: Positive straight leg raise on the left side.  Assessemnt: Radiculopathy of lumbosacral region (M54.17) Myalgia (M79.10)  Plan: After discussing various treatment options with the patient including but not limited to oral medications, physical therapy, exercise modalities as well as interventional spinal injections, we have decided with the following plan: The patient is presenting with acute/sub-acute radicular pain with impairment in ADLs and functionality.  The pain has not responded to conservative care including NSAID therapy and/or physical therapy.  There is no bleeding tendency, unstable medical condition, or systemic infection  The risks, benefits and alternatives of the proposed procedure were explained in detail with the patient.  The risks outlined include but are not limited to infection, bleeding, post dural puncture headache, nerve injury, a temporary increase in pain, failure to resolve symptoms, allergic reaction, symptom recurrence, and possible elevation of blood sugar.  All questions were answered to patient's satisfaction and he/she verbalized an understanding.  Follow up 1-2 weeks post injection foe re-evaluation.  Continue home exercises, stretching, activity modification, physical therapy, and conservative care.  
optho attending and neuro

## 2024-10-16 NOTE — CONSULT NOTE ADULT - SUBJECTIVE AND OBJECTIVE BOX
ESPINOZA CARRINGTON  MRN-[id number]  63y Female        Patient is a 63y old  Female who presents with a chief complaint of   HEALTH ISSUES - R/O PROBLEM Dx:    HPI:    PAST MEDICAL & SURGICAL HISTORY:  Leukemia  in remission      Schizoaffective disorder, depressive type      H/O aortic valve disorder      Dementia      H/O aortic valve replacement        MEDICATIONS  (STANDING):    MEDICATIONS  (PRN):    Allergies    penicillin (Rash)    Intolerances      HEALTH ISSUES - PROBLEM Dx:          HEIKE: 5yrs  POHx: negative  FOHx: Non-contributory    Extended HPI: blurry vision to the right eye ~ 1yr; Progressive with gradual onset. No pain. No diplopia (as if looking thru a glass window).   Also gives h/o right eye would go to the side (out)    Ocular Medications: none        EXTERNAL:    VISUAL ACUITY:       RIGHT EYE: sc: HM                          LEFT EYE: sc; 20/20      NEURO TESTING  EXTRAOCULAR MOVEMENTS: right exotropia commitant; full OU  PUPILS: PERRL; No APD  VFc: full to HM right eye; FTFC OS     ANTERIOR SEGMENT EVALUATION: :    LIDS/ADENEXA: clear OU  CONJUNCTIVA/SCLERA: clear oU  CORNEA: clear OU  ANTERIOR CHAMBER: deep/quiet OU  IRIS/PUPIL: no NVI OU  LENS/VITREOUS: mature cataract OD; mild NS OS    INTRAOCULAR PRESSURE:   OD:  16mmHg  OS: 16  mmHg  @ 10:30am                      
Neurology Consult    Patient is a 63y old  Female who presents with a chief complaint of right eye blindness and right sided weakness.    HPI:  63-year-old female with a past medical history of schizoaffective disorder with SA, bioprosthetic AVR on aspirin, leukemia, hypertension, diabetes recently discharged from inpatient [facility] for worsening depression, suicidal and homicidal ideation, now presenting to the ED for right sided weakness and right eye blindness. Patient states the weakness started about 5 hrs prior to arrival. She was able to walk but felt like the right side was heavy. In regard to her vision, she states she over the last 2 days, her right eye started blurry and overtime she completely loss her visual acuity and only sees bright light in the right eye. Denies any falls, no seizures, no fever, sweats or chills.      PAST MEDICAL & SURGICAL HISTORY:  Leukemia  in remission      Schizoaffective disorder, depressive type      H/O aortic valve disorder      Dementia      H/O aortic valve replacement          FAMILY HISTORY:  Family history of early CAD (Father)  Says that multiple family members had heart disease (a sibling is awaiting heart transplant)        Social History: (-) x 3    Allergies    penicillin (Rash)    Intolerances        MEDICATIONS  (STANDING):    MEDICATIONS  (PRN):      Review of systems:    As stated in HPI    Vital Signs Last 24 Hrs  T(C): 36.6 (16 Oct 2024 04:05), Max: 36.6 (16 Oct 2024 04:05)  T(F): 97.9 (16 Oct 2024 04:05), Max: 97.9 (16 Oct 2024 04:05)  HR: 88 (16 Oct 2024 04:58) (88 - 103)  BP: 144/88 (16 Oct 2024 04:58) (144/88 - 146/94)  BP(mean): --  RR: 18 (16 Oct 2024 04:58) (18 - 18)  SpO2: 98% (16 Oct 2024 04:58) (97% - 98%)    Parameters below as of 16 Oct 2024 04:58  Patient On (Oxygen Delivery Method): room air        Examination:  General:  Appearance is consistent with chronologic age  Cognitive/Language:  The patient is oriented to person, place, time and date.  Recent and remote memory intact.  Fund of knowledge is intact and normal.  Language with normal repetition, comprehension and naming.  Nondysarthric.    Eyes: Complete blindness in OD, pupils are equal and reactive to light, right pupil opacified, extraocular movements intact   Face:  Facial sensation normal V1 - 3, no facial asymmetry.    Ears/Nose/Throat:  Hearing grossly intact b/l.  Palate elevates midline.  Tongue and uvula midline.   Motor examination:   Normal tone, bulk and range of motion.  No tenderness, twitching, tremors or involuntary movements.  Formal Muscle Strength Testing: (MRC grade R/L) 5/5 UE; 4+/5 LE.  No observable drift.  Reflexes:   2+ b/l biceps, triceps, brachioradialis, patella and Achilles.  Plantar response downgoing b/l.  Jaw jerk, Yana, clonus absent.  Sensory examination:   Intact to light touch  in all extremities.  Cerebellum:   FTN intact with normal MARCK in all limbs.  No dysmetria or dysdiadokinesia.  Gait narrow based and normal.    NIHSS 3 - OD blindness    Labs:   CBC Full  -  ( 16 Oct 2024 04:38 )  WBC Count : 6.99 K/uL  RBC Count : 4.32 M/uL  Hemoglobin : 13.0 g/dL  Hematocrit : 37.6 %  Platelet Count - Automated : 204 K/uL  Mean Cell Volume : 87.0 fL  Mean Cell Hemoglobin : 30.1 pg  Mean Cell Hemoglobin Concentration : 34.6 g/dL  Auto Neutrophil # : 4.31 K/uL  Auto Lymphocyte # : 1.81 K/uL  Auto Monocyte # : 0.46 K/uL  Auto Eosinophil # : 0.29 K/uL  Auto Basophil # : 0.04 K/uL  Auto Neutrophil % : 61.7 %  Auto Lymphocyte % : 25.9 %  Auto Monocyte % : 6.6 %  Auto Eosinophil % : 4.1 %  Auto Basophil % : 0.6 %    10-16    135  |  99  |  9[L]  ----------------------------<  319[H]  3.9   |  25  |  0.8    Ca    9.1      16 Oct 2024 04:38    TPro  7.0  /  Alb  4.0  /  TBili  0.3  /  DBili  x   /  AST  15  /  ALT  22  /  AlkPhos  154[H]  10-16    LIVER FUNCTIONS - ( 16 Oct 2024 04:38 )  Alb: 4.0 g/dL / Pro: 7.0 g/dL / ALK PHOS: 154 U/L / ALT: 22 U/L / AST: 15 U/L / GGT: x           PT/INR - ( 16 Oct 2024 04:38 )   PT: 10.60 sec;   INR: 0.93 ratio         PTT - ( 16 Oct 2024 04:38 )  PTT:28.8 sec  Urinalysis Basic - ( 16 Oct 2024 04:38 )    Color: x / Appearance: x / SG: x / pH: x  Gluc: 319 mg/dL / Ketone: x  / Bili: x / Urobili: x   Blood: x / Protein: x / Nitrite: x   Leuk Esterase: x / RBC: x / WBC x   Sq Epi: x / Non Sq Epi: x / Bacteria: x          Neuroimaging:  [facility]:     10-16-24 @ 05:22

## 2024-10-16 NOTE — ED PROVIDER NOTE - ATTENDING APP SHARED VISIT CONTRIBUTION OF CARE
Patient is limited historian regarding PMHx which was largely obtained from chart, HPI is from patient.    63 yo F, hx of HTN, HLD, hypothyroidism, schizophrenia, intermittently undomiciled here for assessment of R sided arm weakness, sensation of dragging R foot and worsening of poor vision in R eye -- states symptoms began around 10pm, did not seek medical care at that time because she thought things would get better. Patient states that the vision in her R eye has been darkening for 1 month but today is got significantly worse and she can only see shadows.     Of note, on chart review, had similar episode of R sided sx 10/19/23 -- at that time had no ocular complaints and neuro note does not comment on abnormal pupil.     VS notable for mildly elevated BP, mild tachycardia. On exam has white discoloration/haziness to R pupil however PERRL, EOMI, can only see shadows on R so VA not able to be performed, has mild weakness 4/5 in RUE in flexion/extension at elbow, no pronator drift, 5/5 strength at lower extremities, CN II-XII intact, steady gait without assistance, clear speech.     Stroke code called, patient to CT scan, pending neuro eval. Patient is limited historian regarding PMHx which was largely obtained from chart, HPI is from patient.    63 yo F, hx of HTN, HLD, hypothyroidism, schizophrenia, intermittently undomiciled here for assessment of R sided arm weakness, sensation of dragging R foot and worsening of poor vision in R eye -- states symptoms began around 10pm, did not seek medical care at that time because she thought things would get better. Patient states that the vision in her R eye has been darkening for 1 month and today it got slightly worse and she can only see shadows.     Of note, on chart review, had similar episode of R sided sx 10/19/23 -- at that time had no ocular complaints and neuro note does not comment on abnormal pupil.     VS notable for mildly elevated BP, mild tachycardia. On exam has white discoloration/haziness to R pupil however PERRL, EOMI, can only see shadows on R so VA not able to be performed, has mild weakness 4/5 in RUE in flexion/extension at elbow, no pronator drift, 5/5 strength at lower extremities, CN II-XII intact, steady gait without assistance, clear speech.     Stroke code called, patient to CT scan, pending neuro eval.

## 2024-10-30 ENCOUNTER — APPOINTMENT (OUTPATIENT)
Dept: OPHTHALMOLOGY | Facility: CLINIC | Age: 64
End: 2024-10-30

## 2024-11-11 NOTE — PROGRESS NOTE BEHAVIORAL HEALTH - NSBHFUPINTERVALHXFT_PSY_A_CORE
The patient is a 59 year old , homeless, disabled woman with no children, financially reliant on disability, with a past psychiatric history of schizoaffective disorder, multiple psychiatric hospitalizations, and a past medical history of leukemia in remission for over 20 years who presented to the emergency department by herself due to auditory hallucinations in the context of medication noncompliance. She currently denies hearing anything that cannot be perceived by others. She denies feelings of persecution, feeling that she can do things that others cannot, or receiving special messages. She denies thoughts of wanting to end her own life, hurt herself, or hurt others. She slept 8 hours last night which is her baseline and feels well rested afterwards. She describes her appetite as "fine" and ate 2 meals yesterday when provided. When asked what the voices told her prior to her admission here, she states that she does not remember. She states that she would like to have her own housing through [facility] and that this is her motivation at this time. She is motivated to take her medications since they help her with her auditory hallucinations. No The patient, is a 59-year-old , undomiciled, disabled woman with no children, financially reliant on disability, with a past psychiatric history of schizoaffective disorder, multiple psychiatric hospitalizations, and a past medical history of leukemia in remission for over 20 years who presented to the emergency department by herself due to auditory hallucinations in the context of medication noncompliance. She currently denies hearing anything that cannot be perceived by others. She denies feelings of persecution, feeling that she can do things that others cannot, or receiving special messages. She denies thoughts of wanting to end her own life, hurt herself, or hurt others. She slept 8 hours last night which is her baseline and feels well rested afterwards. She describes her appetite as "fine" and ate 2 meals yesterday when provided. When asked what the voices told her prior to her admission here, she states that she does not remember. She states that she would like to have her own housing through [facility] and that this is her motivation at this time. She is motivated to take her medications since they help her with her auditory hallucinations.

## 2024-12-18 NOTE — ED ADULT NURSE NOTE - CHIEF COMPLAINT QUOTE
Instructions: This plan will send the code FBSE to the PM system.  DO NOT or CHANGE the price. Detail Level: Simple Price (Do Not Change): 0.00 "I am feeling suicidal." Denies attempt or plan

## 2025-01-11 ENCOUNTER — INPATIENT (INPATIENT)
Facility: HOSPITAL | Age: 65
LOS: 2 days | Discharge: HOME | DRG: 313 | End: 2025-01-14
Attending: STUDENT IN AN ORGANIZED HEALTH CARE EDUCATION/TRAINING PROGRAM | Admitting: INTERNAL MEDICINE
Payer: MEDICARE

## 2025-01-11 VITALS
OXYGEN SATURATION: 98 % | DIASTOLIC BLOOD PRESSURE: 113 MMHG | TEMPERATURE: 98 F | WEIGHT: 179.9 LBS | SYSTOLIC BLOOD PRESSURE: 166 MMHG | RESPIRATION RATE: 16 BRPM | HEART RATE: 94 BPM

## 2025-01-11 DIAGNOSIS — R07.9 CHEST PAIN, UNSPECIFIED: ICD-10-CM

## 2025-01-11 DIAGNOSIS — Z95.2 PRESENCE OF PROSTHETIC HEART VALVE: Chronic | ICD-10-CM

## 2025-01-11 LAB
ALBUMIN SERPL ELPH-MCNC: 4.1 G/DL — SIGNIFICANT CHANGE UP (ref 3.5–5.2)
ALP SERPL-CCNC: 147 U/L — HIGH (ref 30–115)
ALT FLD-CCNC: 21 U/L — SIGNIFICANT CHANGE UP (ref 0–41)
ANION GAP SERPL CALC-SCNC: 11 MMOL/L — SIGNIFICANT CHANGE UP (ref 7–14)
AST SERPL-CCNC: 24 U/L — SIGNIFICANT CHANGE UP (ref 0–41)
BASOPHILS # BLD AUTO: 0.05 K/UL — SIGNIFICANT CHANGE UP (ref 0–0.2)
BASOPHILS NFR BLD AUTO: 0.7 % — SIGNIFICANT CHANGE UP (ref 0–1)
BILIRUB SERPL-MCNC: <0.2 MG/DL — SIGNIFICANT CHANGE UP (ref 0.2–1.2)
BUN SERPL-MCNC: 15 MG/DL — SIGNIFICANT CHANGE UP (ref 10–20)
CALCIUM SERPL-MCNC: 9.1 MG/DL — SIGNIFICANT CHANGE UP (ref 8.4–10.5)
CHLORIDE SERPL-SCNC: 103 MMOL/L — SIGNIFICANT CHANGE UP (ref 98–110)
CO2 SERPL-SCNC: 23 MMOL/L — SIGNIFICANT CHANGE UP (ref 17–32)
CREAT SERPL-MCNC: 0.9 MG/DL — SIGNIFICANT CHANGE UP (ref 0.7–1.5)
EGFR: 72 ML/MIN/1.73M2 — SIGNIFICANT CHANGE UP
EOSINOPHIL # BLD AUTO: 0.2 K/UL — SIGNIFICANT CHANGE UP (ref 0–0.7)
EOSINOPHIL NFR BLD AUTO: 2.6 % — SIGNIFICANT CHANGE UP (ref 0–8)
GLUCOSE SERPL-MCNC: 227 MG/DL — HIGH (ref 70–99)
HCT VFR BLD CALC: 37.3 % — SIGNIFICANT CHANGE UP (ref 37–47)
HGB BLD-MCNC: 12.4 G/DL — SIGNIFICANT CHANGE UP (ref 12–16)
IMM GRANULOCYTES NFR BLD AUTO: 0.5 % — HIGH (ref 0.1–0.3)
LYMPHOCYTES # BLD AUTO: 2.03 K/UL — SIGNIFICANT CHANGE UP (ref 1.2–3.4)
LYMPHOCYTES # BLD AUTO: 26.6 % — SIGNIFICANT CHANGE UP (ref 20.5–51.1)
MCHC RBC-ENTMCNC: 30.2 PG — SIGNIFICANT CHANGE UP (ref 27–31)
MCHC RBC-ENTMCNC: 33.2 G/DL — SIGNIFICANT CHANGE UP (ref 32–37)
MCV RBC AUTO: 90.8 FL — SIGNIFICANT CHANGE UP (ref 81–99)
MONOCYTES # BLD AUTO: 0.46 K/UL — SIGNIFICANT CHANGE UP (ref 0.1–0.6)
MONOCYTES NFR BLD AUTO: 6 % — SIGNIFICANT CHANGE UP (ref 1.7–9.3)
NEUTROPHILS # BLD AUTO: 4.84 K/UL — SIGNIFICANT CHANGE UP (ref 1.4–6.5)
NEUTROPHILS NFR BLD AUTO: 63.6 % — SIGNIFICANT CHANGE UP (ref 42.2–75.2)
NRBC # BLD: 0 /100 WBCS — SIGNIFICANT CHANGE UP (ref 0–0)
PLATELET # BLD AUTO: 211 K/UL — SIGNIFICANT CHANGE UP (ref 130–400)
PMV BLD: 9.3 FL — SIGNIFICANT CHANGE UP (ref 7.4–10.4)
POTASSIUM SERPL-MCNC: 4.6 MMOL/L — SIGNIFICANT CHANGE UP (ref 3.5–5)
POTASSIUM SERPL-SCNC: 4.6 MMOL/L — SIGNIFICANT CHANGE UP (ref 3.5–5)
PROT SERPL-MCNC: 6.8 G/DL — SIGNIFICANT CHANGE UP (ref 6–8)
RBC # BLD: 4.11 M/UL — LOW (ref 4.2–5.4)
RBC # FLD: 13.5 % — SIGNIFICANT CHANGE UP (ref 11.5–14.5)
SODIUM SERPL-SCNC: 137 MMOL/L — SIGNIFICANT CHANGE UP (ref 135–146)
TROPONIN T, HIGH SENSITIVITY RESULT: 9 NG/L — SIGNIFICANT CHANGE UP (ref 6–13)
WBC # BLD: 7.62 K/UL — SIGNIFICANT CHANGE UP (ref 4.8–10.8)
WBC # FLD AUTO: 7.62 K/UL — SIGNIFICANT CHANGE UP (ref 4.8–10.8)

## 2025-01-11 PROCEDURE — 82553 CREATINE MB FRACTION: CPT

## 2025-01-11 PROCEDURE — 99285 EMERGENCY DEPT VISIT HI MDM: CPT

## 2025-01-11 PROCEDURE — 93017 CV STRESS TEST TRACING ONLY: CPT

## 2025-01-11 PROCEDURE — 83036 HEMOGLOBIN GLYCOSYLATED A1C: CPT

## 2025-01-11 PROCEDURE — 84436 ASSAY OF TOTAL THYROXINE: CPT

## 2025-01-11 PROCEDURE — 78452 HT MUSCLE IMAGE SPECT MULT: CPT | Mod: MC

## 2025-01-11 PROCEDURE — 93005 ELECTROCARDIOGRAM TRACING: CPT

## 2025-01-11 PROCEDURE — 85025 COMPLETE CBC W/AUTO DIFF WBC: CPT

## 2025-01-11 PROCEDURE — 71045 X-RAY EXAM CHEST 1 VIEW: CPT | Mod: 26

## 2025-01-11 PROCEDURE — 36415 COLL VENOUS BLD VENIPUNCTURE: CPT

## 2025-01-11 PROCEDURE — 84484 ASSAY OF TROPONIN QUANT: CPT

## 2025-01-11 PROCEDURE — 84480 ASSAY TRIIODOTHYRONINE (T3): CPT

## 2025-01-11 PROCEDURE — 80061 LIPID PANEL: CPT

## 2025-01-11 PROCEDURE — 80053 COMPREHEN METABOLIC PANEL: CPT

## 2025-01-11 PROCEDURE — 82962 GLUCOSE BLOOD TEST: CPT

## 2025-01-11 PROCEDURE — 93010 ELECTROCARDIOGRAM REPORT: CPT

## 2025-01-11 PROCEDURE — 99223 1ST HOSP IP/OBS HIGH 75: CPT

## 2025-01-11 PROCEDURE — A9500: CPT

## 2025-01-11 PROCEDURE — 83735 ASSAY OF MAGNESIUM: CPT

## 2025-01-11 PROCEDURE — 84443 ASSAY THYROID STIM HORMONE: CPT

## 2025-01-11 RX ORDER — PANTOPRAZOLE 40 MG/1
40 TABLET, DELAYED RELEASE ORAL
Refills: 0 | Status: DISCONTINUED | OUTPATIENT
Start: 2025-01-11 | End: 2025-01-14

## 2025-01-11 RX ORDER — ASPIRIN 81 MG
324 TABLET, DELAYED RELEASE (ENTERIC COATED) ORAL ONCE
Refills: 0 | Status: COMPLETED | OUTPATIENT
Start: 2025-01-11 | End: 2025-01-11

## 2025-01-11 RX ORDER — LEVOTHYROXINE SODIUM 175 UG/1
25 TABLET ORAL DAILY
Refills: 0 | Status: DISCONTINUED | OUTPATIENT
Start: 2025-01-11 | End: 2025-01-14

## 2025-01-11 RX ORDER — ARIPIPRAZOLE 10 MG/1
20 TABLET ORAL DAILY
Refills: 0 | Status: DISCONTINUED | OUTPATIENT
Start: 2025-01-11 | End: 2025-01-14

## 2025-01-11 RX ORDER — ONDANSETRON 4 MG/1
4 TABLET ORAL ONCE
Refills: 0 | Status: COMPLETED | OUTPATIENT
Start: 2025-01-11 | End: 2025-01-11

## 2025-01-11 RX ORDER — INSULIN LISPRO 100/ML
VIAL (ML) SUBCUTANEOUS
Refills: 0 | Status: DISCONTINUED | OUTPATIENT
Start: 2025-01-11 | End: 2025-01-14

## 2025-01-11 RX ORDER — ENOXAPARIN SODIUM 60 MG/.6ML
40 INJECTION INTRAVENOUS; SUBCUTANEOUS EVERY 24 HOURS
Refills: 0 | Status: DISCONTINUED | OUTPATIENT
Start: 2025-01-11 | End: 2025-01-14

## 2025-01-11 RX ORDER — ASPIRIN 81 MG
81 TABLET, DELAYED RELEASE (ENTERIC COATED) ORAL DAILY
Refills: 0 | Status: DISCONTINUED | OUTPATIENT
Start: 2025-01-11 | End: 2025-01-14

## 2025-01-11 RX ORDER — ATORVASTATIN CALCIUM 40 MG/1
40 TABLET, FILM COATED ORAL AT BEDTIME
Refills: 0 | Status: DISCONTINUED | OUTPATIENT
Start: 2025-01-11 | End: 2025-01-13

## 2025-01-11 RX ADMIN — Medication 324 MILLIGRAM(S): at 17:26

## 2025-01-11 RX ADMIN — ONDANSETRON 4 MILLIGRAM(S): 4 TABLET ORAL at 17:26

## 2025-01-11 NOTE — ED PROVIDER NOTE - NS ED MD DISPO SPECIAL CONSIDERATION1
None Olumiant Pregnancy And Lactation Text: Based on animal studies, Olumiant may cause embryo-fetal harm when administered to pregnant women.  The medication should not be used in pregnancy.  Breastfeeding is not recommended during treatment. none

## 2025-01-11 NOTE — H&P ADULT - NSHPLABSRESULTS_GEN_ALL_CORE
12.4   7.62  )-----------( 211      ( 11 Jan 2025 17:30 )             37.3       01-11    137  |  103  |  15  ----------------------------<  227[H]  4.6   |  23  |  0.9    Ca    9.1      11 Jan 2025 17:30    TPro  6.8  /  Alb  4.1  /  TBili  <0.2  /  DBili  x   /  AST  24  /  ALT  21  /  AlkPhos  147[H]  01-11              Urinalysis Basic - ( 11 Jan 2025 17:30 )    Color: x / Appearance: x / SG: x / pH: x  Gluc: 227 mg/dL / Ketone: x  / Bili: x / Urobili: x   Blood: x / Protein: x / Nitrite: x   Leuk Esterase: x / RBC: x / WBC x   Sq Epi: x / Non Sq Epi: x / Bacteria: x            Lactate Trend            CAPILLARY BLOOD GLUCOSE

## 2025-01-11 NOTE — ED ADULT NURSE NOTE - SUICIDE SCREENING QUESTION 2
Your Child's Health  [age]-Year-Old Visit      Lamberto Cool  May 7, 2021    There were no vitals taken for this visit.  Weight:       YOUR CHILD'S 6 YEAR-OLD VISIT    1. Nutrition  Keep nutritious foods in your home for meal and snack times. Your child's appetite may decrease at this age because their rate of growth is slowing down a little bit. Overweight and obesity are very serious problems; protect your child by offering them only small portions at a time and do not encourage them to eat when they are not hungry. Children should drink about 16 to 24 ounces of low-fat or no fat milk daily to meet their calcium needs; taking a multivitamin with iron (or a pure vitamin D supplement) regularly will ensure they meet their vitamin D needs (600 IU daily) as well. You are still in charge of what they are eating, so continue to avoid unhealthy choices like greasy fast food, bagged snacks, sodas and sweet drinks, lots of juice, candies and sweets. Teaching your child to prefer healthy choices (fresh fruits and vegetables, string cheese, whole-grain crackers, yogurt) at this age is easier than trying to change their preferences when they are older!    2. Healthy Routines  Most 4 year olds are fully potty-trained, but nighttime accidents may still occur. (Don’t punish your child for these; simply have them help clean up.) Establish regular routines for teeth brushing and for hand washing after toileting. Children should brush with a regular (fluoridated) toothpaste at least twice daily; brushing at bedtime is particularly important for dental health. (A parent should still be helping them brush their back teeth.) Lamberto should be drinking plenty of water every day as well (tap water is safe and it provides necessary fluoride for dental health). Limit juice to no more than 4 ounces daily and do not let your child carry juice or diluted juice around in a cup or water bottle all day - this prolonged exposure to sugar  increases their chance of cavities. If you have questions about older neighborhoods in [place] that might have lead connecting (or service) pipes, do an internet search for \"[place] lead awareness and drinking water safety\".    3. Safety  Children are safest in a forward-facing car seat with a 5-point harness until they weigh at least 40 pounds. Many of the 5-point harness seats have higher weight limits than 40 pounds; your child is safest in these seats until they outgrow the 's recommended size limits. (There is not a specific height limit for most of these seats, but children are safe as long as the top of their ears are below the top/ back edge of the seat and their shoulders are below the highest shoulder strap slots.) When they do outgrow the 5-point harness seat limits, they should ride in a booster seat in the backseat.     Your child requires constant and careful supervision whenever they are playing outside, around water, or crossing a street. They should not play in streets or driveways or be in yards where someone is using a lawnmower (rocks or hard objects that are hit by lawnmowers become dangerous projectiles). Swimming lessons are appropriate for some 4 year-olds, but remember that swimming lessons and \"floaties\" (or “swimmies”) do not prevent drowning - careful supervision by an adult who know how to swim is a must. Remember to use sunscreen with an SPF of 15 or higher when outside and reapply after time in the water.  Your child should avoid prolonged time in the sun between 11 AM and 3 PM and wear hats, sunglasses and sun protection clothing. Careful supervision of children around pets is still important at this age.    Continue to make sure you keep all medications, cleaning solutions, matches, sharp items and any toxic substance well out of reach of children. Make sure you have the Poison Control number (1- 261.602.4486) in your cell phone. If there any hunters or firearm owners  in your home or anywhere your child is visiting, make sure all weapons are safely stored: unloaded, securely locked and ammunition stored separately. Firearms and a child's natural curiosity can be a very dangerous combination.    A parent’s safety is just as important as a child’s safety. Violence is common in many people’s lives. If you do not feel safe in your home or if a partner has ever hit, kicked, shoved or physically hurt you or your child, it is important for you to get help. Talk to your doctors or a . In [place], resources include [facility] (899-772-6217) and the [facility] (24 hour hotline is 287- 803-9023); the National Domestic Violence Hotline is 5-402-994-ARGE (8720).    4. Development and School Readiness   Developmentally, 6 year old children can brush teeth and go to the bathroom by themselves. They can dress and undress with very little help, including mastering medium sized buttons. They demonstrate very creative and imaginative play. They use four word sentences, and their speech is generally 100% understandable to strangers. They draw pictures of things that you recognize, and they can play games which have simple rules. They can skip and climb upstairs well without holding on.. They will also tell you a story from their favorite book!     As your child's language is continuing to develop, keep explanations short and simple (they ask a lot of questions at this age!). Encourage your child to talk about what they are seeing, doing and feeling. Visit libraries, lazar and zoos and continue to read together regularly. Talk to your doctor about your child's speech if their speech is not ~100% understandable, if they are not using four-word sentences or if they stutter frequently (occasional stuttering is usually normal).    Promoting your child's language skills and giving them opportunities to play and interact with other children their age are  important steps towards preparing them for school.  programs definitely have a positive impact on school performance, so consider enrolling your child in one if available.    5. Behavior  Sometimes parents maybe puzzled because their 6 year old seems to understand and communicate very well, but they still may lie sometimes or not take responsibility for things they do. Children this age are still trying to figure out what is expected of them, so they often test limits. Basic rules of discipline still apply: 1) make sure rules and expectations are clear (and reasonable for this age) and 2) always respond consistently when rules are broken. Encourage your child's good behaviors by treating them with respect and give them plenty of positive praise and feedback when they are warranted. Teach your child that experiencing strong emotions (enoch, anger, sadness, frustration) is normal. Help them learn to describe and express their feelings and talk about ways that they can cope with them.    Children are naturally curious about their own bodies, and genital exploration at this age is normal. Gender identity issues may also start to emerge at this age; these children feel conflicted when told to dress or behave in a way that is not consistent with how they feel.    Teach your child that certain body parts (the parts usually covered by a bathing suit) and behaviors are private. For safety purposes, make sure your child knows that they should never keep secrets from parents, and they should always report to you if any adult shows any interest in their private parts (or if an adult discussed or shared their own private parts with a child).    6. Screen Time and Media Use  Electronic media (TV, tablets, computers, cell phones) is incredibly appealing to 4 year olds because they are naturally prone to fantasy and magical thinking. And while you may welcome the quiet when they are engaged with these devices, make sure you  do not rely on them to distract and quiet down your child. Limited use of educational media programs can be beneficial but excessive use of general media can contribute to lots of problems including attention issues, sleep problems, school problems, obesity, aggression and other worsening behaviors. Media use at bedtime is a cause of sleep problems and school problems (books and a bedtime routine are always the best!). Limit your child's screen time to no more than 1 hour a day and always preview and watch and discuss the content with them as much as possible. Don't invest in a TV for your child's room - there are no benefits to that! Setting rules about media use in your home is difficult but very important. For suggestions on developing healthy media habits, do an internet search for “healthychildren media use plan” for recommendations from the American Academy of Pediatrics.     7. Smoking  Smoking has negative effects on the whole family’s health. Keep your child’s environments smoke-free. If you smoke and are ready to consider quitting, talk to your doctor. Nicotine replacement products can be very helpful in breaking this tough addiction. 1-800-QUIT-NOW is a national help line that can help you find resources; other resources can be found at cdc.gov.     MEDICATION FOR FEVER OR PAIN:   Acetaminophen liquid (e.g., Tylenol or Tempra) may be given every four hours as needed for pain or fever.  Acetaminophen liquid is less concentrated than the infant dropper bottle type.  Be sure to check which product CONCENTRATION you are using.    OLD Concentration INFANT Tylenol/Acetaminophen  Drops (80 MG/0.8 mL)    Child’s Weight: Dose:  24 - 35 pounds:   160 mg (2 droppers)  36 - 47 pounds:   240 mg (3 droppers)    NEW Concentration INFANT Tylenol/Acetaminophen  Drops (160 MG/5 mL)    Child’s Weight: Dose:  24 - 35 pounds:   160 mg (5.0 mL (1 Teaspoon))  36 - 47 pounds:   240 mg (7.5 mL (1 1/2 Teaspoons))    CHILDREN’S  Tylenol/Acetaminophen  (160 MG/5 mL)    Child’s Weight: Dose:  24 - 35 pounds:   160 mg (5.0 mL (1 Teaspoon))  36 - 47 pounds:   240 mg (7.5 mL (1 1/2 Teaspoons))    CHILDREN’S Tylenol/Acetaminophen MELTAWAYS ( 80 MG tablets)    Child’s Weight:  Dose:  24 - 35 pounds:    160 mg (2 meltaway tablets)  36 - 47 pounds:    240 mg (3 meltaway tablets)    CHILDREN'S Ibuprofen liquid (e.g., Advil or Motrin) may be given every six hours as needed for pain or fever.    Child’s Weight:  Dose:  24 - 35 pounds:    100 mg (1 Teaspoon)  36 - 47 pounds:    150 mg (1 1/2 Teaspoons)    If Lamberto is outside these weight ranges, call your pediatrician's office for advice.      There is no immunization history on file for this patient.    If Lamberto develops any of the following reactions within 72 hours after an immunization, notify your pediatrician by calling the pediatric phone nurse:  1.  A temperature of 105 degrees or above.  2.  More than 3 hours of continuous crying.  3.  A shrill, high-pitched cry.  4.  A pale, limp spell.  5.  A seizure or fainting spell.  In this case, you should call 911 or go immediately to the emergency room.      NEXT VISIT:  7 YEARS OF AGE    Thank you for entrusting your care to [facility].    Also, check out “Children’s Health” on the [facility] Blog for updates on timely topics regarding children’s health!       No

## 2025-01-11 NOTE — ED ADULT NURSE NOTE - NSFALLHARMRISKINTERV_ED_ALL_ED

## 2025-01-11 NOTE — ED ADULT NURSE NOTE - OBJECTIVE STATEMENT
pt complaining of chest pain, increased dizziness, had baseline impaired gate. pt lives in homeless shelter and requesting nursing home placement with rehab. Pt educated on call bell system, and set up on primafit to comfortably use bathroom without risk of fall due to imbalance.

## 2025-01-11 NOTE — H&P ADULT - ATTENDING COMMENTS
64-year-old female, with past medical history of DM, hypothyroidism, CAD s/p CABG, CVA with right-sided weakness, schizophrenia, TAVR who presents to the ED due to chest pain which started 10 hours prior to arrival and request for rehab.        Agree  with assessment  except for changes below.   Vital Signs Last 24 Hrs  T(C): 36.4 (12 Jan 2025 00:56), Max: 36.7 (11 Jan 2025 15:35)  T(F): 97.5 (12 Jan 2025 00:56), Max: 98 (11 Jan 2025 15:35)  HR: 84 (12 Jan 2025 00:56) (84 - 94)  BP: 151/96 (12 Jan 2025 00:56) (151/96 - 166/113)  BP(mean): --  RR: 19 (12 Jan 2025 00:56) (16 - 19)  SpO2: 98% (12 Jan 2025 00:56) (98% - 98%)    Parameters below as of 12 Jan 2025 00:56  Patient On (Oxygen Delivery Method): room air        IMPRESSION  Atypical  Chest Pain  Hx  CAD S/P CABG   Hx DM  Possible  musculoskeletal Pain   Cardiac Enzymes Unremarkable x1  ECG: No Acute  Ischemic  Changes   Send  2nd Trop   2D  Echo   c/w home aspirin, Atorvastatin   Tylenol  Check Lipids  PT Eval  Tele 24 hours   DC id  2nd Neg trop      Hx CVA  Right  Sided  Residual  Weakness - ASA and   Hx HTN  -  Amlodipine     Hx DM   Hold oral meds;  check fs  Start insulin  regimen if  serum Glucose IF FS >180,   Adjust insulin  Lantus/Lispro,  for  Goal 140-180  Hold for Hypoglycemia     Hx Hypothyroidism -c/w home levothyroxine 25  Hx Schizophrenia -c/w home Abilify 64-year-old female, with past medical history of DM, hypothyroidism, CAD s/p CABG, CVA with right-sided weakness, schizophrenia, TAVR who presents to the ED due to chest pain which started 10 hours prior to arrival and request for rehab.        Agree  with assessment  except for changes below.   Vital Signs Last 24 Hrs  T(C): 36.4 (12 Jan 2025 00:56), Max: 36.7 (11 Jan 2025 15:35)  T(F): 97.5 (12 Jan 2025 00:56), Max: 98 (11 Jan 2025 15:35)  HR: 84 (12 Jan 2025 00:56) (84 - 94)  BP: 151/96 (12 Jan 2025 00:56) (151/96 - 166/113)  BP(mean): --  RR: 19 (12 Jan 2025 00:56) (16 - 19)  SpO2: 98% (12 Jan 2025 00:56) (98% - 98%)    Parameters below as of 12 Jan 2025 00:56  Patient On (Oxygen Delivery Method): room air    PHYSICAL EXAM  GENERAL: NAD,  HEAD:  NCAT, EOMI, MM  NECK: Supple, Nontender  NERVOUS SYSTEM:  AAOx3, NFD  CHEST/LUNG: +bs b/l, No wheezing   HEART: +s1s2 RRR, Murmmr  Herd  ABDOMEN: soft, NT/ND  EXTREMITIES:  pp, no edema  SKIN: age related skin changes       IMPRESSION  Atypical  Chest Pain  Hx  CAD S/P CABG   S/P TAVR  Hx DM  Possible  musculoskeletal Pain   Cardiac Enzymes Unremarkable x1  ECG: No Acute  Ischemic  Changes   Send  2nd Trop   2D  Echo   c/w home aspirin, Atorvastatin   Tylenol  Check Lipids  PT Eval  Tele 24 hours   DC id  2nd Neg trop      Hx CVA  Right  Sided  Residual  Weakness - ASA and , PT   Hx HTN  -  Amlodipine     Hx DM   Hold oral meds;  check fs  Start insulin  regimen if  serum Glucose IF FS >180,   Adjust insulin  Lantus/Lispro,  for  Goal 140-180  Hold for Hypoglycemia     Hx Hypothyroidism -c/w home levothyroxine 25  Hx Schizophrenia -c/w home Abilify    Seen on 01/12

## 2025-01-11 NOTE — H&P ADULT - HISTORY OF PRESENT ILLNESS
64-year-old female, with past medical history of DM, hypothyroidism, CAD s/p CABG, CVA with right-sided weakness, schizophrenia, TAVR who presents to the ED due to chest pain which started 10 hours prior to arrival and request for rehab.  Patient states that 10 hours she developed on and off chest pain, located on the left side, non radiating.  She states the pain comes and goes at random and will last for a while before resolving.  Patient states the chest pain feels "sharp" and when she presses on her left chest that is when she feels the chest pain the most. She states she had some associated nausea with the chest pain.  Patient also states that she previously had a stroke with right sided residual weakness and had rehab for this which helped her significantly.  She states that she needs rehab again because she wants more improvement in her right sided weakness, especially her right knee and foot.     In the ED vitals: /113, HR 94, T 98, O2 sat 98% on room air  In the ED labs: alk phos 147 (similar to prior), trop negative  EKG: NSR

## 2025-01-11 NOTE — H&P ADULT - ASSESSMENT
64-year-old female, with past medical history of DM, hypothyroidism, CAD s/p CABG, CVA with right-sided weakness, schizophrenia, TAVR who presents to the ED due to chest pain which started 10 hours prior to arrival and request for rehab.      #Chest pain  -patient has had left sided chest pain for the past 10 hours prior to arrival  -patient is present on palpation of the left chest and is described as sharp, likely MSK in nature  -trop negative and EKG NSR  -repeat trop in AM  -repeat EKG in AM  -obtain chest x-ray    #CVA with right sided residual weakness  -c/w home aspirin  -patient had rehab for right sided weakness after CVA and is requesting additional rehab to improve the residual weakness  -PT consult     #Elevated alk phos  -continue to monitor  -outpatient follow up    #CAD s/p CABG  -c/w home aspirin    #Hypothyroidism  -c/w home levothyroxine 25    #DM  -patient takes januvia and metformin at home  -start sliding scale    DVT proph: lovenox  GI proph: PPI 64-year-old female, with past medical history of DM, hypothyroidism, CAD s/p CABG, CVA with right-sided weakness, schizophrenia, TAVR who presents to the ED due to chest pain which started 10 hours prior to arrival and request for rehab.      #Chest pain  -patient has had left sided chest pain for the past 10 hours prior to arrival  -patient is present on palpation of the left chest and is described as sharp, likely MSK in nature  -trop negative and EKG NSR  -repeat trop in AM  -repeat EKG in AM  -obtain chest x-ray    #CVA with right sided residual weakness  -c/w home aspirin  -patient had rehab for right sided weakness after CVA and is requesting additional rehab to improve the residual weakness  -PT consult     #Elevated alk phos  -continue to monitor  -outpatient follow up    #CAD s/p CABG  -c/w home aspirin    #Hypothyroidism  -c/w home levothyroxine 25    #DM  -patient takes januvia and metformin at home  -start sliding scale    #Schizophrenia  -c/w home abilify    DVT proph: lovenox  GI proph: PPI

## 2025-01-11 NOTE — ED PROVIDER NOTE - PHYSICAL EXAMINATION
GENERAL: Well-developed; well-nourished; in no acute distress.   SKIN: warm, dry  HEAD: Normocephalic; atraumatic.  EYES: 2mm pupils, PERRLA, EOMI, no conjunctival erythema  ENT: No nasal discharge; airway clear. MMM  NECK: Supple; non tender.  CARD: Regular rate and rhythm. S1, S2 normal; no murmurs, gallops, or rubs.   RESP: LCTAB; No wheezes, rales, rhonchi, or stridor.  ABD: soft, nontender, and nondistended  EXT: Normal ROM.  No LE TTP or edema bilaterally.  NEURO: A/ox3, grossly unremarkable  PSYCH: Cooperative, appropriate, odd affect

## 2025-01-11 NOTE — ED PROVIDER NOTE - PRO INTERPRETER NEED 2
LOS: 7 days   Patient Care Team:  Moises Montes MD as PCP - General (Internal Medicine)  Luis Carlos Awan MD as Consulting Physician (Urology)  Alex Cortez MD as Cardiologist (Cardiology)    Chief Complaint: Shortness of breath AF RVR  Subjective  Feeling  better  No chest pain   No excessive shortness of breath  No new complaints    Interval History: Improved overall    Patient Complaints:   Denies chest pain currently. Denies excessive shortness of breath. Denies abdominal pain, nausea vomiting or diarrhoea.    Telemetry: no malignant arrhythmia. No significant pauses. AF rate 100-110 BPM    Review of Systems:    The following systems were reviewed and negative; ENT, respiratory,  gastrointestinal, integument, hematologic / lymphatic, neurological, behavioral/psych and allergies / immunologic    Labs:    WBC WBC   Date Value Ref Range Status   02/22/2017 18.09 (H) 4.80 - 10.80 10*3/mm3 Final   02/21/2017 17.53 (H) 4.80 - 10.80 10*3/mm3 Final   02/20/2017 15.95 (H) 4.80 - 10.80 10*3/mm3 Final      HGB HEMOGLOBIN   Date Value Ref Range Status   02/22/2017 9.2 (L) 14.0 - 18.0 g/dL Final   02/21/2017 8.7 (L) 14.0 - 18.0 g/dL Final   02/20/2017 8.3 (L) 14.0 - 18.0 g/dL Final      HCT HEMATOCRIT   Date Value Ref Range Status   02/22/2017 28.9 (L) 40.0 - 52.0 % Final   02/21/2017 27.1 (L) 40.0 - 52.0 % Final   02/20/2017 25.9 (L) 40.0 - 52.0 % Final      Platlets PLATELETS   Date Value Ref Range Status   02/22/2017 157 130 - 400 10*3/mm3 Final   02/21/2017 155 130 - 400 10*3/mm3 Final   02/20/2017 149 130 - 400 10*3/mm3 Final      MCV MCV   Date Value Ref Range Status   02/22/2017 85.3 82.0 - 95.0 fL Final   02/21/2017 84.7 82.0 - 95.0 fL Final   02/20/2017 84.1 82.0 - 95.0 fL Final          Results from last 7 days  Lab Units 02/22/17  0404 02/21/17  0451 02/20/17  0341   SODIUM mmol/L 137 136 135   POTASSIUM mmol/L 4.7 5.2 4.8   CHLORIDE mmol/L 100 100 101   TOTAL CO2 mmol/L 29.0 26.0 23.0*   BUN mg/dL 56*  62* 57*   CREATININE mg/dL 1.54* 1.57* 1.60*   CALCIUM mg/dL 8.8 8.7 8.5   GLUCOSE mg/dL 90 146* 203*     Lab Results   Component Value Date    TROPONINI <0.012 02/15/2017     PT/INR:  No results found for: PROTIME/No results found for: INR    Imaging Results (last 72 hours)     ** No results found for the last 72 hours. **          Objective     Medication Review:   Current Facility-Administered Medications   Medication Dose Route Frequency Provider Last Rate Last Dose   • acetaminophen (TYLENOL) tablet 650 mg  650 mg Oral Q4H PRN BHARATHI Diaz   650 mg at 02/19/17 0331   • aluminum-magnesium hydroxide-simethicone (MAALOX/MYLANTA) suspension 30 mL  30 mL Oral Q6H PRN BHARATHI Diaz       • bumetanide (BUMEX) tablet 1 mg  1 mg Oral Daily Reinaldo Foley MD   1 mg at 02/22/17 0939   • DAPTOmycin (CUBICIN) 810 mg in sodium chloride 0.9 % 50 mL IVPB  8 mg/kg Intravenous Q24H Laura Eckert MD   Stopped at 02/22/17 2048   • dextrose (D50W) solution 25 g  25 g Intravenous Q15 Min PRN Iliana Alexis MD       • dextrose (GLUTOSE) oral gel 15 g  15 g Oral Q15 Min PRN Iliana Alexis MD       • diltiaZEM CD (CARDIZEM CD) 24 hr capsule 360 mg  360 mg Oral Q24H BHARATHI Portillo   360 mg at 02/22/17 0940   • docusate sodium (COLACE) capsule 100 mg  100 mg Oral BID BHARATHI Diaz   100 mg at 02/22/17 0939   • enoxaparin (LOVENOX) syringe 30 mg  30 mg Subcutaneous Daily BHARATHI Diaz   30 mg at 02/16/17 1733   • glucagon (human recombinant) (GLUCAGEN DIAGNOSTIC) injection 1 mg  1 mg Subcutaneous Q15 Min PRN Iliana Alexis MD       • heparin (porcine) injection 1,800 Units  1,800 Units Intracatheter PRN Reinaldo Foley MD   1,800 Units at 02/16/17 1400   • insulin detemir (LEVEMIR) injection 10 Units  10 Units Subcutaneous QAM Iliana Alexis MD   10 Units at 02/21/17 0821   • insulin lispro (humaLOG) injection 2-7 Units  2-7 Units Subcutaneous 4x Daily AC & at Bedtime  "Iliana Alexis MD   4 Units at 02/22/17 2001   • metoprolol tartrate (LOPRESSOR) injection 5 mg  5 mg Intravenous Q6H PRN Macie E Knees, APRN   5 mg at 02/22/17 2308   • metoprolol tartrate (LOPRESSOR) tablet 75 mg  75 mg Oral Q12H Macie E Knees, APRN   75 mg at 02/22/17 0939   • ondansetron (ZOFRAN) injection 4 mg  4 mg Intravenous Q6H PRN BHARATHI Diaz       • predniSONE (DELTASONE) tablet 40 mg  40 mg Oral Daily With Breakfast Hema Jade MD   40 mg at 02/22/17 0939   • sodium chloride 0.9 % flush 1-10 mL  1-10 mL Intravenous PRN BHARATHI Diaz       • tamsulosin (FLOMAX) 24 hr capsule 0.4 mg  0.4 mg Oral Nightly BHARATHI Diaz   0.4 mg at 02/22/17 2003       Vital Sign Min/Max for last 24 hours  Temp  Min: 97.9 °F (36.6 °C)  Max: 98 °F (36.7 °C)   BP  Min: 85/54  Max: 134/72   Pulse  Min: 85  Max: 108   Resp  Min: 14  Max: 20   SpO2  Min: 95 %  Max: 100 %   No Data Recorded   Weight  Min: 221 lb 6 oz (100 kg)  Max: 221 lb 6 oz (100 kg)     Flowsheet Rows         First Filed Value    Admission Height  73\" (185.4 cm) Documented at 02/15/2017 0953    Admission Weight  224 lb (102 kg) Documented at 02/15/2017 0953          Physical Exam:  Ears ears appear intact with no abnormalities noted  Nose nares normal, septum midline, mucosa normal and no drainage  Neck suppple, trachea midline, no thyromegaly, no carotid bruit and no JVD  Back no kyphosis present, no scoliosis present, no skin lesions, erythema, or scars, no tenderness to percussion or palpation and range of motion normal  Lungs respirations regular, respirations even and respirations unlabored  Heart normal S1, S2, irregular,  2/6 pansystolic murmur in the left sternal border,  no rub and no click  Abdomen normal bowel sounds, no masses, no hepatomegaly, no splenomegaly, no guarding and no rebound tenderness  Skin no bleeding, bruising or rash     Results Review:   I reviewed the patient's new clinical results.  I reviewed the patient's " new imaging results and agree with the interpretation.  I reviewed the patient's other test results and agree with the interpretation  I personally viewed and interpreted the patient's EKG/Telemetry data  Discussed with patient    Medication Review: Performed    Assessment/Plan     Principal Problem:    Persistent atrial fibrillation  Active Problems:    Henoch-Schonlein purpura nephritis    Tobacco abuse    Microcytic anemia    Hypertensive nephrosclerosis    Wegener's granulomatosis with renal involvement    MRSA bacteremia    Plan  Will perform JOSEPH today  Risks, benefits and alternatives explained. The patient understands and wishes to proceed.  Same treatment  Supportive care  Telemetry  Optimal medical therapy  Deep vein thrombosis precautions  Low salt cardiac diet  Continue rate control agents  No anticoagulation    Alex Cortez MD  02/22/17  11:46 PM         English

## 2025-01-11 NOTE — ED ADULT NURSE NOTE - NS ED NURSE REPORT GIVEN TO FT
[facility] Hospitalist Progress Note      Subjective  Complains of dizziness today, especially when changing position.  Complains of increased dizziness with moving her head, especially when looking towards right and downward.  No headache.  Overall numbness have improved, but still feels a change of sensation on the left side of the body.    Objective:    I/O's  No intake or output data in the 24 hours ending 02/07/23 1548       Last Recorded Vitals  Temp:  [97.5 °F (36.4 °C)-98.4 °F (36.9 °C)] 97.5 °F (36.4 °C)  Heart Rate:  [] 92  Resp:  [16-19] 16  BP: (102-122)/(70-94) 121/85     Physical Exam:  Constitutional: well-developed, well-nourished, no acute distress.   HENT: Head Normocephalic and atraumatic. PERRL. EOMI, oral mucosa moist, no oral thrush  Neck: supple, no JVD  Cardiovascular: S1 S2 nl, RRR, normal heart sounds, no murmurs.  Pulmonary/Chest: Breath sounds normal bilaterally.  No audible crackles or wheezing  Abdominal: Soft, no tenderness or distention, bowel sounds active, no organomegaly.  Musculoskeletal: Normal range of motion. No edema.   Neurological: AAOx3, minimal weakness on the left upper and lower extremity  Skin: Skin is warm, No rash noted.         Labs   Lab Results   Component Value Date    WBC 8.9 02/06/2023    WBC 7.3 04/12/2019    HGB 13.7 02/06/2023    HGB 13.2 04/12/2019     02/06/2023     04/12/2019    SODIUM 136 02/06/2023    SODIUM 141 04/12/2019    POTASSIUM 3.7 02/06/2023    POTASSIUM 3.9 04/12/2019    BUN 15 02/06/2023    BUN 11 04/12/2019    CREATININE 0.65 02/06/2023    CREATININE 0.58 04/12/2019    AST 11 02/06/2023    AST 12 04/12/2019    GPT 23 02/06/2023    GPT 20 04/12/2019    ALKPT 66 02/06/2023    ALKPT 84 04/12/2019    LIPA 113 04/12/2019    LACTA 0.7 05/28/2022    LACTA 1.1 04/12/2019    HGBA1C 5.1 02/06/2023    MG 2.1 02/06/2023    PCT <0.05 05/28/2022    CALCLDL 88 02/07/2023    HDL 52 02/07/2023    TRIGLYCERIDE 143 02/07/2023        Imaging     XR  CHEST AP OR PA   Final Result       No acute pulmonary process.         Electronically Signed by: MICKY DICKINSON M.D.    Signed on: 2/7/2023 5:05 AM          MRI BRAIN WO CONTRAST   Final Result   1. There are no acute intracranial abnormalities.      Electronically Signed by: JANIE LUCERO M.D.    Signed on: 2/6/2023 6:19 PM          CTA HEAD AND NECK W CONTRAST LEVEL 1   Final Result   Normal CTA of the head and neck.  (Dr. Vinita Best was notified   by perfect serve texting at 1:40 PM.  A phone extension was not provided   for this study).      Electronically Signed by: ROSS COPE M.D.    Signed on: 2/6/2023 1:43 PM          CT HEAD LEVEL 1   Final Result   No abnormalities. (Dr. Vinita Best was notified by phone at   1:19 PM).      Electronically Signed by: ROSS COPE M.D.    Signed on: 2/6/2023 1:20 PM               Assessment and Plan    Left upper and lower extremity weakness, numbness and tingling - - -possible CVA versus transient neurological deficits versus conversion disorder versus seizures  -Lipid panel and hemoglobin A1c normal  -MRI brain negative  -Appreciate neurology recommendations, continue to monitor on telemetry  -Check orthostatic vital signs  -Was evaluated by PT/OT, not cleared for discharge yet    Obesity  -BMI of 31    DVT Prophylaxis  Current Active Medications for DVT Prophylaxis (From admission, onward)         Stop     enoxaparin (LOVENOX) injection 40 mg  40 mg,   Subcutaneous,   EVERY 24 HOURS         --                   Code Status: Full Resuscitation    Dispo: Home tomorrow if continues to improve    PCP:  MD Faizan Lewis MD,  [facility] Hospitalist  2/7/2023   covering RN

## 2025-01-11 NOTE — H&P ADULT - NSHPPHYSICALEXAM_GEN_ALL_CORE
PHYSICAL EXAM:  GENERAL: NAD, conversive  HEAD:  Atraumatic, Normocephalic  EYES: EOMI, PERRLA, conjunctiva and sclera clear  ENMT: No tonsillar erythema, exudates, or enlargement; Moist mucous membranes  NECK: Supple, No JVD, Normal thyroid  HEART: Regular rate and rhythm; No murmurs, rubs, or gallops  RESPIRATORY: CTA B/L, No W/R/R  ABDOMEN: Soft, Nontender, Nondistended; Bowel sounds present  NEUROLOGY: A&Ox3, nonfocal, moving all extremities  EXTREMITIES:  2+ Peripheral Pulses, No clubbing, cyanosis, or edema  SKIN: warm, dry, normal color, no rash or abnormal lesions

## 2025-01-11 NOTE — ED ADULT TRIAGE NOTE - CHIEF COMPLAINT QUOTE
Patient presents to ED with complaints of chest pain that started 4 hours ago. Patient also reports feeling off balance for 3 months. PMH CABG 3/2023, CVA 3/2024, TIA last week.

## 2025-01-11 NOTE — ED PROVIDER NOTE - CLINICAL SUMMARY MEDICAL DECISION MAKING FREE TEXT BOX
64-year-old female, with past medical history of DM, hypothyroidism, CAD s/p CABG, CVA with right-sided weakness, schizophrenia, TAVR who presents to the ED due to chest pain which started 10 hours prior to arrival and request for rehab.  Patient states that 10 hours she developed on and off chest pain, located on the left side, non radiating.  She states the pain comes and goes at random and will last for a while before resolving.  Patient states the chest pain feels "sharp" and when she presses on her left chest that is when she feels the chest pain the most. She states she had some associated nausea with the chest pain.  Patient also states that she previously had a stroke with right sided residual weakness and had rehab for this which helped her significantly.  She states that she needs rehab again because she wants more improvement in her right sided weakness, especially her right knee and foot.  Currently undomiciled and reports unsafe living conditions at shelter.  ADMIT>

## 2025-01-11 NOTE — ED PROVIDER NOTE - OBJECTIVE STATEMENT
64-year-old female, with past medical history of DM, hypothyroidism, CAD s/p CABG, CVA with right-sided weakness, schizophrenia, TAVR, presents to the ED with sudden onset chest pain onset at noon while at rest.  Also reports nausea and shortness of breath onset same time and cough for a couple days.  Does not currently have a cardiologist and is undomiciled. Does not recall when last cardiac testing was. Patient requesting higher level of care like rehab or assisted living.

## 2025-01-12 LAB
ALBUMIN SERPL ELPH-MCNC: 3.8 G/DL — SIGNIFICANT CHANGE UP (ref 3.5–5.2)
ALP SERPL-CCNC: 132 U/L — HIGH (ref 30–115)
ALT FLD-CCNC: 19 U/L — SIGNIFICANT CHANGE UP (ref 0–41)
ANION GAP SERPL CALC-SCNC: 9 MMOL/L — SIGNIFICANT CHANGE UP (ref 7–14)
AST SERPL-CCNC: 16 U/L — SIGNIFICANT CHANGE UP (ref 0–41)
BASOPHILS # BLD AUTO: 0.04 K/UL — SIGNIFICANT CHANGE UP (ref 0–0.2)
BASOPHILS NFR BLD AUTO: 0.7 % — SIGNIFICANT CHANGE UP (ref 0–1)
BILIRUB SERPL-MCNC: 0.3 MG/DL — SIGNIFICANT CHANGE UP (ref 0.2–1.2)
BUN SERPL-MCNC: 17 MG/DL — SIGNIFICANT CHANGE UP (ref 10–20)
CALCIUM SERPL-MCNC: 8.6 MG/DL — SIGNIFICANT CHANGE UP (ref 8.4–10.5)
CHLORIDE SERPL-SCNC: 106 MMOL/L — SIGNIFICANT CHANGE UP (ref 98–110)
CHOLEST SERPL-MCNC: 203 MG/DL — HIGH
CO2 SERPL-SCNC: 24 MMOL/L — SIGNIFICANT CHANGE UP (ref 17–32)
CREAT SERPL-MCNC: 0.9 MG/DL — SIGNIFICANT CHANGE UP (ref 0.7–1.5)
EGFR: 72 ML/MIN/1.73M2 — SIGNIFICANT CHANGE UP
EOSINOPHIL # BLD AUTO: 0.26 K/UL — SIGNIFICANT CHANGE UP (ref 0–0.7)
EOSINOPHIL NFR BLD AUTO: 4.7 % — SIGNIFICANT CHANGE UP (ref 0–8)
GLUCOSE BLDC GLUCOMTR-MCNC: 155 MG/DL — HIGH (ref 70–99)
GLUCOSE BLDC GLUCOMTR-MCNC: 160 MG/DL — HIGH (ref 70–99)
GLUCOSE BLDC GLUCOMTR-MCNC: 161 MG/DL — HIGH (ref 70–99)
GLUCOSE BLDC GLUCOMTR-MCNC: 197 MG/DL — HIGH (ref 70–99)
GLUCOSE SERPL-MCNC: 157 MG/DL — HIGH (ref 70–99)
HCT VFR BLD CALC: 34.8 % — LOW (ref 37–47)
HDLC SERPL-MCNC: 56 MG/DL — SIGNIFICANT CHANGE UP
HGB BLD-MCNC: 11.3 G/DL — LOW (ref 12–16)
IMM GRANULOCYTES NFR BLD AUTO: 0.7 % — HIGH (ref 0.1–0.3)
LIPID PNL WITH DIRECT LDL SERPL: 114 MG/DL — HIGH
LYMPHOCYTES # BLD AUTO: 1.63 K/UL — SIGNIFICANT CHANGE UP (ref 1.2–3.4)
LYMPHOCYTES # BLD AUTO: 29.2 % — SIGNIFICANT CHANGE UP (ref 20.5–51.1)
MAGNESIUM SERPL-MCNC: 2.2 MG/DL — SIGNIFICANT CHANGE UP (ref 1.8–2.4)
MCHC RBC-ENTMCNC: 30.1 PG — SIGNIFICANT CHANGE UP (ref 27–31)
MCHC RBC-ENTMCNC: 32.5 G/DL — SIGNIFICANT CHANGE UP (ref 32–37)
MCV RBC AUTO: 92.8 FL — SIGNIFICANT CHANGE UP (ref 81–99)
MONOCYTES # BLD AUTO: 0.47 K/UL — SIGNIFICANT CHANGE UP (ref 0.1–0.6)
MONOCYTES NFR BLD AUTO: 8.4 % — SIGNIFICANT CHANGE UP (ref 1.7–9.3)
NEUTROPHILS # BLD AUTO: 3.15 K/UL — SIGNIFICANT CHANGE UP (ref 1.4–6.5)
NEUTROPHILS NFR BLD AUTO: 56.3 % — SIGNIFICANT CHANGE UP (ref 42.2–75.2)
NON HDL CHOLESTEROL: 147 MG/DL — HIGH
NRBC # BLD: 0 /100 WBCS — SIGNIFICANT CHANGE UP (ref 0–0)
PLATELET # BLD AUTO: 192 K/UL — SIGNIFICANT CHANGE UP (ref 130–400)
PMV BLD: 9.5 FL — SIGNIFICANT CHANGE UP (ref 7.4–10.4)
POTASSIUM SERPL-MCNC: 4.7 MMOL/L — SIGNIFICANT CHANGE UP (ref 3.5–5)
POTASSIUM SERPL-SCNC: 4.7 MMOL/L — SIGNIFICANT CHANGE UP (ref 3.5–5)
PROT SERPL-MCNC: 6.1 G/DL — SIGNIFICANT CHANGE UP (ref 6–8)
RBC # BLD: 3.75 M/UL — LOW (ref 4.2–5.4)
RBC # FLD: 13.7 % — SIGNIFICANT CHANGE UP (ref 11.5–14.5)
SODIUM SERPL-SCNC: 137 MMOL/L — SIGNIFICANT CHANGE UP (ref 135–146)
TRIGL SERPL-MCNC: 188 MG/DL — HIGH
TROPONIN T, HIGH SENSITIVITY RESULT: <6 NG/L — SIGNIFICANT CHANGE UP (ref 6–13)
WBC # BLD: 5.59 K/UL — SIGNIFICANT CHANGE UP (ref 4.8–10.8)
WBC # FLD AUTO: 5.59 K/UL — SIGNIFICANT CHANGE UP (ref 4.8–10.8)

## 2025-01-12 PROCEDURE — 93010 ELECTROCARDIOGRAM REPORT: CPT

## 2025-01-12 PROCEDURE — 99233 SBSQ HOSP IP/OBS HIGH 50: CPT

## 2025-01-12 RX ORDER — ACETAMINOPHEN 80 MG/.8ML
650 SOLUTION/ DROPS ORAL EVERY 6 HOURS
Refills: 0 | Status: DISCONTINUED | OUTPATIENT
Start: 2025-01-12 | End: 2025-01-14

## 2025-01-12 RX ORDER — INFLUENZA A VIRUS A/WISCONSIN/588/2019 (H1N1) RECOMBINANT HEMAGGLUTININ ANTIGEN, INFLUENZA A VIRUS A/DARWIN/6/2021 (H3N2) RECOMBINANT HEMAGGLUTININ ANTIGEN, INFLUENZA B VIRUS B/AUSTRIA/1359417/2021 RECOMBINANT HEMAGGLUTININ ANTIGEN, AND INFLUENZA B VIRUS B/PHUKET/3073/2013 RECOMBINANT HEMAGGLUTININ ANTIGEN 45; 45; 45; 45 UG/.5ML; UG/.5ML; UG/.5ML; UG/.5ML
0.5 INJECTION INTRAMUSCULAR ONCE
Refills: 0 | Status: DISCONTINUED | OUTPATIENT
Start: 2025-01-12 | End: 2025-01-14

## 2025-01-12 RX ORDER — REGADENOSON 0.08 MG/ML
0.4 INJECTION, SOLUTION INTRAVENOUS ONCE
Refills: 0 | Status: DISCONTINUED | OUTPATIENT
Start: 2025-01-13 | End: 2025-01-14

## 2025-01-12 RX ADMIN — LEVOTHYROXINE SODIUM 25 MICROGRAM(S): 175 TABLET ORAL at 06:02

## 2025-01-12 RX ADMIN — Medication 81 MILLIGRAM(S): at 11:49

## 2025-01-12 RX ADMIN — ARIPIPRAZOLE 20 MILLIGRAM(S): 10 TABLET ORAL at 11:50

## 2025-01-12 RX ADMIN — ACETAMINOPHEN 650 MILLIGRAM(S): 80 SOLUTION/ DROPS ORAL at 03:23

## 2025-01-12 RX ADMIN — ATORVASTATIN CALCIUM 40 MILLIGRAM(S): 40 TABLET, FILM COATED ORAL at 21:45

## 2025-01-12 RX ADMIN — Medication 1: at 08:27

## 2025-01-12 RX ADMIN — Medication 5 MILLIGRAM(S): at 06:02

## 2025-01-12 RX ADMIN — ACETAMINOPHEN 650 MILLIGRAM(S): 80 SOLUTION/ DROPS ORAL at 04:55

## 2025-01-12 RX ADMIN — Medication 1: at 17:49

## 2025-01-12 RX ADMIN — Medication 1: at 11:49

## 2025-01-12 NOTE — PATIENT PROFILE ADULT - FALL HARM RISK - HARM RISK INTERVENTIONS
Assistance with ambulation/Assistance OOB with selected safe patient handling equipment/Communicate Risk of Fall with Harm to all staff/Discuss with provider need for PT consult/Monitor gait and stability/Provide patient with walking aids - walker, cane, crutches/Reinforce activity limits and safety measures with patient and family/Tailored Fall Risk Interventions/Use of alarms - bed, chair and/or voice tab/Visual Cue: Yellow wristband and red socks/Bed in lowest position, wheels locked, appropriate side rails in place/Call bell, personal items and telephone in reach/Instruct patient to call for assistance before getting out of bed or chair/Non-slip footwear when patient is out of bed/[place] to call system/Physically safe environment - no spills, clutter or unnecessary equipment/Purposeful Proactive Rounding/Room/bathroom lighting operational, light cord in reach

## 2025-01-13 ENCOUNTER — TRANSCRIPTION ENCOUNTER (OUTPATIENT)
Age: 65
End: 2025-01-13

## 2025-01-13 LAB
A1C WITH ESTIMATED AVERAGE GLUCOSE RESULT: 6.6 % — HIGH (ref 4–5.6)
CK MB CFR SERPL CALC: 1.1 NG/ML — SIGNIFICANT CHANGE UP (ref 0.6–6.3)
ESTIMATED AVERAGE GLUCOSE: 143 MG/DL — HIGH (ref 68–114)
GLUCOSE BLDC GLUCOMTR-MCNC: 182 MG/DL — HIGH (ref 70–99)
GLUCOSE BLDC GLUCOMTR-MCNC: 192 MG/DL — HIGH (ref 70–99)
T3 SERPL-MCNC: 123 NG/DL — SIGNIFICANT CHANGE UP (ref 80–200)
T4 AB SER-ACNC: 5.3 UG/DL — SIGNIFICANT CHANGE UP (ref 4.6–12)
TROPONIN T, HIGH SENSITIVITY RESULT: 8 NG/L — SIGNIFICANT CHANGE UP (ref 6–13)
TSH SERPL-MCNC: 3.63 UIU/ML — SIGNIFICANT CHANGE UP (ref 0.27–4.2)

## 2025-01-13 PROCEDURE — 93016 CV STRESS TEST SUPVJ ONLY: CPT

## 2025-01-13 PROCEDURE — 93018 CV STRESS TEST I&R ONLY: CPT

## 2025-01-13 PROCEDURE — 78452 HT MUSCLE IMAGE SPECT MULT: CPT | Mod: 26

## 2025-01-13 PROCEDURE — 93010 ELECTROCARDIOGRAM REPORT: CPT

## 2025-01-13 PROCEDURE — 99232 SBSQ HOSP IP/OBS MODERATE 35: CPT

## 2025-01-13 RX ORDER — ATORVASTATIN CALCIUM 40 MG/1
80 TABLET, FILM COATED ORAL AT BEDTIME
Refills: 0 | Status: DISCONTINUED | OUTPATIENT
Start: 2025-01-13 | End: 2025-01-14

## 2025-01-13 RX ORDER — ATORVASTATIN CALCIUM 40 MG/1
1 TABLET, FILM COATED ORAL
Qty: 0 | Refills: 0 | DISCHARGE
Start: 2025-01-13

## 2025-01-13 RX ADMIN — Medication 5 MILLIGRAM(S): at 05:49

## 2025-01-13 RX ADMIN — ENOXAPARIN SODIUM 40 MILLIGRAM(S): 60 INJECTION INTRAVENOUS; SUBCUTANEOUS at 05:54

## 2025-01-13 RX ADMIN — LEVOTHYROXINE SODIUM 25 MICROGRAM(S): 175 TABLET ORAL at 05:49

## 2025-01-13 RX ADMIN — ATORVASTATIN CALCIUM 80 MILLIGRAM(S): 40 TABLET, FILM COATED ORAL at 21:23

## 2025-01-13 RX ADMIN — Medication 1: at 18:29

## 2025-01-13 RX ADMIN — PANTOPRAZOLE 40 MILLIGRAM(S): 40 TABLET, DELAYED RELEASE ORAL at 09:04

## 2025-01-13 RX ADMIN — Medication 1: at 09:01

## 2025-01-13 NOTE — DISCHARGE NOTE PROVIDER - NSDCMRMEDTOKEN_GEN_ALL_CORE_FT
Abilify 20 mg oral tablet: 1 tab(s) orally once a day MDD: 1 tablet  amLODIPine 5 mg oral tablet: 1 tab(s) orally once a day Continue until told otherwise by your provider. MDD: 1 tablet  aspirin 81 mg oral delayed release tablet: 1 tab(s) orally once a day Continue until told otherwise by your provider MDD: 1 tablet  atorvastatin 80 mg oral tablet: 1 tab(s) orally once a day (at bedtime)  Januvia 100 mg oral tablet: 1 tab(s) orally once a day MDD: 1 tablet  levothyroxine 25 mcg (0.025 mg) oral tablet: 1 tab(s) orally once a day Continue until told otherwise by your provider. MDD: 1 tablet  metFORMIN 500 mg oral tablet: 1 tab(s) orally 2 times a day MDD: 1000 mg

## 2025-01-13 NOTE — DISCHARGE NOTE PROVIDER - NSDCCPCAREPLAN_GEN_ALL_CORE_FT
PRINCIPAL DISCHARGE DIAGNOSIS  Diagnosis: Chest pain, musculoskeletal  Assessment and Plan of Treatment: You came in because you were experiencing chest pain which can often be very concerning for problems with your heart. However, all of your cardiac tests were normal. The nature of your chest pain and the fact that we could elicit the pain when we pressed on your chest means that this was most likely a muscle pain and not a heart pain. You may find that stretching and warm compresses will relieve this pain. You may also take tylenol as necessary but less than a total of 2g per day.      SECONDARY DISCHARGE DIAGNOSES  Diagnosis: Inadequate social support  Assessment and Plan of Treatment:      PRINCIPAL DISCHARGE DIAGNOSIS  Diagnosis: Chest pain, musculoskeletal  Assessment and Plan of Treatment: You came in because you were experiencing chest pain which can often be very concerning for problems with your heart. However, all of your cardiac tests were normal. The nature of your chest pain and the fact that we could elicit the pain when we pressed on your chest means that this was most likely a muscle pain and not a heart pain. You may find that stretching and warm compresses will relieve this pain. You may also take tylenol as necessary but less than a total of 2g per day.  Follow-up appointment with Psychiatry: at [facility] on Wednesday 1/22/25 at 1:30pm with Ana Laura March      SECONDARY DISCHARGE DIAGNOSES  Diagnosis: Inadequate social support  Assessment and Plan of Treatment:

## 2025-01-13 NOTE — DISCHARGE NOTE PROVIDER - NSDCFUADDINST_GEN_ALL_CORE_FT
If you do not already have a primary care physician, you may follow up with Dr. Lin at the [facility] clinic located at [address] [place] [address].  If you do not already have a primary care physician, you may follow up with Dr. Lin at the [facility] clinic located at [address] [place] [address].

## 2025-01-13 NOTE — DISCHARGE NOTE PROVIDER - HOSPITAL COURSE
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  Pt reports having had her mother and sister die in the last few months and is currently in the shelter system. Cardiac workup negative. Chest pain recurred intermittently, did not change in quality or radiate, and was always reproducible.       #Chest pain, reproducible - likely musculoskeletal   -patient has had left sided chest pain for the past 10 hours prior to arrival  -patient is present on palpation of the left chest and is described as sharp, likely MSK in nature  -trop 18->16->9 and EKG NSR  -nuclear stress test normal     #CVA with right sided residual weakness  -c/w home aspirin  -patient had rehab for right sided weakness after CVA and is requesting additional rehab to improve the residual weakness  -PT consult - recommended DC to [facility]     #Elevated alk phos  -continue to monitor  -outpatient follow up    #CAD s/p CABG  -c/w home aspirin  -increased atorvastatin     #Hypothyroidism  -c/w home levothyroxine 25    #DM  -patient takes januvia and metformin at home  -start sliding scale    #Schizophrenia  -c/w home abilify      Discussion of discharge plan of care, including discharge diagnoses, medication reconciliation, and follow-ups was conducted with Dr. Arellano on 1/13/25, and discharge was approved.   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  Pt reports having had her mother and sister die in the last few months and is currently in the shelter system. Cardiac workup negative. Chest pain recurred intermittently, did not change in quality or radiate, and was always reproducible.       #Chest pain, reproducible - likely musculoskeletal   -patient has had left sided chest pain for the past 10 hours prior to arrival  -patient is present on palpation of the left chest and is described as sharp, likely MSK in nature  -trop 18->16->9 and EKG NSR  -nuclear stress test normal     #CVA with right sided residual weakness  -c/w home aspirin  -patient had rehab for right sided weakness after CVA and is requesting additional rehab to improve the residual weakness  -PT consult - recommended DC to [facility]     #Elevated alk phos  -continue to monitor  -outpatient follow up    #CAD s/p CABG  -c/w home aspirin  -increased atorvastatin     #Hypothyroidism  -c/w home levothyroxine 25    #DM  -patient takes januvia and metformin at home  -start sliding scale    #Schizophrenia  -c/w home abilify  - sent one month to [facility] - discussed with pt at bedside       Discussion of discharge plan of care, including discharge diagnoses, medication reconciliation, and follow-ups was conducted with Dr. Arellano on 1/13/25, and discharge was approved.   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  Pt reports having had her mother and sister die in the last few months and is currently in the shelter system. Cardiac workup negative. Chest pain recurred intermittently, did not change in quality or radiate, and was always reproducible.       #Chest pain, reproducible - likely musculoskeletal   -patient has had left sided chest pain for the past 10 hours prior to arrival  -patient is present on palpation of the left chest and is described as sharp, likely MSK in nature  -trop 18->16->9 and EKG NSR  -nuclear stress test normal     #CVA with right sided residual weakness  -c/w home aspirin  -patient had rehab for right sided weakness after CVA and is requesting additional rehab to improve the residual weakness  -PT consult - recommended DC to [facility]     #Elevated alk phos  -continue to monitor  -outpatient follow up    #CAD s/p CABG  -c/w home aspirin  -increased atorvastatin     #Hypothyroidism  -c/w home levothyroxine 25    #DM  -patient takes januvia and metformin at home  -start sliding scale    #Schizophrenia  -c/w home abilify  - sent one month to [facility] - discussed with pt at bedside   - pt was seen by psychiatry who found that the patient was safe from a psychiatry perspective to be discharged to the community  - sw consult appreciated       Pt refusing subacute rehab discharged to homeless shelter

## 2025-01-13 NOTE — DISCHARGE NOTE PROVIDER - ATTENDING DISCHARGE PHYSICAL EXAMINATION:
Vital Signs Last 24 Hrs  T(F): 97.6 (14 Jan 2025 12:22), Max: 98.1 (13 Jan 2025 20:02)  HR: 87 (14 Jan 2025 12:22) (80 - 88)  BP: 100/68 (14 Jan 2025 12:22) (100/68 - 129/90)  RR: 18 (14 Jan 2025 12:22) (18 - 19)  SpO2: 95% (14 Jan 2025 05:13) (95% - 97%)    PHYSICAL EXAM:  GENERAL: NAD, well-groomed, well-developed  HEAD:  Atraumatic, Normocephalic  EYES: EOMI, conjunctiva and sclera clear  ENMT: Moist mucous membranes, Good dentition, no thrush  NECK: Supple, No JVD  CHEST/LUNG: Clear to auscultation bilaterally, good air entry, non-labored breathing  HEART: RRR; S1/S2, 2/6 murmur   ABDOMEN: Soft, Nontender, Nondistended; Bowel sounds present  VASCULAR: Normal pulses, Normal capillary refill  EXTREMITIES: No calf tenderness, No cyanosis, No edema  LYMPH: Normal; No lymphadenopathy noted  SKIN: Warm, Intact  PSYCH: agitated mood, Normal affect  NERVOUS SYSTEM:  A/O x3, poor concentration

## 2025-01-13 NOTE — DISCHARGE NOTE PROVIDER - NSDCFUADDAPPT_GEN_ALL_CORE_FT
Appointment at [facility] on Wednesday 1/22/25 at 1:30pm with Ana Laura March

## 2025-01-13 NOTE — DISCHARGE NOTE PROVIDER - CARE PROVIDER_API CALL
Claudia Lin  Internal Medicine  [address] [place] [id number]  Phone: [phone]  Fax: [phone]  Follow Up Time: 1 month

## 2025-01-14 VITALS
TEMPERATURE: 98 F | SYSTOLIC BLOOD PRESSURE: 145 MMHG | DIASTOLIC BLOOD PRESSURE: 73 MMHG | HEART RATE: 81 BPM | RESPIRATION RATE: 18 BRPM

## 2025-01-14 PROCEDURE — 99239 HOSP IP/OBS DSCHRG MGMT >30: CPT

## 2025-01-14 PROCEDURE — 99222 1ST HOSP IP/OBS MODERATE 55: CPT

## 2025-01-14 RX ORDER — OLANZAPINE 15 MG/1
5 TABLET ORAL EVERY 6 HOURS
Refills: 0 | Status: DISCONTINUED | OUTPATIENT
Start: 2025-01-14 | End: 2025-01-14

## 2025-01-14 RX ORDER — ARIPIPRAZOLE 10 MG/1
1 TABLET ORAL
Qty: 30 | Refills: 0
Start: 2025-01-14 | End: 2025-02-12

## 2025-01-14 RX ADMIN — Medication 81 MILLIGRAM(S): at 11:08

## 2025-01-14 RX ADMIN — ARIPIPRAZOLE 20 MILLIGRAM(S): 10 TABLET ORAL at 11:08

## 2025-01-14 NOTE — BH CONSULTATION LIAISON ASSESSMENT NOTE - NSBHATTESTTYPEVISIT_PSY_A_CORE
Patient is a 24-year-old white female here for follow-up on her right knee. Patient had the arthroscopy yesterday and had a chondroplasty of her patella and trochlea along with the medial femoral condyle. Also had a debridement of a medial shelf and osteophyte of the patella. Patient is complaining of moderate pain. Her mom is with her today. Patient's exam shows her to have moderate swelling of the right knee. Straight leg raising is intact. Wounds are clean and dry. Negative Homans. Flexion to about 80 degrees. Extension to 0 degrees. Impression is that a normal healing status post right knee arthroscopy with endoscopic surgery. Recommendations for to follow-up with me in 1 week's time for suture removal.  Also going to have her begin physical therapy in 1 week's time. I did give her exercises to do for this week. We did talk about weight loss in the necessity for that effort to reduce stress on the knee. We will also need to consider going ahead with the Synvisc 1 or equivalent gel injection in about 2 months time.   Will wait for about 3 to 4 weeks and then submit request for approval.  I am also going to have the patient off of work for the next 4 weeks so she can focus on rehabilitation of the knee and also developing a weight loss program. Attending Only

## 2025-01-14 NOTE — BH CONSULTATION LIAISON ASSESSMENT NOTE - CURRENT MEDICATION
MEDICATIONS  (STANDING):  amLODIPine   Tablet 5 milliGRAM(s) Oral daily  ARIPiprazole 20 milliGRAM(s) Oral daily  aspirin enteric coated 81 milliGRAM(s) Oral daily  atorvastatin 80 milliGRAM(s) Oral at bedtime  enoxaparin Injectable 40 milliGRAM(s) SubCutaneous every 24 hours  influenza   Vaccine 0.5 milliLiter(s) IntraMuscular once  insulin lispro (ADMELOG) corrective regimen sliding scale   SubCutaneous three times a day before meals  levothyroxine 25 MICROGram(s) Oral daily  pantoprazole    Tablet 40 milliGRAM(s) Oral before breakfast  regadenoson Injectable 0.4 milliGRAM(s) IV Push once    MEDICATIONS  (PRN):  acetaminophen     Tablet .. 650 milliGRAM(s) Oral every 6 hours PRN Mild Pain (1 - 3)  OLANZapine Injectable 5 milliGRAM(s) IntraMuscular every 6 hours PRN agitation

## 2025-01-14 NOTE — BH CONSULTATION LIAISON ASSESSMENT NOTE - NSBHREFERDETAILS_PSY_A_CORE_FT
65 yo female w hx of schizophrenia, from [facility] shelter presented for chest pain, msk in nature. Missed dose of abilify yesterday now hearing and responding to voices, refusing to take her abilify. Consulting for safe discharge/psych clearance

## 2025-01-14 NOTE — BH CONSULTATION LIAISON ASSESSMENT NOTE - NSBHCHARTREVIEWVS_PSY_A_CORE FT
Vital Signs Last 24 Hrs  T(C): 36.4 (14 Jan 2025 05:13), Max: 36.7 (13 Jan 2025 20:02)  T(F): 97.6 (14 Jan 2025 05:13), Max: 98.1 (13 Jan 2025 20:02)  HR: 88 (14 Jan 2025 05:13) (80 - 88)  BP: 129/90 (14 Jan 2025 05:13) (128/83 - 129/90)  BP(mean): 103 (14 Jan 2025 05:13) (98 - 103)  RR: 19 (14 Jan 2025 05:13) (18 - 19)  SpO2: 95% (14 Jan 2025 05:13) (95% - 97%)    Parameters below as of 13 Jan 2025 20:02  Patient On (Oxygen Delivery Method): room air

## 2025-01-14 NOTE — BH CONSULTATION LIAISON ASSESSMENT NOTE - NSBHCONSULTFOLLOWAFTERCARE_PSY_A_CORE FT
Patient has a follow up appointment with [facility] on 1/22 at 1:30 PM for outpatient psychiatric follow up.

## 2025-01-14 NOTE — PHYSICAL THERAPY INITIAL EVALUATION ADULT - SPECIFY REASON(S)
Patient attempted to see the patient for PT eval. Patient declined participating in PT eval. Patient verbalized, "I am fine, I don't need PT." IGNACIO Alvarado and Dr Arellano was notified.

## 2025-01-14 NOTE — BH CONSULTATION LIAISON ASSESSMENT NOTE - OTHER
Verbal order for refill of the Nexium called to [facility] [address].  Script did not want to transmit.     Limited

## 2025-01-14 NOTE — BH CONSULTATION LIAISON ASSESSMENT NOTE - NSBHCHARTREVIEWINVESTIGATE_PSY_A_CORE FT
< from: 12 Lead ECG (01.13.25 @ 07:46) >    Ventricular Rate 65 BPM    Atrial Rate 65 BPM    P-R Interval 150 ms    QRS Duration 86 ms    Q-T Interval 386 ms    QTC Calculation(Bazett) 401 ms    < end of copied text >

## 2025-01-14 NOTE — BH CONSULTATION LIAISON ASSESSMENT NOTE - RISK ASSESSMENT
Risk: chronic psychotic symptoms, limited social support  Protective: currently not suicidal/homicidal, limited access to lethal means    Patient has low acute risk of violence and suicidality. She is currently not endorsing suicidal ideation and is future oriented to return home. She has limited access to lethal means. She has elevated chronic risk due to previous suicide attempt and chronic psychotic symptoms. Acute inpatient hospitalization would not change this.

## 2025-01-14 NOTE — PROGRESS NOTE ADULT - SUBJECTIVE AND OBJECTIVE BOX
Patient is a 64y old  Female who presents with a chief complaint of     Patient seen and examined at bedside.  Pt denies any current chest pain or shortness of breath, reports right knee pain   ALLERGIES:  penicillin (Rash)    MEDICATIONS:  acetaminophen     Tablet .. 650 milliGRAM(s) Oral every 6 hours PRN  amLODIPine   Tablet 5 milliGRAM(s) Oral daily  ARIPiprazole 20 milliGRAM(s) Oral daily  aspirin enteric coated 81 milliGRAM(s) Oral daily  atorvastatin 40 milliGRAM(s) Oral at bedtime  enoxaparin Injectable 40 milliGRAM(s) SubCutaneous every 24 hours  influenza   Vaccine 0.5 milliLiter(s) IntraMuscular once  insulin lispro (ADMELOG) corrective regimen sliding scale   SubCutaneous three times a day before meals  levothyroxine 25 MICROGram(s) Oral daily  pantoprazole    Tablet 40 milliGRAM(s) Oral before breakfast    Vital Signs Last 24 Hrs  T(F): 98.4 (12 Jan 2025 12:18), Max: 98.4 (12 Jan 2025 12:18)  HR: 83 (12 Jan 2025 12:18) (74 - 85)  BP: 135/82 (12 Jan 2025 12:18) (121/83 - 151/96)  RR: 16 (12 Jan 2025 12:18) (16 - 19)  SpO2: 97% (12 Jan 2025 12:18) (97% - 98%)  I&O's Summary    12 Jan 2025 07:01  -  12 Jan 2025 16:17  --------------------------------------------------------  IN: 650 mL / OUT: 250 mL / NET: 400 mL        PHYSICAL EXAM:  General: NAD, A/O x 3  ENT: MMM  Neck: Supple, No JVD  Lungs: diminished breath sounds, no wheezes heard   Cardio: RRR, S1/S2, 2/6 murmur   Abdomen: Soft, Nontender, Nondistended; Bowel sounds present  Extremities: No cyanosis, No edema, right knee limited flexion on exam     LABS:                        11.3   5.59  )-----------( 192      ( 12 Jan 2025 07:16 )             34.8     01-12    137  |  106  |  17  ----------------------------<  157  4.7   |  24  |  0.9    Ca    8.6      12 Jan 2025 07:16  Mg     2.2     01-12    TPro  6.1  /  Alb  3.8  /  TBili  0.3  /  DBili  x   /  AST  16  /  ALT  19  /  AlkPhos  132  01-12 01-12 Chol 203 mg/dL LDL -- HDL 56 mg/dL Trig 188 mg/dL              POCT Blood Glucose.: 161 mg/dL (12 Jan 2025 11:38)  POCT Blood Glucose.: 155 mg/dL (12 Jan 2025 07:33)      Urinalysis Basic - ( 12 Jan 2025 07:16 )    Color: x / Appearance: x / SG: x / pH: x  Gluc: 157 mg/dL / Ketone: x  / Bili: x / Urobili: x   Blood: x / Protein: x / Nitrite: x   Leuk Esterase: x / RBC: x / WBC x   Sq Epi: x / Non Sq Epi: x / Bacteria: x            RADIOLOGY & ADDITIONAL TESTS:    Care Discussed with Consultants/Other Providers: 
Patient is a 64y old  Female who presents with a chief complaint of chest pain (12 Jan 2025 16:16)      Patient seen and examined at bedside.  pt denies any shortness of breath, endorses chest pain  ALLERGIES:  penicillin (Rash)    MEDICATIONS:  acetaminophen     Tablet .. 650 milliGRAM(s) Oral every 6 hours PRN  amLODIPine   Tablet 5 milliGRAM(s) Oral daily  ARIPiprazole 20 milliGRAM(s) Oral daily  aspirin enteric coated 81 milliGRAM(s) Oral daily  atorvastatin 80 milliGRAM(s) Oral at bedtime  enoxaparin Injectable 40 milliGRAM(s) SubCutaneous every 24 hours  influenza   Vaccine 0.5 milliLiter(s) IntraMuscular once  insulin lispro (ADMELOG) corrective regimen sliding scale   SubCutaneous three times a day before meals  levothyroxine 25 MICROGram(s) Oral daily  pantoprazole    Tablet 40 milliGRAM(s) Oral before breakfast  regadenoson Injectable 0.4 milliGRAM(s) IV Push once    Vital Signs Last 24 Hrs  T(F): 97.3 (13 Jan 2025 04:21), Max: 98.4 (12 Jan 2025 20:12)  HR: 72 (13 Jan 2025 04:21) (72 - 83)  BP: 127/86 (13 Jan 2025 04:21) (127/86 - 130/83)  RR: 18 (13 Jan 2025 04:21) (17 - 18)  SpO2: 97% (13 Jan 2025 04:21) (96% - 97%)  I&O's Summary    12 Jan 2025 07:01  -  13 Jan 2025 07:00  --------------------------------------------------------  IN: 650 mL / OUT: 250 mL / NET: 400 mL        PHYSICAL EXAM:  General: NAD, A/O x 3  ENT: MMM  Neck: Supple, No JVD  Lungs: Clear to auscultation bilaterally  Cardio: RRR, S1/S2, 2/6 murmur   Abdomen: Soft, Nontender, Nondistended; Bowel sounds present  Extremities: No cyanosis, No edema    LABS:                        11.3   5.59  )-----------( 192      ( 12 Jan 2025 07:16 )             34.8     01-12    137  |  106  |  17  ----------------------------<  157  4.7   |  24  |  0.9    Ca    8.6      12 Jan 2025 07:16  Mg     2.2     01-12    TPro  6.1  /  Alb  3.8  /  TBili  0.3  /  DBili  x   /  AST  16  /  ALT  19  /  AlkPhos  132  01-12          CARDIAC MARKERS ( 13 Jan 2025 09:47 )  x     / x     / x     / x     / 1.1 ng/mL      01-12 Chol 203 mg/dL LDL -- HDL 56 mg/dL Trig 188 mg/dL  TSH 3.63   TSH with FT4 reflex --  Total T3 123              POCT Blood Glucose.: 182 mg/dL (13 Jan 2025 08:04)  POCT Blood Glucose.: 160 mg/dL (12 Jan 2025 21:31)  POCT Blood Glucose.: 197 mg/dL (12 Jan 2025 16:16)      Urinalysis Basic - ( 12 Jan 2025 07:16 )    Color: x / Appearance: x / SG: x / pH: x  Gluc: 157 mg/dL / Ketone: x  / Bili: x / Urobili: x   Blood: x / Protein: x / Nitrite: x   Leuk Esterase: x / RBC: x / WBC x   Sq Epi: x / Non Sq Epi: x / Bacteria: x            RADIOLOGY & ADDITIONAL TESTS:  < from: NM Nuclear Stress Pharmacologic Multiple (01.13.25 @ 13:57) >  1. NORMAL LEXISCAN / REST MYOCARDIAL PERFUSION SPECT TOMOGRAPHY, WITH NO   EVIDENCE FOR ISCHEMIA DURING LEXISCAN INFUSION.  2. NORMAL RESTING LEFT VENTRICULAR WALL MOTION AND WALL THICKENING.  3. LEFT VENTRICULAR EJECTION FRACTION OF  66 % WHICH IS WITHIN RANGE OF   NORMAL.    < end of copied text >    Care Discussed with Consultants/Other Providers: 
SUBJECTIVE/OVERNIGHT EVENTS  Today is hospital day 3d. This morning patient was seen and examined at bedside, resting comfortably in bed. No acute or major events overnight. Pt was supposed to be discharged to [facility] last night but there was no bed available for her. This AM, she claims she no longer needs rehab and she would like to go back to the shelter. She began to become agitated and appeared to be responding to internal stimuli and experiencing delusions, talking about the [facility] and [employer] being in charge.     PMH    Chest pain    No pertinent family history in first degree relatives    Family history of early CAD (Father)    Handoff    MEWS Score    Schizophrenia    Leukemia    Depression    Schizoaffective disorder, depressive type    H/O aortic valve disorder    Dementia    Acute chest pain    Chest pain, musculoskeletal    No significant past surgical history    No significant past surgical history    H/O aortic valve replacement    CHEST PAIN    87    Inadequate social support    [email or web address]        MEDICATIONS  STANDING MEDICATIONS  amLODIPine   Tablet 5 milliGRAM(s) Oral daily  ARIPiprazole 20 milliGRAM(s) Oral daily  aspirin enteric coated 81 milliGRAM(s) Oral daily  atorvastatin 80 milliGRAM(s) Oral at bedtime  enoxaparin Injectable 40 milliGRAM(s) SubCutaneous every 24 hours  influenza   Vaccine 0.5 milliLiter(s) IntraMuscular once  insulin lispro (ADMELOG) corrective regimen sliding scale   SubCutaneous three times a day before meals  levothyroxine 25 MICROGram(s) Oral daily  pantoprazole    Tablet 40 milliGRAM(s) Oral before breakfast  regadenoson Injectable 0.4 milliGRAM(s) IV Push once    PRN MEDICATIONS  acetaminophen     Tablet .. 650 milliGRAM(s) Oral every 6 hours PRN  OLANZapine Injectable 5 milliGRAM(s) IntraMuscular every 6 hours PRN    VITALS  T(F): 97.6 (01-14-25 @ 05:13), Max: 98.1 (01-13-25 @ 20:02)  HR: 88 (01-14-25 @ 05:13) (80 - 88)  BP: 129/90 (01-14-25 @ 05:13) (128/83 - 129/90)  RR: 19 (01-14-25 @ 05:13) (18 - 19)  SpO2: 95% (01-14-25 @ 05:13) (95% - 97%)  POCT Blood Glucose.: 192 mg/dL (01-13-25 @ 16:33)    PHYSICAL EXAM    GENERAL: NAD, lying in bed comfortably  HEAD:  Atraumatic, normocephalic  HEART: Regular rate and rhythm  LUNGS: Unlabored respirations.  Clear to auscultation bilaterally, no crackles, wheezing, or rhonchi  ABDOMEN: Soft, nontender, nondistended, +BS  EXTREMITIES: 2+ peripheral pulses bilaterally. No clubbing, cyanosis, or edema  NERVOUS SYSTEM:  A&Ox3, moving all extremities, no focal deficits; responding to internal stimuli and agitated     Troponin T, High Sensitivity Result: 8 ng/L (01-13-25 @ 09:47)  CKMB Units: 1.1 ng/mL (01-13-25 @ 09:47)  Troponin T, High Sensitivity Result: <6 ng/L (01-12-25 @ 07:16)          IMAGING    General:  - DVT proph  - GI proph  - Diet  - Activity  - Code Status

## 2025-01-14 NOTE — BH CONSULTATION LIAISON ASSESSMENT NOTE - NSBHCONSULTRECOMMENDOTHER_PSY_A_CORE FT
Active Problems    1  Cellulitis of scrotum (608 4) (N49 2)   2  Urgency of urination (788 63) (R39 15)   3  Urinary frequency (788 41) (R35 0)   4  Urinary urgency (788 63) (R39 15)    Current Meds   1  Atorvastatin Calcium 20 MG Oral Tablet; Therapy: (Recorded:2017) to Recorded   2  Gabapentin 100 MG Oral Capsule; Therapy: (Recorded:2017) to Recorded   3  Percocet  MG TABS; Therapy: (Recorded:2017) to Recorded   4  RA Vitamin D-3 1000 UNIT Oral Tablet; Therapy: (Recorded:[id number]) to Recorded   5  Tylenol 325 MG Oral Tablet; Therapy: (Recorded:[id number]) to Recorded   6  Vitamin B-12 100 MCG Oral Tablet; Therapy: (Recorded:[id number]) to Recorded    Allergies    1  oxybutynin   2  Toviaz TB24   3  VESIcare TABS   4  cephalexin    Procedure    Procedure: Percutaneous Tibial Nerve Stimulation (PTNS)   Date onset of symptoms MORE THAN 2 YEARS AGO  Behavior modification: DIDN'T WORK  Biofeedback: DIDN'T WORK  STILLL DOING KEGELS  No E-Stim  Drug 1: Carlee Millan, 1963 [address] [place] DIDN'T WORK  Session number: 1 month f/u     Patient Goals and Progress   Decreased urgency  Decreased frequency  No accidents  Sleeps through the night  No related health and social factors  Caffeine - cups per day: 1  Alcohol - number of drinks per day: 0  Daytime voids - number per day: 8-13  Nighttime voids - number per night: 0-1  Urgency: 1-2  Incontinence - episodes per day: 0  Treatment Plan   Do not increase fluids  Decrease fluids  Decrease caffeine  Urge reduction techniques: Jules Litten:   Toilet every 3-4 hours  No fluids before bed  Ankle Used: Left     Treatment settin  Duration of treatment: 30 MINUTES  Lead lot #: [id number]  Expiration Date: 2020  Feeling/Response: Toe flex and foot sensation      Assessment    1  Urgency of urination (788 63) (R39 15)   2   Urinary frequency (788 41) (R35 0)    Plan  Urinary frequency, Urinary urgency    · Follow-up visit in 2 months Evaluation and Treatment  Follow-up  Status: Hold For -  Scheduling,Retrospective Authorization  Requested for: [id number]   Ordered; For: Urinary frequency, Urinary urgency;  Ordered By: Tamera Bhagat Performed:  Due: [id number]; Last Updated By: Lupe Burnham; 12/18/2017 11:10:52 AM    Future Appointments    Date/Time Provider Specialty Site   12/18/2018 11:00 AM Tamera Bhagat MD Urology [address]     Signatures   Electronically signed by : Cris Ledbetter RN; Dec 18 2017 11:10AM EST                       (Author)    Electronically signed by : Manuel Cordero MD; Dec 18 2017  3:43PM EST -Continue Abilify 20 mg daily for schizoaffective disorder. Please give 30 day supply on discharge.  -Per Psych SW: Patient has a follow up appointment with [facility] on 1/22 at 1:30 PM for outpatient psychiatric follow up.  -No indication for acute inpatient psychiatric hospitalization at this time--no psychiatric contraindication to discharge.

## 2025-01-14 NOTE — BH CONSULTATION LIAISON ASSESSMENT NOTE - SUMMARY
64-year-old female, with past medical history of DM, hypothyroidism, CAD s/p CABG, CVA with right-sided weakness, schizophrenia, TAVR who presents to the ED due to chest pain which started 10 hours prior to arrival and request for rehab.  Patient was admitted for chest pain work up and potential inpatient rehab. Psychiatry consulted due to patient wanting to leave and concern for psychotic symptoms.    On interview, patient is not disorganized and generally is well related. She reported wanting to return to her room in the women's shelter. She denied suicidal or homicidal ideation. She denies depressed mood. She denied having command hallucinations and did not appear to be responding to internal stimuli. Per medical team notes--she made comments about the [facility] and [employer] being in charge suggestive of paranoid thinking. This was not elicited during the interview. Strongly suspect these delusional thought processes are an element of chronic psychotic symptoms from schizoaffective disorder. Currently they do not impair her normal function at a level that would acutely prevent her from maintaining her basic needs or put her at risk to harm herself or others. Violent behavior has not been observed.

## 2025-01-14 NOTE — PROGRESS NOTE ADULT - ASSESSMENT
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  Pt reports having had her mother and sister die in the last few months and is currently in the shelter system.      #Chest pain  -patient has had left sided chest pain for the past 10 hours prior to arrival  -patient is present on palpation of the left chest and is described as sharp, likely MSK in nature  -trop 18->16->9 and EKG NSR  -nuclear stress test ordered for the AM     #CVA with right sided residual weakness  -c/w home aspirin  -patient had rehab for right sided weakness after CVA and is requesting additional rehab to improve the residual weakness  -PT consult     #Elevated alk phos  -continue to monitor  -outpatient follow up    #CAD s/p CABG  -c/w home aspirin    #Hypothyroidism  -c/w home levothyroxine 25    #DM  -patient takes januvia and metformin at home  -start sliding scale    #Schizophrenia  #Housing insecurity   -c/w home abilify  -sw consulted     DVT proph: lovenox  GI proph: PPI  Pending: stress test, PT consult, SW consult   
#Chest pain, reproducible - likely musculoskeletal   -patient has had left sided chest pain for the past 10 hours prior to arrival  -patient is present on palpation of the left chest and is described as sharp, likely MSK in nature  -trop 18->16->9 and EKG NSR  -nuclear stress test normal     #CVA with right sided residual weakness  -c/w home aspirin  -patient had rehab for right sided weakness after CVA and is requesting additional rehab to improve the residual weakness  -PT consult - recommended DC to [facility]     #Elevated alk phos  -continue to monitor  -outpatient follow up    #CAD s/p CABG  -c/w home aspirin  -increased atorvastatin     #Hypothyroidism  -c/w home levothyroxine 25    #DM  -patient takes januvia and metformin at home  -start sliding scale    #Schizophrenia  -c/w home abilify  - sent one month to [facility] - discussed with pt at bedside 
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  Pt reports having had her mother and sister die in the last few months and is currently in the shelter system.      #Chest pain-2/2 to muscular skeletal pain   - 1/13 pt had episode of chest pain in AM - EKG was fine, trop negative, was reproducible on palpation   -patient has had left sided chest pain for the past 10 hours prior to arrival  -patient is present on palpation of the left chest and is described as sharp, likely MSK in nature  -trop 18->16->9 and EKG NSR  -nuclear stress test 1/13 was normal     #CVA with right sided residual weakness  -c/w home aspirin  -patient had rehab for right sided weakness after CVA and is requesting additional rehab to improve the residual weakness  -PT consult recommend [facility]    #Elevated alk phos  -continue to monitor  -outpatient follow up    #CAD s/p CABG  -c/w home aspirin    #Hypothyroidism  -c/w home levothyroxine 25    #DM  -patient takes januvia and metformin at home  -start sliding scale    #Schizophrenia  #Housing insecurity   -c/w home abilify  -sw consulted     DVT proph: lovenox  GI proph: PPI  Pending: discharge to subacute rehab

## 2025-01-17 NOTE — BH INPATIENT PSYCHIATRY PROGRESS NOTE - NSBHMSEAFFQUAL_PSY_A_CORE
[de-identified] : dry cough, body chills
Euthymic
Depressed
Depressed
Euthymic
Depressed
Euthymic

## 2025-01-23 DIAGNOSIS — Z88.0 ALLERGY STATUS TO PENICILLIN: ICD-10-CM

## 2025-01-23 DIAGNOSIS — R07.89 OTHER CHEST PAIN: ICD-10-CM

## 2025-01-23 DIAGNOSIS — E83.39 OTHER DISORDERS OF PHOSPHORUS METABOLISM: ICD-10-CM

## 2025-01-23 DIAGNOSIS — I25.2 OLD MYOCARDIAL INFARCTION: ICD-10-CM

## 2025-01-23 DIAGNOSIS — Z65.8 OTHER SPECIFIED PROBLEMS RELATED TO PSYCHOSOCIAL CIRCUMSTANCES: ICD-10-CM

## 2025-01-23 DIAGNOSIS — E03.9 HYPOTHYROIDISM, UNSPECIFIED: ICD-10-CM

## 2025-01-23 DIAGNOSIS — Z79.82 LONG TERM (CURRENT) USE OF ASPIRIN: ICD-10-CM

## 2025-01-23 DIAGNOSIS — Z95.1 PRESENCE OF AORTOCORONARY BYPASS GRAFT: ICD-10-CM

## 2025-01-23 DIAGNOSIS — E11.9 TYPE 2 DIABETES MELLITUS WITHOUT COMPLICATIONS: ICD-10-CM

## 2025-01-23 DIAGNOSIS — C95.91 LEUKEMIA, UNSPECIFIED, IN REMISSION: ICD-10-CM

## 2025-01-23 DIAGNOSIS — Z79.899 OTHER LONG TERM (CURRENT) DRUG THERAPY: ICD-10-CM

## 2025-01-23 DIAGNOSIS — Z79.84 LONG TERM (CURRENT) USE OF ORAL HYPOGLYCEMIC DRUGS: ICD-10-CM

## 2025-01-23 DIAGNOSIS — I69.353 HEMIPLEGIA AND HEMIPARESIS FOLLOWING CEREBRAL INFARCTION AFFECTING RIGHT NON-DOMINANT SIDE: ICD-10-CM

## 2025-01-23 DIAGNOSIS — Z59.811 HOUSING INSTABILITY, HOUSED, WITH RISK OF HOMELESSNESS: ICD-10-CM

## 2025-01-23 DIAGNOSIS — Z79.890 HORMONE REPLACEMENT THERAPY: ICD-10-CM

## 2025-01-23 DIAGNOSIS — F03.90 UNSPECIFIED DEMENTIA, UNSPECIFIED SEVERITY, WITHOUT BEHAVIORAL DISTURBANCE, PSYCHOTIC DISTURBANCE, MOOD DISTURBANCE, AND ANXIETY: ICD-10-CM

## 2025-01-23 DIAGNOSIS — F25.9 SCHIZOAFFECTIVE DISORDER, UNSPECIFIED: ICD-10-CM

## 2025-01-23 DIAGNOSIS — I25.10 ATHEROSCLEROTIC HEART DISEASE OF NATIVE CORONARY ARTERY WITHOUT ANGINA PECTORIS: ICD-10-CM

## 2025-01-23 SDOH — ECONOMIC STABILITY - HOUSING INSECURITY: HOUSING INSTABILITY WITH RISK OF HOMELESSNESS: Z59.811

## 2025-02-10 NOTE — ED BEHAVIORAL HEALTH ASSESSMENT NOTE - NSBHMSERECMEM_PSY_A_CORE
Pt with History of AFIB came in with daughter for having palpitations since last night.  Pt is Polish speaking.  Pt is A/O x 4, breathing unlabored.  
Normal

## 2025-02-24 NOTE — PATIENT PROFILE BEHAVIORAL HEALTH - AS SC BRADEN MOISTURE
"Routine Prenatal Visit  [facility] - [address], [address], [place] [address]    Assessment/Plan:  Cortney is a 36 y.o. year old  at 21w3d who presents for routine prenatal visit.     1. Hx  (vaginal birth after ), currently pregnant  Assessment & Plan:  Reports  was traumatic delivery, will have repeat c section  2. 21 weeks gestation of pregnancy  -     POCT urine dip  3. Previous  section complicating pregnancy  Assessment & Plan:  Desires repeat        Subjective:   Cortney Perez is a 36 y.o.  who presents for routine prenatal care at 21w3d.  Complaints today: Denies  LOF: -; VB: -; Contractions: -; FM: +    Objective:  /80 (BP Location: Right arm, Patient Position: Sitting, Cuff Size: Standard)   Ht 5' 6\" (1.676 m)   Wt 91.6 kg (202 lb)   LMP 2024 (Exact Date)   BMI 32.60 kg/m²     General: Well appearing, no distress  Respiratory: Unlabored breathing  Abdomen: Soft, gravid, nontender  Extremities: Warm and well perfused.  Non tender.    Pregravid Weight/BMI: 83.9 kg (185 lb) (BMI 29.87)  Current Weight: 91.6 kg (202 lb)   Total Weight Gain: 7.711 kg (17 lb)     Pre-Butch Vitals      Flowsheet Row Most Recent Value   Prenatal Assessment    Fetal Heart Rate 150   Movement Present   Prenatal Vitals    Blood Pressure 118/80   Weight - Scale 91.6 kg (202 lb)   Urine Albumin/Glucose    Dilation/Effacement/Station    Vaginal Drainage    Edema              Shil V Cassidy, DO  2025 3:44 PM     " (4) rarely moist

## 2025-02-25 ENCOUNTER — INPATIENT (INPATIENT)
Facility: HOSPITAL | Age: 65
LOS: 15 days | Discharge: HOME | DRG: 885 | End: 2025-03-13
Attending: PSYCHIATRY & NEUROLOGY | Admitting: PSYCHIATRY & NEUROLOGY
Payer: MEDICARE

## 2025-02-25 VITALS
TEMPERATURE: 97 F | RESPIRATION RATE: 16 BRPM | HEART RATE: 86 BPM | WEIGHT: 179.9 LBS | DIASTOLIC BLOOD PRESSURE: 100 MMHG | OXYGEN SATURATION: 99 % | SYSTOLIC BLOOD PRESSURE: 160 MMHG

## 2025-02-25 DIAGNOSIS — Z95.2 PRESENCE OF PROSTHETIC HEART VALVE: Chronic | ICD-10-CM

## 2025-02-25 DIAGNOSIS — F20.1 DISORGANIZED SCHIZOPHRENIA: ICD-10-CM

## 2025-02-25 LAB
ALBUMIN SERPL ELPH-MCNC: 4.3 G/DL — SIGNIFICANT CHANGE UP (ref 3.5–5.2)
ALP SERPL-CCNC: 156 U/L — HIGH (ref 30–115)
ALT FLD-CCNC: 23 U/L — SIGNIFICANT CHANGE UP (ref 0–41)
ANION GAP SERPL CALC-SCNC: 12 MMOL/L — SIGNIFICANT CHANGE UP (ref 7–14)
APAP SERPL-MCNC: <5 UG/ML — LOW (ref 10–30)
APPEARANCE UR: CLEAR — SIGNIFICANT CHANGE UP
AST SERPL-CCNC: 15 U/L — SIGNIFICANT CHANGE UP (ref 0–41)
BASOPHILS # BLD AUTO: 0.04 K/UL — SIGNIFICANT CHANGE UP (ref 0–0.2)
BASOPHILS NFR BLD AUTO: 0.6 % — SIGNIFICANT CHANGE UP (ref 0–1)
BILIRUB DIRECT SERPL-MCNC: <0.2 MG/DL — SIGNIFICANT CHANGE UP (ref 0–0.3)
BILIRUB INDIRECT FLD-MCNC: >0.1 MG/DL — LOW (ref 0.2–1.2)
BILIRUB SERPL-MCNC: 0.3 MG/DL — SIGNIFICANT CHANGE UP (ref 0.2–1.2)
BILIRUB UR-MCNC: NEGATIVE — SIGNIFICANT CHANGE UP
BUN SERPL-MCNC: 19 MG/DL — SIGNIFICANT CHANGE UP (ref 10–20)
CALCIUM SERPL-MCNC: 9 MG/DL — SIGNIFICANT CHANGE UP (ref 8.4–10.5)
CHLORIDE SERPL-SCNC: 105 MMOL/L — SIGNIFICANT CHANGE UP (ref 98–110)
CO2 SERPL-SCNC: 23 MMOL/L — SIGNIFICANT CHANGE UP (ref 17–32)
COLOR SPEC: YELLOW — SIGNIFICANT CHANGE UP
CREAT SERPL-MCNC: 1 MG/DL — SIGNIFICANT CHANGE UP (ref 0.7–1.5)
DIFF PNL FLD: NEGATIVE — SIGNIFICANT CHANGE UP
EGFR: 64 ML/MIN/1.73M2 — SIGNIFICANT CHANGE UP
EGFR: 64 ML/MIN/1.73M2 — SIGNIFICANT CHANGE UP
EOSINOPHIL # BLD AUTO: 0.13 K/UL — SIGNIFICANT CHANGE UP (ref 0–0.7)
EOSINOPHIL NFR BLD AUTO: 1.9 % — SIGNIFICANT CHANGE UP (ref 0–8)
ETHANOL SERPL-MCNC: <10 MG/DL — SIGNIFICANT CHANGE UP
GLUCOSE SERPL-MCNC: 152 MG/DL — HIGH (ref 70–99)
GLUCOSE UR QL: NEGATIVE MG/DL — SIGNIFICANT CHANGE UP
HCT VFR BLD CALC: 39.6 % — SIGNIFICANT CHANGE UP (ref 37–47)
HGB BLD-MCNC: 13.6 G/DL — SIGNIFICANT CHANGE UP (ref 12–16)
IMM GRANULOCYTES NFR BLD AUTO: 0.4 % — HIGH (ref 0.1–0.3)
KETONES UR-MCNC: NEGATIVE MG/DL — SIGNIFICANT CHANGE UP
LEUKOCYTE ESTERASE UR-ACNC: NEGATIVE — SIGNIFICANT CHANGE UP
LYMPHOCYTES # BLD AUTO: 1.97 K/UL — SIGNIFICANT CHANGE UP (ref 1.2–3.4)
LYMPHOCYTES # BLD AUTO: 29.1 % — SIGNIFICANT CHANGE UP (ref 20.5–51.1)
MCHC RBC-ENTMCNC: 30.1 PG — SIGNIFICANT CHANGE UP (ref 27–31)
MCHC RBC-ENTMCNC: 34.3 G/DL — SIGNIFICANT CHANGE UP (ref 32–37)
MCV RBC AUTO: 87.6 FL — SIGNIFICANT CHANGE UP (ref 81–99)
MONOCYTES # BLD AUTO: 0.43 K/UL — SIGNIFICANT CHANGE UP (ref 0.1–0.6)
MONOCYTES NFR BLD AUTO: 6.4 % — SIGNIFICANT CHANGE UP (ref 1.7–9.3)
NEUTROPHILS # BLD AUTO: 4.16 K/UL — SIGNIFICANT CHANGE UP (ref 1.4–6.5)
NEUTROPHILS NFR BLD AUTO: 61.6 % — SIGNIFICANT CHANGE UP (ref 42.2–75.2)
NITRITE UR-MCNC: NEGATIVE — SIGNIFICANT CHANGE UP
NRBC BLD AUTO-RTO: 0 /100 WBCS — SIGNIFICANT CHANGE UP (ref 0–0)
PH UR: 5.5 — SIGNIFICANT CHANGE UP (ref 5–8)
PLATELET # BLD AUTO: 215 K/UL — SIGNIFICANT CHANGE UP (ref 130–400)
PMV BLD: 8.9 FL — SIGNIFICANT CHANGE UP (ref 7.4–10.4)
POTASSIUM SERPL-MCNC: 3.8 MMOL/L — SIGNIFICANT CHANGE UP (ref 3.5–5)
POTASSIUM SERPL-SCNC: 3.8 MMOL/L — SIGNIFICANT CHANGE UP (ref 3.5–5)
PROT SERPL-MCNC: 7.3 G/DL — SIGNIFICANT CHANGE UP (ref 6–8)
PROT UR-MCNC: NEGATIVE MG/DL — SIGNIFICANT CHANGE UP
RBC # BLD: 4.52 M/UL — SIGNIFICANT CHANGE UP (ref 4.2–5.4)
RBC # FLD: 13.1 % — SIGNIFICANT CHANGE UP (ref 11.5–14.5)
SALICYLATES SERPL-MCNC: <0.3 MG/DL — LOW (ref 4–30)
SARS-COV-2 RNA SPEC QL NAA+PROBE: SIGNIFICANT CHANGE UP
SODIUM SERPL-SCNC: 140 MMOL/L — SIGNIFICANT CHANGE UP (ref 135–146)
SP GR SPEC: 1.02 — SIGNIFICANT CHANGE UP (ref 1–1.03)
UROBILINOGEN FLD QL: 0.2 MG/DL — SIGNIFICANT CHANGE UP (ref 0.2–1)
WBC # BLD: 6.76 K/UL — SIGNIFICANT CHANGE UP (ref 4.8–10.8)
WBC # FLD AUTO: 6.76 K/UL — SIGNIFICANT CHANGE UP (ref 4.8–10.8)

## 2025-02-25 PROCEDURE — 93010 ELECTROCARDIOGRAM REPORT: CPT

## 2025-02-25 PROCEDURE — 90792 PSYCH DIAG EVAL W/MED SRVCS: CPT | Mod: 2W

## 2025-02-25 PROCEDURE — 99285 EMERGENCY DEPT VISIT HI MDM: CPT | Mod: FS

## 2025-02-25 RX ORDER — AMLODIPINE BESYLATE 10 MG/1
5 TABLET ORAL DAILY
Refills: 0 | Status: DISCONTINUED | OUTPATIENT
Start: 2025-02-25 | End: 2025-02-26

## 2025-02-25 RX ORDER — LEVOTHYROXINE SODIUM 300 MCG
25 TABLET ORAL AT BEDTIME
Refills: 0 | Status: DISCONTINUED | OUTPATIENT
Start: 2025-02-25 | End: 2025-02-26

## 2025-02-25 RX ORDER — LORAZEPAM 4 MG/ML
2 VIAL (ML) INJECTION EVERY 6 HOURS
Refills: 0 | Status: DISCONTINUED | OUTPATIENT
Start: 2025-02-25 | End: 2025-02-26

## 2025-02-25 RX ORDER — HALOPERIDOL 10 MG/1
5 TABLET ORAL EVERY 6 HOURS
Refills: 0 | Status: DISCONTINUED | OUTPATIENT
Start: 2025-02-25 | End: 2025-02-26

## 2025-02-25 RX ORDER — ARIPIPRAZOLE 2 MG/1
20 TABLET ORAL DAILY
Refills: 0 | Status: DISCONTINUED | OUTPATIENT
Start: 2025-02-25 | End: 2025-02-26

## 2025-02-25 RX ORDER — ASPIRIN 325 MG
81 TABLET ORAL DAILY
Refills: 0 | Status: DISCONTINUED | OUTPATIENT
Start: 2025-02-25 | End: 2025-02-26

## 2025-02-25 RX ORDER — LEVOTHYROXINE SODIUM 300 MCG
25 TABLET ORAL DAILY
Refills: 0 | Status: DISCONTINUED | OUTPATIENT
Start: 2025-02-25 | End: 2025-02-26

## 2025-02-25 RX ORDER — ATORVASTATIN CALCIUM 80 MG/1
80 TABLET, FILM COATED ORAL AT BEDTIME
Refills: 0 | Status: DISCONTINUED | OUTPATIENT
Start: 2025-02-25 | End: 2025-02-26

## 2025-02-25 RX ORDER — METFORMIN HYDROCHLORIDE 850 MG/1
500 TABLET ORAL
Refills: 0 | Status: DISCONTINUED | OUTPATIENT
Start: 2025-02-25 | End: 2025-02-26
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
## 2025-02-25 NOTE — ED ADULT TRIAGE NOTE - CHIEF COMPLAINT QUOTE
Patient states "I want to kill other people and myself." Denies suicidal attempts in the past and has no plan on how. Patient has not been taking her abilify.

## 2025-02-25 NOTE — ED BEHAVIORAL HEALTH ASSESSMENT NOTE - SUMMARY
Pt's current presentation is most c/w decompensation of her SCZ, likely i/s/o med nonadherence.  On exam she is bizarre, oddly related, impoverished, and reports bizarre AH, SI, and HI.  She requests vol admission for which she is appropriate.

## 2025-02-25 NOTE — ED PROVIDER NOTE - OBJECTIVE STATEMENT
64-year-old female history of schizoaffective disorder on Abilify presenting with suicidal ideations with a plan to jump off of a bridge.

## 2025-02-25 NOTE — ED PROVIDER NOTE - CADM POA PRESS ULCER
No Pt is alert and orientedx4, ambulatory at baseline. Pt presents to the ED with some rectal pain associated with Hemorrhoids

## 2025-02-25 NOTE — ED BEHAVIORAL HEALTH ASSESSMENT NOTE - MEDICAL ISSUES AND PLAN (INCLUDE STANDING AND PRN MEDICATION)
restart asa 81 mg, amlodipie 5 mg daily, lipitor 80 qhs, snythoird 25 BID, protonix 40 daily, metformin 500 bid

## 2025-02-25 NOTE — ED PROVIDER NOTE - CLINICAL SUMMARY MEDICAL DECISION MAKING FREE TEXT BOX
64-year-old female history of schizoaffective disorder on Abilify presenting with suicidal Wes ideations with a plan to jump off of a bridge.  Patient medically cleared evaluated psychiatry admitted voluntarily. 64-year-old female history of schizoaffective disorder on Abilify presenting with suicidal ideations with a plan to jump off of a bridge.  Patient medically cleared evaluated psychiatry admitted voluntarily.

## 2025-02-26 DIAGNOSIS — F20.1 DISORGANIZED SCHIZOPHRENIA: ICD-10-CM

## 2025-02-26 LAB
AMPHET UR-MCNC: NEGATIVE — SIGNIFICANT CHANGE UP
BARBITURATES UR SCN-MCNC: NEGATIVE — SIGNIFICANT CHANGE UP
BENZODIAZ UR-MCNC: NEGATIVE — SIGNIFICANT CHANGE UP
COCAINE METAB.OTHER UR-MCNC: NEGATIVE — SIGNIFICANT CHANGE UP
DRUG SCREEN 1, URINE RESULT: SIGNIFICANT CHANGE UP
FENTANYL UR QL: NEGATIVE — SIGNIFICANT CHANGE UP
METHADONE UR-MCNC: NEGATIVE — SIGNIFICANT CHANGE UP
OPIATES UR-MCNC: NEGATIVE — SIGNIFICANT CHANGE UP
OXYCODONE UR-MCNC: NEGATIVE — SIGNIFICANT CHANGE UP
PCP UR-MCNC: NEGATIVE — SIGNIFICANT CHANGE UP
PROPOXYPHENE QUALITATIVE URINE RESULT: NEGATIVE — SIGNIFICANT CHANGE UP
THC UR QL: NEGATIVE — SIGNIFICANT CHANGE UP

## 2025-02-26 PROCEDURE — 84443 ASSAY THYROID STIM HORMONE: CPT

## 2025-02-26 PROCEDURE — 99232 SBSQ HOSP IP/OBS MODERATE 35: CPT | Mod: FS

## 2025-02-26 PROCEDURE — 83036 HEMOGLOBIN GLYCOSYLATED A1C: CPT

## 2025-02-26 PROCEDURE — 36415 COLL VENOUS BLD VENIPUNCTURE: CPT

## 2025-02-26 PROCEDURE — 80061 LIPID PANEL: CPT

## 2025-02-26 RX ORDER — HALOPERIDOL 10 MG/1
5 TABLET ORAL EVERY 6 HOURS
Refills: 0 | Status: DISCONTINUED | OUTPATIENT
Start: 2025-02-26 | End: 2025-03-13

## 2025-02-26 RX ORDER — LORAZEPAM 4 MG/ML
2 VIAL (ML) INJECTION EVERY 6 HOURS
Refills: 0 | Status: DISCONTINUED | OUTPATIENT
Start: 2025-02-26 | End: 2025-02-26

## 2025-02-26 RX ORDER — ASPIRIN 325 MG
81 TABLET ORAL DAILY
Refills: 0 | Status: DISCONTINUED | OUTPATIENT
Start: 2025-02-26 | End: 2025-03-13

## 2025-02-26 RX ORDER — ARIPIPRAZOLE 2 MG/1
20 TABLET ORAL DAILY
Refills: 0 | Status: DISCONTINUED | OUTPATIENT
Start: 2025-02-26 | End: 2025-02-26

## 2025-02-26 RX ORDER — DIPHENHYDRAMINE HCL 12.5MG/5ML
50 ELIXIR ORAL EVERY 6 HOURS
Refills: 0 | Status: DISCONTINUED | OUTPATIENT
Start: 2025-02-26 | End: 2025-03-13

## 2025-02-26 RX ORDER — LEVOTHYROXINE SODIUM 300 MCG
25 TABLET ORAL DAILY
Refills: 0 | Status: DISCONTINUED | OUTPATIENT
Start: 2025-02-26 | End: 2025-02-26

## 2025-02-26 RX ORDER — ACETAMINOPHEN 500 MG/5ML
650 LIQUID (ML) ORAL EVERY 6 HOURS
Refills: 0 | Status: DISCONTINUED | OUTPATIENT
Start: 2025-02-26 | End: 2025-03-13

## 2025-02-26 RX ORDER — ARIPIPRAZOLE 2 MG/1
10 TABLET ORAL DAILY
Refills: 0 | Status: DISCONTINUED | OUTPATIENT
Start: 2025-02-26 | End: 2025-02-28

## 2025-02-26 RX ORDER — LEVOTHYROXINE SODIUM 300 MCG
25 TABLET ORAL
Refills: 0 | Status: DISCONTINUED | OUTPATIENT
Start: 2025-02-27 | End: 2025-03-13

## 2025-02-26 RX ORDER — LEVOTHYROXINE SODIUM 300 MCG
25 TABLET ORAL AT BEDTIME
Refills: 0 | Status: DISCONTINUED | OUTPATIENT
Start: 2025-02-26 | End: 2025-02-26

## 2025-02-26 RX ORDER — SITAGLIPTIN 100 MG/1
100 TABLET, FILM COATED ORAL DAILY
Refills: 0 | Status: DISCONTINUED | OUTPATIENT
Start: 2025-02-26 | End: 2025-03-13

## 2025-02-26 RX ORDER — HYDROXYZINE HYDROCHLORIDE 25 MG/1
50 TABLET, FILM COATED ORAL EVERY 6 HOURS
Refills: 0 | Status: DISCONTINUED | OUTPATIENT
Start: 2025-02-26 | End: 2025-03-13

## 2025-02-26 RX ORDER — ATORVASTATIN CALCIUM 80 MG/1
80 TABLET, FILM COATED ORAL AT BEDTIME
Refills: 0 | Status: DISCONTINUED | OUTPATIENT
Start: 2025-02-26 | End: 2025-03-13

## 2025-02-26 RX ORDER — AMLODIPINE BESYLATE 10 MG/1
5 TABLET ORAL DAILY
Refills: 0 | Status: DISCONTINUED | OUTPATIENT
Start: 2025-02-26 | End: 2025-03-13

## 2025-02-26 RX ORDER — METFORMIN HYDROCHLORIDE 850 MG/1
500 TABLET ORAL
Refills: 0 | Status: DISCONTINUED | OUTPATIENT
Start: 2025-02-26 | End: 2025-03-13

## 2025-02-26 RX ADMIN — AMLODIPINE BESYLATE 5 MILLIGRAM(S): 10 TABLET ORAL at 08:59

## 2025-02-26 RX ADMIN — ATORVASTATIN CALCIUM 80 MILLIGRAM(S): 80 TABLET, FILM COATED ORAL at 20:18

## 2025-02-26 RX ADMIN — Medication 81 MILLIGRAM(S): at 08:56

## 2025-02-26 RX ADMIN — METFORMIN HYDROCHLORIDE 500 MILLIGRAM(S): 850 TABLET ORAL at 20:18

## 2025-02-26 RX ADMIN — METFORMIN HYDROCHLORIDE 500 MILLIGRAM(S): 850 TABLET ORAL at 08:56

## 2025-02-26 RX ADMIN — SITAGLIPTIN 100 MILLIGRAM(S): 100 TABLET, FILM COATED ORAL at 11:34

## 2025-02-26 RX ADMIN — ARIPIPRAZOLE 10 MILLIGRAM(S): 2 TABLET ORAL at 08:55

## 2025-02-26 NOTE — BH INPATIENT PSYCHIATRY ASSESSMENT NOTE - NSBHMETABOLIC_PSY_ALL_CORE_FT
BMI: BMI (kg/m2): 34.8 (02-26-25 @ 01:03)  HbA1c: A1C with Estimated Average Glucose Result: 6.6 % (01-12-25 @ 07:16)    Glucose: POCT Blood Glucose.: 192 mg/dL (01-13-25 @ 16:33)    BP: 149/96 (02-26-25 @ 10:12) (143/89 - 160/100)Vital Signs Last 24 Hrs  T(C): 36.7 (02-26-25 @ 10:12), Max: 36.7 (02-26-25 @ 00:05)  T(F): 98 (02-26-25 @ 10:12), Max: 98 (02-26-25 @ 00:05)  HR: 86 (02-26-25 @ 10:12) (80 - 88)  BP: 149/96 (02-26-25 @ 10:12) (143/89 - 160/100)  BP(mean): --  RR: 20 (02-26-25 @ 01:03) (16 - 20)  SpO2: 100% (02-26-25 @ 01:03) (98% - 100%)      Lipid Panel: Date/Time: 01-12-25 @ 07:16  Cholesterol, Serum: 203  LDL Cholesterol Calculated: 114  HDL Cholesterol, Serum: 56  Total Cholesterol/HDL Ration Measurement: --  Triglycerides, Serum: 188

## 2025-02-26 NOTE — BH PATIENT PROFILE - NSICDXPASTSURGICALHX_GEN_ALL_CORE_FT
PAST SURGICAL HISTORY:  H/O aortic valve replacement      Low Dose Naltrexone Counseling- I discussed with the patient the potential risks and side effects of low dose naltrexone including but not limited to: more vivid dreams, headaches, nausea, vomiting, abdominal pain, fatigue, dizziness, and anxiety.
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
## 2025-02-26 NOTE — BH INPATIENT PSYCHIATRY ASSESSMENT NOTE - CURRENT MEDICATION
MEDICATIONS  (STANDING):  amLODIPine   Tablet 5 milliGRAM(s) Oral daily  ARIPiprazole 10 milliGRAM(s) Oral daily  aspirin  chewable 81 milliGRAM(s) Oral daily  atorvastatin 80 milliGRAM(s) Oral at bedtime  metFORMIN 500 milliGRAM(s) Oral two times a day  pantoprazole    Tablet 40 milliGRAM(s) Oral before breakfast  SITagliptin 100 milliGRAM(s) Oral daily    MEDICATIONS  (PRN):  haloperidol     Tablet 5 milliGRAM(s) Oral every 6 hours PRN agitation  LORazepam     Tablet 2 milliGRAM(s) Oral every 6 hours PRN Anxiety   MEDICATIONS  (STANDING):  amLODIPine   Tablet 5 milliGRAM(s) Oral daily  ARIPiprazole 10 milliGRAM(s) Oral daily  aspirin  chewable 81 milliGRAM(s) Oral daily  atorvastatin 80 milliGRAM(s) Oral at bedtime  metFORMIN 500 milliGRAM(s) Oral two times a day  pantoprazole    Tablet 40 milliGRAM(s) Oral before breakfast  SITagliptin 100 milliGRAM(s) Oral daily    MEDICATIONS  (PRN):  acetaminophen     Tablet .. 650 milliGRAM(s) Oral every 6 hours PRN Temp greater or equal to 38C (100.4F), Mild Pain (1 - 3)  diphenhydrAMINE 50 milliGRAM(s) Oral every 6 hours PRN Eps  haloperidol     Tablet 5 milliGRAM(s) Oral every 6 hours PRN agitation  hydrOXYzine hydrochloride 50 milliGRAM(s) Oral every 6 hours PRN Agitation  LORazepam     Tablet 2 milliGRAM(s) Oral every 6 hours PRN Anxiety

## 2025-02-26 NOTE — BH PATIENT PROFILE - NSPROMEDSHERBAL_GEN_A_NUR
[facility] generated and letter acknowledging Augmentin allergy. Routing to Jessica Merchant per parent request as PCP is out of clinic.    Laure Johnston RN     no

## 2025-02-26 NOTE — PSYCHIATRIC REHAB INITIAL EVALUATION - NSBHPRTOOLBOX_PSY_ALL_CORE
Pt self-reportedly has a psychiatrist at [facility] in [place]/Entertainment/Family support/Music/Treatment team provider support

## 2025-02-26 NOTE — BH INPATIENT PSYCHIATRY ASSESSMENT NOTE - ATTENDING COMMENTS
Interviewed the patient. Pt presents oddly related and reports in a matter of fact way that she is "suicidal and homicidal" while staying calm without any signs of emotional distress. Denies suicidal plan. Agree with the assessment and plan.

## 2025-02-26 NOTE — BH PATIENT PROFILE - ARRIVAL FROM
Additional Area 4 Secondary Filler Volume In Cc (Estimated): 0 Send Procedure Quote As Charge: No Detail Level: Detailed Home Street

## 2025-02-26 NOTE — BH PATIENT PROFILE - HOME MEDICATIONS
Abilify 20 mg oral tablet , 1 tab(s) orally once a day MDD: 1 tablet  atorvastatin 80 mg oral tablet , 1 tab(s) orally once a day (at bedtime)  levothyroxine 25 mcg (0.025 mg) oral tablet , 1 tab(s) orally once a day Continue until told otherwise by your provider. MDD: 1 tablet  amLODIPine 5 mg oral tablet , 1 tab(s) orally once a day Continue until told otherwise by your provider. MDD: 1 tablet  aspirin 81 mg oral delayed release tablet , 1 tab(s) orally once a day Continue until told otherwise by your provider MDD: 1 tablet  Januvia 100 mg oral tablet , 1 tab(s) orally once a day MDD: 1 tablet  metFORMIN 500 mg oral tablet , 1 tab(s) orally 2 times a day MDD: 1000 mg

## 2025-02-26 NOTE — BH PATIENT PROFILE - FUNCTIONAL ASSESSMENT - BASIC MOBILITY 4.
[address] [facility]  Pulmonary Hypertension Clinic    Pulmonary Hypertension Clinic Visit    Date of Visit:  1/2/2020  Patient Name:  Miri Price  Medical Record Number:  [id number]  YOB: 1938   Primary Physician:  Alesia Davis MD  Other Physicians: Dr. Winston Day (cardiology), Dr. Rosalina Lora (EP), Dr. Taylor Benitez (Nephrology), Dr. Gladis Moe (Urology), Dr. Hall Sandhoff (Pulmonary)    CHIEF COMPLAINT: follow-up pulmonary hypertension after completion of right heart catheterization    HISTORY OF PRESENT ILLNESS:  Miri Price is a 80year old male dilated ascending aorta, heart failure with preserved ejection fraction, atrial fibrillation s/p DCCV ON 2/13/19, LAKESHA, hyperlipidemia, and obesity who presents to the clinic today for follow-up pulmonary hypertension after completion of right heart catheterization. Currently, treating with sildenafil 20mg three times daily, hydralazine 10mg three times daily, carvedilol 3.125mg twice daily, hydralazine 10mg three times daily, and furosemide 60mg daily. His weight is down 15 lbs since he was last seen in the clinic in November. He is down about 2.5 lbs since his RHC. His weight was 289.6 lbs today but says he was down to as low as 284 lbs but says he had dietary indiscretion with the holidays and missed his diuretic yesterday. He is scheduled to undergo pacemaker placement for bradycardia/chronotropic incompetence in a couple weeks. He is participating in lymphedema clinic and is on an antibiotic for infected toe wound (Keflex from podiatrist). Miri Price is currently a NYHA Class III: Significant HF symptoms/activity intolerance (Only able to do light housework, can walk slowly on level ground).     Frequency / Description   [x]  YES    []  NO Dyspnea:   []  YES    [x]  NO Angina:   []  YES    [x]  NO Claudication:   []  YES    [x]  NO Presyncope/Syncope:   []  YES    [x]  NO Orthopnea:   []  YES [x]  NO PND (Paroxysmal nocturnal dyspnea):   [x]  YES    []  NO Pedal Edema: but improved    []  YES    [x]  NO Abdominal Swelling:   []  YES    [x]  NO Early Satiety:    []  YES    [x]  NO Weight Gain: down 15 lbs since last seen in the office   []  YES    [x]  NO Palpitations:  [x]  YES    []  NO OTHER: as above  []  YES    [x]  NO Hospitalization(s):   []  YES    [x]  NO Emergency Room Visit:    PULMONARY ARTERIAL HYPERTENSION MEDICATIONS   [x] NONE [] PDE5-I [] ERA [] soluble guanylate cyclase ([address] [place] [address]) stimulator   [] selective prostacyclin receptor stimulator [] Prostacyclin    MEDICATION SIDE EFFECTS   []  YES    [x]  NO    OTHER MEDICATIONS   [x] Diuretic   [x] BB (beta blocker)  [] ACEi (angiotensin converting enzyme inhibitor)  [] ARB (angiotensin receptor blocker)  [] MRA (mineralocorticoid receptor antagonist)  [] Nitrate  [x] Hydralazine [] DIG (digoxin)  [] CCB (calcium channel blocker)    DEVICES   [x] NONE [] ICD (implantable cardioverter-defibrillator)  [] BIV - ICD (biventricular implantable cardioverter-defibrillator)  [] pacemaker  [] CardioMEMs      COMPLIANCE   Description   [x]  YES    []  NO Medication(s): typically, doesn't miss except doesn't take diuretic when travels   []  YES    [x]  NO Diet: had some dietary indiscretion with the holidays    Allergies, Past Medical, Family and Social History reviewed in EPIC. Outpatient Medications Marked as Taking for the 1/2/20 encounter (Follow up) with Carlos Arcos PA-C   Medication Sig Dispense Refill   â¢ cephalexin (KEFLEX) 500 MG capsule Take 500 mg by mouth every 6 hours. Only taking 1 tablet 3 times a day     â¢ levothyroxine (SYNTHROID) 50 MCG tablet Take 1 tablet by mouth daily. 90 tablet 0   â¢ finasteride (PROSCAR) 5 MG tablet TAKE ONE TABLET BY MOUTH DAILY 30 tablet 1   â¢ furosemide (LASIX) 20 MG tablet Take 2 tablets by mouth daily. Or as directed by MD. (Patient taking differently: Take 60 mg by mouth daily.  Or as directed by MD.) 180 tablet 1   â¢ sildenafil (REVATIO) 20 MG tablet Take 1 tablet by mouth 3 times daily. 90 tablet 3   â¢ atorvastatin (LIPITOR) 40 MG tablet Take 1 tablet by mouth daily. 90 tablet 0   â¢ hydrALAZINE (APRESOLINE) 10 MG tablet Take 1 tablet by mouth 3 times daily. 90 tablet 3   â¢ carvedilol (COREG) 3.125 MG tablet Take 1 tablet by mouth 2 times daily (with meals). 180 tablet 3   â¢ oxybutynin (DITROPAN-XL) 10 MG 24 hr tablet TAKE ONE TABLET BY MOUTH DAILY 30 tablet 6   â¢ apixaBAN (ELIQUIS) 5 MG Tab Take 1 tablet by mouth every 12 hours. 180 tablet 2       REVIEW OF SYSTEMS:  6 point review of systems was completed and is negative except as stated above in HPI (history of present illness). Visit Vitals  /67 (BP Location: LUE - Left upper extremity, Patient Position: Sitting, Cuff Size: Regular)   Pulse (!) 46   Wt 131.4 kg   SpO2 100%   BMI 38.21 kg/mÂ²     Body mass index is 38.21 kg/mÂ². PHYSICAL EXAMINATION:  Constitutional:  well developed 80year old male in no acute distress. Skin:  Warm, dry, intact, no lesions. HEENT:  Normocephalic, atraumatic. Mucous membranes moist, EOMs (extraocular movements) intact. Neck:  Supple, trachea midline, negative JVD (jugular venous distention) or HJR (hepatojugular reflux) at 45 degrees, negative carotid bruits bilaterally. No thyromegaly. Cardiovascular:  Normal S1, S2.  Bradycardic rate and regular rhythm. No murmur. No gallops. Respiratory:  Anterior/posterior lung sounds clear to auscultation bilaterally. Abdomen:  Soft, nontender, negative hepatosplenomegaly and normal bowel sounds. Musculoskeletal/Extremities:  CRT (capillary refill time) <3, no clubbing, no cyanosis, 1+ to 2+ pitting BLE (bilateral lower extremities). Neurological:  No focal neurological deficits. Speech normal.  Sensation grossly intact. Psychiatric:  Alert and oriented to person, place and time.     LABORATORY:  Recent Labs   Lab 12/16/19  1319 12/03/19  1149 11/18/19  1045   Sodium 143 142 141   Potassium 4.4 4.6 4.6   Chloride 109* 110* 112*   Carbon Dioxide 28 28 24   BUN 53* 46* 45*   CALCIUM 8.7 8.7 8.1*   Glucose 79 78 79   Creatinine 1.77* 1.86* 1.69*     Recent Labs   Lab 12/20/19  1256 12/11/19  1250 11/18/19  1045 06/28/19  1051   TSH 9.010*  --  11.650*  --    CHOLESTEROL  --  98  --   --    HDL  --  27*  --   --    LDL (Direct) 46  --   --   --    CHOL/HDL  --  3.6  --   --    PSA, Total  --   --   --  1.61     Recent Labs   Lab 11/18/19  1045 05/02/19  1547 02/08/19  1338   WBC 7.4 7.2 5.1   RBC 4.44* 4.12* 4.43*   HGB 12.9* 12.0* 13.2   HCT 42.7 39.2 42.4    182 137*     Lab Results   Component Value Date     (H) 07/05/2017     (H) 06/18/2014    BNP 92 05/21/2014      Lab Results   Component Value Date    NTPROB 1,296 (H) 11/18/2019    NTPROB 1,333 (H) 05/02/2019    NTPROB 4,151 (H) 02/08/2019     Lab Results   Component Value Date    RESR 4 11/18/2019    ANABL NEGATIVE 11/18/2019     (H) 11/18/2019    CRP <0.3 11/18/2019       DIAGNOSTICS:  The following diagnostics were reviewed:    12/11/2019 [facility]:  HEMODYNAMIC DATA  BP= 166/86 mmHg         HR= 80  RA= 15 mmHg   V= 18 mmHg  RV= 66/18 mmHg             PAP= 63/30/41 mmHg   Sat= 65.4%  PCWP= 21 mmHg  TPG= 20 mmHg              PVR= 3.0 (T) bower  Â   Thermodilution Cardiac output (L/min)/cardiac index (L/min/m2) 6.6/2.6. LUIS MIGUEL Cardiac output (L/min)/cardiac index (L/min/m2) 4.7/1.9.    ASSESSMENT:  1. Combined pre- and largely post-capillary pulmonary hypertension due to heart failure with preserved ejection fraction   2. Chronic heart failure with preserved ejection fraction: volume status mildly elevated but improved compared to last clinic visit  3. Bradycardia/chronotropic incompetence: plan for dual chamber pacemaker on 1/17/19 with Dr. Benjamin Pierson  4. Atrial fibrillation s/p DCCV ON 2/13/19  5. Chronic anticoagulation therapy: on Eliquis  6. LAKESHA on CPAP  7. Hyperlipidemia  8. Obesity  9.  CKD stage III  10. Lymphedema: participating in therapy    PLAN:  1. BMP and NT-pro BNP today in clinic. 2. Recheck taylor BP as he says today's blood pressure is an outlier. 3. To take furosemide 20mg this afternoon. Tomorrow he should resume furosemide 60mg daily as his maintenance diuretic dose. 4. He can reduce sildenafil to 1/2 tablet 3 times daily for 3 days and then he may discontinue. No role for PAH vasodilator therapy as he has WHO group II PH. 5. Patient to complete overnight pulse oximetry on CPAP to ensure sleep disorder adequately treated. If abnormal, patient is aware oxygen or a formal sleep study may be required. 6. To continue in lymphedema clinic as he has been. 7. Defers referral to pulmonary rehab but will consider when toe wound healed. 8. Weights daily. To call with 3 lb weight gain overnight or 5 lb weight gain in one week. 9. To limit sodium to 2000 mg daily and fluids to 64 ounces (2000 mL) daily. 10. To avoid the use of NSAIDs, including but not limited to Ibuprofen, Advil and Aleve. Encouraged to check with community pharmacist with all over-the-counter medications to ensure product does not contain NSAIDs. 11. Follow-up 6 weeks in clinic with a limited right echocardiogram and labs (CBC, CMP, NT-pro BNP). HFpEF: ACC/AHA Stage C, Class III. Volume status is mildly elevated but improved. Perfusion status is okay (warm). Based on objective data and clinical data will augment medical therapy as follows: The mainstay of management for HFpEF (heart failure with preserved ejection fraction) includes control of comorbidities:  1. HTN (hypertension): goal BP (blood pressure) <130/80   2. Volume overload: Augment diuretics as follows: furosemide 60mg daily. 3. Ischemia: No anginal symptoms. No ischemia on 3/2019 stress test.    4. Anemia: Goal Hgb > 9.  5. Evaluate/treat sleep-disordered breathing. Check overnight pulse ox on CPAP.    6. No role for PAH vasodilator therapy as he has WHO group II PH.   7. Obesity: The patient is asked to make an attempt to improve diet and exercise patterns to aid in medical management of this problem. 8. Unfortunately, there is currently no WHO group II PH clinical trials enrolling at our center. Greater than 50% of this 30 minute visit was spent in education, counseling and coordination of care. Patient understands he can return sooner if any issues should arise. Patient seen under the supervision of YARELIS Whitney M.D.     Shay Woodson PA-C 4 = No assist / stand by assistance

## 2025-02-26 NOTE — BH INPATIENT PSYCHIATRY ASSESSMENT NOTE - NSBHCHARTREVIEWVS_PSY_A_CORE FT
Vital Signs Last 24 Hrs  T(C): 36.7 (02-26-25 @ 10:12), Max: 36.7 (02-26-25 @ 00:05)  T(F): 98 (02-26-25 @ 10:12), Max: 98 (02-26-25 @ 00:05)  HR: 86 (02-26-25 @ 10:12) (80 - 88)  BP: 149/96 (02-26-25 @ 10:12) (143/89 - 160/100)  BP(mean): --  RR: 20 (02-26-25 @ 01:03) (16 - 20)  SpO2: 100% (02-26-25 @ 01:03) (98% - 100%)

## 2025-02-26 NOTE — PSYCHIATRIC REHAB INITIAL EVALUATION - LIVES WITH
Pt is undomiciled, currently residing at [facility] in [facility]

## 2025-02-26 NOTE — BH INPATIENT PSYCHIATRY ASSESSMENT NOTE - NSTXPROBDEPRES_PSY_ALL_CORE
Anesthesia Evaluation        Airway   Mallampati: II  TM distance: >3 FB  Neck ROM: full  no difficulty expected  Dental - normal exam     Pulmonary - normal exam   (+) pneumonia , pulmonary embolism, a smoker, COPD, sleep apnea,   Cardiovascular - normal exam    (+) hypertension, valvular problems/murmurs MS, CAD, cardiac stents     ROS comment: One coronary stent within the last 12 months at [facility]  Dr chaudhary  Also echo shows annular calcification of mitral valve    Neuro/Psych  (+) psychiatric history Bipolar,    GI/Hepatic/Renal/Endo    (+)  GERD,     Musculoskeletal     Abdominal    Substance History      OB/GYN          Other   (+) arthritis                               Anesthesia Plan    ASA 4     general     intravenous induction   Anesthetic plan and risks discussed with patient.       DEPRESSIVE SYMPTOMS

## 2025-02-26 NOTE — BH SOCIAL WORK INITIAL PSYCHOSOCIAL EVALUATION - NSBHHOUSEFACILITYFT_PSY_ALL_CORE
[facility] ([address] [facility]. [place], [place]- [phone]) [facility] ([address] [place], [place]- [phone])  [facility] ID[facility] [id number]

## 2025-02-26 NOTE — BH PATIENT PROFILE - FALL HARM RISK - UNIVERSAL INTERVENTIONS
Bed in lowest position, wheels locked, appropriate side rails in place/Call bell, personal items and telephone in reach/Instruct patient to call for assistance before getting out of bed or chair/Non-slip footwear when patient is out of bed/[place] to call system/Physically safe environment - no spills, clutter or unnecessary equipment/Purposeful Proactive Rounding/Room/bathroom lighting operational, light cord in reach Ivermectin Counseling:  Patient instructed to take medication on an empty stomach with a full glass of water.  Patient informed of potential adverse effects including but not limited to nausea, diarrhea, dizziness, itching, and swelling of the extremities or lymph nodes.  The patient verbalized understanding of the proper use and possible adverse effects of ivermectin.  All of the patient's questions and concerns were addressed.

## 2025-02-26 NOTE — BH SOCIAL WORK INITIAL PSYCHOSOCIAL EVALUATION - OTHER PAST PSYCHIATRIC HISTORY (INCLUDE DETAILS REGARDING ONSET, COURSE OF ILLNESS, INPATIENT/OUTPATIENT TREATMENT)
pphx of schizoaffective disorder and on Abilify, history of multiple prior psychiatric admission (last 10/2024 at [facility]).
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
## 2025-02-26 NOTE — BH INPATIENT PSYCHIATRY ASSESSMENT NOTE - NSBHCHARTREVIEWINVESTIGATE_PSY_A_CORE FT
< from: 12 Lead ECG (02.25.25 @ 19:38) >    Ventricular Rate 81 BPM    Atrial Rate 82 BPM    P-R Interval 162 ms    QRS Duration 86 ms    Q-T Interval 376 ms    QTC Calculation(Bazett) 436 ms    P Axis 60 degrees    R Axis -26 degrees    T Axis 90 degrees    Diagnosis Line Normal sinus rhythm  Minimal voltage criteria for LVH, may be normal variant ( R in aVL )  Borderline ECG

## 2025-02-26 NOTE — BH INPATIENT PSYCHIATRY ASSESSMENT NOTE - NSBHCHARTREVIEWLAB_PSY_A_CORE FT
Complete Blood Count + Automated Diff (02.25.25 @ 19:30)   Auto NRBC: 0 /100 WBCs  WBC Count: 6.76 K/uL  RBC Count: 4.52 M/uL  Hemoglobin: 13.6 g/dL  Hematocrit: 39.6 %  Mean Cell Volume: 87.6 fL  Mean Cell Hemoglobin: 30.1 pg  Mean Cell Hemoglobin Conc: 34.3 g/dL  Red Cell Distrib Width: 13.1 %  Platelet Count - Automated: 215 K/uL  MPV: 8.9 fL  Neutrophil #: 4.16 K/uL  Lymphocyte #: 1.97 K/uL  Monocyte #: 0.43 K/uL  Eosinophil #: 0.13 K/uL  Basophil #: 0.04 K/uL  Neutrophil %: 61.6: Differential percentages must be correlated with absolute numbers for   clinical significance. %  Lymphocyte %: 29.1 %  Monocyte %: 6.4 %  Eosinophil %: 1.9 %  Basophil %: 0.6 %  Auto Immature Granulocyte %: 0.4: (Includes meta, myelo and promyelocytes). Mild elevations in immature   granulocytes may be seen with many inflammatory processes and pregnancy;   clinical correlation suggested. %

## 2025-02-27 PROCEDURE — 99231 SBSQ HOSP IP/OBS SF/LOW 25: CPT

## 2025-02-27 PROCEDURE — 99233 SBSQ HOSP IP/OBS HIGH 50: CPT

## 2025-02-27 RX ADMIN — METFORMIN HYDROCHLORIDE 500 MILLIGRAM(S): 850 TABLET ORAL at 20:10

## 2025-02-27 RX ADMIN — ARIPIPRAZOLE 10 MILLIGRAM(S): 2 TABLET ORAL at 09:23

## 2025-02-27 RX ADMIN — METFORMIN HYDROCHLORIDE 500 MILLIGRAM(S): 850 TABLET ORAL at 09:23

## 2025-02-27 RX ADMIN — ATORVASTATIN CALCIUM 80 MILLIGRAM(S): 80 TABLET, FILM COATED ORAL at 20:10

## 2025-02-27 RX ADMIN — Medication 25 MICROGRAM(S): at 05:33

## 2025-02-27 RX ADMIN — Medication 40 MILLIGRAM(S): at 05:33

## 2025-02-27 RX ADMIN — SITAGLIPTIN 100 MILLIGRAM(S): 100 TABLET, FILM COATED ORAL at 09:23

## 2025-02-27 RX ADMIN — Medication 81 MILLIGRAM(S): at 09:23

## 2025-02-27 RX ADMIN — HYDROXYZINE HYDROCHLORIDE 50 MILLIGRAM(S): 25 TABLET, FILM COATED ORAL at 20:10

## 2025-02-27 RX ADMIN — AMLODIPINE BESYLATE 5 MILLIGRAM(S): 10 TABLET ORAL at 09:23

## 2025-02-27 NOTE — BH INPATIENT PSYCHIATRY PROGRESS NOTE - PRN MEDS
MEDICATIONS  (PRN):  acetaminophen     Tablet .. 650 milliGRAM(s) Oral every 6 hours PRN Temp greater or equal to 38C (100.4F), Mild Pain (1 - 3)  diphenhydrAMINE 50 milliGRAM(s) Oral every 6 hours PRN Eps  haloperidol     Tablet 5 milliGRAM(s) Oral every 6 hours PRN agitation  hydrOXYzine hydrochloride 50 milliGRAM(s) Oral every 6 hours PRN Agitation  LORazepam     Tablet 2 milliGRAM(s) Oral every 6 hours PRN Anxiety

## 2025-02-27 NOTE — BH SAFETY PLAN - ENVIRONMENT SAFETY 1:
My environment would be safer if I could get my own apartment finally or get into a better living situation. I have been waiting for a year. Having someone to aggressively advocate for me would help.
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
## 2025-02-27 NOTE — CONSULT NOTE ADULT - ASSESSMENT
Patient is a 64y old  Female who presents with a chief complaint of  Patient is a 64y old  Female who presents with a chief complaint of Schizophrenia     Medicine consulted given new IPP Admission  Patient states she dose not know her medical history or medications   Patient Was dismissive during interview     Per Chart Review has medical history listed below   CAD s/P CABG + CVA  Hypothyroidism  Type II DM     Continue home meds     Medicine will sign off

## 2025-02-27 NOTE — CONSULT NOTE ADULT - SUBJECTIVE AND OBJECTIVE BOX
Patient is a 64y old  Female who presents with a chief complaint of       MEDICATIONS  (STANDING):  amLODIPine   Tablet 5 milliGRAM(s) Oral daily  ARIPiprazole 10 milliGRAM(s) Oral daily  aspirin  chewable 81 milliGRAM(s) Oral daily  atorvastatin 80 milliGRAM(s) Oral at bedtime  levothyroxine 25 MICROGram(s) Oral <User Schedule>  metFORMIN 500 milliGRAM(s) Oral two times a day  pantoprazole    Tablet 40 milliGRAM(s) Oral before breakfast  SITagliptin 100 milliGRAM(s) Oral daily    MEDICATIONS  (PRN):  acetaminophen     Tablet .. 650 milliGRAM(s) Oral every 6 hours PRN Temp greater or equal to 38C (100.4F), Mild Pain (1 - 3)  diphenhydrAMINE 50 milliGRAM(s) Oral every 6 hours PRN Eps  haloperidol     Tablet 5 milliGRAM(s) Oral every 6 hours PRN agitation  hydrOXYzine hydrochloride 50 milliGRAM(s) Oral every 6 hours PRN Agitation  LORazepam     Tablet 2 milliGRAM(s) Oral every 6 hours PRN Anxiety      CAPILLARY BLOOD GLUCOSE  I&O's Summary      PHYSICAL EXAM:  Vital Signs Last 24 Hrs  T(C): 36.6 (27 Feb 2025 07:32), Max: 36.7 (26 Feb 2025 16:38)  T(F): 97.8 (27 Feb 2025 07:32), Max: 98 (26 Feb 2025 16:38)  HR: 88 (27 Feb 2025 07:32) (82 - 88)  BP: 129/80 (27 Feb 2025 07:32) (129/80 - 170/93)  BP(mean): --  RR: --  SpO2: --      GENERAL: No acute distress, well-developed  CHEST/LUNG: CTAB; No wheezes, rales, or rhonchi  HEART: Regular rate and rhythm; No murmurs, rubs, or gallops  ABDOMEN: Soft, non-tender, non-distended; normal bowel sounds,  EXTREMITIES:  2+ peripheral pulses b/l, No clubbing, cyanosis, or edema  NEUROLOGY: A&O x 3, no focal deficits      LABS:                        13.6   6.76  )-----------( 215      ( 25 Feb 2025 19:30 )             39.6     02-25    140  |  105  |  19  ----------------------------<  152[H]  3.8   |  23  |  1.0    Ca    9.0      25 Feb 2025 19:30    TPro  7.3  /  Alb  4.3  /  TBili  0.3  /  DBili  <0.2  /  AST  15  /  ALT  23  /  AlkPhos  156[H]  02-25      Urinalysis Basic - ( 25 Feb 2025 19:30 )    Color: x / Appearance: x / SG: x / pH: x  Gluc: 152 mg/dL / Ketone: x  / Bili: x / Urobili: x   Blood: x / Protein: x / Nitrite: x   Leuk Esterase: x / RBC: x / WBC x   Sq Epi: x / Non Sq Epi: x / Bacteria: x             Patient is a 64y old  Female who presents with a chief complaint of       MEDICATIONS  (STANDING):  amLODIPine   Tablet 5 milliGRAM(s) Oral daily  ARIPiprazole 10 milliGRAM(s) Oral daily  aspirin  chewable 81 milliGRAM(s) Oral daily  atorvastatin 80 milliGRAM(s) Oral at bedtime  levothyroxine 25 MICROGram(s) Oral <User Schedule>  metFORMIN 500 milliGRAM(s) Oral two times a day  pantoprazole    Tablet 40 milliGRAM(s) Oral before breakfast  SITagliptin 100 milliGRAM(s) Oral daily    MEDICATIONS  (PRN):  acetaminophen     Tablet .. 650 milliGRAM(s) Oral every 6 hours PRN Temp greater or equal to 38C (100.4F), Mild Pain (1 - 3)  diphenhydrAMINE 50 milliGRAM(s) Oral every 6 hours PRN Eps  haloperidol     Tablet 5 milliGRAM(s) Oral every 6 hours PRN agitation  hydrOXYzine hydrochloride 50 milliGRAM(s) Oral every 6 hours PRN Agitation  LORazepam     Tablet 2 milliGRAM(s) Oral every 6 hours PRN Anxiety      CAPILLARY BLOOD GLUCOSE  I&O's Summary      PHYSICAL EXAM:  Vital Signs Last 24 Hrs  T(C): 36.6 (27 Feb 2025 07:32), Max: 36.7 (26 Feb 2025 16:38)  T(F): 97.8 (27 Feb 2025 07:32), Max: 98 (26 Feb 2025 16:38)  HR: 88 (27 Feb 2025 07:32) (82 - 88)  BP: 129/80 (27 Feb 2025 07:32) (129/80 - 170/93)  BP(mean): --  RR: --  SpO2: --      GENERAL: No acute distress, well-developed, Obese   CHEST/LUNG: CTAB; No wheezes, rales, or rhonchi  HEART: Regular rate and rhythm; No murmurs,   ABDOMEN: Soft, non-tender, non-distended;   EXTREMITIES:  , No clubbing, cyanosis, or edema  NEUROLOGY: A&O x 1, no focal deficits      LABS:                        13.6   6.76  )-----------( 215      ( 25 Feb 2025 19:30 )             39.6     02-25    140  |  105  |  19  ----------------------------<  152[H]  3.8   |  23  |  1.0    Ca    9.0      25 Feb 2025 19:30    TPro  7.3  /  Alb  4.3  /  TBili  0.3  /  DBili  <0.2  /  AST  15  /  ALT  23  /  AlkPhos  156[H]  02-25      Urinalysis Basic - ( 25 Feb 2025 19:30 )    Color: x / Appearance: x / SG: x / pH: x  Gluc: 152 mg/dL / Ketone: x  / Bili: x / Urobili: x   Blood: x / Protein: x / Nitrite: x   Leuk Esterase: x / RBC: x / WBC x   Sq Epi: x / Non Sq Epi: x / Bacteria: x

## 2025-02-27 NOTE — BH INPATIENT PSYCHIATRY PROGRESS NOTE - NSBHCHARTREVIEWVS_PSY_A_CORE FT
Vital Signs Last 24 Hrs  T(C): 36.6 (02-27-25 @ 07:32), Max: 36.7 (02-26-25 @ 16:38)  T(F): 97.8 (02-27-25 @ 07:32), Max: 98 (02-26-25 @ 16:38)  HR: 88 (02-27-25 @ 07:32) (82 - 88)  BP: 129/80 (02-27-25 @ 07:32) (129/80 - 170/93)  BP(mean): --  RR: --  SpO2: --

## 2025-02-27 NOTE — BH INPATIENT PSYCHIATRY PROGRESS NOTE - NSBHMETABOLIC_PSY_ALL_CORE_FT
[facility] ([place])  Discharge Summary  [facility]    Patient Name: Gale Valle  MRN: [id number]  Admission Date: 2022    Subjective:       Delivery Date: 2022   Delivery Time: 6:16 AM   Delivery Type: Vaginal, Spontaneous     Maternal History:  Gale Valle is a 1 days day old 40w3d   born to a mother who is a 29 y.o.   . She has a past medical history of Asthma, Chronic fatigue syndrome (2015), Herpes simplex without mention of complication, Lyme disease (), POTS (postural orthostatic tachycardia syndrome), and Seasonal allergies.     Prenatal Labs Review:  ABO/Rh:   Lab Results   Component Value Date/Time    GROUPTRH O POS 2022 08:54 PM      Group B Beta Strep:   Lab Results   Component Value Date/Time    STREPBCULT No Group B Streptococcus isolated 2022 09:10 AM      HIV: 2022: HIV 1/2 Ag/Ab Negative (Ref range: Negative)  RPR:   Lab Results   Component Value Date/Time    RPR Non-reactive 2022 09:22 AM      Hepatitis B Surface Antigen:   Lab Results   Component Value Date/Time    HEPBSAG Negative 10/05/2021 04:59 PM      Rubella Immune Status:   Lab Results   Component Value Date/Time    RUBELLAIMMUN Reactive 2021 09:45 AM        Pregnancy/Delivery Course:  The pregnancy was complicated by h/o HSV1 (genital) on valtrex ppx since 36wga and negative spec exam on admission, mild intermittent asthma, and UTI in first trimester. Prenatal ultrasound revealed normal anatomy. Prenatal care was good. Mother received medications per L&D. Membrane rupture:  Membrane Rupture Date 1: 22   Membrane Rupture Time 1: 0515   The delivery was complicated by prolonged ROM 25 hours but no other risk factors for sepsis.  No maternal or infant fever .   Apgar scores:      Assessment:       1 Minute:  Skin color:    Muscle tone:      Heart rate:    Breathing:      Grimace:      Total: 4            5 Minute:  Skin color:    Muscle tone:   "    Heart rate:    Breathing:      Grimace:      Total: 7            10 Minute:  Skin color:    Muscle tone:      Heart rate:    Breathing:      Grimace:      Total:          Living Status:      .        Objective:     Admission GA: 40w3d   Admission Weight: 3300 g (7 lb 4.4 oz) (Filed from Delivery Summary)  Admission  Head Circumference: 34.3 cm (Filed from Delivery Summary)   Admission Length: Height: 52.1 cm (20.5") (Filed from Delivery Summary)    Delivery Method: Vaginal, Spontaneous       Feeding Method: Breastmilk     Labs:  Recent Results (from the past 168 hour(s))   Cord Blood Evaluation    Collection Time: 22  8:35 AM   Result Value Ref Range    Cord ABO A POS     Cord Direct Freda NEG    Bilirubin, Direct    Collection Time: 22  6:59 AM   Result Value Ref Range    Bilirubin, Direct 0.3 0.1 - 0.6 mg/dL   Bilirubin, , Total    Collection Time: 22  6:59 AM   Result Value Ref Range    Bilirubin, Total -  7.6 (H) 0.1 - 6.0 mg/dL   Pediatric Echo    Collection Time: 22 12:20 PM   Result Value Ref Range    BSA 0.22 m2       Immunization History   Administered Date(s) Administered    Hepatitis B, Pediatric/Adolescent 2022       Nursery Course     [place] Screen sent greater than 24 hours?: yes  Hearing Screen Right Ear: ABR (auditory brainstem response), passed    Left Ear: ABR (auditory brainstem response), passed   Stooling: yes  Voiding: yes  SpO2: Pre-Ductal (Right Hand): 99 %  SpO2: Post-Ductal: 100 %  Car Seat Test?   N/A  Therapeutic Interventions: none  Surgical Procedures: none    Discharge Exam:   Discharge Weight: Weight: 3265 g (7 lb 3.2 oz)  Weight Change Since Birth: -1%     Physical Exam  Vitals and nursing note reviewed.   Constitutional:       General: She is not in acute distress.     Appearance: Normal appearance.   HENT:      Head: Normocephalic. Anterior fontanelle is flat.      Right Ear: External ear normal.      Left Ear: External ear " normal.      Nose: Nose normal.      Mouth/Throat:      Mouth: Mucous membranes are moist.   Eyes:      Conjunctiva/sclera: Conjunctivae normal.   Cardiovascular:      Rate and Rhythm: Normal rate and regular rhythm.      Pulses: Normal pulses.      Heart sounds: Murmur (soft I/VI MANNY heard at LLSB, 2+ LE pulses) heard.   Pulmonary:      Effort: Pulmonary effort is normal. No respiratory distress or retractions.      Breath sounds: Normal breath sounds.   Abdominal:      General: Abdomen is flat. Bowel sounds are normal. There is no distension.      Palpations: Abdomen is soft.   Genitourinary:     General: Normal vulva.   Musculoskeletal:         General: Normal range of motion.      Cervical back: Normal range of motion.   Skin:     General: Skin is warm.      Turgor: Normal.   Neurological:      General: No focal deficit present.      Mental Status: She is alert.      Primitive Reflexes: Suck normal. Symmetric Delores.         Assessment and Plan:     Discharge Date and Time: ,     Final Diagnoses:   * Term  delivered vaginally, current hospitalization  Term AGA female  Routine  care  24 hour TB 7.6 (photo level 11.6)  Exclusive BF. 1% weight loss on day of discharge.  PCP: [facility] f/u in 1 day for jaundice check    Heart murmur of   Echo completed 3/18, results pending.     ABO incompatibility affecting   Mom O+/Baby A+/Freda neg  ABO incompatibility with increase risk for jaundice.  24 hour TB 7.6. Close follow-up.    Prolonged rupture of membranes  PROM 25 hours.  GBS negative.  Maternal tmax 98.5.  Baby asymptomatic with stable VS.  Monitor closely with routine VS. No concerns.             Goals of Care Treatment Preferences:  Code Status: Full Code      Discharged Condition: Good    Disposition: Discharge to Home    Follow Up:   Follow-up Information     Michelle Kim MD Follow up in 1 day(s).    Contact information:  [address]  [address]  [place]  [place] [address]  [phone]                       Patient Instructions:   Discharge instructions provided including: safe sleep, normal feeding frequency and volumes, car seat safety, and outpatient follow up.  Call provider with temperature greater than 100.4 F, poor feeding, recurrent or bilious emesis, decreased urine output, jaundice, or any other concerns.      Dina Metcalf MD  Pediatrics  [facility] ([place])       BMI: BMI (kg/m2): 34.8 (02-26-25 @ 01:03)  HbA1c: A1C with Estimated Average Glucose Result: 6.6 % (01-12-25 @ 07:16)    Glucose: POCT Blood Glucose.: 192 mg/dL (01-13-25 @ 16:33)    BP: 129/80 (02-27-25 @ 07:32) (129/80 - 170/93)Vital Signs Last 24 Hrs  T(C): 36.6 (02-27-25 @ 07:32), Max: 36.7 (02-26-25 @ 16:38)  T(F): 97.8 (02-27-25 @ 07:32), Max: 98 (02-26-25 @ 16:38)  HR: 88 (02-27-25 @ 07:32) (82 - 88)  BP: 129/80 (02-27-25 @ 07:32) (129/80 - 170/93)  BP(mean): --  RR: --  SpO2: --      Lipid Panel: Date/Time: 01-12-25 @ 07:16  Cholesterol, Serum: 203  LDL Cholesterol Calculated: 114  HDL Cholesterol, Serum: 56  Total Cholesterol/HDL Ration Measurement: --  Triglycerides, Serum: 188

## 2025-02-27 NOTE — BH INPATIENT PSYCHIATRY PROGRESS NOTE - NSBHFUPINTERVALHXFT_PSY_A_CORE
Patient seen and evaluated 2/27/25. As per nursing report, no acute events last night but this morning patient became agitated. Walked out of the dinning room and slammed her door. Refused morning meds. On approach patient seen having a whole conversation by herself. Patient irritable, not wanting to talk to writer. Patient eventually engaged in conversation and ramble on incoherently about a man who committed multiple crime and was supposed to be in shelter but not arrested. Patient continues to present as disorganized, paranoid, delusional, acutely psychotic. Patient re-started on Abilify yesterday. Will continue to monitor and titrate accordingly. Patient on a higher dose. Patient continues to admit to passive suicidal ideations. No intent or plan. At end of conversation patient agreed to take her meds. Patient isolative to her room. Encouraged to end groups.

## 2025-02-27 NOTE — BH INPATIENT PSYCHIATRY PROGRESS NOTE - CURRENT MEDICATION
MEDICATIONS  (STANDING):  amLODIPine   Tablet 5 milliGRAM(s) Oral daily  ARIPiprazole 10 milliGRAM(s) Oral daily  aspirin  chewable 81 milliGRAM(s) Oral daily  atorvastatin 80 milliGRAM(s) Oral at bedtime  levothyroxine 25 MICROGram(s) Oral <User Schedule>  metFORMIN 500 milliGRAM(s) Oral two times a day  pantoprazole    Tablet 40 milliGRAM(s) Oral before breakfast  SITagliptin 100 milliGRAM(s) Oral daily    MEDICATIONS  (PRN):  acetaminophen     Tablet .. 650 milliGRAM(s) Oral every 6 hours PRN Temp greater or equal to 38C (100.4F), Mild Pain (1 - 3)  diphenhydrAMINE 50 milliGRAM(s) Oral every 6 hours PRN Eps  haloperidol     Tablet 5 milliGRAM(s) Oral every 6 hours PRN agitation  hydrOXYzine hydrochloride 50 milliGRAM(s) Oral every 6 hours PRN Agitation  LORazepam     Tablet 2 milliGRAM(s) Oral every 6 hours PRN Anxiety

## 2025-02-28 PROCEDURE — 99231 SBSQ HOSP IP/OBS SF/LOW 25: CPT

## 2025-02-28 RX ORDER — ARIPIPRAZOLE 2 MG/1
15 TABLET ORAL DAILY
Refills: 0 | Status: DISCONTINUED | OUTPATIENT
Start: 2025-03-01 | End: 2025-03-05

## 2025-02-28 RX ADMIN — Medication 81 MILLIGRAM(S): at 08:55

## 2025-02-28 RX ADMIN — Medication 40 MILLIGRAM(S): at 05:52

## 2025-02-28 RX ADMIN — ATORVASTATIN CALCIUM 80 MILLIGRAM(S): 80 TABLET, FILM COATED ORAL at 20:45

## 2025-02-28 RX ADMIN — METFORMIN HYDROCHLORIDE 500 MILLIGRAM(S): 850 TABLET ORAL at 20:45

## 2025-02-28 RX ADMIN — Medication 25 MICROGRAM(S): at 05:52

## 2025-02-28 RX ADMIN — METFORMIN HYDROCHLORIDE 500 MILLIGRAM(S): 850 TABLET ORAL at 08:56

## 2025-02-28 RX ADMIN — ARIPIPRAZOLE 10 MILLIGRAM(S): 2 TABLET ORAL at 08:56

## 2025-02-28 RX ADMIN — SITAGLIPTIN 100 MILLIGRAM(S): 100 TABLET, FILM COATED ORAL at 08:56

## 2025-02-28 RX ADMIN — AMLODIPINE BESYLATE 5 MILLIGRAM(S): 10 TABLET ORAL at 08:56

## 2025-02-28 NOTE — BH INPATIENT PSYCHIATRY PROGRESS NOTE - NSBHMETABOLIC_PSY_ALL_CORE_FT
BMI: BMI (kg/m2): 34.8 (02-26-25 @ 01:03)  HbA1c: A1C with Estimated Average Glucose Result: 6.6 % (01-12-25 @ 07:16)    Glucose: POCT Blood Glucose.: 192 mg/dL (01-13-25 @ 16:33)    BP: 121/84 (02-28-25 @ 09:26) (121/84 - 170/93)Vital Signs Last 24 Hrs  T(C): 36.4 (02-28-25 @ 09:26), Max: 36.4 (02-27-25 @ 17:52)  T(F): 97.6 (02-28-25 @ 09:26), Max: 97.6 (02-27-25 @ 17:52)  HR: 83 (02-28-25 @ 09:26) (83 - 96)  BP: 121/84 (02-28-25 @ 09:26) (121/84 - 143/88)  BP(mean): --  RR: --  SpO2: --      Lipid Panel: Date/Time: 01-12-25 @ 07:16  Cholesterol, Serum: 203  LDL Cholesterol Calculated: 114  HDL Cholesterol, Serum: 56  Total Cholesterol/HDL Ration Measurement: --  Triglycerides, Serum: 188

## 2025-02-28 NOTE — BH INPATIENT PSYCHIATRY PROGRESS NOTE - NSBHFUPINTERVALHXFT_PSY_A_CORE
Patient seen and evaluated 2/28/25. As per nursing report, no acute. On approach patient seen having a whole conversation by herself, as she is seen doing every morning and throughout the day. Patient continues to present as disorganized, paranoid, delusional, acutely psychotic. Patient recently re-started on Abilify. Will continue to monitor and titrate accordingly. Patient on a higher dose. Patient continues to admit to passive suicidal ideations. No intent or plan. Patient isolative to her room. Encouraged to end groups.

## 2025-02-28 NOTE — BH INPATIENT PSYCHIATRY PROGRESS NOTE - NSBHCHARTREVIEWVS_PSY_A_CORE FT
Vital Signs Last 24 Hrs  T(C): 36.4 (02-28-25 @ 09:26), Max: 36.4 (02-27-25 @ 17:52)  T(F): 97.6 (02-28-25 @ 09:26), Max: 97.6 (02-27-25 @ 17:52)  HR: 83 (02-28-25 @ 09:26) (83 - 96)  BP: 121/84 (02-28-25 @ 09:26) (121/84 - 143/88)  BP(mean): --  RR: --  SpO2: --

## 2025-03-01 PROCEDURE — 99232 SBSQ HOSP IP/OBS MODERATE 35: CPT

## 2025-03-01 RX ADMIN — METFORMIN HYDROCHLORIDE 500 MILLIGRAM(S): 850 TABLET ORAL at 08:27

## 2025-03-01 RX ADMIN — ARIPIPRAZOLE 15 MILLIGRAM(S): 2 TABLET ORAL at 08:27

## 2025-03-01 RX ADMIN — ATORVASTATIN CALCIUM 80 MILLIGRAM(S): 80 TABLET, FILM COATED ORAL at 20:52

## 2025-03-01 RX ADMIN — Medication 25 MICROGRAM(S): at 07:18

## 2025-03-01 RX ADMIN — SITAGLIPTIN 100 MILLIGRAM(S): 100 TABLET, FILM COATED ORAL at 08:26

## 2025-03-01 RX ADMIN — AMLODIPINE BESYLATE 5 MILLIGRAM(S): 10 TABLET ORAL at 08:27

## 2025-03-01 RX ADMIN — Medication 81 MILLIGRAM(S): at 08:27

## 2025-03-01 RX ADMIN — METFORMIN HYDROCHLORIDE 500 MILLIGRAM(S): 850 TABLET ORAL at 20:53

## 2025-03-01 RX ADMIN — Medication 40 MILLIGRAM(S): at 07:19

## 2025-03-01 NOTE — BH INPATIENT PSYCHIATRY PROGRESS NOTE - NSBHCHARTREVIEWVS_PSY_A_CORE FT
Vital Signs Last 24 Hrs  T(C): 36.5 (03-01-25 @ 08:28), Max: 36.6 (02-28-25 @ 16:31)  T(F): 97.7 (03-01-25 @ 08:28), Max: 97.8 (02-28-25 @ 16:31)  HR: 98 (03-01-25 @ 08:28) (90 - 98)  BP: 157/97 (03-01-25 @ 08:28) (111/70 - 157/97)  BP(mean): --  RR: --  SpO2: --

## 2025-03-01 NOTE — BH INPATIENT PSYCHIATRY PROGRESS NOTE - CURRENT MEDICATION
MEDICATIONS  (STANDING):  amLODIPine   Tablet 5 milliGRAM(s) Oral daily  ARIPiprazole 15 milliGRAM(s) Oral daily  aspirin  chewable 81 milliGRAM(s) Oral daily  atorvastatin 80 milliGRAM(s) Oral at bedtime  levothyroxine 25 MICROGram(s) Oral <User Schedule>  metFORMIN 500 milliGRAM(s) Oral two times a day  pantoprazole    Tablet 40 milliGRAM(s) Oral before breakfast  SITagliptin 100 milliGRAM(s) Oral daily    MEDICATIONS  (PRN):  acetaminophen     Tablet .. 650 milliGRAM(s) Oral every 6 hours PRN Temp greater or equal to 38C (100.4F), Mild Pain (1 - 3)  diphenhydrAMINE 50 milliGRAM(s) Oral every 6 hours PRN Eps  haloperidol     Tablet 5 milliGRAM(s) Oral every 6 hours PRN agitation  hydrOXYzine hydrochloride 50 milliGRAM(s) Oral every 6 hours PRN Agitation  LORazepam     Tablet 2 milliGRAM(s) Oral every 6 hours PRN Anxiety

## 2025-03-01 NOTE — BH INPATIENT PSYCHIATRY PROGRESS NOTE - NSBHMETABOLIC_PSY_ALL_CORE_FT
BMI: BMI (kg/m2): 34.8 (02-26-25 @ 01:03)  HbA1c: A1C with Estimated Average Glucose Result: 6.6 % (01-12-25 @ 07:16)    Glucose: POCT Blood Glucose.: 192 mg/dL (01-13-25 @ 16:33)    BP: 157/97 (03-01-25 @ 08:28) (111/70 - 170/93)Vital Signs Last 24 Hrs  T(C): 36.5 (03-01-25 @ 08:28), Max: 36.6 (02-28-25 @ 16:31)  T(F): 97.7 (03-01-25 @ 08:28), Max: 97.8 (02-28-25 @ 16:31)  HR: 98 (03-01-25 @ 08:28) (90 - 98)  BP: 157/97 (03-01-25 @ 08:28) (111/70 - 157/97)  BP(mean): --  RR: --  SpO2: --      Lipid Panel: Date/Time: 01-12-25 @ 07:16  Cholesterol, Serum: 203  LDL Cholesterol Calculated: 114  HDL Cholesterol, Serum: 56  Total Cholesterol/HDL Ration Measurement: --  Triglycerides, Serum: 188

## 2025-03-01 NOTE — BH INPATIENT PSYCHIATRY PROGRESS NOTE - NSBHFUPINTERVALHXFT_PSY_A_CORE
As per nurses, patient talks to herself all day long. She is cooperative, adherent to treatment, leaves her room and seems to function fairly well. Patient was seen in her room, presents odd, calm, with tangential t/p, and reports "I am OK, doing much better, I am not depressed anymore". She describes how God "supplies her with audio and video" which she can turn on and off any time she wants. She hears voices of relatives from [facility] saying that [place] want to take over the [place], but does not seem to be bothered much. She talks about "cameras" and "[place]" which she can visit. She denies si/hi.

## 2025-03-02 RX ADMIN — Medication 40 MILLIGRAM(S): at 08:15

## 2025-03-02 RX ADMIN — Medication 81 MILLIGRAM(S): at 08:14

## 2025-03-02 RX ADMIN — METFORMIN HYDROCHLORIDE 500 MILLIGRAM(S): 850 TABLET ORAL at 08:14

## 2025-03-02 RX ADMIN — Medication 25 MICROGRAM(S): at 08:15

## 2025-03-02 RX ADMIN — METFORMIN HYDROCHLORIDE 500 MILLIGRAM(S): 850 TABLET ORAL at 20:50

## 2025-03-02 RX ADMIN — SITAGLIPTIN 100 MILLIGRAM(S): 100 TABLET, FILM COATED ORAL at 08:14

## 2025-03-02 RX ADMIN — ATORVASTATIN CALCIUM 80 MILLIGRAM(S): 80 TABLET, FILM COATED ORAL at 20:50

## 2025-03-02 RX ADMIN — AMLODIPINE BESYLATE 5 MILLIGRAM(S): 10 TABLET ORAL at 08:14

## 2025-03-02 RX ADMIN — ARIPIPRAZOLE 15 MILLIGRAM(S): 2 TABLET ORAL at 08:14

## 2025-03-02 NOTE — BH INPATIENT PSYCHIATRY PROGRESS NOTE - NSBHMETABOLIC_PSY_ALL_CORE_FT
BMI: BMI (kg/m2): 34.8 (02-26-25 @ 01:03)  HbA1c: A1C with Estimated Average Glucose Result: 6.6 % (01-12-25 @ 07:16)    Glucose: POCT Blood Glucose.: 192 mg/dL (01-13-25 @ 16:33)    BP: 137/95 (03-02-25 @ 08:59) (111/70 - 157/97)Vital Signs Last 24 Hrs  T(C): 36.8 (03-02-25 @ 08:59), Max: 36.8 (03-02-25 @ 08:59)  T(F): 98.2 (03-02-25 @ 08:59), Max: 98.2 (03-02-25 @ 08:59)  HR: 98 (03-02-25 @ 08:59) (88 - 98)  BP: 137/95 (03-02-25 @ 08:59) (132/77 - 137/95)  BP(mean): --  RR: --  SpO2: --      Lipid Panel: Date/Time: 01-12-25 @ 07:16  Cholesterol, Serum: 203  LDL Cholesterol Calculated: 114  HDL Cholesterol, Serum: 56  Total Cholesterol/HDL Ration Measurement: --  Triglycerides, Serum: 188

## 2025-03-02 NOTE — BH INPATIENT PSYCHIATRY PROGRESS NOTE - NSBHFUPINTERVALHXFT_PSY_A_CORE
Chart reviewed , discussed with staff and patient evaluated by her bed side    . Patient was seen in her room, presents odd, calm, with tangential t/p, and reports "I am OK, doing much better, I am not depressed anymore". She describes how God "supplies her with audio and video" which she can turn on and off any time she wants. She hears voices of relatives from [facility] saying that [place] want to take over the [place], but does not seem to be bothered much. She talks about "cameras" and "[place]" which she can visit. She denies si/hi.

## 2025-03-02 NOTE — BH INPATIENT PSYCHIATRY PROGRESS NOTE - NSBHCHARTREVIEWVS_PSY_A_CORE FT
Vital Signs Last 24 Hrs  T(C): 36.8 (03-02-25 @ 08:59), Max: 36.8 (03-02-25 @ 08:59)  T(F): 98.2 (03-02-25 @ 08:59), Max: 98.2 (03-02-25 @ 08:59)  HR: 98 (03-02-25 @ 08:59) (88 - 98)  BP: 137/95 (03-02-25 @ 08:59) (132/77 - 137/95)  BP(mean): --  RR: --  SpO2: --

## 2025-03-03 PROCEDURE — 99231 SBSQ HOSP IP/OBS SF/LOW 25: CPT

## 2025-03-03 RX ORDER — ONDANSETRON HCL/PF 4 MG/2 ML
4 VIAL (ML) INJECTION EVERY 6 HOURS
Refills: 0 | Status: DISCONTINUED | OUTPATIENT
Start: 2025-03-03 | End: 2025-03-13

## 2025-03-03 RX ORDER — LOPERAMIDE HCL 1 MG/7.5ML
2 SOLUTION ORAL EVERY 8 HOURS
Refills: 0 | Status: DISCONTINUED | OUTPATIENT
Start: 2025-03-03 | End: 2025-03-13

## 2025-03-03 RX ADMIN — ATORVASTATIN CALCIUM 80 MILLIGRAM(S): 80 TABLET, FILM COATED ORAL at 20:01

## 2025-03-03 RX ADMIN — Medication 25 MICROGRAM(S): at 06:42

## 2025-03-03 RX ADMIN — AMLODIPINE BESYLATE 5 MILLIGRAM(S): 10 TABLET ORAL at 08:54

## 2025-03-03 RX ADMIN — METFORMIN HYDROCHLORIDE 500 MILLIGRAM(S): 850 TABLET ORAL at 08:54

## 2025-03-03 RX ADMIN — Medication 81 MILLIGRAM(S): at 08:55

## 2025-03-03 RX ADMIN — Medication 40 MILLIGRAM(S): at 06:43

## 2025-03-03 RX ADMIN — METFORMIN HYDROCHLORIDE 500 MILLIGRAM(S): 850 TABLET ORAL at 20:01

## 2025-03-03 RX ADMIN — SITAGLIPTIN 100 MILLIGRAM(S): 100 TABLET, FILM COATED ORAL at 08:55

## 2025-03-03 RX ADMIN — ARIPIPRAZOLE 15 MILLIGRAM(S): 2 TABLET ORAL at 08:54

## 2025-03-03 RX ADMIN — Medication 4 MILLIGRAM(S): at 11:44

## 2025-03-03 NOTE — BH INPATIENT PSYCHIATRY PROGRESS NOTE - NSBHMSESPABN_PSY_A_CORE
Increased productivity

## 2025-03-03 NOTE — BH INPATIENT PSYCHIATRY PROGRESS NOTE - NSBHFUPINTERVALHXFT_PSY_A_CORE
Patient seen and evaluated 3/3/25. As per nursing report, no acute. On approach patient seen having a whole conversation by herself, as she is seen doing every morning and throughout the day. Patient continues to present as disorganized, paranoid, delusional, acutely psychotic. Patient recently re-started on Abilify. Will continue to monitor and titrate accordingly. Patient on a higher dose. Patient will intermittently ramble on incoherently. Patient continues to admit to passive suicidal ideations, but they are decreasing. No intent or plan. Patient isolative to her room. Encouraged to attend groups.

## 2025-03-03 NOTE — BH INPATIENT PSYCHIATRY PROGRESS NOTE - NSBHCHARTREVIEWVS_PSY_A_CORE FT
Vital Signs Last 24 Hrs  T(C): 36.4 (03-03-25 @ 10:00), Max: 36.7 (03-02-25 @ 15:49)  T(F): 97.5 (03-03-25 @ 10:00), Max: 98 (03-02-25 @ 15:49)  HR: 87 (03-03-25 @ 10:00) (77 - 87)  BP: 163/91 (03-03-25 @ 10:00) (147/79 - 163/91)  BP(mean): --  RR: --  SpO2: --

## 2025-03-03 NOTE — BH INPATIENT PSYCHIATRY PROGRESS NOTE - CURRENT MEDICATION
MEDICATIONS  (STANDING):  amLODIPine   Tablet 5 milliGRAM(s) Oral daily  ARIPiprazole 15 milliGRAM(s) Oral daily  aspirin  chewable 81 milliGRAM(s) Oral daily  atorvastatin 80 milliGRAM(s) Oral at bedtime  levothyroxine 25 MICROGram(s) Oral <User Schedule>  metFORMIN 500 milliGRAM(s) Oral two times a day  pantoprazole    Tablet 40 milliGRAM(s) Oral before breakfast  SITagliptin 100 milliGRAM(s) Oral daily    MEDICATIONS  (PRN):  acetaminophen     Tablet .. 650 milliGRAM(s) Oral every 6 hours PRN Temp greater or equal to 38C (100.4F), Mild Pain (1 - 3)  diphenhydrAMINE 50 milliGRAM(s) Oral every 6 hours PRN Eps  haloperidol     Tablet 5 milliGRAM(s) Oral every 6 hours PRN agitation  hydrOXYzine hydrochloride 50 milliGRAM(s) Oral every 6 hours PRN Agitation  loperamide 2 milliGRAM(s) Oral every 8 hours PRN Loose stool  LORazepam     Tablet 2 milliGRAM(s) Oral every 6 hours PRN Anxiety  ondansetron    Tablet 4 milliGRAM(s) Oral every 6 hours PRN Nausea and vomiting

## 2025-03-03 NOTE — BH INPATIENT PSYCHIATRY PROGRESS NOTE - NSBHMETABOLIC_PSY_ALL_CORE_FT
BMI: BMI (kg/m2): 34.8 (02-26-25 @ 01:03)  HbA1c: A1C with Estimated Average Glucose Result: 6.6 % (01-12-25 @ 07:16)    Glucose: POCT Blood Glucose.: 192 mg/dL (01-13-25 @ 16:33)    BP: 163/91 (03-03-25 @ 10:00) (111/70 - 163/91)Vital Signs Last 24 Hrs  T(C): 36.4 (03-03-25 @ 10:00), Max: 36.7 (03-02-25 @ 15:49)  T(F): 97.5 (03-03-25 @ 10:00), Max: 98 (03-02-25 @ 15:49)  HR: 87 (03-03-25 @ 10:00) (77 - 87)  BP: 163/91 (03-03-25 @ 10:00) (147/79 - 163/91)  BP(mean): --  RR: --  SpO2: --      Lipid Panel: Date/Time: 01-12-25 @ 07:16  Cholesterol, Serum: 203  LDL Cholesterol Calculated: 114  HDL Cholesterol, Serum: 56  Total Cholesterol/HDL Ration Measurement: --  Triglycerides, Serum: 188

## 2025-03-03 NOTE — BH INPATIENT PSYCHIATRY PROGRESS NOTE - PRN MEDS
MEDICATIONS  (PRN):  acetaminophen     Tablet .. 650 milliGRAM(s) Oral every 6 hours PRN Temp greater or equal to 38C (100.4F), Mild Pain (1 - 3)  diphenhydrAMINE 50 milliGRAM(s) Oral every 6 hours PRN Eps  haloperidol     Tablet 5 milliGRAM(s) Oral every 6 hours PRN agitation  hydrOXYzine hydrochloride 50 milliGRAM(s) Oral every 6 hours PRN Agitation  loperamide 2 milliGRAM(s) Oral every 8 hours PRN Loose stool  LORazepam     Tablet 2 milliGRAM(s) Oral every 6 hours PRN Anxiety  ondansetron    Tablet 4 milliGRAM(s) Oral every 6 hours PRN Nausea and vomiting

## 2025-03-04 PROCEDURE — 99231 SBSQ HOSP IP/OBS SF/LOW 25: CPT | Mod: FS

## 2025-03-04 RX ADMIN — ARIPIPRAZOLE 15 MILLIGRAM(S): 2 TABLET ORAL at 10:57

## 2025-03-04 RX ADMIN — ATORVASTATIN CALCIUM 80 MILLIGRAM(S): 80 TABLET, FILM COATED ORAL at 20:23

## 2025-03-04 RX ADMIN — METFORMIN HYDROCHLORIDE 500 MILLIGRAM(S): 850 TABLET ORAL at 08:42

## 2025-03-04 RX ADMIN — Medication 25 MICROGRAM(S): at 06:30

## 2025-03-04 RX ADMIN — AMLODIPINE BESYLATE 5 MILLIGRAM(S): 10 TABLET ORAL at 08:42

## 2025-03-04 RX ADMIN — Medication 81 MILLIGRAM(S): at 08:42

## 2025-03-04 RX ADMIN — Medication 40 MILLIGRAM(S): at 06:30

## 2025-03-04 RX ADMIN — METFORMIN HYDROCHLORIDE 500 MILLIGRAM(S): 850 TABLET ORAL at 20:23

## 2025-03-04 RX ADMIN — SITAGLIPTIN 100 MILLIGRAM(S): 100 TABLET, FILM COATED ORAL at 08:42

## 2025-03-04 NOTE — BH INPATIENT PSYCHIATRY PROGRESS NOTE - NSBHMETABOLIC_PSY_ALL_CORE_FT
BMI: BMI (kg/m2): 34.8 (02-26-25 @ 01:03)  HbA1c: A1C with Estimated Average Glucose Result: 6.6 % (01-12-25 @ 07:16)    Glucose: POCT Blood Glucose.: 192 mg/dL (01-13-25 @ 16:33)    BP: 123/79 (03-04-25 @ 08:00) (123/79 - 163/91)Vital Signs Last 24 Hrs  T(C): 36.6 (03-04-25 @ 08:00), Max: 36.7 (03-03-25 @ 16:09)  T(F): 97.8 (03-04-25 @ 08:00), Max: 98 (03-03-25 @ 16:09)  HR: 81 (03-04-25 @ 08:00) (73 - 87)  BP: 123/79 (03-04-25 @ 08:00) (123/79 - 163/91)  BP(mean): --  RR: 16 (03-03-25 @ 16:09) (16 - 16)  SpO2: --      Lipid Panel: Date/Time: 01-12-25 @ 07:16  Cholesterol, Serum: 203  LDL Cholesterol Calculated: 114  HDL Cholesterol, Serum: 56  Total Cholesterol/HDL Ration Measurement: --  Triglycerides, Serum: 188

## 2025-03-04 NOTE — BH INPATIENT PSYCHIATRY PROGRESS NOTE - NSBHCHARTREVIEWVS_PSY_A_CORE FT
Vital Signs Last 24 Hrs  T(C): 36.6 (03-04-25 @ 08:00), Max: 36.7 (03-03-25 @ 16:09)  T(F): 97.8 (03-04-25 @ 08:00), Max: 98 (03-03-25 @ 16:09)  HR: 81 (03-04-25 @ 08:00) (73 - 87)  BP: 123/79 (03-04-25 @ 08:00) (123/79 - 163/91)  BP(mean): --  RR: 16 (03-03-25 @ 16:09) (16 - 16)  SpO2: --

## 2025-03-04 NOTE — BH INPATIENT PSYCHIATRY PROGRESS NOTE - NSBHFUPINTERVALHXFT_PSY_A_CORE
Patient seen and evaluated 3/4/25. As per nursing report, no acute. On approach patient in day room watching tv today, which is an improvement. Patient will self isolate until she is starting to feel better. Patient continues to present as disorganized, paranoid, delusional, although less so today. Appears to be responding well to medication. Patient recently re-started on Abilify. Will continue to monitor and titrate accordingly. Patient on a higher dose. As of tomorrow patient will be in previous dose of Abilify. Patient denies suicidal ideations. Expressed a decrease in the intermittent voices and denies any A/V hallucinations at time of assessment. Continues to appear internally preoccupied at times. Patient visible on the unit, watching TV in the TV room.

## 2025-03-05 PROCEDURE — 99231 SBSQ HOSP IP/OBS SF/LOW 25: CPT

## 2025-03-05 RX ORDER — ARIPIPRAZOLE 2 MG/1
20 TABLET ORAL DAILY
Refills: 0 | Status: DISCONTINUED | OUTPATIENT
Start: 2025-03-06 | End: 2025-03-13

## 2025-03-05 RX ADMIN — Medication 40 MILLIGRAM(S): at 06:57

## 2025-03-05 RX ADMIN — Medication 81 MILLIGRAM(S): at 08:27

## 2025-03-05 RX ADMIN — METFORMIN HYDROCHLORIDE 500 MILLIGRAM(S): 850 TABLET ORAL at 20:22

## 2025-03-05 RX ADMIN — METFORMIN HYDROCHLORIDE 500 MILLIGRAM(S): 850 TABLET ORAL at 08:27

## 2025-03-05 RX ADMIN — SITAGLIPTIN 100 MILLIGRAM(S): 100 TABLET, FILM COATED ORAL at 08:27

## 2025-03-05 RX ADMIN — AMLODIPINE BESYLATE 5 MILLIGRAM(S): 10 TABLET ORAL at 08:27

## 2025-03-05 RX ADMIN — ARIPIPRAZOLE 15 MILLIGRAM(S): 2 TABLET ORAL at 08:27

## 2025-03-05 RX ADMIN — ATORVASTATIN CALCIUM 80 MILLIGRAM(S): 80 TABLET, FILM COATED ORAL at 20:22

## 2025-03-05 RX ADMIN — Medication 25 MICROGRAM(S): at 06:57

## 2025-03-05 NOTE — BH INPATIENT PSYCHIATRY PROGRESS NOTE - PRN MEDS
MEDICATIONS  (PRN):  acetaminophen     Tablet .. 650 milliGRAM(s) Oral every 6 hours PRN Temp greater or equal to 38C (100.4F), Mild Pain (1 - 3)  diphenhydrAMINE 50 milliGRAM(s) Oral every 6 hours PRN Eps  haloperidol     Tablet 5 milliGRAM(s) Oral every 6 hours PRN agitation  hydrOXYzine hydrochloride 50 milliGRAM(s) Oral every 6 hours PRN Agitation  loperamide 2 milliGRAM(s) Oral every 8 hours PRN Loose stool  LORazepam     Tablet 2 milliGRAM(s) Oral every 6 hours PRN Anxiety  ondansetron    Tablet 4 milliGRAM(s) Oral every 6 hours PRN Nausea and vomiting   MEDICATIONS  (PRN):  acetaminophen     Tablet .. 650 milliGRAM(s) Oral every 6 hours PRN Temp greater or equal to 38C (100.4F), Mild Pain (1 - 3)  diphenhydrAMINE 50 milliGRAM(s) Oral every 6 hours PRN Eps  haloperidol     Tablet 5 milliGRAM(s) Oral every 6 hours PRN agitation  hydrOXYzine hydrochloride 50 milliGRAM(s) Oral every 6 hours PRN Agitation  loperamide 2 milliGRAM(s) Oral every 8 hours PRN Loose stool  ondansetron    Tablet 4 milliGRAM(s) Oral every 6 hours PRN Nausea and vomiting

## 2025-03-05 NOTE — BH INPATIENT PSYCHIATRY PROGRESS NOTE - NSBHCHARTREVIEWVS_PSY_A_CORE FT
Vital Signs Last 24 Hrs  T(C): 36.4 (03-05-25 @ 09:34), Max: 36.4 (03-04-25 @ 15:50)  T(F): 97.6 (03-05-25 @ 09:34), Max: 97.6 (03-05-25 @ 09:34)  HR: 77 (03-05-25 @ 09:34) (77 - 88)  BP: 128/81 (03-05-25 @ 09:34) (128/81 - 140/83)  BP(mean): --  RR: --  SpO2: --     Vital Signs Last 24 Hrs  T(C): 36.6 (03-05-25 @ 16:27), Max: 36.6 (03-05-25 @ 16:27)  T(F): 97.9 (03-05-25 @ 16:27), Max: 97.9 (03-05-25 @ 16:27)  HR: 52 (03-05-25 @ 16:27) (52 - 77)  BP: 134/87 (03-05-25 @ 16:27) (128/81 - 134/87)  BP(mean): --  RR: --  SpO2: --

## 2025-03-05 NOTE — BH INPATIENT PSYCHIATRY PROGRESS NOTE - CURRENT MEDICATION
MEDICATIONS  (STANDING):  amLODIPine   Tablet 5 milliGRAM(s) Oral daily  aspirin  chewable 81 milliGRAM(s) Oral daily  atorvastatin 80 milliGRAM(s) Oral at bedtime  levothyroxine 25 MICROGram(s) Oral <User Schedule>  metFORMIN 500 milliGRAM(s) Oral two times a day  pantoprazole    Tablet 40 milliGRAM(s) Oral before breakfast  SITagliptin 100 milliGRAM(s) Oral daily    MEDICATIONS  (PRN):  acetaminophen     Tablet .. 650 milliGRAM(s) Oral every 6 hours PRN Temp greater or equal to 38C (100.4F), Mild Pain (1 - 3)  diphenhydrAMINE 50 milliGRAM(s) Oral every 6 hours PRN Eps  haloperidol     Tablet 5 milliGRAM(s) Oral every 6 hours PRN agitation  hydrOXYzine hydrochloride 50 milliGRAM(s) Oral every 6 hours PRN Agitation  loperamide 2 milliGRAM(s) Oral every 8 hours PRN Loose stool  LORazepam     Tablet 2 milliGRAM(s) Oral every 6 hours PRN Anxiety  ondansetron    Tablet 4 milliGRAM(s) Oral every 6 hours PRN Nausea and vomiting   MEDICATIONS  (STANDING):  amLODIPine   Tablet 5 milliGRAM(s) Oral daily  ARIPiprazole 20 milliGRAM(s) Oral daily  aspirin  chewable 81 milliGRAM(s) Oral daily  atorvastatin 80 milliGRAM(s) Oral at bedtime  levothyroxine 25 MICROGram(s) Oral <User Schedule>  metFORMIN 500 milliGRAM(s) Oral two times a day  pantoprazole    Tablet 40 milliGRAM(s) Oral before breakfast  SITagliptin 100 milliGRAM(s) Oral daily    MEDICATIONS  (PRN):  acetaminophen     Tablet .. 650 milliGRAM(s) Oral every 6 hours PRN Temp greater or equal to 38C (100.4F), Mild Pain (1 - 3)  diphenhydrAMINE 50 milliGRAM(s) Oral every 6 hours PRN Eps  haloperidol     Tablet 5 milliGRAM(s) Oral every 6 hours PRN agitation  hydrOXYzine hydrochloride 50 milliGRAM(s) Oral every 6 hours PRN Agitation  loperamide 2 milliGRAM(s) Oral every 8 hours PRN Loose stool  ondansetron    Tablet 4 milliGRAM(s) Oral every 6 hours PRN Nausea and vomiting

## 2025-03-05 NOTE — BH INPATIENT PSYCHIATRY PROGRESS NOTE - NSBHFUPINTERVALHXFT_PSY_A_CORE
Patient seen and evaluated 3/5/25. As per nursing report, no acute. On approach patient calm and cooperative. No agitation or aggression noted. Patient continues to present as disorganized, paranoid, delusional, although less so today. Appears to be responding well to medication. Patient recently re-started on Abilify. Will continue to monitor and titrate accordingly. Patient on a higher dose. Previous dose of Abilify ordered. Patient denies suicidal ideations. Expressed a decrease in the intermittent voices and denies any A/V hallucinations at time of assessment. Continues to appear internally preoccupied at times. Patient visible on the unit, watching TV in the TV room. Patient seen and evaluated 3/5/25. As per nursing report, no acute events. On approach patient calm and cooperative. No agitation or aggression noted. Patient continues to present as disorganized, paranoid, delusional, although less so today. Appears to be responding well to medication. Patient recently re-started on Abilify. Will continue to monitor and titrate accordingly. Patient on a higher dose. Previous dose of Abilify ordered. Patient denies suicidal ideations. Expressed a decrease in the intermittent voices and denies any A/V hallucinations at time of assessment. Continues to appear internally preoccupied at times. Patient visible on the unit, watching TV in the TV room.

## 2025-03-05 NOTE — BH INPATIENT PSYCHIATRY PROGRESS NOTE - NSBHMETABOLIC_PSY_ALL_CORE_FT
BMI: BMI (kg/m2): 34.8 (02-26-25 @ 01:03)  HbA1c: A1C with Estimated Average Glucose Result: 6.6 % (01-12-25 @ 07:16)    Glucose: POCT Blood Glucose.: 192 mg/dL (01-13-25 @ 16:33)    BP: 128/81 (03-05-25 @ 09:34) (123/79 - 163/91)Vital Signs Last 24 Hrs  T(C): 36.4 (03-05-25 @ 09:34), Max: 36.4 (03-04-25 @ 15:50)  T(F): 97.6 (03-05-25 @ 09:34), Max: 97.6 (03-05-25 @ 09:34)  HR: 77 (03-05-25 @ 09:34) (77 - 88)  BP: 128/81 (03-05-25 @ 09:34) (128/81 - 140/83)  BP(mean): --  RR: --  SpO2: --      Lipid Panel: Date/Time: 01-12-25 @ 07:16  Cholesterol, Serum: 203  LDL Cholesterol Calculated: 114  HDL Cholesterol, Serum: 56  Total Cholesterol/HDL Ration Measurement: --  Triglycerides, Serum: 188   BMI: BMI (kg/m2): 34.8 (02-26-25 @ 01:03)  HbA1c: A1C with Estimated Average Glucose Result: 6.6 % (01-12-25 @ 07:16)    Glucose: POCT Blood Glucose.: 192 mg/dL (01-13-25 @ 16:33)    BP: 134/87 (03-05-25 @ 16:27) (123/79 - 163/91)Vital Signs Last 24 Hrs  T(C): 36.6 (03-05-25 @ 16:27), Max: 36.6 (03-05-25 @ 16:27)  T(F): 97.9 (03-05-25 @ 16:27), Max: 97.9 (03-05-25 @ 16:27)  HR: 52 (03-05-25 @ 16:27) (52 - 77)  BP: 134/87 (03-05-25 @ 16:27) (128/81 - 134/87)  BP(mean): --  RR: --  SpO2: --      Lipid Panel: Date/Time: 01-12-25 @ 07:16  Cholesterol, Serum: 203  LDL Cholesterol Calculated: 114  HDL Cholesterol, Serum: 56  Total Cholesterol/HDL Ration Measurement: --  Triglycerides, Serum: 188

## 2025-03-06 PROCEDURE — 99231 SBSQ HOSP IP/OBS SF/LOW 25: CPT

## 2025-03-06 RX ADMIN — ATORVASTATIN CALCIUM 80 MILLIGRAM(S): 80 TABLET, FILM COATED ORAL at 20:17

## 2025-03-06 RX ADMIN — METFORMIN HYDROCHLORIDE 500 MILLIGRAM(S): 850 TABLET ORAL at 20:17

## 2025-03-06 RX ADMIN — METFORMIN HYDROCHLORIDE 500 MILLIGRAM(S): 850 TABLET ORAL at 08:28

## 2025-03-06 RX ADMIN — Medication 25 MICROGRAM(S): at 06:58

## 2025-03-06 RX ADMIN — AMLODIPINE BESYLATE 5 MILLIGRAM(S): 10 TABLET ORAL at 08:28

## 2025-03-06 RX ADMIN — ARIPIPRAZOLE 20 MILLIGRAM(S): 2 TABLET ORAL at 08:27

## 2025-03-06 RX ADMIN — Medication 40 MILLIGRAM(S): at 06:58

## 2025-03-06 RX ADMIN — Medication 81 MILLIGRAM(S): at 08:28

## 2025-03-06 RX ADMIN — SITAGLIPTIN 100 MILLIGRAM(S): 100 TABLET, FILM COATED ORAL at 08:27

## 2025-03-06 NOTE — BH TREATMENT PLAN - NSTXDEPRESINTERPR_PSY_ALL_CORE
Pt is spending time in her room , pt is pleasant and cooperative . Pt needs reminders and encouragement to attend RT sessions .

## 2025-03-06 NOTE — BH INPATIENT PSYCHIATRY PROGRESS NOTE - NSBHCHARTREVIEWVS_PSY_A_CORE FT
Vital Signs Last 24 Hrs  T(C): 36.4 (03-06-25 @ 09:53), Max: 36.6 (03-05-25 @ 16:27)  T(F): 97.6 (03-06-25 @ 09:53), Max: 97.9 (03-05-25 @ 16:27)  HR: 71 (03-06-25 @ 09:53) (52 - 71)  BP: 112/76 (03-06-25 @ 09:53) (112/76 - 134/87)  BP(mean): --  RR: --  SpO2: --

## 2025-03-06 NOTE — BH INPATIENT PSYCHIATRY PROGRESS NOTE - NSBHMETABOLIC_PSY_ALL_CORE_FT
BMI: BMI (kg/m2): 34.8 (02-26-25 @ 01:03)  HbA1c: A1C with Estimated Average Glucose Result: 6.6 % (01-12-25 @ 07:16)    Glucose: POCT Blood Glucose.: 192 mg/dL (01-13-25 @ 16:33)    BP: 112/76 (03-06-25 @ 09:53) (112/76 - 140/83)Vital Signs Last 24 Hrs  T(C): 36.4 (03-06-25 @ 09:53), Max: 36.6 (03-05-25 @ 16:27)  T(F): 97.6 (03-06-25 @ 09:53), Max: 97.9 (03-05-25 @ 16:27)  HR: 71 (03-06-25 @ 09:53) (52 - 71)  BP: 112/76 (03-06-25 @ 09:53) (112/76 - 134/87)  BP(mean): --  RR: --  SpO2: --      Lipid Panel: Date/Time: 01-12-25 @ 07:16  Cholesterol, Serum: 203  LDL Cholesterol Calculated: 114  HDL Cholesterol, Serum: 56  Total Cholesterol/HDL Ration Measurement: --  Triglycerides, Serum: 188

## 2025-03-06 NOTE — BH INPATIENT PSYCHIATRY PROGRESS NOTE - CURRENT MEDICATION
MEDICATIONS  (STANDING):  amLODIPine   Tablet 5 milliGRAM(s) Oral daily  ARIPiprazole 20 milliGRAM(s) Oral daily  aspirin  chewable 81 milliGRAM(s) Oral daily  atorvastatin 80 milliGRAM(s) Oral at bedtime  levothyroxine 25 MICROGram(s) Oral <User Schedule>  metFORMIN 500 milliGRAM(s) Oral two times a day  pantoprazole    Tablet 40 milliGRAM(s) Oral before breakfast  SITagliptin 100 milliGRAM(s) Oral daily    MEDICATIONS  (PRN):  acetaminophen     Tablet .. 650 milliGRAM(s) Oral every 6 hours PRN Temp greater or equal to 38C (100.4F), Mild Pain (1 - 3)  diphenhydrAMINE 50 milliGRAM(s) Oral every 6 hours PRN Eps  haloperidol     Tablet 5 milliGRAM(s) Oral every 6 hours PRN agitation  hydrOXYzine hydrochloride 50 milliGRAM(s) Oral every 6 hours PRN Agitation  loperamide 2 milliGRAM(s) Oral every 8 hours PRN Loose stool  ondansetron    Tablet 4 milliGRAM(s) Oral every 6 hours PRN Nausea and vomiting

## 2025-03-06 NOTE — BH TREATMENT PLAN - NSTXDCOPNOINTERSW_PSY_ALL_CORE
SW will provide psychoeducation on the importance of contiuning treatment and connection with next level of care.
SW will educate pt the importance of continuing treatment and connect her to next level of care.

## 2025-03-06 NOTE — BH INPATIENT PSYCHIATRY PROGRESS NOTE - PRN MEDS
MEDICATIONS  (PRN):  acetaminophen     Tablet .. 650 milliGRAM(s) Oral every 6 hours PRN Temp greater or equal to 38C (100.4F), Mild Pain (1 - 3)  diphenhydrAMINE 50 milliGRAM(s) Oral every 6 hours PRN Eps  haloperidol     Tablet 5 milliGRAM(s) Oral every 6 hours PRN agitation  hydrOXYzine hydrochloride 50 milliGRAM(s) Oral every 6 hours PRN Agitation  loperamide 2 milliGRAM(s) Oral every 8 hours PRN Loose stool  ondansetron    Tablet 4 milliGRAM(s) Oral every 6 hours PRN Nausea and vomiting

## 2025-03-06 NOTE — BH TREATMENT PLAN - NSTXSUICIDINTERPR_PSY_ALL_CORE
MO will offer support and provide encouragement to attend RT sessions to develop coping skills and explore leisure interests while on the unit
TX will offer support and provide encouragement to attend RT sessions to develop coping skills and explore leisure interests while on the unit

## 2025-03-06 NOTE — BH INPATIENT PSYCHIATRY PROGRESS NOTE - NSBHFUPINTERVALHXFT_PSY_A_CORE
Patient seen and evaluated 3/6/25. As per nursing report, no acute events. On approach patient calm and cooperative. No agitation or aggression noted. Patient continues to present as disorganized, paranoid, delusional, although less so. Appears to be responding well to medication. Abilify increased back to previous dose. Will continue to monitor and titrate accordingly. Patient denies suicidal/homicidal ideations. Expressed a decrease in the intermittent voices and denies any A/V hallucinations at time of assessment. Continues to appear internally preoccupied at times. Patient visible on the unit, watching TV in the TV room.

## 2025-03-07 PROCEDURE — 99231 SBSQ HOSP IP/OBS SF/LOW 25: CPT

## 2025-03-07 RX ORDER — LORAZEPAM 4 MG/ML
2 VIAL (ML) INJECTION EVERY 6 HOURS
Refills: 0 | Status: DISCONTINUED | OUTPATIENT
Start: 2025-03-07 | End: 2025-03-13

## 2025-03-07 RX ADMIN — Medication 40 MILLIGRAM(S): at 06:30

## 2025-03-07 RX ADMIN — Medication 81 MILLIGRAM(S): at 08:24

## 2025-03-07 RX ADMIN — AMLODIPINE BESYLATE 5 MILLIGRAM(S): 10 TABLET ORAL at 08:24

## 2025-03-07 RX ADMIN — ATORVASTATIN CALCIUM 80 MILLIGRAM(S): 80 TABLET, FILM COATED ORAL at 20:10

## 2025-03-07 RX ADMIN — SITAGLIPTIN 100 MILLIGRAM(S): 100 TABLET, FILM COATED ORAL at 08:24

## 2025-03-07 RX ADMIN — ARIPIPRAZOLE 20 MILLIGRAM(S): 2 TABLET ORAL at 08:25

## 2025-03-07 RX ADMIN — Medication 25 MICROGRAM(S): at 06:30

## 2025-03-07 RX ADMIN — METFORMIN HYDROCHLORIDE 500 MILLIGRAM(S): 850 TABLET ORAL at 08:24

## 2025-03-07 RX ADMIN — METFORMIN HYDROCHLORIDE 500 MILLIGRAM(S): 850 TABLET ORAL at 20:10

## 2025-03-07 NOTE — BH INPATIENT PSYCHIATRY PROGRESS NOTE - NSBHFUPINTERVALHXFT_PSY_A_CORE
Patient seen and evaluated 3/7/25. As per nursing report, no acute events. On approach patient calm and cooperative. No agitation or aggression noted. Patient continues to present as disorganized, paranoid, delusional, although less so. Appears to be responding well to medication. Abilify recently increased back to previous dose. Will continue to monitor and titrate accordingly. Patient denies suicidal/homicidal ideations. Expressed a decrease in the intermittent voices and denies any A/V hallucinations at time of assessment. Continues to appear internally preoccupied at times. Patient less isolative to the room. Visible on the unit, watching TV in the TV room.

## 2025-03-07 NOTE — BH INPATIENT PSYCHIATRY PROGRESS NOTE - NSBHMETABOLIC_PSY_ALL_CORE_FT
BMI: BMI (kg/m2): 34.8 (02-26-25 @ 01:03)  HbA1c: A1C with Estimated Average Glucose Result: 6.6 % (01-12-25 @ 07:16)    Glucose: POCT Blood Glucose.: 192 mg/dL (01-13-25 @ 16:33)    BP: 151/85 (03-07-25 @ 08:09) (112/76 - 151/85)Vital Signs Last 24 Hrs  T(C): 36.5 (03-07-25 @ 08:09), Max: 36.5 (03-06-25 @ 15:49)  T(F): 97.7 (03-07-25 @ 08:09), Max: 97.7 (03-06-25 @ 15:49)  HR: 86 (03-07-25 @ 08:09) (77 - 86)  BP: 151/85 (03-07-25 @ 08:09) (116/81 - 151/85)  BP(mean): --  RR: --  SpO2: --      Lipid Panel: Date/Time: 01-12-25 @ 07:16  Cholesterol, Serum: 203  LDL Cholesterol Calculated: 114  HDL Cholesterol, Serum: 56  Total Cholesterol/HDL Ration Measurement: --  Triglycerides, Serum: 188

## 2025-03-07 NOTE — BH INPATIENT PSYCHIATRY PROGRESS NOTE - NSBHCHARTREVIEWVS_PSY_A_CORE FT
Vital Signs Last 24 Hrs  T(C): 36.5 (03-07-25 @ 08:09), Max: 36.5 (03-06-25 @ 15:49)  T(F): 97.7 (03-07-25 @ 08:09), Max: 97.7 (03-06-25 @ 15:49)  HR: 86 (03-07-25 @ 08:09) (77 - 86)  BP: 151/85 (03-07-25 @ 08:09) (116/81 - 151/85)  BP(mean): --  RR: --  SpO2: --

## 2025-03-08 RX ADMIN — METFORMIN HYDROCHLORIDE 500 MILLIGRAM(S): 850 TABLET ORAL at 08:28

## 2025-03-08 RX ADMIN — Medication 40 MILLIGRAM(S): at 06:37

## 2025-03-08 RX ADMIN — Medication 81 MILLIGRAM(S): at 08:28

## 2025-03-08 RX ADMIN — SITAGLIPTIN 100 MILLIGRAM(S): 100 TABLET, FILM COATED ORAL at 08:27

## 2025-03-08 RX ADMIN — Medication 25 MICROGRAM(S): at 06:37

## 2025-03-08 RX ADMIN — ARIPIPRAZOLE 20 MILLIGRAM(S): 2 TABLET ORAL at 08:27

## 2025-03-08 RX ADMIN — AMLODIPINE BESYLATE 5 MILLIGRAM(S): 10 TABLET ORAL at 08:27

## 2025-03-08 RX ADMIN — ATORVASTATIN CALCIUM 80 MILLIGRAM(S): 80 TABLET, FILM COATED ORAL at 20:02

## 2025-03-08 RX ADMIN — METFORMIN HYDROCHLORIDE 500 MILLIGRAM(S): 850 TABLET ORAL at 20:02

## 2025-03-09 RX ADMIN — Medication 81 MILLIGRAM(S): at 08:21

## 2025-03-09 RX ADMIN — SITAGLIPTIN 100 MILLIGRAM(S): 100 TABLET, FILM COATED ORAL at 08:21

## 2025-03-09 RX ADMIN — Medication 650 MILLIGRAM(S): at 17:37

## 2025-03-09 RX ADMIN — Medication 25 MICROGRAM(S): at 06:04

## 2025-03-09 RX ADMIN — ATORVASTATIN CALCIUM 80 MILLIGRAM(S): 80 TABLET, FILM COATED ORAL at 20:04

## 2025-03-09 RX ADMIN — Medication 650 MILLIGRAM(S): at 17:20

## 2025-03-09 RX ADMIN — AMLODIPINE BESYLATE 5 MILLIGRAM(S): 10 TABLET ORAL at 08:21

## 2025-03-09 RX ADMIN — Medication 40 MILLIGRAM(S): at 06:04

## 2025-03-09 RX ADMIN — METFORMIN HYDROCHLORIDE 500 MILLIGRAM(S): 850 TABLET ORAL at 20:04

## 2025-03-09 RX ADMIN — ARIPIPRAZOLE 20 MILLIGRAM(S): 2 TABLET ORAL at 08:21

## 2025-03-09 RX ADMIN — METFORMIN HYDROCHLORIDE 500 MILLIGRAM(S): 850 TABLET ORAL at 08:21

## 2025-03-10 PROCEDURE — 99231 SBSQ HOSP IP/OBS SF/LOW 25: CPT | Mod: FS

## 2025-03-10 RX ADMIN — METFORMIN HYDROCHLORIDE 500 MILLIGRAM(S): 850 TABLET ORAL at 20:27

## 2025-03-10 RX ADMIN — Medication 25 MICROGRAM(S): at 06:05

## 2025-03-10 RX ADMIN — ATORVASTATIN CALCIUM 80 MILLIGRAM(S): 80 TABLET, FILM COATED ORAL at 20:27

## 2025-03-10 RX ADMIN — METFORMIN HYDROCHLORIDE 500 MILLIGRAM(S): 850 TABLET ORAL at 08:38

## 2025-03-10 RX ADMIN — Medication 40 MILLIGRAM(S): at 06:05

## 2025-03-10 RX ADMIN — AMLODIPINE BESYLATE 5 MILLIGRAM(S): 10 TABLET ORAL at 08:39

## 2025-03-10 RX ADMIN — ARIPIPRAZOLE 20 MILLIGRAM(S): 2 TABLET ORAL at 08:39

## 2025-03-10 RX ADMIN — Medication 81 MILLIGRAM(S): at 08:39

## 2025-03-10 RX ADMIN — SITAGLIPTIN 100 MILLIGRAM(S): 100 TABLET, FILM COATED ORAL at 08:40

## 2025-03-10 NOTE — BH INPATIENT PSYCHIATRY PROGRESS NOTE - PRN MEDS
MEDICATIONS  (PRN):  acetaminophen     Tablet .. 650 milliGRAM(s) Oral every 6 hours PRN Temp greater or equal to 38C (100.4F), Mild Pain (1 - 3)  diphenhydrAMINE 50 milliGRAM(s) Oral every 6 hours PRN Eps  haloperidol     Tablet 5 milliGRAM(s) Oral every 6 hours PRN agitation  hydrOXYzine hydrochloride 50 milliGRAM(s) Oral every 6 hours PRN Agitation  loperamide 2 milliGRAM(s) Oral every 8 hours PRN Loose stool  LORazepam     Tablet 2 milliGRAM(s) Oral every 6 hours PRN Aggression  ondansetron    Tablet 4 milliGRAM(s) Oral every 6 hours PRN Nausea and vomiting

## 2025-03-10 NOTE — BH INPATIENT PSYCHIATRY PROGRESS NOTE - NSBHFUPINTERVALHXFT_PSY_A_CORE
Patient seen and evaluated 3/10/25. As per nursing report, no acute events this weekend. On approach patient calm and cooperative. No agitation or aggression noted. Patient continues to present as less disorganized and did not express any delusional content this morning. Appears to be responding well to medication. Appears to be returning to baseline. Abilify recently increased back to previous dose. Will continue to monitor to see if further titration is needed. Patient denies suicidal/homicidal ideations. Expressed a decrease in the intermittent voices and denies any A/V hallucinations at time of assessment. Continues to appear internally preoccupied at times, which appears to be chronic. Patient visible on the unit, watching TV in the TV room. Anticipated discharge later in the week provided no further titrations are needed.

## 2025-03-10 NOTE — BH INPATIENT PSYCHIATRY PROGRESS NOTE - NSBHMETABOLIC_PSY_ALL_CORE_FT
BMI: BMI (kg/m2): 34.8 (02-26-25 @ 01:03)  HbA1c: A1C with Estimated Average Glucose Result: 6.6 % (01-12-25 @ 07:16)    Glucose: POCT Blood Glucose.: 192 mg/dL (01-13-25 @ 16:33)    BP: 134/88 (03-10-25 @ 08:20) (118/83 - 166/94)Vital Signs Last 24 Hrs  T(C): 36.8 (03-09-25 @ 16:06), Max: 36.8 (03-09-25 @ 16:06)  T(F): 98.2 (03-09-25 @ 16:06), Max: 98.2 (03-09-25 @ 16:06)  HR: 86 (03-10-25 @ 08:20) (84 - 86)  BP: 134/88 (03-10-25 @ 08:20) (134/88 - 136/88)  BP(mean): --  RR: --  SpO2: --      Lipid Panel: Date/Time: 01-12-25 @ 07:16  Cholesterol, Serum: 203  LDL Cholesterol Calculated: 114  HDL Cholesterol, Serum: 56  Total Cholesterol/HDL Ration Measurement: --  Triglycerides, Serum: 188

## 2025-03-10 NOTE — BH INPATIENT PSYCHIATRY PROGRESS NOTE - NSBHCHARTREVIEWVS_PSY_A_CORE FT
Vital Signs Last 24 Hrs  T(C): 36.8 (03-09-25 @ 16:06), Max: 36.8 (03-09-25 @ 16:06)  T(F): 98.2 (03-09-25 @ 16:06), Max: 98.2 (03-09-25 @ 16:06)  HR: 86 (03-10-25 @ 08:20) (84 - 86)  BP: 134/88 (03-10-25 @ 08:20) (134/88 - 136/88)  BP(mean): --  RR: --  SpO2: --

## 2025-03-10 NOTE — BH INPATIENT PSYCHIATRY PROGRESS NOTE - CURRENT MEDICATION
MEDICATIONS  (STANDING):  amLODIPine   Tablet 5 milliGRAM(s) Oral daily  ARIPiprazole 20 milliGRAM(s) Oral daily  aspirin  chewable 81 milliGRAM(s) Oral daily  atorvastatin 80 milliGRAM(s) Oral at bedtime  levothyroxine 25 MICROGram(s) Oral <User Schedule>  metFORMIN 500 milliGRAM(s) Oral two times a day  pantoprazole    Tablet 40 milliGRAM(s) Oral before breakfast  SITagliptin 100 milliGRAM(s) Oral daily    MEDICATIONS  (PRN):  acetaminophen     Tablet .. 650 milliGRAM(s) Oral every 6 hours PRN Temp greater or equal to 38C (100.4F), Mild Pain (1 - 3)  diphenhydrAMINE 50 milliGRAM(s) Oral every 6 hours PRN Eps  haloperidol     Tablet 5 milliGRAM(s) Oral every 6 hours PRN agitation  hydrOXYzine hydrochloride 50 milliGRAM(s) Oral every 6 hours PRN Agitation  loperamide 2 milliGRAM(s) Oral every 8 hours PRN Loose stool  LORazepam     Tablet 2 milliGRAM(s) Oral every 6 hours PRN Aggression  ondansetron    Tablet 4 milliGRAM(s) Oral every 6 hours PRN Nausea and vomiting

## 2025-03-11 LAB
A1C WITH ESTIMATED AVERAGE GLUCOSE RESULT: 7 % — HIGH (ref 4–5.6)
CHOLEST SERPL-MCNC: 152 MG/DL — SIGNIFICANT CHANGE UP
ESTIMATED AVERAGE GLUCOSE: 154 MG/DL — HIGH (ref 68–114)
HDLC SERPL-MCNC: 61 MG/DL — SIGNIFICANT CHANGE UP
LIPID PNL WITH DIRECT LDL SERPL: 63 MG/DL — SIGNIFICANT CHANGE UP
NON HDL CHOLESTEROL: 91 MG/DL — SIGNIFICANT CHANGE UP
TRIGL SERPL-MCNC: 172 MG/DL — HIGH
TSH SERPL-MCNC: 3.32 UIU/ML — SIGNIFICANT CHANGE UP (ref 0.27–4.2)

## 2025-03-11 PROCEDURE — 99231 SBSQ HOSP IP/OBS SF/LOW 25: CPT

## 2025-03-11 RX ORDER — ASPIRIN 325 MG
1 TABLET ORAL
Qty: 14 | Refills: 0
Start: 2025-03-11 | End: 2025-03-24

## 2025-03-11 RX ORDER — LEVOTHYROXINE SODIUM 300 MCG
1 TABLET ORAL
Qty: 14 | Refills: 0
Start: 2025-03-11 | End: 2025-03-24

## 2025-03-11 RX ORDER — ARIPIPRAZOLE 2 MG/1
1 TABLET ORAL
Qty: 30 | Refills: 0
Start: 2025-03-11 | End: 2025-04-09

## 2025-03-11 RX ORDER — SITAGLIPTIN 100 MG/1
1 TABLET, FILM COATED ORAL
Qty: 14 | Refills: 0
Start: 2025-03-11 | End: 2025-03-24

## 2025-03-11 RX ORDER — METFORMIN HYDROCHLORIDE 850 MG/1
1 TABLET ORAL
Qty: 28 | Refills: 0
Start: 2025-03-11 | End: 2025-03-24

## 2025-03-11 RX ORDER — AMLODIPINE BESYLATE 10 MG/1
1 TABLET ORAL
Qty: 14 | Refills: 0
Start: 2025-03-11 | End: 2025-03-24

## 2025-03-11 RX ORDER — ATORVASTATIN CALCIUM 80 MG/1
1 TABLET, FILM COATED ORAL
Qty: 14 | Refills: 0
Start: 2025-03-11 | End: 2025-03-24

## 2025-03-11 RX ADMIN — Medication 81 MILLIGRAM(S): at 08:07

## 2025-03-11 RX ADMIN — Medication 40 MILLIGRAM(S): at 06:45

## 2025-03-11 RX ADMIN — METFORMIN HYDROCHLORIDE 500 MILLIGRAM(S): 850 TABLET ORAL at 20:16

## 2025-03-11 RX ADMIN — Medication 25 MICROGRAM(S): at 06:45

## 2025-03-11 RX ADMIN — METFORMIN HYDROCHLORIDE 500 MILLIGRAM(S): 850 TABLET ORAL at 08:07

## 2025-03-11 RX ADMIN — ARIPIPRAZOLE 20 MILLIGRAM(S): 2 TABLET ORAL at 08:08

## 2025-03-11 RX ADMIN — AMLODIPINE BESYLATE 5 MILLIGRAM(S): 10 TABLET ORAL at 08:07

## 2025-03-11 RX ADMIN — ATORVASTATIN CALCIUM 80 MILLIGRAM(S): 80 TABLET, FILM COATED ORAL at 20:16

## 2025-03-11 RX ADMIN — SITAGLIPTIN 100 MILLIGRAM(S): 100 TABLET, FILM COATED ORAL at 08:08

## 2025-03-11 NOTE — BH INPATIENT PSYCHIATRY DISCHARGE NOTE - REASON FOR ADMISSION
Patient admitted for acute decompensation secondary to non compliance of meds. Patient verbalized hearing voices, SI.
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
## 2025-03-11 NOTE — BH INPATIENT PSYCHIATRY DISCHARGE NOTE - NSDCMRMEDTOKEN_GEN_ALL_CORE_FT
amLODIPine 5 mg oral tablet: 1 tab(s) orally once a day x 14 days Continue until told otherwise by your provider  ARIPiprazole 20 mg oral tablet: 1 tab(s) orally once a day x 30 days Continue until told otherwise by your provider  aspirin 81 mg oral tablet, chewable: 1 tab(s) orally once a day x 14 days Continue until told otherwise by your provider.  atorvastatin 80 mg oral tablet: 1 tab(s) orally once a day (at bedtime) x 14 days Continue until told otherwise by your provider  levothyroxine 25 mcg (0.025 mg) oral tablet: 1 tab(s) orally once a day x 14 days Before breakfast. Continue until told otherwise by your provider  metFORMIN 500 mg oral tablet: 1 tab(s) orally 2 times a day x 14 days Continue until told otherwise by you provider  pantoprazole 40 mg oral delayed release tablet: 1 tab(s) orally once a day (before a meal) x 14 days Continue until told otherwise by your provider  SITagliptin (as phosphate) 100 mg oral tablet: 1 tab(s) orally once a day x 14 days Continue until told otherwise by your provider

## 2025-03-11 NOTE — BH DISCHARGE NOTE NURSING/SOCIAL WORK/PSYCH REHAB - NSCDUDCCRISIS_PSY_A_CORE
[facility]  1 [phone] [facility] [phone])  Text "WELL" to [phone]  Website: [email or web address]  [employer]  1 [phone]) LIF[facility]ET [phone] Suicide and Crisis Lifeline

## 2025-03-11 NOTE — BH INPATIENT PSYCHIATRY DISCHARGE NOTE - OTHER
"Im fine" Appears to be responding to internal stimuli at times, which appears to be chronic but denies and A/V hallucinations

## 2025-03-11 NOTE — BH DISCHARGE NOTE NURSING/SOCIAL WORK/PSYCH REHAB - FACILITY ADDRESS
[address] [place] [address] - Ms. Carr [phone] [address] [place], [place]- [phone]

## 2025-03-11 NOTE — BH DISCHARGE NOTE NURSING/SOCIAL WORK/PSYCH REHAB - PATIENT PORTAL LINK FT
You can access the FollowMyHealth Patient Portal offered by [facility] by registering at the following website: [email or web address]. By joining [facility]’s FollowMyHealth portal, you will also be able to view your health information using other applications (apps) compatible with our system.

## 2025-03-11 NOTE — BH INPATIENT PSYCHIATRY DISCHARGE NOTE - NSBHDCMEDICALFT_PSY_A_CORE
# Aortic stenosis and history of valve replacement  -aspirin 81 milliGRAM(s) Oral daily    #HTN  -Norvasc    #DM  -Januvia  -Metformin    #HLD  - Atorvastatin    #Thyroid  - Synthroid

## 2025-03-11 NOTE — BH DISCHARGE NOTE NURSING/SOCIAL WORK/PSYCH REHAB - NSDCVIVACCINE_GEN_ALL_CORE_FT
COVID-19, mRNA, LNP-S, PF, 100 mcg/ 0.5 mL dose (Moderna); 16-Dec-2021 12:11; Alana Solares (RN); [employer] [id number] (Exp. Date: 30-Dec-2021); IntraMuscular; Deltoid Right.; 0.5 milliLiter(s);   rabies, intradermal injection; 01-Apr-2024 15:01; Pramod Kumari (RN); [employer]; [id number] (Exp. Date: 31-Jan-2026); IntraMuscular.; Deltoid Left...; 1 milliLiter(s); VIS (VIS Published: 01-Apr-2024, VIS Presented: 01-Apr-2024);   Tdap; 01-Apr-2024 14:58; Pramod Kumari (RN); [employer]; [id number] (Exp. Date: 23-Oct-2025); IntraMuscular; Deltoid Right.; 0.5 milliLiter(s); VIS (VIS Published: 09-May-2013, VIS Presented: 01-Apr-2024);

## 2025-03-11 NOTE — BH INPATIENT PSYCHIATRY PROGRESS NOTE - NSBHMETABOLIC_PSY_ALL_CORE_FT
BMI: BMI (kg/m2): 34.8 (02-26-25 @ 01:03)  HbA1c: A1C with Estimated Average Glucose Result: 6.6 % (01-12-25 @ 07:16)    Glucose: POCT Blood Glucose.: 192 mg/dL (01-13-25 @ 16:33)    BP: 127/88 (03-11-25 @ 07:42) (118/83 - 166/94)Vital Signs Last 24 Hrs  T(C): 36.4 (03-11-25 @ 07:42), Max: 36.4 (03-10-25 @ 15:45)  T(F): 97.6 (03-11-25 @ 07:42), Max: 97.6 (03-11-25 @ 07:42)  HR: 70 (03-11-25 @ 07:42) (70 - 108)  BP: 127/88 (03-11-25 @ 07:42) (127/88 - 147/88)  BP(mean): --  RR: --  SpO2: --      Lipid Panel: Date/Time: 03-11-25 @ 08:05  Cholesterol, Serum: 152  LDL Cholesterol Calculated: 63  HDL Cholesterol, Serum: 61  Total Cholesterol/HDL Ration Measurement: --  Triglycerides, Serum: 172

## 2025-03-11 NOTE — BH INPATIENT PSYCHIATRY PROGRESS NOTE - NSBHCHARTREVIEWVS_PSY_A_CORE FT
Vital Signs Last 24 Hrs  T(C): 36.4 (03-11-25 @ 07:42), Max: 36.4 (03-10-25 @ 15:45)  T(F): 97.6 (03-11-25 @ 07:42), Max: 97.6 (03-11-25 @ 07:42)  HR: 70 (03-11-25 @ 07:42) (70 - 108)  BP: 127/88 (03-11-25 @ 07:42) (127/88 - 147/88)  BP(mean): --  RR: --  SpO2: --
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
## 2025-03-11 NOTE — BH INPATIENT PSYCHIATRY DISCHARGE NOTE - NSBHFUPINTERVALHXFT_PSY_A_CORE
Patient seen and evaluated 3/12/25. As per nursing report no acute events. States she is feeling good today. Sleeping well and appetite is good. Appears to be at baseline. Denies any suicidal/homicidal ideations. Able to contract for safety. Patient is future oriented, and goal directed. Denies any A/V hallucinations. Continues to appear internally preoccupied at times, which appears to be chronic. Discussed discharge and coping skills to use when discharged. Patient will return to her shelter. Anticipated discharge tomorrow 3/13/25. Compliant with medication. Does not warrant continued Inpatient hospitalization. Patient does not present a risk to self or others at this time.    Meds ordered from [facility] to be given to patient upon discharge.

## 2025-03-11 NOTE — BH DISCHARGE NOTE NURSING/SOCIAL WORK/PSYCH REHAB - NSDCNEXTLEVELWHO_PSY_ALL_CORE_FT
SW to [email or web address]  SW to [email or web address]   ROMULO to [email or web address].
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
## 2025-03-11 NOTE — BH INPATIENT PSYCHIATRY PROGRESS NOTE - NSBHFUPINTERVALHXFT_PSY_A_CORE
Patient seen and evaluated 3/11/25. As per nursing report, no acute events. On approach patient calm and cooperative. No agitation or aggression noted. Appears to be responding well to medication. Appears to be returning to baseline. Abilify recently increased back to previous dose. Will continue to monitor to see if further titration is needed. Patient denies suicidal/homicidal ideations. Expressed a decrease in the intermittent voices and denies any A/V hallucinations at time of assessment. Continues to appear internally preoccupied at times, which appears to be chronic. Patient visible on the unit, watching TV in the TV room. Anticipated discharge later in the week provided no further titrations are needed.

## 2025-03-12 PROCEDURE — 99231 SBSQ HOSP IP/OBS SF/LOW 25: CPT

## 2025-03-12 RX ADMIN — METFORMIN HYDROCHLORIDE 500 MILLIGRAM(S): 850 TABLET ORAL at 08:32

## 2025-03-12 RX ADMIN — SITAGLIPTIN 100 MILLIGRAM(S): 100 TABLET, FILM COATED ORAL at 08:32

## 2025-03-12 RX ADMIN — ARIPIPRAZOLE 20 MILLIGRAM(S): 2 TABLET ORAL at 08:31

## 2025-03-12 RX ADMIN — AMLODIPINE BESYLATE 5 MILLIGRAM(S): 10 TABLET ORAL at 08:31

## 2025-03-12 RX ADMIN — ATORVASTATIN CALCIUM 80 MILLIGRAM(S): 80 TABLET, FILM COATED ORAL at 20:12

## 2025-03-12 RX ADMIN — Medication 25 MICROGRAM(S): at 06:50

## 2025-03-12 RX ADMIN — METFORMIN HYDROCHLORIDE 500 MILLIGRAM(S): 850 TABLET ORAL at 20:12

## 2025-03-12 RX ADMIN — Medication 81 MILLIGRAM(S): at 08:31

## 2025-03-12 RX ADMIN — Medication 40 MILLIGRAM(S): at 06:50

## 2025-03-12 NOTE — BH INPATIENT PSYCHIATRY PROGRESS NOTE - NSBHMETABOLIC_PSY_ALL_CORE_FT
BMI: BMI (kg/m2): 34.8 (02-26-25 @ 01:03)  HbA1c: A1C with Estimated Average Glucose Result: 7.0 % (03-11-25 @ 08:05)    Glucose: POCT Blood Glucose.: 192 mg/dL (01-13-25 @ 16:33)    BP: 136/87 (03-12-25 @ 08:00) (126/80 - 147/88)Vital Signs Last 24 Hrs  T(C): 36.5 (03-12-25 @ 08:00), Max: 36.6 (03-11-25 @ 16:14)  T(F): 97.7 (03-12-25 @ 08:00), Max: 97.8 (03-11-25 @ 16:14)  HR: 80 (03-12-25 @ 08:00) (80 - 81)  BP: 136/87 (03-12-25 @ 08:00) (126/80 - 136/87)  BP(mean): --  RR: --  SpO2: --      Lipid Panel: Date/Time: 03-11-25 @ 08:05  Cholesterol, Serum: 152  LDL Cholesterol Calculated: 63  HDL Cholesterol, Serum: 61  Total Cholesterol/HDL Ration Measurement: --  Triglycerides, Serum: 172

## 2025-03-12 NOTE — BH INPATIENT PSYCHIATRY PROGRESS NOTE - NSBHCHARTREVIEWVS_PSY_A_CORE FT
Vital Signs Last 24 Hrs  T(C): 36.5 (03-12-25 @ 08:00), Max: 36.6 (03-11-25 @ 16:14)  T(F): 97.7 (03-12-25 @ 08:00), Max: 97.8 (03-11-25 @ 16:14)  HR: 80 (03-12-25 @ 08:00) (80 - 81)  BP: 136/87 (03-12-25 @ 08:00) (126/80 - 136/87)  BP(mean): --  RR: --  SpO2: --

## 2025-03-13 VITALS — DIASTOLIC BLOOD PRESSURE: 93 MMHG | TEMPERATURE: 98 F | SYSTOLIC BLOOD PRESSURE: 156 MMHG | HEART RATE: 78 BPM

## 2025-03-13 PROCEDURE — 99238 HOSP IP/OBS DSCHRG MGMT 30/<: CPT

## 2025-03-13 RX ADMIN — Medication 25 MICROGRAM(S): at 05:30

## 2025-03-13 RX ADMIN — SITAGLIPTIN 100 MILLIGRAM(S): 100 TABLET, FILM COATED ORAL at 08:28

## 2025-03-13 RX ADMIN — Medication 81 MILLIGRAM(S): at 08:28

## 2025-03-13 RX ADMIN — METFORMIN HYDROCHLORIDE 500 MILLIGRAM(S): 850 TABLET ORAL at 08:29

## 2025-03-13 RX ADMIN — ARIPIPRAZOLE 20 MILLIGRAM(S): 2 TABLET ORAL at 08:29

## 2025-03-13 RX ADMIN — Medication 40 MILLIGRAM(S): at 05:30

## 2025-03-13 RX ADMIN — AMLODIPINE BESYLATE 5 MILLIGRAM(S): 10 TABLET ORAL at 08:27

## 2025-03-13 NOTE — BH INPATIENT PSYCHIATRY PROGRESS NOTE - NSTXDEPRESGOAL_PSY_ALL_CORE
Exhibit improvements in self-grooming, hygiene, sleep and appetite
Will identify 2 coping skills that assist in improving mood
Exhibit improvements in self-grooming, hygiene, sleep and appetite
Will identify 2 coping skills that assist in improving mood
Exhibit improvements in self-grooming, hygiene, sleep and appetite
Exhibit improvements in self-grooming, hygiene, sleep and appetite
Will identify 2 coping skills that assist in improving mood
Will identify 2 coping skills that assist in improving mood
Exhibit improvements in self-grooming, hygiene, sleep and appetite
Will identify 2 coping skills that assist in improving mood
Will identify 2 coping skills that assist in improving mood

## 2025-03-13 NOTE — BH INPATIENT PSYCHIATRY PROGRESS NOTE - NSBHMSETHTPROC_PSY_A_CORE
oddly related/Disorganized/Tangential/Illogical
[>50% of Time Spent on Counseling and Coordination of Care for  ___] : Greater than 50% of the encounter time was spent on counseling and coordination of care for [unfilled]
[Time Spent: ___ minutes] : I have spent [unfilled] minutes of face to face time with the patient
oddly related/Linear
oddly related/Disorganized/Tangential/Illogical
oddly related/Disorganized/Other
oddly related/Disorganized/Tangential/Illogical
oddly related/Disorganized/Other
oddly related/Disorganized/Tangential/Illogical
oddly related/Disorganized/Tangential/Illogical
oddly related/Linear
oddly related/Disorganized/Tangential/Illogical

## 2025-03-13 NOTE — BH INPATIENT PSYCHIATRY PROGRESS NOTE - NSBHATTESTATTENDNAMEFT_PSY_A_CORE
Dr. Foster
Oriana Motta MD  
Dr. Foster
Dr. Foster

## 2025-03-13 NOTE — BH INPATIENT PSYCHIATRY PROGRESS NOTE - NSTXDEPRESPROGRES_PSY_ALL_CORE
Improving
No Change
Improving
No Change
Improving
No Change
Improving
Improving
Met - goal discontinued
No Change
Met - goal discontinued

## 2025-03-13 NOTE — BH INPATIENT PSYCHIATRY PROGRESS NOTE - NSTXSUICIDDATETRGT_PSY_ALL_CORE
05-Mar-2025
13-Mar-2025
05-Mar-2025

## 2025-03-13 NOTE — BH INPATIENT PSYCHIATRY PROGRESS NOTE - NSTXPSYCHOGOAL_PSY_ALL_CORE
Will identify 2 coping skills that assist with focus on reality

## 2025-03-13 NOTE — BH INPATIENT PSYCHIATRY PROGRESS NOTE - NSBHFUPINTERVALCCFT_PSY_A_CORE
"Im ok"
"Im ready"
"Im fine"
"Im fine"
"Im good"
"Im fine"
"I am doing much better, I am not depressed anymore"
"Im ok"
"I am doing much better, I am not depressed anymore"
"Im ok"
"Im ok"
"Im good"
"Im fine"

## 2025-03-13 NOTE — BH INPATIENT PSYCHIATRY PROGRESS NOTE - NSBHFUPINTERVALHXFT_PSY_A_CORE
D/C today 3/13/25. [employer] cards provided. Meds ordered and delivered from [facility] to be given to patient upon discharge. Patient appeared to be in good spirits today. Endorses a better mood. Insight and judgment have improved. Denies suicidal/ homicidal ideations. Patient able to contract for safety. Denies any A/V hallucinations. Appears to be at her baseline state. Compliant with medication. Does not warrant continued Inpatient hospitalization. Writer reviewed D/C papers with patient. Patient verbalized understanding. Patient does not appear to be of risk to self or others at this time.

## 2025-03-13 NOTE — BH INPATIENT PSYCHIATRY PROGRESS NOTE - NSBHMSESPEECH_PSY_A_CORE
Normal volume, rate, productivity, spontaneity and articulation
Normal volume, rate, productivity, spontaneity and articulation
Abnormal as indicated, otherwise normal...
Abnormal as indicated, otherwise normal...
Normal volume, rate, productivity, spontaneity and articulation
Abnormal as indicated, otherwise normal...
Normal volume, rate, productivity, spontaneity and articulation
Abnormal as indicated, otherwise normal...
Normal volume, rate, productivity, spontaneity and articulation
Abnormal as indicated, otherwise normal...
Normal volume, rate, productivity, spontaneity and articulation

## 2025-03-13 NOTE — BH INPATIENT PSYCHIATRY PROGRESS NOTE - NSBHMSEJUDGE_PSY_A_CORE
CC:skin lesions    Subjective: New Pt here today here to establish care with a concern on left temple and left arm    HPI/location: left temple  Time present: not sure  Painful lesion: No  Itching lesion: No  Enlarging lesion: No  Has since flaked free and now gone.     HPI/location: left arm  Time present: 2-3 months  Painful lesion: No  Itching lesion: Yes  Enlarging lesion: Yes    ROS: no fevers/chills. No itch.  No cough  Relevant PMH: NC  Social: former smoker    PE: Gen:WDWN male in NAD. Skin: focal exam. Declined additional exam today. Left arm - erythematous patch, approx 1-2 cm with overlying thin collarette of scale/crust. Head - scattered thin waxy papules and tan macules, appearing benign.  Left thigh - waxy/warty papule      A/P: Neoplasm NOS: consider SCC vs porokeratosis vs other  -consent for bx, including R/B/A. Cleaned with EtOH, anesthesia with lidocaine 1% + epinephrine, shave bx, AlCl3 for hemostasis  -vaseline/bandage and wound care reviewed    Lentigos/SK:   -b/r  -reviewed sunprotection and skin cancer detection    F/u ROHAN as desires    I have reviewed medications relevant to my specialty.          
Poor
Fair
Poor
Fair
Poor
Poor

## 2025-03-13 NOTE — BH INPATIENT PSYCHIATRY PROGRESS NOTE - NSBHCHARTREVIEWVS_PSY_A_CORE FT
Vital Signs Last 24 Hrs  T(C): 36.5 (03-13-25 @ 07:24), Max: 36.5 (03-12-25 @ 15:43)  T(F): 97.7 (03-13-25 @ 07:24), Max: 97.7 (03-12-25 @ 15:43)  HR: 78 (03-13-25 @ 07:24) (76 - 78)  BP: 156/93 (03-13-25 @ 07:24) (149/93 - 156/93)  BP(mean): --  RR: --  SpO2: --

## 2025-03-13 NOTE — BH INPATIENT PSYCHIATRY PROGRESS NOTE - LEVEL OF CONSCIOUSNESS
pts son will have them send again,  Gave fax # again to him
Alert

## 2025-03-13 NOTE — BH INPATIENT PSYCHIATRY PROGRESS NOTE - NSBHMSEAFFQUAL_PSY_A_CORE
Euthymic
Irritable
Euthymic
Irritable
Euthymic
Irritable

## 2025-03-13 NOTE — BH INPATIENT PSYCHIATRY PROGRESS NOTE - NSBHATTESTTYPEVISIT_PSY_A_CORE
On-site Attending supervising BECKY (99XXX codes)
Attending Only
On-site Attending supervising BECKY (99XXX codes)
Attending Only
On-site Attending supervising BECKY (99XXX codes)

## 2025-03-13 NOTE — BH INPATIENT PSYCHIATRY PROGRESS NOTE - NSBHMSEAFFCONG_PSY_A_CORE
Congruent
Non-congruent
Congruent
Congruent
Non-congruent
Non-congruent
Congruent
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
## 2025-03-13 NOTE — BH INPATIENT PSYCHIATRY PROGRESS NOTE - NSTXDEPRESDATETRGT_PSY_ALL_CORE
05-Mar-2025
05-Mar-2025
19-Mar-2025
19-Mar-2025
13-Mar-2025
05-Mar-2025
19-Mar-2025
05-Mar-2025

## 2025-03-13 NOTE — BH INPATIENT PSYCHIATRY PROGRESS NOTE - NSTXSUICIDGOAL_PSY_ALL_CORE
Will identify and utilize 2 coping skills
done
Will identify and utilize 2 coping skills

## 2025-03-13 NOTE — BH INPATIENT PSYCHIATRY PROGRESS NOTE - NSTXDCOPNODATETRGT_PSY_ALL_CORE
05-Mar-2025

## 2025-03-13 NOTE — BH INPATIENT PSYCHIATRY PROGRESS NOTE - NSTXPSYCHODATEEST_PSY_ALL_CORE
26-Feb-2025

## 2025-03-13 NOTE — BH INPATIENT PSYCHIATRY PROGRESS NOTE - NSTXSUICIDPROGRES_PSY_ALL_CORE
Improving
Improving
No Change
Improving
Improving
No Change
Improving
Met - goal discontinued
Improving
Improving
Met - goal discontinued

## 2025-03-13 NOTE — BH INPATIENT PSYCHIATRY PROGRESS NOTE - NSBHMSETHTCONTENT_PSY_A_CORE
Delusions/Suicidality
Unremarkable
Delusions
Delusions
Delusions/Suicidality
Delusions
Unremarkable
Delusions/Suicidality
Delusions

## 2025-03-13 NOTE — BH CHART NOTE - NS ED BHA SUICIDALITY PRESENT CURRENT PASSIVE IDEATION
Pt daughter called in to request a medication refill for prescription busPIRone (BUSPAR) 5 MG tablet.  Please send to [address], [address] [place] [address], S.. No

## 2025-03-13 NOTE — BH INPATIENT PSYCHIATRY PROGRESS NOTE - NSTXDCOPNOPROGRES_PSY_ALL_CORE
Improving
Met - goal discontinued
Met - goal discontinued
08-Feb-2019 02:03
Improving
No Change
Improving
No Change

## 2025-03-13 NOTE — BH INPATIENT PSYCHIATRY PROGRESS NOTE - NSTXPSYCHODATETRGT_PSY_ALL_CORE
05-Mar-2025
13-Mar-2025
05-Mar-2025

## 2025-03-13 NOTE — BH INPATIENT PSYCHIATRY PROGRESS NOTE - NSTXPSYCHOPROGRES_PSY_ALL_CORE
Improving
No Change
Improving
Improving
Met - goal discontinued
No Change
Improving
No Change
Met - goal discontinued
No Change

## 2025-03-13 NOTE — BH INPATIENT PSYCHIATRY PROGRESS NOTE - NSDCCRITERIA_PSY_ALL_CORE
To be discharged once deemed not a danger to self or others.
Spine appears normal, range of motion is not limited, no muscle or joint tenderness
To be discharged once deemed not a danger to self or others.

## 2025-03-13 NOTE — BH INPATIENT PSYCHIATRY PROGRESS NOTE - NSBHMETABOLIC_PSY_ALL_CORE_FT
BMI: BMI (kg/m2): 34.8 (02-26-25 @ 01:03)  HbA1c: A1C with Estimated Average Glucose Result: 7.0 % (03-11-25 @ 08:05)    Glucose: POCT Blood Glucose.: 192 mg/dL (01-13-25 @ 16:33)    BP: 156/93 (03-13-25 @ 07:24) (126/80 - 156/93)Vital Signs Last 24 Hrs  T(C): 36.5 (03-13-25 @ 07:24), Max: 36.5 (03-12-25 @ 15:43)  T(F): 97.7 (03-13-25 @ 07:24), Max: 97.7 (03-12-25 @ 15:43)  HR: 78 (03-13-25 @ 07:24) (76 - 78)  BP: 156/93 (03-13-25 @ 07:24) (149/93 - 156/93)  BP(mean): --  RR: --  SpO2: --      Lipid Panel: Date/Time: 03-11-25 @ 08:05  Cholesterol, Serum: 152  LDL Cholesterol Calculated: 63  HDL Cholesterol, Serum: 61  Total Cholesterol/HDL Ration Measurement: --  Triglycerides, Serum: 172

## 2025-03-13 NOTE — BH INPATIENT PSYCHIATRY PROGRESS NOTE - NSBHATTESTBILLING_PSY_A_CORE
47682-Juxqwllcpb OBS or IP - moderate complexity OR 35-49 mins
41537-Pelibyrsgx OBS or IP - low complexity OR 25-34 mins
02262-Jpssnfclqi OBS or IP - low complexity OR 25-34 mins
25470-Fdfhbaxzbv OBS or IP - low complexity OR 25-34 mins
34721-Mujpcwehyq OBS or IP - low complexity OR 25-34 mins
49743-Ooacenygkc OBS or IP - low complexity OR 25-34 mins
26428-Jktgotmpsv OBS or IP - low complexity OR 25-34 mins
58983-Iqsngfdjuj OBS or IP - low complexity OR 25-34 mins
Initial
92718-Dxryezlnon OBS or IP - moderate complexity OR 35-49 mins
73179-Axfgrsxuvx OBS or IP - low complexity OR 25-34 mins
18447-Wxkutfenwn OBS or IP - low complexity OR 25-34 mins
11106-Yxpjndvlse OBS or IP - low complexity OR 25-34 mins
72377-Mmjikygmqq OBS or IP - low complexity OR 25-34 mins

## 2025-03-13 NOTE — BH INPATIENT PSYCHIATRY PROGRESS NOTE - NSTXDEPRESDATEEST_PSY_ALL_CORE
26-Feb-2025
27-Mar-2025
27-Mar-2025
26-Feb-2025
27-Mar-2025
26-Feb-2025
27-Mar-2025
27-Mar-2025
26-Feb-2025

## 2025-03-13 NOTE — BH INPATIENT PSYCHIATRY PROGRESS NOTE - OTHER
Appears to be responding to internal stimuli at times, which appears to be chronic but denies and A/V hallucinations

## 2025-03-14 NOTE — BH SOCIAL WORK CONFIRMATION FOLLOW UP NOTE - NSCOMMENTS_PSY_ALL_CORE
As per : [facility] contact call attempted by  (3/14); patient is unreachable by phone.    As per : [facility] contact call attempted by  (3/14); patient is unreachable by phone.     Pt did not attend her appt with [facility] scheduled for 3/14 at 1pm. Pt was not at the shelter; unable to send mobile crisis as pt's whereabouts are unknown.

## 2025-03-18 DIAGNOSIS — F25.9 SCHIZOAFFECTIVE DISORDER, UNSPECIFIED: ICD-10-CM

## 2025-03-18 DIAGNOSIS — R45.851 SUICIDAL IDEATIONS: ICD-10-CM

## 2025-03-18 DIAGNOSIS — Z86.73 PERSONAL HISTORY OF TRANSIENT ISCHEMIC ATTACK (TIA), AND CEREBRAL INFARCTION WITHOUT RESIDUAL DEFICITS: ICD-10-CM

## 2025-03-18 DIAGNOSIS — Z95.1 PRESENCE OF AORTOCORONARY BYPASS GRAFT: ICD-10-CM

## 2025-03-18 DIAGNOSIS — I25.10 ATHEROSCLEROTIC HEART DISEASE OF NATIVE CORONARY ARTERY WITHOUT ANGINA PECTORIS: ICD-10-CM

## 2025-03-18 DIAGNOSIS — E03.9 HYPOTHYROIDISM, UNSPECIFIED: ICD-10-CM

## 2025-03-18 DIAGNOSIS — Z79.84 LONG TERM (CURRENT) USE OF ORAL HYPOGLYCEMIC DRUGS: ICD-10-CM

## 2025-03-18 DIAGNOSIS — Z95.3 PRESENCE OF XENOGENIC HEART VALVE: ICD-10-CM

## 2025-03-18 DIAGNOSIS — Z85.6 PERSONAL HISTORY OF LEUKEMIA: ICD-10-CM

## 2025-03-18 DIAGNOSIS — E11.9 TYPE 2 DIABETES MELLITUS WITHOUT COMPLICATIONS: ICD-10-CM

## 2025-03-18 DIAGNOSIS — Z79.82 LONG TERM (CURRENT) USE OF ASPIRIN: ICD-10-CM

## 2025-03-18 NOTE — BH INPATIENT PSYCHIATRY DISCHARGE NOTE - NSBHMSETHTASSOC_PSY_A_CORE
Patient called back and writer informed of below. Patient stated that she is currently using the Betamethasone lotion and she had to stop it because it does not work and also causing a burning sensation in skin. Writer informed patient that ointment was sent instead but she will hold off on picking it up as she wants to be seen first and evaluated before alternative is sent.    Appointment has been scheduled on 3-20-25 with Marisol ROLDAN. No further questions from patient at this time.   Normal

## 2025-03-25 NOTE — ED PROVIDER NOTE - IMAGING STUDIES QUESTION 2 - PERFORMED INDEPENDENT VISUALIZATION
[Dear  ___] : Dear  [unfilled], [( Thank you for referring [unfilled] for consultation for _____ )] : Thank you for referring [unfilled] for consultation for [unfilled] [Please see my note below.] : Please see my note below. [Consult Closing:] : Thank you very much for allowing me to participate in the care of this patient.  If you have any questions, please do not hesitate to contact me. [Sincerely,] : Sincerely, [FreeTextEntry3] : Yelitza Vargas [place] [address] Yes

## 2025-04-17 NOTE — DISCHARGE NOTE BEHAVIORAL HEALTH - NSBHDCMEDSFT_PSY_A_CORE
[facility]    Continuous Video Electroencephalogram Report          Patient Name: Sen Vasquez  MRN: [id number]  #: [id number]  Date of Service: 07:00 AM on 4/17/2025 to 07:00 on 04/18/25  Total Recording Time: 23 hours, 54 minutes  Referring Provider: Daniel Robles D.O.    INDICATION:  Sen Vasquez 79 y.o. male presenting with status epilepticus    CURRENT ANTI-SEIZURE AND OTHER PERTINENT MEDICATIONS:   Keppra  Vimpat  Propofol gtt  Versed gtt      TECHNIQUE: CVEEG was set up by a Neurodiagnostic technologist who performed education to the patient and staff. A minimum of 23 electrodes and 23 channel recording was setup and performed by Neurodiagnostic technologist, in accordance with the international 10-20 system. Impedence, electrode integrity, and technical impressions were documented a minimum of every 2-24 hour period by a Neurodiagnostic Technologist and reviewed by Interpreting Physician. The study was reviewed in bipolar and referential montages. The recording examined the patient in the sedated/comatose state(s).     DESCRIPTION OF THE RECORD:  A discontinuous background was present throughout the recording, with a burst-suppression ratio initially of 30-40% with a trend to 80% over the morning hours.     N2 sleep transients were not observed.     ACTIVATION PROCEDURES:   NA    ICTAL AND INTERICTAL FINDINGS:   1) Frequent left lateralized periodic discharges within the bursts initially, no longer observed after approximately 12:00 PM  2) No seizures       EKG: Single lead EKG regular    EVENTS:  As above    INTERPRETATION:    1) Frequent left lateralized periodic discharges within the bursts, initially, no longer observed after approximately 12:00 PM. This finding indicates an ongoing predisposition for seizures to arise from the left hemisphere and it was subsequently suppressed when burst suppression of approximately 80% was reached.   2) A discontinuous background was present throughout the recording, with 
Unable to reach patient for call back after patient's follow up appointment with PCP.   KRISTINAM with call back number for patient to call if needed   If no voicemail available call attempts x 2 were made to contact the patient to assist with any questions or concerns patient may have.     
a burst-suppression ratio initially of 30-40% with a trend to 80% over the morning hours. These findings are consistent with severe cerebral dysfunction with sedative/medication effects contributing.   3) No seizures.     Comments: compared to previous recording, there were no further seizures and adequate burst suppression was achieved.       Mellissa Ventura M.D.  Epileptologist/Neurologist  [facility]      
Abilify: Patient tolerated the medication well, denied side effects.

## 2025-04-27 NOTE — BH INPATIENT PSYCHIATRY PROGRESS NOTE - NS ED BHA REVIEW OF ED CHART VITAL SIGNS REVIEWED
Yes
Yes
disoriented to place/disoriented to time/situation
Yes
Yes
No
Yes

## 2025-06-10 NOTE — DISCHARGE NOTE BEHAVIORAL HEALTH - REASON FOR REFUSAL (REFER PATIENT TO HEALTHCARE PROVIDER FOR FOLLOW-UP):
COLONOSCOPY PROCEDURE NOTE    DATE OF SERVICE: 6/10/2025    SURGEON: LION Gonzalez MD     PRE-OP DIAGNOSIS:    Screening for colon cancer     POST-OP DIAGNOSIS:    Normal Exam    PROCEDURE:   Colonoscopy    ASSISTANT:  Bassemulator: Princess Freedman RN  Scrub Person: Little Chang    ANESTHESIA:  MAC                            MACCRNA Independent: May Salazar APRN CRNA    INDICATION FOR THE PROCEDURE: The patient is a 47 year old female with average risk for colon cancer. The patient has no other complaints.  After explaining the risks to include bleeding, perforation, potential inability to reach the cecum the patient wishes to proceed.    PROCEDURE: After adequate sedation, the patient was in the left lateral decubitus position.  Rectal exam was performed.  There was normal tone and no palpable masses.  The colonoscope was introduced into the rectum and advanced to the cecum with Mild difficulty.  The patient's prep was excellent.  The terminal cecum was reached.  The cecum, ascending, transverse, descending and sigmoid colon were grossly unremarkable .  The scope was retroflexed in the rectum.  The anorectal junction was unremarkable.  The scope was straightened and removed.  The patient tolerated the procedure well.     ESTIMATED BLOOD LOSS: none    COMPLICATIONS:  None    TISSUE REMOVED:  No    RECOMMEND:    Follow-up in 10 years      LION Gonzalez MD         
I don't want it

## 2025-07-18 NOTE — ED BEHAVIORAL HEALTH ASSESSMENT NOTE - NSBHMSEKNOW_PSY_A_CORE
What Type Of Note Output Would You Prefer (Optional)?: Standard Output Hpi Title: Evaluation of Skin Lesions How Severe Are Your Spot(S)?: mild Have Your Spot(S) Been Treated In The Past?: has not been treated Additional History: Pt has skin tags they would like removed Normal

## 2025-07-29 NOTE — BH INPATIENT PSYCHIATRY ASSESSMENT NOTE - NSTREATMENTCERTY_PSY_ALL_CORE
Date/Time:  7/29/2025 10:54 AM    Update: No return call received from patient. Per [facility] patient is currently at Pulmonary Rehab appt. Will re attempt in 1 hour.   That inpatient services furnished since the previous certification or recertification were, and continue to be required: for treatment that could reasonably be expected to improve the patient's condition; or, for diagnostic study;    That the hospital records show that the services furnished were: intensive treatment services; admission and related services necessary for diagnostic study or equivalent services;    That the patient continues to need, on a daily basis active inpatient treatment furnished directly by or requiring the supervision of inpatient psychiatric facility personnel.

## 2025-08-25 ENCOUNTER — INPATIENT (INPATIENT)
Facility: HOSPITAL | Age: 65
LOS: 1 days | Discharge: HOME | DRG: 948 | End: 2025-08-27
Attending: STUDENT IN AN ORGANIZED HEALTH CARE EDUCATION/TRAINING PROGRAM | Admitting: STUDENT IN AN ORGANIZED HEALTH CARE EDUCATION/TRAINING PROGRAM
Payer: MEDICARE

## 2025-08-25 VITALS
OXYGEN SATURATION: 99 % | SYSTOLIC BLOOD PRESSURE: 140 MMHG | DIASTOLIC BLOOD PRESSURE: 88 MMHG | WEIGHT: 192.02 LBS | HEART RATE: 101 BPM | RESPIRATION RATE: 18 BRPM | TEMPERATURE: 98 F

## 2025-08-25 DIAGNOSIS — E03.9 HYPOTHYROIDISM, UNSPECIFIED: ICD-10-CM

## 2025-08-25 DIAGNOSIS — Z95.2 PRESENCE OF PROSTHETIC HEART VALVE: Chronic | ICD-10-CM

## 2025-08-25 DIAGNOSIS — Z88.0 ALLERGY STATUS TO PENICILLIN: ICD-10-CM

## 2025-08-25 DIAGNOSIS — Z86.73 PERSONAL HISTORY OF TRANSIENT ISCHEMIC ATTACK (TIA), AND CEREBRAL INFARCTION WITHOUT RESIDUAL DEFICITS: ICD-10-CM

## 2025-08-25 DIAGNOSIS — R29.705 NIHSS SCORE 5: ICD-10-CM

## 2025-08-25 DIAGNOSIS — Z95.1 PRESENCE OF AORTOCORONARY BYPASS GRAFT: ICD-10-CM

## 2025-08-25 DIAGNOSIS — I25.10 ATHEROSCLEROTIC HEART DISEASE OF NATIVE CORONARY ARTERY WITHOUT ANGINA PECTORIS: ICD-10-CM

## 2025-08-25 DIAGNOSIS — R29.898 OTHER SYMPTOMS AND SIGNS INVOLVING THE MUSCULOSKELETAL SYSTEM: ICD-10-CM

## 2025-08-25 DIAGNOSIS — E11.9 TYPE 2 DIABETES MELLITUS WITHOUT COMPLICATIONS: ICD-10-CM

## 2025-08-25 DIAGNOSIS — F25.1 SCHIZOAFFECTIVE DISORDER, DEPRESSIVE TYPE: ICD-10-CM

## 2025-08-25 DIAGNOSIS — I10 ESSENTIAL (PRIMARY) HYPERTENSION: ICD-10-CM

## 2025-08-25 DIAGNOSIS — R20.0 ANESTHESIA OF SKIN: ICD-10-CM

## 2025-08-25 DIAGNOSIS — Z79.84 LONG TERM (CURRENT) USE OF ORAL HYPOGLYCEMIC DRUGS: ICD-10-CM

## 2025-08-25 DIAGNOSIS — Z79.82 LONG TERM (CURRENT) USE OF ASPIRIN: ICD-10-CM

## 2025-08-25 DIAGNOSIS — Z95.2 PRESENCE OF PROSTHETIC HEART VALVE: ICD-10-CM

## 2025-08-25 DIAGNOSIS — R20.2 PARESTHESIA OF SKIN: ICD-10-CM

## 2025-08-25 DIAGNOSIS — C91.41 HAIRY CELL LEUKEMIA, IN REMISSION: ICD-10-CM

## 2025-08-25 DIAGNOSIS — Z79.890 HORMONE REPLACEMENT THERAPY: ICD-10-CM

## 2025-08-25 DIAGNOSIS — F25.9 SCHIZOAFFECTIVE DISORDER, UNSPECIFIED: ICD-10-CM

## 2025-08-25 DIAGNOSIS — R53.1 WEAKNESS: ICD-10-CM

## 2025-08-25 DIAGNOSIS — H26.9 UNSPECIFIED CATARACT: ICD-10-CM

## 2025-08-25 DIAGNOSIS — R47.1 DYSARTHRIA AND ANARTHRIA: ICD-10-CM

## 2025-08-25 LAB
ALBUMIN SERPL ELPH-MCNC: 4.2 G/DL — SIGNIFICANT CHANGE UP (ref 3.5–5.2)
ALP SERPL-CCNC: 129 U/L — HIGH (ref 30–115)
ALT FLD-CCNC: 18 U/L — SIGNIFICANT CHANGE UP (ref 0–41)
ANION GAP SERPL CALC-SCNC: 11 MMOL/L — SIGNIFICANT CHANGE UP (ref 7–14)
APTT BLD: 25.9 SEC — LOW (ref 27–39.2)
AST SERPL-CCNC: 16 U/L — SIGNIFICANT CHANGE UP (ref 0–41)
BASOPHILS # BLD AUTO: 0.05 K/UL — SIGNIFICANT CHANGE UP (ref 0–0.2)
BASOPHILS NFR BLD AUTO: 0.8 % — SIGNIFICANT CHANGE UP (ref 0–1)
BILIRUB SERPL-MCNC: 0.2 MG/DL — SIGNIFICANT CHANGE UP (ref 0.2–1.2)
BUN SERPL-MCNC: 19 MG/DL — SIGNIFICANT CHANGE UP (ref 10–20)
CALCIUM SERPL-MCNC: 9.1 MG/DL — SIGNIFICANT CHANGE UP (ref 8.4–10.5)
CHLORIDE SERPL-SCNC: 103 MMOL/L — SIGNIFICANT CHANGE UP (ref 98–110)
CO2 SERPL-SCNC: 23 MMOL/L — SIGNIFICANT CHANGE UP (ref 17–32)
CREAT SERPL-MCNC: 0.9 MG/DL — SIGNIFICANT CHANGE UP (ref 0.7–1.5)
EGFR: 72 ML/MIN/1.73M2 — SIGNIFICANT CHANGE UP
EGFR: 72 ML/MIN/1.73M2 — SIGNIFICANT CHANGE UP
EOSINOPHIL # BLD AUTO: 0.1 K/UL — SIGNIFICANT CHANGE UP (ref 0–0.7)
EOSINOPHIL NFR BLD AUTO: 1.5 % — SIGNIFICANT CHANGE UP (ref 0–8)
GLUCOSE BLDC GLUCOMTR-MCNC: 177 MG/DL — HIGH (ref 70–99)
GLUCOSE SERPL-MCNC: 232 MG/DL — HIGH (ref 70–99)
HCT VFR BLD CALC: 33.8 % — LOW (ref 37–47)
HGB BLD-MCNC: 11.3 G/DL — LOW (ref 12–16)
IMM GRANULOCYTES NFR BLD AUTO: 0.8 % — HIGH (ref 0.1–0.3)
INR BLD: 0.92 RATIO — SIGNIFICANT CHANGE UP (ref 0.65–1.3)
LYMPHOCYTES # BLD AUTO: 2.09 K/UL — SIGNIFICANT CHANGE UP (ref 1.2–3.4)
LYMPHOCYTES # BLD AUTO: 32.2 % — SIGNIFICANT CHANGE UP (ref 20.5–51.1)
MCHC RBC-ENTMCNC: 29.9 PG — SIGNIFICANT CHANGE UP (ref 27–31)
MCHC RBC-ENTMCNC: 33.4 G/DL — SIGNIFICANT CHANGE UP (ref 32–37)
MCV RBC AUTO: 89.4 FL — SIGNIFICANT CHANGE UP (ref 81–99)
MONOCYTES # BLD AUTO: 0.5 K/UL — SIGNIFICANT CHANGE UP (ref 0.1–0.6)
MONOCYTES NFR BLD AUTO: 7.7 % — SIGNIFICANT CHANGE UP (ref 1.7–9.3)
NEUTROPHILS # BLD AUTO: 3.7 K/UL — SIGNIFICANT CHANGE UP (ref 1.4–6.5)
NEUTROPHILS NFR BLD AUTO: 57 % — SIGNIFICANT CHANGE UP (ref 42.2–75.2)
NRBC BLD AUTO-RTO: 0 /100 WBCS — SIGNIFICANT CHANGE UP (ref 0–0)
PLATELET # BLD AUTO: 206 K/UL — SIGNIFICANT CHANGE UP (ref 130–400)
PMV BLD: 9.3 FL — SIGNIFICANT CHANGE UP (ref 7.4–10.4)
POTASSIUM SERPL-MCNC: 4.3 MMOL/L — SIGNIFICANT CHANGE UP (ref 3.5–5)
POTASSIUM SERPL-SCNC: 4.3 MMOL/L — SIGNIFICANT CHANGE UP (ref 3.5–5)
PROT SERPL-MCNC: 6.9 G/DL — SIGNIFICANT CHANGE UP (ref 6–8)
PROTHROM AB SERPL-ACNC: 10.8 SEC — SIGNIFICANT CHANGE UP (ref 9.95–12.87)
RBC # BLD: 3.78 M/UL — LOW (ref 4.2–5.4)
RBC # FLD: 13.5 % — SIGNIFICANT CHANGE UP (ref 11.5–14.5)
SODIUM SERPL-SCNC: 138 MMOL/L — SIGNIFICANT CHANGE UP (ref 135–146)
TROPONIN T, HIGH SENSITIVITY RESULT: 10 NG/L — SIGNIFICANT CHANGE UP
WBC # BLD: 6.49 K/UL — SIGNIFICANT CHANGE UP (ref 4.8–10.8)
WBC # FLD AUTO: 6.49 K/UL — SIGNIFICANT CHANGE UP (ref 4.8–10.8)

## 2025-08-25 PROCEDURE — 70496 CT ANGIOGRAPHY HEAD: CPT | Mod: 26

## 2025-08-25 PROCEDURE — 92610 EVALUATE SWALLOWING FUNCTION: CPT | Mod: GN

## 2025-08-25 PROCEDURE — 93306 TTE W/DOPPLER COMPLETE: CPT

## 2025-08-25 PROCEDURE — 0042T: CPT

## 2025-08-25 PROCEDURE — 36415 COLL VENOUS BLD VENIPUNCTURE: CPT

## 2025-08-25 PROCEDURE — 80053 COMPREHEN METABOLIC PANEL: CPT

## 2025-08-25 PROCEDURE — 83735 ASSAY OF MAGNESIUM: CPT

## 2025-08-25 PROCEDURE — 70450 CT HEAD/BRAIN W/O DYE: CPT | Mod: 26,XU

## 2025-08-25 PROCEDURE — 82962 GLUCOSE BLOOD TEST: CPT

## 2025-08-25 PROCEDURE — 83036 HEMOGLOBIN GLYCOSYLATED A1C: CPT

## 2025-08-25 PROCEDURE — 99291 CRITICAL CARE FIRST HOUR: CPT

## 2025-08-25 PROCEDURE — 84100 ASSAY OF PHOSPHORUS: CPT

## 2025-08-25 PROCEDURE — 93010 ELECTROCARDIOGRAM REPORT: CPT

## 2025-08-25 PROCEDURE — 70551 MRI BRAIN STEM W/O DYE: CPT

## 2025-08-25 PROCEDURE — 70498 CT ANGIOGRAPHY NECK: CPT | Mod: 26

## 2025-08-25 PROCEDURE — 80061 LIPID PANEL: CPT

## 2025-08-25 PROCEDURE — 84443 ASSAY THYROID STIM HORMONE: CPT

## 2025-08-25 PROCEDURE — 85025 COMPLETE CBC W/AUTO DIFF WBC: CPT

## 2025-08-25 RX ORDER — DEXTROSE 50 % IN WATER 50 %
25 SYRINGE (ML) INTRAVENOUS ONCE
Refills: 0 | Status: DISCONTINUED | OUTPATIENT
Start: 2025-08-25 | End: 2025-08-27

## 2025-08-25 RX ORDER — SODIUM CHLORIDE 9 G/1000ML
1000 INJECTION, SOLUTION INTRAVENOUS
Refills: 0 | Status: DISCONTINUED | OUTPATIENT
Start: 2025-08-25 | End: 2025-08-27

## 2025-08-25 RX ORDER — CLOPIDOGREL BISULFATE 75 MG/1
75 TABLET, FILM COATED ORAL DAILY
Refills: 0 | Status: DISCONTINUED | OUTPATIENT
Start: 2025-08-26 | End: 2025-08-27

## 2025-08-25 RX ORDER — ASPIRIN 325 MG
81 TABLET ORAL DAILY
Refills: 0 | Status: DISCONTINUED | OUTPATIENT
Start: 2025-08-26 | End: 2025-08-27

## 2025-08-25 RX ORDER — CLOPIDOGREL BISULFATE 75 MG/1
300 TABLET, FILM COATED ORAL ONCE
Refills: 0 | Status: COMPLETED | OUTPATIENT
Start: 2025-08-25 | End: 2025-08-25

## 2025-08-25 RX ORDER — LEVOTHYROXINE SODIUM 300 MCG
25 TABLET ORAL DAILY
Refills: 0 | Status: DISCONTINUED | OUTPATIENT
Start: 2025-08-26 | End: 2025-08-27

## 2025-08-25 RX ORDER — SITAGLIPTIN 100 MG/1
1 TABLET, FILM COATED ORAL
Refills: 0 | DISCHARGE

## 2025-08-25 RX ORDER — INSULIN LISPRO 100 U/ML
INJECTION, SOLUTION INTRAVENOUS; SUBCUTANEOUS
Refills: 0 | Status: DISCONTINUED | OUTPATIENT
Start: 2025-08-25 | End: 2025-08-27

## 2025-08-25 RX ORDER — DEXTROSE 50 % IN WATER 50 %
12.5 SYRINGE (ML) INTRAVENOUS ONCE
Refills: 0 | Status: DISCONTINUED | OUTPATIENT
Start: 2025-08-25 | End: 2025-08-27

## 2025-08-25 RX ORDER — GLUCAGON 3 MG/1
1 POWDER NASAL ONCE
Refills: 0 | Status: DISCONTINUED | OUTPATIENT
Start: 2025-08-25 | End: 2025-08-27

## 2025-08-25 RX ORDER — ARIPIPRAZOLE 2 MG/1
20 TABLET ORAL DAILY
Refills: 0 | Status: DISCONTINUED | OUTPATIENT
Start: 2025-08-26 | End: 2025-08-27

## 2025-08-25 RX ORDER — DEXTROSE 50 % IN WATER 50 %
15 SYRINGE (ML) INTRAVENOUS ONCE
Refills: 0 | Status: DISCONTINUED | OUTPATIENT
Start: 2025-08-25 | End: 2025-08-27

## 2025-08-25 RX ORDER — ENOXAPARIN SODIUM 100 MG/ML
40 INJECTION SUBCUTANEOUS EVERY 24 HOURS
Refills: 0 | Status: DISCONTINUED | OUTPATIENT
Start: 2025-08-25 | End: 2025-08-27

## 2025-08-25 RX ORDER — ATORVASTATIN CALCIUM 80 MG/1
80 TABLET, FILM COATED ORAL AT BEDTIME
Refills: 0 | Status: DISCONTINUED | OUTPATIENT
Start: 2025-08-25 | End: 2025-08-27

## 2025-08-25 RX ADMIN — ENOXAPARIN SODIUM 40 MILLIGRAM(S): 100 INJECTION SUBCUTANEOUS at 22:32

## 2025-08-25 RX ADMIN — ATORVASTATIN CALCIUM 80 MILLIGRAM(S): 80 TABLET, FILM COATED ORAL at 22:32

## 2025-08-25 RX ADMIN — CLOPIDOGREL BISULFATE 300 MILLIGRAM(S): 75 TABLET, FILM COATED ORAL at 22:31

## 2025-08-26 ENCOUNTER — RESULT REVIEW (OUTPATIENT)
Age: 65
End: 2025-08-26

## 2025-08-26 LAB
A1C WITH ESTIMATED AVERAGE GLUCOSE RESULT: 7.2 % — HIGH (ref 4–5.6)
ALBUMIN SERPL ELPH-MCNC: 3.8 G/DL — SIGNIFICANT CHANGE UP (ref 3.5–5.2)
ALP SERPL-CCNC: 120 U/L — HIGH (ref 30–115)
ALT FLD-CCNC: 16 U/L — SIGNIFICANT CHANGE UP (ref 0–41)
ANION GAP SERPL CALC-SCNC: 16 MMOL/L — HIGH (ref 7–14)
AST SERPL-CCNC: 15 U/L — SIGNIFICANT CHANGE UP (ref 0–41)
BASOPHILS # BLD AUTO: 0.04 K/UL — SIGNIFICANT CHANGE UP (ref 0–0.2)
BASOPHILS NFR BLD AUTO: 0.8 % — SIGNIFICANT CHANGE UP (ref 0–1)
BILIRUB SERPL-MCNC: 0.2 MG/DL — SIGNIFICANT CHANGE UP (ref 0.2–1.2)
BUN SERPL-MCNC: 14 MG/DL — SIGNIFICANT CHANGE UP (ref 10–20)
CALCIUM SERPL-MCNC: 8.6 MG/DL — SIGNIFICANT CHANGE UP (ref 8.4–10.5)
CHLORIDE SERPL-SCNC: 103 MMOL/L — SIGNIFICANT CHANGE UP (ref 98–110)
CHOLEST SERPL-MCNC: 202 MG/DL — HIGH
CO2 SERPL-SCNC: 19 MMOL/L — SIGNIFICANT CHANGE UP (ref 17–32)
CREAT SERPL-MCNC: 0.8 MG/DL — SIGNIFICANT CHANGE UP (ref 0.7–1.5)
EGFR: 83 ML/MIN/1.73M2 — SIGNIFICANT CHANGE UP
EGFR: 83 ML/MIN/1.73M2 — SIGNIFICANT CHANGE UP
EOSINOPHIL # BLD AUTO: 0.11 K/UL — SIGNIFICANT CHANGE UP (ref 0–0.7)
EOSINOPHIL NFR BLD AUTO: 2.3 % — SIGNIFICANT CHANGE UP (ref 0–8)
ESTIMATED AVERAGE GLUCOSE: 160 MG/DL — HIGH (ref 68–114)
GLUCOSE BLDC GLUCOMTR-MCNC: 168 MG/DL — HIGH (ref 70–99)
GLUCOSE BLDC GLUCOMTR-MCNC: 179 MG/DL — HIGH (ref 70–99)
GLUCOSE BLDC GLUCOMTR-MCNC: 188 MG/DL — HIGH (ref 70–99)
GLUCOSE BLDC GLUCOMTR-MCNC: 190 MG/DL — HIGH (ref 70–99)
GLUCOSE BLDC GLUCOMTR-MCNC: 204 MG/DL — HIGH (ref 70–99)
GLUCOSE BLDC GLUCOMTR-MCNC: 220 MG/DL — HIGH (ref 70–99)
GLUCOSE SERPL-MCNC: 163 MG/DL — HIGH (ref 70–99)
HCT VFR BLD CALC: 34.8 % — LOW (ref 37–47)
HDLC SERPL-MCNC: 50 MG/DL — LOW
HGB BLD-MCNC: 11.5 G/DL — LOW (ref 12–16)
IMM GRANULOCYTES NFR BLD AUTO: 1.2 % — HIGH (ref 0.1–0.3)
LDLC SERPL-MCNC: 108 MG/DL — HIGH
LIPID PNL WITH DIRECT LDL SERPL: 108 MG/DL — HIGH
LYMPHOCYTES # BLD AUTO: 1.53 K/UL — SIGNIFICANT CHANGE UP (ref 1.2–3.4)
LYMPHOCYTES # BLD AUTO: 31.8 % — SIGNIFICANT CHANGE UP (ref 20.5–51.1)
MAGNESIUM SERPL-MCNC: 2.3 MG/DL — SIGNIFICANT CHANGE UP (ref 1.8–2.4)
MCHC RBC-ENTMCNC: 29.9 PG — SIGNIFICANT CHANGE UP (ref 27–31)
MCHC RBC-ENTMCNC: 33 G/DL — SIGNIFICANT CHANGE UP (ref 32–37)
MCV RBC AUTO: 90.4 FL — SIGNIFICANT CHANGE UP (ref 81–99)
MONOCYTES # BLD AUTO: 0.4 K/UL — SIGNIFICANT CHANGE UP (ref 0.1–0.6)
MONOCYTES NFR BLD AUTO: 8.3 % — SIGNIFICANT CHANGE UP (ref 1.7–9.3)
NEUTROPHILS # BLD AUTO: 2.67 K/UL — SIGNIFICANT CHANGE UP (ref 1.4–6.5)
NEUTROPHILS NFR BLD AUTO: 55.6 % — SIGNIFICANT CHANGE UP (ref 42.2–75.2)
NONHDLC SERPL-MCNC: 152 MG/DL — HIGH
NRBC BLD AUTO-RTO: 0 /100 WBCS — SIGNIFICANT CHANGE UP (ref 0–0)
PLATELET # BLD AUTO: 186 K/UL — SIGNIFICANT CHANGE UP (ref 130–400)
PMV BLD: 9.8 FL — SIGNIFICANT CHANGE UP (ref 7.4–10.4)
POTASSIUM SERPL-MCNC: 4.2 MMOL/L — SIGNIFICANT CHANGE UP (ref 3.5–5)
POTASSIUM SERPL-SCNC: 4.2 MMOL/L — SIGNIFICANT CHANGE UP (ref 3.5–5)
PROT SERPL-MCNC: 6.4 G/DL — SIGNIFICANT CHANGE UP (ref 6–8)
RBC # BLD: 3.85 M/UL — LOW (ref 4.2–5.4)
RBC # FLD: 13.6 % — SIGNIFICANT CHANGE UP (ref 11.5–14.5)
SODIUM SERPL-SCNC: 138 MMOL/L — SIGNIFICANT CHANGE UP (ref 135–146)
TRIGL SERPL-MCNC: 254 MG/DL — HIGH
TSH SERPL-MCNC: 4.78 UIU/ML — HIGH (ref 0.27–4.2)
WBC # BLD: 4.81 K/UL — SIGNIFICANT CHANGE UP (ref 4.8–10.8)
WBC # FLD AUTO: 4.81 K/UL — SIGNIFICANT CHANGE UP (ref 4.8–10.8)

## 2025-08-26 PROCEDURE — 99223 1ST HOSP IP/OBS HIGH 75: CPT

## 2025-08-26 PROCEDURE — 70551 MRI BRAIN STEM W/O DYE: CPT | Mod: 26

## 2025-08-26 PROCEDURE — 93306 TTE W/DOPPLER COMPLETE: CPT | Mod: 26

## 2025-08-26 RX ADMIN — INSULIN LISPRO 1: 100 INJECTION, SOLUTION INTRAVENOUS; SUBCUTANEOUS at 18:52

## 2025-08-26 RX ADMIN — ENOXAPARIN SODIUM 40 MILLIGRAM(S): 100 INJECTION SUBCUTANEOUS at 21:53

## 2025-08-26 RX ADMIN — CLOPIDOGREL BISULFATE 75 MILLIGRAM(S): 75 TABLET, FILM COATED ORAL at 12:36

## 2025-08-26 RX ADMIN — Medication 25 MICROGRAM(S): at 05:23

## 2025-08-26 RX ADMIN — Medication 81 MILLIGRAM(S): at 12:36

## 2025-08-26 RX ADMIN — INSULIN LISPRO 2: 100 INJECTION, SOLUTION INTRAVENOUS; SUBCUTANEOUS at 21:52

## 2025-08-26 RX ADMIN — ATORVASTATIN CALCIUM 80 MILLIGRAM(S): 80 TABLET, FILM COATED ORAL at 21:53

## 2025-08-26 RX ADMIN — ARIPIPRAZOLE 20 MILLIGRAM(S): 2 TABLET ORAL at 12:36

## 2025-08-26 RX ADMIN — INSULIN LISPRO 1: 100 INJECTION, SOLUTION INTRAVENOUS; SUBCUTANEOUS at 12:46

## 2025-08-26 RX ADMIN — INSULIN LISPRO 1: 100 INJECTION, SOLUTION INTRAVENOUS; SUBCUTANEOUS at 08:39

## 2025-08-27 ENCOUNTER — TRANSCRIPTION ENCOUNTER (OUTPATIENT)
Age: 65
End: 2025-08-27

## 2025-08-27 VITALS
SYSTOLIC BLOOD PRESSURE: 152 MMHG | RESPIRATION RATE: 18 BRPM | TEMPERATURE: 98 F | OXYGEN SATURATION: 97 % | HEART RATE: 78 BPM | DIASTOLIC BLOOD PRESSURE: 98 MMHG

## 2025-08-27 LAB
ALBUMIN SERPL ELPH-MCNC: 3.7 G/DL — SIGNIFICANT CHANGE UP (ref 3.5–5.2)
ALP SERPL-CCNC: 134 U/L — HIGH (ref 30–115)
ALT FLD-CCNC: 18 U/L — SIGNIFICANT CHANGE UP (ref 0–41)
ANION GAP SERPL CALC-SCNC: 12 MMOL/L — SIGNIFICANT CHANGE UP (ref 7–14)
AST SERPL-CCNC: 18 U/L — SIGNIFICANT CHANGE UP (ref 0–41)
BASOPHILS # BLD AUTO: 0.05 K/UL — SIGNIFICANT CHANGE UP (ref 0–0.2)
BASOPHILS NFR BLD AUTO: 0.8 % — SIGNIFICANT CHANGE UP (ref 0–1)
BILIRUB SERPL-MCNC: 0.4 MG/DL — SIGNIFICANT CHANGE UP (ref 0.2–1.2)
BUN SERPL-MCNC: 13 MG/DL — SIGNIFICANT CHANGE UP (ref 10–20)
CALCIUM SERPL-MCNC: 8.7 MG/DL — SIGNIFICANT CHANGE UP (ref 8.4–10.5)
CHLORIDE SERPL-SCNC: 106 MMOL/L — SIGNIFICANT CHANGE UP (ref 98–110)
CO2 SERPL-SCNC: 22 MMOL/L — SIGNIFICANT CHANGE UP (ref 17–32)
CREAT SERPL-MCNC: 0.8 MG/DL — SIGNIFICANT CHANGE UP (ref 0.7–1.5)
EGFR: 83 ML/MIN/1.73M2 — SIGNIFICANT CHANGE UP
EGFR: 83 ML/MIN/1.73M2 — SIGNIFICANT CHANGE UP
EOSINOPHIL # BLD AUTO: 0.18 K/UL — SIGNIFICANT CHANGE UP (ref 0–0.7)
EOSINOPHIL NFR BLD AUTO: 3 % — SIGNIFICANT CHANGE UP (ref 0–8)
GLUCOSE BLDC GLUCOMTR-MCNC: 168 MG/DL — HIGH (ref 70–99)
GLUCOSE SERPL-MCNC: 147 MG/DL — HIGH (ref 70–99)
HCT VFR BLD CALC: 35.2 % — LOW (ref 37–47)
HGB BLD-MCNC: 11.9 G/DL — LOW (ref 12–16)
IMM GRANULOCYTES NFR BLD AUTO: 1 % — HIGH (ref 0.1–0.3)
LYMPHOCYTES # BLD AUTO: 1.49 K/UL — SIGNIFICANT CHANGE UP (ref 1.2–3.4)
LYMPHOCYTES # BLD AUTO: 25.2 % — SIGNIFICANT CHANGE UP (ref 20.5–51.1)
MAGNESIUM SERPL-MCNC: 2.2 MG/DL — SIGNIFICANT CHANGE UP (ref 1.8–2.4)
MCHC RBC-ENTMCNC: 30 PG — SIGNIFICANT CHANGE UP (ref 27–31)
MCHC RBC-ENTMCNC: 33.8 G/DL — SIGNIFICANT CHANGE UP (ref 32–37)
MCV RBC AUTO: 88.7 FL — SIGNIFICANT CHANGE UP (ref 81–99)
MONOCYTES # BLD AUTO: 0.41 K/UL — SIGNIFICANT CHANGE UP (ref 0.1–0.6)
MONOCYTES NFR BLD AUTO: 6.9 % — SIGNIFICANT CHANGE UP (ref 1.7–9.3)
NEUTROPHILS # BLD AUTO: 3.73 K/UL — SIGNIFICANT CHANGE UP (ref 1.4–6.5)
NEUTROPHILS NFR BLD AUTO: 63.1 % — SIGNIFICANT CHANGE UP (ref 42.2–75.2)
NRBC BLD AUTO-RTO: 0 /100 WBCS — SIGNIFICANT CHANGE UP (ref 0–0)
PHOSPHATE SERPL-MCNC: 3.6 MG/DL — SIGNIFICANT CHANGE UP (ref 2.1–4.9)
PLATELET # BLD AUTO: 188 K/UL — SIGNIFICANT CHANGE UP (ref 130–400)
PMV BLD: 9.3 FL — SIGNIFICANT CHANGE UP (ref 7.4–10.4)
POTASSIUM SERPL-MCNC: 4.9 MMOL/L — SIGNIFICANT CHANGE UP (ref 3.5–5)
POTASSIUM SERPL-SCNC: 4.9 MMOL/L — SIGNIFICANT CHANGE UP (ref 3.5–5)
PROT SERPL-MCNC: 6.4 G/DL — SIGNIFICANT CHANGE UP (ref 6–8)
RBC # BLD: 3.97 M/UL — LOW (ref 4.2–5.4)
RBC # FLD: 13.3 % — SIGNIFICANT CHANGE UP (ref 11.5–14.5)
SODIUM SERPL-SCNC: 140 MMOL/L — SIGNIFICANT CHANGE UP (ref 135–146)
WBC # BLD: 5.92 K/UL — SIGNIFICANT CHANGE UP (ref 4.8–10.8)
WBC # FLD AUTO: 5.92 K/UL — SIGNIFICANT CHANGE UP (ref 4.8–10.8)

## 2025-08-27 PROCEDURE — 99231 SBSQ HOSP IP/OBS SF/LOW 25: CPT

## 2025-08-27 RX ORDER — ASPIRIN 325 MG
1 TABLET ORAL
Qty: 30 | Refills: 2
Start: 2025-08-27 | End: 2025-11-24

## 2025-08-27 RX ORDER — CLOPIDOGREL BISULFATE 75 MG/1
1 TABLET, FILM COATED ORAL
Qty: 19 | Refills: 0
Start: 2025-08-27 | End: 2025-09-14

## 2025-08-27 RX ORDER — ATORVASTATIN CALCIUM 80 MG/1
1 TABLET, FILM COATED ORAL
Qty: 0 | Refills: 0 | DISCHARGE
Start: 2025-08-27

## 2025-08-27 RX ADMIN — Medication 40 MILLIGRAM(S): at 05:05

## 2025-08-27 RX ADMIN — Medication 25 MICROGRAM(S): at 05:05

## 2025-08-28 ENCOUNTER — NON-APPOINTMENT (OUTPATIENT)
Age: 65
End: 2025-08-28